# Patient Record
Sex: FEMALE | Race: WHITE | NOT HISPANIC OR LATINO | Employment: OTHER | URBAN - METROPOLITAN AREA
[De-identification: names, ages, dates, MRNs, and addresses within clinical notes are randomized per-mention and may not be internally consistent; named-entity substitution may affect disease eponyms.]

---

## 2017-01-06 RX ORDER — GLIMEPIRIDE 2 MG/1
TABLET ORAL
Qty: 90 TABLET | Refills: 0 | Status: SHIPPED | OUTPATIENT
Start: 2017-01-06 | End: 2017-02-02 | Stop reason: SDUPTHER

## 2017-01-18 ENCOUNTER — HOSPITAL ENCOUNTER (OUTPATIENT)
Dept: INFUSION THERAPY | Facility: HOSPITAL | Age: 67
Discharge: HOME | End: 2017-01-18
Admitting: INTERNAL MEDICINE

## 2017-01-18 VITALS
SYSTOLIC BLOOD PRESSURE: 176 MMHG | OXYGEN SATURATION: 99 % | DIASTOLIC BLOOD PRESSURE: 91 MMHG | TEMPERATURE: 98.6 F | RESPIRATION RATE: 20 BRPM | HEART RATE: 84 BPM

## 2017-01-18 DIAGNOSIS — N18.4 CHRONIC KIDNEY DISEASE, STAGE IV (SEVERE) (HCC): ICD-10-CM

## 2017-01-18 LAB
25(OH)D3 SERPL-MCNC: 44.5 NG/ML (ref 30–100)
ALBUMIN SERPL-MCNC: 3.8 G/DL (ref 3.5–5.2)
ALBUMIN/GLOB SERPL: 0.9 G/DL
ALP SERPL-CCNC: 72 U/L (ref 39–117)
ALT SERPL W P-5'-P-CCNC: 13 U/L (ref 1–33)
ANION GAP SERPL CALCULATED.3IONS-SCNC: 13.2 MMOL/L
AST SERPL-CCNC: 18 U/L (ref 1–32)
BILIRUB SERPL-MCNC: 0.2 MG/DL (ref 0.1–1.2)
BUN BLD-MCNC: 47 MG/DL (ref 8–23)
BUN/CREAT SERPL: 16.2 (ref 7–25)
CALCIUM SPEC-SCNC: 8.9 MG/DL (ref 8.6–10.5)
CALCIUM SPEC-SCNC: 9.2 MG/DL (ref 8.6–10.5)
CHLORIDE SERPL-SCNC: 103 MMOL/L (ref 98–107)
CO2 SERPL-SCNC: 23.8 MMOL/L (ref 22–29)
CREAT BLD-MCNC: 2.9 MG/DL (ref 0.57–1)
DEPRECATED RDW RBC AUTO: 64.1 FL (ref 37–54)
ERYTHROCYTE [DISTWIDTH] IN BLOOD BY AUTOMATED COUNT: 17.6 % (ref 11.7–13)
FERRITIN SERPL-MCNC: 909.2 NG/ML (ref 13–150)
FOLATE SERPL-MCNC: >20 NG/ML (ref 4.78–24.2)
GFR SERPL CREATININE-BSD FRML MDRD: 16 ML/MIN/1.73
GLOBULIN UR ELPH-MCNC: 4.4 GM/DL
GLUCOSE BLD-MCNC: 342 MG/DL (ref 65–99)
HCT VFR BLD AUTO: 35.8 % (ref 35.6–45.5)
HGB BLD-MCNC: 10.9 G/DL (ref 11.9–15.5)
IRON 24H UR-MRATE: 77 MCG/DL (ref 37–145)
IRON SATN MFR SERPL: 32 % (ref 20–50)
MCH RBC QN AUTO: 30.1 PG (ref 26.9–32)
MCHC RBC AUTO-ENTMCNC: 30.4 G/DL (ref 32.4–36.3)
MCV RBC AUTO: 98.9 FL (ref 80.5–98.2)
PHOSPHATE SERPL-MCNC: 3.5 MG/DL (ref 2.5–4.5)
PLATELET # BLD AUTO: 263 10*3/MM3 (ref 140–500)
PMV BLD AUTO: 10.4 FL (ref 6–12)
POTASSIUM BLD-SCNC: 4.3 MMOL/L (ref 3.5–5.2)
PROT SERPL-MCNC: 8.2 G/DL (ref 6–8.5)
PTH-INTACT SERPL-MCNC: 111.7 PG/ML (ref 15–65)
RBC # BLD AUTO: 3.62 10*6/MM3 (ref 3.9–5.2)
SODIUM BLD-SCNC: 140 MMOL/L (ref 136–145)
TIBC SERPL-MCNC: 244 MCG/DL
TRANSFERRIN SERPL-MCNC: 164 MG/DL (ref 200–360)
URATE SERPL-MCNC: 9.6 MG/DL (ref 2.4–5.7)
VIT B12 BLD-MCNC: 412 PG/ML (ref 211–946)
WBC NRBC COR # BLD: 9.55 10*3/MM3 (ref 4.5–10.7)

## 2017-01-18 PROCEDURE — 96372 THER/PROPH/DIAG INJ SC/IM: CPT

## 2017-01-18 PROCEDURE — 84100 ASSAY OF PHOSPHORUS: CPT | Performed by: INTERNAL MEDICINE

## 2017-01-18 PROCEDURE — 82306 VITAMIN D 25 HYDROXY: CPT | Performed by: INTERNAL MEDICINE

## 2017-01-18 PROCEDURE — 85027 COMPLETE CBC AUTOMATED: CPT | Performed by: INTERNAL MEDICINE

## 2017-01-18 PROCEDURE — 82746 ASSAY OF FOLIC ACID SERUM: CPT | Performed by: INTERNAL MEDICINE

## 2017-01-18 PROCEDURE — 83540 ASSAY OF IRON: CPT | Performed by: INTERNAL MEDICINE

## 2017-01-18 PROCEDURE — 25010000002 EPOETIN ALFA PER 1000 UNITS: Performed by: INTERNAL MEDICINE

## 2017-01-18 PROCEDURE — 84550 ASSAY OF BLOOD/URIC ACID: CPT | Performed by: INTERNAL MEDICINE

## 2017-01-18 PROCEDURE — 36415 COLL VENOUS BLD VENIPUNCTURE: CPT

## 2017-01-18 PROCEDURE — 84466 ASSAY OF TRANSFERRIN: CPT | Performed by: INTERNAL MEDICINE

## 2017-01-18 PROCEDURE — 80053 COMPREHEN METABOLIC PANEL: CPT | Performed by: INTERNAL MEDICINE

## 2017-01-18 PROCEDURE — 82607 VITAMIN B-12: CPT | Performed by: INTERNAL MEDICINE

## 2017-01-18 PROCEDURE — 83970 ASSAY OF PARATHORMONE: CPT | Performed by: INTERNAL MEDICINE

## 2017-01-18 PROCEDURE — 82728 ASSAY OF FERRITIN: CPT | Performed by: INTERNAL MEDICINE

## 2017-01-18 RX ADMIN — ERYTHROPOIETIN 20000 UNITS: 20000 INJECTION, SOLUTION INTRAVENOUS; SUBCUTANEOUS at 13:09

## 2017-01-18 NOTE — PROGRESS NOTES
Pt tolerated injection with no complaints.  Injection site x 1 with band aide applied.  Pt D/C per ambulation with spouse @ 1315.

## 2017-01-18 NOTE — IP AVS SNAPSHOT
Maribeth Rendon   1/18/2017 12:30 PM   Injection    Provider:  RO[facility]   Department:  [facility] AMB CARE   Dept Phone:  [phone]                Your Full Care Plan           To Do List     2/6/2017 12:30 PM     Appointment with ROOM 3 [facility] at [facility] CARE ([phone])   [address] [facility] [address]            Your Updated Medication List      ASK your doctor about these medications     amLODIPine 10 MG tablet   Commonly known as:  NORVASC       aspirin 81 MG tablet       bumetanide 2 MG tablet   Commonly known as:  BUMEX   TAKE 1 TABLET EVERY DAY       calcium acetate 667 MG capsule   Commonly known as:  PHOSLO   Take 1 capsule by mouth 3 (three) times a day.       epoetin hermes 80735 UNIT/ML injection   Commonly known as:  EPOGEN,PROCRIT       ferrous fumarate 50 MG CR tablet   Commonly known as:  YADIRA-SEQUELS       gabapentin 300 MG capsule   Commonly known as:  NEURONTIN       glimepiride 2 MG tablet   Commonly known as:  AMARYL   TAKE 1 TABLET EVERY MORNING BEFORE BREAKFAST       linagliptin 5 MG tablet tablet   Commonly known as:  TRADJENTA   Take 1 tablet by mouth Daily. Needs apt       magnesium oxide 400 MG tablet   Commonly known as:  MAG-OX       metoprolol succinate XL 50 MG 24 hr tablet   Commonly known as:  TOPROL-XL   TAKE 1 TABLET EVERY DAY       montelukast 10 MG tablet   Commonly known as:  SINGULAIR   Take 1 tablet by mouth every night.       MULTI VITAMIN DAILY PO       OTEZLA 30 MG tablet   Generic drug:  Apremilast       PROBIOTIC DAILY PO       sertraline 50 MG tablet   Commonly known as:  ZOLOFT   TAKE 1 TABLET EVERY DAY.       tiZANidine 4 MG tablet   Commonly known as:  ZANAFLEX       topiramate 100 MG tablet   Commonly known as:  TOPAMAX       Vitamin D-3 1000 UNITS capsule               [employer] Signup     Our records indicate that you have an active [facility] [employer] account.    You can view your  After Visit Summary by going to SPark! and logging in with your [employer] username and password.  If you don't have a [employer] username and password but a parent or guardian has access to your record, the parent or guardian should login with their own [employer] username and password and access your record to view the After Visit Summary.    If you have questions, you can email [email or web address] or call [phone] to talk to our [employer] staff.  Remember, [employer] is NOT to be used for urgent needs.  For medical emergencies, dial 911.               Other Info from Your Visit           Allergies     Prednisone Hallucinations      Reason for Visit     Injections procrit      Vital Signs     Blood Pressure Pulse Temperature Respirations Oxygen Saturation Smoking Status    176/91 (BP Location: Right arm, Patient Position: Sitting, Cuff Size: Adult) 84 98.6 °F (37 °C) (Oral) 20 99% Former Smoker      Discharge Instructions     None         SYMPTOMS OF A STROKE    Call 911 or have someone take you to the Emergency Department if you have any of the following:    · Sudden numbness or weakness of your face, arm or leg especially on one side of the body  · Sudden confusion, diffiiculty speaking or trouble understanding   · Changes in your vision or loss of sight in one eye  · Sudden severe headache with no known cause  · sudden dizziness, trouble walking, loss of balance or coordination    It is important to seek emergency care right away if you have further stroke symptoms. If you get emergency help quickly, the powerful clot-dissolving medicines can reduce the disabilities caused by a stroke.     For more information:    American Stroke Association  4-094-4-STROKE  www.strokeassociation.org           IF YOU SMOKE OR USE TOBACCO PLEASE READ THE FOLLOWING:    Why is smoking bad for me?  Smoking increases the risk of heart disease, lung disease, vascular disease, stroke, and cancer.     If you smoke,  STOP!    If you would like more information on quitting smoking, please visit the [employer] website: [email or web address]/[email or web address]   This link will provide additional resources including the QUIT line and the Beat the Pack support groups.     For more information:    American Cancer Society  ([phone]    American Heart Association  1-407.594.6545

## 2017-01-19 RX ORDER — GABAPENTIN 300 MG/1
CAPSULE ORAL
Qty: 180 CAPSULE | Refills: 0 | OUTPATIENT
Start: 2017-01-19

## 2017-02-02 ENCOUNTER — OFFICE VISIT (OUTPATIENT)
Dept: FAMILY MEDICINE CLINIC | Facility: CLINIC | Age: 67
End: 2017-02-02

## 2017-02-02 VITALS
BODY MASS INDEX: 33.97 KG/M2 | RESPIRATION RATE: 16 BRPM | SYSTOLIC BLOOD PRESSURE: 120 MMHG | WEIGHT: 199 LBS | HEART RATE: 76 BPM | DIASTOLIC BLOOD PRESSURE: 58 MMHG | HEIGHT: 64 IN | TEMPERATURE: 98.6 F

## 2017-02-02 DIAGNOSIS — E78.5 HYPERLIPIDEMIA, UNSPECIFIED HYPERLIPIDEMIA TYPE: ICD-10-CM

## 2017-02-02 DIAGNOSIS — R25.1 TREMOR OF RIGHT HAND: ICD-10-CM

## 2017-02-02 DIAGNOSIS — E11.42 DM TYPE 2 WITH DIABETIC PERIPHERAL NEUROPATHY (HCC): Primary | ICD-10-CM

## 2017-02-02 PROCEDURE — 99213 OFFICE O/P EST LOW 20 MIN: CPT | Performed by: INTERNAL MEDICINE

## 2017-02-02 RX ORDER — GLIMEPIRIDE 2 MG/1
2 TABLET ORAL
Qty: 90 TABLET | Refills: 1 | Status: SHIPPED | OUTPATIENT
Start: 2017-02-02 | End: 2017-10-17 | Stop reason: SDUPTHER

## 2017-02-02 RX ORDER — MONTELUKAST SODIUM 10 MG/1
10 TABLET ORAL NIGHTLY
Qty: 90 TABLET | Refills: 1 | Status: SHIPPED | OUTPATIENT
Start: 2017-02-02 | End: 2017-10-17 | Stop reason: SDUPTHER

## 2017-02-02 RX ORDER — AMLODIPINE BESYLATE 10 MG/1
10 TABLET ORAL EVERY MORNING
COMMUNITY
End: 2017-09-26 | Stop reason: HOSPADM

## 2017-02-02 RX ORDER — GABAPENTIN 300 MG/1
300 CAPSULE ORAL 2 TIMES DAILY
Qty: 180 CAPSULE | Refills: 1 | Status: SHIPPED | OUTPATIENT
Start: 2017-02-02 | End: 2017-12-20 | Stop reason: SDUPTHER

## 2017-02-02 RX ORDER — METOPROLOL SUCCINATE 50 MG/1
50 TABLET, EXTENDED RELEASE ORAL DAILY
Qty: 90 TABLET | Refills: 1 | Status: SHIPPED | OUTPATIENT
Start: 2017-02-02 | End: 2017-10-17 | Stop reason: SDUPTHER

## 2017-02-02 RX ORDER — BUMETANIDE 2 MG/1
2 TABLET ORAL DAILY
Qty: 90 TABLET | Refills: 1 | Status: SHIPPED | OUTPATIENT
Start: 2017-02-02 | End: 2017-10-17 | Stop reason: SDUPTHER

## 2017-02-02 NOTE — PROGRESS NOTES
Subjective  chief complaint is follow-up for medication refills  Maribeth Rendon is a 66 y.o. female.     History of Present Illness   Maribeth is here today for follow-up.  She does have non-insulin-dependent diabetes mellitus.  She has not had a hemoglobin A1c checked recently that I can see.  She had some lab work drawn at the hospital looking at her chemistry panel and phosphorus and calcium levels as well as blood count.  She did have a blood sugar on her chemistry panel 372.  She does have hypertension for which she takes amlodipine which is prescribed by her kidney doctor.  She also is on metoprolol as well.  She does take gabapentin for neuralgia pain.  She is developing a more rhythmic tremor.  Dr. Avila has her on Topamax for essential tremor.  She has not started dialysis yet.  Her kidney numbers of stabilized but she is going to have a new shunt placed.  The following portions of the patient's history were reviewed and updated as appropriate: allergies, current medications, past medical history and problem list.    Review of Systems   Respiratory: Negative for chest tightness and shortness of breath.    Cardiovascular: Negative for chest pain and leg swelling.   Neurological: Positive for tremors.       Objective   Physical Exam   Constitutional: She appears well-developed and well-nourished.   Cardiovascular: Normal rate, regular rhythm and normal heart sounds.    Pulmonary/Chest: Effort normal and breath sounds normal.   Abdominal: Soft.   Musculoskeletal: She exhibits no edema.   Neurological:   There is a rhythmic tremor of her right arm with some pill rolling quality with the thumb.   Nursing note and vitals reviewed.      Assessment/Plan   Maribeth was seen today for med refill.    Diagnoses and all orders for this visit:    DM type 2 with diabetic peripheral neuropathy  -     Hemoglobin A1c    Hyperlipidemia, unspecified hyperlipidemia type  -     Lipid Panel    Other orders  -     bumetanide (BUMEX) 2  MG tablet; Take 1 tablet by mouth Daily.  -     calcium acetate (PHOSLO) 667 MG capsule; Take 1 capsule by mouth 3 (Three) Times a Day.  -     gabapentin (NEURONTIN) 300 MG capsule; Take 1 capsule by mouth 2 (Two) Times a Day.  -     glimepiride (AMARYL) 2 MG tablet; Take 1 tablet by mouth Every Morning Before Breakfast.  -     linagliptin (TRADJENTA) 5 MG tablet tablet; Take 1 tablet by mouth Daily. Needs apt  -     metoprolol succinate XL (TOPROL-XL) 50 MG 24 hr tablet; Take 1 tablet by mouth Daily.  -     montelukast (SINGULAIR) 10 MG tablet; Take 1 tablet by mouth Every Night.  -     sertraline (ZOLOFT) 50 MG tablet; Take 1 tablet by mouth Daily.      Maribeth is here today for follow-up.  I am going to check her A1c and lipid panel.  She does appear to have her tremor which I think is more parkinsonian.  She is going to discuss this with her neurologist in March.

## 2017-02-03 ENCOUNTER — TELEPHONE (OUTPATIENT)
Dept: FAMILY MEDICINE CLINIC | Facility: CLINIC | Age: 67
End: 2017-02-03

## 2017-02-03 LAB
CHOLEST SERPL-MCNC: 191 MG/DL (ref 100–199)
HBA1C MFR BLD: 7.5 % (ref 4.8–5.6)
HDLC SERPL-MCNC: 34 MG/DL
INTERPRETATION: NORMAL
LDLC SERPL CALC-MCNC: 103 MG/DL (ref 0–99)
Lab: NORMAL
TRIGL SERPL-MCNC: 270 MG/DL (ref 0–149)
VLDLC SERPL CALC-MCNC: 54 MG/DL (ref 5–40)

## 2017-02-03 NOTE — TELEPHONE ENCOUNTER
Laboratory results were given to the patient.  Her A1c is down to 7.5.  Unfortunately due to some inactivity her cholesterol seems to have gone up.  Am going to let her try watching her diet and we will recheck in 3 months.

## 2017-02-06 ENCOUNTER — HOSPITAL ENCOUNTER (OUTPATIENT)
Dept: GENERAL RADIOLOGY | Facility: HOSPITAL | Age: 67
Discharge: HOME | End: 2017-02-06

## 2017-02-06 ENCOUNTER — HOSPITAL ENCOUNTER (OUTPATIENT)
Dept: INFUSION THERAPY | Facility: HOSPITAL | Age: 67
Discharge: HOME | End: 2017-02-06
Admitting: INTERNAL MEDICINE

## 2017-02-06 ENCOUNTER — APPOINTMENT (OUTPATIENT)
Dept: PREADMISSION TESTING | Facility: HOSPITAL | Age: 67
End: 2017-02-06

## 2017-02-06 VITALS
DIASTOLIC BLOOD PRESSURE: 77 MMHG | TEMPERATURE: 97.8 F | HEART RATE: 80 BPM | OXYGEN SATURATION: 100 % | WEIGHT: 196.19 LBS | BODY MASS INDEX: 32.69 KG/M2 | RESPIRATION RATE: 16 BRPM | HEIGHT: 65 IN | SYSTOLIC BLOOD PRESSURE: 168 MMHG

## 2017-02-06 VITALS
HEART RATE: 74 BPM | OXYGEN SATURATION: 95 % | DIASTOLIC BLOOD PRESSURE: 77 MMHG | RESPIRATION RATE: 16 BRPM | SYSTOLIC BLOOD PRESSURE: 168 MMHG | TEMPERATURE: 98.5 F

## 2017-02-06 DIAGNOSIS — N18.4 CHRONIC KIDNEY DISEASE, STAGE IV (SEVERE) (HCC): ICD-10-CM

## 2017-02-06 LAB
ALBUMIN SERPL-MCNC: 3.3 G/DL (ref 3.5–5.2)
ALBUMIN/GLOB SERPL: 0.7 G/DL
ALP SERPL-CCNC: 71 U/L (ref 39–117)
ALT SERPL W P-5'-P-CCNC: 13 U/L (ref 1–33)
ANION GAP SERPL CALCULATED.3IONS-SCNC: 15.3 MMOL/L
APTT PPP: 31.6 SECONDS (ref 22.7–35.4)
AST SERPL-CCNC: 15 U/L (ref 1–32)
BACTERIA UR QL AUTO: ABNORMAL /HPF
BILIRUB SERPL-MCNC: 0.2 MG/DL (ref 0.1–1.2)
BILIRUB UR QL STRIP: NEGATIVE
BUN BLD-MCNC: 45 MG/DL (ref 8–23)
BUN/CREAT SERPL: 14.6 (ref 7–25)
CALCIUM SPEC-SCNC: 8.8 MG/DL (ref 8.6–10.5)
CHLORIDE SERPL-SCNC: 101 MMOL/L (ref 98–107)
CLARITY UR: CLEAR
CO2 SERPL-SCNC: 23.7 MMOL/L (ref 22–29)
COLOR UR: YELLOW
CREAT BLD-MCNC: 3.09 MG/DL (ref 0.57–1)
DEPRECATED RDW RBC AUTO: 60.4 FL (ref 37–54)
ERYTHROCYTE [DISTWIDTH] IN BLOOD BY AUTOMATED COUNT: 17.4 % (ref 11.7–13)
GFR SERPL CREATININE-BSD FRML MDRD: 15 ML/MIN/1.73
GLOBULIN UR ELPH-MCNC: 4.8 GM/DL
GLUCOSE BLD-MCNC: 264 MG/DL (ref 65–99)
GLUCOSE UR STRIP-MCNC: NEGATIVE MG/DL
HCT VFR BLD AUTO: 32.5 % (ref 35.6–45.5)
HGB BLD-MCNC: 10.3 G/DL (ref 11.9–15.5)
HGB UR QL STRIP.AUTO: NEGATIVE
HYALINE CASTS UR QL AUTO: ABNORMAL /LPF
INR PPP: 1.1 (ref 0.9–1.1)
KETONES UR QL STRIP: NEGATIVE
LEUKOCYTE ESTERASE UR QL STRIP.AUTO: NEGATIVE
MCH RBC QN AUTO: 30.3 PG (ref 26.9–32)
MCHC RBC AUTO-ENTMCNC: 31.7 G/DL (ref 32.4–36.3)
MCV RBC AUTO: 95.6 FL (ref 80.5–98.2)
NITRITE UR QL STRIP: NEGATIVE
PH UR STRIP.AUTO: 6.5 [PH] (ref 5–8)
PLATELET # BLD AUTO: 322 10*3/MM3 (ref 140–500)
PMV BLD AUTO: 10 FL (ref 6–12)
POTASSIUM BLD-SCNC: 4.2 MMOL/L (ref 3.5–5.2)
PROT SERPL-MCNC: 8.1 G/DL (ref 6–8.5)
PROT UR QL STRIP: ABNORMAL
PROTHROMBIN TIME: 13.8 SECONDS (ref 11.7–14.2)
RBC # BLD AUTO: 3.4 10*6/MM3 (ref 3.9–5.2)
RBC # UR: ABNORMAL /HPF
REF LAB TEST METHOD: ABNORMAL
SODIUM BLD-SCNC: 140 MMOL/L (ref 136–145)
SP GR UR STRIP: 1.01 (ref 1–1.03)
SQUAMOUS #/AREA URNS HPF: ABNORMAL /HPF
UROBILINOGEN UR QL STRIP: ABNORMAL
WBC NRBC COR # BLD: 10.65 10*3/MM3 (ref 4.5–10.7)
WBC UR QL AUTO: ABNORMAL /HPF

## 2017-02-06 PROCEDURE — 85027 COMPLETE CBC AUTOMATED: CPT | Performed by: SURGERY

## 2017-02-06 PROCEDURE — 36415 COLL VENOUS BLD VENIPUNCTURE: CPT

## 2017-02-06 PROCEDURE — 25010000002 EPOETIN ALFA PER 1000 UNITS: Performed by: INTERNAL MEDICINE

## 2017-02-06 PROCEDURE — 81001 URINALYSIS AUTO W/SCOPE: CPT | Performed by: SURGERY

## 2017-02-06 PROCEDURE — 96372 THER/PROPH/DIAG INJ SC/IM: CPT

## 2017-02-06 PROCEDURE — 85730 THROMBOPLASTIN TIME PARTIAL: CPT | Performed by: SURGERY

## 2017-02-06 PROCEDURE — 71020 HC CHEST PA AND LATERAL: CPT

## 2017-02-06 PROCEDURE — 80053 COMPREHEN METABOLIC PANEL: CPT | Performed by: SURGERY

## 2017-02-06 PROCEDURE — 85610 PROTHROMBIN TIME: CPT | Performed by: SURGERY

## 2017-02-06 RX ADMIN — ERYTHROPOIETIN 20000 UNITS: 20000 INJECTION, SOLUTION INTRAVENOUS; SUBCUTANEOUS at 13:47

## 2017-02-06 NOTE — PROGRESS NOTES
Pt tolerated injection.  Injection site x 1 with band aide applied.  Pt D/C  Per ambulation with spouse @ 3711.

## 2017-02-06 NOTE — DISCHARGE INSTRUCTIONS
Take the following medications the morning of surgery with a small sip of water.    AMLODIPINE  TOPROL   TOPAMAX    General Instructions:  • Do not eat or drink after midnight: includes water, mints, or gum. You may brush your teeth.  • Do not smoke, chew tobacco, or drink alcohol.  • Bring medications in original bottles, any inhalers and if applicable your C-PAP/ BI-PAP machine.  • Bring any papers given to you in the doctor’s office.  • Wear clean comfortable clothes and socks.  • Do not wear contact lenses or make-up.  Bring a case for your glasses if applicable.   • Leave all other valuables and jewelry at home.        Preventing a Surgical Site Infection:  Shower on the morning of surgery using a fresh bar of anti-bacterial soap (such as Dial) and clean washcloth.  Dry with a clean towel and dress in clean clothing.  For 2 to 3 days before surgery, avoid shaving with a razor near where you will have surgery because the razor can irritate skin and make it easier to develop an infection  Ask your surgeon if you will be receiving antibiotics prior to surgery  Make sure you, your family, and all healthcare providers clean their hands with soap and water or an alcohol based hand  before caring for you or your wound  If at all possible, quit smoking as many days before surgery as you can.    Day of surgery:  Upon arrival, a Pre-op nurse and Anesthesiologist will review your health history, obtain vital signs, and answer questions you may have.  The only belongings needed at this time will be your home medications and if applicable your C-PAP/BI-PAP machine.  If you are staying overnight your family can leave the rest of your belongings in the car and bring them to your room later.  A Pre-op nurse will start an IV and you may receive medication in preparation for surgery, including something to help you relax.  Your family will be able to see you in the Pre-op area.  While you are in surgery your family  should notify the waiting room  if they leave the waiting room area and provide a contact phone number.    Please be aware that surgery does come with discomfort.  We want to make every effort to control your discomfort so please discuss any uncontrolled symptoms with your nurse.   Your doctor will most likely have prescribed pain medications.      If you are going home after surgery you will receive individualized written care instructions before being discharged.  A responsible adult must drive you to and from the hospital on the day of your surgery and stay with you for 24 hours.        If you have any questions please call Pre-Admission Testing at [phone].    Deductibles and co-payments are collected on the day of service. Please be prepared to pay the required co-pay, deductible or deposit on the day of service as defined by your plan.

## 2017-02-16 ENCOUNTER — ANESTHESIA EVENT (OUTPATIENT)
Dept: PERIOP | Facility: HOSPITAL | Age: 67
End: 2017-02-16

## 2017-02-16 ENCOUNTER — HOSPITAL ENCOUNTER (OUTPATIENT)
Facility: HOSPITAL | Age: 67
Setting detail: HOSPITAL OUTPATIENT SURGERY
Discharge: HOME | End: 2017-02-16
Attending: SURGERY | Admitting: SURGERY

## 2017-02-16 ENCOUNTER — ANESTHESIA (OUTPATIENT)
Dept: PERIOP | Facility: HOSPITAL | Age: 67
End: 2017-02-16

## 2017-02-16 VITALS
HEART RATE: 56 BPM | WEIGHT: 196.1 LBS | SYSTOLIC BLOOD PRESSURE: 107 MMHG | DIASTOLIC BLOOD PRESSURE: 60 MMHG | BODY MASS INDEX: 32.67 KG/M2 | TEMPERATURE: 97.7 F | OXYGEN SATURATION: 97 % | RESPIRATION RATE: 18 BRPM | HEIGHT: 65 IN

## 2017-02-16 LAB
GLUCOSE BLDC GLUCOMTR-MCNC: 186 MG/DL (ref 70–130)
GLUCOSE BLDC GLUCOMTR-MCNC: 193 MG/DL (ref 70–130)

## 2017-02-16 PROCEDURE — 25010000002 FENTANYL CITRATE (PF) 100 MCG/2ML SOLUTION: Performed by: NURSE ANESTHETIST, CERTIFIED REGISTERED

## 2017-02-16 PROCEDURE — 82962 GLUCOSE BLOOD TEST: CPT

## 2017-02-16 PROCEDURE — 25010000002 PROTAMINE SULFATE PER 10 MG: Performed by: NURSE ANESTHETIST, CERTIFIED REGISTERED

## 2017-02-16 PROCEDURE — 25010000002 PROPOFOL 10 MG/ML EMULSION: Performed by: NURSE ANESTHETIST, CERTIFIED REGISTERED

## 2017-02-16 PROCEDURE — 25010000003 CEFAZOLIN IN DEXTROSE 2-4 GM/100ML-% SOLUTION

## 2017-02-16 PROCEDURE — 25010000002 HEPARIN (PORCINE) PER 1000 UNITS: Performed by: NURSE ANESTHETIST, CERTIFIED REGISTERED

## 2017-02-16 PROCEDURE — 25010000002 MIDAZOLAM PER 1 MG: Performed by: ANESTHESIOLOGY

## 2017-02-16 PROCEDURE — 25010000002 HEPARIN (PORCINE) PER 1000 UNITS: Performed by: SURGERY

## 2017-02-16 PROCEDURE — 25010000002 MIDAZOLAM PER 1 MG: Performed by: NURSE ANESTHETIST, CERTIFIED REGISTERED

## 2017-02-16 PROCEDURE — 25010000003 CEFAZOLIN PER 500 MG: Performed by: SURGERY

## 2017-02-16 RX ORDER — PROMETHAZINE HYDROCHLORIDE 25 MG/1
25 TABLET ORAL ONCE AS NEEDED
Status: DISCONTINUED | OUTPATIENT
Start: 2017-02-16 | End: 2017-02-16 | Stop reason: HOSPADM

## 2017-02-16 RX ORDER — PROTAMINE SULFATE 10 MG/ML
INJECTION, SOLUTION INTRAVENOUS AS NEEDED
Status: DISCONTINUED | OUTPATIENT
Start: 2017-02-16 | End: 2017-02-16 | Stop reason: SURG

## 2017-02-16 RX ORDER — MIDAZOLAM HYDROCHLORIDE 1 MG/ML
2 INJECTION INTRAMUSCULAR; INTRAVENOUS
Status: DISCONTINUED | OUTPATIENT
Start: 2017-02-16 | End: 2017-02-16 | Stop reason: HOSPADM

## 2017-02-16 RX ORDER — CEFAZOLIN SODIUM 2 G/100ML
INJECTION, SOLUTION INTRAVENOUS
Status: COMPLETED
Start: 2017-02-16 | End: 2017-02-16

## 2017-02-16 RX ORDER — PROMETHAZINE HYDROCHLORIDE 25 MG/1
25 SUPPOSITORY RECTAL ONCE AS NEEDED
Status: DISCONTINUED | OUTPATIENT
Start: 2017-02-16 | End: 2017-02-16 | Stop reason: HOSPADM

## 2017-02-16 RX ORDER — MIDAZOLAM HYDROCHLORIDE 1 MG/ML
INJECTION INTRAMUSCULAR; INTRAVENOUS AS NEEDED
Status: DISCONTINUED | OUTPATIENT
Start: 2017-02-16 | End: 2017-02-16 | Stop reason: SURG

## 2017-02-16 RX ORDER — SODIUM CHLORIDE 9 MG/ML
9 INJECTION, SOLUTION INTRAVENOUS CONTINUOUS PRN
Status: DISCONTINUED | OUTPATIENT
Start: 2017-02-16 | End: 2017-02-16 | Stop reason: HOSPADM

## 2017-02-16 RX ORDER — SODIUM CHLORIDE 0.9 % (FLUSH) 0.9 %
1-10 SYRINGE (ML) INJECTION AS NEEDED
Status: DISCONTINUED | OUTPATIENT
Start: 2017-02-16 | End: 2017-02-16 | Stop reason: HOSPADM

## 2017-02-16 RX ORDER — PROMETHAZINE HYDROCHLORIDE 25 MG/ML
12.5 INJECTION, SOLUTION INTRAMUSCULAR; INTRAVENOUS ONCE AS NEEDED
Status: DISCONTINUED | OUTPATIENT
Start: 2017-02-16 | End: 2017-02-16 | Stop reason: HOSPADM

## 2017-02-16 RX ORDER — HYDROCODONE BITARTRATE AND ACETAMINOPHEN 5; 325 MG/1; MG/1
1-2 TABLET ORAL EVERY 4 HOURS PRN
Qty: 30 TABLET | Refills: 0 | Status: SHIPPED | OUTPATIENT
Start: 2017-02-16 | End: 2018-09-20

## 2017-02-16 RX ORDER — FENTANYL CITRATE 50 UG/ML
50 INJECTION, SOLUTION INTRAMUSCULAR; INTRAVENOUS
Status: DISCONTINUED | OUTPATIENT
Start: 2017-02-16 | End: 2017-02-16 | Stop reason: HOSPADM

## 2017-02-16 RX ORDER — MIDAZOLAM HYDROCHLORIDE 1 MG/ML
1 INJECTION INTRAMUSCULAR; INTRAVENOUS
Status: DISCONTINUED | OUTPATIENT
Start: 2017-02-16 | End: 2017-02-16 | Stop reason: HOSPADM

## 2017-02-16 RX ORDER — NALOXONE HCL 0.4 MG/ML
0.2 VIAL (ML) INJECTION AS NEEDED
Status: DISCONTINUED | OUTPATIENT
Start: 2017-02-16 | End: 2017-02-16 | Stop reason: HOSPADM

## 2017-02-16 RX ORDER — LIDOCAINE HYDROCHLORIDE 10 MG/ML
1 INJECTION, SOLUTION EPIDURAL; INFILTRATION; INTRACAUDAL; PERINEURAL ONCE
Status: DISCONTINUED | OUTPATIENT
Start: 2017-02-16 | End: 2017-02-16 | Stop reason: HOSPADM

## 2017-02-16 RX ORDER — HEPARIN SODIUM 1000 [USP'U]/ML
INJECTION, SOLUTION INTRAVENOUS; SUBCUTANEOUS AS NEEDED
Status: DISCONTINUED | OUTPATIENT
Start: 2017-02-16 | End: 2017-02-16 | Stop reason: SURG

## 2017-02-16 RX ORDER — FENTANYL CITRATE 50 UG/ML
INJECTION, SOLUTION INTRAMUSCULAR; INTRAVENOUS AS NEEDED
Status: DISCONTINUED | OUTPATIENT
Start: 2017-02-16 | End: 2017-02-16 | Stop reason: SURG

## 2017-02-16 RX ORDER — HYDRALAZINE HYDROCHLORIDE 20 MG/ML
5 INJECTION INTRAMUSCULAR; INTRAVENOUS
Status: DISCONTINUED | OUTPATIENT
Start: 2017-02-16 | End: 2017-02-16 | Stop reason: HOSPADM

## 2017-02-16 RX ORDER — CEFAZOLIN SODIUM 2 G/100ML
2 INJECTION, SOLUTION INTRAVENOUS ONCE
Status: DISCONTINUED | OUTPATIENT
Start: 2017-02-16 | End: 2017-02-16 | Stop reason: HOSPADM

## 2017-02-16 RX ORDER — PROPOFOL 10 MG/ML
VIAL (ML) INTRAVENOUS CONTINUOUS PRN
Status: DISCONTINUED | OUTPATIENT
Start: 2017-02-16 | End: 2017-02-16 | Stop reason: SURG

## 2017-02-16 RX ORDER — FLUMAZENIL 0.1 MG/ML
0.2 INJECTION INTRAVENOUS AS NEEDED
Status: DISCONTINUED | OUTPATIENT
Start: 2017-02-16 | End: 2017-02-16 | Stop reason: HOSPADM

## 2017-02-16 RX ORDER — DIPHENHYDRAMINE HYDROCHLORIDE 50 MG/ML
12.5 INJECTION INTRAMUSCULAR; INTRAVENOUS
Status: DISCONTINUED | OUTPATIENT
Start: 2017-02-16 | End: 2017-02-16 | Stop reason: HOSPADM

## 2017-02-16 RX ORDER — HYDROCODONE BITARTRATE AND ACETAMINOPHEN 5; 325 MG/1; MG/1
1 TABLET ORAL ONCE AS NEEDED
Status: DISCONTINUED | OUTPATIENT
Start: 2017-02-16 | End: 2017-02-16 | Stop reason: HOSPADM

## 2017-02-16 RX ORDER — ONDANSETRON 2 MG/ML
4 INJECTION INTRAMUSCULAR; INTRAVENOUS ONCE AS NEEDED
Status: DISCONTINUED | OUTPATIENT
Start: 2017-02-16 | End: 2017-02-16 | Stop reason: HOSPADM

## 2017-02-16 RX ORDER — FAMOTIDINE 10 MG/ML
20 INJECTION, SOLUTION INTRAVENOUS ONCE
Status: COMPLETED | OUTPATIENT
Start: 2017-02-16 | End: 2017-02-16

## 2017-02-16 RX ORDER — LABETALOL HYDROCHLORIDE 5 MG/ML
5 INJECTION, SOLUTION INTRAVENOUS
Status: DISCONTINUED | OUTPATIENT
Start: 2017-02-16 | End: 2017-02-16 | Stop reason: HOSPADM

## 2017-02-16 RX ADMIN — SODIUM CHLORIDE: 9 INJECTION, SOLUTION INTRAVENOUS at 08:48

## 2017-02-16 RX ADMIN — MIDAZOLAM HYDROCHLORIDE 2 MG: 1 INJECTION, SOLUTION INTRAMUSCULAR; INTRAVENOUS at 07:46

## 2017-02-16 RX ADMIN — FAMOTIDINE 20 MG: 10 INJECTION, SOLUTION INTRAVENOUS at 06:59

## 2017-02-16 RX ADMIN — HEPARIN SODIUM 3000 UNITS: 1000 INJECTION, SOLUTION INTRAVENOUS; SUBCUTANEOUS at 08:17

## 2017-02-16 RX ADMIN — PROTAMINE SULFATE 10 MG: 10 INJECTION, SOLUTION INTRAVENOUS at 08:46

## 2017-02-16 RX ADMIN — SODIUM CHLORIDE 9 ML/HR: 9 INJECTION, SOLUTION INTRAVENOUS at 06:59

## 2017-02-16 RX ADMIN — PROPOFOL 75 MCG/KG/MIN: 10 INJECTION, EMULSION INTRAVENOUS at 07:45

## 2017-02-16 RX ADMIN — CEFAZOLIN SODIUM 2 G: 2 INJECTION, SOLUTION INTRAVENOUS at 07:42

## 2017-02-16 RX ADMIN — FENTANYL CITRATE 25 MCG: 50 INJECTION INTRAMUSCULAR; INTRAVENOUS at 07:59

## 2017-02-16 RX ADMIN — MIDAZOLAM 1 MG: 1 INJECTION INTRAMUSCULAR; INTRAVENOUS at 06:59

## 2017-02-16 NOTE — PLAN OF CARE
Problem: Perioperative Period (Adult)  Goal: Signs and Symptoms of Listed Potential Problems Will be Absent or Manageable (Perioperative Period)  Outcome: Ongoing (interventions implemented as appropriate)    02/16/17 0615   Perioperative Period   Problems Assessed (Perioperative Period) pain   Problems Present (Perioperative Period) pain

## 2017-02-16 NOTE — DISCHARGE INSTRUCTIONS
"[facility]  [place] [place], Melisa Frey [place], [place], [place]  [address], [address]  [phone]    Post-Operative Instructions for AV Fistula / Graft   Diet: Regular Diet    Medications: Take your regularly scheduled medications on the day of your surgery, unless your doctor has directed you otherwise. You may be sent home with a prescription for pain medication, follow the directions as prescribed.    Activity Restrictions / Driving: Avoid lifting more than 8 pounds or other activities that stress or compress the access area. No driving for the remainder of the day after surgery. You may drive when you no longer are taking narcotic pain medications.    Incision Care: Some bruising is normal. If you have drainage from the incision please notify the office.  OK to shower in 48 hrs. Do not submerge incision until cleared by your surgeon (bath or swimming).    Bathing and Showering: You may shower in 48 hrs.q    Follow-up Appointments: You will need to return to the office for a follow-up visit within 1-3 weeks after your surgery. Please make sure you have your appointment scheduled, call [phone].    The patient (you) should:  1. Avoid wearing tight constrictive clothing over that arm.  2. Avoid wearing jewelry that is tight, such as a watch on the access arm.  3. Avoid carrying heavy objects.  4. Avoid purse straps over the fistula.  5. Avoid sleeping on the arm or keeping it bent for extended periods of time.  6. Each day, using your opposite hand, feel over the fistula for the \"thrill\" or vibration that is normally present.    Fistula Information / Care:  ·  It is normal to have swelling in the surgical area. To help control this swelling, you should elevate your arm on a pillow.  ·  Wiggle your fingers and clinch your fist 10 times every hour, while awake, for the first 5-7 days. Also, bend and straighten at the elbow to regain normal range of motion. These exercises are " designed to promote circulation in the fingers and aid in draining away the excess fluid accumulation in the immediate area.  · No blood pressures or needle sticks in the arm with your access.    Call the office for the followin. Fever greater than 101.0  2. Uncontrolled pain. This is on a scale of 1-10 (10 being the worst pain imaginable) your pain is a level 7 or above.  3. It is important that you notify our office if you are having numbness and significant pain in the extremity in which you have just had surgery!  4. Decreased or absent thrill.  5. Nausea, diarrhea, and/or vomiting that continue for 12-24 hours.  6. Signs of an infection: redness, increased swelling, drainage, fever and/or chills.  7. Chest pain or difficulty breathing.    The fistula or graft CAN NOT be used until the MD has given written approval. Generally, a graft will be ready to use in 2 weeks, and a fistula will be ready to use in 6-8 weeks.     If you have further questions after reading this handout, the office is open from 8:30am to 5:00pm Monday through Friday. Call [phone].

## 2017-02-16 NOTE — ANESTHESIA PREPROCEDURE EVALUATION
Anesthesia Evaluation     Patient summary reviewed   NPO Status: > 8 hours   Airway   Mallampati: II  TM distance: <3 FB  possible difficult intubation  Dental    (+) poor dentation    Pulmonary     breath sounds clear to auscultation  (+) pneumonia , shortness of breath,   Cardiovascular - normal exam    ECG reviewed  Rhythm: regular  Rate: normal    (+) hypertension,       Neuro/Psych  (+) seizures, psychiatric history Depression,    GI/Hepatic/Renal/Endo    (+)  GERD, chronic renal disease ESRD,     Musculoskeletal     Abdominal    Substance History      OB/GYN          Other   (+) arthritis         Phys Exam Other: Very loose teeth, incisors and molars                          Anesthesia Plan    ASA 3     MAC     intravenous induction   Anesthetic plan and risks discussed with patient and spouse/significant other.

## 2017-02-16 NOTE — PLAN OF CARE
Problem: Patient Care Overview (Adult)  Goal: Plan of Care Review  Outcome: Outcome(s) achieved Date Met:  02/16/17 02/16/17 1019   Coping/Psychosocial Response Interventions   Plan Of Care Reviewed With patient;spouse   Patient Care Overview   Progress improving   Outcome Evaluation   Outcome Summary/Follow up Plan ready for discharge       Goal: Adult Individualization and Mutuality  Outcome: Outcome(s) achieved Date Met:  02/16/17  Goal: Discharge Needs Assessment  Outcome: Outcome(s) achieved Date Met:  02/16/17    Problem: Perioperative Period (Adult)  Goal: Signs and Symptoms of Listed Potential Problems Will be Absent or Manageable (Perioperative Period)  Outcome: Outcome(s) achieved Date Met:  02/16/17

## 2017-02-16 NOTE — ANESTHESIA POSTPROCEDURE EVALUATION
Patient: Maribeth Rendon    Procedure Summary     Date Anesthesia Start Anesthesia Stop Room / Location    02/16/17 0739 0910  [facility] / [facility]       Procedure Diagnosis Surgeon Provider    LT BRACHIAL CEPHALIC WITH FISTULA LIGATION RADIAL CEPHALIC. (Left ) No diagnosis on file. MD Tru Sharma MD          Anesthesia Type: MAC  Last vitals  /60 (02/16/17 1010)    Temp      Pulse 56 (02/16/17 1010)   Resp 18 (02/16/17 1010)    SpO2 97 % (02/16/17 1010)      Post Anesthesia Care and Evaluation    Patient location during evaluation: PHASE II  Patient participation: complete - patient participated  Level of consciousness: awake and alert  Pain management: adequate  Airway patency: patent  Anesthetic complications: No anesthetic complications    Cardiovascular status: acceptable  Respiratory status: acceptable  Hydration status: acceptable

## 2017-02-16 NOTE — PLAN OF CARE
Problem: Patient Care Overview (Adult)  Goal: Plan of Care Review  Outcome: Ongoing (interventions implemented as appropriate)    02/16/17 0614   Coping/Psychosocial Response Interventions   Plan Of Care Reviewed With patient   Patient Care Overview   Progress no change       Goal: Adult Individualization and Mutuality  Outcome: Ongoing (interventions implemented as appropriate)    02/16/17 0614   Individualization   Patient Specific Preferences arthritis bothering her

## 2017-02-16 NOTE — H&P
Date of Admission:  2/16/2017   LOS: 0 days   Patient Care Team:  Robby Chaney MD as PCP - General  Robby Chaney MD as PCP - Family Medicine  Eddie Hodges MD as Consulting Physician (Cardiology)        Subjective     Consults  History of Present Illness  Review of Systems    Past Medical History   Diagnosis Date   • Anemia    • Anxiety    • Depression    • Diabetes mellitus, type 2    • ESRD (end stage renal disease)      HAS LEFT FOREARM FISTULA   • GERD (gastroesophageal reflux disease)    • History of MRSA infection 03/15/2016     ABDOMINAL WOUND,   INFECTION CONTROLL NOTIFIED 2-6-2017   • Hyperlipidemia    • Hypertension    • Hypokalemia    • Hypomagnesemia    • Hypoxic 01/2016     WHEN SHE HAD PNEUMONIA   • Intertrigo    • Leukocytosis    • Osteoarthritis    • Pneumonia 01/2016   • Psoriasis    • Psoriatic arthritis    • Seizures      one time   • Sepsis 01/2016   • SOB (shortness of breath)    • Stage 4 chronic renal impairment associated with type 2 diabetes mellitus    • Staph infection      HX RIGHT FOOT AT [facility] 01/09/2010   • Streptococcal bacteremia    • Swelling of hand 10/27/2016     LEFT HAND SWELLIMG SINCE LEFT FISTULA SURGERY   • Tremor, essential      Past Surgical History   Procedure Laterality Date   • Dilatation and curettage  1991   • Foot surgery Right 2010     REMOVED BONE IN RIGHT GREAT TOE   • Excision mass trunk N/A 3/22/2016     Procedure: I&D LOWER ABDOMINAL  WOUND;  Surgeon: Tru Yi MD;  Location: [facility];  Service:    • Abdominal wall abscess incision and drainage  2016   • Arteriovenous fistula/shunt surgery Left 10/27/2016     Procedure: LT FOREARM FISTULA;  Surgeon: Lorelei Haque Jr., MD;  Location: [facility];  Service:    • Cataract extraction w/ intraocular lens implant Bilateral 2011   • Hysterectomy  1992     Family History   Problem Relation Age of Onset   • Heart attack Mother    • Heart disease Mother    • Kidney  disease Mother    • Heart attack Father    • Heart disease Father    • Hypertension Father    • Stroke Father      ISCHEMIC   • Diabetes Father      TYPE 2   • Kidney disease Brother    • Diabetes Brother      TYPE 1   • Thyroid disease Daughter    • Anxiety disorder Maternal Aunt    • Bipolar disorder Maternal Aunt    • Depression Maternal Aunt    • Lupus Maternal Aunt      LUPUS ANTICOAGLULANT   • Rheum arthritis Maternal Aunt    • Alcohol abuse Maternal Uncle    • Other Maternal Uncle      PULMONARY DISEASE     Social History   Substance Use Topics   • Smoking status: Former Smoker     Packs/day: 2.00     Years: 20.00     Types: Cigarettes     Quit date: 10/21/1992   • Smokeless tobacco: Never Used      Comment: quit 1993   • Alcohol use No     Prescriptions Prior to Admission   Medication Sig Dispense Refill Last Dose   • amLODIPine (NORVASC) 10 MG tablet Take 10 mg by mouth Every Morning.   2/16/2017 at 0430   • Apremilast (OTEZLA) 30 MG tablet Take 30 mg by mouth Every Morning.   2/15/2017 at 1030   • aspirin 81 MG tablet Take 81 mg by mouth Every Morning. TO STOP THE DAY BEFORE SURGERY   2/15/2017 at 1030   • bumetanide (BUMEX) 2 MG tablet Take 1 tablet by mouth Daily. (Patient taking differently: Take 2 mg by mouth Every Morning.) 90 tablet 1 2/15/2017 at 1030   • calcium acetate (PHOSLO) 667 MG capsule Take 1 capsule by mouth 3 (Three) Times a Day. 270 capsule 1 2/15/2017 at 2200   • Cholecalciferol (VITAMIN D-3) 1000 UNITS capsule Take 1,000 Units by mouth 2 (Two) Times a Day.   2/15/2017 at 2200   • epoetin hermes (EPOGEN,PROCRIT) 96574 UNIT/ML injection Inject 20,000 Units under the skin Every 14 (Fourteen) Days. LAST TIME 2-6-2017 2/6/2017   • ferrous fumarate (YADIRA-SEQUELS) 50 MG CR tablet Take 65 mg by mouth 2 (Two) Times a Day.   2/15/2017 at 2200   • gabapentin (NEURONTIN) 300 MG capsule Take 1 capsule by mouth 2 (Two) Times a Day. 180 capsule 1 2/15/2017 at 2200   • glimepiride (AMARYL) 2 MG  tablet Take 1 tablet by mouth Every Morning Before Breakfast. 90 tablet 1 2/15/2017 at 2200   • linagliptin (TRADJENTA) 5 MG tablet tablet Take 1 tablet by mouth Daily. Needs apt (Patient taking differently: Take 5 mg by mouth Every Morning. Needs apt) 90 tablet 1 2/15/2017 at 1030   • magnesium oxide (MAG-OX) 400 MG tablet Take 400 mg by mouth Daily.   2/15/2017 at 1030   • metoprolol succinate XL (TOPROL-XL) 50 MG 24 hr tablet Take 1 tablet by mouth Daily. (Patient taking differently: Take 50 mg by mouth Every Morning.) 90 tablet 1 2/16/2017 at 0430   • montelukast (SINGULAIR) 10 MG tablet Take 1 tablet by mouth Every Night. 90 tablet 1 2/15/2017 at 2200   • Multiple Vitamin (MULTI VITAMIN DAILY PO) Take 1 tablet by mouth Every Morning.   2/15/2017 at 1030   • Probiotic Product (PROBIOTIC DAILY PO) Take 1 capsule by mouth Daily.   2/15/2017 at 1030   • sertraline (ZOLOFT) 50 MG tablet Take 1 tablet by mouth Daily. (Patient taking differently: Take 50 mg by mouth Every Morning.) 90 tablet 1 2/15/2017 at 1030   • topiramate (TOPAMAX) 100 MG tablet Take 100 mg by mouth Every Morning. For 1 week then stop   2/15/2017 at 1030       ceFAZolin in dextrose      ceFAZolin 2 g Intravenous Once   lidocaine PF 1 mL Infiltration Once       sodium chloride 9 mL/hr Last Rate: 9 mL/hr (02/16/17 0659)     midazolam **OR** midazolam  •  sodium chloride  •  sodium chloride  Prednisone    Objective     Physical Exam:  Physical Exam   Constitutional: She appears well-developed and well-nourished.   HENT:   Head: Normocephalic and atraumatic.   Eyes: Pupils are equal, round, and reactive to light. No scleral icterus.   Neck: No JVD present. No thyromegaly present.   Pulmonary/Chest: Effort normal. No respiratory distress.   Abdominal: Soft. Bowel sounds are normal.   Left mid RCF with thrill but very small.    Vascular Findings:  No acute complaints, some stable left hand swelling    Vital Signs and Labs:  Vital Signs Patient Vitals  "for the past 24 hrs:   BP Temp Temp src Resp SpO2 Height Weight   02/16/17 0606 157/66 98.3 °F (36.8 °C) Oral 20 99 % 65\" (165.1 cm) 196 lb 1.6 oz (89 kg)     I/O:       CBC        There are no hospital problems to display for this patient.    Assessment/Plan     Active Problems:    * No active hospital problems. *      66 y.o. female ESRD with a left arm RCF that is not matured, will need conversion to BCF.  R/B/A/ d/w pt.     I discussed the patients findings and my recommendations with patient, family and nursing staff.    Gurmeet Carver MD  02/16/17  7:26 AM    Please call my office with any question: [phone]            "

## 2017-02-16 NOTE — OP NOTE
Date of Admission:  2/16/2017 02/16/17  Gurmeet Carver MD      Preoperative Diagnosis: Chronic Kidney Disease Stage IV with left radiocephalic fistula that has failed to mature.    Postoperative Diagnosis: same as above    Procedure Performed:   left brachial-cephalic fistula creation (74274)  Ligation of left radiocephalic fistula.    Surgeon: Gurmeet Carver MD    Assistant:  Landy BURCH    Anesthesia: MAC with local    Estimated Blood Loss: minimal    Findings:     Vein quality:  Good   Artery quality:  Good   Post operative thrill quality:  Good   Post distal perfusion: Multiphasic signals at the wrist and palmar arch.   Left radiocephalic fistula ligated just after anastomosis.    Specimen: none    Complications: none    Dispo: to PACU    Indication for procedure: 66 y.o. female with running kidney disease stage IV with previously placed left radiocephalic fistula in October.  This remains patent but has failed to mature.  Recently she underwent a fistulogram that failed to demonstrate any stenoses as the cause of failure to maturation.  She did however have a small radial artery feeding the fistula which I believed to be the cause.  She was counseled on the risks benefits alternatives to undergoing fistula revision she agreed procedure informed consent was obtained.    Description of procedure:   The patient was taken to the operating room and placed in the supine position.  The arm was extended on an arm board and then prepped and draped in the usual sterile fashion.  Preoperative antibiotics were given.  A full surgical timeout was done.       Just proximal in the forearm to the previous incision a small oblique incision was made over the fistula just after the anastomosis.  Blunt dissection was carried out down to the level of the fistula here was encircled with a silk suture and tied.  Doppler is used to interrogate the fistula which showed cessation of flow.     A longitudinal incision was  made under local anesthesia and the cephalic vein was dissected out for several centimeters.  Next the deep fascia was opened and the brachial artery was dissected out. The patient was systemically heparinized. The vein was marked for orientation, divided and tied off with silk ties.  The vein was flushed with heparinized saline and a coronary dilator was passed proximally to ensure no venous webs.  After the heparin had been allowed to circulate for 5 minutes clamps were applied distally first and then proximally to the brachial artery.  It was opened longitudinally with an 11 blade and then Napoles scissors and then an anastomosis was created using 6-0 running Prolene between the artery and the vein.  Appropriate backbleeding and flushing maneuvers were done.      Flow was then restored.  The field was irrigated with sterile saline.  It was closed in the subcutaneous layers using 3-0 Vicryl and then in the skin subcuticular layer using 4-0 Vicryl.  The incision was clean and dry and covered in Dermabond.  The patient tolerated procedure well, counts are correct.      Gurmeet Carver MD  02/16/17    Active Hospital Problems (** Indicates Principal Problem)    Diagnosis Date Noted   • Chronic kidney disease, stage IV (severe) [N18.4] 01/27/2016      Resolved Hospital Problems    Diagnosis Date Noted Date Resolved   No resolved problems to display.

## 2017-02-17 ENCOUNTER — HOSPITAL ENCOUNTER (OUTPATIENT)
Dept: CARDIOLOGY | Facility: HOSPITAL | Age: 67
Discharge: HOME | End: 2017-02-17
Attending: SURGERY | Admitting: SURGERY

## 2017-02-17 ENCOUNTER — TRANSCRIBE ORDERS (OUTPATIENT)
Dept: ADMINISTRATIVE | Facility: HOSPITAL | Age: 67
End: 2017-02-17

## 2017-02-17 DIAGNOSIS — N18.5 CHRONIC KIDNEY DISEASE (CKD), STAGE 5: ICD-10-CM

## 2017-02-17 DIAGNOSIS — N18.6 END STAGE RENAL DISEASE (HCC): ICD-10-CM

## 2017-02-17 DIAGNOSIS — N18.6 END STAGE RENAL DISEASE (HCC): Primary | ICD-10-CM

## 2017-02-17 LAB
BH CV UPPER ARTERIAL LEFT 1ST DIGIT SYS MAX: 32 MMHG
BH CV UPPER ARTERIAL LEFT 2ND DIGIT SYS MAX: 48 MMHG
BH CV UPPER ARTERIAL LEFT 3RD DIGIT SYS MAX: 49 MMHG
BH CV UPPER ARTERIAL LEFT 4TH DIGIT SYS MAX: 59 MMHG
BH CV UPPER ARTERIAL LEFT 5TH DIGIT SYS MAX: 49 MMHG
BH CV UPPER ARTERIAL LEFT FBI 1ST DIGIT RATIO: 0.21
BH CV UPPER ARTERIAL LEFT FBI 2ND DIGIT RATIO: 0.31
BH CV UPPER ARTERIAL LEFT FBI 3RD DIGIT RATIO: 0.32
BH CV UPPER ARTERIAL LEFT FBI 4TH DIGIT RATIO: 0.39
BH CV UPPER ARTERIAL LEFT FBI 5TH DIGIT RATIO: 0.32
BH CV UPPER ARTERIAL LEFT WBI RATIO: 0.62
UPPER ARTERIAL LEFT ARM RADIAL SYS MAX: 95 MMHG
UPPER ARTERIAL LEFT ARM ULNAR SYS MAX: 93 MMHG
UPPER ARTERIAL RIGHT ARM BRACHIAL SYS MAX: 153 MMHG

## 2017-02-17 PROCEDURE — 93922 UPR/L XTREMITY ART 2 LEVELS: CPT

## 2017-02-24 ENCOUNTER — HOSPITAL ENCOUNTER (OUTPATIENT)
Dept: INFUSION THERAPY | Facility: HOSPITAL | Age: 67
Discharge: HOME | End: 2017-02-24
Admitting: INTERNAL MEDICINE

## 2017-02-24 VITALS
RESPIRATION RATE: 20 BRPM | TEMPERATURE: 97.9 F | SYSTOLIC BLOOD PRESSURE: 157 MMHG | HEART RATE: 78 BPM | OXYGEN SATURATION: 97 % | DIASTOLIC BLOOD PRESSURE: 71 MMHG

## 2017-02-24 DIAGNOSIS — N18.4 CHRONIC KIDNEY DISEASE, STAGE IV (SEVERE) (HCC): ICD-10-CM

## 2017-02-24 LAB
ANION GAP SERPL CALCULATED.3IONS-SCNC: 17.1 MMOL/L
BUN BLD-MCNC: 59 MG/DL (ref 8–23)
BUN/CREAT SERPL: 15.3 (ref 7–25)
CALCIUM SPEC-SCNC: 9 MG/DL (ref 8.6–10.5)
CHLORIDE SERPL-SCNC: 97 MMOL/L (ref 98–107)
CO2 SERPL-SCNC: 25.9 MMOL/L (ref 22–29)
CREAT BLD-MCNC: 3.86 MG/DL (ref 0.57–1)
GFR SERPL CREATININE-BSD FRML MDRD: 12 ML/MIN/1.73
GLUCOSE BLD-MCNC: 282 MG/DL (ref 65–99)
HCT VFR BLD AUTO: 32.3 % (ref 35.6–45.5)
HGB BLD-MCNC: 10.1 G/DL (ref 11.9–15.5)
MAGNESIUM SERPL-MCNC: 2.3 MG/DL (ref 1.6–2.4)
POTASSIUM BLD-SCNC: 4.2 MMOL/L (ref 3.5–5.2)
SODIUM BLD-SCNC: 140 MMOL/L (ref 136–145)

## 2017-02-24 PROCEDURE — 80048 BASIC METABOLIC PNL TOTAL CA: CPT | Performed by: INTERNAL MEDICINE

## 2017-02-24 PROCEDURE — 85018 HEMOGLOBIN: CPT | Performed by: INTERNAL MEDICINE

## 2017-02-24 PROCEDURE — 96372 THER/PROPH/DIAG INJ SC/IM: CPT

## 2017-02-24 PROCEDURE — 83735 ASSAY OF MAGNESIUM: CPT | Performed by: INTERNAL MEDICINE

## 2017-02-24 PROCEDURE — 85014 HEMATOCRIT: CPT | Performed by: INTERNAL MEDICINE

## 2017-02-24 PROCEDURE — 36415 COLL VENOUS BLD VENIPUNCTURE: CPT

## 2017-02-24 PROCEDURE — 63510000001 EPOETIN ALFA PER 1000 UNITS: Performed by: INTERNAL MEDICINE

## 2017-02-24 RX ADMIN — ERYTHROPOIETIN 20000 UNITS: 20000 INJECTION, SOLUTION INTRAVENOUS; SUBCUTANEOUS at 13:09

## 2017-03-10 ENCOUNTER — HOSPITAL ENCOUNTER (OUTPATIENT)
Dept: INFUSION THERAPY | Facility: HOSPITAL | Age: 67
Discharge: HOME | End: 2017-03-10
Admitting: INTERNAL MEDICINE

## 2017-03-10 VITALS
SYSTOLIC BLOOD PRESSURE: 151 MMHG | RESPIRATION RATE: 20 BRPM | TEMPERATURE: 97.8 F | DIASTOLIC BLOOD PRESSURE: 76 MMHG | HEART RATE: 66 BPM | OXYGEN SATURATION: 98 %

## 2017-03-10 DIAGNOSIS — N18.4 CHRONIC KIDNEY DISEASE, STAGE IV (SEVERE) (HCC): ICD-10-CM

## 2017-03-10 DIAGNOSIS — N18.4 ANEMIA OF CHRONIC KIDNEY FAILURE, STAGE 4 (SEVERE) (HCC): Primary | ICD-10-CM

## 2017-03-10 DIAGNOSIS — D63.1 ANEMIA OF CHRONIC KIDNEY FAILURE, STAGE 4 (SEVERE) (HCC): Primary | ICD-10-CM

## 2017-03-10 LAB
HCT VFR BLD AUTO: 32 % (ref 35.6–45.5)
HGB BLD-MCNC: 10 G/DL (ref 11.9–15.5)

## 2017-03-10 PROCEDURE — 96372 THER/PROPH/DIAG INJ SC/IM: CPT | Performed by: NURSE PRACTITIONER

## 2017-03-10 PROCEDURE — 85018 HEMOGLOBIN: CPT | Performed by: INTERNAL MEDICINE

## 2017-03-10 PROCEDURE — 85014 HEMATOCRIT: CPT | Performed by: INTERNAL MEDICINE

## 2017-03-10 PROCEDURE — 36415 COLL VENOUS BLD VENIPUNCTURE: CPT

## 2017-03-10 PROCEDURE — 63510000001 EPOETIN ALFA PER 1000 UNITS: Performed by: INTERNAL MEDICINE

## 2017-03-10 RX ADMIN — ERYTHROPOIETIN 20000 UNITS: 20000 INJECTION, SOLUTION INTRAVENOUS; SUBCUTANEOUS at 13:46

## 2017-03-24 ENCOUNTER — HOSPITAL ENCOUNTER (OUTPATIENT)
Dept: INFUSION THERAPY | Facility: HOSPITAL | Age: 67
Discharge: HOME | End: 2017-03-24
Admitting: INTERNAL MEDICINE

## 2017-03-24 VITALS
SYSTOLIC BLOOD PRESSURE: 142 MMHG | RESPIRATION RATE: 20 BRPM | OXYGEN SATURATION: 98 % | TEMPERATURE: 99.1 F | HEART RATE: 67 BPM | DIASTOLIC BLOOD PRESSURE: 60 MMHG

## 2017-03-24 DIAGNOSIS — N18.4 CHRONIC KIDNEY DISEASE, STAGE IV (SEVERE) (HCC): ICD-10-CM

## 2017-03-24 LAB
ANION GAP SERPL CALCULATED.3IONS-SCNC: 16.3 MMOL/L
BUN BLD-MCNC: 53 MG/DL (ref 8–23)
BUN/CREAT SERPL: 14.7 (ref 7–25)
CALCIUM SPEC-SCNC: 9.5 MG/DL (ref 8.6–10.5)
CHLORIDE SERPL-SCNC: 96 MMOL/L (ref 98–107)
CO2 SERPL-SCNC: 26.7 MMOL/L (ref 22–29)
CREAT BLD-MCNC: 3.6 MG/DL (ref 0.57–1)
GFR SERPL CREATININE-BSD FRML MDRD: 13 ML/MIN/1.73
GLUCOSE BLD-MCNC: 259 MG/DL (ref 65–99)
HCT VFR BLD AUTO: 32.7 % (ref 35.6–45.5)
HGB BLD-MCNC: 10.1 G/DL (ref 11.9–15.5)
MAGNESIUM SERPL-MCNC: 2.2 MG/DL (ref 1.6–2.4)
POTASSIUM BLD-SCNC: 4.3 MMOL/L (ref 3.5–5.2)
SODIUM BLD-SCNC: 139 MMOL/L (ref 136–145)

## 2017-03-24 PROCEDURE — 85018 HEMOGLOBIN: CPT | Performed by: INTERNAL MEDICINE

## 2017-03-24 PROCEDURE — 83735 ASSAY OF MAGNESIUM: CPT | Performed by: INTERNAL MEDICINE

## 2017-03-24 PROCEDURE — 63510000001 EPOETIN ALFA PER 1000 UNITS: Performed by: INTERNAL MEDICINE

## 2017-03-24 PROCEDURE — 85014 HEMATOCRIT: CPT | Performed by: INTERNAL MEDICINE

## 2017-03-24 PROCEDURE — 80048 BASIC METABOLIC PNL TOTAL CA: CPT | Performed by: INTERNAL MEDICINE

## 2017-03-24 PROCEDURE — 36415 COLL VENOUS BLD VENIPUNCTURE: CPT

## 2017-03-24 PROCEDURE — 96372 THER/PROPH/DIAG INJ SC/IM: CPT

## 2017-03-24 RX ADMIN — ERYTHROPOIETIN 20000 UNITS: 20000 INJECTION, SOLUTION INTRAVENOUS; SUBCUTANEOUS at 13:05

## 2017-04-07 ENCOUNTER — HOSPITAL ENCOUNTER (OUTPATIENT)
Dept: INFUSION THERAPY | Facility: HOSPITAL | Age: 67
Discharge: HOME | End: 2017-04-07
Admitting: INTERNAL MEDICINE

## 2017-04-07 VITALS
SYSTOLIC BLOOD PRESSURE: 118 MMHG | DIASTOLIC BLOOD PRESSURE: 56 MMHG | HEART RATE: 67 BPM | OXYGEN SATURATION: 91 % | TEMPERATURE: 98.3 F | RESPIRATION RATE: 20 BRPM

## 2017-04-07 DIAGNOSIS — N18.4 CHRONIC KIDNEY DISEASE, STAGE IV (SEVERE) (HCC): ICD-10-CM

## 2017-04-07 LAB
HCT VFR BLD AUTO: 34.4 % (ref 35.6–45.5)
HGB BLD-MCNC: 10.7 G/DL (ref 11.9–15.5)

## 2017-04-07 PROCEDURE — 63510000001 EPOETIN ALFA PER 1000 UNITS: Performed by: INTERNAL MEDICINE

## 2017-04-07 PROCEDURE — 96372 THER/PROPH/DIAG INJ SC/IM: CPT

## 2017-04-07 PROCEDURE — 85014 HEMATOCRIT: CPT | Performed by: INTERNAL MEDICINE

## 2017-04-07 PROCEDURE — 85018 HEMOGLOBIN: CPT | Performed by: INTERNAL MEDICINE

## 2017-04-07 PROCEDURE — 36415 COLL VENOUS BLD VENIPUNCTURE: CPT

## 2017-04-07 RX ADMIN — ERYTHROPOIETIN 20000 UNITS: 20000 INJECTION, SOLUTION INTRAVENOUS; SUBCUTANEOUS at 13:22

## 2017-04-21 ENCOUNTER — HOSPITAL ENCOUNTER (OUTPATIENT)
Dept: INFUSION THERAPY | Facility: HOSPITAL | Age: 67
Discharge: HOME | End: 2017-04-21
Admitting: INTERNAL MEDICINE

## 2017-04-21 VITALS
DIASTOLIC BLOOD PRESSURE: 73 MMHG | OXYGEN SATURATION: 97 % | HEART RATE: 77 BPM | RESPIRATION RATE: 20 BRPM | SYSTOLIC BLOOD PRESSURE: 139 MMHG | TEMPERATURE: 98.3 F

## 2017-04-21 DIAGNOSIS — N18.4 CHRONIC KIDNEY DISEASE, STAGE IV (SEVERE) (HCC): ICD-10-CM

## 2017-04-21 DIAGNOSIS — N18.4 CKD (CHRONIC KIDNEY DISEASE) STAGE 4, GFR 15-29 ML/MIN (HCC): ICD-10-CM

## 2017-04-21 DIAGNOSIS — D50.8 OTHER IRON DEFICIENCY ANEMIA: ICD-10-CM

## 2017-04-21 LAB
25(OH)D3 SERPL-MCNC: 35.6 NG/ML (ref 30–100)
ALBUMIN SERPL-MCNC: 3.2 G/DL (ref 3.5–5.2)
ALBUMIN/GLOB SERPL: 0.6 G/DL
ALP SERPL-CCNC: 77 U/L (ref 39–117)
ALT SERPL W P-5'-P-CCNC: 12 U/L (ref 1–33)
ANION GAP SERPL CALCULATED.3IONS-SCNC: 20.7 MMOL/L
AST SERPL-CCNC: 16 U/L (ref 1–32)
BILIRUB SERPL-MCNC: 0.2 MG/DL (ref 0.1–1.2)
BUN BLD-MCNC: 49 MG/DL (ref 8–23)
BUN/CREAT SERPL: 12.8 (ref 7–25)
CALCIUM SPEC-SCNC: 8.6 MG/DL (ref 8.6–10.5)
CALCIUM SPEC-SCNC: 8.9 MG/DL (ref 8.6–10.5)
CHLORIDE SERPL-SCNC: 101 MMOL/L (ref 98–107)
CO2 SERPL-SCNC: 20.3 MMOL/L (ref 22–29)
CREAT BLD-MCNC: 3.84 MG/DL (ref 0.57–1)
DEPRECATED RDW RBC AUTO: 61.4 FL (ref 37–54)
ERYTHROCYTE [DISTWIDTH] IN BLOOD BY AUTOMATED COUNT: 17 % (ref 11.7–13)
FERRITIN SERPL-MCNC: 801.2 NG/ML (ref 13–150)
FOLATE SERPL-MCNC: >20 NG/ML (ref 4.78–24.2)
GFR SERPL CREATININE-BSD FRML MDRD: 12 ML/MIN/1.73
GLOBULIN UR ELPH-MCNC: 5.1 GM/DL
GLUCOSE BLD-MCNC: 179 MG/DL (ref 65–99)
HCT VFR BLD AUTO: 32.4 % (ref 35.6–45.5)
HGB BLD-MCNC: 10.2 G/DL (ref 11.9–15.5)
IRON 24H UR-MRATE: 48 MCG/DL (ref 37–145)
IRON SATN MFR SERPL: 22 % (ref 20–50)
MAGNESIUM SERPL-MCNC: 2.5 MG/DL (ref 1.6–2.4)
MCH RBC QN AUTO: 31.4 PG (ref 26.9–32)
MCHC RBC AUTO-ENTMCNC: 31.5 G/DL (ref 32.4–36.3)
MCV RBC AUTO: 99.7 FL (ref 80.5–98.2)
PHOSPHATE SERPL-MCNC: 4.6 MG/DL (ref 2.5–4.5)
PLATELET # BLD AUTO: 267 10*3/MM3 (ref 140–500)
PMV BLD AUTO: 10.3 FL (ref 6–12)
POTASSIUM BLD-SCNC: 4 MMOL/L (ref 3.5–5.2)
PROT SERPL-MCNC: 8.3 G/DL (ref 6–8.5)
PTH-INTACT SERPL-MCNC: 128.1 PG/ML (ref 15–65)
RBC # BLD AUTO: 3.25 10*6/MM3 (ref 3.9–5.2)
SODIUM BLD-SCNC: 142 MMOL/L (ref 136–145)
TIBC SERPL-MCNC: 222 MCG/DL (ref 298–536)
TRANSFERRIN SERPL-MCNC: 149 MG/DL (ref 200–360)
URATE SERPL-MCNC: 10.6 MG/DL (ref 2.4–5.7)
VIT B12 BLD-MCNC: 577 PG/ML (ref 211–946)
WBC NRBC COR # BLD: 10.63 10*3/MM3 (ref 4.5–10.7)

## 2017-04-21 PROCEDURE — 82746 ASSAY OF FOLIC ACID SERUM: CPT | Performed by: INTERNAL MEDICINE

## 2017-04-21 PROCEDURE — 83735 ASSAY OF MAGNESIUM: CPT | Performed by: INTERNAL MEDICINE

## 2017-04-21 PROCEDURE — 84100 ASSAY OF PHOSPHORUS: CPT | Performed by: INTERNAL MEDICINE

## 2017-04-21 PROCEDURE — 82306 VITAMIN D 25 HYDROXY: CPT | Performed by: INTERNAL MEDICINE

## 2017-04-21 PROCEDURE — 96372 THER/PROPH/DIAG INJ SC/IM: CPT

## 2017-04-21 PROCEDURE — 84466 ASSAY OF TRANSFERRIN: CPT | Performed by: INTERNAL MEDICINE

## 2017-04-21 PROCEDURE — 84550 ASSAY OF BLOOD/URIC ACID: CPT | Performed by: INTERNAL MEDICINE

## 2017-04-21 PROCEDURE — 85027 COMPLETE CBC AUTOMATED: CPT | Performed by: INTERNAL MEDICINE

## 2017-04-21 PROCEDURE — 82607 VITAMIN B-12: CPT | Performed by: INTERNAL MEDICINE

## 2017-04-21 PROCEDURE — 63510000001 EPOETIN ALFA PER 1000 UNITS: Performed by: INTERNAL MEDICINE

## 2017-04-21 PROCEDURE — 83540 ASSAY OF IRON: CPT | Performed by: INTERNAL MEDICINE

## 2017-04-21 PROCEDURE — 82728 ASSAY OF FERRITIN: CPT | Performed by: INTERNAL MEDICINE

## 2017-04-21 PROCEDURE — 83970 ASSAY OF PARATHORMONE: CPT | Performed by: INTERNAL MEDICINE

## 2017-04-21 PROCEDURE — 36415 COLL VENOUS BLD VENIPUNCTURE: CPT

## 2017-04-21 PROCEDURE — 80053 COMPREHEN METABOLIC PANEL: CPT | Performed by: INTERNAL MEDICINE

## 2017-04-21 RX ADMIN — ERYTHROPOIETIN 20000 UNITS: 20000 INJECTION, SOLUTION INTRAVENOUS; SUBCUTANEOUS at 11:22

## 2017-05-05 ENCOUNTER — HOSPITAL ENCOUNTER (OUTPATIENT)
Dept: INFUSION THERAPY | Facility: HOSPITAL | Age: 67
Discharge: HOME | End: 2017-05-05
Admitting: INTERNAL MEDICINE

## 2017-05-05 VITALS
SYSTOLIC BLOOD PRESSURE: 143 MMHG | HEART RATE: 73 BPM | TEMPERATURE: 98.3 F | DIASTOLIC BLOOD PRESSURE: 60 MMHG | OXYGEN SATURATION: 96 % | RESPIRATION RATE: 20 BRPM

## 2017-05-05 DIAGNOSIS — N18.4 CHRONIC KIDNEY DISEASE, STAGE IV (SEVERE) (HCC): ICD-10-CM

## 2017-05-05 LAB
HCT VFR BLD AUTO: 32.2 % (ref 35.6–45.5)
HGB BLD-MCNC: 10.1 G/DL (ref 11.9–15.5)

## 2017-05-05 PROCEDURE — 63510000001 EPOETIN ALFA PER 1000 UNITS: Performed by: INTERNAL MEDICINE

## 2017-05-05 PROCEDURE — 36415 COLL VENOUS BLD VENIPUNCTURE: CPT

## 2017-05-05 PROCEDURE — 85018 HEMOGLOBIN: CPT | Performed by: INTERNAL MEDICINE

## 2017-05-05 PROCEDURE — 96372 THER/PROPH/DIAG INJ SC/IM: CPT

## 2017-05-05 PROCEDURE — 85014 HEMATOCRIT: CPT | Performed by: INTERNAL MEDICINE

## 2017-05-05 RX ADMIN — ERYTHROPOIETIN 20000 UNITS: 20000 INJECTION, SOLUTION INTRAVENOUS; SUBCUTANEOUS at 13:03

## 2017-05-19 ENCOUNTER — HOSPITAL ENCOUNTER (OUTPATIENT)
Dept: INFUSION THERAPY | Facility: HOSPITAL | Age: 67
Discharge: HOME | End: 2017-05-19
Attending: INTERNAL MEDICINE | Admitting: INTERNAL MEDICINE

## 2017-05-19 VITALS
HEART RATE: 65 BPM | DIASTOLIC BLOOD PRESSURE: 61 MMHG | OXYGEN SATURATION: 97 % | RESPIRATION RATE: 20 BRPM | SYSTOLIC BLOOD PRESSURE: 139 MMHG | TEMPERATURE: 98.1 F

## 2017-05-19 DIAGNOSIS — N18.4 CHRONIC KIDNEY DISEASE, STAGE IV (SEVERE) (HCC): ICD-10-CM

## 2017-05-19 LAB
HCT VFR BLD AUTO: 31 % (ref 35.6–45.5)
HGB BLD-MCNC: 9.7 G/DL (ref 11.9–15.5)

## 2017-05-19 PROCEDURE — 63510000001 EPOETIN ALFA PER 1000 UNITS: Performed by: INTERNAL MEDICINE

## 2017-05-19 PROCEDURE — 85014 HEMATOCRIT: CPT | Performed by: INTERNAL MEDICINE

## 2017-05-19 PROCEDURE — 36415 COLL VENOUS BLD VENIPUNCTURE: CPT

## 2017-05-19 PROCEDURE — 96372 THER/PROPH/DIAG INJ SC/IM: CPT

## 2017-05-19 PROCEDURE — 85018 HEMOGLOBIN: CPT | Performed by: INTERNAL MEDICINE

## 2017-05-19 RX ADMIN — ERYTHROPOIETIN 20000 UNITS: 20000 INJECTION, SOLUTION INTRAVENOUS; SUBCUTANEOUS at 13:36

## 2017-06-02 ENCOUNTER — HOSPITAL ENCOUNTER (OUTPATIENT)
Dept: INFUSION THERAPY | Facility: HOSPITAL | Age: 67
Discharge: HOME | End: 2017-06-02
Attending: INTERNAL MEDICINE | Admitting: INTERNAL MEDICINE

## 2017-06-02 VITALS
HEART RATE: 65 BPM | TEMPERATURE: 99 F | OXYGEN SATURATION: 95 % | SYSTOLIC BLOOD PRESSURE: 147 MMHG | RESPIRATION RATE: 20 BRPM | DIASTOLIC BLOOD PRESSURE: 61 MMHG

## 2017-06-02 DIAGNOSIS — N18.4 CHRONIC KIDNEY DISEASE, STAGE IV (SEVERE) (HCC): ICD-10-CM

## 2017-06-02 LAB
ANION GAP SERPL CALCULATED.3IONS-SCNC: 15 MMOL/L
BUN BLD-MCNC: 52 MG/DL (ref 8–23)
BUN/CREAT SERPL: 14.7 (ref 7–25)
CALCIUM SPEC-SCNC: 9.3 MG/DL (ref 8.6–10.5)
CHLORIDE SERPL-SCNC: 100 MMOL/L (ref 98–107)
CO2 SERPL-SCNC: 27 MMOL/L (ref 22–29)
CREAT BLD-MCNC: 3.53 MG/DL (ref 0.57–1)
GFR SERPL CREATININE-BSD FRML MDRD: 13 ML/MIN/1.73
GLUCOSE BLD-MCNC: 253 MG/DL (ref 65–99)
HCT VFR BLD AUTO: 30.3 % (ref 35.6–45.5)
HGB BLD-MCNC: 9.5 G/DL (ref 11.9–15.5)
MAGNESIUM SERPL-MCNC: 2.2 MG/DL (ref 1.6–2.4)
POTASSIUM BLD-SCNC: 4 MMOL/L (ref 3.5–5.2)
SODIUM BLD-SCNC: 142 MMOL/L (ref 136–145)

## 2017-06-02 PROCEDURE — 96372 THER/PROPH/DIAG INJ SC/IM: CPT

## 2017-06-02 PROCEDURE — 83735 ASSAY OF MAGNESIUM: CPT | Performed by: INTERNAL MEDICINE

## 2017-06-02 PROCEDURE — 63510000001 EPOETIN ALFA PER 1000 UNITS: Performed by: INTERNAL MEDICINE

## 2017-06-02 PROCEDURE — 80048 BASIC METABOLIC PNL TOTAL CA: CPT | Performed by: INTERNAL MEDICINE

## 2017-06-02 PROCEDURE — 36415 COLL VENOUS BLD VENIPUNCTURE: CPT

## 2017-06-02 PROCEDURE — 85014 HEMATOCRIT: CPT | Performed by: INTERNAL MEDICINE

## 2017-06-02 PROCEDURE — 85018 HEMOGLOBIN: CPT | Performed by: INTERNAL MEDICINE

## 2017-06-02 RX ADMIN — ERYTHROPOIETIN 20000 UNITS: 20000 INJECTION, SOLUTION INTRAVENOUS; SUBCUTANEOUS at 13:13

## 2017-06-16 ENCOUNTER — HOSPITAL ENCOUNTER (OUTPATIENT)
Dept: INFUSION THERAPY | Facility: HOSPITAL | Age: 67
Discharge: HOME | End: 2017-06-16
Attending: INTERNAL MEDICINE | Admitting: INTERNAL MEDICINE

## 2017-06-16 VITALS
RESPIRATION RATE: 18 BRPM | SYSTOLIC BLOOD PRESSURE: 126 MMHG | OXYGEN SATURATION: 93 % | HEART RATE: 66 BPM | DIASTOLIC BLOOD PRESSURE: 60 MMHG | TEMPERATURE: 98.8 F

## 2017-06-16 DIAGNOSIS — N18.4 CHRONIC KIDNEY DISEASE, STAGE IV (SEVERE) (HCC): ICD-10-CM

## 2017-06-16 LAB
HCT VFR BLD AUTO: 32.1 % (ref 35.6–45.5)
HGB BLD-MCNC: 10 G/DL (ref 11.9–15.5)

## 2017-06-16 PROCEDURE — 36415 COLL VENOUS BLD VENIPUNCTURE: CPT

## 2017-06-16 PROCEDURE — 63510000001 EPOETIN ALFA PER 1000 UNITS: Performed by: INTERNAL MEDICINE

## 2017-06-16 PROCEDURE — 96372 THER/PROPH/DIAG INJ SC/IM: CPT

## 2017-06-16 PROCEDURE — 85014 HEMATOCRIT: CPT | Performed by: INTERNAL MEDICINE

## 2017-06-16 PROCEDURE — 85018 HEMOGLOBIN: CPT | Performed by: INTERNAL MEDICINE

## 2017-06-16 RX ADMIN — ERYTHROPOIETIN 20000 UNITS: 20000 INJECTION, SOLUTION INTRAVENOUS; SUBCUTANEOUS at 13:26

## 2017-06-16 NOTE — PROGRESS NOTES
Pt tolerated injection.  Injection site x 1 with band aide applied.  Pt D/C  Per ambulation with spouse @ 1350.

## 2017-06-30 ENCOUNTER — HOSPITAL ENCOUNTER (OUTPATIENT)
Dept: INFUSION THERAPY | Facility: HOSPITAL | Age: 67
Discharge: HOME | End: 2017-06-30
Admitting: INTERNAL MEDICINE

## 2017-06-30 VITALS
SYSTOLIC BLOOD PRESSURE: 151 MMHG | DIASTOLIC BLOOD PRESSURE: 61 MMHG | HEART RATE: 70 BPM | OXYGEN SATURATION: 92 % | RESPIRATION RATE: 20 BRPM | TEMPERATURE: 98.4 F

## 2017-06-30 DIAGNOSIS — N18.4 CHRONIC KIDNEY DISEASE, STAGE IV (SEVERE) (HCC): ICD-10-CM

## 2017-06-30 LAB
HCT VFR BLD AUTO: 30.4 % (ref 35.6–45.5)
HGB BLD-MCNC: 9.3 G/DL (ref 11.9–15.5)

## 2017-06-30 PROCEDURE — 85014 HEMATOCRIT: CPT | Performed by: INTERNAL MEDICINE

## 2017-06-30 PROCEDURE — 96372 THER/PROPH/DIAG INJ SC/IM: CPT

## 2017-06-30 PROCEDURE — 85018 HEMOGLOBIN: CPT | Performed by: INTERNAL MEDICINE

## 2017-06-30 PROCEDURE — 36415 COLL VENOUS BLD VENIPUNCTURE: CPT

## 2017-06-30 PROCEDURE — 63510000001 EPOETIN ALFA PER 1000 UNITS: Performed by: INTERNAL MEDICINE

## 2017-06-30 RX ADMIN — ERYTHROPOIETIN 20000 UNITS: 20000 INJECTION, SOLUTION INTRAVENOUS; SUBCUTANEOUS at 14:04

## 2017-07-14 ENCOUNTER — HOSPITAL ENCOUNTER (OUTPATIENT)
Dept: INFUSION THERAPY | Facility: HOSPITAL | Age: 67
Discharge: HOME | End: 2017-07-14
Admitting: INTERNAL MEDICINE

## 2017-07-14 VITALS
TEMPERATURE: 98.5 F | RESPIRATION RATE: 20 BRPM | OXYGEN SATURATION: 91 % | DIASTOLIC BLOOD PRESSURE: 53 MMHG | SYSTOLIC BLOOD PRESSURE: 126 MMHG | HEART RATE: 85 BPM

## 2017-07-14 DIAGNOSIS — N18.4 CHRONIC KIDNEY DISEASE, STAGE IV (SEVERE) (HCC): ICD-10-CM

## 2017-07-14 LAB
ANION GAP SERPL CALCULATED.3IONS-SCNC: 16.6 MMOL/L
BUN BLD-MCNC: 50 MG/DL (ref 8–23)
BUN/CREAT SERPL: 13.7 (ref 7–25)
CALCIUM SPEC-SCNC: 9.3 MG/DL (ref 8.6–10.5)
CHLORIDE SERPL-SCNC: 97 MMOL/L (ref 98–107)
CO2 SERPL-SCNC: 24.4 MMOL/L (ref 22–29)
CREAT BLD-MCNC: 3.64 MG/DL (ref 0.57–1)
GFR SERPL CREATININE-BSD FRML MDRD: 12 ML/MIN/1.73
GLUCOSE BLD-MCNC: 165 MG/DL (ref 65–99)
HCT VFR BLD AUTO: 29.2 % (ref 35.6–45.5)
HGB BLD-MCNC: 9.1 G/DL (ref 11.9–15.5)
MAGNESIUM SERPL-MCNC: 2 MG/DL (ref 1.6–2.4)
POTASSIUM BLD-SCNC: 4.3 MMOL/L (ref 3.5–5.2)
SODIUM BLD-SCNC: 138 MMOL/L (ref 136–145)

## 2017-07-14 PROCEDURE — 85018 HEMOGLOBIN: CPT | Performed by: INTERNAL MEDICINE

## 2017-07-14 PROCEDURE — 96372 THER/PROPH/DIAG INJ SC/IM: CPT

## 2017-07-14 PROCEDURE — 85014 HEMATOCRIT: CPT | Performed by: INTERNAL MEDICINE

## 2017-07-14 PROCEDURE — 63510000001 EPOETIN ALFA PER 1000 UNITS: Performed by: INTERNAL MEDICINE

## 2017-07-14 PROCEDURE — 36415 COLL VENOUS BLD VENIPUNCTURE: CPT

## 2017-07-14 PROCEDURE — 83735 ASSAY OF MAGNESIUM: CPT | Performed by: INTERNAL MEDICINE

## 2017-07-14 PROCEDURE — 80048 BASIC METABOLIC PNL TOTAL CA: CPT | Performed by: INTERNAL MEDICINE

## 2017-07-14 RX ORDER — SODIUM PHOSPHATE,MONO-DIBASIC 19G-7G/118
500 ENEMA (ML) RECTAL 2 TIMES DAILY WITH MEALS
COMMUNITY
End: 2019-05-17

## 2017-07-14 RX ADMIN — ERYTHROPOIETIN 20000 UNITS: 20000 INJECTION, SOLUTION INTRAVENOUS; SUBCUTANEOUS at 13:14

## 2017-07-14 NOTE — PROGRESS NOTES
Procrit indicated for hemoglobin less than 11. Pt to return in 2 weeks. appt given. D/C at 1320 ambulatory with cane.

## 2017-07-28 ENCOUNTER — HOSPITAL ENCOUNTER (OUTPATIENT)
Dept: INFUSION THERAPY | Facility: HOSPITAL | Age: 67
Discharge: HOME | End: 2017-07-28
Admitting: INTERNAL MEDICINE

## 2017-07-28 VITALS
OXYGEN SATURATION: 93 % | HEART RATE: 96 BPM | SYSTOLIC BLOOD PRESSURE: 170 MMHG | DIASTOLIC BLOOD PRESSURE: 71 MMHG | TEMPERATURE: 97.5 F | RESPIRATION RATE: 16 BRPM

## 2017-07-28 DIAGNOSIS — N18.4 CHRONIC KIDNEY DISEASE, STAGE IV (SEVERE) (HCC): ICD-10-CM

## 2017-07-28 LAB
25(OH)D3 SERPL-MCNC: 43.8 NG/ML (ref 30–100)
ALBUMIN SERPL-MCNC: 3.3 G/DL (ref 3.5–5.2)
ALBUMIN/GLOB SERPL: 0.7 G/DL
ALP SERPL-CCNC: 72 U/L (ref 39–117)
ALT SERPL W P-5'-P-CCNC: 10 U/L (ref 1–33)
ANION GAP SERPL CALCULATED.3IONS-SCNC: 15.7 MMOL/L
AST SERPL-CCNC: 11 U/L (ref 1–32)
BILIRUB SERPL-MCNC: 0.2 MG/DL (ref 0.1–1.2)
BUN BLD-MCNC: 51 MG/DL (ref 8–23)
BUN/CREAT SERPL: 12.9 (ref 7–25)
CALCIUM SPEC-SCNC: 8 MG/DL (ref 8.6–10.5)
CALCIUM SPEC-SCNC: 8.5 MG/DL (ref 8.6–10.5)
CHLORIDE SERPL-SCNC: 100 MMOL/L (ref 98–107)
CO2 SERPL-SCNC: 24.3 MMOL/L (ref 22–29)
CREAT BLD-MCNC: 3.95 MG/DL (ref 0.57–1)
DEPRECATED RDW RBC AUTO: 62.8 FL (ref 37–54)
ERYTHROCYTE [DISTWIDTH] IN BLOOD BY AUTOMATED COUNT: 17.8 % (ref 11.7–13)
FERRITIN SERPL-MCNC: 840.9 NG/ML (ref 13–150)
FOLATE SERPL-MCNC: 16.48 NG/ML (ref 4.78–24.2)
GFR SERPL CREATININE-BSD FRML MDRD: 11 ML/MIN/1.73
GLOBULIN UR ELPH-MCNC: 4.8 GM/DL
GLUCOSE BLD-MCNC: 266 MG/DL (ref 65–99)
HCT VFR BLD AUTO: 31.7 % (ref 35.6–45.5)
HGB BLD-MCNC: 9.5 G/DL (ref 11.9–15.5)
IRON 24H UR-MRATE: 46 MCG/DL (ref 37–145)
IRON SATN MFR SERPL: 25 % (ref 20–50)
MAGNESIUM SERPL-MCNC: 1.9 MG/DL (ref 1.6–2.4)
MCH RBC QN AUTO: 29.1 PG (ref 26.9–32)
MCHC RBC AUTO-ENTMCNC: 30 G/DL (ref 32.4–36.3)
MCV RBC AUTO: 97.2 FL (ref 80.5–98.2)
PHOSPHATE SERPL-MCNC: 2.7 MG/DL (ref 2.5–4.5)
PLATELET # BLD AUTO: 262 10*3/MM3 (ref 140–500)
PMV BLD AUTO: 9.6 FL (ref 6–12)
POTASSIUM BLD-SCNC: 4.2 MMOL/L (ref 3.5–5.2)
PROT SERPL-MCNC: 8.1 G/DL (ref 6–8.5)
PTH-INTACT SERPL-MCNC: 135 PG/ML (ref 15–65)
RBC # BLD AUTO: 3.26 10*6/MM3 (ref 3.9–5.2)
SODIUM BLD-SCNC: 140 MMOL/L (ref 136–145)
TIBC SERPL-MCNC: 186 MCG/DL
TRANSFERRIN SERPL-MCNC: 125 MG/DL (ref 200–360)
URATE SERPL-MCNC: 12 MG/DL (ref 2.4–5.7)
VIT B12 BLD-MCNC: 576 PG/ML (ref 211–946)
WBC NRBC COR # BLD: 12.24 10*3/MM3 (ref 4.5–10.7)

## 2017-07-28 PROCEDURE — 84100 ASSAY OF PHOSPHORUS: CPT | Performed by: INTERNAL MEDICINE

## 2017-07-28 PROCEDURE — 82728 ASSAY OF FERRITIN: CPT | Performed by: INTERNAL MEDICINE

## 2017-07-28 PROCEDURE — 85027 COMPLETE CBC AUTOMATED: CPT | Performed by: INTERNAL MEDICINE

## 2017-07-28 PROCEDURE — 96372 THER/PROPH/DIAG INJ SC/IM: CPT

## 2017-07-28 PROCEDURE — 36415 COLL VENOUS BLD VENIPUNCTURE: CPT

## 2017-07-28 PROCEDURE — 82607 VITAMIN B-12: CPT | Performed by: INTERNAL MEDICINE

## 2017-07-28 PROCEDURE — 84466 ASSAY OF TRANSFERRIN: CPT | Performed by: INTERNAL MEDICINE

## 2017-07-28 PROCEDURE — 82306 VITAMIN D 25 HYDROXY: CPT | Performed by: INTERNAL MEDICINE

## 2017-07-28 PROCEDURE — 83970 ASSAY OF PARATHORMONE: CPT | Performed by: INTERNAL MEDICINE

## 2017-07-28 PROCEDURE — 63510000001 EPOETIN ALFA PER 1000 UNITS: Performed by: INTERNAL MEDICINE

## 2017-07-28 PROCEDURE — 82746 ASSAY OF FOLIC ACID SERUM: CPT | Performed by: INTERNAL MEDICINE

## 2017-07-28 PROCEDURE — 84550 ASSAY OF BLOOD/URIC ACID: CPT | Performed by: INTERNAL MEDICINE

## 2017-07-28 PROCEDURE — 83735 ASSAY OF MAGNESIUM: CPT | Performed by: INTERNAL MEDICINE

## 2017-07-28 PROCEDURE — 80053 COMPREHEN METABOLIC PANEL: CPT | Performed by: INTERNAL MEDICINE

## 2017-07-28 PROCEDURE — 83540 ASSAY OF IRON: CPT | Performed by: INTERNAL MEDICINE

## 2017-07-28 RX ADMIN — ERYTHROPOIETIN 20000 UNITS: 20000 INJECTION, SOLUTION INTRAVENOUS; SUBCUTANEOUS at 13:35

## 2017-08-11 ENCOUNTER — HOSPITAL ENCOUNTER (OUTPATIENT)
Dept: INFUSION THERAPY | Facility: HOSPITAL | Age: 67
Discharge: HOME | End: 2017-08-11
Admitting: INTERNAL MEDICINE

## 2017-08-11 VITALS
HEART RATE: 74 BPM | RESPIRATION RATE: 20 BRPM | TEMPERATURE: 98.2 F | OXYGEN SATURATION: 97 % | DIASTOLIC BLOOD PRESSURE: 89 MMHG | SYSTOLIC BLOOD PRESSURE: 142 MMHG

## 2017-08-11 DIAGNOSIS — N18.4 CHRONIC KIDNEY DISEASE, STAGE IV (SEVERE) (HCC): ICD-10-CM

## 2017-08-11 LAB
HCT VFR BLD AUTO: 30.1 % (ref 35.6–45.5)
HGB BLD-MCNC: 8.8 G/DL (ref 11.9–15.5)

## 2017-08-11 PROCEDURE — 96372 THER/PROPH/DIAG INJ SC/IM: CPT

## 2017-08-11 PROCEDURE — 85014 HEMATOCRIT: CPT | Performed by: INTERNAL MEDICINE

## 2017-08-11 PROCEDURE — 36415 COLL VENOUS BLD VENIPUNCTURE: CPT

## 2017-08-11 PROCEDURE — 85018 HEMOGLOBIN: CPT | Performed by: INTERNAL MEDICINE

## 2017-08-11 PROCEDURE — 63510000001 EPOETIN ALFA PER 1000 UNITS: Performed by: INTERNAL MEDICINE

## 2017-08-11 RX ADMIN — ERYTHROPOIETIN 20000 UNITS: 20000 INJECTION, SOLUTION INTRAVENOUS; SUBCUTANEOUS at 13:05

## 2017-08-11 NOTE — PROGRESS NOTES
Procrit indicated per lab results & MD order.  Injection site x 1 checked with band aide applied.  Pt D/C per ambulation @ 1116.

## 2017-08-25 ENCOUNTER — HOSPITAL ENCOUNTER (OUTPATIENT)
Dept: INFUSION THERAPY | Facility: HOSPITAL | Age: 67
Discharge: HOME | End: 2017-08-25
Admitting: INTERNAL MEDICINE

## 2017-08-25 ENCOUNTER — HOSPITAL ENCOUNTER (OUTPATIENT)
Dept: GENERAL RADIOLOGY | Facility: HOSPITAL | Age: 67
Discharge: HOME | End: 2017-08-25
Attending: INTERNAL MEDICINE

## 2017-08-25 VITALS
TEMPERATURE: 97.6 F | RESPIRATION RATE: 20 BRPM | DIASTOLIC BLOOD PRESSURE: 52 MMHG | OXYGEN SATURATION: 99 % | SYSTOLIC BLOOD PRESSURE: 142 MMHG | HEART RATE: 79 BPM

## 2017-08-25 DIAGNOSIS — S93.402S SPRAIN OF LEFT ANKLE, UNSPECIFIED LIGAMENT, SEQUELA: ICD-10-CM

## 2017-08-25 DIAGNOSIS — N18.4 CHRONIC KIDNEY DISEASE, STAGE IV (SEVERE) (HCC): ICD-10-CM

## 2017-08-25 DIAGNOSIS — S93.402S SPRAIN OF LEFT ANKLE, UNSPECIFIED LIGAMENT, SEQUELA: Primary | ICD-10-CM

## 2017-08-25 LAB
HCT VFR BLD AUTO: 29.6 % (ref 35.6–45.5)
HGB BLD-MCNC: 9 G/DL (ref 11.9–15.5)

## 2017-08-25 PROCEDURE — 85018 HEMOGLOBIN: CPT | Performed by: INTERNAL MEDICINE

## 2017-08-25 PROCEDURE — 63510000001 EPOETIN ALFA PER 1000 UNITS: Performed by: INTERNAL MEDICINE

## 2017-08-25 PROCEDURE — 36415 COLL VENOUS BLD VENIPUNCTURE: CPT

## 2017-08-25 PROCEDURE — 73610 X-RAY EXAM OF ANKLE: CPT

## 2017-08-25 PROCEDURE — 85014 HEMATOCRIT: CPT | Performed by: INTERNAL MEDICINE

## 2017-08-25 PROCEDURE — 96372 THER/PROPH/DIAG INJ SC/IM: CPT

## 2017-08-25 RX ADMIN — ERYTHROPOIETIN 20000 UNITS: 20000 INJECTION, SOLUTION INTRAVENOUS; SUBCUTANEOUS at 14:01

## 2017-08-25 NOTE — PROGRESS NOTES
"Patient reports hurt foot at home, primary MD aware and has just completed X-ray of foot. Dr. Chaney to share results.   RN called radiology, spoke with Dr. Lindsay who verbally reported to RN that left foot is \"swollen, maybe a bad sprain, no fracture.\" Patient notified and patient and  encouraged to notify Dr. Chaney concerning pain.  Patient advised to follow-up with Dr. Chaney on discharge for foot/ankle pain. Patient refused AVS.   "

## 2017-08-30 ENCOUNTER — OFFICE VISIT (OUTPATIENT)
Dept: FAMILY MEDICINE CLINIC | Facility: CLINIC | Age: 67
End: 2017-08-30

## 2017-08-30 VITALS
HEART RATE: 78 BPM | SYSTOLIC BLOOD PRESSURE: 130 MMHG | DIASTOLIC BLOOD PRESSURE: 76 MMHG | RESPIRATION RATE: 16 BRPM | HEIGHT: 64 IN | TEMPERATURE: 99 F

## 2017-08-30 DIAGNOSIS — N18.4 TYPE 2 DIABETES MELLITUS WITH STAGE 4 CHRONIC KIDNEY DISEASE, WITHOUT LONG-TERM CURRENT USE OF INSULIN (HCC): ICD-10-CM

## 2017-08-30 DIAGNOSIS — M25.461 EFFUSION OF RIGHT KNEE: ICD-10-CM

## 2017-08-30 DIAGNOSIS — E11.22 TYPE 2 DIABETES MELLITUS WITH STAGE 4 CHRONIC KIDNEY DISEASE, WITHOUT LONG-TERM CURRENT USE OF INSULIN (HCC): ICD-10-CM

## 2017-08-30 DIAGNOSIS — M25.561 ACUTE PAIN OF RIGHT KNEE: Primary | ICD-10-CM

## 2017-08-30 DIAGNOSIS — M70.50 ANSERINE BURSITIS: ICD-10-CM

## 2017-08-30 PROCEDURE — 99214 OFFICE O/P EST MOD 30 MIN: CPT | Performed by: INTERNAL MEDICINE

## 2017-08-30 NOTE — PROGRESS NOTES
Subjective  complaint is knee pain  Maribeth Rendon is a 66 y.o. female.     History of Present Illness   Maribeth is here today for evaluation of right knee pain.  This has been present for approximately 2 months.  There has been no associated injury.  It seems to be worse lately since she is now having to wear a boot on her left foot for an ankle sprain.  She did fall and sprained her ankle approximately one week ago.  X-ray showed no fracture.   She does have a history of poorly controlled non-insulin-dependent diabetes mellitus with chronic kidney disease and anemia of chronic kidney disease.  She gets lab work drawn every few weeks to follow her hemoglobin.  Her last hemoglobin was low.  She has not had a hemoglobin A1c done in some time.  She has not had any cholesterol testing done in some time.  The following portions of the patient's history were reviewed and updated as appropriate: allergies, current medications, past medical history and problem list.    Review of Systems   Respiratory: Negative for chest tightness and shortness of breath.    Cardiovascular: Negative for chest pain.   Musculoskeletal: Positive for joint swelling.   Neurological: Positive for weakness.       Objective   Physical Exam   Neck: Carotid bruit is not present.   Cardiovascular: Normal rate and regular rhythm.    Pulmonary/Chest: Effort normal and breath sounds normal.   Musculoskeletal:   There is some crepitation of the right knee with inability to fully extend his knee.  There is some mild joint effusion and tenderness over the anserine bursa.   Nursing note and vitals reviewed.      Assessment/Plan   Maribeth was seen today for knee pain.    Diagnoses and all orders for this visit:    Acute pain of right knee    Effusion of right knee    Anserine bursitis    Type 2 diabetes mellitus with stage 4 chronic kidney disease, without long-term current use of insulin  -     Hemoglobin A1c; Future  -     Lipid Panel; Future     Maribeth is here  today for pain in her right knee that is worsened since having to use her right leg after an ankle injury.  She does have a small effusion.  She has tenderness over the anserine bursa.  I advised normally this would be treated with anti-inflammatories and possibly steroids.  Neither of these she can do.  I did advise using ice alternating with heat.  She may use Tylenol.  We may consider an injection of the knee if this does not get better.

## 2017-09-08 ENCOUNTER — HOSPITAL ENCOUNTER (OUTPATIENT)
Dept: INFUSION THERAPY | Facility: HOSPITAL | Age: 67
Discharge: HOME | End: 2017-09-08
Admitting: INTERNAL MEDICINE

## 2017-09-08 VITALS
OXYGEN SATURATION: 97 % | HEART RATE: 84 BPM | RESPIRATION RATE: 20 BRPM | TEMPERATURE: 99.2 F | DIASTOLIC BLOOD PRESSURE: 62 MMHG | SYSTOLIC BLOOD PRESSURE: 144 MMHG

## 2017-09-08 DIAGNOSIS — N18.4 TYPE 2 DIABETES MELLITUS WITH STAGE 4 CHRONIC KIDNEY DISEASE, WITHOUT LONG-TERM CURRENT USE OF INSULIN (HCC): ICD-10-CM

## 2017-09-08 DIAGNOSIS — E11.22 TYPE 2 DIABETES MELLITUS WITH STAGE 4 CHRONIC KIDNEY DISEASE, WITHOUT LONG-TERM CURRENT USE OF INSULIN (HCC): ICD-10-CM

## 2017-09-08 DIAGNOSIS — N18.4 CHRONIC KIDNEY DISEASE, STAGE IV (SEVERE) (HCC): ICD-10-CM

## 2017-09-08 LAB
ANION GAP SERPL CALCULATED.3IONS-SCNC: 14.9 MMOL/L
BUN BLD-MCNC: 46 MG/DL (ref 8–23)
BUN/CREAT SERPL: 15.2 (ref 7–25)
CALCIUM SPEC-SCNC: 9.7 MG/DL (ref 8.6–10.5)
CHLORIDE SERPL-SCNC: 101 MMOL/L (ref 98–107)
CHOLEST SERPL-MCNC: 125 MG/DL (ref 0–200)
CO2 SERPL-SCNC: 24.1 MMOL/L (ref 22–29)
CREAT BLD-MCNC: 3.03 MG/DL (ref 0.57–1)
GFR SERPL CREATININE-BSD FRML MDRD: 15 ML/MIN/1.73
GLUCOSE BLD-MCNC: 333 MG/DL (ref 65–99)
HBA1C MFR BLD: 6.74 % (ref 4.8–5.6)
HCT VFR BLD AUTO: 28.5 % (ref 35.6–45.5)
HDLC SERPL-MCNC: 37 MG/DL (ref 40–60)
HGB BLD-MCNC: 8.4 G/DL (ref 11.9–15.5)
LDLC SERPL CALC-MCNC: 56 MG/DL (ref 0–100)
LDLC/HDLC SERPL: 1.52 {RATIO}
MAGNESIUM SERPL-MCNC: 2 MG/DL (ref 1.6–2.4)
POTASSIUM BLD-SCNC: 4.4 MMOL/L (ref 3.5–5.2)
SODIUM BLD-SCNC: 140 MMOL/L (ref 136–145)
TRIGL SERPL-MCNC: 159 MG/DL (ref 0–150)
VLDLC SERPL-MCNC: 31.8 MG/DL (ref 5–40)

## 2017-09-08 PROCEDURE — 85014 HEMATOCRIT: CPT | Performed by: INTERNAL MEDICINE

## 2017-09-08 PROCEDURE — 85018 HEMOGLOBIN: CPT | Performed by: INTERNAL MEDICINE

## 2017-09-08 PROCEDURE — 83036 HEMOGLOBIN GLYCOSYLATED A1C: CPT | Performed by: INTERNAL MEDICINE

## 2017-09-08 PROCEDURE — 80048 BASIC METABOLIC PNL TOTAL CA: CPT | Performed by: INTERNAL MEDICINE

## 2017-09-08 PROCEDURE — 63510000001 EPOETIN ALFA PER 1000 UNITS: Performed by: INTERNAL MEDICINE

## 2017-09-08 PROCEDURE — 96372 THER/PROPH/DIAG INJ SC/IM: CPT

## 2017-09-08 PROCEDURE — 80061 LIPID PANEL: CPT | Performed by: INTERNAL MEDICINE

## 2017-09-08 PROCEDURE — 83735 ASSAY OF MAGNESIUM: CPT | Performed by: INTERNAL MEDICINE

## 2017-09-08 PROCEDURE — 36415 COLL VENOUS BLD VENIPUNCTURE: CPT

## 2017-09-08 RX ADMIN — ERYTHROPOIETIN 20000 UNITS: 20000 INJECTION, SOLUTION INTRAVENOUS; SUBCUTANEOUS at 13:51

## 2017-09-08 NOTE — PROGRESS NOTES
HGB LESS THAN 11.0 PROCRIT INDICATED AND GIVEN. DC'D HOME PER WHEEL CHAIR WITH  AFTER APPOINTMENT COMPLETED.

## 2017-09-13 ENCOUNTER — TELEPHONE (OUTPATIENT)
Dept: FAMILY MEDICINE CLINIC | Facility: CLINIC | Age: 67
End: 2017-09-13

## 2017-09-13 NOTE — TELEPHONE ENCOUNTER
----- Message from Soraya Steel sent at 9/13/2017  3:00 PM EDT -----  Patient would like to speak with you about test results ...    Thanks,  Neena   Lab results were given to the patient.  I advised her to call Dr. Mcclain regarding her elevated creatinine.  Her cholesterol and A1c then I will fine.

## 2017-09-22 ENCOUNTER — HOSPITAL ENCOUNTER (INPATIENT)
Facility: HOSPITAL | Age: 67
LOS: 4 days | Discharge: HOME HEALTH | End: 2017-09-26
Attending: EMERGENCY MEDICINE | Admitting: INTERNAL MEDICINE

## 2017-09-22 ENCOUNTER — APPOINTMENT (OUTPATIENT)
Dept: GENERAL RADIOLOGY | Facility: HOSPITAL | Age: 67
End: 2017-09-22

## 2017-09-22 ENCOUNTER — HOSPITAL ENCOUNTER (OUTPATIENT)
Dept: INFUSION THERAPY | Facility: HOSPITAL | Age: 67
Discharge: HOME | End: 2017-09-22
Admitting: INTERNAL MEDICINE

## 2017-09-22 VITALS
RESPIRATION RATE: 18 BRPM | HEART RATE: 78 BPM | OXYGEN SATURATION: 98 % | SYSTOLIC BLOOD PRESSURE: 139 MMHG | TEMPERATURE: 98.5 F | DIASTOLIC BLOOD PRESSURE: 63 MMHG

## 2017-09-22 DIAGNOSIS — N18.4 CHRONIC KIDNEY DISEASE, STAGE IV (SEVERE) (HCC): ICD-10-CM

## 2017-09-22 DIAGNOSIS — L03.032 CELLULITIS OF TOE OF LEFT FOOT: Primary | ICD-10-CM

## 2017-09-22 LAB
ALBUMIN SERPL-MCNC: 3.1 G/DL (ref 3.5–5.2)
ALBUMIN/GLOB SERPL: 0.6 G/DL
ALP SERPL-CCNC: 64 U/L (ref 39–117)
ALT SERPL W P-5'-P-CCNC: 7 U/L (ref 1–33)
ANION GAP SERPL CALCULATED.3IONS-SCNC: 12.9 MMOL/L
AST SERPL-CCNC: 9 U/L (ref 1–32)
BASOPHILS # BLD AUTO: 0.04 10*3/MM3 (ref 0–0.2)
BASOPHILS NFR BLD AUTO: 0.3 % (ref 0–1.5)
BILIRUB SERPL-MCNC: 0.2 MG/DL (ref 0.1–1.2)
BUN BLD-MCNC: 50 MG/DL (ref 8–23)
BUN/CREAT SERPL: 14.9 (ref 7–25)
CALCIUM SPEC-SCNC: 9.6 MG/DL (ref 8.6–10.5)
CHLORIDE SERPL-SCNC: 94 MMOL/L (ref 98–107)
CO2 SERPL-SCNC: 27.1 MMOL/L (ref 22–29)
CREAT BLD-MCNC: 3.36 MG/DL (ref 0.57–1)
CRP SERPL-MCNC: 12.1 MG/DL (ref 0–0.5)
DEPRECATED RDW RBC AUTO: 59.4 FL (ref 37–54)
EOSINOPHIL # BLD AUTO: 0.48 10*3/MM3 (ref 0–0.7)
EOSINOPHIL NFR BLD AUTO: 3.5 % (ref 0.3–6.2)
ERYTHROCYTE [DISTWIDTH] IN BLOOD BY AUTOMATED COUNT: 17.4 % (ref 11.7–13)
ERYTHROCYTE [SEDIMENTATION RATE] IN BLOOD: >140 MM/HR (ref 0–30)
GFR SERPL CREATININE-BSD FRML MDRD: 14 ML/MIN/1.73
GLOBULIN UR ELPH-MCNC: 5.2 GM/DL
GLUCOSE BLD-MCNC: 239 MG/DL (ref 65–99)
HCT VFR BLD AUTO: 27.3 % (ref 35.6–45.5)
HCT VFR BLD AUTO: 27.4 % (ref 35.6–45.5)
HGB BLD-MCNC: 8.2 G/DL (ref 11.9–15.5)
HGB BLD-MCNC: 8.2 G/DL (ref 11.9–15.5)
IMM GRANULOCYTES # BLD: 0.08 10*3/MM3 (ref 0–0.03)
IMM GRANULOCYTES NFR BLD: 0.6 % (ref 0–0.5)
LYMPHOCYTES # BLD AUTO: 1.37 10*3/MM3 (ref 0.9–4.8)
LYMPHOCYTES NFR BLD AUTO: 10 % (ref 19.6–45.3)
MCH RBC QN AUTO: 27.9 PG (ref 26.9–32)
MCHC RBC AUTO-ENTMCNC: 29.9 G/DL (ref 32.4–36.3)
MCV RBC AUTO: 93.2 FL (ref 80.5–98.2)
MONOCYTES # BLD AUTO: 1.11 10*3/MM3 (ref 0.2–1.2)
MONOCYTES NFR BLD AUTO: 8.1 % (ref 5–12)
NEUTROPHILS # BLD AUTO: 10.59 10*3/MM3 (ref 1.9–8.1)
NEUTROPHILS NFR BLD AUTO: 77.5 % (ref 42.7–76)
PLATELET # BLD AUTO: 333 10*3/MM3 (ref 140–500)
PMV BLD AUTO: 9.7 FL (ref 6–12)
POTASSIUM BLD-SCNC: 3.9 MMOL/L (ref 3.5–5.2)
PROT SERPL-MCNC: 8.3 G/DL (ref 6–8.5)
RBC # BLD AUTO: 2.94 10*6/MM3 (ref 3.9–5.2)
SODIUM BLD-SCNC: 134 MMOL/L (ref 136–145)
WBC NRBC COR # BLD: 13.67 10*3/MM3 (ref 4.5–10.7)

## 2017-09-22 PROCEDURE — 36415 COLL VENOUS BLD VENIPUNCTURE: CPT | Performed by: EMERGENCY MEDICINE

## 2017-09-22 PROCEDURE — 25010000002 VANCOMYCIN: Performed by: PHYSICIAN ASSISTANT

## 2017-09-22 PROCEDURE — 73630 X-RAY EXAM OF FOOT: CPT

## 2017-09-22 PROCEDURE — 36415 COLL VENOUS BLD VENIPUNCTURE: CPT

## 2017-09-22 PROCEDURE — 86140 C-REACTIVE PROTEIN: CPT | Performed by: PHYSICIAN ASSISTANT

## 2017-09-22 PROCEDURE — 80053 COMPREHEN METABOLIC PANEL: CPT | Performed by: EMERGENCY MEDICINE

## 2017-09-22 PROCEDURE — 99284 EMERGENCY DEPT VISIT MOD MDM: CPT

## 2017-09-22 PROCEDURE — 85025 COMPLETE CBC W/AUTO DIFF WBC: CPT | Performed by: EMERGENCY MEDICINE

## 2017-09-22 PROCEDURE — 87040 BLOOD CULTURE FOR BACTERIA: CPT | Performed by: PHYSICIAN ASSISTANT

## 2017-09-22 PROCEDURE — 25010000002 PIPERACILLIN SOD-TAZOBACTAM PER 1 G: Performed by: PHYSICIAN ASSISTANT

## 2017-09-22 PROCEDURE — 85652 RBC SED RATE AUTOMATED: CPT | Performed by: PHYSICIAN ASSISTANT

## 2017-09-22 RX ORDER — SODIUM CHLORIDE 0.9 % (FLUSH) 0.9 %
1-10 SYRINGE (ML) INJECTION AS NEEDED
Status: DISCONTINUED | OUTPATIENT
Start: 2017-09-22 | End: 2017-09-26 | Stop reason: HOSPADM

## 2017-09-22 RX ORDER — ONDANSETRON 4 MG/1
4 TABLET, ORALLY DISINTEGRATING ORAL EVERY 6 HOURS PRN
Status: DISCONTINUED | OUTPATIENT
Start: 2017-09-22 | End: 2017-09-26 | Stop reason: HOSPADM

## 2017-09-22 RX ORDER — L.ACID,PARA/B.BIFIDUM/S.THERM 8B CELL
1 CAPSULE ORAL DAILY
Status: DISCONTINUED | OUTPATIENT
Start: 2017-09-23 | End: 2017-09-26 | Stop reason: HOSPADM

## 2017-09-22 RX ORDER — MONTELUKAST SODIUM 10 MG/1
10 TABLET ORAL NIGHTLY
Status: DISCONTINUED | OUTPATIENT
Start: 2017-09-22 | End: 2017-09-26 | Stop reason: HOSPADM

## 2017-09-22 RX ORDER — ONDANSETRON 2 MG/ML
4 INJECTION INTRAMUSCULAR; INTRAVENOUS EVERY 6 HOURS PRN
Status: DISCONTINUED | OUTPATIENT
Start: 2017-09-22 | End: 2017-09-26 | Stop reason: HOSPADM

## 2017-09-22 RX ORDER — HYDROCODONE BITARTRATE AND ACETAMINOPHEN 5; 325 MG/1; MG/1
1 TABLET ORAL EVERY 4 HOURS PRN
Status: DISCONTINUED | OUTPATIENT
Start: 2017-09-22 | End: 2017-09-26 | Stop reason: HOSPADM

## 2017-09-22 RX ORDER — METOPROLOL SUCCINATE 50 MG/1
50 TABLET, EXTENDED RELEASE ORAL EVERY MORNING
Status: DISCONTINUED | OUTPATIENT
Start: 2017-09-23 | End: 2017-09-26 | Stop reason: HOSPADM

## 2017-09-22 RX ORDER — TOPIRAMATE 50 MG/1
50 TABLET, FILM COATED ORAL EVERY 12 HOURS SCHEDULED
Status: DISCONTINUED | OUTPATIENT
Start: 2017-09-23 | End: 2017-09-23

## 2017-09-22 RX ORDER — ONDANSETRON 4 MG/1
4 TABLET, FILM COATED ORAL EVERY 6 HOURS PRN
Status: DISCONTINUED | OUTPATIENT
Start: 2017-09-22 | End: 2017-09-26 | Stop reason: HOSPADM

## 2017-09-22 RX ORDER — MELATONIN
1000 DAILY
Status: DISCONTINUED | OUTPATIENT
Start: 2017-09-23 | End: 2017-09-26 | Stop reason: HOSPADM

## 2017-09-22 RX ORDER — UREA 10 %
140 LOTION (ML) TOPICAL
Status: DISCONTINUED | OUTPATIENT
Start: 2017-09-23 | End: 2017-09-26 | Stop reason: HOSPADM

## 2017-09-22 RX ORDER — SENNA AND DOCUSATE SODIUM 50; 8.6 MG/1; MG/1
2 TABLET, FILM COATED ORAL NIGHTLY PRN
Status: DISCONTINUED | OUTPATIENT
Start: 2017-09-22 | End: 2017-09-26 | Stop reason: HOSPADM

## 2017-09-22 RX ORDER — DEXTROSE MONOHYDRATE 25 G/50ML
25 INJECTION, SOLUTION INTRAVENOUS
Status: DISCONTINUED | OUTPATIENT
Start: 2017-09-22 | End: 2017-09-26 | Stop reason: HOSPADM

## 2017-09-22 RX ORDER — ASPIRIN 81 MG/1
81 TABLET ORAL DAILY
Status: DISCONTINUED | OUTPATIENT
Start: 2017-09-23 | End: 2017-09-26 | Stop reason: HOSPADM

## 2017-09-22 RX ORDER — BUMETANIDE 2 MG/1
2 TABLET ORAL EVERY MORNING
Status: DISCONTINUED | OUTPATIENT
Start: 2017-09-23 | End: 2017-09-26 | Stop reason: HOSPADM

## 2017-09-22 RX ORDER — NICOTINE POLACRILEX 4 MG
15 LOZENGE BUCCAL
Status: DISCONTINUED | OUTPATIENT
Start: 2017-09-22 | End: 2017-09-26 | Stop reason: HOSPADM

## 2017-09-22 RX ORDER — HEPARIN SODIUM 5000 [USP'U]/ML
5000 INJECTION, SOLUTION INTRAVENOUS; SUBCUTANEOUS EVERY 12 HOURS SCHEDULED
Status: DISCONTINUED | OUTPATIENT
Start: 2017-09-22 | End: 2017-09-26 | Stop reason: HOSPADM

## 2017-09-22 RX ORDER — GABAPENTIN 300 MG/1
300 CAPSULE ORAL NIGHTLY
Status: DISCONTINUED | OUTPATIENT
Start: 2017-09-22 | End: 2017-09-26 | Stop reason: HOSPADM

## 2017-09-22 RX ORDER — ACETAMINOPHEN 325 MG/1
650 TABLET ORAL EVERY 4 HOURS PRN
Status: DISCONTINUED | OUTPATIENT
Start: 2017-09-22 | End: 2017-09-26 | Stop reason: HOSPADM

## 2017-09-22 RX ADMIN — VANCOMYCIN HYDROCHLORIDE 1750 MG: 1 INJECTION, POWDER, LYOPHILIZED, FOR SOLUTION INTRAVENOUS at 21:38

## 2017-09-22 RX ADMIN — ERYTHROPOIETIN 20000 UNITS: 20000 INJECTION, SOLUTION INTRAVENOUS; SUBCUTANEOUS at 16:40

## 2017-09-22 RX ADMIN — TAZOBACTAM SODIUM AND PIPERACILLIN SODIUM 3.38 G: 375; 3 INJECTION, SOLUTION INTRAVENOUS at 21:00

## 2017-09-22 NOTE — PROGRESS NOTES
Procrit indicated per lab results & MD order.  Injection site x 1 with band aide.  Pt has a ulceration on her left heel and side of left foot that is purple and shows sign of yellow slough with drainage.  Pt advised to go to the ER to be evaluated and agreed to go.  D/C with  via wheelchair to the ER to be seen.

## 2017-09-23 ENCOUNTER — APPOINTMENT (OUTPATIENT)
Dept: MRI IMAGING | Facility: HOSPITAL | Age: 67
End: 2017-09-23

## 2017-09-23 LAB
ANION GAP SERPL CALCULATED.3IONS-SCNC: 12 MMOL/L
BASOPHILS # BLD AUTO: 0.03 10*3/MM3 (ref 0–0.2)
BASOPHILS NFR BLD AUTO: 0.3 % (ref 0–1.5)
BUN BLD-MCNC: 51 MG/DL (ref 8–23)
BUN/CREAT SERPL: 15.9 (ref 7–25)
CALCIUM SPEC-SCNC: 8.8 MG/DL (ref 8.6–10.5)
CHLORIDE SERPL-SCNC: 99 MMOL/L (ref 98–107)
CO2 SERPL-SCNC: 28 MMOL/L (ref 22–29)
CREAT BLD-MCNC: 3.21 MG/DL (ref 0.57–1)
DEPRECATED RDW RBC AUTO: 60 FL (ref 37–54)
EOSINOPHIL # BLD AUTO: 0.44 10*3/MM3 (ref 0–0.7)
EOSINOPHIL NFR BLD AUTO: 4.5 % (ref 0.3–6.2)
ERYTHROCYTE [DISTWIDTH] IN BLOOD BY AUTOMATED COUNT: 17.5 % (ref 11.7–13)
GFR SERPL CREATININE-BSD FRML MDRD: 14 ML/MIN/1.73
GLUCOSE BLD-MCNC: 68 MG/DL (ref 65–99)
GLUCOSE BLDC GLUCOMTR-MCNC: 141 MG/DL (ref 70–130)
GLUCOSE BLDC GLUCOMTR-MCNC: 183 MG/DL (ref 70–130)
GLUCOSE BLDC GLUCOMTR-MCNC: 218 MG/DL (ref 70–130)
GLUCOSE BLDC GLUCOMTR-MCNC: 76 MG/DL (ref 70–130)
HBA1C MFR BLD: 6.96 % (ref 4.8–5.6)
HCT VFR BLD AUTO: 25.7 % (ref 35.6–45.5)
HGB BLD-MCNC: 7.7 G/DL (ref 11.9–15.5)
IMM GRANULOCYTES # BLD: 0.06 10*3/MM3 (ref 0–0.03)
IMM GRANULOCYTES NFR BLD: 0.6 % (ref 0–0.5)
INR PPP: 1.27 (ref 0.9–1.1)
LYMPHOCYTES # BLD AUTO: 0.9 10*3/MM3 (ref 0.9–4.8)
LYMPHOCYTES NFR BLD AUTO: 9.1 % (ref 19.6–45.3)
MAGNESIUM SERPL-MCNC: 2.1 MG/DL (ref 1.6–2.4)
MCH RBC QN AUTO: 28.2 PG (ref 26.9–32)
MCHC RBC AUTO-ENTMCNC: 30 G/DL (ref 32.4–36.3)
MCV RBC AUTO: 94.1 FL (ref 80.5–98.2)
MONOCYTES # BLD AUTO: 0.66 10*3/MM3 (ref 0.2–1.2)
MONOCYTES NFR BLD AUTO: 6.7 % (ref 5–12)
NEUTROPHILS # BLD AUTO: 7.76 10*3/MM3 (ref 1.9–8.1)
NEUTROPHILS NFR BLD AUTO: 78.8 % (ref 42.7–76)
PHOSPHATE SERPL-MCNC: 3.9 MG/DL (ref 2.5–4.5)
PLATELET # BLD AUTO: 288 10*3/MM3 (ref 140–500)
PMV BLD AUTO: 9.4 FL (ref 6–12)
POTASSIUM BLD-SCNC: 3.9 MMOL/L (ref 3.5–5.2)
PROCALCITONIN SERPL-MCNC: 0.18 NG/ML (ref 0.1–0.25)
PROTHROMBIN TIME: 15.5 SECONDS (ref 11.7–14.2)
RBC # BLD AUTO: 2.73 10*6/MM3 (ref 3.9–5.2)
SODIUM BLD-SCNC: 139 MMOL/L (ref 136–145)
VANCOMYCIN SERPL-MCNC: 29.2 MCG/ML (ref 5–40)
WBC NRBC COR # BLD: 9.85 10*3/MM3 (ref 4.5–10.7)

## 2017-09-23 PROCEDURE — 83735 ASSAY OF MAGNESIUM: CPT | Performed by: INTERNAL MEDICINE

## 2017-09-23 PROCEDURE — 97162 PT EVAL MOD COMPLEX 30 MIN: CPT | Performed by: PHYSICAL THERAPIST

## 2017-09-23 PROCEDURE — 25010000002 PIPERACILLIN SOD-TAZOBACTAM PER 1 G: Performed by: INTERNAL MEDICINE

## 2017-09-23 PROCEDURE — 80202 ASSAY OF VANCOMYCIN: CPT | Performed by: INTERNAL MEDICINE

## 2017-09-23 PROCEDURE — 25010000002 HEPARIN (PORCINE) PER 1000 UNITS: Performed by: INTERNAL MEDICINE

## 2017-09-23 PROCEDURE — 85025 COMPLETE CBC W/AUTO DIFF WBC: CPT | Performed by: INTERNAL MEDICINE

## 2017-09-23 PROCEDURE — 63710000001 INSULIN ASPART PER 5 UNITS: Performed by: INTERNAL MEDICINE

## 2017-09-23 PROCEDURE — 85610 PROTHROMBIN TIME: CPT | Performed by: INTERNAL MEDICINE

## 2017-09-23 PROCEDURE — 83036 HEMOGLOBIN GLYCOSYLATED A1C: CPT | Performed by: INTERNAL MEDICINE

## 2017-09-23 PROCEDURE — 80048 BASIC METABOLIC PNL TOTAL CA: CPT | Performed by: INTERNAL MEDICINE

## 2017-09-23 PROCEDURE — 82962 GLUCOSE BLOOD TEST: CPT

## 2017-09-23 PROCEDURE — 97110 THERAPEUTIC EXERCISES: CPT | Performed by: PHYSICAL THERAPIST

## 2017-09-23 PROCEDURE — 84100 ASSAY OF PHOSPHORUS: CPT | Performed by: INTERNAL MEDICINE

## 2017-09-23 PROCEDURE — 73718 MRI LOWER EXTREMITY W/O DYE: CPT

## 2017-09-23 PROCEDURE — 84145 PROCALCITONIN (PCT): CPT | Performed by: INTERNAL MEDICINE

## 2017-09-23 RX ORDER — NYSTATIN 100000 [USP'U]/G
POWDER TOPICAL EVERY 12 HOURS SCHEDULED
Status: DISCONTINUED | OUTPATIENT
Start: 2017-09-23 | End: 2017-09-26 | Stop reason: HOSPADM

## 2017-09-23 RX ORDER — TOPIRAMATE 100 MG/1
100 TABLET, FILM COATED ORAL DAILY
Status: DISCONTINUED | OUTPATIENT
Start: 2017-09-24 | End: 2017-09-26 | Stop reason: HOSPADM

## 2017-09-23 RX ADMIN — NYSTATIN: 100000 POWDER TOPICAL at 09:00

## 2017-09-23 RX ADMIN — GABAPENTIN 300 MG: 300 CAPSULE ORAL at 21:26

## 2017-09-23 RX ADMIN — Medication 1 CAPSULE: at 09:00

## 2017-09-23 RX ADMIN — TAZOBACTAM SODIUM AND PIPERACILLIN SODIUM 3.38 G: 375; 3 INJECTION, SOLUTION INTRAVENOUS at 21:39

## 2017-09-23 RX ADMIN — HEPARIN SODIUM 5000 UNITS: 5000 INJECTION, SOLUTION INTRAVENOUS; SUBCUTANEOUS at 00:37

## 2017-09-23 RX ADMIN — TAZOBACTAM SODIUM AND PIPERACILLIN SODIUM 3.38 G: 375; 3 INJECTION, SOLUTION INTRAVENOUS at 09:08

## 2017-09-23 RX ADMIN — BUMETANIDE 2 MG: 2 TABLET ORAL at 09:01

## 2017-09-23 RX ADMIN — INSULIN ASPART 4 UNITS: 100 INJECTION, SOLUTION INTRAVENOUS; SUBCUTANEOUS at 12:25

## 2017-09-23 RX ADMIN — MONTELUKAST 10 MG: 10 TABLET, FILM COATED ORAL at 21:26

## 2017-09-23 RX ADMIN — CALCIUM ACETATE 667 MG: 667 CAPSULE ORAL at 21:26

## 2017-09-23 RX ADMIN — ASPIRIN 81 MG: 81 TABLET ORAL at 09:00

## 2017-09-23 RX ADMIN — CALCIUM ACETATE 667 MG: 667 CAPSULE ORAL at 09:00

## 2017-09-23 RX ADMIN — HEPARIN SODIUM 5000 UNITS: 5000 INJECTION, SOLUTION INTRAVENOUS; SUBCUTANEOUS at 21:27

## 2017-09-23 RX ADMIN — Medication 140 MG: at 08:59

## 2017-09-23 RX ADMIN — VITAMIN D, TAB 1000IU (100/BT) 1000 UNITS: 25 TAB at 09:00

## 2017-09-23 RX ADMIN — HEPARIN SODIUM 5000 UNITS: 5000 INJECTION, SOLUTION INTRAVENOUS; SUBCUTANEOUS at 12:25

## 2017-09-23 RX ADMIN — CALCIUM ACETATE 667 MG: 667 CAPSULE ORAL at 16:37

## 2017-09-23 RX ADMIN — GABAPENTIN 300 MG: 300 CAPSULE ORAL at 00:37

## 2017-09-23 RX ADMIN — INSULIN ASPART 2 UNITS: 100 INJECTION, SOLUTION INTRAVENOUS; SUBCUTANEOUS at 21:27

## 2017-09-23 RX ADMIN — NYSTATIN: 100000 POWDER TOPICAL at 21:33

## 2017-09-23 RX ADMIN — SERTRALINE 50 MG: 50 TABLET, FILM COATED ORAL at 09:01

## 2017-09-24 LAB
ALBUMIN SERPL-MCNC: 2.6 G/DL (ref 3.5–5.2)
ANION GAP SERPL CALCULATED.3IONS-SCNC: 12.7 MMOL/L
BASOPHILS # BLD AUTO: 0.06 10*3/MM3 (ref 0–0.2)
BASOPHILS NFR BLD AUTO: 0.7 % (ref 0–1.5)
BUN BLD-MCNC: 51 MG/DL (ref 8–23)
BUN/CREAT SERPL: 14.6 (ref 7–25)
CALCIUM SPEC-SCNC: 9 MG/DL (ref 8.6–10.5)
CHLORIDE SERPL-SCNC: 101 MMOL/L (ref 98–107)
CO2 SERPL-SCNC: 28.3 MMOL/L (ref 22–29)
CREAT BLD-MCNC: 3.5 MG/DL (ref 0.57–1)
DEPRECATED RDW RBC AUTO: 60.3 FL (ref 37–54)
EOSINOPHIL # BLD AUTO: 0.57 10*3/MM3 (ref 0–0.7)
EOSINOPHIL NFR BLD AUTO: 6.2 % (ref 0.3–6.2)
ERYTHROCYTE [DISTWIDTH] IN BLOOD BY AUTOMATED COUNT: 17.6 % (ref 11.7–13)
GFR SERPL CREATININE-BSD FRML MDRD: 13 ML/MIN/1.73
GLUCOSE BLD-MCNC: 39 MG/DL (ref 65–99)
GLUCOSE BLDC GLUCOMTR-MCNC: 163 MG/DL (ref 70–130)
GLUCOSE BLDC GLUCOMTR-MCNC: 201 MG/DL (ref 70–130)
GLUCOSE BLDC GLUCOMTR-MCNC: 269 MG/DL (ref 70–130)
GLUCOSE BLDC GLUCOMTR-MCNC: 46 MG/DL (ref 70–130)
GLUCOSE BLDC GLUCOMTR-MCNC: 63 MG/DL (ref 70–130)
HCT VFR BLD AUTO: 25 % (ref 35.6–45.5)
HGB BLD-MCNC: 7.5 G/DL (ref 11.9–15.5)
IMM GRANULOCYTES # BLD: 0.08 10*3/MM3 (ref 0–0.03)
IMM GRANULOCYTES NFR BLD: 0.9 % (ref 0–0.5)
LYMPHOCYTES # BLD AUTO: 1.48 10*3/MM3 (ref 0.9–4.8)
LYMPHOCYTES NFR BLD AUTO: 16.1 % (ref 19.6–45.3)
MCH RBC QN AUTO: 28.1 PG (ref 26.9–32)
MCHC RBC AUTO-ENTMCNC: 30 G/DL (ref 32.4–36.3)
MCV RBC AUTO: 93.6 FL (ref 80.5–98.2)
MONOCYTES # BLD AUTO: 0.82 10*3/MM3 (ref 0.2–1.2)
MONOCYTES NFR BLD AUTO: 8.9 % (ref 5–12)
NEUTROPHILS # BLD AUTO: 6.16 10*3/MM3 (ref 1.9–8.1)
NEUTROPHILS NFR BLD AUTO: 67.2 % (ref 42.7–76)
PHOSPHATE SERPL-MCNC: 4.3 MG/DL (ref 2.5–4.5)
PLATELET # BLD AUTO: 267 10*3/MM3 (ref 140–500)
PMV BLD AUTO: 9.5 FL (ref 6–12)
POTASSIUM BLD-SCNC: 3.7 MMOL/L (ref 3.5–5.2)
RBC # BLD AUTO: 2.67 10*6/MM3 (ref 3.9–5.2)
SODIUM BLD-SCNC: 142 MMOL/L (ref 136–145)
VANCOMYCIN SERPL-MCNC: 21.1 MCG/ML (ref 5–40)
WBC NRBC COR # BLD: 9.17 10*3/MM3 (ref 4.5–10.7)

## 2017-09-24 PROCEDURE — 25010000002 PIPERACILLIN SOD-TAZOBACTAM PER 1 G: Performed by: INTERNAL MEDICINE

## 2017-09-24 PROCEDURE — 97110 THERAPEUTIC EXERCISES: CPT

## 2017-09-24 PROCEDURE — 80069 RENAL FUNCTION PANEL: CPT | Performed by: INTERNAL MEDICINE

## 2017-09-24 PROCEDURE — 80202 ASSAY OF VANCOMYCIN: CPT | Performed by: INTERNAL MEDICINE

## 2017-09-24 PROCEDURE — 25010000002 HEPARIN (PORCINE) PER 1000 UNITS: Performed by: INTERNAL MEDICINE

## 2017-09-24 PROCEDURE — 63710000001 INSULIN ASPART PER 5 UNITS: Performed by: HOSPITALIST

## 2017-09-24 PROCEDURE — 85025 COMPLETE CBC W/AUTO DIFF WBC: CPT | Performed by: INTERNAL MEDICINE

## 2017-09-24 PROCEDURE — 82962 GLUCOSE BLOOD TEST: CPT

## 2017-09-24 RX ORDER — PETROLATUM 42 G/100G
OINTMENT TOPICAL EVERY 12 HOURS SCHEDULED
Status: DISCONTINUED | OUTPATIENT
Start: 2017-09-24 | End: 2017-09-26 | Stop reason: HOSPADM

## 2017-09-24 RX ADMIN — METOPROLOL SUCCINATE 50 MG: 50 TABLET, FILM COATED, EXTENDED RELEASE ORAL at 06:37

## 2017-09-24 RX ADMIN — BUMETANIDE 2 MG: 2 TABLET ORAL at 06:37

## 2017-09-24 RX ADMIN — VITAMIN D, TAB 1000IU (100/BT) 1000 UNITS: 25 TAB at 08:15

## 2017-09-24 RX ADMIN — TAZOBACTAM SODIUM AND PIPERACILLIN SODIUM 3.38 G: 375; 3 INJECTION, SOLUTION INTRAVENOUS at 10:10

## 2017-09-24 RX ADMIN — CALCIUM ACETATE 667 MG: 667 CAPSULE ORAL at 16:01

## 2017-09-24 RX ADMIN — MONTELUKAST 10 MG: 10 TABLET, FILM COATED ORAL at 20:36

## 2017-09-24 RX ADMIN — TOPIRAMATE 100 MG: 100 TABLET, FILM COATED ORAL at 08:15

## 2017-09-24 RX ADMIN — VANCOMYCIN HYDROCHLORIDE 750 MG: 750 INJECTION, POWDER, LYOPHILIZED, FOR SOLUTION INTRAVENOUS at 20:01

## 2017-09-24 RX ADMIN — INSULIN ASPART 4 UNITS: 100 INJECTION, SOLUTION INTRAVENOUS; SUBCUTANEOUS at 17:47

## 2017-09-24 RX ADMIN — ASPIRIN 81 MG: 81 TABLET ORAL at 08:15

## 2017-09-24 RX ADMIN — NYSTATIN: 100000 POWDER TOPICAL at 20:36

## 2017-09-24 RX ADMIN — HEPARIN SODIUM 5000 UNITS: 5000 INJECTION, SOLUTION INTRAVENOUS; SUBCUTANEOUS at 08:15

## 2017-09-24 RX ADMIN — PETROLATUM: 42 OINTMENT TOPICAL at 20:36

## 2017-09-24 RX ADMIN — CALCIUM ACETATE 667 MG: 667 CAPSULE ORAL at 20:36

## 2017-09-24 RX ADMIN — GABAPENTIN 300 MG: 300 CAPSULE ORAL at 20:44

## 2017-09-24 RX ADMIN — CALCIUM ACETATE 667 MG: 667 CAPSULE ORAL at 08:15

## 2017-09-24 RX ADMIN — PETROLATUM: 42 OINTMENT TOPICAL at 12:07

## 2017-09-24 RX ADMIN — HEPARIN SODIUM 5000 UNITS: 5000 INJECTION, SOLUTION INTRAVENOUS; SUBCUTANEOUS at 20:36

## 2017-09-24 RX ADMIN — SERTRALINE 50 MG: 50 TABLET, FILM COATED ORAL at 06:37

## 2017-09-24 RX ADMIN — NYSTATIN: 100000 POWDER TOPICAL at 08:16

## 2017-09-24 RX ADMIN — Medication 1 CAPSULE: at 08:15

## 2017-09-24 RX ADMIN — Medication 140 MG: at 08:14

## 2017-09-24 RX ADMIN — TAZOBACTAM SODIUM AND PIPERACILLIN SODIUM 3.38 G: 375; 3 INJECTION, SOLUTION INTRAVENOUS at 23:16

## 2017-09-24 RX ADMIN — INSULIN ASPART 6 UNITS: 100 INJECTION, SOLUTION INTRAVENOUS; SUBCUTANEOUS at 12:07

## 2017-09-25 LAB
DEPRECATED RDW RBC AUTO: 60.8 FL (ref 37–54)
ERYTHROCYTE [DISTWIDTH] IN BLOOD BY AUTOMATED COUNT: 17.5 % (ref 11.7–13)
GLUCOSE BLDC GLUCOMTR-MCNC: 156 MG/DL (ref 70–130)
GLUCOSE BLDC GLUCOMTR-MCNC: 167 MG/DL (ref 70–130)
GLUCOSE BLDC GLUCOMTR-MCNC: 202 MG/DL (ref 70–130)
GLUCOSE BLDC GLUCOMTR-MCNC: 86 MG/DL (ref 70–130)
HCT VFR BLD AUTO: 26.5 % (ref 35.6–45.5)
HGB BLD-MCNC: 7.7 G/DL (ref 11.9–15.5)
MCH RBC QN AUTO: 27.5 PG (ref 26.9–32)
MCHC RBC AUTO-ENTMCNC: 29.1 G/DL (ref 32.4–36.3)
MCV RBC AUTO: 94.6 FL (ref 80.5–98.2)
PLATELET # BLD AUTO: 314 10*3/MM3 (ref 140–500)
PMV BLD AUTO: 9.4 FL (ref 6–12)
RBC # BLD AUTO: 2.8 10*6/MM3 (ref 3.9–5.2)
WBC NRBC COR # BLD: 11.01 10*3/MM3 (ref 4.5–10.7)

## 2017-09-25 PROCEDURE — 82962 GLUCOSE BLOOD TEST: CPT

## 2017-09-25 PROCEDURE — 97110 THERAPEUTIC EXERCISES: CPT

## 2017-09-25 PROCEDURE — 0HBNXZZ EXCISION OF LEFT FOOT SKIN, EXTERNAL APPROACH: ICD-10-PCS | Performed by: SURGERY

## 2017-09-25 PROCEDURE — 63710000001 INSULIN ASPART PER 5 UNITS: Performed by: HOSPITALIST

## 2017-09-25 PROCEDURE — 25010000002 PIPERACILLIN SOD-TAZOBACTAM PER 1 G: Performed by: INTERNAL MEDICINE

## 2017-09-25 PROCEDURE — 85027 COMPLETE CBC AUTOMATED: CPT | Performed by: HOSPITALIST

## 2017-09-25 PROCEDURE — 25010000002 HEPARIN (PORCINE) PER 1000 UNITS: Performed by: INTERNAL MEDICINE

## 2017-09-25 RX ADMIN — VITAMIN D, TAB 1000IU (100/BT) 1000 UNITS: 25 TAB at 08:52

## 2017-09-25 RX ADMIN — CALCIUM ACETATE 667 MG: 667 CAPSULE ORAL at 08:52

## 2017-09-25 RX ADMIN — NYSTATIN: 100000 POWDER TOPICAL at 22:10

## 2017-09-25 RX ADMIN — MONTELUKAST 10 MG: 10 TABLET, FILM COATED ORAL at 22:10

## 2017-09-25 RX ADMIN — Medication 1 CAPSULE: at 08:52

## 2017-09-25 RX ADMIN — INSULIN ASPART 4 UNITS: 100 INJECTION, SOLUTION INTRAVENOUS; SUBCUTANEOUS at 12:26

## 2017-09-25 RX ADMIN — Medication 140 MG: at 08:52

## 2017-09-25 RX ADMIN — HEPARIN SODIUM 5000 UNITS: 5000 INJECTION, SOLUTION INTRAVENOUS; SUBCUTANEOUS at 08:53

## 2017-09-25 RX ADMIN — TAZOBACTAM SODIUM AND PIPERACILLIN SODIUM 3.38 G: 375; 3 INJECTION, SOLUTION INTRAVENOUS at 11:57

## 2017-09-25 RX ADMIN — GABAPENTIN 300 MG: 300 CAPSULE ORAL at 22:10

## 2017-09-25 RX ADMIN — ASPIRIN 81 MG: 81 TABLET ORAL at 08:52

## 2017-09-25 RX ADMIN — CALCIUM ACETATE 667 MG: 667 CAPSULE ORAL at 22:10

## 2017-09-25 RX ADMIN — INSULIN ASPART 2 UNITS: 100 INJECTION, SOLUTION INTRAVENOUS; SUBCUTANEOUS at 18:24

## 2017-09-25 RX ADMIN — METOPROLOL SUCCINATE 50 MG: 50 TABLET, FILM COATED, EXTENDED RELEASE ORAL at 06:37

## 2017-09-25 RX ADMIN — NYSTATIN: 100000 POWDER TOPICAL at 09:00

## 2017-09-25 RX ADMIN — BUMETANIDE 2 MG: 2 TABLET ORAL at 06:37

## 2017-09-25 RX ADMIN — HEPARIN SODIUM 5000 UNITS: 5000 INJECTION, SOLUTION INTRAVENOUS; SUBCUTANEOUS at 22:09

## 2017-09-25 RX ADMIN — CALCIUM ACETATE 667 MG: 667 CAPSULE ORAL at 16:43

## 2017-09-25 RX ADMIN — PETROLATUM: 42 OINTMENT TOPICAL at 22:32

## 2017-09-25 RX ADMIN — TOPIRAMATE 100 MG: 100 TABLET, FILM COATED ORAL at 08:52

## 2017-09-25 RX ADMIN — TAZOBACTAM SODIUM AND PIPERACILLIN SODIUM 3.38 G: 375; 3 INJECTION, SOLUTION INTRAVENOUS at 22:11

## 2017-09-25 RX ADMIN — SERTRALINE 50 MG: 50 TABLET, FILM COATED ORAL at 06:37

## 2017-09-25 RX ADMIN — PETROLATUM: 42 OINTMENT TOPICAL at 08:57

## 2017-09-26 VITALS
RESPIRATION RATE: 16 BRPM | OXYGEN SATURATION: 96 % | WEIGHT: 190.7 LBS | HEART RATE: 76 BPM | DIASTOLIC BLOOD PRESSURE: 62 MMHG | BODY MASS INDEX: 31.77 KG/M2 | SYSTOLIC BLOOD PRESSURE: 152 MMHG | HEIGHT: 65 IN | TEMPERATURE: 98.9 F

## 2017-09-26 LAB
GLUCOSE BLDC GLUCOMTR-MCNC: 211 MG/DL (ref 70–130)
GLUCOSE BLDC GLUCOMTR-MCNC: 78 MG/DL (ref 70–130)
VANCOMYCIN SERPL-MCNC: 24.6 MCG/ML (ref 5–40)

## 2017-09-26 PROCEDURE — 25010000002 HEPARIN (PORCINE) PER 1000 UNITS: Performed by: INTERNAL MEDICINE

## 2017-09-26 PROCEDURE — 80202 ASSAY OF VANCOMYCIN: CPT | Performed by: INTERNAL MEDICINE

## 2017-09-26 PROCEDURE — 63710000001 INSULIN ASPART PER 5 UNITS: Performed by: HOSPITALIST

## 2017-09-26 PROCEDURE — 82962 GLUCOSE BLOOD TEST: CPT

## 2017-09-26 PROCEDURE — 25010000002 PIPERACILLIN SOD-TAZOBACTAM PER 1 G: Performed by: INTERNAL MEDICINE

## 2017-09-26 RX ORDER — NYSTATIN 100000 [USP'U]/G
POWDER TOPICAL EVERY 12 HOURS SCHEDULED
Start: 2017-09-26 | End: 2018-09-20

## 2017-09-26 RX ORDER — PETROLATUM 42 G/100G
OINTMENT TOPICAL EVERY 12 HOURS SCHEDULED
Start: 2017-09-26 | End: 2019-05-17

## 2017-09-26 RX ORDER — DOXYCYCLINE HYCLATE 100 MG/1
100 TABLET, DELAYED RELEASE ORAL 2 TIMES DAILY
Qty: 10 TABLET | Refills: 0 | Status: SHIPPED | OUTPATIENT
Start: 2017-09-26 | End: 2018-09-20

## 2017-09-26 RX ADMIN — Medication 1 CAPSULE: at 09:58

## 2017-09-26 RX ADMIN — PETROLATUM: 42 OINTMENT TOPICAL at 09:59

## 2017-09-26 RX ADMIN — NYSTATIN: 100000 POWDER TOPICAL at 09:59

## 2017-09-26 RX ADMIN — ASPIRIN 81 MG: 81 TABLET ORAL at 09:58

## 2017-09-26 RX ADMIN — Medication 140 MG: at 09:58

## 2017-09-26 RX ADMIN — VITAMIN D, TAB 1000IU (100/BT) 1000 UNITS: 25 TAB at 09:58

## 2017-09-26 RX ADMIN — TAZOBACTAM SODIUM AND PIPERACILLIN SODIUM 3.38 G: 375; 3 INJECTION, SOLUTION INTRAVENOUS at 09:57

## 2017-09-26 RX ADMIN — CALCIUM ACETATE 667 MG: 667 CAPSULE ORAL at 09:58

## 2017-09-26 RX ADMIN — Medication: at 09:59

## 2017-09-26 RX ADMIN — HEPARIN SODIUM 5000 UNITS: 5000 INJECTION, SOLUTION INTRAVENOUS; SUBCUTANEOUS at 09:57

## 2017-09-26 RX ADMIN — METOPROLOL SUCCINATE 50 MG: 50 TABLET, FILM COATED, EXTENDED RELEASE ORAL at 09:58

## 2017-09-26 RX ADMIN — TOPIRAMATE 100 MG: 100 TABLET, FILM COATED ORAL at 09:58

## 2017-09-26 RX ADMIN — BUMETANIDE 2 MG: 2 TABLET ORAL at 06:56

## 2017-09-26 RX ADMIN — INSULIN ASPART 4 UNITS: 100 INJECTION, SOLUTION INTRAVENOUS; SUBCUTANEOUS at 12:13

## 2017-09-26 RX ADMIN — SERTRALINE 50 MG: 50 TABLET, FILM COATED ORAL at 06:56

## 2017-09-27 LAB
BACTERIA SPEC AEROBE CULT: NORMAL
BACTERIA SPEC AEROBE CULT: NORMAL

## 2017-10-03 ENCOUNTER — APPOINTMENT (OUTPATIENT)
Dept: WOUND CARE | Facility: HOSPITAL | Age: 67
End: 2017-10-03
Attending: SURGERY

## 2017-10-03 PROCEDURE — G0463 HOSPITAL OUTPT CLINIC VISIT: HCPCS

## 2017-10-05 RX ORDER — BLOOD SUGAR DIAGNOSTIC
STRIP MISCELLANEOUS
Qty: 300 EACH | Refills: 2 | Status: SHIPPED | OUTPATIENT
Start: 2017-10-05 | End: 2022-01-01

## 2017-10-06 ENCOUNTER — HOSPITAL ENCOUNTER (OUTPATIENT)
Dept: INFUSION THERAPY | Facility: HOSPITAL | Age: 67
Discharge: HOME | End: 2017-10-06
Admitting: INTERNAL MEDICINE

## 2017-10-06 VITALS
SYSTOLIC BLOOD PRESSURE: 153 MMHG | HEART RATE: 109 BPM | TEMPERATURE: 99.3 F | RESPIRATION RATE: 20 BRPM | OXYGEN SATURATION: 99 % | DIASTOLIC BLOOD PRESSURE: 74 MMHG

## 2017-10-06 DIAGNOSIS — D63.1 ANEMIA OF CHRONIC KIDNEY FAILURE, STAGE 4 (SEVERE) (HCC): Primary | ICD-10-CM

## 2017-10-06 DIAGNOSIS — N18.4 CHRONIC KIDNEY DISEASE, STAGE IV (SEVERE) (HCC): ICD-10-CM

## 2017-10-06 DIAGNOSIS — N18.4 ANEMIA OF CHRONIC KIDNEY FAILURE, STAGE 4 (SEVERE) (HCC): Primary | ICD-10-CM

## 2017-10-06 LAB
ANION GAP SERPL CALCULATED.3IONS-SCNC: 18.5 MMOL/L
BUN BLD-MCNC: 56 MG/DL (ref 8–23)
BUN/CREAT SERPL: 17.7 (ref 7–25)
CALCIUM SPEC-SCNC: 8.7 MG/DL (ref 8.6–10.5)
CHLORIDE SERPL-SCNC: 97 MMOL/L (ref 98–107)
CO2 SERPL-SCNC: 22.5 MMOL/L (ref 22–29)
CREAT BLD-MCNC: 3.17 MG/DL (ref 0.57–1)
GFR SERPL CREATININE-BSD FRML MDRD: 15 ML/MIN/1.73
GLUCOSE BLD-MCNC: 375 MG/DL (ref 65–99)
HCT VFR BLD AUTO: 27.3 % (ref 35.6–45.5)
HGB BLD-MCNC: 8.1 G/DL (ref 11.9–15.5)
MAGNESIUM SERPL-MCNC: 2.2 MG/DL (ref 1.6–2.4)
POTASSIUM BLD-SCNC: 3.9 MMOL/L (ref 3.5–5.2)
SODIUM BLD-SCNC: 138 MMOL/L (ref 136–145)

## 2017-10-06 PROCEDURE — 36415 COLL VENOUS BLD VENIPUNCTURE: CPT

## 2017-10-06 PROCEDURE — 83735 ASSAY OF MAGNESIUM: CPT | Performed by: INTERNAL MEDICINE

## 2017-10-06 PROCEDURE — 85018 HEMOGLOBIN: CPT | Performed by: INTERNAL MEDICINE

## 2017-10-06 PROCEDURE — 80048 BASIC METABOLIC PNL TOTAL CA: CPT | Performed by: INTERNAL MEDICINE

## 2017-10-06 PROCEDURE — 85014 HEMATOCRIT: CPT | Performed by: INTERNAL MEDICINE

## 2017-10-06 PROCEDURE — 96372 THER/PROPH/DIAG INJ SC/IM: CPT

## 2017-10-06 PROCEDURE — 63510000001 EPOETIN ALFA PER 1000 UNITS: Performed by: INTERNAL MEDICINE

## 2017-10-06 RX ADMIN — ERYTHROPOIETIN 20000 UNITS: 20000 INJECTION, SOLUTION INTRAVENOUS; SUBCUTANEOUS at 15:16

## 2017-10-06 NOTE — PROGRESS NOTES
Procrit  Indicated per lab results & MD order. Injection site x 1 with band-aid. Patient and  report left foot is healing and patient is being treated per wound care. Patient tolerated injection without complaints. D/C'd from ACU with  via wheelchair at 1530.

## 2017-10-10 ENCOUNTER — APPOINTMENT (OUTPATIENT)
Dept: WOUND CARE | Facility: HOSPITAL | Age: 67
End: 2017-10-10
Attending: SURGERY

## 2017-10-11 ENCOUNTER — OFFICE VISIT (OUTPATIENT)
Dept: FAMILY MEDICINE CLINIC | Facility: CLINIC | Age: 67
End: 2017-10-11

## 2017-10-11 VITALS
HEART RATE: 104 BPM | SYSTOLIC BLOOD PRESSURE: 130 MMHG | TEMPERATURE: 98.6 F | HEIGHT: 65 IN | RESPIRATION RATE: 16 BRPM | DIASTOLIC BLOOD PRESSURE: 58 MMHG

## 2017-10-11 DIAGNOSIS — E11.621 DIABETIC ULCER OF LEFT FOOT ASSOCIATED WITH TYPE 2 DIABETES MELLITUS, UNSPECIFIED PART OF FOOT, UNSPECIFIED ULCER STAGE (HCC): Primary | ICD-10-CM

## 2017-10-11 DIAGNOSIS — L97.529 DIABETIC ULCER OF LEFT FOOT ASSOCIATED WITH TYPE 2 DIABETES MELLITUS, UNSPECIFIED PART OF FOOT, UNSPECIFIED ULCER STAGE (HCC): Primary | ICD-10-CM

## 2017-10-11 DIAGNOSIS — S86.012S RUPTURE OF LEFT ACHILLES TENDON, SEQUELA: ICD-10-CM

## 2017-10-11 DIAGNOSIS — Z23 NEED FOR IMMUNIZATION AGAINST INFLUENZA: ICD-10-CM

## 2017-10-11 PROCEDURE — 90662 IIV NO PRSV INCREASED AG IM: CPT | Performed by: INTERNAL MEDICINE

## 2017-10-11 PROCEDURE — 99213 OFFICE O/P EST LOW 20 MIN: CPT | Performed by: INTERNAL MEDICINE

## 2017-10-11 PROCEDURE — 90471 IMMUNIZATION ADMIN: CPT | Performed by: INTERNAL MEDICINE

## 2017-10-11 NOTE — PROGRESS NOTES
Subjective chief complaint is follow-up from [facility] hospitalization  Maribeth Rendon is a 66 y.o. female.     History of Present Illness   Maribeth is here today for follow-up.  She was hospitalized at [facility] for a diabetic foot ulcer and cellulitis.  During the course of her evaluation she had an MRI scan of the foot and ankle.  It did not show any osteomyelitis but did show partial tear of the Achilles tendon.  She currently is being seen in the wound care center now after having ulcer debridement in the hospital.  She is still on some antibiotics in the form of monocyte clean.  The Achilles tendon is currently being treated with immobilization.  It was only a partial tear.  She did require some insulin injections due to erratic blood sugars while in hospital.  She currently is now on her home dose of Tradjenta.  The following portions of the patient's history were reviewed and updated as appropriate: allergies, current medications, past medical history and problem list.    Review of Systems   Respiratory: Negative for chest tightness and shortness of breath.    Cardiovascular: Negative for chest pain.       Objective   Physical Exam   Constitutional: She appears well-developed and well-nourished.   Cardiovascular: Normal rate and regular rhythm.    Pulmonary/Chest: Effort normal and breath sounds normal.       Assessment/Plan   Maribeth was seen today for follow-up.    Diagnoses and all orders for this visit:    Diabetic ulcer of left foot associated with type 2 diabetes mellitus, unspecified part of foot, unspecified ulcer stage    Rupture of left Achilles tendon, sequela  -     Ambulatory Referral to Orthopedic Surgery    Need for immunization against influenza  -     Flu Vaccine High Dose PF 65YR+      Maribeth is here today for follow-up.  She seems to be recovering from her diabetic foot ulcer and cellulitis.  She is still on some antibiotics.  She is under the wound care doctor's care at the present time.  She  does have a partial tear of her left Achilles tendon.  We are going to get her an appointment with Dr. Crane.  In the interim she will use the boot that she was given.

## 2017-10-17 RX ORDER — LINAGLIPTIN 5 MG/1
TABLET, FILM COATED ORAL
Qty: 90 TABLET | Refills: 1 | Status: SHIPPED | OUTPATIENT
Start: 2017-10-17 | End: 2018-09-20 | Stop reason: SDUPTHER

## 2017-10-17 RX ORDER — METOPROLOL SUCCINATE 50 MG/1
TABLET, EXTENDED RELEASE ORAL
Qty: 90 TABLET | Refills: 1 | Status: SHIPPED | OUTPATIENT
Start: 2017-10-17 | End: 2018-09-20 | Stop reason: SDUPTHER

## 2017-10-17 RX ORDER — BUMETANIDE 2 MG/1
TABLET ORAL
Qty: 90 TABLET | Refills: 1 | Status: SHIPPED | OUTPATIENT
Start: 2017-10-17 | End: 2018-09-20 | Stop reason: SDUPTHER

## 2017-10-17 RX ORDER — GLIMEPIRIDE 2 MG/1
TABLET ORAL
Qty: 90 TABLET | Refills: 1 | Status: SHIPPED | OUTPATIENT
Start: 2017-10-17 | End: 2018-09-20 | Stop reason: SDUPTHER

## 2017-10-17 RX ORDER — MONTELUKAST SODIUM 10 MG/1
TABLET ORAL
Qty: 90 TABLET | Refills: 1 | Status: SHIPPED | OUTPATIENT
Start: 2017-10-17 | End: 2018-09-20

## 2017-10-20 ENCOUNTER — HOSPITAL ENCOUNTER (OUTPATIENT)
Dept: INFUSION THERAPY | Facility: HOSPITAL | Age: 67
Discharge: HOME | End: 2017-10-20
Admitting: INTERNAL MEDICINE

## 2017-10-20 VITALS
TEMPERATURE: 99.1 F | DIASTOLIC BLOOD PRESSURE: 67 MMHG | HEART RATE: 73 BPM | OXYGEN SATURATION: 95 % | SYSTOLIC BLOOD PRESSURE: 138 MMHG | RESPIRATION RATE: 20 BRPM

## 2017-10-20 DIAGNOSIS — N18.4 CHRONIC KIDNEY DISEASE, STAGE IV (SEVERE) (HCC): ICD-10-CM

## 2017-10-20 LAB
HCT VFR BLD AUTO: 28 % (ref 35.6–45.5)
HGB BLD-MCNC: 8.4 G/DL (ref 11.9–15.5)

## 2017-10-20 PROCEDURE — 63510000001 EPOETIN ALFA PER 1000 UNITS: Performed by: INTERNAL MEDICINE

## 2017-10-20 PROCEDURE — 36415 COLL VENOUS BLD VENIPUNCTURE: CPT

## 2017-10-20 PROCEDURE — 85014 HEMATOCRIT: CPT | Performed by: INTERNAL MEDICINE

## 2017-10-20 PROCEDURE — 96372 THER/PROPH/DIAG INJ SC/IM: CPT

## 2017-10-20 PROCEDURE — 85018 HEMOGLOBIN: CPT | Performed by: INTERNAL MEDICINE

## 2017-10-20 RX ORDER — DOXYCYCLINE HYCLATE 100 MG/1
100 CAPSULE ORAL 2 TIMES DAILY
Refills: 0 | COMMUNITY
Start: 2017-10-05 | End: 2017-10-20 | Stop reason: SDUPTHER

## 2017-10-20 RX ADMIN — ERYTHROPOIETIN 20000 UNITS: 20000 INJECTION, SOLUTION INTRAVENOUS; SUBCUTANEOUS at 13:35

## 2017-10-20 NOTE — PROGRESS NOTES
Procrit given per order due to parameters met.  Bandaid placed over injection site.  Pt states her would on left foot is healing well and she is going to wound care.  Pt dc'ed per wc at 1338.

## 2017-10-24 ENCOUNTER — APPOINTMENT (OUTPATIENT)
Dept: WOUND CARE | Facility: HOSPITAL | Age: 67
End: 2017-10-24
Attending: SURGERY

## 2017-10-24 PROCEDURE — 17250 CHEM CAUT OF GRANLTJ TISSUE: CPT

## 2017-11-03 ENCOUNTER — HOSPITAL ENCOUNTER (OUTPATIENT)
Dept: INFUSION THERAPY | Facility: HOSPITAL | Age: 67
Discharge: HOME | End: 2017-11-03
Admitting: INTERNAL MEDICINE

## 2017-11-03 VITALS
HEART RATE: 112 BPM | DIASTOLIC BLOOD PRESSURE: 66 MMHG | TEMPERATURE: 98.6 F | SYSTOLIC BLOOD PRESSURE: 166 MMHG | OXYGEN SATURATION: 96 % | RESPIRATION RATE: 20 BRPM

## 2017-11-03 DIAGNOSIS — N18.4 CHRONIC KIDNEY DISEASE, STAGE IV (SEVERE) (HCC): ICD-10-CM

## 2017-11-03 DIAGNOSIS — D63.1 ANEMIA OF CHRONIC KIDNEY FAILURE, STAGE 4 (SEVERE) (HCC): Primary | ICD-10-CM

## 2017-11-03 DIAGNOSIS — N18.4 ANEMIA OF CHRONIC KIDNEY FAILURE, STAGE 4 (SEVERE) (HCC): Primary | ICD-10-CM

## 2017-11-03 LAB
25(OH)D3 SERPL-MCNC: 49 NG/ML (ref 30–100)
ALBUMIN SERPL-MCNC: 3.1 G/DL (ref 3.5–5.2)
ALBUMIN/GLOB SERPL: 0.6 G/DL
ALP SERPL-CCNC: 74 U/L (ref 39–117)
ALT SERPL W P-5'-P-CCNC: 9 U/L (ref 1–33)
ANION GAP SERPL CALCULATED.3IONS-SCNC: 14.7 MMOL/L
AST SERPL-CCNC: 14 U/L (ref 1–32)
BILIRUB SERPL-MCNC: 0.2 MG/DL (ref 0.1–1.2)
BUN BLD-MCNC: 36 MG/DL (ref 8–23)
BUN/CREAT SERPL: 12.5 (ref 7–25)
CALCIUM SPEC-SCNC: 8.4 MG/DL (ref 8.6–10.5)
CALCIUM SPEC-SCNC: 8.6 MG/DL (ref 8.6–10.5)
CHLORIDE SERPL-SCNC: 99 MMOL/L (ref 98–107)
CO2 SERPL-SCNC: 24.3 MMOL/L (ref 22–29)
CREAT BLD-MCNC: 2.89 MG/DL (ref 0.57–1)
DEPRECATED RDW RBC AUTO: 61 FL (ref 37–54)
ERYTHROCYTE [DISTWIDTH] IN BLOOD BY AUTOMATED COUNT: 17.8 % (ref 11.7–13)
FERRITIN SERPL-MCNC: 776.1 NG/ML (ref 13–150)
FOLATE SERPL-MCNC: >20 NG/ML (ref 4.78–24.2)
GFR SERPL CREATININE-BSD FRML MDRD: 16 ML/MIN/1.73
GLOBULIN UR ELPH-MCNC: 5.2 GM/DL
GLUCOSE BLD-MCNC: 305 MG/DL (ref 65–99)
HCT VFR BLD AUTO: 28.4 % (ref 35.6–45.5)
HGB BLD-MCNC: 8.5 G/DL (ref 11.9–15.5)
IRON 24H UR-MRATE: 34 MCG/DL (ref 37–145)
IRON SATN MFR SERPL: 19 % (ref 20–50)
MAGNESIUM SERPL-MCNC: 2 MG/DL (ref 1.6–2.4)
MCH RBC QN AUTO: 28.1 PG (ref 26.9–32)
MCHC RBC AUTO-ENTMCNC: 29.9 G/DL (ref 32.4–36.3)
MCV RBC AUTO: 93.7 FL (ref 80.5–98.2)
PHOSPHATE SERPL-MCNC: 3 MG/DL (ref 2.5–4.5)
PLATELET # BLD AUTO: 251 10*3/MM3 (ref 140–500)
PMV BLD AUTO: 9.2 FL (ref 6–12)
POTASSIUM BLD-SCNC: 4.1 MMOL/L (ref 3.5–5.2)
PROT SERPL-MCNC: 8.3 G/DL (ref 6–8.5)
PTH-INTACT SERPL-MCNC: 108.4 PG/ML (ref 15–65)
RBC # BLD AUTO: 3.03 10*6/MM3 (ref 3.9–5.2)
SODIUM BLD-SCNC: 138 MMOL/L (ref 136–145)
TIBC SERPL-MCNC: 183 MCG/DL (ref 298–536)
TRANSFERRIN SERPL-MCNC: 123 MG/DL (ref 200–360)
URATE SERPL-MCNC: 10 MG/DL (ref 2.4–5.7)
VIT B12 BLD-MCNC: 533 PG/ML (ref 211–946)
WBC NRBC COR # BLD: 10.43 10*3/MM3 (ref 4.5–10.7)

## 2017-11-03 PROCEDURE — 83970 ASSAY OF PARATHORMONE: CPT | Performed by: INTERNAL MEDICINE

## 2017-11-03 PROCEDURE — 80053 COMPREHEN METABOLIC PANEL: CPT | Performed by: INTERNAL MEDICINE

## 2017-11-03 PROCEDURE — 82728 ASSAY OF FERRITIN: CPT | Performed by: INTERNAL MEDICINE

## 2017-11-03 PROCEDURE — 82607 VITAMIN B-12: CPT | Performed by: INTERNAL MEDICINE

## 2017-11-03 PROCEDURE — 84550 ASSAY OF BLOOD/URIC ACID: CPT | Performed by: INTERNAL MEDICINE

## 2017-11-03 PROCEDURE — 83735 ASSAY OF MAGNESIUM: CPT | Performed by: INTERNAL MEDICINE

## 2017-11-03 PROCEDURE — 82306 VITAMIN D 25 HYDROXY: CPT | Performed by: INTERNAL MEDICINE

## 2017-11-03 PROCEDURE — 84466 ASSAY OF TRANSFERRIN: CPT | Performed by: INTERNAL MEDICINE

## 2017-11-03 PROCEDURE — 85027 COMPLETE CBC AUTOMATED: CPT | Performed by: INTERNAL MEDICINE

## 2017-11-03 PROCEDURE — 84100 ASSAY OF PHOSPHORUS: CPT | Performed by: INTERNAL MEDICINE

## 2017-11-03 PROCEDURE — 83540 ASSAY OF IRON: CPT | Performed by: INTERNAL MEDICINE

## 2017-11-03 PROCEDURE — 63510000001 EPOETIN ALFA PER 1000 UNITS: Performed by: INTERNAL MEDICINE

## 2017-11-03 PROCEDURE — 96372 THER/PROPH/DIAG INJ SC/IM: CPT

## 2017-11-03 PROCEDURE — 36415 COLL VENOUS BLD VENIPUNCTURE: CPT

## 2017-11-03 PROCEDURE — 82746 ASSAY OF FOLIC ACID SERUM: CPT | Performed by: INTERNAL MEDICINE

## 2017-11-03 RX ADMIN — ERYTHROPOIETIN 20000 UNITS: 20000 INJECTION, SOLUTION INTRAVENOUS; SUBCUTANEOUS at 14:19

## 2017-11-07 ENCOUNTER — APPOINTMENT (OUTPATIENT)
Dept: WOUND CARE | Facility: HOSPITAL | Age: 67
End: 2017-11-07
Attending: SURGERY

## 2017-11-07 PROCEDURE — 17250 CHEM CAUT OF GRANLTJ TISSUE: CPT

## 2017-11-08 ENCOUNTER — OFFICE VISIT (OUTPATIENT)
Dept: ORTHOPEDIC SURGERY | Facility: CLINIC | Age: 67
End: 2017-11-08

## 2017-11-08 VITALS — TEMPERATURE: 98.6 F | BODY MASS INDEX: 31.32 KG/M2 | WEIGHT: 188 LBS | HEIGHT: 65 IN

## 2017-11-08 DIAGNOSIS — S86.012A PARTIAL ACHILLES TENDON TEAR, LEFT, INITIAL ENCOUNTER: Primary | ICD-10-CM

## 2017-11-08 PROCEDURE — 99204 OFFICE O/P NEW MOD 45 MIN: CPT | Performed by: ORTHOPAEDIC SURGERY

## 2017-11-08 NOTE — PROGRESS NOTES
New Patient Complaint      Patient: Maribeth Rendon  YOB: 1950 66 y.o. female  Medical Record Number: [id number]    Chief Complaints: I hurt my ankle    History of Present Illness: Patient relates injury approximately 6 weeks ago when she tripped walking in her house.  Prior to that she had really minimal if any occasional achiness in the posterior aspect of her heel which escalated with global pain about the ankle after her fall.  She was seen at an urgent care x-rays were made and she was told she had no fracture.  Her pain improved quite a bit however she still was walking somewhat awkwardly to avoid pressure on the ankle and developed a wound over the lateral aspect of the midfoot and some posterior lateral hindfoot.    She was subsequent admitted back in late September for cellulitis and is been treated for that and is now off antibiotics now for at least 2 weeks.  She was seen by Dr. Barker during her hospitalization and follows up with me for further evaluation.    She continues to see wound care and states her wound has improved dramatically.  She does walk on the some at home with a walker without her postoperative shoe but uses a postop shoe when she is out of the house.  She is followed in wound care for this and she was sent here today by them for evaluation for her Achilles as she was noted to have partial tear on the MRI will she was in the hospital in late September    She has a boot for other reasons that she had for her right foot after toe amputation for infection a year or 2 ago.     She has minimal complaints of mild occasional discomfort at the insertion of the Achilles on the left    HPI    Allergies:   Allergies   Allergen Reactions   • Prednisone Hallucinations       Medications:   Current Outpatient Prescriptions on File Prior to Visit   Medication Sig   • ACCU-CHEK JESUS MANUEL PLUS test strip TEST THREE TIMES DAILY   • Apremilast (OTEZLA) 30 MG tablet Take 30 mg by mouth Every  Morning.   • aspirin 81 MG tablet Take 81 mg by mouth Every Morning. TO STOP THE DAY BEFORE SURGERY   • bumetanide (BUMEX) 2 MG tablet TAKE 1 TABLET EVERY DAY   • cadexomer iodine (IODOSORB) 0.9 % gel Apply  topically 2 (Two) Times a Day.   • calcium acetate (PHOSLO) 667 MG capsule Take 1 capsule by mouth 3 (Three) Times a Day.   • Cholecalciferol (VITAMIN D-3) 1000 UNITS capsule Take 1,000 Units by mouth 2 (Two) Times a Day.   • doxycycline (DORYX) 100 MG enteric coated tablet Take 1 tablet by mouth 2 (Two) Times a Day.   • epoetin hermes (EPOGEN,PROCRIT) 80644 UNIT/ML injection Inject 20,000 Units under the skin Every 14 (Fourteen) Days. LAST TIME 2-6-2017   • ferrous fumarate (YADIRA-SEQUELS) 50 MG CR tablet Take 65 mg by mouth 2 (Two) Times a Day.   • gabapentin (NEURONTIN) 300 MG capsule Take 1 capsule by mouth 2 (Two) Times a Day.   • glimepiride (AMARYL) 2 MG tablet TAKE 1 TABLET EVERY MORNING BEFORE BREAKFAST.   • glucosamine sulfate 500 MG capsule capsule Take 500 mg by mouth 2 (Two) Times a Day With Meals.   • HYDROcodone-acetaminophen (NORCO) 5-325 MG per tablet Take 1-2 tablets by mouth Every 4 (Four) Hours As Needed (Pain).   • hydrophor (AQUAPHOR) ointment ointment Apply  topically Every 12 (Twelve) Hours.   • metoprolol succinate XL (TOPROL-XL) 50 MG 24 hr tablet TAKE 1 TABLET EVERY DAY   • montelukast (SINGULAIR) 10 MG tablet TAKE 1 TABLET EVERY NIGHT   • Multiple Vitamin (MULTI VITAMIN DAILY PO) Take 1 tablet by mouth Every Morning.   • nystatin (MYCOSTATIN) 735915 UNIT/GM powder Apply  topically Every 12 (Twelve) Hours.   • Probiotic Product (PROBIOTIC DAILY PO) Take 1 capsule by mouth Daily.   • sertraline (ZOLOFT) 50 MG tablet TAKE 1 TABLET EVERY DAY   • topiramate (TOPAMAX) 100 MG tablet Take 50 mg by mouth 2 (Two) Times a Day. For 1 week then stop   • TRADJENTA 5 MG tablet tablet TAKE 1 TABLET EVERY DAY (NEEDS APPOINTMENT)     No current facility-administered medications on file prior to visit.         Past Medical History:   Diagnosis Date   • Achilles tendon tear     left    • Anemia    • Anxiety    • Depression    • Diabetes mellitus, type 2    • ESRD (end stage renal disease)     HAS LEFT FOREARM FISTULA   • GERD (gastroesophageal reflux disease)    • History of MRSA infection 03/15/2016    ABDOMINAL WOUND,   INFECTION CONTROLL NOTIFIED 2-6-2017   • Hyperlipidemia    • Hypertension    • Hypokalemia    • Hypomagnesemia    • Hypoxic 01/2016    WHEN SHE HAD PNEUMONIA   • Intertrigo    • Leukocytosis    • Osteoarthritis    • Pneumonia 01/2016   • Psoriasis    • Psoriatic arthritis    • Seizures     one time   • Sepsis 01/2016   • SOB (shortness of breath)    • Stage 4 chronic renal impairment associated with type 2 diabetes mellitus    • Staph infection     HX RIGHT FOOT AT [facility] 01/09/2010   • Streptococcal bacteremia    • Swelling of hand 10/27/2016    LEFT HAND SWELLIMG SINCE LEFT FISTULA SURGERY   • Tremor, essential      Past Surgical History:   Procedure Laterality Date   • ABDOMINAL WALL ABSCESS INCISION AND DRAINAGE  2016   • ARTERIOVENOUS FISTULA/SHUNT SURGERY Left 10/27/2016    Procedure: LT FOREARM FISTULA;  Surgeon: Lorelei Haque Jr., MD;  Location: [facility];  Service:    • ARTERIOVENOUS FISTULA/SHUNT SURGERY Left 2/16/2017    Procedure: LT BRACHIAL CEPHALIC WITH FISTULA LIGATION RADIAL CEPHALIC.;  Surgeon: Gurmeet Carver MD;  Location: [facility] OR;  Service:    • CATARACT EXTRACTION W/ INTRAOCULAR LENS IMPLANT Bilateral 2011   • DILATATION AND CURETTAGE  1991   • EXCISION MASS TRUNK N/A 3/22/2016    Procedure: I&D LOWER ABDOMINAL  WOUND;  Surgeon: Tru Yi MD;  Location: [facility];  Service:    • FOOT SURGERY Right 2010    REMOVED BONE IN RIGHT GREAT TOE   • HYSTERECTOMY  1992     Social History     Occupational History   • Not on file.     Social History Main Topics   • Smoking status: Former Smoker     Packs/day: 2.00     Years: 20.00     Types: Cigarettes      "Quit date: 10/21/1992   • Smokeless tobacco: Never Used      Comment: quit 1993   • Alcohol use No   • Drug use: No   • Sexual activity: Defer      Social History     Social History Narrative     Family History   Problem Relation Age of Onset   • Heart attack Mother    • Heart disease Mother    • Kidney disease Mother    • Heart attack Father    • Heart disease Father    • Hypertension Father    • Stroke Father      ISCHEMIC   • Diabetes Father      TYPE 2   • Kidney disease Brother    • Diabetes Brother      TYPE 1   • Thyroid disease Daughter    • Anxiety disorder Maternal Aunt    • Bipolar disorder Maternal Aunt    • Depression Maternal Aunt    • Lupus Maternal Aunt      LUPUS ANTICOAGLULANT   • Rheum arthritis Maternal Aunt    • Alcohol abuse Maternal Uncle    • Other Maternal Uncle      PULMONARY DISEASE       Review of Systems: 14 point review of systems performed, positive pertinent findings identified in HPI. All remaining systems negative     Review of Systems      Physical Exam:   Vitals:    11/08/17 1045   Temp: 98.6 °F (37 °C)   Weight: 188 lb (85.3 kg)   Height: 65\" (165.1 cm)     Physical Exam   Constitutional: pleasant, well developed   Eyes: sclera non icteric  Hearing : adequate for exam  Cardiovascular: palpable pulses in left foot, left calf/ thigh NT without sign of DVT  Respiratoy: breathing unlabored   Neurological: grossly sensate to LT throughout left LE to pressure but lacked discrete sensation   Psychiatric: oriented with normal mood and affect.   Lymphatic: No palpable popliteal lymphadenopathy left LE  Skin: intact throughout left leg/foot  Musculoskeletal: Nonantalgic gait.  Left foot shows healing wound over the lateral aspect of the midfoot with granulation tissue with a smaller wound in the plantar lateral aspect of the heel neither showed any drainage warmth or erythema.  There was really no focal pain or swelling at the insertion of the Achilles with no palpable defects.  She " demonstrated at least 4+ out of 5 plantarflexion strength with negative Davidson test.  Physical Exam  Ortho Exam    Radiology: MRI films and report of the left ankle dated 9/23/17 were reviewed shows a soft tissue wound but no evidence of abscess or osteomyelitis.  Shows a partial Achilles tendon tear involving the central fibers which are somewhat retracted but peripheral fibers are intact.  Also shows midfoot arthritis of the second and third TMT joints area    Assessment/Plan: 1.  Left partial Achilles tear  2.  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  Plantar lateral foot wounds.    Her wounds seem to be improving according to her and compared with pictures that she showed me.  She will continue with wound care per wound care clinic for these.      regarding her Achilles this may have been some tendinitis going on before that was exacerbated by her fall.  However I reviewed with her that given her lack of pain as well as her good plantarflexion strength as well as her current wounds and overall medical condition I would not recommend anything from a surgical standpoint.  She was fitted with a small heel lift that she will use in a boot that she already has to help protect the Achilles.  Regarding her midfoot arthritis this is not seem to be symptomatic at this time and will be supported in her boot.    Regarding her diabetes, I did  her on careful blood sugar control and to make sure and stay within regimen for this and check with her PCP she if has difficulty monitoring this.    I will see her back in 6 weeks

## 2017-11-17 ENCOUNTER — HOSPITAL ENCOUNTER (OUTPATIENT)
Dept: INFUSION THERAPY | Facility: HOSPITAL | Age: 67
Discharge: HOME | End: 2017-11-17
Admitting: INTERNAL MEDICINE

## 2017-11-17 ENCOUNTER — HOSPITAL ENCOUNTER (OUTPATIENT)
Dept: GENERAL RADIOLOGY | Facility: HOSPITAL | Age: 67
Discharge: HOME | End: 2017-11-17
Attending: SURGERY

## 2017-11-17 VITALS
DIASTOLIC BLOOD PRESSURE: 64 MMHG | TEMPERATURE: 97.9 F | RESPIRATION RATE: 16 BRPM | HEART RATE: 80 BPM | OXYGEN SATURATION: 95 % | SYSTOLIC BLOOD PRESSURE: 138 MMHG

## 2017-11-17 DIAGNOSIS — L97.422 ULCER OF LEFT HEEL, WITH FAT LAYER EXPOSED (HCC): ICD-10-CM

## 2017-11-17 DIAGNOSIS — M86.9 DIABETIC FOOT ULCER WITH OSTEOMYELITIS (HCC): ICD-10-CM

## 2017-11-17 DIAGNOSIS — E11.69 DIABETIC FOOT ULCER WITH OSTEOMYELITIS (HCC): ICD-10-CM

## 2017-11-17 DIAGNOSIS — N18.4 CHRONIC KIDNEY DISEASE, STAGE IV (SEVERE) (HCC): ICD-10-CM

## 2017-11-17 DIAGNOSIS — L97.509 DIABETIC FOOT ULCER WITH OSTEOMYELITIS (HCC): ICD-10-CM

## 2017-11-17 DIAGNOSIS — E11.621 DIABETIC FOOT ULCER WITH OSTEOMYELITIS (HCC): ICD-10-CM

## 2017-11-17 LAB
HCT VFR BLD AUTO: 27.6 % (ref 35.6–45.5)
HGB BLD-MCNC: 8.2 G/DL (ref 11.9–15.5)

## 2017-11-17 PROCEDURE — 36415 COLL VENOUS BLD VENIPUNCTURE: CPT

## 2017-11-17 PROCEDURE — 85018 HEMOGLOBIN: CPT | Performed by: INTERNAL MEDICINE

## 2017-11-17 PROCEDURE — 73630 X-RAY EXAM OF FOOT: CPT

## 2017-11-17 PROCEDURE — 96372 THER/PROPH/DIAG INJ SC/IM: CPT

## 2017-11-17 PROCEDURE — 85014 HEMATOCRIT: CPT | Performed by: INTERNAL MEDICINE

## 2017-11-17 PROCEDURE — 63510000001 EPOETIN ALFA PER 1000 UNITS: Performed by: INTERNAL MEDICINE

## 2017-11-17 RX ADMIN — ERYTHROPOIETIN 20000 UNITS: 20000 INJECTION, SOLUTION INTRAVENOUS; SUBCUTANEOUS at 14:21

## 2017-11-21 ENCOUNTER — APPOINTMENT (OUTPATIENT)
Dept: WOUND CARE | Facility: HOSPITAL | Age: 67
End: 2017-11-21
Attending: SURGERY

## 2017-11-21 PROCEDURE — 17250 CHEM CAUT OF GRANLTJ TISSUE: CPT

## 2017-12-01 ENCOUNTER — HOSPITAL ENCOUNTER (OUTPATIENT)
Dept: INFUSION THERAPY | Facility: HOSPITAL | Age: 67
Discharge: HOME | End: 2017-12-01
Admitting: INTERNAL MEDICINE

## 2017-12-01 VITALS
RESPIRATION RATE: 16 BRPM | SYSTOLIC BLOOD PRESSURE: 143 MMHG | HEART RATE: 72 BPM | OXYGEN SATURATION: 99 % | DIASTOLIC BLOOD PRESSURE: 63 MMHG | TEMPERATURE: 96.2 F

## 2017-12-01 DIAGNOSIS — N18.4 CHRONIC KIDNEY DISEASE, STAGE IV (SEVERE) (HCC): ICD-10-CM

## 2017-12-01 LAB
HCT VFR BLD AUTO: 28.4 % (ref 35.6–45.5)
HGB BLD-MCNC: 8.4 G/DL (ref 11.9–15.5)

## 2017-12-01 PROCEDURE — 85018 HEMOGLOBIN: CPT | Performed by: INTERNAL MEDICINE

## 2017-12-01 PROCEDURE — 36415 COLL VENOUS BLD VENIPUNCTURE: CPT

## 2017-12-01 PROCEDURE — 96372 THER/PROPH/DIAG INJ SC/IM: CPT

## 2017-12-01 PROCEDURE — 63510000001 EPOETIN ALFA PER 1000 UNITS: Performed by: INTERNAL MEDICINE

## 2017-12-01 PROCEDURE — 85014 HEMATOCRIT: CPT | Performed by: INTERNAL MEDICINE

## 2017-12-01 RX ADMIN — ERYTHROPOIETIN 20000 UNITS: 20000 INJECTION, SOLUTION INTRAVENOUS; SUBCUTANEOUS at 15:22

## 2017-12-01 NOTE — PROGRESS NOTES
Procrit given per order due to parameters met.  Bandaid placed over injection site.  Pt states her left foot is healing well and she is going to wound care.  Pt dc'ed per wc at 1530.

## 2017-12-05 ENCOUNTER — APPOINTMENT (OUTPATIENT)
Dept: WOUND CARE | Facility: HOSPITAL | Age: 67
End: 2017-12-05
Attending: SURGERY

## 2017-12-07 ENCOUNTER — OFFICE VISIT (OUTPATIENT)
Dept: ORTHOPEDIC SURGERY | Facility: CLINIC | Age: 67
End: 2017-12-07

## 2017-12-07 VITALS — TEMPERATURE: 98.8 F | HEIGHT: 65 IN | BODY MASS INDEX: 31.49 KG/M2 | WEIGHT: 189 LBS

## 2017-12-07 DIAGNOSIS — S86.012A PARTIAL ACHILLES TENDON TEAR, LEFT, INITIAL ENCOUNTER: Primary | ICD-10-CM

## 2017-12-07 DIAGNOSIS — M19.079 ARTHRITIS OF FOOT: ICD-10-CM

## 2017-12-07 PROCEDURE — 99213 OFFICE O/P EST LOW 20 MIN: CPT | Performed by: ORTHOPAEDIC SURGERY

## 2017-12-07 RX ORDER — TOPIRAMATE 50 MG/1
100 TABLET, FILM COATED ORAL DAILY
Status: ON HOLD | COMMUNITY
Start: 2017-12-07 | End: 2020-05-30

## 2017-12-07 NOTE — PROGRESS NOTES
Ankle Follow Up      Patient: Maribeth Rendon    YOB: 1950 67 y.o. female    Chief Complaints: Ankle doing okay    History of Present Illness: Patient was seen initially on 11/8/17.  At that time she related to me that her injury been about 6 weeks ago however review of records indicates that was probably sometime in mid September that she had injury where she fell injuring her left ankle.  With escalation of systems global pain that she had had with only minor occasional achiness prior to that.    After walking abnormally she develop some ulcers and is in wound care for that.  She's not had any sign of infection.  She's not really been using her boot due to the wounds on her feet.  She does use a walker and really no significant complaints of pain about the Achilles nor does she report any obvious weakness today.  HPI    ROS: ankle pain no fevers chills  Past Medical History:   Diagnosis Date   • Achilles tendon tear     left    • Anemia    • Anxiety    • Depression    • Diabetes mellitus, type 2    • ESRD (end stage renal disease)     HAS LEFT FOREARM FISTULA   • GERD (gastroesophageal reflux disease)    • History of MRSA infection 03/15/2016    ABDOMINAL WOUND,   INFECTION CONTROLL NOTIFIED 2-6-2017   • Hyperlipidemia    • Hypertension    • Hypokalemia    • Hypomagnesemia    • Hypoxic 01/2016    WHEN SHE HAD PNEUMONIA   • Intertrigo    • Leukocytosis    • Osteoarthritis    • Pneumonia 01/2016   • Psoriasis    • Psoriatic arthritis    • Seizures     one time   • Sepsis 01/2016   • SOB (shortness of breath)    • Stage 4 chronic renal impairment associated with type 2 diabetes mellitus    • Staph infection     HX RIGHT FOOT AT [facility] 01/09/2010   • Streptococcal bacteremia    • Swelling of hand 10/27/2016    LEFT HAND SWELLIMG SINCE LEFT FISTULA SURGERY   • Tremor, essential    • Wears glasses        Physical Exam:   Vitals:    12/07/17 1542   Temp: 98.8 °F (37.1 °C)   TempSrc: Temporal Artery   "  Weight: 85.7 kg (189 lb)   Height: 165.1 cm (65\")     Well developed with normal mood.Left ankle shows some mild swelling about the Achilles no clearly palpable defect she had 4 out of 5 plantarflexion strength with a negative Davidson test.  She had 2 small wounds over the plantar lateral aspect of the midfoot and heel that were dressed with some type of packing there was no drainage warmth or obvious erythema.  Should minimal if any discomfort around the midfoot.      Radiology: None performed      Assessment/Plan:  1.  Left partial Achilles tear  2.  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  Plantar lateral foot wounds    I discussed treatment options with them she does seem slightly weaker than she was previously and is not been using the boot.  I discussed with him that even if she did have some worsening of the tear of her Achilles that given her overall medical condition as well as these slowly healing wounds at her foot I would not recommend surgery nor do they want any surgery done.    The midfoot arthritis is not terribly symptomatic and she using a postoperative shoe and really nothing I would do his far as any type of injections etc. with that at this time.    She'll continue with postoperative shoe and use of a walker and with wound care clinic for her feet.    Discussed with him that if she does have residual weakness she could be fitted with an AFO with a later point once her wounds are healed.    We had a pleasant visit unfortunately her  has been recently diagnosed with prostate cancer.  I will see him back in 6 weeks no x-rays  "

## 2017-12-12 ENCOUNTER — APPOINTMENT (OUTPATIENT)
Dept: WOUND CARE | Facility: HOSPITAL | Age: 67
End: 2017-12-12
Attending: SURGERY

## 2017-12-12 PROCEDURE — 17250 CHEM CAUT OF GRANLTJ TISSUE: CPT

## 2017-12-15 ENCOUNTER — HOSPITAL ENCOUNTER (OUTPATIENT)
Dept: INFUSION THERAPY | Facility: HOSPITAL | Age: 67
Discharge: HOME | End: 2017-12-15
Admitting: INTERNAL MEDICINE

## 2017-12-15 VITALS
OXYGEN SATURATION: 99 % | TEMPERATURE: 98.2 F | SYSTOLIC BLOOD PRESSURE: 143 MMHG | DIASTOLIC BLOOD PRESSURE: 64 MMHG | HEART RATE: 73 BPM | RESPIRATION RATE: 20 BRPM

## 2017-12-15 DIAGNOSIS — N18.4 CHRONIC KIDNEY DISEASE, STAGE IV (SEVERE) (HCC): ICD-10-CM

## 2017-12-15 LAB
HCT VFR BLD AUTO: 29.8 % (ref 35.6–45.5)
HGB BLD-MCNC: 8.8 G/DL (ref 11.9–15.5)

## 2017-12-15 PROCEDURE — 96372 THER/PROPH/DIAG INJ SC/IM: CPT

## 2017-12-15 PROCEDURE — 85018 HEMOGLOBIN: CPT | Performed by: INTERNAL MEDICINE

## 2017-12-15 PROCEDURE — 63510000001 EPOETIN ALFA PER 1000 UNITS: Performed by: INTERNAL MEDICINE

## 2017-12-15 PROCEDURE — 85014 HEMATOCRIT: CPT | Performed by: INTERNAL MEDICINE

## 2017-12-15 PROCEDURE — 36415 COLL VENOUS BLD VENIPUNCTURE: CPT

## 2017-12-15 RX ADMIN — ERYTHROPOIETIN 20000 UNITS: 20000 INJECTION, SOLUTION INTRAVENOUS; SUBCUTANEOUS at 14:37

## 2017-12-20 RX ORDER — GABAPENTIN 300 MG/1
CAPSULE ORAL
Qty: 180 CAPSULE | Refills: 1 | Status: SHIPPED | OUTPATIENT
Start: 2017-12-20 | End: 2018-09-19 | Stop reason: SDUPTHER

## 2017-12-20 NOTE — TELEPHONE ENCOUNTER
Dr. Chaney patient.    [facility]  Tel. #[phone]  Fax. #[phone].    GABAPENTIN 300 MG CAPSULE.    No future appointment.    Last O.V. 10/11/2017.  [place] 11/06/2017.  Filled 06/07/2017.    Thank you.

## 2017-12-26 ENCOUNTER — APPOINTMENT (OUTPATIENT)
Dept: WOUND CARE | Facility: HOSPITAL | Age: 67
End: 2017-12-26
Attending: SURGERY

## 2017-12-29 ENCOUNTER — HOSPITAL ENCOUNTER (OUTPATIENT)
Dept: INFUSION THERAPY | Facility: HOSPITAL | Age: 67
Discharge: HOME | End: 2017-12-29
Admitting: INTERNAL MEDICINE

## 2017-12-29 VITALS
DIASTOLIC BLOOD PRESSURE: 62 MMHG | RESPIRATION RATE: 16 BRPM | HEART RATE: 70 BPM | TEMPERATURE: 98.9 F | OXYGEN SATURATION: 94 % | SYSTOLIC BLOOD PRESSURE: 136 MMHG

## 2017-12-29 DIAGNOSIS — N18.4 CHRONIC KIDNEY DISEASE, STAGE IV (SEVERE) (HCC): ICD-10-CM

## 2017-12-29 LAB
HCT VFR BLD AUTO: 29.9 % (ref 35.6–45.5)
HGB BLD-MCNC: 8.9 G/DL (ref 11.9–15.5)

## 2017-12-29 PROCEDURE — 63510000001 EPOETIN ALFA PER 1000 UNITS: Performed by: INTERNAL MEDICINE

## 2017-12-29 PROCEDURE — 85014 HEMATOCRIT: CPT | Performed by: INTERNAL MEDICINE

## 2017-12-29 PROCEDURE — 36415 COLL VENOUS BLD VENIPUNCTURE: CPT

## 2017-12-29 PROCEDURE — 96372 THER/PROPH/DIAG INJ SC/IM: CPT

## 2017-12-29 PROCEDURE — 85018 HEMOGLOBIN: CPT | Performed by: INTERNAL MEDICINE

## 2017-12-29 RX ADMIN — ERYTHROPOIETIN 20000 UNITS: 20000 INJECTION, SOLUTION INTRAVENOUS; SUBCUTANEOUS at 13:52

## 2018-01-03 ENCOUNTER — APPOINTMENT (OUTPATIENT)
Dept: WOUND CARE | Facility: HOSPITAL | Age: 68
End: 2018-01-03
Attending: PHYSICAL MEDICINE & REHABILITATION

## 2018-01-12 ENCOUNTER — HOSPITAL ENCOUNTER (OUTPATIENT)
Dept: INFUSION THERAPY | Facility: HOSPITAL | Age: 68
Discharge: HOME | End: 2018-01-12
Admitting: INTERNAL MEDICINE

## 2018-01-12 VITALS
SYSTOLIC BLOOD PRESSURE: 136 MMHG | RESPIRATION RATE: 16 BRPM | HEART RATE: 76 BPM | OXYGEN SATURATION: 96 % | DIASTOLIC BLOOD PRESSURE: 65 MMHG | TEMPERATURE: 99.1 F

## 2018-01-12 DIAGNOSIS — N18.4 CHRONIC KIDNEY DISEASE, STAGE IV (SEVERE) (HCC): ICD-10-CM

## 2018-01-12 LAB
ANION GAP SERPL CALCULATED.3IONS-SCNC: 12.9 MMOL/L
BUN BLD-MCNC: 41 MG/DL (ref 8–23)
BUN/CREAT SERPL: 13.5 (ref 7–25)
CALCIUM SPEC-SCNC: 8.6 MG/DL (ref 8.6–10.5)
CHLORIDE SERPL-SCNC: 103 MMOL/L (ref 98–107)
CO2 SERPL-SCNC: 23.1 MMOL/L (ref 22–29)
CREAT BLD-MCNC: 3.03 MG/DL (ref 0.57–1)
GFR SERPL CREATININE-BSD FRML MDRD: 15 ML/MIN/1.73
GLUCOSE BLD-MCNC: 220 MG/DL (ref 65–99)
HCT VFR BLD AUTO: 31.6 % (ref 35.6–45.5)
HGB BLD-MCNC: 9.3 G/DL (ref 11.9–15.5)
MAGNESIUM SERPL-MCNC: 1.7 MG/DL (ref 1.6–2.4)
POTASSIUM BLD-SCNC: 3.9 MMOL/L (ref 3.5–5.2)
SODIUM BLD-SCNC: 139 MMOL/L (ref 136–145)

## 2018-01-12 PROCEDURE — 85014 HEMATOCRIT: CPT | Performed by: INTERNAL MEDICINE

## 2018-01-12 PROCEDURE — 83735 ASSAY OF MAGNESIUM: CPT | Performed by: INTERNAL MEDICINE

## 2018-01-12 PROCEDURE — 63510000001 EPOETIN ALFA PER 1000 UNITS: Performed by: INTERNAL MEDICINE

## 2018-01-12 PROCEDURE — 80048 BASIC METABOLIC PNL TOTAL CA: CPT | Performed by: INTERNAL MEDICINE

## 2018-01-12 PROCEDURE — 36415 COLL VENOUS BLD VENIPUNCTURE: CPT

## 2018-01-12 PROCEDURE — 85018 HEMOGLOBIN: CPT | Performed by: INTERNAL MEDICINE

## 2018-01-12 PROCEDURE — 96372 THER/PROPH/DIAG INJ SC/IM: CPT

## 2018-01-12 RX ADMIN — ERYTHROPOIETIN 20000 UNITS: 20000 INJECTION, SOLUTION INTRAVENOUS; SUBCUTANEOUS at 12:46

## 2018-01-12 NOTE — PROGRESS NOTES
Procrit indicated per lab results & MD order.  Injection site x 1 with band aide applied.  Pt D/C per ambulation with walker & spouse @ 6042.

## 2018-01-16 ENCOUNTER — APPOINTMENT (OUTPATIENT)
Dept: WOUND CARE | Facility: HOSPITAL | Age: 68
End: 2018-01-16
Attending: SURGERY

## 2018-01-16 PROCEDURE — G0463 HOSPITAL OUTPT CLINIC VISIT: HCPCS

## 2018-01-18 ENCOUNTER — OFFICE VISIT (OUTPATIENT)
Dept: ORTHOPEDIC SURGERY | Facility: CLINIC | Age: 68
End: 2018-01-18

## 2018-01-18 VITALS — HEIGHT: 65 IN | BODY MASS INDEX: 31.65 KG/M2 | WEIGHT: 190 LBS | TEMPERATURE: 98.5 F

## 2018-01-18 DIAGNOSIS — M19.079 ARTHRITIS OF FOOT: ICD-10-CM

## 2018-01-18 DIAGNOSIS — S86.012A PARTIAL ACHILLES TENDON TEAR, LEFT, INITIAL ENCOUNTER: Primary | ICD-10-CM

## 2018-01-18 PROCEDURE — 99213 OFFICE O/P EST LOW 20 MIN: CPT | Performed by: ORTHOPAEDIC SURGERY

## 2018-01-18 NOTE — PROGRESS NOTES
"Ankle Follow Up      Patient: Maribeth Rendon    YOB: 1950 67 y.o. 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  She's been followed by wound care for plantar lateral foot wounds which have continued to improve.    Since her last visit she's used a cane the majority the time occasionally uses a walker now had any pain along her Achilles or feelings of weakness with getting around.  HPI    ROS: No ankle pain  Past Medical History:   Diagnosis Date   • Achilles tendon tear     left    • Anemia    • Anxiety    • Depression    • Diabetes mellitus, type 2    • ESRD (end stage renal disease)     HAS LEFT FOREARM FISTULA   • GERD (gastroesophageal reflux disease)    • History of MRSA infection 03/15/2016    ABDOMINAL WOUND,   INFECTION CONTROLL NOTIFIED 2-6-2017   • Hyperlipidemia    • Hypertension    • Hypokalemia    • Hypomagnesemia    • Hypoxic 01/2016    WHEN SHE HAD PNEUMONIA   • Intertrigo    • Leukocytosis    • Osteoarthritis    • Pneumonia 01/2016   • Psoriasis    • Psoriatic arthritis    • Seizures     one time   • Sepsis 01/2016   • SOB (shortness of breath)    • Stage 4 chronic renal impairment associated with type 2 diabetes mellitus    • Staph infection     HX RIGHT FOOT AT [facility] 01/09/2010   • Streptococcal bacteremia    • Swelling of hand 10/27/2016    LEFT HAND SWELLIMG SINCE LEFT FISTULA SURGERY   • Tremor, essential    • Wears glasses        Physical Exam:   Vitals:    01/18/18 1344   Temp: 98.5 °F (36.9 °C)   TempSrc: Temporal Artery    Weight: 86.2 kg (190 lb)   Height: 165.1 cm (65\")     Well developed with normal mood.She is with a family member.  She has 1 small residual ulcer under the plantar lateral aspect of the left foot showed no drainage from this.  The lateral foot wound appears healed now.  There was no tenderness along the Achilles and she was able to " actively plantarflex with release 4 out of 5 motor strength.  Difficult to tell if there were any defects but she had good heel motion with calf squeeze.  Nontender over the dorsum of the midfoot      Radiology: None performed      Assessment/Plan:   1.  Left partial Achilles tear  2.  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  Plantar lateral foot wounds    She will continue with wound care for the ulcers.  They've also some ordered her some diabetic shoes which she will start weaning into may continue use of the cane or walker.  At this point I would hold off on any type of AFO brace until the wounds are healed and see how she does as far as weakness etc. but still may benefit from this.    We had a pleasant visit I will see her back in 6 weeks.  Unfortunately her  was diagnosed with prostate cancer.

## 2018-01-19 ENCOUNTER — OFFICE VISIT (OUTPATIENT)
Dept: FAMILY MEDICINE CLINIC | Facility: CLINIC | Age: 68
End: 2018-01-19

## 2018-01-19 VITALS
TEMPERATURE: 98.5 F | OXYGEN SATURATION: 95 % | HEART RATE: 77 BPM | SYSTOLIC BLOOD PRESSURE: 130 MMHG | WEIGHT: 195 LBS | HEIGHT: 65 IN | DIASTOLIC BLOOD PRESSURE: 60 MMHG | BODY MASS INDEX: 32.49 KG/M2

## 2018-01-19 DIAGNOSIS — L97.429 DIABETIC ULCER OF LEFT HEEL ASSOCIATED WITH TYPE 2 DIABETES MELLITUS, UNSPECIFIED ULCER STAGE (HCC): ICD-10-CM

## 2018-01-19 DIAGNOSIS — E11.42 DM TYPE 2 WITH DIABETIC PERIPHERAL NEUROPATHY (HCC): Primary | ICD-10-CM

## 2018-01-19 DIAGNOSIS — E11.621 DIABETIC ULCER OF LEFT HEEL ASSOCIATED WITH TYPE 2 DIABETES MELLITUS, UNSPECIFIED ULCER STAGE (HCC): ICD-10-CM

## 2018-01-19 DIAGNOSIS — J01.00 ACUTE MAXILLARY SINUSITIS, RECURRENCE NOT SPECIFIED: ICD-10-CM

## 2018-01-19 PROCEDURE — 99214 OFFICE O/P EST MOD 30 MIN: CPT | Performed by: INTERNAL MEDICINE

## 2018-01-19 RX ORDER — AZITHROMYCIN 250 MG/1
TABLET, FILM COATED ORAL
Qty: 6 TABLET | Refills: 0 | Status: SHIPPED | OUTPATIENT
Start: 2018-01-19 | End: 2018-02-09

## 2018-01-19 NOTE — PROGRESS NOTES
Subjective chief complaint is need for diabetic shoes  Maribeth Rendon is a 67 y.o. female.     History of Present Illness Maribeth is here today for evaluation of diabetic shoes.  She has known non-insulin-dependent diabetes mellitus with associated peripheral neuropathy..  She is not currently on insulin.  She did have a large plantar ulcer in October.  She has been undergoing debridements and treatment at the wound care center.  The wound has nearly healed other than a mild ulceration on her left heel.  She does have foot deformity of her right foot.  She has had prior surgery on the right great toe.  She does have a known Charcot deformity of the right foot.  Currently she is on Tradjenta and glimepiride.  She is due for a recheck on her hemoglobin A1c.   Additionally today she is complaining of some headache and left sinus pain.       The following portions of the patient's history were reviewed and updated as appropriate: allergies, current medications, past medical history and problem list.    Review of Systems   Constitutional: Negative for chills and fever.   Respiratory: Negative for cough, chest tightness and shortness of breath.    Neurological: Positive for headaches.       Objective   Physical Exam   Constitutional: She appears well-developed and well-nourished.   HENT:   She has some bilateral nasal congestion and dry excoriation.  There is tenderness over the left maxillary sinus.   Cardiovascular: Normal rate, regular rhythm, normal heart sounds and intact distal pulses.    Pulmonary/Chest: Effort normal and breath sounds normal.   Musculoskeletal:   She does have a Charcot deformity of the right foot.  The right great toe is foreshortened secondary to prior osteotomy.   Skin:   The left foot is currently wrapped.  Pictures were reviewed that showed near complete resolution of her large plantar ulcer.  There is a small area left on the heel.       Assessment/Plan   Maribeth was seen today for  follow-up.    Diagnoses and all orders for this visit:    DM type 2 with diabetic peripheral neuropathy    Acute maxillary sinusitis, recurrence not specified    Diabetic ulcer of left heel associated with type 2 diabetes mellitus, unspecified ulcer stage      Maribeth is here today for evaluation for diabetic shoes.  She has known diabetes.  She has known foot deformity due to Charcot arthropathy.  She has known diabetic peripheral neuropathy.  She has a nearly healed diabetic foot ulcer on the left foot.  She would benefit from diabetic shoes with custom inserts.  Her sinusitis I am going to give her Z-David.

## 2018-01-20 LAB
ALBUMIN SERPL-MCNC: 3.3 G/DL (ref 3.5–5.2)
ALBUMIN/GLOB SERPL: 0.8 G/DL
ALP SERPL-CCNC: 66 U/L (ref 39–117)
ALT SERPL-CCNC: 11 U/L (ref 1–33)
AST SERPL-CCNC: 16 U/L (ref 1–32)
BILIRUB SERPL-MCNC: 0.2 MG/DL (ref 0.1–1.2)
BUN SERPL-MCNC: 41 MG/DL (ref 8–23)
BUN/CREAT SERPL: 13.4 (ref 7–25)
CALCIUM SERPL-MCNC: 8.9 MG/DL (ref 8.6–10.5)
CHLORIDE SERPL-SCNC: 104 MMOL/L (ref 98–107)
CHOLEST SERPL-MCNC: 164 MG/DL (ref 0–200)
CO2 SERPL-SCNC: 23 MMOL/L (ref 22–29)
CREAT SERPL-MCNC: 3.07 MG/DL (ref 0.57–1)
GLOBULIN SER CALC-MCNC: 4.4 GM/DL
GLUCOSE SERPL-MCNC: 177 MG/DL (ref 65–99)
HBA1C MFR BLD: 6.8 % (ref 4.8–5.6)
HDLC SERPL-MCNC: 36 MG/DL (ref 40–60)
INTERPRETATION: NORMAL
LDLC SERPL CALC-MCNC: 89 MG/DL (ref 0–100)
POTASSIUM SERPL-SCNC: 4.3 MMOL/L (ref 3.5–5.2)
PROT SERPL-MCNC: 7.7 G/DL (ref 6–8.5)
SODIUM SERPL-SCNC: 143 MMOL/L (ref 136–145)
TRIGL SERPL-MCNC: 197 MG/DL (ref 0–150)
VLDLC SERPL CALC-MCNC: 39.4 MG/DL (ref 5–40)

## 2018-01-26 ENCOUNTER — APPOINTMENT (OUTPATIENT)
Dept: INFUSION THERAPY | Facility: HOSPITAL | Age: 68
End: 2018-01-26

## 2018-01-29 ENCOUNTER — HOSPITAL ENCOUNTER (OUTPATIENT)
Dept: INFUSION THERAPY | Facility: HOSPITAL | Age: 68
Discharge: HOME | End: 2018-01-29
Admitting: INTERNAL MEDICINE

## 2018-01-29 VITALS
RESPIRATION RATE: 20 BRPM | HEART RATE: 61 BPM | DIASTOLIC BLOOD PRESSURE: 59 MMHG | OXYGEN SATURATION: 98 % | TEMPERATURE: 98.2 F | SYSTOLIC BLOOD PRESSURE: 127 MMHG

## 2018-01-29 DIAGNOSIS — N18.4 CHRONIC KIDNEY DISEASE, STAGE IV (SEVERE) (HCC): ICD-10-CM

## 2018-01-29 LAB
HCT VFR BLD AUTO: 30.6 % (ref 35.6–45.5)
HGB BLD-MCNC: 9.3 G/DL (ref 11.9–15.5)

## 2018-01-29 PROCEDURE — 63510000001 EPOETIN ALFA PER 1000 UNITS: Performed by: INTERNAL MEDICINE

## 2018-01-29 PROCEDURE — 85018 HEMOGLOBIN: CPT | Performed by: INTERNAL MEDICINE

## 2018-01-29 PROCEDURE — 36415 COLL VENOUS BLD VENIPUNCTURE: CPT

## 2018-01-29 PROCEDURE — 96372 THER/PROPH/DIAG INJ SC/IM: CPT

## 2018-01-29 PROCEDURE — 85014 HEMATOCRIT: CPT | Performed by: INTERNAL MEDICINE

## 2018-01-29 RX ADMIN — ERYTHROPOIETIN 20000 UNITS: 20000 INJECTION, SOLUTION INTRAVENOUS; SUBCUTANEOUS at 15:06

## 2018-01-30 ENCOUNTER — APPOINTMENT (OUTPATIENT)
Dept: WOUND CARE | Facility: HOSPITAL | Age: 68
End: 2018-01-30
Attending: SURGERY

## 2018-02-09 ENCOUNTER — HOSPITAL ENCOUNTER (OUTPATIENT)
Dept: INFUSION THERAPY | Facility: HOSPITAL | Age: 68
Discharge: HOME | End: 2018-02-09
Admitting: INTERNAL MEDICINE

## 2018-02-09 VITALS
SYSTOLIC BLOOD PRESSURE: 157 MMHG | RESPIRATION RATE: 16 BRPM | OXYGEN SATURATION: 98 % | DIASTOLIC BLOOD PRESSURE: 64 MMHG | TEMPERATURE: 97.9 F | HEART RATE: 84 BPM

## 2018-02-09 DIAGNOSIS — N18.4 CHRONIC KIDNEY DISEASE, STAGE IV (SEVERE) (HCC): ICD-10-CM

## 2018-02-09 LAB
25(OH)D3 SERPL-MCNC: 36.8 NG/ML (ref 30–100)
CALCIUM SPEC-SCNC: 8.3 MG/DL (ref 8.6–10.5)
DEPRECATED RDW RBC AUTO: 60.3 FL (ref 37–54)
ERYTHROCYTE [DISTWIDTH] IN BLOOD BY AUTOMATED COUNT: 17.7 % (ref 11.7–13)
FERRITIN SERPL-MCNC: 504 NG/ML (ref 13–150)
FOLATE SERPL-MCNC: 19.11 NG/ML (ref 4.78–24.2)
HCT VFR BLD AUTO: 31.5 % (ref 35.6–45.5)
HGB BLD-MCNC: 9.5 G/DL (ref 11.9–15.5)
IRON 24H UR-MRATE: 35 MCG/DL (ref 37–145)
IRON SATN MFR SERPL: 17 % (ref 20–50)
MAGNESIUM SERPL-MCNC: 1.8 MG/DL (ref 1.6–2.4)
MCH RBC QN AUTO: 28.2 PG (ref 26.9–32)
MCHC RBC AUTO-ENTMCNC: 30.2 G/DL (ref 32.4–36.3)
MCV RBC AUTO: 93.5 FL (ref 80.5–98.2)
PHOSPHATE SERPL-MCNC: 4 MG/DL (ref 2.5–4.5)
PLATELET # BLD AUTO: 231 10*3/MM3 (ref 140–500)
PMV BLD AUTO: 9.6 FL (ref 6–12)
PTH-INTACT SERPL-MCNC: 160.1 PG/ML (ref 15–65)
RBC # BLD AUTO: 3.37 10*6/MM3 (ref 3.9–5.2)
TIBC SERPL-MCNC: 209 MCG/DL (ref 298–536)
TRANSFERRIN SERPL-MCNC: 140 MG/DL (ref 200–360)
URATE SERPL-MCNC: 8.8 MG/DL (ref 2.4–5.7)
VIT B12 BLD-MCNC: 423 PG/ML (ref 211–946)
WBC NRBC COR # BLD: 9.33 10*3/MM3 (ref 4.5–10.7)

## 2018-02-09 PROCEDURE — 82746 ASSAY OF FOLIC ACID SERUM: CPT | Performed by: INTERNAL MEDICINE

## 2018-02-09 PROCEDURE — 82728 ASSAY OF FERRITIN: CPT | Performed by: INTERNAL MEDICINE

## 2018-02-09 PROCEDURE — 85027 COMPLETE CBC AUTOMATED: CPT | Performed by: INTERNAL MEDICINE

## 2018-02-09 PROCEDURE — 84550 ASSAY OF BLOOD/URIC ACID: CPT | Performed by: INTERNAL MEDICINE

## 2018-02-09 PROCEDURE — 96372 THER/PROPH/DIAG INJ SC/IM: CPT

## 2018-02-09 PROCEDURE — 63510000001 EPOETIN ALFA PER 1000 UNITS: Performed by: INTERNAL MEDICINE

## 2018-02-09 PROCEDURE — 82607 VITAMIN B-12: CPT | Performed by: INTERNAL MEDICINE

## 2018-02-09 PROCEDURE — 83540 ASSAY OF IRON: CPT | Performed by: INTERNAL MEDICINE

## 2018-02-09 PROCEDURE — 83970 ASSAY OF PARATHORMONE: CPT | Performed by: INTERNAL MEDICINE

## 2018-02-09 PROCEDURE — 82310 ASSAY OF CALCIUM: CPT | Performed by: INTERNAL MEDICINE

## 2018-02-09 PROCEDURE — 84466 ASSAY OF TRANSFERRIN: CPT | Performed by: INTERNAL MEDICINE

## 2018-02-09 PROCEDURE — 83735 ASSAY OF MAGNESIUM: CPT | Performed by: INTERNAL MEDICINE

## 2018-02-09 PROCEDURE — 82306 VITAMIN D 25 HYDROXY: CPT | Performed by: INTERNAL MEDICINE

## 2018-02-09 PROCEDURE — 36415 COLL VENOUS BLD VENIPUNCTURE: CPT

## 2018-02-09 PROCEDURE — 84100 ASSAY OF PHOSPHORUS: CPT | Performed by: INTERNAL MEDICINE

## 2018-02-09 RX ADMIN — ERYTHROPOIETIN 20000 UNITS: 20000 INJECTION, SOLUTION INTRAVENOUS; SUBCUTANEOUS at 12:46

## 2018-02-09 NOTE — PROGRESS NOTES
Procrit indicated per lab results & MD order.  Injection site x 1 with band aide applied.  Pt D/C per ambulation with walker & spouse @ 8711.

## 2018-02-13 ENCOUNTER — APPOINTMENT (OUTPATIENT)
Dept: WOUND CARE | Facility: HOSPITAL | Age: 68
End: 2018-02-13
Attending: SURGERY

## 2018-02-13 PROCEDURE — G0463 HOSPITAL OUTPT CLINIC VISIT: HCPCS

## 2018-02-23 ENCOUNTER — HOSPITAL ENCOUNTER (OUTPATIENT)
Dept: INFUSION THERAPY | Facility: HOSPITAL | Age: 68
Discharge: HOME | End: 2018-02-23
Admitting: INTERNAL MEDICINE

## 2018-02-23 VITALS
TEMPERATURE: 97.7 F | HEART RATE: 85 BPM | RESPIRATION RATE: 16 BRPM | SYSTOLIC BLOOD PRESSURE: 160 MMHG | OXYGEN SATURATION: 97 % | DIASTOLIC BLOOD PRESSURE: 72 MMHG

## 2018-02-23 DIAGNOSIS — D63.1 ANEMIA IN STAGE 5 CHRONIC KIDNEY DISEASE, NOT ON CHRONIC DIALYSIS (HCC): ICD-10-CM

## 2018-02-23 DIAGNOSIS — N18.5 ANEMIA IN STAGE 5 CHRONIC KIDNEY DISEASE, NOT ON CHRONIC DIALYSIS (HCC): ICD-10-CM

## 2018-02-23 DIAGNOSIS — N18.5: Primary | ICD-10-CM

## 2018-02-23 LAB
HCT VFR BLD AUTO: 30.8 % (ref 35.6–45.5)
HGB BLD-MCNC: 9.4 G/DL (ref 11.9–15.5)

## 2018-02-23 PROCEDURE — 96372 THER/PROPH/DIAG INJ SC/IM: CPT

## 2018-02-23 PROCEDURE — 36415 COLL VENOUS BLD VENIPUNCTURE: CPT

## 2018-02-23 PROCEDURE — 63510000001 EPOETIN ALFA PER 1000 UNITS: Performed by: INTERNAL MEDICINE

## 2018-02-23 PROCEDURE — 85018 HEMOGLOBIN: CPT | Performed by: INTERNAL MEDICINE

## 2018-02-23 PROCEDURE — 85014 HEMATOCRIT: CPT | Performed by: INTERNAL MEDICINE

## 2018-02-23 RX ADMIN — ERYTHROPOIETIN 20000 UNITS: 20000 INJECTION, SOLUTION INTRAVENOUS; SUBCUTANEOUS at 13:19

## 2018-02-23 NOTE — PROGRESS NOTES
hgb less than11.0, Procrit indicated and injection given. Tolerated without difficulty.  Discharged amb with (cane and walking boot) with  after appointment completed.

## 2018-03-06 ENCOUNTER — APPOINTMENT (OUTPATIENT)
Dept: WOUND CARE | Facility: HOSPITAL | Age: 68
End: 2018-03-06
Attending: SURGERY

## 2018-03-06 PROCEDURE — G0463 HOSPITAL OUTPT CLINIC VISIT: HCPCS

## 2018-03-09 ENCOUNTER — HOSPITAL ENCOUNTER (OUTPATIENT)
Dept: INFUSION THERAPY | Facility: HOSPITAL | Age: 68
Discharge: HOME | End: 2018-03-09
Admitting: INTERNAL MEDICINE

## 2018-03-09 VITALS
TEMPERATURE: 98.7 F | OXYGEN SATURATION: 93 % | SYSTOLIC BLOOD PRESSURE: 182 MMHG | DIASTOLIC BLOOD PRESSURE: 76 MMHG | RESPIRATION RATE: 16 BRPM | HEART RATE: 80 BPM

## 2018-03-09 DIAGNOSIS — N18.4 CHRONIC KIDNEY DISEASE, STAGE IV (SEVERE) (HCC): ICD-10-CM

## 2018-03-09 DIAGNOSIS — D63.1 ANEMIA IN STAGE 5 CHRONIC KIDNEY DISEASE, NOT ON CHRONIC DIALYSIS (HCC): ICD-10-CM

## 2018-03-09 DIAGNOSIS — N18.5 ANEMIA OF CHRONIC KIDNEY FAILURE, STAGE 5 (HCC): Primary | ICD-10-CM

## 2018-03-09 DIAGNOSIS — N18.5 ANEMIA IN STAGE 5 CHRONIC KIDNEY DISEASE, NOT ON CHRONIC DIALYSIS (HCC): ICD-10-CM

## 2018-03-09 DIAGNOSIS — D63.1 ANEMIA OF CHRONIC KIDNEY FAILURE, STAGE 5 (HCC): Primary | ICD-10-CM

## 2018-03-09 LAB
HCT VFR BLD AUTO: 31.7 % (ref 35.6–45.5)
HGB BLD-MCNC: 9.6 G/DL (ref 11.9–15.5)

## 2018-03-09 PROCEDURE — 85014 HEMATOCRIT: CPT | Performed by: INTERNAL MEDICINE

## 2018-03-09 PROCEDURE — 36415 COLL VENOUS BLD VENIPUNCTURE: CPT

## 2018-03-09 PROCEDURE — 63510000001 EPOETIN ALFA PER 1000 UNITS: Performed by: INTERNAL MEDICINE

## 2018-03-09 PROCEDURE — 85018 HEMOGLOBIN: CPT | Performed by: INTERNAL MEDICINE

## 2018-03-09 PROCEDURE — 96372 THER/PROPH/DIAG INJ SC/IM: CPT

## 2018-03-09 RX ADMIN — ERYTHROPOIETIN 20000 UNITS: 20000 INJECTION, SOLUTION INTRAVENOUS; SUBCUTANEOUS at 13:01

## 2018-03-23 ENCOUNTER — HOSPITAL ENCOUNTER (OUTPATIENT)
Dept: INFUSION THERAPY | Facility: HOSPITAL | Age: 68
Discharge: HOME | End: 2018-03-23
Admitting: INTERNAL MEDICINE

## 2018-03-23 VITALS
HEART RATE: 67 BPM | OXYGEN SATURATION: 97 % | SYSTOLIC BLOOD PRESSURE: 151 MMHG | DIASTOLIC BLOOD PRESSURE: 62 MMHG | RESPIRATION RATE: 16 BRPM | TEMPERATURE: 97.7 F

## 2018-03-23 DIAGNOSIS — N18.4 CHRONIC KIDNEY DISEASE, STAGE IV (SEVERE) (HCC): ICD-10-CM

## 2018-03-23 DIAGNOSIS — N18.5 ANEMIA IN STAGE 5 CHRONIC KIDNEY DISEASE, NOT ON CHRONIC DIALYSIS (HCC): ICD-10-CM

## 2018-03-23 DIAGNOSIS — D63.1 ANEMIA IN STAGE 5 CHRONIC KIDNEY DISEASE, NOT ON CHRONIC DIALYSIS (HCC): ICD-10-CM

## 2018-03-23 LAB
ANION GAP SERPL CALCULATED.3IONS-SCNC: 14 MMOL/L
BUN BLD-MCNC: 41 MG/DL (ref 8–23)
BUN/CREAT SERPL: 12.2 (ref 7–25)
CALCIUM SPEC-SCNC: 8.7 MG/DL (ref 8.6–10.5)
CHLORIDE SERPL-SCNC: 103 MMOL/L (ref 98–107)
CO2 SERPL-SCNC: 23 MMOL/L (ref 22–29)
CREAT BLD-MCNC: 3.36 MG/DL (ref 0.57–1)
GFR SERPL CREATININE-BSD FRML MDRD: 14 ML/MIN/1.73
GLUCOSE BLD-MCNC: 159 MG/DL (ref 65–99)
HCT VFR BLD AUTO: 30.9 % (ref 35.6–45.5)
HGB BLD-MCNC: 9.2 G/DL (ref 11.9–15.5)
MAGNESIUM SERPL-MCNC: 1.7 MG/DL (ref 1.6–2.4)
POTASSIUM BLD-SCNC: 3.9 MMOL/L (ref 3.5–5.2)
SODIUM BLD-SCNC: 140 MMOL/L (ref 136–145)

## 2018-03-23 PROCEDURE — 63510000001 EPOETIN ALFA PER 1000 UNITS: Performed by: INTERNAL MEDICINE

## 2018-03-23 PROCEDURE — 36415 COLL VENOUS BLD VENIPUNCTURE: CPT

## 2018-03-23 PROCEDURE — 83735 ASSAY OF MAGNESIUM: CPT | Performed by: INTERNAL MEDICINE

## 2018-03-23 PROCEDURE — 85014 HEMATOCRIT: CPT | Performed by: INTERNAL MEDICINE

## 2018-03-23 PROCEDURE — 96372 THER/PROPH/DIAG INJ SC/IM: CPT

## 2018-03-23 PROCEDURE — 85018 HEMOGLOBIN: CPT | Performed by: INTERNAL MEDICINE

## 2018-03-23 PROCEDURE — 80048 BASIC METABOLIC PNL TOTAL CA: CPT | Performed by: INTERNAL MEDICINE

## 2018-03-23 RX ADMIN — ERYTHROPOIETIN 20000 UNITS: 20000 INJECTION, SOLUTION INTRAVENOUS; SUBCUTANEOUS at 13:38

## 2018-03-27 ENCOUNTER — APPOINTMENT (OUTPATIENT)
Dept: WOUND CARE | Facility: HOSPITAL | Age: 68
End: 2018-03-27
Attending: SURGERY

## 2018-03-27 PROCEDURE — G0463 HOSPITAL OUTPT CLINIC VISIT: HCPCS

## 2018-04-06 ENCOUNTER — HOSPITAL ENCOUNTER (OUTPATIENT)
Dept: INFUSION THERAPY | Facility: HOSPITAL | Age: 68
Discharge: HOME | End: 2018-04-06
Admitting: INTERNAL MEDICINE

## 2018-04-06 VITALS
TEMPERATURE: 97.9 F | RESPIRATION RATE: 16 BRPM | DIASTOLIC BLOOD PRESSURE: 65 MMHG | OXYGEN SATURATION: 98 % | SYSTOLIC BLOOD PRESSURE: 141 MMHG | HEART RATE: 68 BPM

## 2018-04-06 DIAGNOSIS — N18.4 CHRONIC KIDNEY DISEASE, STAGE IV (SEVERE) (HCC): ICD-10-CM

## 2018-04-06 DIAGNOSIS — N18.5 ANEMIA IN STAGE 5 CHRONIC KIDNEY DISEASE, NOT ON CHRONIC DIALYSIS (HCC): ICD-10-CM

## 2018-04-06 DIAGNOSIS — D63.1 ANEMIA IN STAGE 5 CHRONIC KIDNEY DISEASE, NOT ON CHRONIC DIALYSIS (HCC): ICD-10-CM

## 2018-04-06 LAB
HCT VFR BLD AUTO: 30.7 % (ref 35.6–45.5)
HGB BLD-MCNC: 9.2 G/DL (ref 11.9–15.5)

## 2018-04-06 PROCEDURE — 63510000001 EPOETIN ALFA PER 1000 UNITS: Performed by: INTERNAL MEDICINE

## 2018-04-06 PROCEDURE — 96372 THER/PROPH/DIAG INJ SC/IM: CPT

## 2018-04-06 PROCEDURE — 36415 COLL VENOUS BLD VENIPUNCTURE: CPT

## 2018-04-06 PROCEDURE — 85018 HEMOGLOBIN: CPT | Performed by: INTERNAL MEDICINE

## 2018-04-06 PROCEDURE — 85014 HEMATOCRIT: CPT | Performed by: INTERNAL MEDICINE

## 2018-04-06 RX ADMIN — ERYTHROPOIETIN 20000 UNITS: 20000 INJECTION, SOLUTION INTRAVENOUS; SUBCUTANEOUS at 13:02

## 2018-04-06 NOTE — PROGRESS NOTES
Procrit indicated per lab results & MD order.  Injection site x 1 with band aide applied.  Pt DC per ambulation with cane & spouse @ 0821.

## 2018-04-09 ENCOUNTER — TELEPHONE (OUTPATIENT)
Dept: FAMILY MEDICINE CLINIC | Facility: CLINIC | Age: 68
End: 2018-04-09

## 2018-04-09 RX ORDER — AMLODIPINE BESYLATE 10 MG/1
10 TABLET ORAL EVERY MORNING
Qty: 90 TABLET | Refills: 1 | Status: SHIPPED | OUTPATIENT
Start: 2018-04-09 | End: 2018-09-20 | Stop reason: SDUPTHER

## 2018-04-09 NOTE — TELEPHONE ENCOUNTER
[facility] pharmacy says pt is requesting amlodipine refill, med is not on her list. Please advise if this is something you would like for her to take or if I should call [facility] to let them know rx is not valid.

## 2018-04-19 ENCOUNTER — TELEPHONE (OUTPATIENT)
Dept: FAMILY MEDICINE CLINIC | Facility: CLINIC | Age: 68
End: 2018-04-19

## 2018-04-20 ENCOUNTER — HOSPITAL ENCOUNTER (OUTPATIENT)
Dept: INFUSION THERAPY | Facility: HOSPITAL | Age: 68
Discharge: HOME | End: 2018-04-20
Admitting: INTERNAL MEDICINE

## 2018-04-20 VITALS
OXYGEN SATURATION: 96 % | DIASTOLIC BLOOD PRESSURE: 94 MMHG | RESPIRATION RATE: 16 BRPM | TEMPERATURE: 97.3 F | SYSTOLIC BLOOD PRESSURE: 178 MMHG | HEART RATE: 76 BPM

## 2018-04-20 DIAGNOSIS — D63.1 ANEMIA IN STAGE 5 CHRONIC KIDNEY DISEASE, NOT ON CHRONIC DIALYSIS (HCC): ICD-10-CM

## 2018-04-20 DIAGNOSIS — N18.5 ANEMIA IN STAGE 5 CHRONIC KIDNEY DISEASE, NOT ON CHRONIC DIALYSIS (HCC): ICD-10-CM

## 2018-04-20 DIAGNOSIS — N18.4 CHRONIC KIDNEY DISEASE, STAGE IV (SEVERE) (HCC): ICD-10-CM

## 2018-04-20 LAB
ANION GAP SERPL CALCULATED.3IONS-SCNC: 14.8 MMOL/L
BUN BLD-MCNC: 40 MG/DL (ref 8–23)
BUN/CREAT SERPL: 12.4 (ref 7–25)
CALCIUM SPEC-SCNC: 8.5 MG/DL (ref 8.6–10.5)
CHLORIDE SERPL-SCNC: 102 MMOL/L (ref 98–107)
CO2 SERPL-SCNC: 23.2 MMOL/L (ref 22–29)
CREAT BLD-MCNC: 3.23 MG/DL (ref 0.57–1)
GFR SERPL CREATININE-BSD FRML MDRD: 14 ML/MIN/1.73
GLUCOSE BLD-MCNC: 265 MG/DL (ref 65–99)
HCT VFR BLD AUTO: 29.7 % (ref 35.6–45.5)
HGB BLD-MCNC: 9.1 G/DL (ref 11.9–15.5)
MAGNESIUM SERPL-MCNC: 1.9 MG/DL (ref 1.6–2.4)
POTASSIUM BLD-SCNC: 3.8 MMOL/L (ref 3.5–5.2)
SODIUM BLD-SCNC: 140 MMOL/L (ref 136–145)

## 2018-04-20 PROCEDURE — 63510000001 EPOETIN ALFA PER 1000 UNITS: Performed by: INTERNAL MEDICINE

## 2018-04-20 PROCEDURE — 83735 ASSAY OF MAGNESIUM: CPT | Performed by: INTERNAL MEDICINE

## 2018-04-20 PROCEDURE — 36415 COLL VENOUS BLD VENIPUNCTURE: CPT

## 2018-04-20 PROCEDURE — 85018 HEMOGLOBIN: CPT | Performed by: INTERNAL MEDICINE

## 2018-04-20 PROCEDURE — 80048 BASIC METABOLIC PNL TOTAL CA: CPT | Performed by: INTERNAL MEDICINE

## 2018-04-20 PROCEDURE — 96372 THER/PROPH/DIAG INJ SC/IM: CPT

## 2018-04-20 PROCEDURE — 85014 HEMATOCRIT: CPT | Performed by: INTERNAL MEDICINE

## 2018-04-20 RX ADMIN — ERYTHROPOIETIN 20000 UNITS: 20000 INJECTION, SOLUTION INTRAVENOUS; SUBCUTANEOUS at 13:31

## 2018-04-20 NOTE — PROGRESS NOTES
Procrit indicated per lab results & MD order.  Injection site x 1 with band aide applied.  Pt DC per ambulation with cane & spouse @ 9592.

## 2018-05-04 ENCOUNTER — HOSPITAL ENCOUNTER (OUTPATIENT)
Dept: INFUSION THERAPY | Facility: HOSPITAL | Age: 68
Discharge: HOME | End: 2018-05-04
Admitting: INTERNAL MEDICINE

## 2018-05-04 VITALS
HEART RATE: 91 BPM | TEMPERATURE: 96.3 F | OXYGEN SATURATION: 96 % | DIASTOLIC BLOOD PRESSURE: 65 MMHG | RESPIRATION RATE: 20 BRPM | SYSTOLIC BLOOD PRESSURE: 160 MMHG

## 2018-05-04 DIAGNOSIS — N18.4 CHRONIC KIDNEY DISEASE, STAGE IV (SEVERE) (HCC): ICD-10-CM

## 2018-05-04 DIAGNOSIS — N18.5 ANEMIA IN STAGE 5 CHRONIC KIDNEY DISEASE, NOT ON CHRONIC DIALYSIS (HCC): ICD-10-CM

## 2018-05-04 DIAGNOSIS — D63.1 ANEMIA IN STAGE 5 CHRONIC KIDNEY DISEASE, NOT ON CHRONIC DIALYSIS (HCC): ICD-10-CM

## 2018-05-04 LAB
HCT VFR BLD AUTO: 30.8 % (ref 35.6–45.5)
HGB BLD-MCNC: 9.5 G/DL (ref 11.9–15.5)

## 2018-05-04 PROCEDURE — 63510000001 EPOETIN ALFA PER 1000 UNITS: Performed by: INTERNAL MEDICINE

## 2018-05-04 PROCEDURE — 36415 COLL VENOUS BLD VENIPUNCTURE: CPT

## 2018-05-04 PROCEDURE — 85014 HEMATOCRIT: CPT | Performed by: INTERNAL MEDICINE

## 2018-05-04 PROCEDURE — 96372 THER/PROPH/DIAG INJ SC/IM: CPT

## 2018-05-04 PROCEDURE — 85018 HEMOGLOBIN: CPT | Performed by: INTERNAL MEDICINE

## 2018-05-04 RX ADMIN — ERYTHROPOIETIN 20000 UNITS: 20000 INJECTION, SOLUTION INTRAVENOUS; SUBCUTANEOUS at 14:21

## 2018-05-04 NOTE — PROGRESS NOTES
Procrit indicated per lab results & MD order.  Injection site x 1 with band aide applied.  Pt DC per ambulation with cane & daughter @ 1086.

## 2018-05-18 ENCOUNTER — HOSPITAL ENCOUNTER (OUTPATIENT)
Dept: INFUSION THERAPY | Facility: HOSPITAL | Age: 68
Discharge: HOME | End: 2018-05-18
Admitting: INTERNAL MEDICINE

## 2018-05-18 VITALS
HEIGHT: 65 IN | RESPIRATION RATE: 22 BRPM | TEMPERATURE: 98 F | SYSTOLIC BLOOD PRESSURE: 172 MMHG | OXYGEN SATURATION: 95 % | HEART RATE: 78 BPM | BODY MASS INDEX: 32.15 KG/M2 | DIASTOLIC BLOOD PRESSURE: 57 MMHG | WEIGHT: 193 LBS

## 2018-05-18 DIAGNOSIS — D63.1 ANEMIA ASSOCIATED WITH STAGE 5 CHRONIC RENAL FAILURE (HCC): Primary | ICD-10-CM

## 2018-05-18 DIAGNOSIS — N18.5 ANEMIA ASSOCIATED WITH STAGE 5 CHRONIC RENAL FAILURE (HCC): Primary | ICD-10-CM

## 2018-05-18 DIAGNOSIS — N18.4 CHRONIC KIDNEY DISEASE, STAGE IV (SEVERE) (HCC): ICD-10-CM

## 2018-05-18 DIAGNOSIS — N18.5 ANEMIA IN STAGE 5 CHRONIC KIDNEY DISEASE, NOT ON CHRONIC DIALYSIS (HCC): ICD-10-CM

## 2018-05-18 DIAGNOSIS — D63.1 ANEMIA IN STAGE 5 CHRONIC KIDNEY DISEASE, NOT ON CHRONIC DIALYSIS (HCC): ICD-10-CM

## 2018-05-18 LAB
25(OH)D3 SERPL-MCNC: 38.8 NG/ML (ref 30–100)
ALBUMIN SERPL-MCNC: 3.8 G/DL (ref 3.5–5.2)
ALBUMIN/GLOB SERPL: 0.9 G/DL
ALP SERPL-CCNC: 69 U/L (ref 39–117)
ALT SERPL W P-5'-P-CCNC: 10 U/L (ref 1–33)
ANION GAP SERPL CALCULATED.3IONS-SCNC: 13.7 MMOL/L
AST SERPL-CCNC: 16 U/L (ref 1–32)
BILIRUB SERPL-MCNC: 0.4 MG/DL (ref 0.1–1.2)
BUN BLD-MCNC: 32 MG/DL (ref 8–23)
BUN/CREAT SERPL: 10.4 (ref 7–25)
CALCIUM SPEC-SCNC: 8.8 MG/DL (ref 8.6–10.5)
CALCIUM SPEC-SCNC: 8.8 MG/DL (ref 8.6–10.5)
CHLORIDE SERPL-SCNC: 104 MMOL/L (ref 98–107)
CO2 SERPL-SCNC: 23.3 MMOL/L (ref 22–29)
CREAT BLD-MCNC: 3.07 MG/DL (ref 0.57–1)
DEPRECATED RDW RBC AUTO: 59.1 FL (ref 37–54)
ERYTHROCYTE [DISTWIDTH] IN BLOOD BY AUTOMATED COUNT: 17.1 % (ref 11.7–13)
FERRITIN SERPL-MCNC: 525.2 NG/ML (ref 13–150)
FOLATE SERPL-MCNC: 16.17 NG/ML (ref 4.78–24.2)
GFR SERPL CREATININE-BSD FRML MDRD: 15 ML/MIN/1.73
GLOBULIN UR ELPH-MCNC: 4.1 GM/DL
GLUCOSE BLD-MCNC: 182 MG/DL (ref 65–99)
HCT VFR BLD AUTO: 32.8 % (ref 35.6–45.5)
HGB BLD-MCNC: 9.9 G/DL (ref 11.9–15.5)
IRON 24H UR-MRATE: 48 MCG/DL (ref 37–145)
IRON SATN MFR SERPL: 22 % (ref 20–50)
MCH RBC QN AUTO: 28.5 PG (ref 26.9–32)
MCHC RBC AUTO-ENTMCNC: 30.2 G/DL (ref 32.4–36.3)
MCV RBC AUTO: 94.5 FL (ref 80.5–98.2)
PHOSPHATE SERPL-MCNC: 4.3 MG/DL (ref 2.5–4.5)
PLATELET # BLD AUTO: 238 10*3/MM3 (ref 140–500)
PMV BLD AUTO: 10 FL (ref 6–12)
POTASSIUM BLD-SCNC: 3.8 MMOL/L (ref 3.5–5.2)
PROT SERPL-MCNC: 7.9 G/DL (ref 6–8.5)
PTH-INTACT SERPL-MCNC: 134.1 PG/ML (ref 15–65)
RBC # BLD AUTO: 3.47 10*6/MM3 (ref 3.9–5.2)
SODIUM BLD-SCNC: 141 MMOL/L (ref 136–145)
TIBC SERPL-MCNC: 218 MCG/DL (ref 298–536)
TRANSFERRIN SERPL-MCNC: 146 MG/DL (ref 200–360)
URATE SERPL-MCNC: 10.5 MG/DL (ref 2.4–5.7)
VIT B12 BLD-MCNC: 432 PG/ML (ref 211–946)
WBC NRBC COR # BLD: 8.13 10*3/MM3 (ref 4.5–10.7)

## 2018-05-18 PROCEDURE — 84550 ASSAY OF BLOOD/URIC ACID: CPT | Performed by: INTERNAL MEDICINE

## 2018-05-18 PROCEDURE — 82728 ASSAY OF FERRITIN: CPT | Performed by: INTERNAL MEDICINE

## 2018-05-18 PROCEDURE — 82746 ASSAY OF FOLIC ACID SERUM: CPT | Performed by: INTERNAL MEDICINE

## 2018-05-18 PROCEDURE — 83540 ASSAY OF IRON: CPT | Performed by: INTERNAL MEDICINE

## 2018-05-18 PROCEDURE — 83970 ASSAY OF PARATHORMONE: CPT | Performed by: INTERNAL MEDICINE

## 2018-05-18 PROCEDURE — 82306 VITAMIN D 25 HYDROXY: CPT | Performed by: INTERNAL MEDICINE

## 2018-05-18 PROCEDURE — 36415 COLL VENOUS BLD VENIPUNCTURE: CPT

## 2018-05-18 PROCEDURE — 82607 VITAMIN B-12: CPT | Performed by: INTERNAL MEDICINE

## 2018-05-18 PROCEDURE — 84466 ASSAY OF TRANSFERRIN: CPT | Performed by: INTERNAL MEDICINE

## 2018-05-18 PROCEDURE — 80053 COMPREHEN METABOLIC PANEL: CPT | Performed by: INTERNAL MEDICINE

## 2018-05-18 PROCEDURE — 96372 THER/PROPH/DIAG INJ SC/IM: CPT

## 2018-05-18 PROCEDURE — 63510000001 EPOETIN ALFA PER 1000 UNITS: Performed by: INTERNAL MEDICINE

## 2018-05-18 PROCEDURE — 84100 ASSAY OF PHOSPHORUS: CPT | Performed by: INTERNAL MEDICINE

## 2018-05-18 PROCEDURE — 85027 COMPLETE CBC AUTOMATED: CPT | Performed by: INTERNAL MEDICINE

## 2018-05-18 RX ADMIN — ERYTHROPOIETIN 20000 UNITS: 20000 INJECTION, SOLUTION INTRAVENOUS; SUBCUTANEOUS at 15:42

## 2018-06-01 ENCOUNTER — HOSPITAL ENCOUNTER (OUTPATIENT)
Dept: INFUSION THERAPY | Facility: HOSPITAL | Age: 68
Discharge: HOME | End: 2018-06-01
Admitting: INTERNAL MEDICINE

## 2018-06-01 VITALS
OXYGEN SATURATION: 93 % | RESPIRATION RATE: 20 BRPM | DIASTOLIC BLOOD PRESSURE: 76 MMHG | HEART RATE: 70 BPM | SYSTOLIC BLOOD PRESSURE: 162 MMHG | TEMPERATURE: 98.8 F

## 2018-06-01 DIAGNOSIS — D63.1 ANEMIA IN STAGE 5 CHRONIC KIDNEY DISEASE, NOT ON CHRONIC DIALYSIS (HCC): ICD-10-CM

## 2018-06-01 DIAGNOSIS — N18.5 CKD (CHRONIC KIDNEY DISEASE), SYMPTOM MANAGEMENT ONLY, STAGE 5 (HCC): ICD-10-CM

## 2018-06-01 DIAGNOSIS — N18.5 ANEMIA IN STAGE 5 CHRONIC KIDNEY DISEASE, NOT ON CHRONIC DIALYSIS (HCC): ICD-10-CM

## 2018-06-01 LAB
HCT VFR BLD AUTO: 34.1 % (ref 35.6–45.5)
HGB BLD-MCNC: 10.4 G/DL (ref 11.9–15.5)

## 2018-06-01 PROCEDURE — 85018 HEMOGLOBIN: CPT | Performed by: INTERNAL MEDICINE

## 2018-06-01 PROCEDURE — 36415 COLL VENOUS BLD VENIPUNCTURE: CPT

## 2018-06-01 PROCEDURE — 63510000001 EPOETIN ALFA PER 1000 UNITS: Performed by: INTERNAL MEDICINE

## 2018-06-01 PROCEDURE — 85014 HEMATOCRIT: CPT | Performed by: INTERNAL MEDICINE

## 2018-06-01 PROCEDURE — 96372 THER/PROPH/DIAG INJ SC/IM: CPT

## 2018-06-01 RX ADMIN — ERYTHROPOIETIN 20000 UNITS: 20000 INJECTION, SOLUTION INTRAVENOUS; SUBCUTANEOUS at 13:45

## 2018-06-01 NOTE — PROGRESS NOTES
HGB LESS THAN 11.0 AND PROCRIT INDICATED, GIVEN BAND AID TO SITE.  DISCHARGED HOME AMB(CANE) WITH  AFTER APPOINTMENT COMPLETED.

## 2018-06-15 ENCOUNTER — HOSPITAL ENCOUNTER (OUTPATIENT)
Dept: INFUSION THERAPY | Facility: HOSPITAL | Age: 68
Discharge: HOME | End: 2018-06-15
Admitting: INTERNAL MEDICINE

## 2018-06-15 VITALS
DIASTOLIC BLOOD PRESSURE: 62 MMHG | SYSTOLIC BLOOD PRESSURE: 176 MMHG | RESPIRATION RATE: 20 BRPM | HEART RATE: 67 BPM | OXYGEN SATURATION: 96 % | TEMPERATURE: 99 F

## 2018-06-15 DIAGNOSIS — D63.1 ANEMIA IN STAGE 5 CHRONIC KIDNEY DISEASE, NOT ON CHRONIC DIALYSIS (HCC): ICD-10-CM

## 2018-06-15 DIAGNOSIS — N18.4 CHRONIC KIDNEY DISEASE, STAGE IV (SEVERE) (HCC): ICD-10-CM

## 2018-06-15 DIAGNOSIS — N18.5 ANEMIA IN STAGE 5 CHRONIC KIDNEY DISEASE, NOT ON CHRONIC DIALYSIS (HCC): ICD-10-CM

## 2018-06-15 LAB
ANION GAP SERPL CALCULATED.3IONS-SCNC: 14.4 MMOL/L
BUN BLD-MCNC: 36 MG/DL (ref 8–23)
BUN/CREAT SERPL: 12.6 (ref 7–25)
CALCIUM SPEC-SCNC: 9.1 MG/DL (ref 8.6–10.5)
CHLORIDE SERPL-SCNC: 99 MMOL/L (ref 98–107)
CO2 SERPL-SCNC: 23.6 MMOL/L (ref 22–29)
CREAT BLD-MCNC: 2.85 MG/DL (ref 0.57–1)
GFR SERPL CREATININE-BSD FRML MDRD: 17 ML/MIN/1.73
GLUCOSE BLD-MCNC: 327 MG/DL (ref 65–99)
HCT VFR BLD AUTO: 33.8 % (ref 35.6–45.5)
HGB BLD-MCNC: 10.3 G/DL (ref 11.9–15.5)
MAGNESIUM SERPL-MCNC: 1.9 MG/DL (ref 1.6–2.4)
POTASSIUM BLD-SCNC: 4.3 MMOL/L (ref 3.5–5.2)
SODIUM BLD-SCNC: 137 MMOL/L (ref 136–145)

## 2018-06-15 PROCEDURE — 83735 ASSAY OF MAGNESIUM: CPT | Performed by: INTERNAL MEDICINE

## 2018-06-15 PROCEDURE — 85018 HEMOGLOBIN: CPT | Performed by: INTERNAL MEDICINE

## 2018-06-15 PROCEDURE — 80048 BASIC METABOLIC PNL TOTAL CA: CPT | Performed by: INTERNAL MEDICINE

## 2018-06-15 PROCEDURE — 63510000001 EPOETIN ALFA PER 1000 UNITS: Performed by: INTERNAL MEDICINE

## 2018-06-15 PROCEDURE — 85014 HEMATOCRIT: CPT | Performed by: INTERNAL MEDICINE

## 2018-06-15 PROCEDURE — 96372 THER/PROPH/DIAG INJ SC/IM: CPT

## 2018-06-15 PROCEDURE — 36415 COLL VENOUS BLD VENIPUNCTURE: CPT

## 2018-06-15 RX ORDER — CLONIDINE HYDROCHLORIDE 0.1 MG/1
0.1 TABLET ORAL ONCE
Status: COMPLETED | OUTPATIENT
Start: 2018-06-15 | End: 2018-06-15

## 2018-06-15 RX ORDER — CLONIDINE HYDROCHLORIDE 0.1 MG/1
0.1 TABLET ORAL ONCE
Status: CANCELLED
Start: 2018-06-15 | End: 2018-06-15

## 2018-06-15 RX ADMIN — CLONIDINE HYDROCHLORIDE 0.1 MG: 0.1 TABLET ORAL at 14:06

## 2018-06-15 RX ADMIN — ERYTHROPOIETIN 20000 UNITS: 20000 INJECTION, SOLUTION INTRAVENOUS; SUBCUTANEOUS at 15:38

## 2018-06-29 ENCOUNTER — HOSPITAL ENCOUNTER (OUTPATIENT)
Dept: INFUSION THERAPY | Facility: HOSPITAL | Age: 68
Discharge: HOME | End: 2018-06-29
Admitting: INTERNAL MEDICINE

## 2018-06-29 VITALS
TEMPERATURE: 99 F | HEART RATE: 93 BPM | OXYGEN SATURATION: 90 % | RESPIRATION RATE: 20 BRPM | DIASTOLIC BLOOD PRESSURE: 69 MMHG | SYSTOLIC BLOOD PRESSURE: 150 MMHG

## 2018-06-29 DIAGNOSIS — N18.5 ANEMIA IN STAGE 5 CHRONIC KIDNEY DISEASE, NOT ON CHRONIC DIALYSIS (HCC): ICD-10-CM

## 2018-06-29 DIAGNOSIS — N18.4 CHRONIC KIDNEY DISEASE, STAGE IV (SEVERE) (HCC): ICD-10-CM

## 2018-06-29 DIAGNOSIS — D63.1 ANEMIA IN STAGE 5 CHRONIC KIDNEY DISEASE, NOT ON CHRONIC DIALYSIS (HCC): ICD-10-CM

## 2018-06-29 LAB
HCT VFR BLD AUTO: 34.3 % (ref 35.6–45.5)
HGB BLD-MCNC: 10.6 G/DL (ref 11.9–15.5)

## 2018-06-29 PROCEDURE — 63510000001 EPOETIN ALFA PER 1000 UNITS: Performed by: INTERNAL MEDICINE

## 2018-06-29 PROCEDURE — 36415 COLL VENOUS BLD VENIPUNCTURE: CPT

## 2018-06-29 PROCEDURE — 85018 HEMOGLOBIN: CPT | Performed by: INTERNAL MEDICINE

## 2018-06-29 PROCEDURE — 96372 THER/PROPH/DIAG INJ SC/IM: CPT

## 2018-06-29 PROCEDURE — 85014 HEMATOCRIT: CPT | Performed by: INTERNAL MEDICINE

## 2018-06-29 RX ADMIN — ERYTHROPOIETIN 20000 UNITS: 20000 INJECTION, SOLUTION INTRAVENOUS; SUBCUTANEOUS at 13:14

## 2018-07-13 ENCOUNTER — HOSPITAL ENCOUNTER (OUTPATIENT)
Dept: INFUSION THERAPY | Facility: HOSPITAL | Age: 68
Discharge: HOME | End: 2018-07-13
Admitting: INTERNAL MEDICINE

## 2018-07-13 VITALS
OXYGEN SATURATION: 95 % | RESPIRATION RATE: 20 BRPM | SYSTOLIC BLOOD PRESSURE: 144 MMHG | DIASTOLIC BLOOD PRESSURE: 62 MMHG | HEART RATE: 88 BPM | TEMPERATURE: 99.1 F

## 2018-07-13 DIAGNOSIS — N18.5 CKD (CHRONIC KIDNEY DISEASE) STAGE 5, GFR LESS THAN 15 ML/MIN (HCC): Primary | ICD-10-CM

## 2018-07-13 DIAGNOSIS — N18.5 ANEMIA IN STAGE 5 CHRONIC KIDNEY DISEASE, NOT ON CHRONIC DIALYSIS (HCC): ICD-10-CM

## 2018-07-13 DIAGNOSIS — N18.4 CHRONIC KIDNEY DISEASE, STAGE IV (SEVERE) (HCC): ICD-10-CM

## 2018-07-13 DIAGNOSIS — D63.1 ANEMIA IN STAGE 5 CHRONIC KIDNEY DISEASE, NOT ON CHRONIC DIALYSIS (HCC): ICD-10-CM

## 2018-07-13 LAB
HCT VFR BLD AUTO: 33.5 % (ref 35.6–45.5)
HGB BLD-MCNC: 10.2 G/DL (ref 11.9–15.5)

## 2018-07-13 PROCEDURE — 85014 HEMATOCRIT: CPT | Performed by: INTERNAL MEDICINE

## 2018-07-13 PROCEDURE — 85018 HEMOGLOBIN: CPT | Performed by: INTERNAL MEDICINE

## 2018-07-13 PROCEDURE — 63510000001 EPOETIN ALFA PER 1000 UNITS: Performed by: INTERNAL MEDICINE

## 2018-07-13 PROCEDURE — 36415 COLL VENOUS BLD VENIPUNCTURE: CPT

## 2018-07-13 PROCEDURE — 96372 THER/PROPH/DIAG INJ SC/IM: CPT

## 2018-07-13 RX ADMIN — ERYTHROPOIETIN 20000 UNITS: 20000 INJECTION, SOLUTION INTRAVENOUS; SUBCUTANEOUS at 14:04

## 2018-07-27 ENCOUNTER — HOSPITAL ENCOUNTER (OUTPATIENT)
Dept: INFUSION THERAPY | Facility: HOSPITAL | Age: 68
Discharge: HOME | End: 2018-07-27
Admitting: INTERNAL MEDICINE

## 2018-07-27 VITALS
DIASTOLIC BLOOD PRESSURE: 68 MMHG | RESPIRATION RATE: 20 BRPM | HEART RATE: 79 BPM | SYSTOLIC BLOOD PRESSURE: 151 MMHG | OXYGEN SATURATION: 95 % | TEMPERATURE: 98.7 F

## 2018-07-27 DIAGNOSIS — D63.1 ANEMIA IN STAGE 5 CHRONIC KIDNEY DISEASE, NOT ON CHRONIC DIALYSIS (HCC): ICD-10-CM

## 2018-07-27 DIAGNOSIS — N18.5 ANEMIA IN STAGE 5 CHRONIC KIDNEY DISEASE, NOT ON CHRONIC DIALYSIS (HCC): ICD-10-CM

## 2018-07-27 DIAGNOSIS — N18.4 CHRONIC KIDNEY DISEASE, STAGE IV (SEVERE) (HCC): ICD-10-CM

## 2018-07-27 LAB
ANION GAP SERPL CALCULATED.3IONS-SCNC: 16.8 MMOL/L
BUN BLD-MCNC: 44 MG/DL (ref 8–23)
BUN/CREAT SERPL: 12.8 (ref 7–25)
CALCIUM SPEC-SCNC: 8.3 MG/DL (ref 8.6–10.5)
CHLORIDE SERPL-SCNC: 102 MMOL/L (ref 98–107)
CO2 SERPL-SCNC: 23.2 MMOL/L (ref 22–29)
CREAT BLD-MCNC: 3.43 MG/DL (ref 0.57–1)
GFR SERPL CREATININE-BSD FRML MDRD: 13 ML/MIN/1.73
GLUCOSE BLD-MCNC: 190 MG/DL (ref 65–99)
HCT VFR BLD AUTO: 34.7 % (ref 35.6–45.5)
HGB BLD-MCNC: 10.5 G/DL (ref 11.9–15.5)
MAGNESIUM SERPL-MCNC: 1.9 MG/DL (ref 1.6–2.4)
POTASSIUM BLD-SCNC: 4 MMOL/L (ref 3.5–5.2)
SODIUM BLD-SCNC: 142 MMOL/L (ref 136–145)

## 2018-07-27 PROCEDURE — 80048 BASIC METABOLIC PNL TOTAL CA: CPT | Performed by: INTERNAL MEDICINE

## 2018-07-27 PROCEDURE — 85018 HEMOGLOBIN: CPT | Performed by: INTERNAL MEDICINE

## 2018-07-27 PROCEDURE — 63510000001 EPOETIN ALFA PER 1000 UNITS: Performed by: INTERNAL MEDICINE

## 2018-07-27 PROCEDURE — 83735 ASSAY OF MAGNESIUM: CPT | Performed by: INTERNAL MEDICINE

## 2018-07-27 PROCEDURE — 85014 HEMATOCRIT: CPT | Performed by: INTERNAL MEDICINE

## 2018-07-27 PROCEDURE — 36415 COLL VENOUS BLD VENIPUNCTURE: CPT

## 2018-07-27 PROCEDURE — 96372 THER/PROPH/DIAG INJ SC/IM: CPT

## 2018-07-27 RX ADMIN — ERYTHROPOIETIN 20000 UNITS: 20000 INJECTION, SOLUTION INTRAVENOUS; SUBCUTANEOUS at 13:40

## 2018-08-10 ENCOUNTER — HOSPITAL ENCOUNTER (OUTPATIENT)
Dept: INFUSION THERAPY | Facility: HOSPITAL | Age: 68
Discharge: HOME | End: 2018-08-10
Admitting: INTERNAL MEDICINE

## 2018-08-10 VITALS
TEMPERATURE: 98.6 F | RESPIRATION RATE: 20 BRPM | HEART RATE: 64 BPM | OXYGEN SATURATION: 91 % | SYSTOLIC BLOOD PRESSURE: 149 MMHG | DIASTOLIC BLOOD PRESSURE: 68 MMHG

## 2018-08-10 DIAGNOSIS — N18.5 ANEMIA IN STAGE 5 CHRONIC KIDNEY DISEASE, NOT ON CHRONIC DIALYSIS (HCC): ICD-10-CM

## 2018-08-10 DIAGNOSIS — N18.4 CHRONIC KIDNEY DISEASE, STAGE IV (SEVERE) (HCC): ICD-10-CM

## 2018-08-10 DIAGNOSIS — D63.1 ANEMIA IN STAGE 5 CHRONIC KIDNEY DISEASE, NOT ON CHRONIC DIALYSIS (HCC): ICD-10-CM

## 2018-08-10 LAB
HCT VFR BLD AUTO: 34.2 % (ref 35.6–45.5)
HGB BLD-MCNC: 10.8 G/DL (ref 11.9–15.5)

## 2018-08-10 PROCEDURE — 85018 HEMOGLOBIN: CPT | Performed by: INTERNAL MEDICINE

## 2018-08-10 PROCEDURE — 85014 HEMATOCRIT: CPT | Performed by: INTERNAL MEDICINE

## 2018-08-10 PROCEDURE — 36415 COLL VENOUS BLD VENIPUNCTURE: CPT

## 2018-08-10 PROCEDURE — 96372 THER/PROPH/DIAG INJ SC/IM: CPT

## 2018-08-10 PROCEDURE — 63510000001 EPOETIN ALFA PER 1000 UNITS: Performed by: INTERNAL MEDICINE

## 2018-08-10 RX ADMIN — ERYTHROPOIETIN 20000 UNITS: 20000 INJECTION, SOLUTION INTRAVENOUS; SUBCUTANEOUS at 13:16

## 2018-08-24 ENCOUNTER — HOSPITAL ENCOUNTER (OUTPATIENT)
Dept: INFUSION THERAPY | Facility: HOSPITAL | Age: 68
Discharge: HOME | End: 2018-08-24
Admitting: INTERNAL MEDICINE

## 2018-08-24 VITALS
OXYGEN SATURATION: 98 % | TEMPERATURE: 98.9 F | HEART RATE: 78 BPM | SYSTOLIC BLOOD PRESSURE: 197 MMHG | RESPIRATION RATE: 18 BRPM | DIASTOLIC BLOOD PRESSURE: 76 MMHG

## 2018-08-24 DIAGNOSIS — N18.4 CHRONIC KIDNEY DISEASE, STAGE IV (SEVERE) (HCC): ICD-10-CM

## 2018-08-24 DIAGNOSIS — N18.5 ANEMIA IN STAGE 5 CHRONIC KIDNEY DISEASE, NOT ON CHRONIC DIALYSIS (HCC): ICD-10-CM

## 2018-08-24 DIAGNOSIS — D63.1 ANEMIA IN STAGE 5 CHRONIC KIDNEY DISEASE, NOT ON CHRONIC DIALYSIS (HCC): ICD-10-CM

## 2018-08-24 LAB
25(OH)D3 SERPL-MCNC: 36.9 NG/ML (ref 30–100)
ALBUMIN SERPL-MCNC: 3.3 G/DL (ref 3.5–5.2)
ALBUMIN/GLOB SERPL: 0.7 G/DL
ALP SERPL-CCNC: 85 U/L (ref 39–117)
ALT SERPL W P-5'-P-CCNC: 13 U/L (ref 1–33)
ANION GAP SERPL CALCULATED.3IONS-SCNC: 13.7 MMOL/L
AST SERPL-CCNC: 14 U/L (ref 1–32)
BILIRUB SERPL-MCNC: 0.2 MG/DL (ref 0.1–1.2)
BUN BLD-MCNC: 32 MG/DL (ref 8–23)
BUN/CREAT SERPL: 11.3 (ref 7–25)
CALCIUM SPEC-SCNC: 8.7 MG/DL (ref 8.6–10.5)
CALCIUM SPEC-SCNC: 8.8 MG/DL (ref 8.6–10.5)
CHLORIDE SERPL-SCNC: 103 MMOL/L (ref 98–107)
CO2 SERPL-SCNC: 23.3 MMOL/L (ref 22–29)
CREAT BLD-MCNC: 2.82 MG/DL (ref 0.57–1)
DEPRECATED RDW RBC AUTO: 55.9 FL (ref 37–54)
ERYTHROCYTE [DISTWIDTH] IN BLOOD BY AUTOMATED COUNT: 16.8 % (ref 11.7–13)
FERRITIN SERPL-MCNC: 536.1 NG/ML (ref 13–150)
FOLATE SERPL-MCNC: 9.28 NG/ML (ref 4.78–24.2)
GFR SERPL CREATININE-BSD FRML MDRD: 17 ML/MIN/1.73
GLOBULIN UR ELPH-MCNC: 4.5 GM/DL
GLUCOSE BLD-MCNC: 317 MG/DL (ref 65–99)
HCT VFR BLD AUTO: 34.7 % (ref 35.6–45.5)
HGB BLD-MCNC: 10.8 G/DL (ref 11.9–15.5)
IRON 24H UR-MRATE: 48 MCG/DL (ref 37–145)
IRON SATN MFR SERPL: 23 % (ref 20–50)
MAGNESIUM SERPL-MCNC: 1.8 MG/DL (ref 1.6–2.4)
MCH RBC QN AUTO: 28.4 PG (ref 26.9–32)
MCHC RBC AUTO-ENTMCNC: 31.1 G/DL (ref 32.4–36.3)
MCV RBC AUTO: 91.3 FL (ref 80.5–98.2)
PHOSPHATE SERPL-MCNC: 4.3 MG/DL (ref 2.5–4.5)
PLATELET # BLD AUTO: 255 10*3/MM3 (ref 140–500)
PMV BLD AUTO: 10.3 FL (ref 6–12)
POTASSIUM BLD-SCNC: 4 MMOL/L (ref 3.5–5.2)
PROT SERPL-MCNC: 7.8 G/DL (ref 6–8.5)
PTH-INTACT SERPL-MCNC: 148.7 PG/ML (ref 15–65)
RBC # BLD AUTO: 3.8 10*6/MM3 (ref 3.9–5.2)
SODIUM BLD-SCNC: 140 MMOL/L (ref 136–145)
TIBC SERPL-MCNC: 212 MCG/DL (ref 298–536)
TRANSFERRIN SERPL-MCNC: 142 MG/DL (ref 200–360)
URATE SERPL-MCNC: 8.7 MG/DL (ref 2.4–5.7)
VIT B12 BLD-MCNC: 377 PG/ML (ref 211–946)
WBC NRBC COR # BLD: 9.39 10*3/MM3 (ref 4.5–10.7)

## 2018-08-24 PROCEDURE — 82607 VITAMIN B-12: CPT | Performed by: INTERNAL MEDICINE

## 2018-08-24 PROCEDURE — 63510000001 EPOETIN ALFA PER 1000 UNITS: Performed by: INTERNAL MEDICINE

## 2018-08-24 PROCEDURE — 84550 ASSAY OF BLOOD/URIC ACID: CPT | Performed by: INTERNAL MEDICINE

## 2018-08-24 PROCEDURE — 83970 ASSAY OF PARATHORMONE: CPT | Performed by: INTERNAL MEDICINE

## 2018-08-24 PROCEDURE — 80053 COMPREHEN METABOLIC PANEL: CPT | Performed by: INTERNAL MEDICINE

## 2018-08-24 PROCEDURE — 85027 COMPLETE CBC AUTOMATED: CPT | Performed by: INTERNAL MEDICINE

## 2018-08-24 PROCEDURE — 83735 ASSAY OF MAGNESIUM: CPT | Performed by: INTERNAL MEDICINE

## 2018-08-24 PROCEDURE — 82746 ASSAY OF FOLIC ACID SERUM: CPT | Performed by: INTERNAL MEDICINE

## 2018-08-24 PROCEDURE — 84466 ASSAY OF TRANSFERRIN: CPT | Performed by: INTERNAL MEDICINE

## 2018-08-24 PROCEDURE — 82306 VITAMIN D 25 HYDROXY: CPT | Performed by: INTERNAL MEDICINE

## 2018-08-24 PROCEDURE — 83540 ASSAY OF IRON: CPT | Performed by: INTERNAL MEDICINE

## 2018-08-24 PROCEDURE — 96372 THER/PROPH/DIAG INJ SC/IM: CPT

## 2018-08-24 PROCEDURE — 84100 ASSAY OF PHOSPHORUS: CPT | Performed by: INTERNAL MEDICINE

## 2018-08-24 PROCEDURE — 36415 COLL VENOUS BLD VENIPUNCTURE: CPT

## 2018-08-24 PROCEDURE — 82728 ASSAY OF FERRITIN: CPT | Performed by: INTERNAL MEDICINE

## 2018-08-24 RX ADMIN — ERYTHROPOIETIN 20000 UNITS: 20000 INJECTION, SOLUTION INTRAVENOUS; SUBCUTANEOUS at 13:44

## 2018-08-24 NOTE — PROGRESS NOTES
Procrit indicated per lab result & MD order.  Injection site x 1 with band aide applied.  Pt DC per ambulation with boot to left foot & cane, spouse accompanied @ 1350.

## 2018-09-07 ENCOUNTER — HOSPITAL ENCOUNTER (OUTPATIENT)
Dept: INFUSION THERAPY | Facility: HOSPITAL | Age: 68
Discharge: HOME | End: 2018-09-07
Admitting: INTERNAL MEDICINE

## 2018-09-07 VITALS
DIASTOLIC BLOOD PRESSURE: 76 MMHG | SYSTOLIC BLOOD PRESSURE: 167 MMHG | RESPIRATION RATE: 20 BRPM | OXYGEN SATURATION: 97 % | TEMPERATURE: 98.6 F | HEART RATE: 97 BPM

## 2018-09-07 DIAGNOSIS — N18.5 ANEMIA IN STAGE 5 CHRONIC KIDNEY DISEASE, NOT ON CHRONIC DIALYSIS (HCC): ICD-10-CM

## 2018-09-07 DIAGNOSIS — D63.1 ANEMIA IN STAGE 5 CHRONIC KIDNEY DISEASE, NOT ON CHRONIC DIALYSIS (HCC): ICD-10-CM

## 2018-09-07 DIAGNOSIS — N18.4 CHRONIC KIDNEY DISEASE, STAGE IV (SEVERE) (HCC): ICD-10-CM

## 2018-09-07 LAB
HCT VFR BLD AUTO: 35.2 % (ref 35.6–45.5)
HGB BLD-MCNC: 10.8 G/DL (ref 11.9–15.5)

## 2018-09-07 PROCEDURE — 96372 THER/PROPH/DIAG INJ SC/IM: CPT

## 2018-09-07 PROCEDURE — 85014 HEMATOCRIT: CPT | Performed by: INTERNAL MEDICINE

## 2018-09-07 PROCEDURE — 63510000001 EPOETIN ALFA PER 1000 UNITS: Performed by: INTERNAL MEDICINE

## 2018-09-07 PROCEDURE — 85018 HEMOGLOBIN: CPT | Performed by: INTERNAL MEDICINE

## 2018-09-07 PROCEDURE — 36415 COLL VENOUS BLD VENIPUNCTURE: CPT

## 2018-09-07 RX ADMIN — ERYTHROPOIETIN 20000 UNITS: 20000 INJECTION, SOLUTION INTRAVENOUS; SUBCUTANEOUS at 12:53

## 2018-09-07 NOTE — PROGRESS NOTES
Procrit indicated per lab results & MD order.  Injection site x 1 with band aide applied.  Pt DC per ambulation with spouse and cane @ 4937.

## 2018-09-11 ENCOUNTER — OFFICE VISIT (OUTPATIENT)
Dept: WOUND CARE | Facility: HOSPITAL | Age: 68
End: 2018-09-11
Attending: SURGERY

## 2018-09-11 DIAGNOSIS — S86.012A PARTIAL ACHILLES TENDON TEAR, LEFT, INITIAL ENCOUNTER: Primary | ICD-10-CM

## 2018-09-11 DIAGNOSIS — M19.079 ARTHRITIS OF FOOT: ICD-10-CM

## 2018-09-11 PROCEDURE — 87075 CULTR BACTERIA EXCEPT BLOOD: CPT | Performed by: SURGERY

## 2018-09-11 PROCEDURE — 87205 SMEAR GRAM STAIN: CPT | Performed by: SURGERY

## 2018-09-11 PROCEDURE — G0463 HOSPITAL OUTPT CLINIC VISIT: HCPCS

## 2018-09-11 PROCEDURE — 87186 SC STD MICRODIL/AGAR DIL: CPT | Performed by: SURGERY

## 2018-09-11 PROCEDURE — 87070 CULTURE OTHR SPECIMN AEROBIC: CPT | Performed by: SURGERY

## 2018-09-11 PROCEDURE — 87147 CULTURE TYPE IMMUNOLOGIC: CPT | Performed by: SURGERY

## 2018-09-14 LAB
BACTERIA SPEC AEROBE CULT: ABNORMAL
GRAM STN SPEC: ABNORMAL

## 2018-09-16 LAB — BACTERIA SPEC ANAEROBE CULT: NORMAL

## 2018-09-18 ENCOUNTER — OFFICE VISIT (OUTPATIENT)
Dept: WOUND CARE | Facility: HOSPITAL | Age: 68
End: 2018-09-18
Attending: SURGERY

## 2018-09-19 RX ORDER — METOPROLOL SUCCINATE 50 MG/1
TABLET, EXTENDED RELEASE ORAL
Qty: 90 TABLET | Refills: 1 | OUTPATIENT
Start: 2018-09-19

## 2018-09-19 RX ORDER — CALCIUM ACETATE 667 MG/1
CAPSULE ORAL
Qty: 270 CAPSULE | Refills: 0 | OUTPATIENT
Start: 2018-09-19

## 2018-09-19 RX ORDER — MONTELUKAST SODIUM 10 MG/1
TABLET ORAL
Qty: 90 TABLET | Refills: 1 | OUTPATIENT
Start: 2018-09-19

## 2018-09-19 RX ORDER — LINAGLIPTIN 5 MG/1
TABLET, FILM COATED ORAL
Qty: 90 TABLET | Refills: 1 | OUTPATIENT
Start: 2018-09-19

## 2018-09-19 RX ORDER — GLIMEPIRIDE 2 MG/1
TABLET ORAL
Qty: 90 TABLET | Refills: 1 | OUTPATIENT
Start: 2018-09-19

## 2018-09-19 RX ORDER — GABAPENTIN 300 MG/1
CAPSULE ORAL
Qty: 180 CAPSULE | Refills: 1 | OUTPATIENT
Start: 2018-09-19 | End: 2019-08-06 | Stop reason: HOSPADM

## 2018-09-19 RX ORDER — BUMETANIDE 2 MG/1
TABLET ORAL
Qty: 90 TABLET | Refills: 1 | OUTPATIENT
Start: 2018-09-19

## 2018-09-19 NOTE — TELEPHONE ENCOUNTER
GABAPENTIN  [facility] fax #[phone].    Has appt tomorrow 09/20/2018.    Last O.V. 01/19/2018.  Herminio 12/18/2017 (UPDATED).  Filled  04/04/2018.    Thank you  Okay to phone in but patient does need an office visits and

## 2018-09-20 ENCOUNTER — OFFICE VISIT (OUTPATIENT)
Dept: FAMILY MEDICINE CLINIC | Facility: CLINIC | Age: 68
End: 2018-09-20

## 2018-09-20 VITALS
HEART RATE: 88 BPM | RESPIRATION RATE: 16 BRPM | TEMPERATURE: 98.7 F | WEIGHT: 191 LBS | HEIGHT: 66 IN | BODY MASS INDEX: 30.7 KG/M2 | DIASTOLIC BLOOD PRESSURE: 87 MMHG | OXYGEN SATURATION: 98 % | SYSTOLIC BLOOD PRESSURE: 120 MMHG

## 2018-09-20 DIAGNOSIS — E11.43 TYPE 2 DIABETES MELLITUS WITH DIABETIC AUTONOMIC NEUROPATHY, WITHOUT LONG-TERM CURRENT USE OF INSULIN (HCC): Primary | ICD-10-CM

## 2018-09-20 PROCEDURE — 99213 OFFICE O/P EST LOW 20 MIN: CPT | Performed by: INTERNAL MEDICINE

## 2018-09-20 RX ORDER — HEPATITIS A VACCINE 1440 [IU]/ML
INJECTION, SUSPENSION INTRAMUSCULAR
COMMUNITY
Start: 2018-06-14 | End: 2019-08-06 | Stop reason: HOSPADM

## 2018-09-20 RX ORDER — BUMETANIDE 2 MG/1
2 TABLET ORAL DAILY
Qty: 90 TABLET | Refills: 1 | Status: SHIPPED | OUTPATIENT
Start: 2018-09-20 | End: 2019-06-19 | Stop reason: SDUPTHER

## 2018-09-20 RX ORDER — METOPROLOL SUCCINATE 50 MG/1
50 TABLET, EXTENDED RELEASE ORAL DAILY
Qty: 90 TABLET | Refills: 1 | Status: SHIPPED | OUTPATIENT
Start: 2018-09-20 | End: 2019-05-20

## 2018-09-20 RX ORDER — AMLODIPINE BESYLATE 10 MG/1
10 TABLET ORAL EVERY MORNING
Qty: 90 TABLET | Refills: 1 | Status: SHIPPED | OUTPATIENT
Start: 2018-09-20 | End: 2019-06-19 | Stop reason: SDUPTHER

## 2018-09-20 RX ORDER — GLIMEPIRIDE 2 MG/1
2 TABLET ORAL
Qty: 90 TABLET | Refills: 1 | Status: SHIPPED | OUTPATIENT
Start: 2018-09-20 | End: 2019-06-19 | Stop reason: SDUPTHER

## 2018-09-20 NOTE — PROGRESS NOTES
Cortez Frank is follow-up for diabetes  Maribeth Rendon is a 67 y.o. female.     History of Present Illness   Maribeth is here today for follow-up on her non-insulin-dependent diabetes mellitus.  She does have peripheral neuropathy associated with this.  She also has a heel wound that is being evaluated by the wound care clinic.  They did do a culture on this and it grew MRSA.  Her kidney labs however are such that oral antibiotics may be difficult to use.  She does take gabapentin for her peripheral neuropathy.  She is due for a refill on this and needs to re-sign an agreement.  The following portions of the patient's history were reviewed and updated as appropriate: allergies, current medications, past medical history and problem list.    Review of Systems   Constitutional: Negative for chills and fever.   Respiratory: Negative for chest tightness and shortness of breath.        Objective   Physical Exam   Constitutional: She appears well-developed and well-nourished.   Cardiovascular: Normal rate, regular rhythm and normal heart sounds.    Pulmonary/Chest: Effort normal and breath sounds normal. No respiratory distress. She has no wheezes. She has no rales.   Musculoskeletal: She exhibits edema.   Nursing note and vitals reviewed.        Assessment/Plan   Maribeth was seen today for diabetes.    Diagnoses and all orders for this visit:    Type 2 diabetes mellitus with diabetic autonomic neuropathy, without long-term current use of insulin (CMS/[facility])  -     Hemoglobin A1c; Future  -     Lipid Panel; Future    Other orders  -     retapamulin (ALTABAX) 1 % ointment; Apply  topically to the appropriate area as directed Every 12 (Twelve) Hours.  -     linagliptin (TRADJENTA) 5 MG tablet tablet; Take 1 tablet by mouth Daily.  -     sertraline (ZOLOFT) 50 MG tablet; Take 1 tablet by mouth Daily.  -     metoprolol succinate XL (TOPROL-XL) 50 MG 24 hr tablet; Take 1 tablet by mouth Daily.  -     glimepiride (AMARYL) 2 MG  tablet; Take 1 tablet by mouth Every Morning Before Breakfast.  -     bumetanide (BUMEX) 2 MG tablet; Take 1 tablet by mouth Daily.  -     amLODIPine (NORVASC) 10 MG tablet; Take 1 tablet by mouth Every Morning.      Maribeth is here today for follow-up.  We are going to see if we can get some labs drawn tomorrow at [facility] with her routine kidney labs.  I'm going to prescribe a topical medicine for her MRSA infection until the wound care clinic decides what else to do with this.

## 2018-09-21 ENCOUNTER — HOSPITAL ENCOUNTER (OUTPATIENT)
Dept: INFUSION THERAPY | Facility: HOSPITAL | Age: 68
Discharge: HOME | End: 2018-09-21
Admitting: INTERNAL MEDICINE

## 2018-09-21 VITALS
RESPIRATION RATE: 20 BRPM | TEMPERATURE: 98.6 F | HEART RATE: 62 BPM | OXYGEN SATURATION: 95 % | SYSTOLIC BLOOD PRESSURE: 123 MMHG | DIASTOLIC BLOOD PRESSURE: 57 MMHG

## 2018-09-21 DIAGNOSIS — D63.1 ANEMIA IN STAGE 5 CHRONIC KIDNEY DISEASE, NOT ON CHRONIC DIALYSIS (HCC): ICD-10-CM

## 2018-09-21 DIAGNOSIS — N18.5 ANEMIA IN STAGE 5 CHRONIC KIDNEY DISEASE, NOT ON CHRONIC DIALYSIS (HCC): ICD-10-CM

## 2018-09-21 DIAGNOSIS — E11.43 TYPE 2 DIABETES MELLITUS WITH DIABETIC AUTONOMIC NEUROPATHY, WITHOUT LONG-TERM CURRENT USE OF INSULIN (HCC): ICD-10-CM

## 2018-09-21 DIAGNOSIS — N18.4 CHRONIC KIDNEY DISEASE, STAGE IV (SEVERE) (HCC): ICD-10-CM

## 2018-09-21 LAB
CHOLEST SERPL-MCNC: 131 MG/DL (ref 0–200)
HBA1C MFR BLD: 7.72 % (ref 4.8–5.6)
HCT VFR BLD AUTO: 32.6 % (ref 35.6–45.5)
HDLC SERPL-MCNC: 34 MG/DL (ref 40–60)
HGB BLD-MCNC: 10.1 G/DL (ref 11.9–15.5)
LDLC SERPL CALC-MCNC: 67 MG/DL (ref 0–100)
LDLC/HDLC SERPL: 1.96 {RATIO}
TRIGL SERPL-MCNC: 152 MG/DL (ref 0–150)
VLDLC SERPL-MCNC: 30.4 MG/DL (ref 5–40)

## 2018-09-21 PROCEDURE — 83036 HEMOGLOBIN GLYCOSYLATED A1C: CPT | Performed by: INTERNAL MEDICINE

## 2018-09-21 PROCEDURE — 85018 HEMOGLOBIN: CPT | Performed by: INTERNAL MEDICINE

## 2018-09-21 PROCEDURE — 85014 HEMATOCRIT: CPT | Performed by: INTERNAL MEDICINE

## 2018-09-21 PROCEDURE — 36415 COLL VENOUS BLD VENIPUNCTURE: CPT

## 2018-09-21 PROCEDURE — 96372 THER/PROPH/DIAG INJ SC/IM: CPT

## 2018-09-21 PROCEDURE — 63510000001 EPOETIN ALFA PER 1000 UNITS: Performed by: INTERNAL MEDICINE

## 2018-09-21 PROCEDURE — 80061 LIPID PANEL: CPT | Performed by: INTERNAL MEDICINE

## 2018-09-21 RX ADMIN — ERYTHROPOIETIN 20000 UNITS: 20000 INJECTION, SOLUTION INTRAVENOUS; SUBCUTANEOUS at 14:03

## 2018-09-21 NOTE — PROGRESS NOTES
Procrit indicated per lab results & MD order.  Injection site x 1 with band aide applied.  Pt DC per ambulation with spouse and cane @ 5440.

## 2018-09-25 ENCOUNTER — APPOINTMENT (OUTPATIENT)
Dept: WOUND CARE | Facility: HOSPITAL | Age: 68
End: 2018-09-25
Attending: SURGERY

## 2018-10-05 ENCOUNTER — HOSPITAL ENCOUNTER (OUTPATIENT)
Dept: INFUSION THERAPY | Facility: HOSPITAL | Age: 68
Discharge: HOME | End: 2018-10-05
Admitting: INTERNAL MEDICINE

## 2018-10-05 VITALS
HEART RATE: 90 BPM | RESPIRATION RATE: 16 BRPM | TEMPERATURE: 99.4 F | DIASTOLIC BLOOD PRESSURE: 53 MMHG | OXYGEN SATURATION: 95 % | SYSTOLIC BLOOD PRESSURE: 142 MMHG

## 2018-10-05 DIAGNOSIS — N18.4 CHRONIC KIDNEY DISEASE, STAGE IV (SEVERE) (HCC): ICD-10-CM

## 2018-10-05 DIAGNOSIS — N18.5 ANEMIA IN STAGE 5 CHRONIC KIDNEY DISEASE, NOT ON CHRONIC DIALYSIS (HCC): ICD-10-CM

## 2018-10-05 DIAGNOSIS — D63.1 ANEMIA IN STAGE 5 CHRONIC KIDNEY DISEASE, NOT ON CHRONIC DIALYSIS (HCC): ICD-10-CM

## 2018-10-05 LAB
HCT VFR BLD AUTO: 33.5 % (ref 35.6–45.5)
HGB BLD-MCNC: 10.4 G/DL (ref 11.9–15.5)

## 2018-10-05 PROCEDURE — 85018 HEMOGLOBIN: CPT | Performed by: INTERNAL MEDICINE

## 2018-10-05 PROCEDURE — 96372 THER/PROPH/DIAG INJ SC/IM: CPT | Performed by: NURSE PRACTITIONER

## 2018-10-05 PROCEDURE — 85014 HEMATOCRIT: CPT | Performed by: INTERNAL MEDICINE

## 2018-10-05 PROCEDURE — 63510000001 EPOETIN ALFA PER 1000 UNITS: Performed by: INTERNAL MEDICINE

## 2018-10-05 PROCEDURE — 36415 COLL VENOUS BLD VENIPUNCTURE: CPT

## 2018-10-05 RX ADMIN — ERYTHROPOIETIN 20000 UNITS: 20000 INJECTION, SOLUTION INTRAVENOUS; SUBCUTANEOUS at 13:11

## 2018-10-09 ENCOUNTER — OFFICE VISIT (OUTPATIENT)
Dept: WOUND CARE | Facility: HOSPITAL | Age: 68
End: 2018-10-09
Attending: SURGERY

## 2018-10-09 ENCOUNTER — HOSPITAL ENCOUNTER (OUTPATIENT)
Dept: PREOP | Facility: HOSPITAL | Age: 68
Setting detail: HOSPITAL OUTPATIENT SURGERY
Discharge: HOME | End: 2018-10-09
Attending: OPHTHALMOLOGY | Admitting: OPHTHALMOLOGY

## 2018-10-09 LAB
GLUCOSE BLD-MCNC: 159 MG/DL (ref 70–105)
GLUCOSE BLD-MCNC: 163 MG/DL (ref 70–105)
POTASSIUM SERPL-SCNC: 3.8 MMOL/L (ref 3.6–5.1)

## 2018-10-19 ENCOUNTER — HOSPITAL ENCOUNTER (OUTPATIENT)
Dept: INFUSION THERAPY | Facility: HOSPITAL | Age: 68
Discharge: HOME | End: 2018-10-19
Admitting: INTERNAL MEDICINE

## 2018-10-19 VITALS
RESPIRATION RATE: 20 BRPM | OXYGEN SATURATION: 97 % | HEART RATE: 96 BPM | DIASTOLIC BLOOD PRESSURE: 89 MMHG | TEMPERATURE: 98.4 F | SYSTOLIC BLOOD PRESSURE: 191 MMHG

## 2018-10-19 DIAGNOSIS — D63.1 ANEMIA IN STAGE 5 CHRONIC KIDNEY DISEASE, NOT ON CHRONIC DIALYSIS (HCC): ICD-10-CM

## 2018-10-19 DIAGNOSIS — N18.5 ANEMIA IN STAGE 5 CHRONIC KIDNEY DISEASE, NOT ON CHRONIC DIALYSIS (HCC): ICD-10-CM

## 2018-10-19 DIAGNOSIS — N18.4 CHRONIC KIDNEY DISEASE, STAGE IV (SEVERE) (HCC): ICD-10-CM

## 2018-10-19 LAB
HCT VFR BLD AUTO: 35 % (ref 35.6–45.5)
HGB BLD-MCNC: 10.9 G/DL (ref 11.9–15.5)

## 2018-10-19 PROCEDURE — 85014 HEMATOCRIT: CPT | Performed by: INTERNAL MEDICINE

## 2018-10-19 PROCEDURE — 85018 HEMOGLOBIN: CPT | Performed by: INTERNAL MEDICINE

## 2018-10-19 PROCEDURE — 36415 COLL VENOUS BLD VENIPUNCTURE: CPT

## 2018-10-19 PROCEDURE — 96372 THER/PROPH/DIAG INJ SC/IM: CPT

## 2018-10-19 PROCEDURE — 63510000001 EPOETIN ALFA PER 1000 UNITS: Performed by: INTERNAL MEDICINE

## 2018-10-19 RX ADMIN — ERYTHROPOIETIN 20000 UNITS: 20000 INJECTION, SOLUTION INTRAVENOUS; SUBCUTANEOUS at 12:52

## 2018-10-25 ENCOUNTER — FLU SHOT (OUTPATIENT)
Dept: FAMILY MEDICINE CLINIC | Facility: CLINIC | Age: 68
End: 2018-10-25

## 2018-10-25 PROCEDURE — 90662 IIV NO PRSV INCREASED AG IM: CPT | Performed by: INTERNAL MEDICINE

## 2018-10-25 PROCEDURE — G0008 ADMIN INFLUENZA VIRUS VAC: HCPCS | Performed by: INTERNAL MEDICINE

## 2018-10-30 ENCOUNTER — OFFICE VISIT (OUTPATIENT)
Dept: WOUND CARE | Facility: HOSPITAL | Age: 68
End: 2018-10-30
Attending: SURGERY

## 2018-11-02 ENCOUNTER — HOSPITAL ENCOUNTER (OUTPATIENT)
Dept: INFUSION THERAPY | Facility: HOSPITAL | Age: 68
Discharge: HOME | End: 2018-11-02
Admitting: INTERNAL MEDICINE

## 2018-11-02 VITALS
OXYGEN SATURATION: 99 % | SYSTOLIC BLOOD PRESSURE: 179 MMHG | RESPIRATION RATE: 20 BRPM | BODY MASS INDEX: 30.34 KG/M2 | WEIGHT: 188 LBS | DIASTOLIC BLOOD PRESSURE: 79 MMHG | TEMPERATURE: 98.3 F | HEART RATE: 81 BPM

## 2018-11-02 DIAGNOSIS — N18.4 CHRONIC KIDNEY DISEASE, STAGE IV (SEVERE) (HCC): ICD-10-CM

## 2018-11-02 DIAGNOSIS — D63.1 ANEMIA IN STAGE 5 CHRONIC KIDNEY DISEASE, NOT ON CHRONIC DIALYSIS (HCC): ICD-10-CM

## 2018-11-02 DIAGNOSIS — N18.5 ANEMIA IN STAGE 5 CHRONIC KIDNEY DISEASE, NOT ON CHRONIC DIALYSIS (HCC): ICD-10-CM

## 2018-11-02 LAB
ANION GAP SERPL CALCULATED.3IONS-SCNC: 13 MMOL/L
BUN BLD-MCNC: 34 MG/DL (ref 8–23)
BUN/CREAT SERPL: 11.3 (ref 7–25)
CALCIUM SPEC-SCNC: 8.9 MG/DL (ref 8.6–10.5)
CHLORIDE SERPL-SCNC: 104 MMOL/L (ref 98–107)
CO2 SERPL-SCNC: 23 MMOL/L (ref 22–29)
CREAT BLD-MCNC: 3 MG/DL (ref 0.57–1)
GFR SERPL CREATININE-BSD FRML MDRD: 16 ML/MIN/1.73
GLUCOSE BLD-MCNC: 326 MG/DL (ref 65–99)
HCT VFR BLD AUTO: 35.8 % (ref 35.6–45.5)
HGB BLD-MCNC: 10.8 G/DL (ref 11.9–15.5)
MAGNESIUM SERPL-MCNC: 1.8 MG/DL (ref 1.6–2.4)
POTASSIUM BLD-SCNC: 4.4 MMOL/L (ref 3.5–5.2)
SODIUM BLD-SCNC: 140 MMOL/L (ref 136–145)

## 2018-11-02 PROCEDURE — 96372 THER/PROPH/DIAG INJ SC/IM: CPT

## 2018-11-02 PROCEDURE — 85014 HEMATOCRIT: CPT | Performed by: INTERNAL MEDICINE

## 2018-11-02 PROCEDURE — 63510000001 EPOETIN ALFA PER 1000 UNITS: Performed by: INTERNAL MEDICINE

## 2018-11-02 PROCEDURE — 83735 ASSAY OF MAGNESIUM: CPT | Performed by: INTERNAL MEDICINE

## 2018-11-02 PROCEDURE — 80048 BASIC METABOLIC PNL TOTAL CA: CPT | Performed by: INTERNAL MEDICINE

## 2018-11-02 PROCEDURE — 36415 COLL VENOUS BLD VENIPUNCTURE: CPT

## 2018-11-02 PROCEDURE — 85018 HEMOGLOBIN: CPT | Performed by: INTERNAL MEDICINE

## 2018-11-02 RX ADMIN — ERYTHROPOIETIN 20000 UNITS: 20000 INJECTION, SOLUTION INTRAVENOUS; SUBCUTANEOUS at 16:08

## 2018-11-16 ENCOUNTER — HOSPITAL ENCOUNTER (OUTPATIENT)
Dept: INFUSION THERAPY | Facility: HOSPITAL | Age: 68
Discharge: HOME | End: 2018-11-16
Admitting: INTERNAL MEDICINE

## 2018-11-16 VITALS
OXYGEN SATURATION: 99 % | DIASTOLIC BLOOD PRESSURE: 72 MMHG | TEMPERATURE: 98.3 F | RESPIRATION RATE: 18 BRPM | HEART RATE: 71 BPM | SYSTOLIC BLOOD PRESSURE: 141 MMHG

## 2018-11-16 DIAGNOSIS — N18.4 CHRONIC KIDNEY DISEASE, STAGE IV (SEVERE) (HCC): ICD-10-CM

## 2018-11-16 DIAGNOSIS — D63.1 ANEMIA IN STAGE 5 CHRONIC KIDNEY DISEASE, NOT ON CHRONIC DIALYSIS (HCC): Primary | ICD-10-CM

## 2018-11-16 DIAGNOSIS — N18.5 ANEMIA IN STAGE 5 CHRONIC KIDNEY DISEASE, NOT ON CHRONIC DIALYSIS (HCC): Primary | ICD-10-CM

## 2018-11-16 LAB
HCT VFR BLD AUTO: 34.2 % (ref 35.6–45.5)
HGB BLD-MCNC: 10.6 G/DL (ref 11.9–15.5)

## 2018-11-16 PROCEDURE — 85018 HEMOGLOBIN: CPT | Performed by: INTERNAL MEDICINE

## 2018-11-16 PROCEDURE — 96372 THER/PROPH/DIAG INJ SC/IM: CPT

## 2018-11-16 PROCEDURE — 85014 HEMATOCRIT: CPT | Performed by: INTERNAL MEDICINE

## 2018-11-16 PROCEDURE — 36415 COLL VENOUS BLD VENIPUNCTURE: CPT

## 2018-11-16 PROCEDURE — 63510000001 EPOETIN ALFA PER 1000 UNITS: Performed by: INTERNAL MEDICINE

## 2018-11-16 RX ADMIN — ERYTHROPOIETIN 20000 UNITS: 20000 INJECTION, SOLUTION INTRAVENOUS; SUBCUTANEOUS at 12:47

## 2018-11-20 ENCOUNTER — OFFICE VISIT (OUTPATIENT)
Dept: WOUND CARE | Facility: HOSPITAL | Age: 68
End: 2018-11-20
Attending: SURGERY

## 2018-11-20 PROCEDURE — G0463 HOSPITAL OUTPT CLINIC VISIT: HCPCS

## 2018-11-30 ENCOUNTER — HOSPITAL ENCOUNTER (OUTPATIENT)
Dept: INFUSION THERAPY | Facility: HOSPITAL | Age: 68
Discharge: HOME | End: 2018-11-30
Admitting: INTERNAL MEDICINE

## 2018-11-30 VITALS
HEART RATE: 71 BPM | SYSTOLIC BLOOD PRESSURE: 169 MMHG | RESPIRATION RATE: 16 BRPM | TEMPERATURE: 97.9 F | DIASTOLIC BLOOD PRESSURE: 88 MMHG | OXYGEN SATURATION: 94 %

## 2018-11-30 DIAGNOSIS — N18.5 ANEMIA IN STAGE 5 CHRONIC KIDNEY DISEASE, NOT ON CHRONIC DIALYSIS (HCC): Primary | ICD-10-CM

## 2018-11-30 DIAGNOSIS — D63.1 ANEMIA IN STAGE 5 CHRONIC KIDNEY DISEASE, NOT ON CHRONIC DIALYSIS (HCC): Primary | ICD-10-CM

## 2018-11-30 DIAGNOSIS — N18.4 CHRONIC KIDNEY DISEASE, STAGE IV (SEVERE) (HCC): ICD-10-CM

## 2018-11-30 LAB
25(OH)D3 SERPL-MCNC: 28.5 NG/ML (ref 30–100)
ALBUMIN SERPL-MCNC: 3.4 G/DL (ref 3.5–5.2)
ALBUMIN/GLOB SERPL: 0.8 G/DL
ALP SERPL-CCNC: 79 U/L (ref 39–117)
ALT SERPL W P-5'-P-CCNC: 11 U/L (ref 1–33)
ANION GAP SERPL CALCULATED.3IONS-SCNC: 13.6 MMOL/L
AST SERPL-CCNC: 13 U/L (ref 1–32)
BILIRUB SERPL-MCNC: 0.3 MG/DL (ref 0.1–1.2)
BUN BLD-MCNC: 42 MG/DL (ref 8–23)
BUN/CREAT SERPL: 12.9 (ref 7–25)
CALCIUM SPEC-SCNC: 8.8 MG/DL (ref 8.6–10.5)
CALCIUM SPEC-SCNC: 9 MG/DL (ref 8.6–10.5)
CHLORIDE SERPL-SCNC: 103 MMOL/L (ref 98–107)
CO2 SERPL-SCNC: 21.4 MMOL/L (ref 22–29)
CREAT BLD-MCNC: 3.25 MG/DL (ref 0.57–1)
DEPRECATED RDW RBC AUTO: 52.5 FL (ref 37–54)
ERYTHROCYTE [DISTWIDTH] IN BLOOD BY AUTOMATED COUNT: 16.1 % (ref 11.7–13)
FERRITIN SERPL-MCNC: 528.3 NG/ML (ref 13–150)
FOLATE SERPL-MCNC: 9.7 NG/ML (ref 4.78–24.2)
GFR SERPL CREATININE-BSD FRML MDRD: 14 ML/MIN/1.73
GFR SERPL CREATININE-BSD FRML MDRD: ABNORMAL ML/MIN/1.73
GLOBULIN UR ELPH-MCNC: 4.4 GM/DL
GLUCOSE BLD-MCNC: 288 MG/DL (ref 65–99)
HCT VFR BLD AUTO: 33.7 % (ref 35.6–45.5)
HGB BLD-MCNC: 10.6 G/DL (ref 11.9–15.5)
IRON 24H UR-MRATE: 47 MCG/DL (ref 37–145)
IRON SATN MFR SERPL: 23 % (ref 20–50)
MAGNESIUM SERPL-MCNC: 1.8 MG/DL (ref 1.6–2.4)
MCH RBC QN AUTO: 28.2 PG (ref 26.9–32)
MCHC RBC AUTO-ENTMCNC: 31.5 G/DL (ref 32.4–36.3)
MCV RBC AUTO: 89.6 FL (ref 80.5–98.2)
PHOSPHATE SERPL-MCNC: 4.6 MG/DL (ref 2.5–4.5)
PLATELET # BLD AUTO: 260 10*3/MM3 (ref 140–500)
PMV BLD AUTO: 9.9 FL (ref 6–12)
POTASSIUM BLD-SCNC: 3.9 MMOL/L (ref 3.5–5.2)
PROT SERPL-MCNC: 7.8 G/DL (ref 6–8.5)
PTH-INTACT SERPL-MCNC: 193 PG/ML (ref 15–65)
RBC # BLD AUTO: 3.76 10*6/MM3 (ref 3.9–5.2)
SODIUM BLD-SCNC: 138 MMOL/L (ref 136–145)
TIBC SERPL-MCNC: 209 MCG/DL
TRANSFERRIN SERPL-MCNC: 140 MG/DL (ref 200–360)
URATE SERPL-MCNC: 9.1 MG/DL (ref 2.4–5.7)
VIT B12 BLD-MCNC: 403 PG/ML (ref 211–946)
WBC NRBC COR # BLD: 9.25 10*3/MM3 (ref 4.5–10.7)

## 2018-11-30 PROCEDURE — 63510000001 EPOETIN ALFA PER 1000 UNITS: Performed by: INTERNAL MEDICINE

## 2018-11-30 PROCEDURE — 82728 ASSAY OF FERRITIN: CPT | Performed by: INTERNAL MEDICINE

## 2018-11-30 PROCEDURE — 85027 COMPLETE CBC AUTOMATED: CPT | Performed by: INTERNAL MEDICINE

## 2018-11-30 PROCEDURE — 80053 COMPREHEN METABOLIC PANEL: CPT | Performed by: INTERNAL MEDICINE

## 2018-11-30 PROCEDURE — 84466 ASSAY OF TRANSFERRIN: CPT | Performed by: INTERNAL MEDICINE

## 2018-11-30 PROCEDURE — 82306 VITAMIN D 25 HYDROXY: CPT | Performed by: INTERNAL MEDICINE

## 2018-11-30 PROCEDURE — 84100 ASSAY OF PHOSPHORUS: CPT | Performed by: INTERNAL MEDICINE

## 2018-11-30 PROCEDURE — 83735 ASSAY OF MAGNESIUM: CPT | Performed by: INTERNAL MEDICINE

## 2018-11-30 PROCEDURE — 83540 ASSAY OF IRON: CPT | Performed by: INTERNAL MEDICINE

## 2018-11-30 PROCEDURE — 82746 ASSAY OF FOLIC ACID SERUM: CPT | Performed by: INTERNAL MEDICINE

## 2018-11-30 PROCEDURE — 82607 VITAMIN B-12: CPT | Performed by: INTERNAL MEDICINE

## 2018-11-30 PROCEDURE — 96372 THER/PROPH/DIAG INJ SC/IM: CPT

## 2018-11-30 PROCEDURE — 84550 ASSAY OF BLOOD/URIC ACID: CPT | Performed by: INTERNAL MEDICINE

## 2018-11-30 PROCEDURE — 36415 COLL VENOUS BLD VENIPUNCTURE: CPT

## 2018-11-30 PROCEDURE — 83970 ASSAY OF PARATHORMONE: CPT | Performed by: INTERNAL MEDICINE

## 2018-11-30 RX ADMIN — ERYTHROPOIETIN 20000 UNITS: 20000 INJECTION, SOLUTION INTRAVENOUS; SUBCUTANEOUS at 13:19

## 2018-12-11 ENCOUNTER — OFFICE VISIT (OUTPATIENT)
Dept: WOUND CARE | Facility: HOSPITAL | Age: 68
End: 2018-12-11
Attending: SURGERY

## 2018-12-11 PROCEDURE — G0463 HOSPITAL OUTPT CLINIC VISIT: HCPCS

## 2018-12-14 ENCOUNTER — HOSPITAL ENCOUNTER (OUTPATIENT)
Dept: CARDIOLOGY | Facility: HOSPITAL | Age: 68
Discharge: HOME | End: 2018-12-14

## 2018-12-14 ENCOUNTER — HOSPITAL ENCOUNTER (OUTPATIENT)
Dept: INFUSION THERAPY | Facility: HOSPITAL | Age: 68
Discharge: HOME | End: 2018-12-14
Admitting: INTERNAL MEDICINE

## 2018-12-14 ENCOUNTER — TRANSCRIBE ORDERS (OUTPATIENT)
Dept: CARDIOLOGY | Facility: HOSPITAL | Age: 68
End: 2018-12-14

## 2018-12-14 VITALS
SYSTOLIC BLOOD PRESSURE: 146 MMHG | DIASTOLIC BLOOD PRESSURE: 72 MMHG | TEMPERATURE: 98.3 F | HEART RATE: 89 BPM | OXYGEN SATURATION: 96 % | RESPIRATION RATE: 20 BRPM

## 2018-12-14 DIAGNOSIS — Z98.890 H/O VITRECTOMY: ICD-10-CM

## 2018-12-14 DIAGNOSIS — Z01.818 PRE-OP EXAM: ICD-10-CM

## 2018-12-14 DIAGNOSIS — N18.4 CHRONIC KIDNEY DISEASE, STAGE IV (SEVERE) (HCC): ICD-10-CM

## 2018-12-14 DIAGNOSIS — D63.1 ANEMIA IN STAGE 5 CHRONIC KIDNEY DISEASE, NOT ON CHRONIC DIALYSIS (HCC): Primary | ICD-10-CM

## 2018-12-14 DIAGNOSIS — Z98.890 H/O VITRECTOMY: Primary | ICD-10-CM

## 2018-12-14 DIAGNOSIS — N18.5 ANEMIA IN STAGE 5 CHRONIC KIDNEY DISEASE, NOT ON CHRONIC DIALYSIS (HCC): Primary | ICD-10-CM

## 2018-12-14 LAB
HCT VFR BLD AUTO: 34.2 % (ref 35.6–45.5)
HGB BLD-MCNC: 10.5 G/DL (ref 11.9–15.5)
POTASSIUM BLD-SCNC: 4.2 MMOL/L (ref 3.5–5.2)

## 2018-12-14 PROCEDURE — 63510000001 EPOETIN ALFA PER 1000 UNITS: Performed by: INTERNAL MEDICINE

## 2018-12-14 PROCEDURE — 85018 HEMOGLOBIN: CPT | Performed by: INTERNAL MEDICINE

## 2018-12-14 PROCEDURE — 36415 COLL VENOUS BLD VENIPUNCTURE: CPT

## 2018-12-14 PROCEDURE — 93010 ELECTROCARDIOGRAM REPORT: CPT | Performed by: INTERNAL MEDICINE

## 2018-12-14 PROCEDURE — 84132 ASSAY OF SERUM POTASSIUM: CPT | Performed by: ANESTHESIOLOGY

## 2018-12-14 PROCEDURE — 96372 THER/PROPH/DIAG INJ SC/IM: CPT

## 2018-12-14 PROCEDURE — 85014 HEMATOCRIT: CPT | Performed by: INTERNAL MEDICINE

## 2018-12-14 PROCEDURE — 93005 ELECTROCARDIOGRAM TRACING: CPT | Performed by: ANESTHESIOLOGY

## 2018-12-14 RX ADMIN — ERYTHROPOIETIN 20000 UNITS: 20000 INJECTION, SOLUTION INTRAVENOUS; SUBCUTANEOUS at 14:02

## 2018-12-14 NOTE — PROGRESS NOTES
Procrit given per order.  Pt had additional labs drawn per order and will have an EKG following injection today.  Pt tolerated injection and walked to outpatient lab for EKG.  Pt using cane and accompanied by spouse.

## 2018-12-18 ENCOUNTER — HOSPITAL ENCOUNTER (OUTPATIENT)
Dept: PREOP | Facility: HOSPITAL | Age: 68
Setting detail: HOSPITAL OUTPATIENT SURGERY
Discharge: HOME | End: 2018-12-18
Attending: OPHTHALMOLOGY | Admitting: OPHTHALMOLOGY

## 2018-12-18 LAB
GLUCOSE BLD-MCNC: 86 MG/DL (ref 70–105)
GLUCOSE BLD-MCNC: 94 MG/DL (ref 70–105)

## 2018-12-28 ENCOUNTER — HOSPITAL ENCOUNTER (OUTPATIENT)
Dept: INFUSION THERAPY | Facility: HOSPITAL | Age: 68
Discharge: HOME | End: 2018-12-28
Admitting: INTERNAL MEDICINE

## 2018-12-28 VITALS
RESPIRATION RATE: 20 BRPM | SYSTOLIC BLOOD PRESSURE: 190 MMHG | TEMPERATURE: 98.1 F | DIASTOLIC BLOOD PRESSURE: 89 MMHG | HEART RATE: 80 BPM | OXYGEN SATURATION: 95 %

## 2018-12-28 DIAGNOSIS — N18.5 ANEMIA IN STAGE 5 CHRONIC KIDNEY DISEASE, NOT ON CHRONIC DIALYSIS (HCC): Primary | ICD-10-CM

## 2018-12-28 DIAGNOSIS — N18.4 CHRONIC KIDNEY DISEASE, STAGE IV (SEVERE) (HCC): ICD-10-CM

## 2018-12-28 DIAGNOSIS — D63.1 ANEMIA IN STAGE 5 CHRONIC KIDNEY DISEASE, NOT ON CHRONIC DIALYSIS (HCC): Primary | ICD-10-CM

## 2018-12-28 LAB
ANION GAP SERPL CALCULATED.3IONS-SCNC: 10 MMOL/L
BUN BLD-MCNC: 37 MG/DL (ref 8–23)
BUN/CREAT SERPL: 12.7 (ref 7–25)
CALCIUM SPEC-SCNC: 9.1 MG/DL (ref 8.6–10.5)
CHLORIDE SERPL-SCNC: 108 MMOL/L (ref 98–107)
CO2 SERPL-SCNC: 23 MMOL/L (ref 22–29)
CREAT BLD-MCNC: 2.91 MG/DL (ref 0.57–1)
GFR SERPL CREATININE-BSD FRML MDRD: 16 ML/MIN/1.73
GLUCOSE BLD-MCNC: 283 MG/DL (ref 65–99)
HCT VFR BLD AUTO: 35.4 % (ref 35.6–45.5)
HGB BLD-MCNC: 10.5 G/DL (ref 11.9–15.5)
MAGNESIUM SERPL-MCNC: 1.7 MG/DL (ref 1.6–2.4)
POTASSIUM BLD-SCNC: 3.7 MMOL/L (ref 3.5–5.2)
SODIUM BLD-SCNC: 141 MMOL/L (ref 136–145)

## 2018-12-28 PROCEDURE — 63510000001 EPOETIN ALFA PER 1000 UNITS: Performed by: INTERNAL MEDICINE

## 2018-12-28 PROCEDURE — 85014 HEMATOCRIT: CPT | Performed by: INTERNAL MEDICINE

## 2018-12-28 PROCEDURE — 96372 THER/PROPH/DIAG INJ SC/IM: CPT

## 2018-12-28 PROCEDURE — 85018 HEMOGLOBIN: CPT | Performed by: INTERNAL MEDICINE

## 2018-12-28 PROCEDURE — 80048 BASIC METABOLIC PNL TOTAL CA: CPT | Performed by: INTERNAL MEDICINE

## 2018-12-28 PROCEDURE — 83735 ASSAY OF MAGNESIUM: CPT | Performed by: INTERNAL MEDICINE

## 2018-12-28 PROCEDURE — 36415 COLL VENOUS BLD VENIPUNCTURE: CPT

## 2018-12-28 RX ADMIN — ERYTHROPOIETIN 20000 UNITS: 20000 INJECTION, SOLUTION INTRAVENOUS; SUBCUTANEOUS at 13:57

## 2019-01-11 ENCOUNTER — HOSPITAL ENCOUNTER (OUTPATIENT)
Dept: INFUSION THERAPY | Facility: HOSPITAL | Age: 69
Discharge: HOME | End: 2019-01-11
Admitting: INTERNAL MEDICINE

## 2019-01-11 VITALS
RESPIRATION RATE: 20 BRPM | HEART RATE: 70 BPM | DIASTOLIC BLOOD PRESSURE: 77 MMHG | TEMPERATURE: 98.1 F | OXYGEN SATURATION: 97 % | SYSTOLIC BLOOD PRESSURE: 178 MMHG

## 2019-01-11 DIAGNOSIS — D63.1 ANEMIA IN STAGE 5 CHRONIC KIDNEY DISEASE, NOT ON CHRONIC DIALYSIS (HCC): Primary | ICD-10-CM

## 2019-01-11 DIAGNOSIS — N18.4 CHRONIC KIDNEY DISEASE, STAGE IV (SEVERE) (HCC): ICD-10-CM

## 2019-01-11 DIAGNOSIS — N18.5 ANEMIA IN STAGE 5 CHRONIC KIDNEY DISEASE, NOT ON CHRONIC DIALYSIS (HCC): Primary | ICD-10-CM

## 2019-01-11 LAB
HCT VFR BLD AUTO: 35.9 % (ref 35.6–45.5)
HGB BLD-MCNC: 10.5 G/DL (ref 11.9–15.5)

## 2019-01-11 PROCEDURE — 85014 HEMATOCRIT: CPT | Performed by: INTERNAL MEDICINE

## 2019-01-11 PROCEDURE — 85018 HEMOGLOBIN: CPT | Performed by: INTERNAL MEDICINE

## 2019-01-11 PROCEDURE — 63510000001 EPOETIN ALFA PER 1000 UNITS: Performed by: INTERNAL MEDICINE

## 2019-01-11 PROCEDURE — 36415 COLL VENOUS BLD VENIPUNCTURE: CPT

## 2019-01-11 PROCEDURE — 96372 THER/PROPH/DIAG INJ SC/IM: CPT

## 2019-01-11 PROCEDURE — 36415 COLL VENOUS BLD VENIPUNCTURE: CPT | Performed by: INTERNAL MEDICINE

## 2019-01-11 RX ADMIN — ERYTHROPOIETIN 20000 UNITS: 20000 INJECTION, SOLUTION INTRAVENOUS; SUBCUTANEOUS at 14:01

## 2019-01-11 NOTE — PROGRESS NOTES
Procrit indicated per lab results & MD order.  Injection site x 1 with band aide applied.  Pt DC per ambulation with cane & spouse @ 0072.

## 2019-01-25 ENCOUNTER — HOSPITAL ENCOUNTER (OUTPATIENT)
Dept: INFUSION THERAPY | Facility: HOSPITAL | Age: 69
Discharge: HOME | End: 2019-01-25
Admitting: INTERNAL MEDICINE

## 2019-01-25 VITALS
TEMPERATURE: 96.6 F | DIASTOLIC BLOOD PRESSURE: 79 MMHG | OXYGEN SATURATION: 100 % | HEART RATE: 68 BPM | SYSTOLIC BLOOD PRESSURE: 180 MMHG | RESPIRATION RATE: 20 BRPM

## 2019-01-25 DIAGNOSIS — D63.1 ANEMIA IN STAGE 5 CHRONIC KIDNEY DISEASE, NOT ON CHRONIC DIALYSIS (HCC): Primary | ICD-10-CM

## 2019-01-25 DIAGNOSIS — N18.5 ANEMIA IN STAGE 5 CHRONIC KIDNEY DISEASE, NOT ON CHRONIC DIALYSIS (HCC): Primary | ICD-10-CM

## 2019-01-25 DIAGNOSIS — N18.4 CHRONIC KIDNEY DISEASE, STAGE IV (SEVERE) (HCC): ICD-10-CM

## 2019-01-25 LAB
HCT VFR BLD AUTO: 35.8 % (ref 35.6–45.5)
HGB BLD-MCNC: 11.1 G/DL (ref 11.9–15.5)

## 2019-01-25 PROCEDURE — 85018 HEMOGLOBIN: CPT | Performed by: INTERNAL MEDICINE

## 2019-01-25 PROCEDURE — G0463 HOSPITAL OUTPT CLINIC VISIT: HCPCS

## 2019-01-25 PROCEDURE — 85014 HEMATOCRIT: CPT | Performed by: INTERNAL MEDICINE

## 2019-01-25 PROCEDURE — 36415 COLL VENOUS BLD VENIPUNCTURE: CPT

## 2019-02-08 ENCOUNTER — HOSPITAL ENCOUNTER (OUTPATIENT)
Dept: INFUSION THERAPY | Facility: HOSPITAL | Age: 69
Discharge: HOME | End: 2019-02-08
Admitting: INTERNAL MEDICINE

## 2019-02-08 VITALS
RESPIRATION RATE: 20 BRPM | DIASTOLIC BLOOD PRESSURE: 85 MMHG | HEART RATE: 85 BPM | TEMPERATURE: 98.9 F | OXYGEN SATURATION: 94 % | SYSTOLIC BLOOD PRESSURE: 155 MMHG

## 2019-02-08 DIAGNOSIS — N18.5 ANEMIA IN STAGE 5 CHRONIC KIDNEY DISEASE, NOT ON CHRONIC DIALYSIS (HCC): Primary | ICD-10-CM

## 2019-02-08 DIAGNOSIS — D63.1 ANEMIA IN STAGE 5 CHRONIC KIDNEY DISEASE, NOT ON CHRONIC DIALYSIS (HCC): Primary | ICD-10-CM

## 2019-02-08 DIAGNOSIS — N18.4 CHRONIC KIDNEY DISEASE, STAGE IV (SEVERE) (HCC): ICD-10-CM

## 2019-02-08 LAB
HCT VFR BLD AUTO: 33.1 % (ref 35.6–45.5)
HGB BLD-MCNC: 10.1 G/DL (ref 11.9–15.5)

## 2019-02-08 PROCEDURE — 85014 HEMATOCRIT: CPT | Performed by: INTERNAL MEDICINE

## 2019-02-08 PROCEDURE — 85018 HEMOGLOBIN: CPT | Performed by: INTERNAL MEDICINE

## 2019-02-08 PROCEDURE — 63510000001 EPOETIN ALFA PER 1000 UNITS: Performed by: INTERNAL MEDICINE

## 2019-02-08 PROCEDURE — 36415 COLL VENOUS BLD VENIPUNCTURE: CPT

## 2019-02-08 PROCEDURE — 96372 THER/PROPH/DIAG INJ SC/IM: CPT

## 2019-02-08 RX ADMIN — ERYTHROPOIETIN 20000 UNITS: 20000 INJECTION, SOLUTION INTRAVENOUS; SUBCUTANEOUS at 13:37

## 2019-02-08 NOTE — PROGRESS NOTES
Procrit indicated per lab results & MD order.  Injection site x 1 with band aide applied.  Pt DC per ambulation with cane & spouse @ 0078.

## 2019-02-22 ENCOUNTER — HOSPITAL ENCOUNTER (OUTPATIENT)
Dept: INFUSION THERAPY | Facility: HOSPITAL | Age: 69
Discharge: HOME | End: 2019-02-22
Admitting: INTERNAL MEDICINE

## 2019-02-22 VITALS
HEART RATE: 93 BPM | OXYGEN SATURATION: 96 % | TEMPERATURE: 98.5 F | RESPIRATION RATE: 20 BRPM | DIASTOLIC BLOOD PRESSURE: 84 MMHG | SYSTOLIC BLOOD PRESSURE: 173 MMHG

## 2019-02-22 DIAGNOSIS — D63.1 ANEMIA IN STAGE 5 CHRONIC KIDNEY DISEASE, NOT ON CHRONIC DIALYSIS (HCC): Primary | ICD-10-CM

## 2019-02-22 DIAGNOSIS — N18.4 CHRONIC KIDNEY DISEASE, STAGE IV (SEVERE) (HCC): ICD-10-CM

## 2019-02-22 DIAGNOSIS — N18.5 ANEMIA IN STAGE 5 CHRONIC KIDNEY DISEASE, NOT ON CHRONIC DIALYSIS (HCC): Primary | ICD-10-CM

## 2019-02-22 LAB
ANION GAP SERPL CALCULATED.3IONS-SCNC: 15.7 MMOL/L
BUN BLD-MCNC: 42 MG/DL (ref 8–23)
BUN/CREAT SERPL: 13 (ref 7–25)
CALCIUM SPEC-SCNC: 9 MG/DL (ref 8.6–10.5)
CHLORIDE SERPL-SCNC: 107 MMOL/L (ref 98–107)
CO2 SERPL-SCNC: 19.3 MMOL/L (ref 22–29)
CREAT BLD-MCNC: 3.24 MG/DL (ref 0.57–1)
GFR SERPL CREATININE-BSD FRML MDRD: 14 ML/MIN/1.73
GFR SERPL CREATININE-BSD FRML MDRD: ABNORMAL ML/MIN/1.73
GLUCOSE BLD-MCNC: 176 MG/DL (ref 65–99)
HCT VFR BLD AUTO: 33.6 % (ref 34–46.6)
HGB BLD-MCNC: 10 G/DL (ref 12–15.9)
MAGNESIUM SERPL-MCNC: 1.9 MG/DL (ref 1.6–2.4)
POTASSIUM BLD-SCNC: 4 MMOL/L (ref 3.5–5.2)
SODIUM BLD-SCNC: 142 MMOL/L (ref 136–145)

## 2019-02-22 PROCEDURE — 63510000001 EPOETIN ALFA PER 1000 UNITS: Performed by: INTERNAL MEDICINE

## 2019-02-22 PROCEDURE — 36415 COLL VENOUS BLD VENIPUNCTURE: CPT

## 2019-02-22 PROCEDURE — 80048 BASIC METABOLIC PNL TOTAL CA: CPT | Performed by: INTERNAL MEDICINE

## 2019-02-22 PROCEDURE — 85014 HEMATOCRIT: CPT | Performed by: INTERNAL MEDICINE

## 2019-02-22 PROCEDURE — 83735 ASSAY OF MAGNESIUM: CPT | Performed by: INTERNAL MEDICINE

## 2019-02-22 PROCEDURE — 85018 HEMOGLOBIN: CPT | Performed by: INTERNAL MEDICINE

## 2019-02-22 PROCEDURE — 96372 THER/PROPH/DIAG INJ SC/IM: CPT

## 2019-02-22 RX ADMIN — ERYTHROPOIETIN 20000 UNITS: 20000 INJECTION, SOLUTION INTRAVENOUS; SUBCUTANEOUS at 09:10

## 2019-03-08 ENCOUNTER — HOSPITAL ENCOUNTER (OUTPATIENT)
Dept: INFUSION THERAPY | Facility: HOSPITAL | Age: 69
Discharge: HOME | End: 2019-03-08
Admitting: INTERNAL MEDICINE

## 2019-03-08 VITALS
WEIGHT: 190 LBS | SYSTOLIC BLOOD PRESSURE: 174 MMHG | TEMPERATURE: 98.9 F | HEART RATE: 98 BPM | DIASTOLIC BLOOD PRESSURE: 66 MMHG | RESPIRATION RATE: 20 BRPM | OXYGEN SATURATION: 93 % | BODY MASS INDEX: 30.67 KG/M2

## 2019-03-08 DIAGNOSIS — N18.4 CHRONIC KIDNEY DISEASE, STAGE IV (SEVERE) (HCC): ICD-10-CM

## 2019-03-08 DIAGNOSIS — N18.4 ANEMIA DUE TO STAGE 4 CHRONIC KIDNEY DISEASE (HCC): ICD-10-CM

## 2019-03-08 DIAGNOSIS — N18.4 CKD (CHRONIC KIDNEY DISEASE) STAGE 4, GFR 15-29 ML/MIN (HCC): Primary | ICD-10-CM

## 2019-03-08 DIAGNOSIS — N18.5 ANEMIA IN STAGE 5 CHRONIC KIDNEY DISEASE, NOT ON CHRONIC DIALYSIS (HCC): ICD-10-CM

## 2019-03-08 DIAGNOSIS — D63.1 ANEMIA DUE TO STAGE 4 CHRONIC KIDNEY DISEASE (HCC): ICD-10-CM

## 2019-03-08 DIAGNOSIS — D63.1 ANEMIA IN STAGE 5 CHRONIC KIDNEY DISEASE, NOT ON CHRONIC DIALYSIS (HCC): ICD-10-CM

## 2019-03-08 LAB
25(OH)D3 SERPL-MCNC: 29.9 NG/ML (ref 30–100)
ALBUMIN SERPL-MCNC: 3.4 G/DL (ref 3.5–5.2)
ALBUMIN/GLOB SERPL: 0.8 G/DL
ALP SERPL-CCNC: 69 U/L (ref 39–117)
ALT SERPL W P-5'-P-CCNC: 13 U/L (ref 1–33)
ANION GAP SERPL CALCULATED.3IONS-SCNC: 12.7 MMOL/L
AST SERPL-CCNC: 16 U/L (ref 1–32)
BILIRUB SERPL-MCNC: 0.3 MG/DL (ref 0.1–1.2)
BUN BLD-MCNC: 43 MG/DL (ref 8–23)
BUN/CREAT SERPL: 12.1 (ref 7–25)
CALCIUM SPEC-SCNC: 8.6 MG/DL (ref 8.6–10.5)
CALCIUM SPEC-SCNC: 8.6 MG/DL (ref 8.6–10.5)
CHLORIDE SERPL-SCNC: 108 MMOL/L (ref 98–107)
CO2 SERPL-SCNC: 22.3 MMOL/L (ref 22–29)
CREAT BLD-MCNC: 3.55 MG/DL (ref 0.57–1)
DEPRECATED RDW RBC AUTO: 57.9 FL (ref 37–54)
ERYTHROCYTE [DISTWIDTH] IN BLOOD BY AUTOMATED COUNT: 16.6 % (ref 12.3–15.4)
FERRITIN SERPL-MCNC: 532 NG/ML (ref 13–150)
FOLATE SERPL-MCNC: 9.28 NG/ML (ref 4.78–24.2)
GFR SERPL CREATININE-BSD FRML MDRD: 13 ML/MIN/1.73
GFR SERPL CREATININE-BSD FRML MDRD: ABNORMAL ML/MIN/1.73
GLOBULIN UR ELPH-MCNC: 4.1 GM/DL
GLUCOSE BLD-MCNC: 212 MG/DL (ref 65–99)
HCT VFR BLD AUTO: 33.7 % (ref 34–46.6)
HGB BLD-MCNC: 10.3 G/DL (ref 12–15.9)
IRON 24H UR-MRATE: 41 MCG/DL (ref 37–145)
IRON SATN MFR SERPL: 18 % (ref 20–50)
MCH RBC QN AUTO: 28.9 PG (ref 26.6–33)
MCHC RBC AUTO-ENTMCNC: 30.6 G/DL (ref 31.5–35.7)
MCV RBC AUTO: 94.7 FL (ref 79–97)
PHOSPHATE SERPL-MCNC: 4.2 MG/DL (ref 2.5–4.5)
PLATELET # BLD AUTO: 223 10*3/MM3 (ref 140–450)
PMV BLD AUTO: 10.7 FL (ref 6–12)
POTASSIUM BLD-SCNC: 4 MMOL/L (ref 3.5–5.2)
PROT SERPL-MCNC: 7.5 G/DL (ref 6–8.5)
PTH-INTACT SERPL-MCNC: 222 PG/ML (ref 15–65)
RBC # BLD AUTO: 3.56 10*6/MM3 (ref 3.77–5.28)
SODIUM BLD-SCNC: 143 MMOL/L (ref 136–145)
TIBC SERPL-MCNC: 232 MCG/DL
TRANSFERRIN SERPL-MCNC: 156 MG/DL (ref 200–360)
URATE SERPL-MCNC: 9.2 MG/DL (ref 2.4–5.7)
VIT B12 BLD-MCNC: 292 PG/ML (ref 211–946)
WBC NRBC COR # BLD: 8.9 10*3/MM3 (ref 3.4–10.8)

## 2019-03-08 PROCEDURE — 82607 VITAMIN B-12: CPT | Performed by: INTERNAL MEDICINE

## 2019-03-08 PROCEDURE — 85027 COMPLETE CBC AUTOMATED: CPT | Performed by: INTERNAL MEDICINE

## 2019-03-08 PROCEDURE — 84100 ASSAY OF PHOSPHORUS: CPT | Performed by: INTERNAL MEDICINE

## 2019-03-08 PROCEDURE — 82746 ASSAY OF FOLIC ACID SERUM: CPT | Performed by: INTERNAL MEDICINE

## 2019-03-08 PROCEDURE — 82306 VITAMIN D 25 HYDROXY: CPT | Performed by: INTERNAL MEDICINE

## 2019-03-08 PROCEDURE — 96372 THER/PROPH/DIAG INJ SC/IM: CPT

## 2019-03-08 PROCEDURE — 63510000001 EPOETIN ALFA PER 1000 UNITS: Performed by: INTERNAL MEDICINE

## 2019-03-08 PROCEDURE — 82728 ASSAY OF FERRITIN: CPT | Performed by: INTERNAL MEDICINE

## 2019-03-08 PROCEDURE — 36415 COLL VENOUS BLD VENIPUNCTURE: CPT

## 2019-03-08 PROCEDURE — 83540 ASSAY OF IRON: CPT | Performed by: INTERNAL MEDICINE

## 2019-03-08 PROCEDURE — 84550 ASSAY OF BLOOD/URIC ACID: CPT | Performed by: INTERNAL MEDICINE

## 2019-03-08 PROCEDURE — 84466 ASSAY OF TRANSFERRIN: CPT | Performed by: INTERNAL MEDICINE

## 2019-03-08 PROCEDURE — 83970 ASSAY OF PARATHORMONE: CPT | Performed by: INTERNAL MEDICINE

## 2019-03-08 PROCEDURE — 80053 COMPREHEN METABOLIC PANEL: CPT | Performed by: INTERNAL MEDICINE

## 2019-03-08 RX ADMIN — ERYTHROPOIETIN 20000 UNITS: 20000 INJECTION, SOLUTION INTRAVENOUS; SUBCUTANEOUS at 13:41

## 2019-03-20 ENCOUNTER — OFFICE VISIT (OUTPATIENT)
Dept: FAMILY MEDICINE CLINIC | Facility: CLINIC | Age: 69
End: 2019-03-20

## 2019-03-20 VITALS
HEART RATE: 108 BPM | SYSTOLIC BLOOD PRESSURE: 130 MMHG | WEIGHT: 188.6 LBS | OXYGEN SATURATION: 97 % | DIASTOLIC BLOOD PRESSURE: 90 MMHG | HEIGHT: 66 IN | BODY MASS INDEX: 30.31 KG/M2

## 2019-03-20 DIAGNOSIS — I10 ESSENTIAL HYPERTENSION: ICD-10-CM

## 2019-03-20 DIAGNOSIS — E11.42 DM TYPE 2 WITH DIABETIC PERIPHERAL NEUROPATHY (HCC): Primary | ICD-10-CM

## 2019-03-20 DIAGNOSIS — R06.02 SHORTNESS OF BREATH: ICD-10-CM

## 2019-03-20 DIAGNOSIS — E78.5 HYPERLIPIDEMIA, UNSPECIFIED HYPERLIPIDEMIA TYPE: ICD-10-CM

## 2019-03-20 PROCEDURE — 99214 OFFICE O/P EST MOD 30 MIN: CPT | Performed by: INTERNAL MEDICINE

## 2019-03-20 NOTE — PROGRESS NOTES
Subjective Chief complaint is follow-up on diabetes but also ear fullness  Maribeth Rendon is a 68 y.o. female.     History of Present Illness   Maribeth is here today for follow-up on her non-insulin-dependent diabetes.  Her last hemoglobin A1c was approximately 6 months ago.  She does have hypertension.  She is on several different medications for that.  She has had some hyperlipidemia in the past.  Her last cholesterol testing however looked okay.  She is complaining of some ear fullness.  This seemed to occur after getting some water in her ear after shower.  She is also complaining of some shortness of breath.  Both she and her  report that she is short of breath just walking back the hallway to the bathroom.  She does have chronic kidney disease and known anemia.  I did review a stress test she had in 2016 that was normal.  She had a chest x-ray in 2017 look normal.  The following portions of the patient's history were reviewed and updated as appropriate: allergies, current medications, past family history, past medical history, past social history, past surgical history and problem list.    Review of Systems   HENT: Positive for ear pain. Negative for ear discharge.    Respiratory: Positive for shortness of breath. Negative for chest tightness.    Cardiovascular: Negative for chest pain.       Objective   Physical Exam   Constitutional: She appears well-developed and well-nourished.   HENT:   Tympanic membranes are normal.   Neck: Carotid bruit is not present. No thyromegaly present.   Cardiovascular: Normal rate, regular rhythm, normal heart sounds and intact distal pulses. Exam reveals no friction rub.   No murmur heard.  Pulmonary/Chest: Effort normal and breath sounds normal. No respiratory distress. She has no wheezes.   Abdominal: Soft. Bowel sounds are normal. She exhibits no distension and no mass. There is no tenderness. There is no guarding.   Musculoskeletal: She exhibits edema.   Nursing note  and vitals reviewed.        Assessment/Plan   Maribeth was seen today for diabetes and ear fullness.    Diagnoses and all orders for this visit:    DM type 2 with diabetic peripheral neuropathy (CMS/[facility])  -     Comprehensive Metabolic Panel; Future  -     Hemoglobin A1c; Future    Essential hypertension  -     CBC & Differential; Future    Hyperlipidemia, unspecified hyperlipidemia type  -     Lipid Panel; Future    Shortness of breath      Maribeth is here today for follow-up.  We are going to check on the status of her diabetes and cholesterol.  I suspect her shortness of breath is based on deconditioning.  Her  does report that she really does little of any type of exercise.  I advised her to start walking and increasing amount of distance each day.

## 2019-03-22 ENCOUNTER — HOSPITAL ENCOUNTER (OUTPATIENT)
Dept: INFUSION THERAPY | Facility: HOSPITAL | Age: 69
Discharge: HOME | End: 2019-03-22
Admitting: INTERNAL MEDICINE

## 2019-03-22 VITALS
RESPIRATION RATE: 16 BRPM | OXYGEN SATURATION: 96 % | DIASTOLIC BLOOD PRESSURE: 86 MMHG | HEART RATE: 99 BPM | SYSTOLIC BLOOD PRESSURE: 182 MMHG

## 2019-03-22 DIAGNOSIS — E11.42 DM TYPE 2 WITH DIABETIC PERIPHERAL NEUROPATHY (HCC): ICD-10-CM

## 2019-03-22 DIAGNOSIS — N18.5 ANEMIA IN STAGE 5 CHRONIC KIDNEY DISEASE, NOT ON CHRONIC DIALYSIS (HCC): Primary | ICD-10-CM

## 2019-03-22 DIAGNOSIS — D63.1 ANEMIA IN STAGE 5 CHRONIC KIDNEY DISEASE, NOT ON CHRONIC DIALYSIS (HCC): Primary | ICD-10-CM

## 2019-03-22 DIAGNOSIS — E78.5 HYPERLIPIDEMIA, UNSPECIFIED HYPERLIPIDEMIA TYPE: ICD-10-CM

## 2019-03-22 DIAGNOSIS — I10 ESSENTIAL HYPERTENSION: ICD-10-CM

## 2019-03-22 DIAGNOSIS — N18.4 CHRONIC KIDNEY DISEASE, STAGE IV (SEVERE) (HCC): ICD-10-CM

## 2019-03-22 LAB
ALBUMIN SERPL-MCNC: 3.5 G/DL (ref 3.5–5.2)
ALBUMIN/GLOB SERPL: 0.8 G/DL
ALP SERPL-CCNC: 77 U/L (ref 39–117)
ALT SERPL W P-5'-P-CCNC: 11 U/L (ref 1–33)
ANION GAP SERPL CALCULATED.3IONS-SCNC: 13.6 MMOL/L
AST SERPL-CCNC: 16 U/L (ref 1–32)
BASOPHILS # BLD AUTO: 0.07 10*3/MM3 (ref 0–0.2)
BASOPHILS NFR BLD AUTO: 0.8 % (ref 0–1.5)
BILIRUB SERPL-MCNC: 0.2 MG/DL (ref 0.2–1.2)
BUN BLD-MCNC: 43 MG/DL (ref 8–23)
BUN/CREAT SERPL: 13.2 (ref 7–25)
CALCIUM SPEC-SCNC: 8.8 MG/DL (ref 8.6–10.5)
CHLORIDE SERPL-SCNC: 106 MMOL/L (ref 98–107)
CHOLEST SERPL-MCNC: 149 MG/DL (ref 0–200)
CO2 SERPL-SCNC: 20.4 MMOL/L (ref 22–29)
CREAT BLD-MCNC: 3.25 MG/DL (ref 0.57–1)
DEPRECATED RDW RBC AUTO: 54.6 FL (ref 37–54)
EOSINOPHIL # BLD AUTO: 0.34 10*3/MM3 (ref 0–0.4)
EOSINOPHIL NFR BLD AUTO: 4.1 % (ref 0.3–6.2)
ERYTHROCYTE [DISTWIDTH] IN BLOOD BY AUTOMATED COUNT: 15.9 % (ref 12.3–15.4)
GFR SERPL CREATININE-BSD FRML MDRD: 14 ML/MIN/1.73
GFR SERPL CREATININE-BSD FRML MDRD: ABNORMAL ML/MIN/1.73
GLOBULIN UR ELPH-MCNC: 4.3 GM/DL
GLUCOSE BLD-MCNC: 290 MG/DL (ref 65–99)
HBA1C MFR BLD: 7.3 % (ref 4.8–5.6)
HCT VFR BLD AUTO: 33.5 % (ref 34–46.6)
HDLC SERPL-MCNC: 35 MG/DL (ref 40–60)
HGB BLD-MCNC: 10.4 G/DL (ref 12–15.9)
IMM GRANULOCYTES # BLD AUTO: 0.04 10*3/MM3 (ref 0–0.05)
IMM GRANULOCYTES NFR BLD AUTO: 0.5 % (ref 0–0.5)
LDLC SERPL CALC-MCNC: 80 MG/DL (ref 0–100)
LDLC/HDLC SERPL: 2.3 {RATIO}
LYMPHOCYTES # BLD AUTO: 1.4 10*3/MM3 (ref 0.7–3.1)
LYMPHOCYTES NFR BLD AUTO: 17 % (ref 19.6–45.3)
MCH RBC QN AUTO: 29.1 PG (ref 26.6–33)
MCHC RBC AUTO-ENTMCNC: 31 G/DL (ref 31.5–35.7)
MCV RBC AUTO: 93.6 FL (ref 79–97)
MONOCYTES # BLD AUTO: 0.61 10*3/MM3 (ref 0.1–0.9)
MONOCYTES NFR BLD AUTO: 7.4 % (ref 5–12)
NEUTROPHILS # BLD AUTO: 5.79 10*3/MM3 (ref 1.4–7)
NEUTROPHILS NFR BLD AUTO: 70.2 % (ref 42.7–76)
NRBC BLD AUTO-RTO: 0 /100 WBC (ref 0–0)
PLATELET # BLD AUTO: 220 10*3/MM3 (ref 140–450)
PMV BLD AUTO: 10 FL (ref 6–12)
POTASSIUM BLD-SCNC: 4.3 MMOL/L (ref 3.5–5.2)
PROT SERPL-MCNC: 7.8 G/DL (ref 6–8.5)
RBC # BLD AUTO: 3.58 10*6/MM3 (ref 3.77–5.28)
SODIUM BLD-SCNC: 140 MMOL/L (ref 136–145)
TRIGL SERPL-MCNC: 168 MG/DL (ref 0–150)
VLDLC SERPL-MCNC: 33.6 MG/DL (ref 5–40)
WBC NRBC COR # BLD: 8.25 10*3/MM3 (ref 3.4–10.8)

## 2019-03-22 PROCEDURE — 85025 COMPLETE CBC W/AUTO DIFF WBC: CPT | Performed by: INTERNAL MEDICINE

## 2019-03-22 PROCEDURE — 63510000001 EPOETIN ALFA PER 1000 UNITS: Performed by: INTERNAL MEDICINE

## 2019-03-22 PROCEDURE — 36415 COLL VENOUS BLD VENIPUNCTURE: CPT

## 2019-03-22 PROCEDURE — 80053 COMPREHEN METABOLIC PANEL: CPT | Performed by: INTERNAL MEDICINE

## 2019-03-22 PROCEDURE — 83036 HEMOGLOBIN GLYCOSYLATED A1C: CPT | Performed by: INTERNAL MEDICINE

## 2019-03-22 PROCEDURE — 96372 THER/PROPH/DIAG INJ SC/IM: CPT

## 2019-03-22 PROCEDURE — 80061 LIPID PANEL: CPT | Performed by: INTERNAL MEDICINE

## 2019-03-22 RX ADMIN — ERYTHROPOIETIN 20000 UNITS: 20000 INJECTION, SOLUTION INTRAVENOUS; SUBCUTANEOUS at 13:34

## 2019-04-05 ENCOUNTER — HOSPITAL ENCOUNTER (OUTPATIENT)
Dept: INFUSION THERAPY | Facility: HOSPITAL | Age: 69
Discharge: HOME | End: 2019-04-05
Admitting: INTERNAL MEDICINE

## 2019-04-05 VITALS
SYSTOLIC BLOOD PRESSURE: 167 MMHG | RESPIRATION RATE: 20 BRPM | OXYGEN SATURATION: 97 % | HEART RATE: 94 BPM | DIASTOLIC BLOOD PRESSURE: 78 MMHG | TEMPERATURE: 98.1 F

## 2019-04-05 DIAGNOSIS — N18.4 CHRONIC KIDNEY DISEASE, STAGE IV (SEVERE) (HCC): ICD-10-CM

## 2019-04-05 DIAGNOSIS — N18.5 ANEMIA IN STAGE 5 CHRONIC KIDNEY DISEASE, NOT ON CHRONIC DIALYSIS (HCC): Primary | ICD-10-CM

## 2019-04-05 DIAGNOSIS — D63.1 ANEMIA IN STAGE 5 CHRONIC KIDNEY DISEASE, NOT ON CHRONIC DIALYSIS (HCC): Primary | ICD-10-CM

## 2019-04-05 LAB
HCT VFR BLD AUTO: 36.5 % (ref 34–46.6)
HGB BLD-MCNC: 11.2 G/DL (ref 12–15.9)

## 2019-04-05 PROCEDURE — G0463 HOSPITAL OUTPT CLINIC VISIT: HCPCS

## 2019-04-05 PROCEDURE — 85014 HEMATOCRIT: CPT | Performed by: INTERNAL MEDICINE

## 2019-04-05 PROCEDURE — 85018 HEMOGLOBIN: CPT | Performed by: INTERNAL MEDICINE

## 2019-04-05 PROCEDURE — 36415 COLL VENOUS BLD VENIPUNCTURE: CPT

## 2019-04-08 ENCOUNTER — OFFICE VISIT (OUTPATIENT)
Dept: FAMILY MEDICINE CLINIC | Facility: CLINIC | Age: 69
End: 2019-04-08

## 2019-04-08 VITALS
DIASTOLIC BLOOD PRESSURE: 80 MMHG | WEIGHT: 185 LBS | HEART RATE: 107 BPM | TEMPERATURE: 98.3 F | OXYGEN SATURATION: 97 % | SYSTOLIC BLOOD PRESSURE: 150 MMHG | BODY MASS INDEX: 29.73 KG/M2 | HEIGHT: 66 IN

## 2019-04-08 DIAGNOSIS — J06.9 URI WITH COUGH AND CONGESTION: Primary | ICD-10-CM

## 2019-04-08 PROCEDURE — 99214 OFFICE O/P EST MOD 30 MIN: CPT | Performed by: INTERNAL MEDICINE

## 2019-04-08 RX ORDER — DOXYCYCLINE HYCLATE 100 MG/1
100 CAPSULE ORAL 2 TIMES DAILY
Qty: 14 CAPSULE | Refills: 0 | Status: SHIPPED | OUTPATIENT
Start: 2019-04-08 | End: 2019-05-03

## 2019-04-08 RX ORDER — BENZONATATE 100 MG/1
100 CAPSULE ORAL 3 TIMES DAILY
Qty: 30 CAPSULE | Refills: 1 | Status: SHIPPED | OUTPATIENT
Start: 2019-04-08 | End: 2019-05-17

## 2019-04-08 NOTE — PROGRESS NOTES
Subjective Chief complaint is cough and congestion  Maribeth Rendon is a 68 y.o. female.     History of Present Illness   Maribeth is here today for moderate to severe cough beginning approximately a week ago.  It is somewhat paroxysmal in nature.  It is keeping her awake at night because it is worse with recumbency.  She has not had any fever or chills with it.  She is not necessarily any more short of breath than usual.  She has not taken any medication because she did not know what she could take with her chronic kidney disease.  The following portions of the patient's history were reviewed and updated as appropriate: allergies, current medications, past family history, past medical history, past social history, past surgical history and problem list.    Review of Systems   Constitutional: Negative for chills and fever.   HENT: Positive for congestion and postnasal drip.    Respiratory: Positive for cough. Negative for shortness of breath.        Objective   Physical Exam   Constitutional: She appears well-developed and well-nourished.   HENT:   Tympanic membranes are normal.  There is bilateral nasal congestion but no significant erythema.  Oropharynx is clear.   Cardiovascular: Normal rate, regular rhythm and normal heart sounds.   Pulmonary/Chest: Effort normal.   She has some scattered upper airway rhonchi which clear with coughing   Nursing note and vitals reviewed.        Assessment/Plan   Maribeth was seen today for cough, nasal congestion and uri.    Diagnoses and all orders for this visit:    URI with cough and congestion    Other orders  -     benzonatate (TESSALON) 100 MG capsule; Take 1 capsule by mouth 3 (Three) Times a Day.  -     doxycycline (VIBRAMYCIN) 100 MG capsule; Take 1 capsule by mouth 2 (Two) Times a Day.      Maribeth is here today for cough.  I am going to treat her with some Tessalon Perles to help with the cough.  Hopefully she will be able to sleep through the night with this.  We will also  give her some doxycycline.  If her symptoms do not improve we will do a chest x-ray.

## 2019-04-19 ENCOUNTER — HOSPITAL ENCOUNTER (OUTPATIENT)
Dept: INFUSION THERAPY | Facility: HOSPITAL | Age: 69
Discharge: HOME | End: 2019-04-19
Admitting: INTERNAL MEDICINE

## 2019-04-19 VITALS
HEART RATE: 95 BPM | SYSTOLIC BLOOD PRESSURE: 180 MMHG | DIASTOLIC BLOOD PRESSURE: 81 MMHG | RESPIRATION RATE: 20 BRPM | OXYGEN SATURATION: 97 % | TEMPERATURE: 97.6 F

## 2019-04-19 DIAGNOSIS — N18.4 CHRONIC KIDNEY DISEASE, STAGE IV (SEVERE) (HCC): ICD-10-CM

## 2019-04-19 DIAGNOSIS — N18.5 ANEMIA IN STAGE 5 CHRONIC KIDNEY DISEASE, NOT ON CHRONIC DIALYSIS (HCC): Primary | ICD-10-CM

## 2019-04-19 DIAGNOSIS — D63.1 ANEMIA IN STAGE 5 CHRONIC KIDNEY DISEASE, NOT ON CHRONIC DIALYSIS (HCC): Primary | ICD-10-CM

## 2019-04-19 LAB
ANION GAP SERPL CALCULATED.3IONS-SCNC: 16.9 MMOL/L
BUN BLD-MCNC: 58 MG/DL (ref 8–23)
BUN/CREAT SERPL: 16.5 (ref 7–25)
CALCIUM SPEC-SCNC: 9.4 MG/DL (ref 8.6–10.5)
CHLORIDE SERPL-SCNC: 101 MMOL/L (ref 98–107)
CO2 SERPL-SCNC: 20.1 MMOL/L (ref 22–29)
CREAT BLD-MCNC: 3.52 MG/DL (ref 0.57–1)
GFR SERPL CREATININE-BSD FRML MDRD: 13 ML/MIN/1.73
GFR SERPL CREATININE-BSD FRML MDRD: ABNORMAL ML/MIN/1.73
GLUCOSE BLD-MCNC: 319 MG/DL (ref 65–99)
HCT VFR BLD AUTO: 35.5 % (ref 34–46.6)
HGB BLD-MCNC: 10.8 G/DL (ref 12–15.9)
MAGNESIUM SERPL-MCNC: 1.8 MG/DL (ref 1.6–2.4)
POTASSIUM BLD-SCNC: 4.1 MMOL/L (ref 3.5–5.2)
SODIUM BLD-SCNC: 138 MMOL/L (ref 136–145)

## 2019-04-19 PROCEDURE — 85014 HEMATOCRIT: CPT | Performed by: INTERNAL MEDICINE

## 2019-04-19 PROCEDURE — 36415 COLL VENOUS BLD VENIPUNCTURE: CPT

## 2019-04-19 PROCEDURE — 96372 THER/PROPH/DIAG INJ SC/IM: CPT

## 2019-04-19 PROCEDURE — 63510000001 EPOETIN ALFA PER 1000 UNITS: Performed by: INTERNAL MEDICINE

## 2019-04-19 PROCEDURE — 83735 ASSAY OF MAGNESIUM: CPT | Performed by: INTERNAL MEDICINE

## 2019-04-19 PROCEDURE — 85018 HEMOGLOBIN: CPT | Performed by: INTERNAL MEDICINE

## 2019-04-19 PROCEDURE — 80048 BASIC METABOLIC PNL TOTAL CA: CPT | Performed by: INTERNAL MEDICINE

## 2019-04-19 RX ADMIN — ERYTHROPOIETIN 20000 UNITS: 20000 INJECTION, SOLUTION INTRAVENOUS; SUBCUTANEOUS at 14:18

## 2019-04-19 NOTE — PROGRESS NOTES
Procrit given per order due to parameters met.  Additional labs drawn and results faxed to Dr Mcclain.  Pt given AVS and dc'ed ambulatory with spouse.

## 2019-05-03 ENCOUNTER — HOSPITAL ENCOUNTER (OUTPATIENT)
Dept: INFUSION THERAPY | Facility: HOSPITAL | Age: 69
Discharge: HOME | End: 2019-05-03
Admitting: INTERNAL MEDICINE

## 2019-05-03 ENCOUNTER — HOSPITAL ENCOUNTER (EMERGENCY)
Facility: HOSPITAL | Age: 69
Discharge: HOME | End: 2019-05-03
Attending: EMERGENCY MEDICINE | Admitting: EMERGENCY MEDICINE

## 2019-05-03 VITALS
WEIGHT: 189.9 LBS | SYSTOLIC BLOOD PRESSURE: 121 MMHG | HEIGHT: 64 IN | BODY MASS INDEX: 32.42 KG/M2 | RESPIRATION RATE: 17 BRPM | HEART RATE: 108 BPM | TEMPERATURE: 98.8 F | OXYGEN SATURATION: 97 % | DIASTOLIC BLOOD PRESSURE: 92 MMHG

## 2019-05-03 VITALS
SYSTOLIC BLOOD PRESSURE: 144 MMHG | HEART RATE: 140 BPM | RESPIRATION RATE: 20 BRPM | TEMPERATURE: 98.4 F | DIASTOLIC BLOOD PRESSURE: 75 MMHG | OXYGEN SATURATION: 97 %

## 2019-05-03 DIAGNOSIS — N18.4 CHRONIC KIDNEY DISEASE, STAGE IV (SEVERE) (HCC): ICD-10-CM

## 2019-05-03 DIAGNOSIS — N18.5 ANEMIA IN STAGE 5 CHRONIC KIDNEY DISEASE, NOT ON CHRONIC DIALYSIS (HCC): Primary | ICD-10-CM

## 2019-05-03 DIAGNOSIS — I48.3 TYPICAL ATRIAL FLUTTER (HCC): Primary | ICD-10-CM

## 2019-05-03 DIAGNOSIS — D63.1 ANEMIA IN STAGE 5 CHRONIC KIDNEY DISEASE, NOT ON CHRONIC DIALYSIS (HCC): Primary | ICD-10-CM

## 2019-05-03 DIAGNOSIS — N18.9 CHRONIC KIDNEY DISEASE, UNSPECIFIED CKD STAGE: ICD-10-CM

## 2019-05-03 LAB
ANION GAP SERPL CALCULATED.3IONS-SCNC: 11.7 MMOL/L
BASOPHILS # BLD AUTO: 0.05 10*3/MM3 (ref 0–0.2)
BASOPHILS NFR BLD AUTO: 0.5 % (ref 0–1.5)
BUN BLD-MCNC: 46 MG/DL (ref 8–23)
BUN/CREAT SERPL: 13.9 (ref 7–25)
CALCIUM SPEC-SCNC: 8.2 MG/DL (ref 8.6–10.5)
CHLORIDE SERPL-SCNC: 102 MMOL/L (ref 98–107)
CO2 SERPL-SCNC: 23.3 MMOL/L (ref 22–29)
CREAT BLD-MCNC: 3.3 MG/DL (ref 0.57–1)
DEPRECATED RDW RBC AUTO: 53.2 FL (ref 37–54)
EOSINOPHIL # BLD AUTO: 0.42 10*3/MM3 (ref 0–0.4)
EOSINOPHIL NFR BLD AUTO: 4 % (ref 0.3–6.2)
ERYTHROCYTE [DISTWIDTH] IN BLOOD BY AUTOMATED COUNT: 15.7 % (ref 12.3–15.4)
GFR SERPL CREATININE-BSD FRML MDRD: 14 ML/MIN/1.73
GFR SERPL CREATININE-BSD FRML MDRD: ABNORMAL ML/MIN/1.73
GLUCOSE BLD-MCNC: 292 MG/DL (ref 65–99)
HCT VFR BLD AUTO: 34.6 % (ref 34–46.6)
HCT VFR BLD AUTO: 37.1 % (ref 34–46.6)
HGB BLD-MCNC: 10.4 G/DL (ref 12–15.9)
HGB BLD-MCNC: 11.2 G/DL (ref 12–15.9)
HOLD SPECIMEN: NORMAL
HOLD SPECIMEN: NORMAL
IMM GRANULOCYTES # BLD AUTO: 0.07 10*3/MM3 (ref 0–0.05)
IMM GRANULOCYTES NFR BLD AUTO: 0.7 % (ref 0–0.5)
LYMPHOCYTES # BLD AUTO: 1.3 10*3/MM3 (ref 0.7–3.1)
LYMPHOCYTES NFR BLD AUTO: 12.3 % (ref 19.6–45.3)
MAGNESIUM SERPL-MCNC: 1.6 MG/DL (ref 1.6–2.4)
MCH RBC QN AUTO: 28 PG (ref 26.6–33)
MCHC RBC AUTO-ENTMCNC: 30.2 G/DL (ref 31.5–35.7)
MCV RBC AUTO: 92.8 FL (ref 79–97)
MONOCYTES # BLD AUTO: 0.83 10*3/MM3 (ref 0.1–0.9)
MONOCYTES NFR BLD AUTO: 7.8 % (ref 5–12)
NEUTROPHILS # BLD AUTO: 7.93 10*3/MM3 (ref 1.7–7)
NEUTROPHILS NFR BLD AUTO: 74.7 % (ref 42.7–76)
NRBC BLD AUTO-RTO: 0 /100 WBC (ref 0–0.2)
PLATELET # BLD AUTO: 241 10*3/MM3 (ref 140–450)
PMV BLD AUTO: 11.2 FL (ref 6–12)
POTASSIUM BLD-SCNC: 4.6 MMOL/L (ref 3.5–5.2)
RBC # BLD AUTO: 4 10*6/MM3 (ref 3.77–5.28)
SODIUM BLD-SCNC: 137 MMOL/L (ref 136–145)
T4 FREE SERPL-MCNC: 1.11 NG/DL (ref 0.93–1.7)
TSH SERPL DL<=0.05 MIU/L-ACNC: 3.23 MIU/ML (ref 0.27–4.2)
WBC NRBC COR # BLD: 10.6 10*3/MM3 (ref 3.4–10.8)
WHOLE BLOOD HOLD SPECIMEN: NORMAL
WHOLE BLOOD HOLD SPECIMEN: NORMAL

## 2019-05-03 PROCEDURE — 84439 ASSAY OF FREE THYROXINE: CPT | Performed by: EMERGENCY MEDICINE

## 2019-05-03 PROCEDURE — 96372 THER/PROPH/DIAG INJ SC/IM: CPT

## 2019-05-03 PROCEDURE — 25010000002 EPOETIN ALFA PER 1000 UNITS: Performed by: INTERNAL MEDICINE

## 2019-05-03 PROCEDURE — 84443 ASSAY THYROID STIM HORMONE: CPT | Performed by: EMERGENCY MEDICINE

## 2019-05-03 PROCEDURE — 85018 HEMOGLOBIN: CPT | Performed by: INTERNAL MEDICINE

## 2019-05-03 PROCEDURE — 85014 HEMATOCRIT: CPT | Performed by: INTERNAL MEDICINE

## 2019-05-03 PROCEDURE — 99285 EMERGENCY DEPT VISIT HI MDM: CPT

## 2019-05-03 PROCEDURE — 96375 TX/PRO/DX INJ NEW DRUG ADDON: CPT

## 2019-05-03 PROCEDURE — 96374 THER/PROPH/DIAG INJ IV PUSH: CPT

## 2019-05-03 PROCEDURE — 25010000002 ADENOSINE PER 6 MG: Performed by: EMERGENCY MEDICINE

## 2019-05-03 PROCEDURE — 63510000001 EPOETIN ALFA PER 1000 UNITS: Performed by: INTERNAL MEDICINE

## 2019-05-03 PROCEDURE — 85025 COMPLETE CBC W/AUTO DIFF WBC: CPT | Performed by: EMERGENCY MEDICINE

## 2019-05-03 PROCEDURE — 93005 ELECTROCARDIOGRAM TRACING: CPT | Performed by: EMERGENCY MEDICINE

## 2019-05-03 PROCEDURE — 36415 COLL VENOUS BLD VENIPUNCTURE: CPT

## 2019-05-03 PROCEDURE — 93010 ELECTROCARDIOGRAM REPORT: CPT | Performed by: INTERNAL MEDICINE

## 2019-05-03 PROCEDURE — 80048 BASIC METABOLIC PNL TOTAL CA: CPT | Performed by: EMERGENCY MEDICINE

## 2019-05-03 PROCEDURE — 83735 ASSAY OF MAGNESIUM: CPT | Performed by: EMERGENCY MEDICINE

## 2019-05-03 PROCEDURE — 93005 ELECTROCARDIOGRAM TRACING: CPT

## 2019-05-03 RX ORDER — ADENOSINE 3 MG/ML
12 INJECTION, SOLUTION INTRAVENOUS ONCE
Status: COMPLETED | OUTPATIENT
Start: 2019-05-03 | End: 2019-05-03

## 2019-05-03 RX ORDER — SODIUM CHLORIDE 0.9 % (FLUSH) 0.9 %
10 SYRINGE (ML) INJECTION AS NEEDED
Status: DISCONTINUED | OUTPATIENT
Start: 2019-05-03 | End: 2019-05-03 | Stop reason: HOSPADM

## 2019-05-03 RX ADMIN — ERYTHROPOIETIN 20000 UNITS: 20000 INJECTION, SOLUTION INTRAVENOUS; SUBCUTANEOUS at 13:44

## 2019-05-03 RX ADMIN — METOPROLOL TARTRATE 25 MG: 25 TABLET ORAL at 14:49

## 2019-05-03 RX ADMIN — METOPROLOL TARTRATE 5 MG: 1 INJECTION, SOLUTION INTRAVENOUS at 14:50

## 2019-05-03 RX ADMIN — ADENOSINE 12 MG: 3 INJECTION, SOLUTION INTRAVENOUS at 14:26

## 2019-05-03 NOTE — PROGRESS NOTES
[id number]-paged Dr. Mcclain, call received from Dr. Watson. Per Dr. Watson send patient to the Emergency room to be evaluated for increased heart rate. Patient denies any other symptoms. Patient taken to the Emergency room per wheelchair at 1350 with . Report given to triage nurse.

## 2019-05-03 NOTE — ED NOTES
[facility] Pharmacist at bedside for pt education on blood thinner.      Natividad Hamilton, RN  05/03/19 0304

## 2019-05-03 NOTE — ED PROVIDER NOTES
" EMERGENCY DEPARTMENT ENCOUNTER    Room Number:  21/21  Date seen:  5/3/2019  Time seen: 2:12 PM  PCP: Robby Chaney MD  Historian: Patient       HPI:  Chief Complaint: Palpitations   Context: Maribeth Rendon is a 68 y.o. female who presents to the ED c/o \"heart racing\" palpitations that were noted this morning when presenting to office for follow up about CKD. Pt denies any other symptoms. Pt states she has 1 prior episode of rapid heart rate associated with sepsis.     Quality: palpitations   Intensity/Severity: mild   Duration: several hours   Onset quality: gradual   Timing: constant   Progression: unchanged   Aggravating Factors: none   Alleviating Factors: none   Previous Episodes: Pt states she has 1 prior episode   Treatment before arrival: none   Associated Symptoms: none    PAST MEDICAL HISTORY  Active Ambulatory Problems     Diagnosis Date Noted   • Menstrual disorder 01/27/2016   • Atopic rhinitis 01/27/2016   • Anemia in CKD (chronic kidney disease) 01/27/2016   • Spasm of cervical paraspinous muscle 01/27/2016   • Cervical radiculopathy 01/27/2016   • Chronic kidney disease, stage IV (severe) (CMS/[facility]) 01/27/2016   • Depression 01/27/2016   • DM type 2 with diabetic peripheral neuropathy (CMS/[facility]) 01/27/2016   • Essential hypertension 01/27/2016   • Duplay's periarthritis syndrome 01/27/2016   • Gastroesophageal reflux disease 01/27/2016   • Hyperlipidemia 01/27/2016   • Hypertension 01/27/2016   • Arthritis 01/27/2016   • Seizure disorder (CMS/[facility]) 01/27/2016   • Phlebitis 01/27/2016   • Type 2 diabetes mellitus (CMS/[facility]) 01/27/2016   • Vitamin D deficiency 01/27/2016   • Chest pain 01/27/2016   • Abscess 03/17/2016   • Generalized muscle weakness 03/17/2016   • Abdominal wall cellulitis 03/18/2016   • HTN (hypertension) 03/18/2016   • CKD (chronic kidney disease) stage 4, GFR 15-29 ml/min (CMS/[facility]) 03/18/2016   • Diabetes mellitus with peripheral autonomic neuropathy (CMS/[facility]) 03/18/2016   • " Hyponatremia 03/18/2016   • Hypercalcemia 03/18/2016   • Dehydration 03/18/2016   • DACIA (acute kidney injury) (CMS/HCC) 03/19/2016   • Abdominal wall abscess 03/23/2016   • Cellulitis of toe of left foot 09/22/2017   • Partial Achilles tendon tear, left, initial encounter 11/08/2017   • Arthritis of foot 01/18/2018     Resolved Ambulatory Problems     Diagnosis Date Noted   • Community acquired pneumonia 01/27/2016   • Diabetes mellitus (CMS/HCC) 03/18/2016     Past Medical History:   Diagnosis Date   • Achilles tendon tear    • Anemia    • Anxiety    • Depression    • Diabetes mellitus, type 2 (CMS/HCC)    • ESRD (end stage renal disease) (CMS/HCC)    • GERD (gastroesophageal reflux disease)    • History of MRSA infection 03/15/2016   • Hyperlipidemia    • Hypertension    • Hypokalemia    • Hypomagnesemia    • Hypoxic 01/2016   • Intertrigo    • Leukocytosis    • Osteoarthritis    • Pneumonia 01/2016   • Psoriasis    • Psoriatic arthritis (CMS/HCC)    • Seizures (CMS/HCC)    • Sepsis (CMS/HCC) 01/2016   • SOB (shortness of breath)    • Stage 4 chronic renal impairment associated with type 2 diabetes mellitus (CMS/HCC)    • Staph infection    • Streptococcal bacteremia    • Swelling of hand 10/27/2016   • Tremor, essential    • Wears glasses          PAST SURGICAL HISTORY  Past Surgical History:   Procedure Laterality Date   • ABDOMINAL WALL ABSCESS INCISION AND DRAINAGE  2016   • ARTERIOVENOUS FISTULA/SHUNT SURGERY Left 10/27/2016    Procedure: LT FOREARM FISTULA;  Surgeon: Lorelei Haque Jr., MD;  Location: [facility] OR;  Service:    • ARTERIOVENOUS FISTULA/SHUNT SURGERY Left 2/16/2017    Procedure: LT BRACHIAL CEPHALIC WITH FISTULA LIGATION RADIAL CEPHALIC.;  Surgeon: Gurmeet Carver MD;  Location: [facility] OR;  Service:    • CATARACT EXTRACTION W/ INTRAOCULAR LENS IMPLANT Bilateral 2011   • DILATATION AND CURETTAGE  1991   • EXCISION MASS TRUNK N/A 3/22/2016    Procedure: I&D LOWER ABDOMINAL  WOUND;   Surgeon: Tru Yi MD;  Location: [facility];  Service:    • FOOT SURGERY Right     REMOVED BONE IN RIGHT GREAT TOE   • HYSTERECTOMY           FAMILY HISTORY  Family History   Problem Relation Age of Onset   • Heart attack Mother    • Heart disease Mother    • Kidney disease Mother    • Heart attack Father    • Heart disease Father    • Hypertension Father    • Stroke Father         ISCHEMIC   • Diabetes Father         TYPE 2   • Kidney disease Brother    • Diabetes Brother         TYPE 1   • Thyroid disease Daughter    • Anxiety disorder Maternal Aunt    • Bipolar disorder Maternal Aunt    • Depression Maternal Aunt    • Lupus Maternal Aunt         LUPUS ANTICOAGLULANT   • Rheum arthritis Maternal Aunt    • Alcohol abuse Maternal Uncle    • Other Maternal Uncle         PULMONARY DISEASE         SOCIAL HISTORY  Social History     Socioeconomic History   • Marital status:      Spouse name: Not on file   • Number of children: Not on file   • Years of education: Not on file   • Highest education level: Not on file   Tobacco Use   • Smoking status: Former Smoker     Packs/day: 2.00     Years: 20.00     Pack years: 40.00     Types: Cigarettes     Last attempt to quit: 10/21/1992     Years since quittin.5   • Smokeless tobacco: Never Used   • Tobacco comment: quit    Substance and Sexual Activity   • Alcohol use: No   • Drug use: No   • Sexual activity: Defer         ALLERGIES  Prednisone        REVIEW OF SYSTEMS  Review of Systems   Constitutional: Negative for fever.   HENT: Negative for sore throat.    Respiratory: Negative for shortness of breath.    Cardiovascular: Positive for palpitations. Negative for chest pain.   Gastrointestinal: Negative for abdominal pain.   Endocrine: Negative for polyuria.   Genitourinary: Negative for dysuria.   Musculoskeletal: Negative for neck pain.   Skin: Negative for rash.   Neurological: Negative for headaches.   All other systems reviewed and are  negative.           PHYSICAL EXAM  ED Triage Vitals [05/03/19 1400]   Temp Heart Rate Resp BP SpO2   98.8 °F (37.1 °C) (!) 142 18 149/91 95 %      Temp src Heart Rate Source Patient Position BP Location FiO2 (%)   Tympanic -- Sitting Right leg --         GENERAL: not distressed  HENT: nares patent  EYES: no scleral icterus  CV: irregular, tachycardic   RESPIRATORY: normal effort  ABDOMEN: soft, nontender  MUSCULOSKELETAL: no deformity  NEURO: alert, PASCUAL, FC  SKIN: warm, dry    Vital signs and nursing notes reviewed.          LAB RESULTS  Recent Results (from the past 24 hour(s))   Hemoglobin & Hematocrit, Blood    Collection Time: 05/03/19  1:01 PM   Result Value Ref Range    Hemoglobin 10.4 (L) 12.0 - 15.9 g/dL    Hematocrit 34.6 34.0 - 46.6 %   Basic Metabolic Panel    Collection Time: 05/03/19  2:18 PM   Result Value Ref Range    Glucose 292 (H) 65 - 99 mg/dL    BUN 46 (H) 8 - 23 mg/dL    Creatinine 3.30 (H) 0.57 - 1.00 mg/dL    Sodium 137 136 - 145 mmol/L    Potassium 4.6 3.5 - 5.2 mmol/L    Chloride 102 98 - 107 mmol/L    CO2 23.3 22.0 - 29.0 mmol/L    Calcium 8.2 (L) 8.6 - 10.5 mg/dL    eGFR  African Amer  >60 mL/min/1.73    eGFR Non African Amer 14 (L) >60 mL/min/1.73    BUN/Creatinine Ratio 13.9 7.0 - 25.0    Anion Gap 11.7 mmol/L   Magnesium    Collection Time: 05/03/19  2:18 PM   Result Value Ref Range    Magnesium 1.6 1.6 - 2.4 mg/dL   TSH    Collection Time: 05/03/19  2:18 PM   Result Value Ref Range    TSH 3.230 0.270 - 4.200 mIU/mL   T4, Free    Collection Time: 05/03/19  2:18 PM   Result Value Ref Range    Free T4 1.11 0.93 - 1.70 ng/dL   CBC Auto Differential    Collection Time: 05/03/19  2:18 PM   Result Value Ref Range    WBC 10.60 3.40 - 10.80 10*3/mm3    RBC 4.00 3.77 - 5.28 10*6/mm3    Hemoglobin 11.2 (L) 12.0 - 15.9 g/dL    Hematocrit 37.1 34.0 - 46.6 %    MCV 92.8 79.0 - 97.0 fL    MCH 28.0 26.6 - 33.0 pg    MCHC 30.2 (L) 31.5 - 35.7 g/dL    RDW 15.7 (H) 12.3 - 15.4 %    RDW-SD 53.2 37.0 -  54.0 fl    MPV 11.2 6.0 - 12.0 fL    Platelets 241 140 - 450 10*3/mm3    Neutrophil % 74.7 42.7 - 76.0 %    Lymphocyte % 12.3 (L) 19.6 - 45.3 %    Monocyte % 7.8 5.0 - 12.0 %    Eosinophil % 4.0 0.3 - 6.2 %    Basophil % 0.5 0.0 - 1.5 %    Immature Grans % 0.7 (H) 0.0 - 0.5 %    Neutrophils, Absolute 7.93 (H) 1.70 - 7.00 10*3/mm3    Lymphocytes, Absolute 1.30 0.70 - 3.10 10*3/mm3    Monocytes, Absolute 0.83 0.10 - 0.90 10*3/mm3    Eosinophils, Absolute 0.42 (H) 0.00 - 0.40 10*3/mm3    Basophils, Absolute 0.05 0.00 - 0.20 10*3/mm3    Immature Grans, Absolute 0.07 (H) 0.00 - 0.05 10*3/mm3    nRBC 0.0 0.0 - 0.2 /100 WBC       Ordered the above labs and reviewed the results.          PROCEDURES  Chemical Cardioversion  Date/Time: 5/3/2019 6:26 PM  Performed by: Sabas Soto II, MD  Authorized by: Sabas Soto II, MD     Consent:     Consent obtained:  Verbal    Consent given by:  Patient    Risks discussed:  Death, induced arrhythmia and pain    Alternatives discussed:  Electrical cardioversion, no treatment and alternative treatment  Pre-procedure details:     Cardioversion basis:  Emergent    Pre-procedure rhythm: SVT vs atrial flutter.  Attempt one:    Cardioversion medication:  Adenosine      Cardioversion outcome attempt one: flutter waves became visible.  Post-procedure details:     Patient tolerance of procedure:  Tolerated well, no immediate complications            EKG:           EKG time: 1404  Rhythm/Rate:   P waves and MN: absent   QRS, axis: LAD   ST and T waves: nml     Interpreted Contemporaneously by me, independently viewed  Changed compared to prior 12/14/18 - sinus at the time              MEDICATIONS GIVEN IN ER  Medications   sodium chloride 0.9 % flush 10 mL (not administered)   metoprolol tartrate (LOPRESSOR) injection 5 mg (5 mg Intravenous Given 5/3/19 4270)   adenosine (ADENOCARD) injection 12 mg (12 mg Intravenous Given 5/3/19 4114)   metoprolol tartrate (LOPRESSOR)  tablet 25 mg (25 mg Oral Given 5/3/19 1449)                   PROGRESS AND CONSULTS     418: Upon pt exam, discussed with pt the evidence of SVT with plan for resolve in ED. Discussed plan to attempt vasovagal maneuver and if unsuccessful the plan to manage with medication.     1415: Adenosine ordered for cardioversion.     1420: Attempted vasovagal maneuver unsuccessfully, HR still in 140s. Discussed plan for adenosine. Informed pt of plan with medication and risks versus benefits of chemical cardioversion. Pt attached to monitor again.     1426: 12mg Adenosine given, HR converted to 78 briefly and then went to 140 again. Rhythm strip reviewed and pt has underlying atrial flutter. Discussed with pt the plan for further medication in ED to decrease HR, with plan to call Cardiology for further plan for care as well. A-flutter discussed at length with pt and possibility of starting pt on anticoagulation as well.     1433: Lopressor ordered for HR management.     1507: Pt's HR is in the 90s. Call placed to Cardiology.     1510: DIscussed pt's case with RN on call with Dr. Hodges who agreed with plan for 25mg Metoprolol BID and Eliquis upon discharge. Directed pt for outpatient follow up.     1521: Pt rechecked and resting comfortably, HR at 100. Discussed with pt the plan for discharge with Metoprolol and Eliquis for outpatient cardiology follow up. Pt given falls risks due to anticoagulants, instructed to come to ED pt hits head upon fall. Pt understands and agrees with plan, all questions addressed.      MEDICAL DECISION MAKING      MDM  Number of Diagnoses or Management Options  Chronic kidney disease, unspecified CKD stage:   Typical atrial flutter (CMS/HCC):   Diagnosis management comments: Patient presents with tachycardia.  I saw no discernible P waves on her EKG and therefore was suspicious for SVT despite having a slower rate than would be normally expected.  Also the differential includes atrial  flutter, sinus tach.  Adenosine was given which brought out classic flutter waves.  Patient was then given IV metoprolol as well as p.o. metoprolol.  Her heart rate has improved to the 90s.  She feels well.  I discussed the case with cardiology. Discharge home.        Amount and/or Complexity of Data Reviewed  Clinical lab tests: ordered and reviewed (BUN - 46  Creatinine - 3.30)  Tests in the medicine section of CPT®: ordered and reviewed (See note)  Discuss the patient with other providers: yes (RN - on call with Dr. Hodges (cardiology))               DIAGNOSIS  Final diagnoses:   Typical atrial flutter (CMS/[facility])   Chronic kidney disease, unspecified CKD stage         DISPOSITION  DISCHARGE    Patient discharged in stable condition.    Reviewed implications of results, diagnosis, meds, responsibility to follow up, warning signs and symptoms of possible worsening, potential complications and reasons to return to ER.    Patient/Family voiced understanding of above instructions.    Discussed plan for discharge, as there is no emergent indication for admission. Patient referred to primary care provider for BP management due to today's BP. Pt/family is agreeable and understands need for follow up and repeat testing.  Pt is aware that discharge does not mean that nothing is wrong but it indicates no emergency is present that requires admission and they must continue care with follow-up as given below or physician of their choice.     FOLLOW-UP  Robby Chaney MD  [address] [id number] [phone]    Schedule an appointment as soon as possible for a visit   As needed    Eddie Hodges MD  [address] [id number] [phone]    Schedule an appointment as soon as possible for a visit in 1 month  for follow up of atrial flutter         Medication List      New Prescriptions    apixaban 5 MG tablet tablet  Commonly known as:  ELIQUIS  Take 1 tablet by mouth 2 (Two)  Times a Day for 30 days.     metoprolol tartrate 25 MG tablet  Commonly known as:  LOPRESSOR  Take 1 tablet by mouth 2 (Two) Times a Day for 30 days.                      Latest Documented Vital Signs:  As of 3:23 PM  BP- 121/92 HR- 110 Temp- 98.8 °F (37.1 °C) (Tympanic) O2 sat- 96%        --  Documentation assistance provided by leona Jameson for Dr. Charles MD.  Information recorded by the scribe was done at my direction and has been verified and validated by me.             Quin Jameson  05/03/19 1529       Sabas Soto II, MD  05/03/19 [id number]

## 2019-05-03 NOTE — ED NOTES
Pts underlying heart rhythm atrial flutter per dr brannon. pts HR returned to 140bpm     Natividad Hamilton, RN  05/03/19 [id number]

## 2019-05-03 NOTE — PROGRESS NOTES
Clinical Pharmacy Services: Anticoagulation Discharge Counseling    Maribeth Rendon is a 68 y.o. female who was diagnosed in the ER with atrial flutter.     He/she is being discharged on eliquis as a new medication. I counseled the patient about this medication, including the following information:  · Indication  · Dose and frequency  · Administration instructions  · Drug interactions  · Side effects  · Monitoring for signs and symptoms of bleeding    Patient/family verbalized good understanding of information and were able to repeat hatch points back to me. Patient was also provided with a co-pay card to help with cost of medication. Patient provided opportunity to ask questions.    Please call if questions.    Noni Sneed, [facility]

## 2019-05-17 ENCOUNTER — HOSPITAL ENCOUNTER (OUTPATIENT)
Dept: INFUSION THERAPY | Facility: HOSPITAL | Age: 69
Discharge: HOME | End: 2019-05-17
Admitting: INTERNAL MEDICINE

## 2019-05-17 VITALS
HEART RATE: 93 BPM | SYSTOLIC BLOOD PRESSURE: 142 MMHG | OXYGEN SATURATION: 91 % | DIASTOLIC BLOOD PRESSURE: 69 MMHG | TEMPERATURE: 98.1 F | RESPIRATION RATE: 16 BRPM

## 2019-05-17 DIAGNOSIS — N18.5 ANEMIA IN STAGE 5 CHRONIC KIDNEY DISEASE, NOT ON CHRONIC DIALYSIS (HCC): Primary | ICD-10-CM

## 2019-05-17 DIAGNOSIS — N18.4 CHRONIC KIDNEY DISEASE, STAGE IV (SEVERE) (HCC): ICD-10-CM

## 2019-05-17 DIAGNOSIS — D63.1 ANEMIA IN STAGE 5 CHRONIC KIDNEY DISEASE, NOT ON CHRONIC DIALYSIS (HCC): Primary | ICD-10-CM

## 2019-05-17 LAB
ANION GAP SERPL CALCULATED.3IONS-SCNC: 13.9 MMOL/L
BUN BLD-MCNC: 51 MG/DL (ref 8–23)
BUN/CREAT SERPL: 13.4 (ref 7–25)
CALCIUM SPEC-SCNC: 8.6 MG/DL (ref 8.6–10.5)
CHLORIDE SERPL-SCNC: 102 MMOL/L (ref 98–107)
CO2 SERPL-SCNC: 22.1 MMOL/L (ref 22–29)
CREAT BLD-MCNC: 3.8 MG/DL (ref 0.57–1)
GFR SERPL CREATININE-BSD FRML MDRD: 12 ML/MIN/1.73
GFR SERPL CREATININE-BSD FRML MDRD: ABNORMAL ML/MIN/1.73
GLUCOSE BLD-MCNC: 305 MG/DL (ref 65–99)
HCT VFR BLD AUTO: 34.5 % (ref 34–46.6)
HGB BLD-MCNC: 10.4 G/DL (ref 12–15.9)
MAGNESIUM SERPL-MCNC: 1.8 MG/DL (ref 1.6–2.4)
POTASSIUM BLD-SCNC: 3.8 MMOL/L (ref 3.5–5.2)
SODIUM BLD-SCNC: 138 MMOL/L (ref 136–145)

## 2019-05-17 PROCEDURE — 80048 BASIC METABOLIC PNL TOTAL CA: CPT | Performed by: INTERNAL MEDICINE

## 2019-05-17 PROCEDURE — 25010000002 EPOETIN ALFA PER 1000 UNITS: Performed by: INTERNAL MEDICINE

## 2019-05-17 PROCEDURE — 85018 HEMOGLOBIN: CPT | Performed by: INTERNAL MEDICINE

## 2019-05-17 PROCEDURE — 96372 THER/PROPH/DIAG INJ SC/IM: CPT

## 2019-05-17 PROCEDURE — 85014 HEMATOCRIT: CPT | Performed by: INTERNAL MEDICINE

## 2019-05-17 PROCEDURE — 83735 ASSAY OF MAGNESIUM: CPT | Performed by: INTERNAL MEDICINE

## 2019-05-17 PROCEDURE — 36415 COLL VENOUS BLD VENIPUNCTURE: CPT

## 2019-05-17 RX ADMIN — ERYTHROPOIETIN 20000 UNITS: 20000 INJECTION, SOLUTION INTRAVENOUS; SUBCUTANEOUS at 14:14

## 2019-05-17 NOTE — PROGRESS NOTES
Procrit indicated per lab results. Tolerated well. Reviewed pt medication list, noted metoprolol succinate and metoprolol tartrate both on list. Spoke with Jazlyn in pharmacy and states pt should talk to doctor regarding the 2 meds together. Informed pt of advice from pharmacist, verbalized understanding. D/C'd per ambulation.

## 2019-05-20 ENCOUNTER — OFFICE VISIT (OUTPATIENT)
Dept: FAMILY MEDICINE CLINIC | Facility: CLINIC | Age: 69
End: 2019-05-20

## 2019-05-20 VITALS
TEMPERATURE: 97.9 F | WEIGHT: 191 LBS | SYSTOLIC BLOOD PRESSURE: 144 MMHG | BODY MASS INDEX: 32.61 KG/M2 | HEART RATE: 91 BPM | OXYGEN SATURATION: 93 % | HEIGHT: 64 IN | DIASTOLIC BLOOD PRESSURE: 74 MMHG

## 2019-05-20 DIAGNOSIS — Z23 NEED FOR PNEUMOCOCCAL VACCINE: Primary | ICD-10-CM

## 2019-05-20 DIAGNOSIS — I48.92 ATRIAL FLUTTER, UNSPECIFIED TYPE (HCC): ICD-10-CM

## 2019-05-20 PROBLEM — G20.C PRIMARY PARKINSONISM: Status: ACTIVE | Noted: 2017-03-10

## 2019-05-20 PROBLEM — G20 PRIMARY PARKINSONISM (HCC): Status: ACTIVE | Noted: 2017-03-10

## 2019-05-20 PROCEDURE — 90670 PCV13 VACCINE IM: CPT | Performed by: INTERNAL MEDICINE

## 2019-05-20 PROCEDURE — 99214 OFFICE O/P EST MOD 30 MIN: CPT | Performed by: INTERNAL MEDICINE

## 2019-05-20 PROCEDURE — 90471 IMMUNIZATION ADMIN: CPT | Performed by: INTERNAL MEDICINE

## 2019-05-20 RX ORDER — METOPROLOL TARTRATE 50 MG/1
50 TABLET, FILM COATED ORAL 2 TIMES DAILY
Qty: 180 TABLET | Refills: 1 | Status: SHIPPED | OUTPATIENT
Start: 2019-05-20 | End: 2019-06-06 | Stop reason: ALTCHOICE

## 2019-05-20 NOTE — PROGRESS NOTES
Subjective Chief complaint is follow-up from emergency room   Maribeth Rendon is a 68 y.o. female.     History of Present Illness   Is here today for follow-up.  She was seen in the emergency room earlier in May for rapid heart rate.  She was found to be in atrial flutter.  She did not respond to adenosine injection.  She had 25 mg of metoprolol twice daily added onto her 50 mg of long-acting metoprolol.  She was also given a prescription for Eliquis.  Her heart rate has been doing okay since then.  She is not having any bleeding abnormalities on Eliquis.  The following portions of the patient's history were reviewed and updated as appropriate: allergies, current medications, past family history, past medical history, past social history, past surgical history and problem list.    Review of Systems   Respiratory: Negative for chest tightness and shortness of breath.    Cardiovascular: Negative for chest pain and leg swelling.       Objective   Physical Exam   Constitutional: She is oriented to person, place, and time. She appears well-developed and well-nourished.   Cardiovascular: Normal rate, regular rhythm and normal heart sounds.   Pulmonary/Chest: Effort normal and breath sounds normal.   Musculoskeletal: She exhibits no edema.   Neurological: She is alert and oriented to person, place, and time. No cranial nerve deficit.   Psychiatric: She has a normal mood and affect.         Assessment/Plan   Maribeth was seen today for follow-up and med refill.    Diagnoses and all orders for this visit:    Need for pneumococcal vaccine  -     Pneumococcal Conjugate Vaccine 13-Valent All (PCV13)    Atrial flutter, unspecified type (CMS/HCC)    Other orders  -     metoprolol tartrate (LOPRESSOR) 50 MG tablet; Take 1 tablet by mouth 2 (Two) Times a Day for 180 days.    Maribeth is here today for follow-up.  Today she seems to be in a regular rhythm with a controlled rate.  Rather than have her take 2 different types of metoprolol I  am going to have her stop the long-acting metoprolol.  I am going to have her use 50 mg of metoprolol tartrate twice daily until she sees her cardiologist.

## 2019-05-31 ENCOUNTER — HOSPITAL ENCOUNTER (OUTPATIENT)
Dept: INFUSION THERAPY | Facility: HOSPITAL | Age: 69
Discharge: HOME | End: 2019-05-31
Admitting: INTERNAL MEDICINE

## 2019-05-31 VITALS
OXYGEN SATURATION: 96 % | TEMPERATURE: 98 F | HEART RATE: 107 BPM | SYSTOLIC BLOOD PRESSURE: 161 MMHG | DIASTOLIC BLOOD PRESSURE: 73 MMHG | RESPIRATION RATE: 20 BRPM

## 2019-05-31 DIAGNOSIS — N18.4 CHRONIC KIDNEY DISEASE, STAGE IV (SEVERE) (HCC): ICD-10-CM

## 2019-05-31 DIAGNOSIS — D63.1 ANEMIA IN STAGE 5 CHRONIC KIDNEY DISEASE, NOT ON CHRONIC DIALYSIS (HCC): Primary | ICD-10-CM

## 2019-05-31 DIAGNOSIS — N18.5 ANEMIA IN STAGE 5 CHRONIC KIDNEY DISEASE, NOT ON CHRONIC DIALYSIS (HCC): Primary | ICD-10-CM

## 2019-05-31 LAB
HCT VFR BLD AUTO: 34.7 % (ref 34–46.6)
HGB BLD-MCNC: 10.1 G/DL (ref 12–15.9)

## 2019-05-31 PROCEDURE — 96372 THER/PROPH/DIAG INJ SC/IM: CPT

## 2019-05-31 PROCEDURE — 85018 HEMOGLOBIN: CPT | Performed by: INTERNAL MEDICINE

## 2019-05-31 PROCEDURE — 36415 COLL VENOUS BLD VENIPUNCTURE: CPT

## 2019-05-31 PROCEDURE — 25010000002 EPOETIN ALFA PER 1000 UNITS: Performed by: INTERNAL MEDICINE

## 2019-05-31 PROCEDURE — 85014 HEMATOCRIT: CPT | Performed by: INTERNAL MEDICINE

## 2019-05-31 RX ADMIN — ERYTHROPOIETIN 20000 UNITS: 20000 INJECTION, SOLUTION INTRAVENOUS; SUBCUTANEOUS at 13:47

## 2019-06-06 ENCOUNTER — OFFICE VISIT (OUTPATIENT)
Dept: CARDIOLOGY | Facility: CLINIC | Age: 69
End: 2019-06-06

## 2019-06-06 VITALS
DIASTOLIC BLOOD PRESSURE: 70 MMHG | HEIGHT: 64 IN | SYSTOLIC BLOOD PRESSURE: 174 MMHG | BODY MASS INDEX: 32.79 KG/M2 | HEART RATE: 66 BPM

## 2019-06-06 DIAGNOSIS — R06.02 SOB (SHORTNESS OF BREATH): ICD-10-CM

## 2019-06-06 DIAGNOSIS — I47.1 PAROXYSMAL SVT (SUPRAVENTRICULAR TACHYCARDIA) (HCC): Primary | ICD-10-CM

## 2019-06-06 PROCEDURE — 99204 OFFICE O/P NEW MOD 45 MIN: CPT | Performed by: INTERNAL MEDICINE

## 2019-06-06 PROCEDURE — 93000 ELECTROCARDIOGRAM COMPLETE: CPT | Performed by: INTERNAL MEDICINE

## 2019-06-06 RX ORDER — BISOPROLOL FUMARATE AND HYDROCHLOROTHIAZIDE 10; 6.25 MG/1; MG/1
1 TABLET ORAL DAILY
Qty: 30 TABLET | Refills: 6 | Status: SHIPPED | OUTPATIENT
Start: 2019-06-06 | End: 2019-08-06 | Stop reason: HOSPADM

## 2019-06-06 NOTE — PROGRESS NOTES
Subjective:        [facility]  Cardiology Consult Note    Patient Identification:  Name: Maribeth Rendon  Age: 68 y.o.  Sex: female  :  1950  MRN: [id number]             CC  See in 3 yrs    During procrit shot fast heart beat ,     Episode of aflutter/ svt in er      History of Present Illness:   68-year-old white female has been seen after 3 years, recently during Procrit shot had heart rate started beating fast lasted first several minutes    Patient went to the emergency room with the patient was found to be in SVT/atrial flutter, symptoms associated with palpitations shortness of breath and anxiety but no chest pain    Comorbid cardiac risk factors:     Past Medical History:  Past Medical History:   Diagnosis Date   • Achilles tendon tear     left    • Anemia    • Anxiety    • Depression    • Diabetes mellitus, type 2 (CMS/HCC)    • ESRD (end stage renal disease) (CMS/[facility])     HAS LEFT FOREARM FISTULA   • GERD (gastroesophageal reflux disease)    • History of MRSA infection 03/15/2016    ABDOMINAL WOUND,   INFECTION CONTROLL NOTIFIED 2017   • Hyperlipidemia    • Hypertension    • Hypokalemia    • Hypomagnesemia    • Hypoxic 2016    WHEN SHE HAD PNEUMONIA   • Intertrigo    • Leukocytosis    • Osteoarthritis    • Pneumonia 2016   • Psoriasis    • Psoriatic arthritis (CMS/[facility])    • Seizures (CMS/[facility])     one time   • Sepsis (CMS/HCC) 2016   • SOB (shortness of breath)    • Stage 4 chronic renal impairment associated with type 2 diabetes mellitus (CMS/[facility])    • Staph infection     HX RIGHT FOOT AT [facility] 2010   • Streptococcal bacteremia    • Swelling of hand 10/27/2016    LEFT HAND SWELLIMG SINCE LEFT FISTULA SURGERY   • Tremor, essential    • Wears glasses      Past Surgical History:  Past Surgical History:   Procedure Laterality Date   • ABDOMINAL WALL ABSCESS INCISION AND DRAINAGE     • ARTERIOVENOUS FISTULA/SHUNT SURGERY Left 10/27/2016    Procedure: LT FOREARM  FISTULA;  Surgeon: Lorelei Haque Jr., MD;  Location: [facility] OR;  Service:    • ARTERIOVENOUS FISTULA/SHUNT SURGERY Left 2017    Procedure: LT BRACHIAL CEPHALIC WITH FISTULA LIGATION RADIAL CEPHALIC.;  Surgeon: Gurmeet Carver MD;  Location: [facility] OR;  Service:    • CATARACT EXTRACTION W/ INTRAOCULAR LENS IMPLANT Bilateral    • DILATATION AND CURETTAGE     • EXCISION MASS TRUNK N/A 3/22/2016    Procedure: I&D LOWER ABDOMINAL  WOUND;  Surgeon: Tru Yi MD;  Location: [facility] OR;  Service:    • FOOT SURGERY Right     REMOVED BONE IN RIGHT GREAT TOE   • HYSTERECTOMY        Allergies:  Allergies   Allergen Reactions   • Prednisone Hallucinations     Home Meds:    (Not in a hospital admission)  Current Meds:   [unfilled]  Social History:   Social History     Tobacco Use   • Smoking status: Former Smoker     Packs/day: 2.00     Years: 20.00     Pack years: 40.00     Types: Cigarettes     Last attempt to quit: 10/21/1992     Years since quittin.6   • Smokeless tobacco: Never Used   • Tobacco comment: quit    Substance Use Topics   • Alcohol use: No      Family History:  Family History   Problem Relation Age of Onset   • Heart attack Mother    • Heart disease Mother    • Kidney disease Mother    • Heart attack Father    • Heart disease Father    • Hypertension Father    • Stroke Father         ISCHEMIC   • Diabetes Father         TYPE 2   • Kidney disease Brother    • Diabetes Brother         TYPE 1   • Thyroid disease Daughter    • Anxiety disorder Maternal Aunt    • Bipolar disorder Maternal Aunt    • Depression Maternal Aunt    • Lupus Maternal Aunt         LUPUS ANTICOAGLULANT   • Rheum arthritis Maternal Aunt    • Alcohol abuse Maternal Uncle    • Other Maternal Uncle         PULMONARY DISEASE        Review of Systems    Constitutional: No weakness,fatigue, fever, rigors, chills   Eyes: No vision changes, eye pain   ENT/oropharynx: No difficulty swallowing, sore  throat, epistaxis, changes in hearing   Cardiovascular:  Palpitation   Respiratory: No shortness of breath, dyspnea on exertion, cough, wheezing hemoptysis   Gastrointestinal: No abdominal pain, nausea, vomiting, diarrhea, bloody stools   Genitourinary: No hematuria, dysuria   Neurological: No headache, tremors, numbness,  one-sided weakness    Musculoskeletal: No cramps, myalgias,  joint pain, joint swelling   Integument: No rash, edema           Constitutional:  Heart Rate:  [66] 66  BP: (174)/(70) 174/70    Physical Exam   General:  Appears in no acute distress  Eyes: PERTL,  HEENT:  No JVD. Thyroid not visibly enlarged. No mucosal pallor or cyanosis  Respiratory: Respirations regular and unlabored at rest. BBS with good air entry in all fields. No crackles, rubs or wheezes auscultated  Cardiovascular: S1S2 Regular rate and rhythm. No murmur, rub or gallop auscultated. No carotid bruits. DP/PT pulses    . No pretibial pitting edema  Gastrointestinal: Abdomen soft, flat, non tender. Bowel sounds present. No hepatosplenomegaly. No ascites  Musculoskeletal: PASCUAL x4. No abnormal movements  Extremities: No digital clubbing or cyanosis  Skin: Color pink. Skin warm and dry to touch. No rashes  No xanthoma  Neuro: AAO x3 CN II-XII grossly intact            ECG 12 Lead  Date/Time: 6/6/2019 11:21 AM  Performed by: Eddie Hodges MD  Authorized by: Eddie Hodges MD   Comparison: compared with previous ECG   Similar to previous ECG  Rhythm: sinus rhythm  ST Flattening: all    Clinical impression: non-specific ECG                Cardiographics  ECG:     Telemetry:    Echocardiogram:     Imaging  Chest X-ray:     Lab Review               @[email or web address]@  Results from last 7 days   Lab Units 05/31/19  1257   HEMOGLOBIN g/dL 10.1*   HEMATOCRIT % 34.7             Assessment:/ Recommendations / Plan:   Patient Active Problem List   Diagnosis   • Menstrual disorder   • Atopic rhinitis   • Anemia in CKD (chronic  kidney disease)   • Spasm of cervical paraspinous muscle   • Cervical radiculopathy   • Chronic kidney disease, stage IV (severe) (CMS/[facility])   • Depression   • DM type 2 with diabetic peripheral neuropathy (CMS/[facility])   • Essential hypertension   • Duplay's periarthritis syndrome   • Gastroesophageal reflux disease   • Hyperlipidemia   • Hypertension   • Arthritis   • Seizure disorder (CMS/[facility])   • Phlebitis   • Type 2 diabetes mellitus (CMS/[facility])   • Vitamin D deficiency   • Chest pain   • Abscess   • Generalized muscle weakness   • Abdominal wall cellulitis   • HTN (hypertension)   • CKD (chronic kidney disease) stage 4, GFR 15-29 ml/min (CMS/[facility])   • Diabetes mellitus with peripheral autonomic neuropathy (CMS/[facility])   • Hyponatremia   • Hypercalcemia   • Dehydration   • DACIA (acute kidney injury) (CMS/[facility])   • Abdominal wall abscess   • Cellulitis of toe of left foot   • Partial Achilles tendon tear, left, initial encounter   • Arthritis of foot   • Essential tremor   • Primary Parkinsonism (CMS/[facility])                    ICD-10-CM ICD-9-CM   1. Paroxysmal SVT (supraventricular tachycardia) (CMS/[facility]) I47.1 427.0   2. SOB (shortness of breath) R06.02 786.05     1. Paroxysmal SVT (supraventricular tachycardia) (CMS/[facility])  Resolved, will start the beta-blockers  - Stress Test With Myocardial Perfusion One Day  - Adult Transthoracic Echo Complete W/ Cont if Necessary Per Protocol    2. SOB (shortness of breath)  Considering the patient's symptoms as well as clinical situation and  EKG findings, along with cardiac risk factors, ischemic workup is necessary to rule out ischemic cardiomyopathy, stress induced arrhythmias, and functional capacity for diagnosis as well as prognostic consideration    Considering patient's medical condition as well as the risk factors, patient will require echocardiogram for further evaluation for the LV function, four-chamber evaluation, including the pressures, valvular function and  pericardial  disease and pericardial effusion    - Stress Test With Myocardial Perfusion One Day  - Adult Transthoracic Echo Complete W/ Cont if Necessary Per Protocol         Dc metoprolol    Start ziac 10/6.25     Pros and cons of this new medication / change medication has been explained to  the patient    Possible side effects has been explained    Associated need of the blood  Work has been explained    Need for the compliance of the medication has been explained      Sandhya, echo    1 month    Labs/tests ordered for am      Eddie Hodges MD  6/6/2019, 11:21 AM      EMR Dragon/Transcription:   Dictated utilizing Dragon dictation

## 2019-06-11 ENCOUNTER — HOSPITAL ENCOUNTER (OUTPATIENT)
Dept: CARDIOLOGY | Facility: HOSPITAL | Age: 69
Discharge: HOME | End: 2019-06-11
Admitting: INTERNAL MEDICINE

## 2019-06-11 VITALS
SYSTOLIC BLOOD PRESSURE: 165 MMHG | BODY MASS INDEX: 32.61 KG/M2 | WEIGHT: 191 LBS | HEIGHT: 64 IN | HEART RATE: 66 BPM | DIASTOLIC BLOOD PRESSURE: 65 MMHG

## 2019-06-11 PROCEDURE — 93306 TTE W/DOPPLER COMPLETE: CPT

## 2019-06-11 PROCEDURE — 93306 TTE W/DOPPLER COMPLETE: CPT | Performed by: INTERNAL MEDICINE

## 2019-06-12 LAB
BH CV ECHO MEAS - ACS: 2 CM
BH CV ECHO MEAS - AO MAX PG (FULL): 9.4 MMHG
BH CV ECHO MEAS - AO MAX PG: 13.5 MMHG
BH CV ECHO MEAS - AO MEAN PG (FULL): 5 MMHG
BH CV ECHO MEAS - AO MEAN PG: 8 MMHG
BH CV ECHO MEAS - AO ROOT AREA (BSA CORRECTED): 1.5
BH CV ECHO MEAS - AO ROOT AREA: 6.2 CM^2
BH CV ECHO MEAS - AO ROOT DIAM: 2.8 CM
BH CV ECHO MEAS - AO V2 MAX: 184 CM/SEC
BH CV ECHO MEAS - AO V2 MEAN: 133 CM/SEC
BH CV ECHO MEAS - AO V2 VTI: 44.8 CM
BH CV ECHO MEAS - ASC AORTA: 3 CM
BH CV ECHO MEAS - AVA(I,A): 1.9 CM^2
BH CV ECHO MEAS - AVA(I,D): 1.9 CM^2
BH CV ECHO MEAS - AVA(V,A): 1.7 CM^2
BH CV ECHO MEAS - AVA(V,D): 1.7 CM^2
BH CV ECHO MEAS - BSA(HAYCOCK): 2 M^2
BH CV ECHO MEAS - BSA: 1.9 M^2
BH CV ECHO MEAS - BZI_BMI: 32.8 KILOGRAMS/M^2
BH CV ECHO MEAS - BZI_METRIC_HEIGHT: 162.6 CM
BH CV ECHO MEAS - BZI_METRIC_WEIGHT: 86.6 KG
BH CV ECHO MEAS - EDV(CUBED): 74.1 ML
BH CV ECHO MEAS - EDV(MOD-SP2): 91 ML
BH CV ECHO MEAS - EDV(MOD-SP4): 127 ML
BH CV ECHO MEAS - EDV(TEICH): 78.6 ML
BH CV ECHO MEAS - EF(CUBED): 59.8 %
BH CV ECHO MEAS - EF(MOD-BP): 57 %
BH CV ECHO MEAS - EF(MOD-SP2): 57.1 %
BH CV ECHO MEAS - EF(MOD-SP4): 61.4 %
BH CV ECHO MEAS - EF(TEICH): 51.7 %
BH CV ECHO MEAS - ESV(CUBED): 29.8 ML
BH CV ECHO MEAS - ESV(MOD-SP2): 39 ML
BH CV ECHO MEAS - ESV(MOD-SP4): 49 ML
BH CV ECHO MEAS - ESV(TEICH): 37.9 ML
BH CV ECHO MEAS - FS: 26.2 %
BH CV ECHO MEAS - IVS/LVPW: 0.93
BH CV ECHO MEAS - IVSD: 1.3 CM
BH CV ECHO MEAS - LAT PEAK E' VEL: 8.8 CM/SEC
BH CV ECHO MEAS - LV DIASTOLIC VOL/BSA (35-75): 66.2 ML/M^2
BH CV ECHO MEAS - LV MASS(C)D: 212.3 GRAMS
BH CV ECHO MEAS - LV MASS(C)DI: 110.7 GRAMS/M^2
BH CV ECHO MEAS - LV MAX PG: 4.2 MMHG
BH CV ECHO MEAS - LV MEAN PG: 3 MMHG
BH CV ECHO MEAS - LV SYSTOLIC VOL/BSA (12-30): 25.5 ML/M^2
BH CV ECHO MEAS - LV V1 MAX: 102 CM/SEC
BH CV ECHO MEAS - LV V1 MEAN: 75.9 CM/SEC
BH CV ECHO MEAS - LV V1 VTI: 26.6 CM
BH CV ECHO MEAS - LVIDD: 4.2 CM
BH CV ECHO MEAS - LVIDS: 3.1 CM
BH CV ECHO MEAS - LVLD AP2: 7.6 CM
BH CV ECHO MEAS - LVLD AP4: 7.7 CM
BH CV ECHO MEAS - LVLS AP2: 6.9 CM
BH CV ECHO MEAS - LVLS AP4: 5.9 CM
BH CV ECHO MEAS - LVOT AREA (M): 3.1 CM^2
BH CV ECHO MEAS - LVOT AREA: 3.1 CM^2
BH CV ECHO MEAS - LVOT DIAM: 2 CM
BH CV ECHO MEAS - LVPWD: 1.4 CM
BH CV ECHO MEAS - MED PEAK E' VEL: 6.2 CM/SEC
BH CV ECHO MEAS - MV A DUR: 0.22 SEC
BH CV ECHO MEAS - MV A MAX VEL: 108 CM/SEC
BH CV ECHO MEAS - MV DEC SLOPE: 348 CM/SEC^2
BH CV ECHO MEAS - MV DEC TIME: 0.17 SEC
BH CV ECHO MEAS - MV E MAX VEL: 135 CM/SEC
BH CV ECHO MEAS - MV E/A: 1.3
BH CV ECHO MEAS - MV MAX PG: 8.1 MMHG
BH CV ECHO MEAS - MV MEAN PG: 2 MMHG
BH CV ECHO MEAS - MV P1/2T MAX VEL: 133 CM/SEC
BH CV ECHO MEAS - MV P1/2T: 111.9 MSEC
BH CV ECHO MEAS - MV V2 MAX: 142 CM/SEC
BH CV ECHO MEAS - MV V2 MEAN: 69.2 CM/SEC
BH CV ECHO MEAS - MV V2 VTI: 44.7 CM
BH CV ECHO MEAS - MVA P1/2T LCG: 1.7 CM^2
BH CV ECHO MEAS - MVA(P1/2T): 2 CM^2
BH CV ECHO MEAS - MVA(VTI): 1.9 CM^2
BH CV ECHO MEAS - PA ACC TIME: 0.1 SEC
BH CV ECHO MEAS - PA MAX PG (FULL): 4.5 MMHG
BH CV ECHO MEAS - PA MAX PG: 7 MMHG
BH CV ECHO MEAS - PA PR(ACCEL): 34 MMHG
BH CV ECHO MEAS - PA V2 MAX: 132 CM/SEC
BH CV ECHO MEAS - PULM A REVS DUR: 0.14 SEC
BH CV ECHO MEAS - PULM A REVS VEL: 26.2 CM/SEC
BH CV ECHO MEAS - PULM DIAS VEL: 63.1 CM/SEC
BH CV ECHO MEAS - PULM S/D: 0.78
BH CV ECHO MEAS - PULM SYS VEL: 49 CM/SEC
BH CV ECHO MEAS - PVA(V,A): 2.7 CM^2
BH CV ECHO MEAS - PVA(V,D): 2.7 CM^2
BH CV ECHO MEAS - QP/QS: 1
BH CV ECHO MEAS - RAP SYSTOLE: 3 MMHG
BH CV ECHO MEAS - RV MAX PG: 2.5 MMHG
BH CV ECHO MEAS - RV MEAN PG: 1 MMHG
BH CV ECHO MEAS - RV V1 MAX: 79.1 CM/SEC
BH CV ECHO MEAS - RV V1 MEAN: 54 CM/SEC
BH CV ECHO MEAS - RV V1 VTI: 18.4 CM
BH CV ECHO MEAS - RVOT AREA: 4.5 CM^2
BH CV ECHO MEAS - RVOT DIAM: 2.4 CM
BH CV ECHO MEAS - RVSP: 34.8 MMHG
BH CV ECHO MEAS - SI(AO): 143.8 ML/M^2
BH CV ECHO MEAS - SI(CUBED): 23.1 ML/M^2
BH CV ECHO MEAS - SI(LVOT): 43.6 ML/M^2
BH CV ECHO MEAS - SI(MOD-SP2): 27.1 ML/M^2
BH CV ECHO MEAS - SI(MOD-SP4): 40.7 ML/M^2
BH CV ECHO MEAS - SI(TEICH): 21.2 ML/M^2
BH CV ECHO MEAS - SV(AO): 275.9 ML
BH CV ECHO MEAS - SV(CUBED): 44.3 ML
BH CV ECHO MEAS - SV(LVOT): 83.6 ML
BH CV ECHO MEAS - SV(MOD-SP2): 52 ML
BH CV ECHO MEAS - SV(MOD-SP4): 78 ML
BH CV ECHO MEAS - SV(RVOT): 83.2 ML
BH CV ECHO MEAS - SV(TEICH): 40.7 ML
BH CV ECHO MEAS - TAPSE (>1.6): 1.9 CM2
BH CV ECHO MEAS - TR MAX VEL: 282 CM/SEC
BH CV ECHO MEASUREMENTS AVERAGE E/E' RATIO: 18
BH CV XLRA - RV BASE: 3.1 CM
BH CV XLRA - RV LENGTH: 6.8 CM
BH CV XLRA - RV MID: 2.2 CM
BH CV XLRA - TDI S': 13.1 CM/SEC
LEFT ATRIUM VOLUME INDEX: 40 ML/M2
MAXIMAL PREDICTED HEART RATE: 152 BPM
STRESS TARGET HR: 129 BPM

## 2019-06-14 ENCOUNTER — HOSPITAL ENCOUNTER (OUTPATIENT)
Dept: INFUSION THERAPY | Facility: HOSPITAL | Age: 69
Discharge: HOME | End: 2019-06-14
Admitting: INTERNAL MEDICINE

## 2019-06-14 VITALS
SYSTOLIC BLOOD PRESSURE: 152 MMHG | RESPIRATION RATE: 16 BRPM | DIASTOLIC BLOOD PRESSURE: 61 MMHG | HEART RATE: 80 BPM | TEMPERATURE: 98.2 F | OXYGEN SATURATION: 96 %

## 2019-06-14 DIAGNOSIS — N18.5 ANEMIA IN STAGE 5 CHRONIC KIDNEY DISEASE, NOT ON CHRONIC DIALYSIS (HCC): Primary | ICD-10-CM

## 2019-06-14 DIAGNOSIS — N18.4 CHRONIC KIDNEY DISEASE, STAGE IV (SEVERE) (HCC): ICD-10-CM

## 2019-06-14 DIAGNOSIS — D63.1 ANEMIA IN STAGE 5 CHRONIC KIDNEY DISEASE, NOT ON CHRONIC DIALYSIS (HCC): Primary | ICD-10-CM

## 2019-06-14 LAB
25(OH)D3 SERPL-MCNC: 26.8 NG/ML (ref 30–100)
ALBUMIN SERPL-MCNC: 3.5 G/DL (ref 3.5–5.2)
ALBUMIN/GLOB SERPL: 0.9 G/DL
ALP SERPL-CCNC: 73 U/L (ref 39–117)
ALT SERPL W P-5'-P-CCNC: 10 U/L (ref 1–33)
ANION GAP SERPL CALCULATED.3IONS-SCNC: 14.6 MMOL/L
ANION GAP SERPL CALCULATED.3IONS-SCNC: 15.8 MMOL/L
AST SERPL-CCNC: 16 U/L (ref 1–32)
BILIRUB SERPL-MCNC: 0.3 MG/DL (ref 0.2–1.2)
BUN BLD-MCNC: 49 MG/DL (ref 8–23)
BUN BLD-MCNC: 53 MG/DL (ref 8–23)
BUN/CREAT SERPL: 11.2 (ref 7–25)
BUN/CREAT SERPL: 11.4 (ref 7–25)
CALCIUM SPEC-SCNC: 8.4 MG/DL (ref 8.6–10.5)
CALCIUM SPEC-SCNC: 8.5 MG/DL (ref 8.6–10.5)
CALCIUM SPEC-SCNC: 8.7 MG/DL (ref 8.6–10.5)
CHLORIDE SERPL-SCNC: 100 MMOL/L (ref 98–107)
CHLORIDE SERPL-SCNC: 102 MMOL/L (ref 98–107)
CO2 SERPL-SCNC: 22.2 MMOL/L (ref 22–29)
CO2 SERPL-SCNC: 22.4 MMOL/L (ref 22–29)
CREAT BLD-MCNC: 4.37 MG/DL (ref 0.57–1)
CREAT BLD-MCNC: 4.63 MG/DL (ref 0.57–1)
DEPRECATED RDW RBC AUTO: 56.7 FL (ref 37–54)
ERYTHROCYTE [DISTWIDTH] IN BLOOD BY AUTOMATED COUNT: 16.2 % (ref 12.3–15.4)
FERRITIN SERPL-MCNC: 476 NG/ML (ref 13–150)
FOLATE SERPL-MCNC: 6.48 NG/ML (ref 4.78–24.2)
GFR SERPL CREATININE-BSD FRML MDRD: 10 ML/MIN/1.73
GFR SERPL CREATININE-BSD FRML MDRD: 9 ML/MIN/1.73
GFR SERPL CREATININE-BSD FRML MDRD: ABNORMAL ML/MIN/1.73
GFR SERPL CREATININE-BSD FRML MDRD: ABNORMAL ML/MIN/1.73
GLOBULIN UR ELPH-MCNC: 3.9 GM/DL
GLUCOSE BLD-MCNC: 234 MG/DL (ref 65–99)
GLUCOSE BLD-MCNC: 234 MG/DL (ref 65–99)
HCT VFR BLD AUTO: 34.3 % (ref 34–46.6)
HGB BLD-MCNC: 10.3 G/DL (ref 12–15.9)
IRON 24H UR-MRATE: 66 MCG/DL (ref 37–145)
IRON SATN MFR SERPL: 28 % (ref 20–50)
MAGNESIUM SERPL-MCNC: 2 MG/DL (ref 1.6–2.4)
MCH RBC QN AUTO: 28.5 PG (ref 26.6–33)
MCHC RBC AUTO-ENTMCNC: 30 G/DL (ref 31.5–35.7)
MCV RBC AUTO: 94.8 FL (ref 79–97)
PHOSPHATE SERPL-MCNC: 6 MG/DL (ref 2.5–4.5)
PLATELET # BLD AUTO: 211 10*3/MM3 (ref 140–450)
PMV BLD AUTO: 10.5 FL (ref 6–12)
POTASSIUM BLD-SCNC: 4 MMOL/L (ref 3.5–5.2)
POTASSIUM BLD-SCNC: 4.1 MMOL/L (ref 3.5–5.2)
PROT SERPL-MCNC: 7.4 G/DL (ref 6–8.5)
PTH-INTACT SERPL-MCNC: 311 PG/ML (ref 15–65)
RBC # BLD AUTO: 3.62 10*6/MM3 (ref 3.77–5.28)
SODIUM BLD-SCNC: 137 MMOL/L (ref 136–145)
SODIUM BLD-SCNC: 140 MMOL/L (ref 136–145)
TIBC SERPL-MCNC: 240 MCG/DL (ref 298–536)
TRANSFERRIN SERPL-MCNC: 161 MG/DL (ref 200–360)
URATE SERPL-MCNC: 10.1 MG/DL (ref 2.4–5.7)
VIT B12 BLD-MCNC: 532 PG/ML (ref 211–946)
WBC NRBC COR # BLD: 7.57 10*3/MM3 (ref 3.4–10.8)

## 2019-06-14 PROCEDURE — 83540 ASSAY OF IRON: CPT | Performed by: INTERNAL MEDICINE

## 2019-06-14 PROCEDURE — 82306 VITAMIN D 25 HYDROXY: CPT | Performed by: INTERNAL MEDICINE

## 2019-06-14 PROCEDURE — 82607 VITAMIN B-12: CPT | Performed by: INTERNAL MEDICINE

## 2019-06-14 PROCEDURE — 84466 ASSAY OF TRANSFERRIN: CPT | Performed by: INTERNAL MEDICINE

## 2019-06-14 PROCEDURE — 84100 ASSAY OF PHOSPHORUS: CPT | Performed by: INTERNAL MEDICINE

## 2019-06-14 PROCEDURE — 25010000002 EPOETIN ALFA PER 1000 UNITS: Performed by: INTERNAL MEDICINE

## 2019-06-14 PROCEDURE — 82746 ASSAY OF FOLIC ACID SERUM: CPT | Performed by: INTERNAL MEDICINE

## 2019-06-14 PROCEDURE — 80053 COMPREHEN METABOLIC PANEL: CPT | Performed by: INTERNAL MEDICINE

## 2019-06-14 PROCEDURE — 83735 ASSAY OF MAGNESIUM: CPT | Performed by: INTERNAL MEDICINE

## 2019-06-14 PROCEDURE — 84550 ASSAY OF BLOOD/URIC ACID: CPT | Performed by: INTERNAL MEDICINE

## 2019-06-14 PROCEDURE — 36415 COLL VENOUS BLD VENIPUNCTURE: CPT

## 2019-06-14 PROCEDURE — 85027 COMPLETE CBC AUTOMATED: CPT | Performed by: INTERNAL MEDICINE

## 2019-06-14 PROCEDURE — 82728 ASSAY OF FERRITIN: CPT | Performed by: INTERNAL MEDICINE

## 2019-06-14 PROCEDURE — 83970 ASSAY OF PARATHORMONE: CPT | Performed by: INTERNAL MEDICINE

## 2019-06-14 PROCEDURE — 96372 THER/PROPH/DIAG INJ SC/IM: CPT

## 2019-06-14 RX ORDER — METOPROLOL TARTRATE 50 MG/1
50 TABLET, FILM COATED ORAL 2 TIMES DAILY
COMMUNITY
End: 2019-06-28

## 2019-06-14 RX ADMIN — ERYTHROPOIETIN 20000 UNITS: 20000 INJECTION, SOLUTION INTRAVENOUS; SUBCUTANEOUS at 13:50

## 2019-06-14 NOTE — PROGRESS NOTES
Procrit indicated per lab results & MD order.  Injection site x 1 with band aide applied.  Pt DC per ambulation with spouse @ [phone].

## 2019-06-19 RX ORDER — AMLODIPINE BESYLATE 10 MG/1
10 TABLET ORAL EVERY MORNING
Qty: 90 TABLET | Refills: 1 | Status: SHIPPED | OUTPATIENT
Start: 2019-06-19 | End: 2019-08-06 | Stop reason: HOSPADM

## 2019-06-19 RX ORDER — MONTELUKAST SODIUM 10 MG/1
10 TABLET ORAL NIGHTLY
Qty: 90 TABLET | Refills: 1 | Status: SHIPPED | OUTPATIENT
Start: 2019-06-19 | End: 2020-07-10 | Stop reason: SDUPTHER

## 2019-06-19 RX ORDER — GLIMEPIRIDE 2 MG/1
2 TABLET ORAL
Qty: 90 TABLET | Refills: 1 | Status: SHIPPED | OUTPATIENT
Start: 2019-06-19 | End: 2019-08-06 | Stop reason: HOSPADM

## 2019-06-19 RX ORDER — BUMETANIDE 2 MG/1
2 TABLET ORAL DAILY
Qty: 90 TABLET | Refills: 1 | Status: SHIPPED | OUTPATIENT
Start: 2019-06-19 | End: 2019-08-06 | Stop reason: HOSPADM

## 2019-06-28 ENCOUNTER — HOSPITAL ENCOUNTER (OUTPATIENT)
Dept: INFUSION THERAPY | Facility: HOSPITAL | Age: 69
Discharge: HOME | End: 2019-06-28
Admitting: INTERNAL MEDICINE

## 2019-06-28 VITALS
OXYGEN SATURATION: 96 % | TEMPERATURE: 97.8 F | RESPIRATION RATE: 20 BRPM | SYSTOLIC BLOOD PRESSURE: 143 MMHG | DIASTOLIC BLOOD PRESSURE: 61 MMHG | HEART RATE: 104 BPM

## 2019-06-28 DIAGNOSIS — D63.1 ANEMIA IN STAGE 5 CHRONIC KIDNEY DISEASE, NOT ON CHRONIC DIALYSIS (HCC): ICD-10-CM

## 2019-06-28 DIAGNOSIS — N18.4 ANEMIA OF CHRONIC KIDNEY FAILURE, STAGE 4 (SEVERE) (HCC): Primary | ICD-10-CM

## 2019-06-28 DIAGNOSIS — N18.5 ANEMIA IN STAGE 5 CHRONIC KIDNEY DISEASE, NOT ON CHRONIC DIALYSIS (HCC): ICD-10-CM

## 2019-06-28 DIAGNOSIS — N18.4 CHRONIC KIDNEY DISEASE, STAGE IV (SEVERE) (HCC): ICD-10-CM

## 2019-06-28 DIAGNOSIS — D63.1 ANEMIA OF CHRONIC KIDNEY FAILURE, STAGE 4 (SEVERE) (HCC): Primary | ICD-10-CM

## 2019-06-28 LAB
HCT VFR BLD AUTO: 34.1 % (ref 34–46.6)
HGB BLD-MCNC: 10.2 G/DL (ref 12–15.9)

## 2019-06-28 PROCEDURE — 85018 HEMOGLOBIN: CPT | Performed by: INTERNAL MEDICINE

## 2019-06-28 PROCEDURE — 96372 THER/PROPH/DIAG INJ SC/IM: CPT

## 2019-06-28 PROCEDURE — 36415 COLL VENOUS BLD VENIPUNCTURE: CPT

## 2019-06-28 PROCEDURE — 85014 HEMATOCRIT: CPT | Performed by: INTERNAL MEDICINE

## 2019-06-28 PROCEDURE — 25010000002 EPOETIN ALFA PER 1000 UNITS: Performed by: INTERNAL MEDICINE

## 2019-06-28 RX ADMIN — ERYTHROPOIETIN 20000 UNITS: 20000 INJECTION, SOLUTION INTRAVENOUS; SUBCUTANEOUS at 13:21

## 2019-07-03 ENCOUNTER — HOSPITAL ENCOUNTER (OUTPATIENT)
Dept: CARDIOLOGY | Facility: HOSPITAL | Age: 69
Discharge: HOME | End: 2019-07-03

## 2019-07-03 VITALS
HEART RATE: 53 BPM | DIASTOLIC BLOOD PRESSURE: 64 MMHG | OXYGEN SATURATION: 100 % | BODY MASS INDEX: 32.61 KG/M2 | WEIGHT: 191 LBS | SYSTOLIC BLOOD PRESSURE: 128 MMHG | RESPIRATION RATE: 16 BRPM | HEIGHT: 64 IN

## 2019-07-03 PROCEDURE — 25010000002 REGADENOSON 0.4 MG/5ML SOLUTION: Performed by: INTERNAL MEDICINE

## 2019-07-03 PROCEDURE — A9500 TC99M SESTAMIBI: HCPCS | Performed by: INTERNAL MEDICINE

## 2019-07-03 PROCEDURE — 25010000002 AMINOPHYLLINE PER 250 MG: Performed by: NURSE PRACTITIONER

## 2019-07-03 PROCEDURE — 93017 CV STRESS TEST TRACING ONLY: CPT

## 2019-07-03 PROCEDURE — 78452 HT MUSCLE IMAGE SPECT MULT: CPT | Performed by: INTERNAL MEDICINE

## 2019-07-03 PROCEDURE — 0 TECHNETIUM SESTAMIBI: Performed by: INTERNAL MEDICINE

## 2019-07-03 PROCEDURE — 93016 CV STRESS TEST SUPVJ ONLY: CPT | Performed by: NURSE PRACTITIONER

## 2019-07-03 PROCEDURE — 93018 CV STRESS TEST I&R ONLY: CPT | Performed by: INTERNAL MEDICINE

## 2019-07-03 PROCEDURE — 78452 HT MUSCLE IMAGE SPECT MULT: CPT

## 2019-07-03 RX ORDER — AMINOPHYLLINE DIHYDRATE 25 MG/ML
125 INJECTION, SOLUTION INTRAVENOUS ONCE
Status: COMPLETED | OUTPATIENT
Start: 2019-07-03 | End: 2019-07-03

## 2019-07-03 RX ADMIN — TECHNETIUM TC 99M SESTAMIBI 1 DOSE: 1 INJECTION INTRAVENOUS at 11:59

## 2019-07-03 RX ADMIN — REGADENOSON 0.4 MG: 0.08 INJECTION, SOLUTION INTRAVENOUS at 12:00

## 2019-07-03 RX ADMIN — AMINOPHYLLINE 125 MG: 25 INJECTION, SOLUTION INTRAVENOUS at 11:57

## 2019-07-03 RX ADMIN — TECHNETIUM TC 99M SESTAMIBI 1 DOSE: 1 INJECTION INTRAVENOUS at 08:08

## 2019-07-05 LAB
BH CV STRESS BP STAGE 1: NORMAL
BH CV STRESS COMMENTS STAGE 1: NORMAL
BH CV STRESS DOSE REGADENOSON STAGE 1: 0.4
BH CV STRESS DURATION MIN STAGE 1: 1
BH CV STRESS DURATION SEC STAGE 1: 0
BH CV STRESS HR STAGE 1: 68
BH CV STRESS O2 STAGE 1: 100
BH CV STRESS PROTOCOL 1: NORMAL
BH CV STRESS RECOVERY BP: NORMAL MMHG
BH CV STRESS RECOVERY HR: 56 BPM
BH CV STRESS RECOVERY O2: 100 %
BH CV STRESS STAGE 1: 1
LV EF NUC BP: 60 %
MAXIMAL PREDICTED HEART RATE: 152 BPM
PERCENT MAX PREDICTED HR: 44.74 %
STRESS BASELINE BP: NORMAL MMHG
STRESS BASELINE HR: 53 BPM
STRESS O2 SAT REST: 100 %
STRESS PERCENT HR: 53 %
STRESS POST ESTIMATED WORKLOAD: 1 METS
STRESS POST EXERCISE DUR MIN: 1 MIN
STRESS POST EXERCISE DUR SEC: 0 SEC
STRESS POST O2 SAT PEAK: 100 %
STRESS POST PEAK BP: NORMAL MMHG
STRESS POST PEAK HR: 68 BPM
STRESS TARGET HR: 129 BPM

## 2019-07-12 ENCOUNTER — APPOINTMENT (OUTPATIENT)
Dept: INFUSION THERAPY | Facility: HOSPITAL | Age: 69
End: 2019-07-12

## 2019-07-15 ENCOUNTER — OFFICE VISIT (OUTPATIENT)
Dept: CARDIOLOGY | Facility: CLINIC | Age: 69
End: 2019-07-15

## 2019-07-15 ENCOUNTER — HOSPITAL ENCOUNTER (OUTPATIENT)
Dept: INFUSION THERAPY | Facility: HOSPITAL | Age: 69
Discharge: HOME | End: 2019-07-15
Admitting: INTERNAL MEDICINE

## 2019-07-15 ENCOUNTER — HOSPITAL ENCOUNTER (OUTPATIENT)
Facility: HOSPITAL | Age: 69
Setting detail: HOSPITAL OUTPATIENT SURGERY
End: 2019-07-15
Attending: INTERNAL MEDICINE | Admitting: INTERNAL MEDICINE

## 2019-07-15 VITALS
OXYGEN SATURATION: 94 % | RESPIRATION RATE: 16 BRPM | HEART RATE: 57 BPM | SYSTOLIC BLOOD PRESSURE: 181 MMHG | DIASTOLIC BLOOD PRESSURE: 73 MMHG | TEMPERATURE: 97.9 F

## 2019-07-15 VITALS
HEART RATE: 85 BPM | SYSTOLIC BLOOD PRESSURE: 191 MMHG | WEIGHT: 191 LBS | HEIGHT: 64 IN | BODY MASS INDEX: 32.61 KG/M2 | DIASTOLIC BLOOD PRESSURE: 94 MMHG

## 2019-07-15 DIAGNOSIS — D63.1 ANEMIA IN STAGE 5 CHRONIC KIDNEY DISEASE, NOT ON CHRONIC DIALYSIS (HCC): Primary | ICD-10-CM

## 2019-07-15 DIAGNOSIS — R07.2 PRECORDIAL PAIN: ICD-10-CM

## 2019-07-15 DIAGNOSIS — I10 ESSENTIAL HYPERTENSION: ICD-10-CM

## 2019-07-15 DIAGNOSIS — R94.30 ABNORMAL CARDIAC FUNCTION TEST: ICD-10-CM

## 2019-07-15 DIAGNOSIS — E78.5 HYPERLIPIDEMIA, UNSPECIFIED HYPERLIPIDEMIA TYPE: ICD-10-CM

## 2019-07-15 DIAGNOSIS — N18.5 ANEMIA IN STAGE 5 CHRONIC KIDNEY DISEASE, NOT ON CHRONIC DIALYSIS (HCC): Primary | ICD-10-CM

## 2019-07-15 DIAGNOSIS — N18.4 CHRONIC KIDNEY DISEASE, STAGE IV (SEVERE) (HCC): ICD-10-CM

## 2019-07-15 DIAGNOSIS — R07.89 OTHER CHEST PAIN: ICD-10-CM

## 2019-07-15 DIAGNOSIS — R94.30 ABNORMAL CARDIAC FUNCTION TEST: Primary | ICD-10-CM

## 2019-07-15 DIAGNOSIS — I25.118 CORONARY ARTERY DISEASE OF NATIVE ARTERY OF NATIVE HEART WITH STABLE ANGINA PECTORIS (HCC): ICD-10-CM

## 2019-07-15 LAB
ANION GAP SERPL CALCULATED.3IONS-SCNC: 12 MMOL/L (ref 5–15)
BASOPHILS # BLD AUTO: 0.05 10*3/MM3 (ref 0–0.2)
BASOPHILS NFR BLD AUTO: 0.6 % (ref 0–1.5)
BUN BLD-MCNC: 48 MG/DL (ref 8–23)
BUN/CREAT SERPL: 13.6 (ref 7–25)
CALCIUM SPEC-SCNC: 8.7 MG/DL (ref 8.6–10.5)
CHLORIDE SERPL-SCNC: 102 MMOL/L (ref 98–107)
CO2 SERPL-SCNC: 25 MMOL/L (ref 22–29)
CREAT BLD-MCNC: 3.53 MG/DL (ref 0.57–1)
DEPRECATED RDW RBC AUTO: 57.5 FL (ref 37–54)
EOSINOPHIL # BLD AUTO: 0.33 10*3/MM3 (ref 0–0.4)
EOSINOPHIL NFR BLD AUTO: 4 % (ref 0.3–6.2)
ERYTHROCYTE [DISTWIDTH] IN BLOOD BY AUTOMATED COUNT: 16.4 % (ref 12.3–15.4)
GFR SERPL CREATININE-BSD FRML MDRD: 13 ML/MIN/1.73
GFR SERPL CREATININE-BSD FRML MDRD: ABNORMAL ML/MIN/1.73
GLUCOSE BLD-MCNC: 250 MG/DL (ref 65–99)
HCT VFR BLD AUTO: 36.4 % (ref 34–46.6)
HCT VFR BLD AUTO: 36.5 % (ref 34–46.6)
HGB BLD-MCNC: 10.8 G/DL (ref 12–15.9)
HGB BLD-MCNC: 10.9 G/DL (ref 12–15.9)
IMM GRANULOCYTES # BLD AUTO: 0.03 10*3/MM3 (ref 0–0.05)
IMM GRANULOCYTES NFR BLD AUTO: 0.4 % (ref 0–0.5)
LYMPHOCYTES # BLD AUTO: 1.16 10*3/MM3 (ref 0.7–3.1)
LYMPHOCYTES NFR BLD AUTO: 14.1 % (ref 19.6–45.3)
MAGNESIUM SERPL-MCNC: 1.8 MG/DL (ref 1.6–2.4)
MCH RBC QN AUTO: 28.3 PG (ref 26.6–33)
MCHC RBC AUTO-ENTMCNC: 29.6 G/DL (ref 31.5–35.7)
MCV RBC AUTO: 95.5 FL (ref 79–97)
MONOCYTES # BLD AUTO: 0.69 10*3/MM3 (ref 0.1–0.9)
MONOCYTES NFR BLD AUTO: 8.4 % (ref 5–12)
NEUTROPHILS # BLD AUTO: 5.94 10*3/MM3 (ref 1.7–7)
NEUTROPHILS NFR BLD AUTO: 72.5 % (ref 42.7–76)
NRBC BLD AUTO-RTO: 0.1 /100 WBC (ref 0–0.2)
PLATELET # BLD AUTO: 218 10*3/MM3 (ref 140–450)
PMV BLD AUTO: 10.9 FL (ref 6–12)
POTASSIUM BLD-SCNC: 4.1 MMOL/L (ref 3.5–5.2)
RBC # BLD AUTO: 3.82 10*6/MM3 (ref 3.77–5.28)
SODIUM BLD-SCNC: 139 MMOL/L (ref 136–145)
WBC NRBC COR # BLD: 8.2 10*3/MM3 (ref 3.4–10.8)

## 2019-07-15 PROCEDURE — 80048 BASIC METABOLIC PNL TOTAL CA: CPT | Performed by: INTERNAL MEDICINE

## 2019-07-15 PROCEDURE — 99214 OFFICE O/P EST MOD 30 MIN: CPT | Performed by: INTERNAL MEDICINE

## 2019-07-15 PROCEDURE — 83735 ASSAY OF MAGNESIUM: CPT | Performed by: INTERNAL MEDICINE

## 2019-07-15 PROCEDURE — 36415 COLL VENOUS BLD VENIPUNCTURE: CPT

## 2019-07-15 PROCEDURE — 85014 HEMATOCRIT: CPT | Performed by: INTERNAL MEDICINE

## 2019-07-15 PROCEDURE — 85025 COMPLETE CBC W/AUTO DIFF WBC: CPT | Performed by: INTERNAL MEDICINE

## 2019-07-15 PROCEDURE — 85018 HEMOGLOBIN: CPT | Performed by: INTERNAL MEDICINE

## 2019-07-15 NOTE — PROGRESS NOTES
Subjective:        Maribeth Rendon is a 68 y.o. female who here for follow up    CC  Palpitation, follow up ziac    sob  HPI  68-year-old white female with a known history of coronary artery disease, along with a history of the benign essential arterial hypertension and hyperlipidemia was recently started on Ziac for the palpitation has markedly improved patient continues to complains of shortness of breath on exertion     Problem List Items Addressed This Visit        Cardiovascular and Mediastinum    Abnormal cardiac function test - Primary    Relevant Orders    Case Request Cath Lab: Left Heart Cath (Completed)    CBC & Differential (Completed)    Basic Metabolic Panel    aPTT    Protime-INR    Hyperlipidemia    Hypertension    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 pectoris (CMS/HCC)       Nervous and Auditory    Chest pain    Relevant Orders    CBC & Differential (Completed)    Basic Metabolic Panel    aPTT    Protime-INR    aPTT      Interpretation Summary        · Findings consistent with a normal ECG stress test.  · Findings consistent with a normal ECG stress test.  · Left ventricular ejection fraction is normal (Calculated EF = 60%).  · Myocardial perfusion imaging indicates a small-to-medium-sized, mild-to-moderately severe area of ischemia located in the anterior wall.  · Impressions are consistent with an intermediate risk study.       .Interpretation Summary     · Right ventricular cavity is borderline dilated.  · Left atrial cavity size is mildly dilated.  · Mild tricuspid valve regurgitation is present.  · Calculated EF = 57%.  · There is no evidence of pericardial effusion.         The following portions of the patient's history were reviewed and updated as appropriate: allergies, current medications, past family history, past medical history, past social history, past surgical history and problem list.    Past Medical History:   Diagnosis Date   • Achilles tendon tear     left     • Anemia    • Anxiety    • Depression    • Diabetes mellitus, type 2 (CMS/[facility])    • ESRD (end stage renal disease) (CMS/[facility])     HAS LEFT FOREARM FISTULA   • GERD (gastroesophageal reflux disease)    • History of MRSA infection 03/15/2016    ABDOMINAL WOUND,   INFECTION CONTROLL NOTIFIED 2-6-2017   • Hyperlipidemia    • Hypertension    • Hypokalemia    • Hypomagnesemia    • Hypoxic 01/2016    WHEN SHE HAD PNEUMONIA   • Intertrigo    • Leukocytosis    • Osteoarthritis    • Pneumonia 01/2016   • Psoriasis    • Psoriatic arthritis (CMS/[facility])    • Seizures (CMS/[facility])     one time   • Sepsis (CMS/[facility]) 01/2016   • SOB (shortness of breath)    • Stage 4 chronic renal impairment associated with type 2 diabetes mellitus (CMS/[facility])    • Staph infection     HX RIGHT FOOT AT [facility] 01/09/2010   • Streptococcal bacteremia    • Swelling of hand 10/27/2016    LEFT HAND SWELLIMG SINCE LEFT FISTULA SURGERY   • Tremor, essential    • Wears glasses      reports that she quit smoking about 26 years ago. Her smoking use included cigarettes. She has a 40.00 pack-year smoking history. She has never used smokeless tobacco. She reports that she does not drink alcohol or use drugs.   Family History   Problem Relation Age of Onset   • Heart attack Mother    • Heart disease Mother    • Kidney disease Mother    • Heart attack Father    • Heart disease Father    • Hypertension Father    • Stroke Father         ISCHEMIC   • Diabetes Father         TYPE 2   • Kidney disease Brother    • Diabetes Brother         TYPE 1   • Thyroid disease Daughter    • Anxiety disorder Maternal Aunt    • Bipolar disorder Maternal Aunt    • Depression Maternal Aunt    • Lupus Maternal Aunt         LUPUS ANTICOAGLULANT   • Rheum arthritis Maternal Aunt    • Alcohol abuse Maternal Uncle    • Other Maternal Uncle         PULMONARY DISEASE       Review of Systems  Constitutional: No wt loss, fever, fatigue  Gastrointestinal: No nausea, abdominal pain  Behavioral/Psych:  "No insomnia or anxiety   Cardiovascular shortness of breath  Objective:       Physical Exam  BP (!) 191/94 (BP Location: Right arm, Patient Position: Sitting)   Pulse 85   Ht 162.6 cm (64\")   Wt 86.6 kg (191 lb)   BMI 32.79 kg/m²   General appearance: No acute changes   Neck: Trachea midline; NECK, supple, no thyromegaly or lymphadenopathy   Lungs: Normal size and shape, normal breath sounds, equal distribution of air, no rales and rhonchi   CV: S1-S2 regular, no murmurs, no rub, no gallop   Abdomen: Soft, non-tender; no masses , no abnormal abdominal sounds   Extremities: No deformity , normal color , no peripheral edema   Skin: Normal temperature, turgor and texture; no rash, ulcers          Procedures      Echocardiogram:        Current Outpatient Medications:   •  ACCU-CHEK JESUS MANUEL PLUS test strip, TEST THREE TIMES DAILY, Disp: 300 each, Rfl: 2  •  amLODIPine (NORVASC) 10 MG tablet, Take 1 tablet by mouth Every Morning., Disp: 90 tablet, Rfl: 1  •  apixaban (ELIQUIS) 5 MG tablet tablet, Take 1 tablet by mouth 2 (Two) Times a Day., Disp: 180 tablet, Rfl: 1  •  Apremilast (OTEZLA) 30 MG tablet, Take 30 mg by mouth Every Morning., Disp: , Rfl:   •  aspirin 81 MG tablet, Take 81 mg by mouth Every Morning. TO STOP THE DAY BEFORE SURGERY, Disp: , Rfl:   •  bisoprolol-hydrochlorothiazide (ZIAC) 10-6.25 MG per tablet, Take 1 tablet by mouth Daily., Disp: 30 tablet, Rfl: 6  •  bumetanide (BUMEX) 2 MG tablet, Take 1 tablet by mouth Daily., Disp: 90 tablet, Rfl: 1  •  cadexomer iodine (IODOSORB) 0.9 % gel, Apply topically to left heel every other day, Disp: 10 g, Rfl: 4  •  calcium acetate (PHOSLO) 667 MG capsule, Take 1 capsule by mouth 3 (Three) Times a Day., Disp: 270 capsule, Rfl: 1  •  Cholecalciferol (VITAMIN D-3) 1000 UNITS capsule, Take 1,000 Units by mouth 2 (Two) Times a Day., Disp: , Rfl:   •  epoetin hermes (EPOGEN,PROCRIT) 29841 UNIT/ML injection, Inject 20,000 Units under the skin Every 14 (Fourteen) Days. " LAST TIME 2-6-2017, Disp: , Rfl:   •  ferrous fumarate (YADIRA-SEQUELS) 50 MG CR tablet, Take 65 mg by mouth 2 (Two) Times a Day., Disp: , Rfl:   •  gabapentin (NEURONTIN) 300 MG capsule, TAKE 1 CAPSULE TWICE DAILY, Disp: 180 capsule, Rfl: 1  •  glimepiride (AMARYL) 2 MG tablet, Take 1 tablet by mouth Every Morning Before Breakfast., Disp: 90 tablet, Rfl: 1  •  HAVRIX 1440 EL U/ML vaccine, , Disp: , Rfl:   •  linagliptin (TRADJENTA) 5 MG tablet tablet, Take 1 tablet by mouth Daily., Disp: 90 tablet, Rfl: 1  •  montelukast (SINGULAIR) 10 MG tablet, Take 1 tablet by mouth Every Night., Disp: 90 tablet, Rfl: 1  •  Multiple Vitamin (MULTI VITAMIN DAILY PO), Take 1 tablet by mouth Every Morning., Disp: , Rfl:   •  Probiotic Product (PROBIOTIC DAILY PO), Take 1 capsule by mouth Daily., Disp: , Rfl:   •  pyridoxine (VITAMIN B-6) 500 MG tablet, Take 500 mg by mouth Daily., Disp: , Rfl:   •  sertraline (ZOLOFT) 50 MG tablet, Take 1 tablet by mouth Daily., Disp: 90 tablet, Rfl: 1  •  topiramate (TOPAMAX) 50 MG tablet, Take 100 mg by mouth Daily., Disp: , Rfl:    Assessment:        Patient Active Problem List   Diagnosis   • Menstrual disorder   • Atopic rhinitis   • Anemia in CKD (chronic kidney disease)   • Spasm of cervical paraspinous muscle   • Cervical radiculopathy   • Chronic kidney disease, stage IV (severe) (CMS/HCC)   • Depression   • DM type 2 with diabetic peripheral neuropathy (CMS/HCC)   • Essential hypertension   • Duplay's periarthritis syndrome   • Gastroesophageal reflux disease   • Hyperlipidemia   • Hypertension   • Arthritis   • Seizure disorder (CMS/HCC)   • Phlebitis   • Type 2 diabetes mellitus (CMS/HCC)   • Vitamin D deficiency   • Chest pain   • Abscess   • Generalized muscle weakness   • Abdominal wall cellulitis   • HTN (hypertension)   • CKD (chronic kidney disease) stage 4, GFR 15-29 ml/min (CMS/HCC)   • Diabetes mellitus with peripheral autonomic neuropathy (CMS/HCC)   • Hyponatremia   •  Hypercalcemia   • Dehydration   • DACIA (acute kidney injury) (CMS/[facility])   • Abdominal wall abscess   • Cellulitis of toe of left foot   • Partial Achilles tendon tear, left, initial encounter   • Arthritis of foot   • Essential tremor   • Primary Parkinsonism (CMS/[facility])               Plan:            ICD-10-CM ICD-9-CM   1. Abnormal cardiac function test R94.30 794.30   2. Precordial pain R07.2 786.51   3. Other chest pain  R07.89 786.59   4. Essential hypertension I10 401.9   5. Hyperlipidemia, unspecified hyperlipidemia type E78.5 272.4   6. Coronary artery disease of native artery of native heart with stable angina pectoris (CMS/[facility]) I25.118 414.01     413.9     1. Abnormal cardiac function test  Procedure, risks and options of cardiac cath explained to pt INCLUDING BUT NOT LIMITED TO MI, STROKE, DEATH, INFECTION HAEMORRHAGE, . Pt understands well and agrees with no further questions.  - Case Request Cath Lab: Left Heart Cath  - CBC & Differential; Future  - Basic Metabolic Panel; Future  - aPTT; Future  - Protime-INR; Future    2. Precordial pain  Atypical  - CBC & Differential; Future  - Basic Metabolic Panel; Future  - aPTT; Future  - Protime-INR; Future    3. Other chest pain   Atypical  - aPTT; Future    4. Essential hypertension  Blood pressure under control    5. Hyperlipidemia, unspecified hyperlipidemia type  Continue current medications    6. Coronary artery disease of native artery of native heart with stable angina pectoris (CMS/[facility])  No chest pain       Abnormal cardiac fun test    Procedure, risks and options of cardiac cath explained to pt INCLUDING BUT NOT LIMITED TO MI, STROKE, DEATH, INFECTION HAEMORRHAGE, . Pt understands well and agrees with no further questions.  COUNSELING:    Maribeth Eagle was given to patient for the following topics: diagnostic results, risk factor reductions, impressions, risks and benefits of treatment options and importance of treatment compliance .       SMOKING  COUNSELING:    Counseling given: Yes  Comment: quit 1993      Dictated using Dragon dictation

## 2019-07-17 ENCOUNTER — TELEPHONE (OUTPATIENT)
Dept: FAMILY MEDICINE CLINIC | Facility: CLINIC | Age: 69
End: 2019-07-17

## 2019-07-17 RX ORDER — FUROSEMIDE 80 MG
80 TABLET ORAL DAILY
Qty: 90 TABLET | Refills: 1 | Status: SHIPPED | OUTPATIENT
Start: 2019-07-17 | End: 2019-08-06 | Stop reason: HOSPADM

## 2019-07-17 NOTE — TELEPHONE ENCOUNTER
----- Message from Tasneem Marti MA sent at 7/16/2019 11:53 AM EDT -----  Patient asked if she can be put back on HCTZ or something cheaper than bumetanide.  It costs $87.50...she didn't know if you changed it to this because of her kidneys.  If you can change it, please send to [facility].  She is having a heart cath in 1 week.  Message was left that this was originally prescribed by the nephrologist.  The closest thing to it would likely be furosemide.  She would have to be on 80 mg daily.

## 2019-07-25 ENCOUNTER — APPOINTMENT (OUTPATIENT)
Dept: GENERAL RADIOLOGY | Facility: HOSPITAL | Age: 69
End: 2019-07-25

## 2019-07-25 ENCOUNTER — HOSPITAL ENCOUNTER (INPATIENT)
Facility: HOSPITAL | Age: 69
LOS: 11 days | Discharge: HOME HEALTH | End: 2019-08-06
Attending: INTERNAL MEDICINE | Admitting: THORACIC SURGERY (CARDIOTHORACIC VASCULAR SURGERY)

## 2019-07-25 DIAGNOSIS — M75.00 DUPLAY'S PERIARTHRITIS SYNDROME, UNSPECIFIED LATERALITY: ICD-10-CM

## 2019-07-25 DIAGNOSIS — I25.118 CORONARY ARTERY DISEASE OF NATIVE ARTERY OF NATIVE HEART WITH STABLE ANGINA PECTORIS (HCC): ICD-10-CM

## 2019-07-25 DIAGNOSIS — I25.118 CORONARY ARTERY DISEASE OF NATIVE HEART WITH STABLE ANGINA PECTORIS, UNSPECIFIED VESSEL OR LESION TYPE (HCC): Primary | ICD-10-CM

## 2019-07-25 DIAGNOSIS — R94.30 ABNORMAL CARDIAC FUNCTION TEST: ICD-10-CM

## 2019-07-25 DIAGNOSIS — Z74.09 IMPAIRED FUNCTIONAL MOBILITY AND ACTIVITY TOLERANCE: ICD-10-CM

## 2019-07-25 LAB
ALBUMIN SERPL-MCNC: 3.1 G/DL (ref 3.5–5.2)
ALBUMIN/GLOB SERPL: 0.9 G/DL
ALP SERPL-CCNC: 74 U/L (ref 39–117)
ALT SERPL W P-5'-P-CCNC: 11 U/L (ref 1–33)
ANION GAP SERPL CALCULATED.3IONS-SCNC: 10.2 MMOL/L (ref 5–15)
AST SERPL-CCNC: 13 U/L (ref 1–32)
BASOPHILS # BLD AUTO: 0.04 10*3/MM3 (ref 0–0.2)
BASOPHILS NFR BLD AUTO: 0.3 % (ref 0–1.5)
BH CV XLRA MEAS - DIST GSV CALF DIST LEFT: 0.25 CM
BH CV XLRA MEAS - DIST GSV CALF DIST RIGHT: 0.21 CM
BH CV XLRA MEAS - DIST GSV THIGH DIST LEFT: 0.41 CM
BH CV XLRA MEAS - DIST GSV THIGH DIST RIGHT: 0.51 CM
BH CV XLRA MEAS - GSV ANKLE DIST LEFT: 0.32 CM
BH CV XLRA MEAS - GSV ANKLE DIST RIGHT: 0.22 CM
BH CV XLRA MEAS - GSV KNEE DIST LEFT: 0.36 CM
BH CV XLRA MEAS - GSV KNEE DIST RIGHT: 0.4 CM
BH CV XLRA MEAS - GSV ORIGIN DIST LEFT: 0.75 CM
BH CV XLRA MEAS - GSV ORIGIN DIST RIGHT: 0.89 CM
BH CV XLRA MEAS - MID GSV CALF LEFT: 0.27 CM
BH CV XLRA MEAS - MID GSV CALF RIGHT: 0.25 CM
BH CV XLRA MEAS - MID GSV THIGH  LEFT: 0.4 CM
BH CV XLRA MEAS - MID GSV THIGH  RIGHT: 0.38 CM
BH CV XLRA MEAS - PROX GSV CALF DIST LEFT: 0.28 CM
BH CV XLRA MEAS - PROX GSV CALF DIST RIGHT: 0.29 CM
BH CV XLRA MEAS - PROX GSV THIGH  LEFT: 0.35 CM
BH CV XLRA MEAS - PROX GSV THIGH  RIGHT: 0.35 CM
BILIRUB SERPL-MCNC: 0.3 MG/DL (ref 0.2–1.2)
BUN BLD-MCNC: 57 MG/DL (ref 8–23)
BUN/CREAT SERPL: 13.6 (ref 7–25)
CALCIUM SPEC-SCNC: 8.5 MG/DL (ref 8.6–10.5)
CHLORIDE SERPL-SCNC: 101 MMOL/L (ref 98–107)
CO2 SERPL-SCNC: 24.8 MMOL/L (ref 22–29)
CREAT BLD-MCNC: 4.19 MG/DL (ref 0.57–1)
DEPRECATED RDW RBC AUTO: 54 FL (ref 37–54)
EOSINOPHIL # BLD AUTO: 0.24 10*3/MM3 (ref 0–0.4)
EOSINOPHIL NFR BLD AUTO: 2 % (ref 0.3–6.2)
ERYTHROCYTE [DISTWIDTH] IN BLOOD BY AUTOMATED COUNT: 15.7 % (ref 12.3–15.4)
GFR SERPL CREATININE-BSD FRML MDRD: 11 ML/MIN/1.73
GFR SERPL CREATININE-BSD FRML MDRD: ABNORMAL ML/MIN/1.73
GLOBULIN UR ELPH-MCNC: 3.5 GM/DL
GLUCOSE BLD-MCNC: 386 MG/DL (ref 65–99)
GLUCOSE BLDC GLUCOMTR-MCNC: 171 MG/DL (ref 70–130)
GLUCOSE BLDC GLUCOMTR-MCNC: 370 MG/DL (ref 70–130)
HCT VFR BLD AUTO: 32.6 % (ref 34–46.6)
HGB BLD-MCNC: 10.1 G/DL (ref 12–15.9)
IMM GRANULOCYTES # BLD AUTO: 0.07 10*3/MM3 (ref 0–0.05)
IMM GRANULOCYTES NFR BLD AUTO: 0.6 % (ref 0–0.5)
LYMPHOCYTES # BLD AUTO: 1.5 10*3/MM3 (ref 0.7–3.1)
LYMPHOCYTES NFR BLD AUTO: 12.7 % (ref 19.6–45.3)
MCH RBC QN AUTO: 28.9 PG (ref 26.6–33)
MCHC RBC AUTO-ENTMCNC: 31 G/DL (ref 31.5–35.7)
MCV RBC AUTO: 93.1 FL (ref 79–97)
MONOCYTES # BLD AUTO: 1.04 10*3/MM3 (ref 0.1–0.9)
MONOCYTES NFR BLD AUTO: 8.8 % (ref 5–12)
NEUTROPHILS # BLD AUTO: 8.88 10*3/MM3 (ref 1.7–7)
NEUTROPHILS NFR BLD AUTO: 75.6 % (ref 42.7–76)
NRBC BLD AUTO-RTO: 0 /100 WBC (ref 0–0.2)
PLATELET # BLD AUTO: 188 10*3/MM3 (ref 140–450)
PMV BLD AUTO: 11 FL (ref 6–12)
POTASSIUM BLD-SCNC: 4.4 MMOL/L (ref 3.5–5.2)
PROT SERPL-MCNC: 6.6 G/DL (ref 6–8.5)
RBC # BLD AUTO: 3.5 10*6/MM3 (ref 3.77–5.28)
SODIUM BLD-SCNC: 136 MMOL/L (ref 136–145)
TROPONIN T SERPL-MCNC: 0.02 NG/ML (ref 0–0.03)
WBC NRBC COR # BLD: 11.77 10*3/MM3 (ref 3.4–10.8)

## 2019-07-25 PROCEDURE — 63710000001 INSULIN LISPRO (HUMAN) PER 5 UNITS: Performed by: HOSPITALIST

## 2019-07-25 PROCEDURE — 82962 GLUCOSE BLOOD TEST: CPT

## 2019-07-25 PROCEDURE — 99219 PR INITIAL OBSERVATION CARE/DAY 50 MINUTES: CPT | Performed by: INTERNAL MEDICINE

## 2019-07-25 PROCEDURE — 71045 X-RAY EXAM CHEST 1 VIEW: CPT

## 2019-07-25 PROCEDURE — G0378 HOSPITAL OBSERVATION PER HR: HCPCS

## 2019-07-25 PROCEDURE — 85025 COMPLETE CBC W/AUTO DIFF WBC: CPT | Performed by: NURSE PRACTITIONER

## 2019-07-25 PROCEDURE — 84484 ASSAY OF TROPONIN QUANT: CPT | Performed by: NURSE PRACTITIONER

## 2019-07-25 PROCEDURE — 80053 COMPREHEN METABOLIC PANEL: CPT | Performed by: NURSE PRACTITIONER

## 2019-07-25 RX ORDER — ATENOLOL AND CHLORTHALIDONE 50; 25 MG/1; MG/1
TABLET ORAL
Status: DISCONTINUED | OUTPATIENT
Start: 2019-07-25 | End: 2019-07-29

## 2019-07-25 RX ORDER — GABAPENTIN 300 MG/1
300 CAPSULE ORAL NIGHTLY
Status: DISCONTINUED | OUTPATIENT
Start: 2019-07-25 | End: 2019-07-29

## 2019-07-25 RX ORDER — AMLODIPINE BESYLATE 10 MG/1
10 TABLET ORAL EVERY MORNING
Status: DISCONTINUED | OUTPATIENT
Start: 2019-07-26 | End: 2019-07-29

## 2019-07-25 RX ORDER — ASPIRIN 81 MG/1
81 TABLET ORAL DAILY
Status: DISCONTINUED | OUTPATIENT
Start: 2019-07-25 | End: 2019-07-29

## 2019-07-25 RX ORDER — DIPHENOXYLATE HYDROCHLORIDE AND ATROPINE SULFATE 2.5; .025 MG/1; MG/1
1 TABLET ORAL EVERY MORNING
Status: DISCONTINUED | OUTPATIENT
Start: 2019-07-26 | End: 2019-07-29

## 2019-07-25 RX ORDER — MONTELUKAST SODIUM 10 MG/1
10 TABLET ORAL NIGHTLY
Status: DISCONTINUED | OUTPATIENT
Start: 2019-07-25 | End: 2019-07-29

## 2019-07-25 RX ORDER — TOPIRAMATE 100 MG/1
100 TABLET, FILM COATED ORAL DAILY
Status: DISCONTINUED | OUTPATIENT
Start: 2019-07-25 | End: 2019-07-29

## 2019-07-25 RX ORDER — GABAPENTIN 300 MG/1
300 CAPSULE ORAL EVERY 12 HOURS SCHEDULED
Status: DISCONTINUED | OUTPATIENT
Start: 2019-07-25 | End: 2019-07-25

## 2019-07-25 RX ORDER — LANOLIN ALCOHOL/MO/W.PET/CERES
500 CREAM (GRAM) TOPICAL DAILY
Status: DISCONTINUED | OUTPATIENT
Start: 2019-07-25 | End: 2019-07-29

## 2019-07-25 RX ORDER — SODIUM CHLORIDE 0.9 % (FLUSH) 0.9 %
3-10 SYRINGE (ML) INJECTION AS NEEDED
Status: DISCONTINUED | OUTPATIENT
Start: 2019-07-25 | End: 2019-07-29

## 2019-07-25 RX ORDER — SODIUM CHLORIDE 9 MG/ML
50 INJECTION, SOLUTION INTRAVENOUS CONTINUOUS
Status: DISCONTINUED | OUTPATIENT
Start: 2019-07-25 | End: 2019-07-26

## 2019-07-25 RX ORDER — NITROGLYCERIN 0.4 MG/1
0.4 TABLET SUBLINGUAL
Status: DISCONTINUED | OUTPATIENT
Start: 2019-07-25 | End: 2019-07-29

## 2019-07-25 RX ORDER — L.ACID,PARA/B.BIFIDUM/S.THERM 8B CELL
1 CAPSULE ORAL DAILY
Status: DISCONTINUED | OUTPATIENT
Start: 2019-07-25 | End: 2019-07-29

## 2019-07-25 RX ORDER — PANTOPRAZOLE SODIUM 40 MG/1
40 TABLET, DELAYED RELEASE ORAL
Status: DISCONTINUED | OUTPATIENT
Start: 2019-07-26 | End: 2019-07-29

## 2019-07-25 RX ORDER — SODIUM CHLORIDE 0.9 % (FLUSH) 0.9 %
3 SYRINGE (ML) INJECTION EVERY 12 HOURS SCHEDULED
Status: DISCONTINUED | OUTPATIENT
Start: 2019-07-25 | End: 2019-07-29

## 2019-07-25 RX ADMIN — INSULIN LISPRO 2 UNITS: 100 INJECTION, SOLUTION INTRAVENOUS; SUBCUTANEOUS at 22:08

## 2019-07-25 RX ADMIN — Medication 1 CAPSULE: at 15:58

## 2019-07-25 RX ADMIN — CALCIUM ACETATE 667 MG: 667 CAPSULE ORAL at 15:57

## 2019-07-25 RX ADMIN — LINAGLIPTIN 5 MG: 5 TABLET, FILM COATED ORAL at 15:57

## 2019-07-25 RX ADMIN — TOPIRAMATE 100 MG: 100 TABLET, FILM COATED ORAL at 15:59

## 2019-07-25 RX ADMIN — SODIUM CHLORIDE, PRESERVATIVE FREE 3 ML: 5 INJECTION INTRAVENOUS at 22:09

## 2019-07-25 RX ADMIN — SODIUM CHLORIDE 75 ML/HR: 9 INJECTION, SOLUTION INTRAVENOUS at 16:09

## 2019-07-25 RX ADMIN — CHLORTHALIDONE: 25 TABLET ORAL at 17:04

## 2019-07-25 RX ADMIN — ASPIRIN 81 MG: 81 TABLET, COATED ORAL at 16:08

## 2019-07-25 RX ADMIN — MONTELUKAST SODIUM 10 MG: 10 TABLET, FILM COATED ORAL at 22:08

## 2019-07-25 RX ADMIN — CALCIUM ACETATE 667 MG: 667 CAPSULE ORAL at 22:08

## 2019-07-25 RX ADMIN — GABAPENTIN 300 MG: 300 CAPSULE ORAL at 22:08

## 2019-07-25 RX ADMIN — INSULIN LISPRO 6 UNITS: 100 INJECTION, SOLUTION INTRAVENOUS; SUBCUTANEOUS at 16:08

## 2019-07-26 ENCOUNTER — APPOINTMENT (OUTPATIENT)
Dept: RESPIRATORY THERAPY | Facility: HOSPITAL | Age: 69
End: 2019-07-26

## 2019-07-26 ENCOUNTER — APPOINTMENT (OUTPATIENT)
Dept: CARDIOLOGY | Facility: HOSPITAL | Age: 69
End: 2019-07-26

## 2019-07-26 ENCOUNTER — APPOINTMENT (OUTPATIENT)
Dept: GENERAL RADIOLOGY | Facility: HOSPITAL | Age: 69
End: 2019-07-26

## 2019-07-26 ENCOUNTER — APPOINTMENT (OUTPATIENT)
Dept: INFUSION THERAPY | Facility: HOSPITAL | Age: 69
End: 2019-07-26

## 2019-07-26 PROBLEM — I25.118 CORONARY ARTERY DISEASE OF NATIVE HEART WITH STABLE ANGINA PECTORIS (HCC): Status: ACTIVE | Noted: 2019-07-15

## 2019-07-26 LAB
ALBUMIN SERPL-MCNC: 3 G/DL (ref 3.5–5.2)
ALBUMIN/GLOB SERPL: 0.8 G/DL
ALP SERPL-CCNC: 68 U/L (ref 39–117)
ALT SERPL W P-5'-P-CCNC: 9 U/L (ref 1–33)
ANION GAP SERPL CALCULATED.3IONS-SCNC: 11.2 MMOL/L (ref 5–15)
AST SERPL-CCNC: 10 U/L (ref 1–32)
BASOPHILS # BLD AUTO: 0.04 10*3/MM3 (ref 0–0.2)
BASOPHILS NFR BLD AUTO: 0.3 % (ref 0–1.5)
BH CV XLRA MEAS LEFT CCA RATIO VEL: -98.2 CM/SEC
BH CV XLRA MEAS LEFT DIST CCA EDV: -20.6 CM/SEC
BH CV XLRA MEAS LEFT DIST CCA PSV: -98.2 CM/SEC
BH CV XLRA MEAS LEFT DIST ICA EDV: -23.4 CM/SEC
BH CV XLRA MEAS LEFT DIST ICA PSV: -121 CM/SEC
BH CV XLRA MEAS LEFT ICA RATIO VEL: -121 CM/SEC
BH CV XLRA MEAS LEFT ICA/CCA RATIO: 1.2
BH CV XLRA MEAS LEFT MID ICA EDV: -23.5 CM/SEC
BH CV XLRA MEAS LEFT MID ICA PSV: -65.7 CM/SEC
BH CV XLRA MEAS LEFT PROX CCA EDV: 14.7 CM/SEC
BH CV XLRA MEAS LEFT PROX CCA PSV: 110 CM/SEC
BH CV XLRA MEAS LEFT PROX ECA EDV: -8.8 CM/SEC
BH CV XLRA MEAS LEFT PROX ECA PSV: -105 CM/SEC
BH CV XLRA MEAS LEFT PROX ICA EDV: 19.6 CM/SEC
BH CV XLRA MEAS LEFT PROX ICA PSV: 80.1 CM/SEC
BH CV XLRA MEAS LEFT PROX SCLA EDV: 77.3 CM/SEC
BH CV XLRA MEAS LEFT PROX SCLA PSV: 246 CM/SEC
BH CV XLRA MEAS LEFT VERTEBRAL A EDV: -17 CM/SEC
BH CV XLRA MEAS LEFT VERTEBRAL A PSV: -75.6 CM/SEC
BH CV XLRA MEAS RIGHT CCA RATIO VEL: -54.2 CM/SEC
BH CV XLRA MEAS RIGHT DIST CCA EDV: -9 CM/SEC
BH CV XLRA MEAS RIGHT DIST CCA PSV: -54.2 CM/SEC
BH CV XLRA MEAS RIGHT DIST ICA EDV: -18.1 CM/SEC
BH CV XLRA MEAS RIGHT DIST ICA PSV: -59.7 CM/SEC
BH CV XLRA MEAS RIGHT ICA RATIO VEL: 64.4 CM/SEC
BH CV XLRA MEAS RIGHT ICA/CCA RATIO: -1.2
BH CV XLRA MEAS RIGHT MID ICA EDV: 17.3 CM/SEC
BH CV XLRA MEAS RIGHT MID ICA PSV: 64.4 CM/SEC
BH CV XLRA MEAS RIGHT PROX CCA EDV: -10.2 CM/SEC
BH CV XLRA MEAS RIGHT PROX CCA PSV: -91.9 CM/SEC
BH CV XLRA MEAS RIGHT PROX ECA EDV: 10.2 CM/SEC
BH CV XLRA MEAS RIGHT PROX ECA PSV: 98.2 CM/SEC
BH CV XLRA MEAS RIGHT PROX ICA EDV: -9.4 CM/SEC
BH CV XLRA MEAS RIGHT PROX ICA PSV: -47.9 CM/SEC
BH CV XLRA MEAS RIGHT PROX SCLA PSV: 138 CM/SEC
BH CV XLRA MEAS RIGHT VERTEBRAL A EDV: -6.7 CM/SEC
BH CV XLRA MEAS RIGHT VERTEBRAL A PSV: -27.6 CM/SEC
BILIRUB SERPL-MCNC: 0.3 MG/DL (ref 0.2–1.2)
BUN BLD-MCNC: 58 MG/DL (ref 8–23)
BUN/CREAT SERPL: 14.3 (ref 7–25)
CALCIUM SPEC-SCNC: 8.7 MG/DL (ref 8.6–10.5)
CHLORIDE SERPL-SCNC: 97 MMOL/L (ref 98–107)
CHOLEST SERPL-MCNC: 134 MG/DL (ref 0–200)
CO2 SERPL-SCNC: 21.8 MMOL/L (ref 22–29)
CREAT BLD-MCNC: 4.06 MG/DL (ref 0.57–1)
DEPRECATED RDW RBC AUTO: 54.8 FL (ref 37–54)
EOSINOPHIL # BLD AUTO: 0.34 10*3/MM3 (ref 0–0.4)
EOSINOPHIL NFR BLD AUTO: 2.8 % (ref 0.3–6.2)
ERYTHROCYTE [DISTWIDTH] IN BLOOD BY AUTOMATED COUNT: 15.8 % (ref 12.3–15.4)
GFR SERPL CREATININE-BSD FRML MDRD: 11 ML/MIN/1.73
GFR SERPL CREATININE-BSD FRML MDRD: ABNORMAL ML/MIN/1.73
GLOBULIN UR ELPH-MCNC: 3.7 GM/DL
GLUCOSE BLD-MCNC: 91 MG/DL (ref 65–99)
GLUCOSE BLDC GLUCOMTR-MCNC: 106 MG/DL (ref 70–130)
GLUCOSE BLDC GLUCOMTR-MCNC: 243 MG/DL (ref 70–130)
GLUCOSE BLDC GLUCOMTR-MCNC: 87 MG/DL (ref 70–130)
HBA1C MFR BLD: 7.56 % (ref 4.8–5.6)
HCT VFR BLD AUTO: 32.9 % (ref 34–46.6)
HDLC SERPL-MCNC: 38 MG/DL (ref 40–60)
HGB BLD-MCNC: 9.9 G/DL (ref 12–15.9)
IMM GRANULOCYTES # BLD AUTO: 0.07 10*3/MM3 (ref 0–0.05)
IMM GRANULOCYTES NFR BLD AUTO: 0.6 % (ref 0–0.5)
LDLC SERPL CALC-MCNC: 74 MG/DL (ref 0–100)
LDLC/HDLC SERPL: 1.94 {RATIO}
LEFT ARM BP: NORMAL MMHG
LYMPHOCYTES # BLD AUTO: 1.82 10*3/MM3 (ref 0.7–3.1)
LYMPHOCYTES NFR BLD AUTO: 15 % (ref 19.6–45.3)
MCH RBC QN AUTO: 28.6 PG (ref 26.6–33)
MCHC RBC AUTO-ENTMCNC: 30.1 G/DL (ref 31.5–35.7)
MCV RBC AUTO: 95.1 FL (ref 79–97)
MONOCYTES # BLD AUTO: 1.08 10*3/MM3 (ref 0.1–0.9)
MONOCYTES NFR BLD AUTO: 8.9 % (ref 5–12)
NEUTROPHILS # BLD AUTO: 8.8 10*3/MM3 (ref 1.7–7)
NEUTROPHILS NFR BLD AUTO: 72.4 % (ref 42.7–76)
NRBC BLD AUTO-RTO: 0 /100 WBC (ref 0–0.2)
NT-PROBNP SERPL-MCNC: 2477 PG/ML (ref 5–900)
PLATELET # BLD AUTO: 189 10*3/MM3 (ref 140–450)
PMV BLD AUTO: 11.1 FL (ref 6–12)
POTASSIUM BLD-SCNC: 4 MMOL/L (ref 3.5–5.2)
PROT SERPL-MCNC: 6.7 G/DL (ref 6–8.5)
RBC # BLD AUTO: 3.46 10*6/MM3 (ref 3.77–5.28)
RIGHT ARM BP: NORMAL MMHG
SODIUM BLD-SCNC: 130 MMOL/L (ref 136–145)
TRIGL SERPL-MCNC: 112 MG/DL (ref 0–150)
TROPONIN T SERPL-MCNC: 0.02 NG/ML (ref 0–0.03)
TSH SERPL DL<=0.05 MIU/L-ACNC: 1.64 MIU/ML (ref 0.27–4.2)
VLDLC SERPL-MCNC: 22.4 MG/DL (ref 5–40)
WBC NRBC COR # BLD: 12.15 10*3/MM3 (ref 3.4–10.8)

## 2019-07-26 PROCEDURE — 93458 L HRT ARTERY/VENTRICLE ANGIO: CPT | Performed by: INTERNAL MEDICINE

## 2019-07-26 PROCEDURE — 84443 ASSAY THYROID STIM HORMONE: CPT | Performed by: HOSPITALIST

## 2019-07-26 PROCEDURE — 99222 1ST HOSP IP/OBS MODERATE 55: CPT | Performed by: THORACIC SURGERY (CARDIOTHORACIC VASCULAR SURGERY)

## 2019-07-26 PROCEDURE — 80053 COMPREHEN METABOLIC PANEL: CPT | Performed by: HOSPITALIST

## 2019-07-26 PROCEDURE — 85025 COMPLETE CBC W/AUTO DIFF WBC: CPT | Performed by: NURSE PRACTITIONER

## 2019-07-26 PROCEDURE — C1894 INTRO/SHEATH, NON-LASER: HCPCS | Performed by: INTERNAL MEDICINE

## 2019-07-26 PROCEDURE — 83880 ASSAY OF NATRIURETIC PEPTIDE: CPT | Performed by: NURSE PRACTITIONER

## 2019-07-26 PROCEDURE — 93970 EXTREMITY STUDY: CPT

## 2019-07-26 PROCEDURE — 25010000002 MIDAZOLAM PER 1 MG: Performed by: INTERNAL MEDICINE

## 2019-07-26 PROCEDURE — 94060 EVALUATION OF WHEEZING: CPT

## 2019-07-26 PROCEDURE — 82962 GLUCOSE BLOOD TEST: CPT

## 2019-07-26 PROCEDURE — 83036 HEMOGLOBIN GLYCOSYLATED A1C: CPT | Performed by: NURSE PRACTITIONER

## 2019-07-26 PROCEDURE — 71046 X-RAY EXAM CHEST 2 VIEWS: CPT

## 2019-07-26 PROCEDURE — 25010000002 HEPARIN (PORCINE) PER 1000 UNITS: Performed by: INTERNAL MEDICINE

## 2019-07-26 PROCEDURE — 84484 ASSAY OF TROPONIN QUANT: CPT | Performed by: NURSE PRACTITIONER

## 2019-07-26 PROCEDURE — 80061 LIPID PANEL: CPT | Performed by: NURSE PRACTITIONER

## 2019-07-26 PROCEDURE — C1769 GUIDE WIRE: HCPCS | Performed by: INTERNAL MEDICINE

## 2019-07-26 PROCEDURE — 0 IOPAMIDOL PER 1 ML: Performed by: INTERNAL MEDICINE

## 2019-07-26 PROCEDURE — 93880 EXTRACRANIAL BILAT STUDY: CPT

## 2019-07-26 PROCEDURE — 25010000002 FENTANYL CITRATE (PF) 100 MCG/2ML SOLUTION: Performed by: INTERNAL MEDICINE

## 2019-07-26 PROCEDURE — 63710000001 INSULIN LISPRO (HUMAN) PER 5 UNITS: Performed by: HOSPITALIST

## 2019-07-26 RX ORDER — MIDAZOLAM HYDROCHLORIDE 1 MG/ML
INJECTION INTRAMUSCULAR; INTRAVENOUS AS NEEDED
Status: DISCONTINUED | OUTPATIENT
Start: 2019-07-26 | End: 2019-07-26 | Stop reason: HOSPADM

## 2019-07-26 RX ORDER — CEFAZOLIN SODIUM 2 G/100ML
2 INJECTION, SOLUTION INTRAVENOUS
Status: COMPLETED | OUTPATIENT
Start: 2019-07-29 | End: 2019-07-29

## 2019-07-26 RX ORDER — FENTANYL CITRATE 50 UG/ML
INJECTION, SOLUTION INTRAMUSCULAR; INTRAVENOUS AS NEEDED
Status: DISCONTINUED | OUTPATIENT
Start: 2019-07-26 | End: 2019-07-26 | Stop reason: HOSPADM

## 2019-07-26 RX ORDER — CHLORHEXIDINE GLUCONATE 0.12 MG/ML
15 RINSE ORAL EVERY 12 HOURS SCHEDULED
Status: DISCONTINUED | OUTPATIENT
Start: 2019-07-28 | End: 2019-07-27

## 2019-07-26 RX ORDER — TEMAZEPAM 7.5 MG/1
7.5 CAPSULE ORAL NIGHTLY PRN
Status: DISCONTINUED | OUTPATIENT
Start: 2019-07-26 | End: 2019-07-29

## 2019-07-26 RX ORDER — CHLORHEXIDINE GLUCONATE 500 MG/1
1 CLOTH TOPICAL EVERY 12 HOURS
Status: DISCONTINUED | OUTPATIENT
Start: 2019-07-28 | End: 2019-07-27

## 2019-07-26 RX ORDER — SODIUM CHLORIDE 9 MG/ML
75 INJECTION, SOLUTION INTRAVENOUS CONTINUOUS
Status: DISCONTINUED | OUTPATIENT
Start: 2019-07-26 | End: 2019-07-27

## 2019-07-26 RX ORDER — ALBUTEROL SULFATE 2.5 MG/3ML
2.5 SOLUTION RESPIRATORY (INHALATION) ONCE AS NEEDED
Status: COMPLETED | OUTPATIENT
Start: 2019-07-26 | End: 2019-07-26

## 2019-07-26 RX ORDER — LIDOCAINE HYDROCHLORIDE 20 MG/ML
INJECTION, SOLUTION INFILTRATION; PERINEURAL AS NEEDED
Status: DISCONTINUED | OUTPATIENT
Start: 2019-07-26 | End: 2019-07-26 | Stop reason: HOSPADM

## 2019-07-26 RX ADMIN — CALCIUM ACETATE 667 MG: 667 CAPSULE ORAL at 09:10

## 2019-07-26 RX ADMIN — LINAGLIPTIN 5 MG: 5 TABLET, FILM COATED ORAL at 09:10

## 2019-07-26 RX ADMIN — AMLODIPINE BESYLATE 10 MG: 10 TABLET ORAL at 07:01

## 2019-07-26 RX ADMIN — CALCIUM ACETATE 667 MG: 667 CAPSULE ORAL at 17:54

## 2019-07-26 RX ADMIN — SODIUM CHLORIDE, PRESERVATIVE FREE 3 ML: 5 INJECTION INTRAVENOUS at 09:17

## 2019-07-26 RX ADMIN — ASPIRIN 81 MG: 81 TABLET, COATED ORAL at 09:10

## 2019-07-26 RX ADMIN — TOPIRAMATE 100 MG: 100 TABLET, FILM COATED ORAL at 09:10

## 2019-07-26 RX ADMIN — ALBUTEROL SULFATE 2.5 MG: 2.5 SOLUTION RESPIRATORY (INHALATION) at 17:00

## 2019-07-26 RX ADMIN — Medication 1 TABLET: at 07:01

## 2019-07-26 RX ADMIN — CALCIUM ACETATE 667 MG: 667 CAPSULE ORAL at 21:26

## 2019-07-26 RX ADMIN — SODIUM CHLORIDE 75 ML/HR: 9 INJECTION, SOLUTION INTRAVENOUS at 12:24

## 2019-07-26 RX ADMIN — Medication 1 CAPSULE: at 09:10

## 2019-07-26 RX ADMIN — GABAPENTIN 300 MG: 300 CAPSULE ORAL at 21:26

## 2019-07-26 RX ADMIN — MONTELUKAST SODIUM 10 MG: 10 TABLET, FILM COATED ORAL at 21:26

## 2019-07-26 RX ADMIN — PANTOPRAZOLE SODIUM 40 MG: 40 TABLET, DELAYED RELEASE ORAL at 07:01

## 2019-07-26 RX ADMIN — CHLORTHALIDONE: 25 TABLET ORAL at 09:10

## 2019-07-26 RX ADMIN — INSULIN LISPRO 3 UNITS: 100 INJECTION, SOLUTION INTRAVENOUS; SUBCUTANEOUS at 21:26

## 2019-07-26 RX ADMIN — SODIUM CHLORIDE, PRESERVATIVE FREE 3 ML: 5 INJECTION INTRAVENOUS at 21:27

## 2019-07-27 LAB
ABO GROUP BLD: NORMAL
ALBUMIN SERPL-MCNC: 3 G/DL (ref 3.5–5.2)
ALBUMIN/GLOB SERPL: 0.8 G/DL
ALP SERPL-CCNC: 60 U/L (ref 39–117)
ALT SERPL W P-5'-P-CCNC: 8 U/L (ref 1–33)
ANION GAP SERPL CALCULATED.3IONS-SCNC: 13.6 MMOL/L (ref 5–15)
ANTI-K: NORMAL
APTT PPP: 35.3 SECONDS (ref 22.7–35.4)
APTT PPP: 70.6 SECONDS (ref 22.7–35.4)
APTT PPP: 78.7 SECONDS (ref 22.7–35.4)
ARTERIAL PATENCY WRIST A: ABNORMAL
AST SERPL-CCNC: 10 U/L (ref 1–32)
ATMOSPHERIC PRESS: 757.2 MMHG
BASE EXCESS BLDA CALC-SCNC: -4.7 MMOL/L (ref 0–2)
BASOPHILS # BLD AUTO: 0.03 10*3/MM3 (ref 0–0.2)
BASOPHILS NFR BLD AUTO: 0.3 % (ref 0–1.5)
BDY SITE: ABNORMAL
BILIRUB SERPL-MCNC: 0.2 MG/DL (ref 0.2–1.2)
BLD GP AB SCN SERPL QL: POSITIVE
BUN BLD-MCNC: 60 MG/DL (ref 8–23)
BUN/CREAT SERPL: 14.4 (ref 7–25)
CALCIUM SPEC-SCNC: 8.4 MG/DL (ref 8.6–10.5)
CHLORIDE SERPL-SCNC: 108 MMOL/L (ref 98–107)
CLOSE TME COLL+ADP + EPINEP PNL BLD: 90 % (ref 86–100)
CO2 SERPL-SCNC: 21.4 MMOL/L (ref 22–29)
CREAT BLD-MCNC: 4.17 MG/DL (ref 0.57–1)
DAT POLY-SP REAG RBC QL: NEGATIVE
DEPRECATED RDW RBC AUTO: 55.9 FL (ref 37–54)
EOSINOPHIL # BLD AUTO: 0.23 10*3/MM3 (ref 0–0.4)
EOSINOPHIL NFR BLD AUTO: 2.5 % (ref 0.3–6.2)
ERYTHROCYTE [DISTWIDTH] IN BLOOD BY AUTOMATED COUNT: 15.9 % (ref 12.3–15.4)
GFR SERPL CREATININE-BSD FRML MDRD: 11 ML/MIN/1.73
GFR SERPL CREATININE-BSD FRML MDRD: ABNORMAL ML/MIN/1.73
GLOBULIN UR ELPH-MCNC: 3.8 GM/DL
GLUCOSE BLD-MCNC: 139 MG/DL (ref 65–99)
GLUCOSE BLDC GLUCOMTR-MCNC: 120 MG/DL (ref 70–130)
GLUCOSE BLDC GLUCOMTR-MCNC: 172 MG/DL (ref 70–130)
GLUCOSE BLDC GLUCOMTR-MCNC: 181 MG/DL (ref 70–130)
GLUCOSE BLDC GLUCOMTR-MCNC: 270 MG/DL (ref 70–130)
HCO3 BLDA-SCNC: 20 MMOL/L (ref 22–28)
HCT VFR BLD AUTO: 29.3 % (ref 34–46.6)
HGB BLD-MCNC: 8.8 G/DL (ref 12–15.9)
IMM GRANULOCYTES # BLD AUTO: 0.08 10*3/MM3 (ref 0–0.05)
IMM GRANULOCYTES NFR BLD AUTO: 0.9 % (ref 0–0.5)
INR PPP: 1.17 (ref 0.9–1.1)
KELL: NEGATIVE
LYMPHOCYTES # BLD AUTO: 1.4 10*3/MM3 (ref 0.7–3.1)
LYMPHOCYTES NFR BLD AUTO: 15.2 % (ref 19.6–45.3)
MAGNESIUM SERPL-MCNC: 1.8 MG/DL (ref 1.6–2.4)
MCH RBC QN AUTO: 28.9 PG (ref 26.6–33)
MCHC RBC AUTO-ENTMCNC: 30 G/DL (ref 31.5–35.7)
MCV RBC AUTO: 96.1 FL (ref 79–97)
MODALITY: ABNORMAL
MONOCYTES # BLD AUTO: 0.92 10*3/MM3 (ref 0.1–0.9)
MONOCYTES NFR BLD AUTO: 10 % (ref 5–12)
NEUTROPHILS # BLD AUTO: 6.54 10*3/MM3 (ref 1.7–7)
NEUTROPHILS NFR BLD AUTO: 71.1 % (ref 42.7–76)
NRBC BLD AUTO-RTO: 0 /100 WBC (ref 0–0.2)
NT-PROBNP SERPL-MCNC: 2437 PG/ML (ref 5–900)
PCO2 BLDA: 35.4 MM HG (ref 35–45)
PH BLDA: 7.36 PH UNITS (ref 7.35–7.45)
PLATELET # BLD AUTO: 171 10*3/MM3 (ref 140–450)
PMV BLD AUTO: 10.9 FL (ref 6–12)
PO2 BLDA: 91.3 MM HG (ref 80–100)
POTASSIUM BLD-SCNC: 4.6 MMOL/L (ref 3.5–5.2)
PROT SERPL-MCNC: 6.8 G/DL (ref 6–8.5)
PROTHROMBIN TIME: 14.6 SECONDS (ref 11.7–14.2)
RBC # BLD AUTO: 3.05 10*6/MM3 (ref 3.77–5.28)
RH BLD: POSITIVE
SAO2 % BLDCOA: 96.8 % (ref 92–99)
SET MECH RESP RATE: 16
SODIUM BLD-SCNC: 143 MMOL/L (ref 136–145)
T&S EXPIRATION DATE: NORMAL
WBC NRBC COR # BLD: 9.2 10*3/MM3 (ref 3.4–10.8)

## 2019-07-27 PROCEDURE — 63710000001 INSULIN LISPRO (HUMAN) PER 5 UNITS: Performed by: HOSPITALIST

## 2019-07-27 PROCEDURE — 82962 GLUCOSE BLOOD TEST: CPT

## 2019-07-27 PROCEDURE — 86880 COOMBS TEST DIRECT: CPT | Performed by: THORACIC SURGERY (CARDIOTHORACIC VASCULAR SURGERY)

## 2019-07-27 PROCEDURE — 85610 PROTHROMBIN TIME: CPT | Performed by: THORACIC SURGERY (CARDIOTHORACIC VASCULAR SURGERY)

## 2019-07-27 PROCEDURE — 83880 ASSAY OF NATRIURETIC PEPTIDE: CPT | Performed by: THORACIC SURGERY (CARDIOTHORACIC VASCULAR SURGERY)

## 2019-07-27 PROCEDURE — 86905 BLOOD TYPING RBC ANTIGENS: CPT | Performed by: THORACIC SURGERY (CARDIOTHORACIC VASCULAR SURGERY)

## 2019-07-27 PROCEDURE — 25010000002 HEPARIN (PORCINE) PER 1000 UNITS: Performed by: THORACIC SURGERY (CARDIOTHORACIC VASCULAR SURGERY)

## 2019-07-27 PROCEDURE — 85576 BLOOD PLATELET AGGREGATION: CPT | Performed by: THORACIC SURGERY (CARDIOTHORACIC VASCULAR SURGERY)

## 2019-07-27 PROCEDURE — 94799 UNLISTED PULMONARY SVC/PX: CPT

## 2019-07-27 PROCEDURE — 86920 COMPATIBILITY TEST SPIN: CPT

## 2019-07-27 PROCEDURE — 86901 BLOOD TYPING SEROLOGIC RH(D): CPT | Performed by: THORACIC SURGERY (CARDIOTHORACIC VASCULAR SURGERY)

## 2019-07-27 PROCEDURE — 99232 SBSQ HOSP IP/OBS MODERATE 35: CPT | Performed by: NURSE PRACTITIONER

## 2019-07-27 PROCEDURE — 86901 BLOOD TYPING SEROLOGIC RH(D): CPT

## 2019-07-27 PROCEDURE — 86900 BLOOD TYPING SEROLOGIC ABO: CPT

## 2019-07-27 PROCEDURE — 86902 BLOOD TYPE ANTIGEN DONOR EA: CPT

## 2019-07-27 PROCEDURE — 86850 RBC ANTIBODY SCREEN: CPT

## 2019-07-27 PROCEDURE — 25010000002 EPOETIN ALFA PER 1000 UNITS: Performed by: INTERNAL MEDICINE

## 2019-07-27 PROCEDURE — 86900 BLOOD TYPING SEROLOGIC ABO: CPT | Performed by: THORACIC SURGERY (CARDIOTHORACIC VASCULAR SURGERY)

## 2019-07-27 PROCEDURE — 86850 RBC ANTIBODY SCREEN: CPT | Performed by: THORACIC SURGERY (CARDIOTHORACIC VASCULAR SURGERY)

## 2019-07-27 PROCEDURE — 80053 COMPREHEN METABOLIC PANEL: CPT | Performed by: THORACIC SURGERY (CARDIOTHORACIC VASCULAR SURGERY)

## 2019-07-27 PROCEDURE — 86922 COMPATIBILITY TEST ANTIGLOB: CPT

## 2019-07-27 PROCEDURE — 85025 COMPLETE CBC W/AUTO DIFF WBC: CPT | Performed by: THORACIC SURGERY (CARDIOTHORACIC VASCULAR SURGERY)

## 2019-07-27 PROCEDURE — 82803 BLOOD GASES ANY COMBINATION: CPT

## 2019-07-27 PROCEDURE — 36600 WITHDRAWAL OF ARTERIAL BLOOD: CPT

## 2019-07-27 PROCEDURE — 83735 ASSAY OF MAGNESIUM: CPT | Performed by: THORACIC SURGERY (CARDIOTHORACIC VASCULAR SURGERY)

## 2019-07-27 PROCEDURE — 86870 RBC ANTIBODY IDENTIFICATION: CPT | Performed by: THORACIC SURGERY (CARDIOTHORACIC VASCULAR SURGERY)

## 2019-07-27 PROCEDURE — 85730 THROMBOPLASTIN TIME PARTIAL: CPT | Performed by: THORACIC SURGERY (CARDIOTHORACIC VASCULAR SURGERY)

## 2019-07-27 RX ORDER — HEPARIN SODIUM 10000 [USP'U]/100ML
12 INJECTION, SOLUTION INTRAVENOUS
Status: DISCONTINUED | OUTPATIENT
Start: 2019-07-27 | End: 2019-07-29

## 2019-07-27 RX ADMIN — Medication 1 CAPSULE: at 08:48

## 2019-07-27 RX ADMIN — HEPARIN SODIUM 12 UNITS/KG/HR: 10000 INJECTION, SOLUTION INTRAVENOUS at 09:01

## 2019-07-27 RX ADMIN — INSULIN LISPRO 2 UNITS: 100 INJECTION, SOLUTION INTRAVENOUS; SUBCUTANEOUS at 20:49

## 2019-07-27 RX ADMIN — TOPIRAMATE 100 MG: 100 TABLET, FILM COATED ORAL at 08:49

## 2019-07-27 RX ADMIN — PANTOPRAZOLE SODIUM 40 MG: 40 TABLET, DELAYED RELEASE ORAL at 06:55

## 2019-07-27 RX ADMIN — INSULIN LISPRO 4 UNITS: 100 INJECTION, SOLUTION INTRAVENOUS; SUBCUTANEOUS at 11:43

## 2019-07-27 RX ADMIN — LINAGLIPTIN 5 MG: 5 TABLET, FILM COATED ORAL at 08:48

## 2019-07-27 RX ADMIN — CALCIUM ACETATE 667 MG: 667 CAPSULE ORAL at 16:59

## 2019-07-27 RX ADMIN — ASPIRIN 81 MG: 81 TABLET, COATED ORAL at 08:49

## 2019-07-27 RX ADMIN — GABAPENTIN 300 MG: 300 CAPSULE ORAL at 20:49

## 2019-07-27 RX ADMIN — CHLORTHALIDONE: 25 TABLET ORAL at 08:48

## 2019-07-27 RX ADMIN — SODIUM CHLORIDE, PRESERVATIVE FREE 3 ML: 5 INJECTION INTRAVENOUS at 20:50

## 2019-07-27 RX ADMIN — Medication 500 MG: at 08:49

## 2019-07-27 RX ADMIN — MONTELUKAST SODIUM 10 MG: 10 TABLET, FILM COATED ORAL at 20:48

## 2019-07-27 RX ADMIN — INSULIN LISPRO 2 UNITS: 100 INJECTION, SOLUTION INTRAVENOUS; SUBCUTANEOUS at 16:59

## 2019-07-27 RX ADMIN — AMLODIPINE BESYLATE 10 MG: 10 TABLET ORAL at 06:55

## 2019-07-27 RX ADMIN — Medication 1 TABLET: at 06:55

## 2019-07-27 RX ADMIN — SERTRALINE 50 MG: 50 TABLET, FILM COATED ORAL at 08:48

## 2019-07-27 RX ADMIN — CALCIUM ACETATE 667 MG: 667 CAPSULE ORAL at 08:48

## 2019-07-27 RX ADMIN — SODIUM CHLORIDE, PRESERVATIVE FREE 3 ML: 5 INJECTION INTRAVENOUS at 08:55

## 2019-07-27 RX ADMIN — ERYTHROPOIETIN 10000 UNITS: 10000 INJECTION, SOLUTION INTRAVENOUS; SUBCUTANEOUS at 16:58

## 2019-07-27 RX ADMIN — CALCIUM ACETATE 667 MG: 667 CAPSULE ORAL at 11:44

## 2019-07-28 ENCOUNTER — ANESTHESIA EVENT (OUTPATIENT)
Dept: PERIOP | Facility: HOSPITAL | Age: 69
End: 2019-07-28

## 2019-07-28 LAB
ALBUMIN SERPL-MCNC: 3.1 G/DL (ref 3.5–5.2)
ANION GAP SERPL CALCULATED.3IONS-SCNC: 16.4 MMOL/L (ref 5–15)
APTT PPP: 127.2 SECONDS (ref 22.7–35.4)
APTT PPP: 45.1 SECONDS (ref 22.7–35.4)
APTT PPP: 96.5 SECONDS (ref 22.7–35.4)
BASOPHILS # BLD AUTO: 0.03 10*3/MM3 (ref 0–0.2)
BASOPHILS NFR BLD AUTO: 0.3 % (ref 0–1.5)
BUN BLD-MCNC: 67 MG/DL (ref 8–23)
BUN/CREAT SERPL: 15.2 (ref 7–25)
CALCIUM SPEC-SCNC: 8.3 MG/DL (ref 8.6–10.5)
CHLORIDE SERPL-SCNC: 106 MMOL/L (ref 98–107)
CO2 SERPL-SCNC: 17.6 MMOL/L (ref 22–29)
CREAT BLD-MCNC: 4.42 MG/DL (ref 0.57–1)
DEPRECATED RDW RBC AUTO: 58.8 FL (ref 37–54)
EOSINOPHIL # BLD AUTO: 0.25 10*3/MM3 (ref 0–0.4)
EOSINOPHIL NFR BLD AUTO: 2.7 % (ref 0.3–6.2)
ERYTHROCYTE [DISTWIDTH] IN BLOOD BY AUTOMATED COUNT: 15.8 % (ref 12.3–15.4)
GFR SERPL CREATININE-BSD FRML MDRD: 10 ML/MIN/1.73
GFR SERPL CREATININE-BSD FRML MDRD: ABNORMAL ML/MIN/1.73
GLUCOSE BLD-MCNC: 200 MG/DL (ref 65–99)
GLUCOSE BLDC GLUCOMTR-MCNC: 172 MG/DL (ref 70–130)
GLUCOSE BLDC GLUCOMTR-MCNC: 212 MG/DL (ref 70–130)
GLUCOSE BLDC GLUCOMTR-MCNC: 225 MG/DL (ref 70–130)
GLUCOSE BLDC GLUCOMTR-MCNC: 256 MG/DL (ref 70–130)
HCT VFR BLD AUTO: 30.4 % (ref 34–46.6)
HGB BLD-MCNC: 8.8 G/DL (ref 12–15.9)
IMM GRANULOCYTES # BLD AUTO: 0.06 10*3/MM3 (ref 0–0.05)
IMM GRANULOCYTES NFR BLD AUTO: 0.6 % (ref 0–0.5)
LYMPHOCYTES # BLD AUTO: 1.93 10*3/MM3 (ref 0.7–3.1)
LYMPHOCYTES NFR BLD AUTO: 20.7 % (ref 19.6–45.3)
MCH RBC QN AUTO: 29 PG (ref 26.6–33)
MCHC RBC AUTO-ENTMCNC: 28.9 G/DL (ref 31.5–35.7)
MCV RBC AUTO: 100.3 FL (ref 79–97)
MONOCYTES # BLD AUTO: 0.92 10*3/MM3 (ref 0.1–0.9)
MONOCYTES NFR BLD AUTO: 9.9 % (ref 5–12)
NEUTROPHILS # BLD AUTO: 6.12 10*3/MM3 (ref 1.7–7)
NEUTROPHILS NFR BLD AUTO: 65.8 % (ref 42.7–76)
NRBC BLD AUTO-RTO: 0.1 /100 WBC (ref 0–0.2)
PHOSPHATE SERPL-MCNC: 4.2 MG/DL (ref 2.5–4.5)
PLATELET # BLD AUTO: 183 10*3/MM3 (ref 140–450)
PMV BLD AUTO: 11.2 FL (ref 6–12)
POTASSIUM BLD-SCNC: 4.5 MMOL/L (ref 3.5–5.2)
RBC # BLD AUTO: 3.03 10*6/MM3 (ref 3.77–5.28)
SODIUM BLD-SCNC: 140 MMOL/L (ref 136–145)
WBC NRBC COR # BLD: 9.31 10*3/MM3 (ref 3.4–10.8)

## 2019-07-28 PROCEDURE — 93005 ELECTROCARDIOGRAM TRACING: CPT | Performed by: NURSE PRACTITIONER

## 2019-07-28 PROCEDURE — 99231 SBSQ HOSP IP/OBS SF/LOW 25: CPT | Performed by: NURSE PRACTITIONER

## 2019-07-28 PROCEDURE — 63710000001 INSULIN LISPRO (HUMAN) PER 5 UNITS: Performed by: HOSPITALIST

## 2019-07-28 PROCEDURE — 85730 THROMBOPLASTIN TIME PARTIAL: CPT | Performed by: THORACIC SURGERY (CARDIOTHORACIC VASCULAR SURGERY)

## 2019-07-28 PROCEDURE — 81001 URINALYSIS AUTO W/SCOPE: CPT | Performed by: THORACIC SURGERY (CARDIOTHORACIC VASCULAR SURGERY)

## 2019-07-28 PROCEDURE — 63710000001 INSULIN GLARGINE PER 5 UNITS: Performed by: HOSPITALIST

## 2019-07-28 PROCEDURE — 80069 RENAL FUNCTION PANEL: CPT | Performed by: INTERNAL MEDICINE

## 2019-07-28 PROCEDURE — 25010000002 HEPARIN (PORCINE) PER 1000 UNITS: Performed by: THORACIC SURGERY (CARDIOTHORACIC VASCULAR SURGERY)

## 2019-07-28 PROCEDURE — 93010 ELECTROCARDIOGRAM REPORT: CPT | Performed by: INTERNAL MEDICINE

## 2019-07-28 PROCEDURE — 82962 GLUCOSE BLOOD TEST: CPT

## 2019-07-28 PROCEDURE — 85025 COMPLETE CBC W/AUTO DIFF WBC: CPT | Performed by: INTERNAL MEDICINE

## 2019-07-28 RX ORDER — CHLORHEXIDINE GLUCONATE 0.12 MG/ML
15 RINSE ORAL EVERY 12 HOURS SCHEDULED
Status: DISCONTINUED | OUTPATIENT
Start: 2019-07-28 | End: 2019-07-29

## 2019-07-28 RX ORDER — INSULIN GLARGINE 100 [IU]/ML
10 INJECTION, SOLUTION SUBCUTANEOUS NIGHTLY
Status: DISCONTINUED | OUTPATIENT
Start: 2019-07-28 | End: 2019-07-29

## 2019-07-28 RX ORDER — FAMOTIDINE 10 MG/ML
20 INJECTION, SOLUTION INTRAVENOUS ONCE
Status: CANCELLED | OUTPATIENT
Start: 2019-07-29 | End: 2019-07-28

## 2019-07-28 RX ADMIN — HEPARIN SODIUM 10 UNITS/KG/HR: 10000 INJECTION, SOLUTION INTRAVENOUS at 08:36

## 2019-07-28 RX ADMIN — INSULIN LISPRO 4 UNITS: 100 INJECTION, SOLUTION INTRAVENOUS; SUBCUTANEOUS at 12:10

## 2019-07-28 RX ADMIN — PANTOPRAZOLE SODIUM 40 MG: 40 TABLET, DELAYED RELEASE ORAL at 06:44

## 2019-07-28 RX ADMIN — MUPIROCIN 1 APPLICATION: 20 OINTMENT TOPICAL at 21:51

## 2019-07-28 RX ADMIN — SODIUM CHLORIDE, PRESERVATIVE FREE 3 ML: 5 INJECTION INTRAVENOUS at 08:41

## 2019-07-28 RX ADMIN — HEPARIN SODIUM 7 UNITS/KG/HR: 10000 INJECTION, SOLUTION INTRAVENOUS at 14:23

## 2019-07-28 RX ADMIN — Medication 1 TABLET: at 06:44

## 2019-07-28 RX ADMIN — TOPIRAMATE 100 MG: 100 TABLET, FILM COATED ORAL at 08:35

## 2019-07-28 RX ADMIN — GABAPENTIN 300 MG: 300 CAPSULE ORAL at 21:51

## 2019-07-28 RX ADMIN — INSULIN LISPRO 2 UNITS: 100 INJECTION, SOLUTION INTRAVENOUS; SUBCUTANEOUS at 06:45

## 2019-07-28 RX ADMIN — AMLODIPINE BESYLATE 10 MG: 10 TABLET ORAL at 06:44

## 2019-07-28 RX ADMIN — INSULIN LISPRO 3 UNITS: 100 INJECTION, SOLUTION INTRAVENOUS; SUBCUTANEOUS at 17:19

## 2019-07-28 RX ADMIN — SODIUM CHLORIDE, PRESERVATIVE FREE 3 ML: 5 INJECTION INTRAVENOUS at 21:52

## 2019-07-28 RX ADMIN — CALCIUM ACETATE 667 MG: 667 CAPSULE ORAL at 17:19

## 2019-07-28 RX ADMIN — CALCIUM ACETATE 667 MG: 667 CAPSULE ORAL at 08:35

## 2019-07-28 RX ADMIN — MONTELUKAST SODIUM 10 MG: 10 TABLET, FILM COATED ORAL at 21:50

## 2019-07-28 RX ADMIN — Medication 1 CAPSULE: at 08:35

## 2019-07-28 RX ADMIN — Medication 500 MG: at 08:35

## 2019-07-28 RX ADMIN — INSULIN LISPRO 3 UNITS: 100 INJECTION, SOLUTION INTRAVENOUS; SUBCUTANEOUS at 21:51

## 2019-07-28 RX ADMIN — INSULIN GLARGINE 10 UNITS: 100 INJECTION, SOLUTION SUBCUTANEOUS at 21:51

## 2019-07-28 RX ADMIN — CHLORHEXIDINE GLUCONATE 15 ML: 1.2 RINSE ORAL at 21:50

## 2019-07-28 RX ADMIN — CHLORTHALIDONE: 25 TABLET ORAL at 08:35

## 2019-07-28 RX ADMIN — LINAGLIPTIN 5 MG: 5 TABLET, FILM COATED ORAL at 08:35

## 2019-07-28 RX ADMIN — ASPIRIN 81 MG: 81 TABLET, COATED ORAL at 08:35

## 2019-07-28 RX ADMIN — CALCIUM ACETATE 667 MG: 667 CAPSULE ORAL at 12:12

## 2019-07-28 NOTE — ANESTHESIA PREPROCEDURE EVALUATION
Anesthesia Evaluation     Patient summary reviewed and Nursing notes reviewed   no history of anesthetic complications:               Airway   Mallampati: III  TM distance: >3 FB  Neck ROM: full  Possible difficult intubation  Dental    (+) poor dentition        Pulmonary - normal exam   (+) shortness of breath,   Cardiovascular - normal exam  Exercise tolerance: poor (<4 METS)    ECG reviewed  PT is on anticoagulation therapy    (+) hypertension, CAD, SILVA, hyperlipidemia,   (-) angina, CHF      Neuro/Psych  (+) seizures, numbness (cervical radic), psychiatric history Anxiety and Depression,     GI/Hepatic/Renal/Endo    (+) obesity,  GERD,  renal disease (stage IV, has shunt but no dialysis yet, patient aware of high liklihood of dialysis following procedure) CRI, diabetes mellitus (peripheral and autonomic neuropathy),     Musculoskeletal     Abdominal   (+) obese,    Substance History      OB/GYN          Other   (+) arthritis     ROS/Med Hx Other: P/w severe SOB on exertion, postive stress test for ischemia. On heparin gtt. Hgb 8.8, stage IV CKD. Disease of Left main, LCx, and RCA    Echo 6/19: · Right ventricular cavity is borderline dilated.  · Left atrial cavity size is mildly dilated.  · Mild tricuspid valve regurgitation is present.  · Calculated EF = 57%.  · There is no evidence of pericardial effusion.                Anesthesia Plan    ASA 4     general   (A line, CVC, swan. D/w patient likely dialysis required following CABG)  intravenous induction   Anesthetic plan, all risks, benefits, and alternatives have been provided, discussed and informed consent has been obtained with: patient.

## 2019-07-29 ENCOUNTER — ANCILLARY PROCEDURE (OUTPATIENT)
Dept: PERIOP | Facility: HOSPITAL | Age: 69
End: 2019-07-29

## 2019-07-29 ENCOUNTER — APPOINTMENT (OUTPATIENT)
Dept: INFUSION THERAPY | Facility: HOSPITAL | Age: 69
End: 2019-07-29

## 2019-07-29 ENCOUNTER — APPOINTMENT (OUTPATIENT)
Dept: GENERAL RADIOLOGY | Facility: HOSPITAL | Age: 69
End: 2019-07-29

## 2019-07-29 ENCOUNTER — ANESTHESIA (OUTPATIENT)
Dept: PERIOP | Facility: HOSPITAL | Age: 69
End: 2019-07-29

## 2019-07-29 LAB
ACT BLD: 114 SECONDS (ref 82–152)
ACT BLD: 136 SECONDS (ref 82–152)
ACT BLD: 351 SECONDS (ref 82–152)
ACT BLD: 384 SECONDS (ref 82–152)
ACT BLD: 406 SECONDS (ref 82–152)
ACT BLD: 417 SECONDS (ref 82–152)
ACT BLD: 433 SECONDS (ref 82–152)
ALBUMIN SERPL-MCNC: 3.1 G/DL (ref 3.5–5.2)
ALBUMIN SERPL-MCNC: 3.1 G/DL (ref 3.5–5.2)
ALBUMIN SERPL-MCNC: 3.2 G/DL (ref 3.5–5.2)
ALBUMIN/GLOB SERPL: 0.8 G/DL
ALP SERPL-CCNC: 82 U/L (ref 39–117)
ALT SERPL W P-5'-P-CCNC: 10 U/L (ref 1–33)
ANION GAP SERPL CALCULATED.3IONS-SCNC: 13.4 MMOL/L (ref 5–15)
ANION GAP SERPL CALCULATED.3IONS-SCNC: 15.4 MMOL/L (ref 5–15)
ANION GAP SERPL CALCULATED.3IONS-SCNC: 16.1 MMOL/L (ref 5–15)
APTT PPP: 36.5 SECONDS (ref 22.7–35.4)
APTT PPP: 48.1 SECONDS (ref 22.7–35.4)
ARTERIAL PATENCY WRIST A: ABNORMAL
ARTERIAL PATENCY WRIST A: ABNORMAL
AST SERPL-CCNC: 12 U/L (ref 1–32)
ATMOSPHERIC PRESS: 750.5 MMHG
ATMOSPHERIC PRESS: 751.6 MMHG
BACTERIA UR QL AUTO: NORMAL /HPF
BASE EXCESS BLDA CALC-SCNC: -4.9 MMOL/L (ref 0–2)
BASE EXCESS BLDA CALC-SCNC: -5.4 MMOL/L (ref 0–2)
BASOPHILS # BLD AUTO: 0.03 10*3/MM3 (ref 0–0.2)
BASOPHILS # BLD AUTO: 0.04 10*3/MM3 (ref 0–0.2)
BASOPHILS NFR BLD AUTO: 0.3 % (ref 0–1.5)
BASOPHILS NFR BLD AUTO: 0.4 % (ref 0–1.5)
BDY SITE: ABNORMAL
BDY SITE: ABNORMAL
BILIRUB SERPL-MCNC: 0.2 MG/DL (ref 0.2–1.2)
BILIRUB UR QL STRIP: NEGATIVE
BUN BLD-MCNC: 72 MG/DL (ref 8–23)
BUN BLD-MCNC: 72 MG/DL (ref 8–23)
BUN BLD-MCNC: 74 MG/DL (ref 8–23)
BUN/CREAT SERPL: 15.7 (ref 7–25)
BUN/CREAT SERPL: 17.6 (ref 7–25)
BUN/CREAT SERPL: 18.1 (ref 7–25)
CA-I BLD-MCNC: 4.5 MG/DL (ref 4.6–5.4)
CA-I SERPL ISE-MCNC: 1.12 MMOL/L (ref 1.15–1.35)
CALCIUM SPEC-SCNC: 7.6 MG/DL (ref 8.6–10.5)
CALCIUM SPEC-SCNC: 7.6 MG/DL (ref 8.6–10.5)
CALCIUM SPEC-SCNC: 8.4 MG/DL (ref 8.6–10.5)
CHLORIDE SERPL-SCNC: 102 MMOL/L (ref 98–107)
CHLORIDE SERPL-SCNC: 106 MMOL/L (ref 98–107)
CHLORIDE SERPL-SCNC: 106 MMOL/L (ref 98–107)
CLARITY UR: CLEAR
CLOSE TME COLL+ADP + EPINEP PNL BLD: 80 %
CO2 SERPL-SCNC: 18.9 MMOL/L (ref 22–29)
CO2 SERPL-SCNC: 19.6 MMOL/L (ref 22–29)
CO2 SERPL-SCNC: 21.6 MMOL/L (ref 22–29)
COLOR UR: YELLOW
CREAT BLD-MCNC: 3.98 MG/DL (ref 0.57–1)
CREAT BLD-MCNC: 4.08 MG/DL (ref 0.57–1)
CREAT BLD-MCNC: 4.71 MG/DL (ref 0.57–1)
DEPRECATED RDW RBC AUTO: 54.4 FL (ref 37–54)
DEPRECATED RDW RBC AUTO: 54.8 FL (ref 37–54)
DEPRECATED RDW RBC AUTO: 55.1 FL (ref 37–54)
EOSINOPHIL # BLD AUTO: 0.26 10*3/MM3 (ref 0–0.4)
EOSINOPHIL # BLD AUTO: 0.32 10*3/MM3 (ref 0–0.4)
EOSINOPHIL NFR BLD AUTO: 2.4 % (ref 0.3–6.2)
EOSINOPHIL NFR BLD AUTO: 3 % (ref 0.3–6.2)
ERYTHROCYTE [DISTWIDTH] IN BLOOD BY AUTOMATED COUNT: 15.9 % (ref 12.3–15.4)
FIBRINOGEN PPP-MCNC: 431 MG/DL (ref 219–464)
GFR SERPL CREATININE-BSD FRML MDRD: 11 ML/MIN/1.73
GFR SERPL CREATININE-BSD FRML MDRD: 11 ML/MIN/1.73
GFR SERPL CREATININE-BSD FRML MDRD: 9 ML/MIN/1.73
GFR SERPL CREATININE-BSD FRML MDRD: ABNORMAL ML/MIN/1.73
GLOBULIN UR ELPH-MCNC: 3.9 GM/DL
GLUCOSE BLD-MCNC: 121 MG/DL (ref 65–99)
GLUCOSE BLD-MCNC: 151 MG/DL (ref 65–99)
GLUCOSE BLD-MCNC: 197 MG/DL (ref 65–99)
GLUCOSE BLDC GLUCOMTR-MCNC: 123 MG/DL (ref 70–130)
GLUCOSE BLDC GLUCOMTR-MCNC: 123 MG/DL (ref 70–130)
GLUCOSE BLDC GLUCOMTR-MCNC: 129 MG/DL (ref 70–130)
GLUCOSE BLDC GLUCOMTR-MCNC: 133 MG/DL (ref 70–130)
GLUCOSE BLDC GLUCOMTR-MCNC: 141 MG/DL (ref 70–130)
GLUCOSE BLDC GLUCOMTR-MCNC: 141 MG/DL (ref 70–130)
GLUCOSE BLDC GLUCOMTR-MCNC: 142 MG/DL (ref 70–130)
GLUCOSE BLDC GLUCOMTR-MCNC: 157 MG/DL (ref 70–130)
GLUCOSE BLDC GLUCOMTR-MCNC: 197 MG/DL (ref 70–130)
GLUCOSE UR STRIP-MCNC: NEGATIVE MG/DL
HCO3 BLDA-SCNC: 19.6 MMOL/L (ref 22–28)
HCO3 BLDA-SCNC: 20.8 MMOL/L (ref 22–28)
HCT VFR BLD AUTO: 23.2 % (ref 34–46.6)
HCT VFR BLD AUTO: 24.3 % (ref 34–46.6)
HCT VFR BLD AUTO: 28.4 % (ref 34–46.6)
HGB BLD-MCNC: 7.3 G/DL (ref 12–15.9)
HGB BLD-MCNC: 7.5 G/DL (ref 12–15.9)
HGB BLD-MCNC: 8.8 G/DL (ref 12–15.9)
HGB UR QL STRIP.AUTO: NEGATIVE
HOROWITZ INDEX BLD+IHG-RTO: 100 %
HOROWITZ INDEX BLD+IHG-RTO: 40 %
HYALINE CASTS UR QL AUTO: NORMAL /LPF
IMM GRANULOCYTES # BLD AUTO: 0.2 10*3/MM3 (ref 0–0.05)
IMM GRANULOCYTES # BLD AUTO: 0.3 10*3/MM3 (ref 0–0.05)
IMM GRANULOCYTES NFR BLD AUTO: 1.9 % (ref 0–0.5)
IMM GRANULOCYTES NFR BLD AUTO: 2.7 % (ref 0–0.5)
INR PPP: 1.67 (ref 0.9–1.1)
KETONES UR QL STRIP: NEGATIVE
LEUKOCYTE ESTERASE UR QL STRIP.AUTO: ABNORMAL
LYMPHOCYTES # BLD AUTO: 1.54 10*3/MM3 (ref 0.7–3.1)
LYMPHOCYTES # BLD AUTO: 1.89 10*3/MM3 (ref 0.7–3.1)
LYMPHOCYTES NFR BLD AUTO: 14.6 % (ref 19.6–45.3)
LYMPHOCYTES NFR BLD AUTO: 17.3 % (ref 19.6–45.3)
MAGNESIUM SERPL-MCNC: 1.9 MG/DL (ref 1.6–2.4)
MAGNESIUM SERPL-MCNC: 1.9 MG/DL (ref 1.6–2.4)
MAGNESIUM SERPL-MCNC: 2.1 MG/DL (ref 1.6–2.4)
MCH RBC QN AUTO: 29.1 PG (ref 26.6–33)
MCH RBC QN AUTO: 29.1 PG (ref 26.6–33)
MCH RBC QN AUTO: 29.3 PG (ref 26.6–33)
MCHC RBC AUTO-ENTMCNC: 30.9 G/DL (ref 31.5–35.7)
MCHC RBC AUTO-ENTMCNC: 31 G/DL (ref 31.5–35.7)
MCHC RBC AUTO-ENTMCNC: 31.5 G/DL (ref 31.5–35.7)
MCV RBC AUTO: 93.2 FL (ref 79–97)
MCV RBC AUTO: 94 FL (ref 79–97)
MCV RBC AUTO: 94.2 FL (ref 79–97)
MODALITY: ABNORMAL
MODALITY: ABNORMAL
MONOCYTES # BLD AUTO: 0.53 10*3/MM3 (ref 0.1–0.9)
MONOCYTES # BLD AUTO: 0.97 10*3/MM3 (ref 0.1–0.9)
MONOCYTES NFR BLD AUTO: 4.8 % (ref 5–12)
MONOCYTES NFR BLD AUTO: 9.2 % (ref 5–12)
NEUTROPHILS # BLD AUTO: 7.51 10*3/MM3 (ref 1.7–7)
NEUTROPHILS # BLD AUTO: 7.94 10*3/MM3 (ref 1.7–7)
NEUTROPHILS NFR BLD AUTO: 70.9 % (ref 42.7–76)
NEUTROPHILS NFR BLD AUTO: 72.5 % (ref 42.7–76)
NITRITE UR QL STRIP: NEGATIVE
NRBC BLD AUTO-RTO: 0 /100 WBC (ref 0–0.2)
NRBC BLD AUTO-RTO: 0 /100 WBC (ref 0–0.2)
O2 A-A PPRESDIFF RESPIRATORY: 0.4 MMHG
O2 A-A PPRESDIFF RESPIRATORY: 0.8 MMHG
PCO2 BLDA: 35 MM HG (ref 35–45)
PCO2 BLDA: 40.5 MM HG (ref 35–45)
PEEP RESPIRATORY: 7.5 CM[H2O]
PEEP RESPIRATORY: 7.5 CM[H2O]
PH BLDA: 7.32 PH UNITS (ref 7.35–7.45)
PH BLDA: 7.36 PH UNITS (ref 7.35–7.45)
PH UR STRIP.AUTO: <=5 [PH] (ref 5–8)
PHOSPHATE SERPL-MCNC: 2.3 MG/DL (ref 2.5–4.5)
PHOSPHATE SERPL-MCNC: 2.6 MG/DL (ref 2.5–4.5)
PHOSPHATE SERPL-MCNC: 3.7 MG/DL (ref 2.5–4.5)
PLATELET # BLD AUTO: 116 10*3/MM3 (ref 140–450)
PLATELET # BLD AUTO: 127 10*3/MM3 (ref 140–450)
PLATELET # BLD AUTO: 197 10*3/MM3 (ref 140–450)
PMV BLD AUTO: 10.7 FL (ref 6–12)
PMV BLD AUTO: 11 FL (ref 6–12)
PMV BLD AUTO: 11.1 FL (ref 6–12)
PO2 BLDA: 110.1 MM HG (ref 80–100)
PO2 BLDA: 529.6 MM HG (ref 80–100)
POTASSIUM BLD-SCNC: 4.2 MMOL/L (ref 3.5–5.2)
POTASSIUM BLD-SCNC: 4.5 MMOL/L (ref 3.5–5.2)
POTASSIUM BLD-SCNC: 4.5 MMOL/L (ref 3.5–5.2)
POTASSIUM BLD-SCNC: 4.6 MMOL/L (ref 3.5–5.2)
PROT SERPL-MCNC: 7 G/DL (ref 6–8.5)
PROT UR QL STRIP: ABNORMAL
PROTHROMBIN TIME: 19.4 SECONDS (ref 11.7–14.2)
RBC # BLD AUTO: 2.49 10*6/MM3 (ref 3.77–5.28)
RBC # BLD AUTO: 2.58 10*6/MM3 (ref 3.77–5.28)
RBC # BLD AUTO: 3.02 10*6/MM3 (ref 3.77–5.28)
RBC # UR: NORMAL /HPF
REF LAB TEST METHOD: NORMAL
SAO2 % BLDCOA: 100 % (ref 92–99)
SAO2 % BLDCOA: 98.1 % (ref 92–99)
SET MECH RESP RATE: 10
SET MECH RESP RATE: 12
SODIUM BLD-SCNC: 137 MMOL/L (ref 136–145)
SODIUM BLD-SCNC: 141 MMOL/L (ref 136–145)
SODIUM BLD-SCNC: 141 MMOL/L (ref 136–145)
SP GR UR STRIP: 1.01 (ref 1–1.03)
SQUAMOUS #/AREA URNS HPF: NORMAL /HPF
TOTAL RATE: 12 BREATHS/MINUTE
TOTAL RATE: 12 BREATHS/MINUTE
URATE SERPL-MCNC: 8.4 MG/DL (ref 2.4–5.7)
UROBILINOGEN UR QL STRIP: ABNORMAL
VENTILATOR MODE: ABNORMAL
VENTILATOR MODE: ABNORMAL
VT ON VENT VENT: 550 ML
VT ON VENT VENT: 600 ML
WBC NRBC COR # BLD: 10.58 10*3/MM3 (ref 3.4–10.8)
WBC NRBC COR # BLD: 10.61 10*3/MM3 (ref 3.4–10.8)
WBC NRBC COR # BLD: 10.95 10*3/MM3 (ref 3.4–10.8)
WBC UR QL AUTO: NORMAL /HPF

## 2019-07-29 PROCEDURE — 25010000003 CEFAZOLIN IN DEXTROSE 2-4 GM/100ML-% SOLUTION: Performed by: THORACIC SURGERY (CARDIOTHORACIC VASCULAR SURGERY)

## 2019-07-29 PROCEDURE — 93010 ELECTROCARDIOGRAM REPORT: CPT | Performed by: INTERNAL MEDICINE

## 2019-07-29 PROCEDURE — 94799 UNLISTED PULMONARY SVC/PX: CPT

## 2019-07-29 PROCEDURE — 85576 BLOOD PLATELET AGGREGATION: CPT | Performed by: THORACIC SURGERY (CARDIOTHORACIC VASCULAR SURGERY)

## 2019-07-29 PROCEDURE — 25010000002 VANCOMYCIN 10 G RECONSTITUTED SOLUTION: Performed by: THORACIC SURGERY (CARDIOTHORACIC VASCULAR SURGERY)

## 2019-07-29 PROCEDURE — 84100 ASSAY OF PHOSPHORUS: CPT | Performed by: INTERNAL MEDICINE

## 2019-07-29 PROCEDURE — 63710000001 INSULIN REGULAR HUMAN PER 5 UNITS: Performed by: ANESTHESIOLOGY

## 2019-07-29 PROCEDURE — 85610 PROTHROMBIN TIME: CPT | Performed by: THORACIC SURGERY (CARDIOTHORACIC VASCULAR SURGERY)

## 2019-07-29 PROCEDURE — 25010000002 ONDANSETRON PER 1 MG: Performed by: ANESTHESIOLOGY

## 2019-07-29 PROCEDURE — 33533 CABG ARTERIAL SINGLE: CPT | Performed by: THORACIC SURGERY (CARDIOTHORACIC VASCULAR SURGERY)

## 2019-07-29 PROCEDURE — 33519 CABG ARTERY-VEIN THREE: CPT | Performed by: THORACIC SURGERY (CARDIOTHORACIC VASCULAR SURGERY)

## 2019-07-29 PROCEDURE — 83735 ASSAY OF MAGNESIUM: CPT | Performed by: INTERNAL MEDICINE

## 2019-07-29 PROCEDURE — 85384 FIBRINOGEN ACTIVITY: CPT | Performed by: THORACIC SURGERY (CARDIOTHORACIC VASCULAR SURGERY)

## 2019-07-29 PROCEDURE — 94002 VENT MGMT INPAT INIT DAY: CPT

## 2019-07-29 PROCEDURE — 33533 CABG ARTERIAL SINGLE: CPT | Performed by: PHYSICIAN ASSISTANT

## 2019-07-29 PROCEDURE — 82947 ASSAY GLUCOSE BLOOD QUANT: CPT

## 2019-07-29 PROCEDURE — 25010000002 PROTAMINE SULFATE PER 10 MG: Performed by: ANESTHESIOLOGY

## 2019-07-29 PROCEDURE — 25010000002 HEPARIN (PORCINE) PER 1000 UNITS

## 2019-07-29 PROCEDURE — 25010000002 FUROSEMIDE PER 20 MG

## 2019-07-29 PROCEDURE — 85347 COAGULATION TIME ACTIVATED: CPT

## 2019-07-29 PROCEDURE — 25010000002 MIDAZOLAM PER 1 MG: Performed by: ANESTHESIOLOGY

## 2019-07-29 PROCEDURE — 33508 ENDOSCOPIC VEIN HARVEST: CPT | Performed by: PHYSICIAN ASSISTANT

## 2019-07-29 PROCEDURE — C1729 CATH, DRAINAGE: HCPCS | Performed by: THORACIC SURGERY (CARDIOTHORACIC VASCULAR SURGERY)

## 2019-07-29 PROCEDURE — 82330 ASSAY OF CALCIUM: CPT | Performed by: THORACIC SURGERY (CARDIOTHORACIC VASCULAR SURGERY)

## 2019-07-29 PROCEDURE — 85730 THROMBOPLASTIN TIME PARTIAL: CPT | Performed by: THORACIC SURGERY (CARDIOTHORACIC VASCULAR SURGERY)

## 2019-07-29 PROCEDURE — 71045 X-RAY EXAM CHEST 1 VIEW: CPT

## 2019-07-29 PROCEDURE — C1751 CATH, INF, PER/CENT/MIDLINE: HCPCS | Performed by: ANESTHESIOLOGY

## 2019-07-29 PROCEDURE — 80069 RENAL FUNCTION PANEL: CPT | Performed by: THORACIC SURGERY (CARDIOTHORACIC VASCULAR SURGERY)

## 2019-07-29 PROCEDURE — 25010000002 PAPAVERINE PER 60 MG: Performed by: THORACIC SURGERY (CARDIOTHORACIC VASCULAR SURGERY)

## 2019-07-29 PROCEDURE — P9047 ALBUMIN (HUMAN), 25%, 50ML: HCPCS

## 2019-07-29 PROCEDURE — 021209W BYPASS CORONARY ARTERY, THREE ARTERIES FROM AORTA WITH AUTOLOGOUS VENOUS TISSUE, OPEN APPROACH: ICD-10-PCS | Performed by: THORACIC SURGERY (CARDIOTHORACIC VASCULAR SURGERY)

## 2019-07-29 PROCEDURE — 82803 BLOOD GASES ANY COMBINATION: CPT

## 2019-07-29 PROCEDURE — 85014 HEMATOCRIT: CPT

## 2019-07-29 PROCEDURE — 25010000002 PHENYLEPHRINE PER 1 ML: Performed by: ANESTHESIOLOGY

## 2019-07-29 PROCEDURE — 25010000002 HEPARIN (PORCINE) PER 1000 UNITS: Performed by: THORACIC SURGERY (CARDIOTHORACIC VASCULAR SURGERY)

## 2019-07-29 PROCEDURE — 02100Z9 BYPASS CORONARY ARTERY, ONE ARTERY FROM LEFT INTERNAL MAMMARY, OPEN APPROACH: ICD-10-PCS | Performed by: THORACIC SURGERY (CARDIOTHORACIC VASCULAR SURGERY)

## 2019-07-29 PROCEDURE — 93318 ECHO TRANSESOPHAGEAL INTRAOP: CPT

## 2019-07-29 PROCEDURE — 85025 COMPLETE CBC W/AUTO DIFF WBC: CPT | Performed by: INTERNAL MEDICINE

## 2019-07-29 PROCEDURE — 25010000002 HEPARIN (PORCINE) PER 1000 UNITS: Performed by: ANESTHESIOLOGY

## 2019-07-29 PROCEDURE — 5A1221Z PERFORMANCE OF CARDIAC OUTPUT, CONTINUOUS: ICD-10-PCS | Performed by: THORACIC SURGERY (CARDIOTHORACIC VASCULAR SURGERY)

## 2019-07-29 PROCEDURE — 33508 ENDOSCOPIC VEIN HARVEST: CPT | Performed by: THORACIC SURGERY (CARDIOTHORACIC VASCULAR SURGERY)

## 2019-07-29 PROCEDURE — 25010000002 ALBUMIN HUMAN 25% PER 50 ML

## 2019-07-29 PROCEDURE — 99232 SBSQ HOSP IP/OBS MODERATE 35: CPT | Performed by: INTERNAL MEDICINE

## 2019-07-29 PROCEDURE — 83735 ASSAY OF MAGNESIUM: CPT | Performed by: THORACIC SURGERY (CARDIOTHORACIC VASCULAR SURGERY)

## 2019-07-29 PROCEDURE — 80053 COMPREHEN METABOLIC PANEL: CPT | Performed by: INTERNAL MEDICINE

## 2019-07-29 PROCEDURE — 06BP4ZZ EXCISION OF RIGHT SAPHENOUS VEIN, PERCUTANEOUS ENDOSCOPIC APPROACH: ICD-10-PCS | Performed by: THORACIC SURGERY (CARDIOTHORACIC VASCULAR SURGERY)

## 2019-07-29 PROCEDURE — 25010000002 PROPOFOL 10 MG/ML EMULSION: Performed by: ANESTHESIOLOGY

## 2019-07-29 PROCEDURE — C1713 ANCHOR/SCREW BN/BN,TIS/BN: HCPCS | Performed by: THORACIC SURGERY (CARDIOTHORACIC VASCULAR SURGERY)

## 2019-07-29 PROCEDURE — 85018 HEMOGLOBIN: CPT

## 2019-07-29 PROCEDURE — A4648 IMPLANTABLE TISSUE MARKER: HCPCS | Performed by: THORACIC SURGERY (CARDIOTHORACIC VASCULAR SURGERY)

## 2019-07-29 PROCEDURE — 85025 COMPLETE CBC W/AUTO DIFF WBC: CPT | Performed by: THORACIC SURGERY (CARDIOTHORACIC VASCULAR SURGERY)

## 2019-07-29 PROCEDURE — 82962 GLUCOSE BLOOD TEST: CPT

## 2019-07-29 PROCEDURE — 84550 ASSAY OF BLOOD/URIC ACID: CPT | Performed by: INTERNAL MEDICINE

## 2019-07-29 PROCEDURE — 84132 ASSAY OF SERUM POTASSIUM: CPT | Performed by: THORACIC SURGERY (CARDIOTHORACIC VASCULAR SURGERY)

## 2019-07-29 PROCEDURE — 25010000002 FENTANYL CITRATE (PF) 100 MCG/2ML SOLUTION: Performed by: ANESTHESIOLOGY

## 2019-07-29 PROCEDURE — 93005 ELECTROCARDIOGRAM TRACING: CPT | Performed by: THORACIC SURGERY (CARDIOTHORACIC VASCULAR SURGERY)

## 2019-07-29 PROCEDURE — 33519 CABG ARTERY-VEIN THREE: CPT | Performed by: PHYSICIAN ASSISTANT

## 2019-07-29 PROCEDURE — 85027 COMPLETE CBC AUTOMATED: CPT | Performed by: THORACIC SURGERY (CARDIOTHORACIC VASCULAR SURGERY)

## 2019-07-29 DEVICE — SS SUTURE, 4 PER SLEEVE
Type: IMPLANTABLE DEVICE | Site: STERNUM | Status: FUNCTIONAL
Brand: MYO/WIRE II

## 2019-07-29 RX ORDER — MORPHINE SULFATE 2 MG/ML
1 INJECTION, SOLUTION INTRAMUSCULAR; INTRAVENOUS EVERY 4 HOURS PRN
Status: DISCONTINUED | OUTPATIENT
Start: 2019-07-29 | End: 2019-07-31

## 2019-07-29 RX ORDER — POTASSIUM CHLORIDE 1.5 G/1.77G
40 POWDER, FOR SOLUTION ORAL AS NEEDED
Status: DISCONTINUED | OUTPATIENT
Start: 2019-07-29 | End: 2019-07-30

## 2019-07-29 RX ORDER — ALPRAZOLAM 0.25 MG/1
0.25 TABLET ORAL EVERY 8 HOURS PRN
Status: DISCONTINUED | OUTPATIENT
Start: 2019-07-29 | End: 2019-07-30

## 2019-07-29 RX ORDER — HEPARIN SODIUM 1000 [USP'U]/ML
INJECTION, SOLUTION INTRAVENOUS; SUBCUTANEOUS AS NEEDED
Status: DISCONTINUED | OUTPATIENT
Start: 2019-07-29 | End: 2019-07-29 | Stop reason: SURG

## 2019-07-29 RX ORDER — ACETAMINOPHEN 650 MG/1
650 SUPPOSITORY RECTAL EVERY 4 HOURS PRN
Status: DISCONTINUED | OUTPATIENT
Start: 2019-07-30 | End: 2019-08-06 | Stop reason: HOSPADM

## 2019-07-29 RX ORDER — ONDANSETRON 2 MG/ML
INJECTION INTRAMUSCULAR; INTRAVENOUS AS NEEDED
Status: DISCONTINUED | OUTPATIENT
Start: 2019-07-29 | End: 2019-07-29 | Stop reason: SURG

## 2019-07-29 RX ORDER — ACETAMINOPHEN 325 MG/1
650 TABLET ORAL EVERY 4 HOURS
Status: DISCONTINUED | OUTPATIENT
Start: 2019-07-29 | End: 2019-07-30

## 2019-07-29 RX ORDER — FENTANYL CITRATE 50 UG/ML
INJECTION, SOLUTION INTRAMUSCULAR; INTRAVENOUS AS NEEDED
Status: DISCONTINUED | OUTPATIENT
Start: 2019-07-29 | End: 2019-07-29 | Stop reason: SURG

## 2019-07-29 RX ORDER — METOCLOPRAMIDE HYDROCHLORIDE 5 MG/ML
10 INJECTION INTRAMUSCULAR; INTRAVENOUS EVERY 6 HOURS
Status: DISCONTINUED | OUTPATIENT
Start: 2019-07-29 | End: 2019-07-29

## 2019-07-29 RX ORDER — ACETAMINOPHEN 160 MG/5ML
650 SOLUTION ORAL EVERY 4 HOURS
Status: DISCONTINUED | OUTPATIENT
Start: 2019-07-29 | End: 2019-07-30

## 2019-07-29 RX ORDER — PROPOFOL 10 MG/ML
VIAL (ML) INTRAVENOUS AS NEEDED
Status: DISCONTINUED | OUTPATIENT
Start: 2019-07-29 | End: 2019-07-29 | Stop reason: SURG

## 2019-07-29 RX ORDER — ACETAMINOPHEN 160 MG/5ML
650 SOLUTION ORAL EVERY 4 HOURS PRN
Status: DISCONTINUED | OUTPATIENT
Start: 2019-07-30 | End: 2019-08-06 | Stop reason: HOSPADM

## 2019-07-29 RX ORDER — ALBUMIN, HUMAN INJ 5% 5 %
1500 SOLUTION INTRAVENOUS AS NEEDED
Status: DISCONTINUED | OUTPATIENT
Start: 2019-07-29 | End: 2019-07-30

## 2019-07-29 RX ORDER — POTASSIUM CHLORIDE 7.45 MG/ML
10 INJECTION INTRAVENOUS
Status: DISCONTINUED | OUTPATIENT
Start: 2019-07-29 | End: 2019-07-30

## 2019-07-29 RX ORDER — PROTAMINE SULFATE 10 MG/ML
INJECTION, SOLUTION INTRAVENOUS AS NEEDED
Status: DISCONTINUED | OUTPATIENT
Start: 2019-07-29 | End: 2019-07-29 | Stop reason: SURG

## 2019-07-29 RX ORDER — AMINOCAPROIC ACID 250 MG/ML
INJECTION, SOLUTION INTRAVENOUS AS NEEDED
Status: DISCONTINUED | OUTPATIENT
Start: 2019-07-29 | End: 2019-07-29 | Stop reason: SURG

## 2019-07-29 RX ORDER — ONDANSETRON 2 MG/ML
4 INJECTION INTRAMUSCULAR; INTRAVENOUS EVERY 6 HOURS PRN
Status: DISCONTINUED | OUTPATIENT
Start: 2019-07-29 | End: 2019-08-06 | Stop reason: HOSPADM

## 2019-07-29 RX ORDER — BISACODYL 5 MG/1
10 TABLET, DELAYED RELEASE ORAL DAILY PRN
Status: DISCONTINUED | OUTPATIENT
Start: 2019-07-29 | End: 2019-08-06 | Stop reason: HOSPADM

## 2019-07-29 RX ORDER — SODIUM CHLORIDE 9 MG/ML
30 INJECTION, SOLUTION INTRAVENOUS CONTINUOUS
Status: DISCONTINUED | OUTPATIENT
Start: 2019-07-29 | End: 2019-07-30

## 2019-07-29 RX ORDER — BISACODYL 10 MG
10 SUPPOSITORY, RECTAL RECTAL DAILY PRN
Status: DISCONTINUED | OUTPATIENT
Start: 2019-07-30 | End: 2019-08-06 | Stop reason: HOSPADM

## 2019-07-29 RX ORDER — POTASSIUM CHLORIDE 29.8 MG/ML
20 INJECTION INTRAVENOUS
Status: DISCONTINUED | OUTPATIENT
Start: 2019-07-29 | End: 2019-07-30

## 2019-07-29 RX ORDER — PROPOFOL 10 MG/ML
VIAL (ML) INTRAVENOUS CONTINUOUS PRN
Status: DISCONTINUED | OUTPATIENT
Start: 2019-07-29 | End: 2019-07-29 | Stop reason: SURG

## 2019-07-29 RX ORDER — PROMETHAZINE HYDROCHLORIDE 25 MG/ML
12.5 INJECTION, SOLUTION INTRAMUSCULAR; INTRAVENOUS EVERY 6 HOURS PRN
Status: DISCONTINUED | OUTPATIENT
Start: 2019-07-29 | End: 2019-08-01

## 2019-07-29 RX ORDER — SENNA AND DOCUSATE SODIUM 50; 8.6 MG/1; MG/1
2 TABLET, FILM COATED ORAL NIGHTLY
Status: DISCONTINUED | OUTPATIENT
Start: 2019-07-30 | End: 2019-07-31

## 2019-07-29 RX ORDER — MILRINONE LACTATE 0.2 MG/ML
.25-.75 INJECTION, SOLUTION INTRAVENOUS CONTINUOUS PRN
Status: DISCONTINUED | OUTPATIENT
Start: 2019-07-29 | End: 2019-07-30

## 2019-07-29 RX ORDER — FUROSEMIDE 10 MG/ML
40 INJECTION INTRAMUSCULAR; INTRAVENOUS EVERY 6 HOURS PRN
Status: DISCONTINUED | OUTPATIENT
Start: 2019-07-29 | End: 2019-07-30

## 2019-07-29 RX ORDER — SODIUM CHLORIDE 9 MG/ML
INJECTION, SOLUTION INTRAVENOUS CONTINUOUS PRN
Status: DISCONTINUED | OUTPATIENT
Start: 2019-07-29 | End: 2019-07-29 | Stop reason: SURG

## 2019-07-29 RX ORDER — MEPERIDINE HYDROCHLORIDE 25 MG/ML
25 INJECTION INTRAMUSCULAR; INTRAVENOUS; SUBCUTANEOUS EVERY 4 HOURS PRN
Status: DISCONTINUED | OUTPATIENT
Start: 2019-07-29 | End: 2019-07-30

## 2019-07-29 RX ORDER — MORPHINE SULFATE 2 MG/ML
4 INJECTION, SOLUTION INTRAMUSCULAR; INTRAVENOUS
Status: DISCONTINUED | OUTPATIENT
Start: 2019-07-29 | End: 2019-07-30

## 2019-07-29 RX ORDER — ASPIRIN 81 MG/1
81 TABLET ORAL DAILY
Status: DISCONTINUED | OUTPATIENT
Start: 2019-07-30 | End: 2019-08-06 | Stop reason: HOSPADM

## 2019-07-29 RX ORDER — MIDAZOLAM HYDROCHLORIDE 1 MG/ML
2 INJECTION INTRAMUSCULAR; INTRAVENOUS
Status: DISCONTINUED | OUTPATIENT
Start: 2019-07-29 | End: 2019-07-30

## 2019-07-29 RX ORDER — ACETAMINOPHEN 650 MG/1
650 SUPPOSITORY RECTAL EVERY 4 HOURS
Status: DISCONTINUED | OUTPATIENT
Start: 2019-07-29 | End: 2019-07-30

## 2019-07-29 RX ORDER — NOREPINEPHRINE BIT/0.9 % NACL 8 MG/250ML
.02-.3 INFUSION BOTTLE (ML) INTRAVENOUS CONTINUOUS PRN
Status: DISCONTINUED | OUTPATIENT
Start: 2019-07-29 | End: 2019-07-30

## 2019-07-29 RX ORDER — SUFENTANIL CITRATE 50 UG/ML
INJECTION EPIDURAL; INTRAVENOUS AS NEEDED
Status: DISCONTINUED | OUTPATIENT
Start: 2019-07-29 | End: 2019-07-29 | Stop reason: SURG

## 2019-07-29 RX ORDER — PAPAVERINE HYDROCHLORIDE 30 MG/ML
INJECTION INTRAMUSCULAR; INTRAVENOUS AS NEEDED
Status: DISCONTINUED | OUTPATIENT
Start: 2019-07-29 | End: 2019-07-29 | Stop reason: HOSPADM

## 2019-07-29 RX ORDER — NITROGLYCERIN 20 MG/100ML
10-50 INJECTION INTRAVENOUS
Status: DISCONTINUED | OUTPATIENT
Start: 2019-07-29 | End: 2019-07-30

## 2019-07-29 RX ORDER — HEPARIN SODIUM 5000 [USP'U]/ML
INJECTION, SOLUTION INTRAVENOUS; SUBCUTANEOUS AS NEEDED
Status: DISCONTINUED | OUTPATIENT
Start: 2019-07-29 | End: 2019-07-29 | Stop reason: HOSPADM

## 2019-07-29 RX ORDER — CYCLOBENZAPRINE HCL 10 MG
10 TABLET ORAL EVERY 8 HOURS PRN
Status: DISCONTINUED | OUTPATIENT
Start: 2019-07-30 | End: 2019-07-31

## 2019-07-29 RX ORDER — ROCURONIUM BROMIDE 10 MG/ML
INJECTION, SOLUTION INTRAVENOUS AS NEEDED
Status: DISCONTINUED | OUTPATIENT
Start: 2019-07-29 | End: 2019-07-29 | Stop reason: SURG

## 2019-07-29 RX ORDER — DOPAMINE HYDROCHLORIDE 160 MG/100ML
2-20 INJECTION, SOLUTION INTRAVENOUS CONTINUOUS PRN
Status: DISCONTINUED | OUTPATIENT
Start: 2019-07-29 | End: 2019-07-30

## 2019-07-29 RX ORDER — PROMETHAZINE HYDROCHLORIDE 25 MG/1
12.5 TABLET ORAL EVERY 6 HOURS PRN
Status: DISCONTINUED | OUTPATIENT
Start: 2019-07-29 | End: 2019-08-01

## 2019-07-29 RX ORDER — NALOXONE HCL 0.4 MG/ML
0.4 VIAL (ML) INJECTION
Status: DISCONTINUED | OUTPATIENT
Start: 2019-07-29 | End: 2019-08-06 | Stop reason: HOSPADM

## 2019-07-29 RX ORDER — POTASSIUM CHLORIDE 750 MG/1
40 CAPSULE, EXTENDED RELEASE ORAL AS NEEDED
Status: DISCONTINUED | OUTPATIENT
Start: 2019-07-29 | End: 2019-07-30

## 2019-07-29 RX ORDER — SODIUM CHLORIDE 9 MG/ML
30 INJECTION, SOLUTION INTRAVENOUS CONTINUOUS PRN
Status: DISCONTINUED | OUTPATIENT
Start: 2019-07-29 | End: 2019-07-30

## 2019-07-29 RX ORDER — MIDAZOLAM HYDROCHLORIDE 1 MG/ML
INJECTION INTRAMUSCULAR; INTRAVENOUS AS NEEDED
Status: DISCONTINUED | OUTPATIENT
Start: 2019-07-29 | End: 2019-07-29 | Stop reason: SURG

## 2019-07-29 RX ORDER — NOREPINEPHRINE BITARTRATE 1 MG/ML
INJECTION, SOLUTION INTRAVENOUS CONTINUOUS PRN
Status: DISCONTINUED | OUTPATIENT
Start: 2019-07-29 | End: 2019-07-29 | Stop reason: SURG

## 2019-07-29 RX ORDER — CHLORHEXIDINE GLUCONATE 0.12 MG/ML
15 RINSE ORAL EVERY 12 HOURS
Status: DISCONTINUED | OUTPATIENT
Start: 2019-07-29 | End: 2019-08-01

## 2019-07-29 RX ORDER — OXYCODONE HYDROCHLORIDE 5 MG/1
10 TABLET ORAL EVERY 4 HOURS PRN
Status: DISCONTINUED | OUTPATIENT
Start: 2019-07-29 | End: 2019-07-30

## 2019-07-29 RX ORDER — ATORVASTATIN CALCIUM 20 MG/1
40 TABLET, FILM COATED ORAL NIGHTLY
Status: DISCONTINUED | OUTPATIENT
Start: 2019-07-29 | End: 2019-08-06 | Stop reason: HOSPADM

## 2019-07-29 RX ORDER — ACETAMINOPHEN 325 MG/1
650 TABLET ORAL EVERY 4 HOURS PRN
Status: DISCONTINUED | OUTPATIENT
Start: 2019-07-30 | End: 2019-08-06 | Stop reason: HOSPADM

## 2019-07-29 RX ORDER — HYDROCODONE BITARTRATE AND ACETAMINOPHEN 5; 325 MG/1; MG/1
2 TABLET ORAL EVERY 4 HOURS PRN
Status: DISCONTINUED | OUTPATIENT
Start: 2019-07-29 | End: 2019-07-31

## 2019-07-29 RX ORDER — SODIUM CHLORIDE 0.9 % (FLUSH) 0.9 %
30 SYRINGE (ML) INJECTION ONCE AS NEEDED
Status: DISCONTINUED | OUTPATIENT
Start: 2019-07-29 | End: 2019-07-30

## 2019-07-29 RX ADMIN — CEFAZOLIN SODIUM 2 G: 2 INJECTION, SOLUTION INTRAVENOUS at 07:34

## 2019-07-29 RX ADMIN — SODIUM CHLORIDE 1 MCG/KG/HR: 900 INJECTION, SOLUTION INTRAVENOUS at 07:21

## 2019-07-29 RX ADMIN — Medication 2 MG: at 11:15

## 2019-07-29 RX ADMIN — HEPARIN SODIUM 30000 UNITS: 1000 INJECTION, SOLUTION INTRAVENOUS; SUBCUTANEOUS at 08:58

## 2019-07-29 RX ADMIN — SODIUM CHLORIDE: 9 INJECTION, SOLUTION INTRAVENOUS at 12:15

## 2019-07-29 RX ADMIN — SUFENTANIL CITRATE 25 MCG: 50 INJECTION, SOLUTION EPIDURAL; INTRAVENOUS at 11:31

## 2019-07-29 RX ADMIN — FENTANYL CITRATE 50 MCG: 50 INJECTION INTRAMUSCULAR; INTRAVENOUS at 06:58

## 2019-07-29 RX ADMIN — Medication 1 MG: at 06:51

## 2019-07-29 RX ADMIN — CEFAZOLIN SODIUM 2 G: 2 INJECTION, SOLUTION INTRAVENOUS at 11:31

## 2019-07-29 RX ADMIN — SODIUM CHLORIDE: 9 INJECTION, SOLUTION INTRAVENOUS at 06:45

## 2019-07-29 RX ADMIN — MUPIROCIN 1 APPLICATION: 20 OINTMENT TOPICAL at 21:47

## 2019-07-29 RX ADMIN — AMINOCAPROIC ACID 10 G: 250 INJECTION, SOLUTION INTRAVENOUS at 11:56

## 2019-07-29 RX ADMIN — SUFENTANIL CITRATE 25 MCG: 50 INJECTION, SOLUTION EPIDURAL; INTRAVENOUS at 07:54

## 2019-07-29 RX ADMIN — SUFENTANIL CITRATE 25 MCG: 50 INJECTION, SOLUTION EPIDURAL; INTRAVENOUS at 07:42

## 2019-07-29 RX ADMIN — SODIUM CHLORIDE 2.4 UNITS/HR: 900 INJECTION, SOLUTION INTRAVENOUS at 07:56

## 2019-07-29 RX ADMIN — SUFENTANIL CITRATE 25 MCG: 50 INJECTION, SOLUTION EPIDURAL; INTRAVENOUS at 11:38

## 2019-07-29 RX ADMIN — ROCURONIUM BROMIDE 20 MG: 10 INJECTION INTRAVENOUS at 11:15

## 2019-07-29 RX ADMIN — PROTAMINE SULFATE 500 MG: 10 INJECTION, SOLUTION INTRAVENOUS at 11:32

## 2019-07-29 RX ADMIN — SODIUM CHLORIDE 30 ML/HR: 9 INJECTION, SOLUTION INTRAVENOUS at 13:30

## 2019-07-29 RX ADMIN — AMINOCAPROIC ACID 10 G: 250 INJECTION, SOLUTION INTRAVENOUS at 07:42

## 2019-07-29 RX ADMIN — Medication 1 MG: at 06:56

## 2019-07-29 RX ADMIN — DEXMEDETOMIDINE HYDROCHLORIDE 0.5 MCG/KG/HR: 100 INJECTION, SOLUTION, CONCENTRATE INTRAVENOUS at 16:03

## 2019-07-29 RX ADMIN — FENTANYL CITRATE 50 MCG: 50 INJECTION INTRAMUSCULAR; INTRAVENOUS at 06:51

## 2019-07-29 RX ADMIN — SODIUM CHLORIDE 1 MG/HR: 9 INJECTION, SOLUTION INTRAVENOUS at 12:10

## 2019-07-29 RX ADMIN — HEPARIN SODIUM 10000 UNITS: 1000 INJECTION, SOLUTION INTRAVENOUS; SUBCUTANEOUS at 09:30

## 2019-07-29 RX ADMIN — SUFENTANIL CITRATE 25 MCG: 50 INJECTION, SOLUTION EPIDURAL; INTRAVENOUS at 10:41

## 2019-07-29 RX ADMIN — SUFENTANIL CITRATE 25 MCG: 50 INJECTION, SOLUTION EPIDURAL; INTRAVENOUS at 08:19

## 2019-07-29 RX ADMIN — SODIUM CHLORIDE: 9 INJECTION, SOLUTION INTRAVENOUS at 07:21

## 2019-07-29 RX ADMIN — PHENYLEPHRINE HYDROCHLORIDE 0.5 MCG/KG/MIN: 10 INJECTION, SOLUTION INTRAMUSCULAR; INTRAVENOUS; SUBCUTANEOUS at 06:58

## 2019-07-29 RX ADMIN — PROPOFOL 50 MCG/KG/MIN: 10 INJECTION, EMULSION INTRAVENOUS at 08:59

## 2019-07-29 RX ADMIN — ONDANSETRON 4 MG: 2 INJECTION INTRAMUSCULAR; INTRAVENOUS at 11:58

## 2019-07-29 RX ADMIN — SUFENTANIL CITRATE 25 MCG: 50 INJECTION, SOLUTION EPIDURAL; INTRAVENOUS at 09:08

## 2019-07-29 RX ADMIN — METOPROLOL TARTRATE 12.5 MG: 25 TABLET ORAL at 06:17

## 2019-07-29 RX ADMIN — MICONAZOLE NITRATE: 2 POWDER TOPICAL at 22:31

## 2019-07-29 RX ADMIN — PHENYLEPHRINE HYDROCHLORIDE 0.2 MCG/KG/MIN: 10 INJECTION, SOLUTION INTRAMUSCULAR; INTRAVENOUS; SUBCUTANEOUS at 07:41

## 2019-07-29 RX ADMIN — ROCURONIUM BROMIDE 50 MG: 10 INJECTION INTRAVENOUS at 07:00

## 2019-07-29 RX ADMIN — PROPOFOL 150 MG: 10 INJECTION, EMULSION INTRAVENOUS at 07:00

## 2019-07-29 RX ADMIN — NOREPINEPHRINE BITARTRATE 0.02 MCG/KG/MIN: 1 INJECTION, SOLUTION, CONCENTRATE INTRAVENOUS at 11:22

## 2019-07-29 RX ADMIN — Medication 1 MG: at 07:49

## 2019-07-29 RX ADMIN — VANCOMYCIN HYDROCHLORIDE 1250 MG: 10 INJECTION, POWDER, LYOPHILIZED, FOR SOLUTION INTRAVENOUS at 19:31

## 2019-07-29 RX ADMIN — SUFENTANIL CITRATE 25 MCG: 50 INJECTION, SOLUTION EPIDURAL; INTRAVENOUS at 11:49

## 2019-07-29 NOTE — ANESTHESIA PROCEDURE NOTES
Central Line      Patient reassessed immediately prior to procedure    Patient location during procedure: OR  Start time: 7/29/2019 7:11 AM  Stop Time:7/29/2019 7:21 AM  Indications: vascular access, MD/Surgeon request and central pressure monitoring  Staff  Anesthesiologist: Nehemiah Chawla MD  Preanesthetic Checklist  Completed: patient identified, surgical consent, pre-op evaluation, timeout performed, IV checked, risks and benefits discussed and monitors and equipment checked  Central Line Prep  Sterile Tech:cap, gloves, gown, mask and sterile barriers  Prep: chloraprep  Patient monitoring: blood pressure monitoring, continuous pulse oximetry and EKG  Central Line Procedure  Laterality:right  Location:internal jugular  Catheter Type:Cordis  Catheter Size:9 Fr  Guidance:landmark technique  Assessment  Post procedure:biopatch applied, line sutured and occlusive dressing applied  Assessement:blood return through all ports, free fluid flow and Kishore Test  Complications:no  Patient Tolerance:patient tolerated the procedure well with no apparent complications

## 2019-07-29 NOTE — ANESTHESIA PROCEDURE NOTES
Central Line      Patient reassessed immediately prior to procedure    Patient location during procedure: OR  Stop Time:7/29/2019 7:21 AM  Indications: vascular access, MD/Surgeon request and central pressure monitoring  Staff  Anesthesiologist: Nehemiah Chawla MD  Preanesthetic Checklist  Completed: patient identified, surgical consent, pre-op evaluation, timeout performed, IV checked, risks and benefits discussed and monitors and equipment checked  Central Line Prep  Sterile Tech:cap, gloves, gown, mask and sterile barriers  Prep: chloraprep  Patient monitoring: blood pressure monitoring, continuous pulse oximetry and EKG  Central Line Procedure  Laterality:right  Location:internal jugular  Catheter Type:Brockton-Leonard  Guidance:landmark technique  Assessment  Post procedure:biopatch applied, line sutured and occlusive dressing applied  Assessement:blood return through all ports, free fluid flow and Kishore Test  Complications:no  Patient Tolerance:patient tolerated the procedure well with no apparent complications

## 2019-07-29 NOTE — ANESTHESIA PROCEDURE NOTES
Airway  Urgency: elective      General Information and Staff    Patient location during procedure: OR  Anesthesiologist: Nehemiah Chawla MD    Indications and Patient Condition    Preoxygenated: yes      Final Airway Details  Final airway type: endotracheal airway      Successful airway: ETT  Cuffed: yes   Successful intubation technique: direct laryngoscopy  Endotracheal tube insertion site: oral  Blade: Marleny  Blade size: 3  ETT size (mm): 7.5  Cormack-Lehane Classification: grade IIa - partial view of glottis  Placement verified by: chest auscultation   Number of attempts at approach: 1

## 2019-07-29 NOTE — ANESTHESIA PROCEDURE NOTES
Procedure Performed: Emergent/Open-Heart Anesthesia ERNESTO     Start Time:        End Time:      Preanesthesia Checklist:  Patient identified, IV assessed, risks and benefits discussed, monitors and equipment assessed, procedure being performed at surgeon's request and anesthesia consent obtained.    General Procedure Information  ERNESTO Placed for monitoring purposes only -- This is not a diagnostic ERNESTO          Anesthesia Information      Echocardiogram Comments:       ERNESTO placed easily x 1 attempt  DMP

## 2019-07-29 NOTE — ANESTHESIA POSTPROCEDURE EVALUATION
Patient: Maribeth Rendon    Procedure Summary     Date:  07/29/19 Room / Location:  [facility] OR [address] [place] [address] MAIN OR    Anesthesia Start:  0645 Anesthesia Stop:  1248    Procedure:  INTRAOP ERNESTO; CORONARY ARTERY BYPASS GRAFTING X 4 WITH LEFT INTERNAL MAMMARY ARTERY GRAFT AND UTILIZING ENDOSCOPICALLY HARVESTED GREATER SAPHENOUS VEIN; PRP (N/A Chest) Diagnosis:       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 pectoris, unspecified vessel or lesion type (CMS/HCC)      (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 pectoris, unspecified vessel or lesion type (CMS/HCC) [I25.118])    Surgeon:  Gordo Ferreira MD Provider:  Nehemiah Chawla MD    Anesthesia Type:  general ASA Status:  4          Anesthesia Type: general  Last vitals  BP   103/53 (07/29/19 1352)   Temp   37 °C (98.6 °F) (07/29/19 0344)   Pulse   69 (07/29/19 1400)   Resp   18 (07/29/19 0344)     SpO2   100 % (07/29/19 1400)     Post Anesthesia Care and Evaluation    Patient location during evaluation: ICU  Patient participation: waiting for patient participation  Pain management: adequate  Airway patency: patent  Anesthetic complications: No anesthetic complications  PONV Status: none  Cardiovascular status: acceptable  Respiratory status: acceptable, ETT, intubated and ventilator  Hydration status: acceptable

## 2019-07-29 NOTE — ANESTHESIA PROCEDURE NOTES
Arterial Line      Patient reassessed immediately prior to procedure    Patient location during procedure: OR  Start time: 7/29/2019 6:51 AM  Stop Time:7/29/2019 6:54 AM       Line placed for hemodynamic monitoring.  Performed By   Anesthesiologist: Nehemiah Chawla MD  Preanesthetic Checklist  Completed: patient identified, surgical consent, pre-op evaluation, timeout performed, IV checked, risks and benefits discussed and monitors and equipment checked  Arterial Line Prep   Sterile Tech: gloves and cap  Prep: ChloraPrep  Patient monitoring: blood pressure monitoring, continuous pulse oximetry and EKG  Arterial Line Procedure   Laterality:right  Location:  radial artery  Catheter size: 20 G   Guidance: landmark technique  Number of attempts: 1  Successful placement: yes  Post Assessment   Dressing Type: occlusive dressing applied and wrist guard applied.   Complications no  Circ/Move/Sens Assessment: normal and unchanged.   Patient Tolerance: patient tolerated the procedure well with no apparent complications

## 2019-07-30 ENCOUNTER — APPOINTMENT (OUTPATIENT)
Dept: GENERAL RADIOLOGY | Facility: HOSPITAL | Age: 69
End: 2019-07-30

## 2019-07-30 LAB
ALBUMIN SERPL-MCNC: 3.3 G/DL (ref 3.5–5.2)
ANION GAP SERPL CALCULATED.3IONS-SCNC: 18.1 MMOL/L (ref 5–15)
ANISOCYTOSIS BLD QL: ABNORMAL
ARTERIAL PATENCY WRIST A: ABNORMAL
ARTERIAL PATENCY WRIST A: ABNORMAL
ATMOSPHERIC PRESS: 751.9 MMHG
ATMOSPHERIC PRESS: 753.4 MMHG
BASE EXCESS BLDA CALC-SCNC: -1 MMOL/L (ref -5–5)
BASE EXCESS BLDA CALC-SCNC: -6 MMOL/L (ref -5–5)
BASE EXCESS BLDA CALC-SCNC: -6.7 MMOL/L (ref 0–2)
BASE EXCESS BLDA CALC-SCNC: -7 MMOL/L (ref -5–5)
BASE EXCESS BLDA CALC-SCNC: -7.8 MMOL/L (ref 0–2)
BASE EXCESS BLDA CALC-SCNC: -8 MMOL/L (ref -5–5)
BASE EXCESS BLDA CALC-SCNC: 1 MMOL/L (ref -5–5)
BASE EXCESS BLDA CALC-SCNC: 1 MMOL/L (ref -5–5)
BDY SITE: ABNORMAL
BDY SITE: ABNORMAL
BUN BLD-MCNC: 73 MG/DL (ref 8–23)
BUN/CREAT SERPL: 17.1 (ref 7–25)
CALCIUM SPEC-SCNC: 7.9 MG/DL (ref 8.6–10.5)
CHLORIDE SERPL-SCNC: 106 MMOL/L (ref 98–107)
CO2 BLDA-SCNC: 20 MMOL/L (ref 24–29)
CO2 BLDA-SCNC: 21 MMOL/L (ref 24–29)
CO2 BLDA-SCNC: 21 MMOL/L (ref 24–29)
CO2 BLDA-SCNC: 25 MMOL/L (ref 24–29)
CO2 BLDA-SCNC: 27 MMOL/L (ref 24–29)
CO2 BLDA-SCNC: 27 MMOL/L (ref 24–29)
CO2 SERPL-SCNC: 17.9 MMOL/L (ref 22–29)
CREAT BLD-MCNC: 4.27 MG/DL (ref 0.57–1)
DEPRECATED RDW RBC AUTO: 57.7 FL (ref 37–54)
ERYTHROCYTE [DISTWIDTH] IN BLOOD BY AUTOMATED COUNT: 16.1 % (ref 12.3–15.4)
GAS FLOW AIRWAY: 5 LPM
GFR SERPL CREATININE-BSD FRML MDRD: 10 ML/MIN/1.73
GFR SERPL CREATININE-BSD FRML MDRD: ABNORMAL ML/MIN/1.73
GLUCOSE BLD-MCNC: 109 MG/DL (ref 65–99)
GLUCOSE BLDC GLUCOMTR-MCNC: 112 MG/DL (ref 70–130)
GLUCOSE BLDC GLUCOMTR-MCNC: 114 MG/DL (ref 70–130)
GLUCOSE BLDC GLUCOMTR-MCNC: 118 MG/DL (ref 70–130)
GLUCOSE BLDC GLUCOMTR-MCNC: 120 MG/DL (ref 70–130)
GLUCOSE BLDC GLUCOMTR-MCNC: 148 MG/DL (ref 70–130)
GLUCOSE BLDC GLUCOMTR-MCNC: 150 MG/DL (ref 70–130)
GLUCOSE BLDC GLUCOMTR-MCNC: 152 MG/DL (ref 70–130)
GLUCOSE BLDC GLUCOMTR-MCNC: 160 MG/DL (ref 70–130)
GLUCOSE BLDC GLUCOMTR-MCNC: 163 MG/DL (ref 70–130)
GLUCOSE BLDC GLUCOMTR-MCNC: 180 MG/DL (ref 70–130)
GLUCOSE BLDC GLUCOMTR-MCNC: 197 MG/DL (ref 70–130)
GLUCOSE BLDC GLUCOMTR-MCNC: 201 MG/DL (ref 70–130)
GLUCOSE BLDC GLUCOMTR-MCNC: 240 MG/DL (ref 70–130)
HCO3 BLDA-SCNC: 18.8 MMOL/L (ref 22–26)
HCO3 BLDA-SCNC: 18.9 MMOL/L (ref 22–28)
HCO3 BLDA-SCNC: 20 MMOL/L (ref 22–26)
HCO3 BLDA-SCNC: 20.1 MMOL/L (ref 22–26)
HCO3 BLDA-SCNC: 20.1 MMOL/L (ref 22–28)
HCO3 BLDA-SCNC: 24 MMOL/L (ref 22–26)
HCO3 BLDA-SCNC: 25.6 MMOL/L (ref 22–26)
HCO3 BLDA-SCNC: 25.9 MMOL/L (ref 22–26)
HCT VFR BLD AUTO: 28 % (ref 34–46.6)
HCT VFR BLDA CALC: 21 % (ref 38–51)
HCT VFR BLDA CALC: 21 % (ref 38–51)
HCT VFR BLDA CALC: 22 % (ref 38–51)
HCT VFR BLDA CALC: 23 % (ref 38–51)
HCT VFR BLDA CALC: 24 % (ref 38–51)
HCT VFR BLDA CALC: 26 % (ref 38–51)
HGB BLD-MCNC: 8.4 G/DL (ref 12–15.9)
HGB BLDA-MCNC: 7.1 G/DL (ref 12–17)
HGB BLDA-MCNC: 7.1 G/DL (ref 12–17)
HGB BLDA-MCNC: 7.5 G/DL (ref 12–17)
HGB BLDA-MCNC: 7.8 G/DL (ref 12–17)
HGB BLDA-MCNC: 8.2 G/DL (ref 12–17)
HGB BLDA-MCNC: 8.8 G/DL (ref 12–17)
HOROWITZ INDEX BLD+IHG-RTO: 40 %
INR PPP: 1.31 (ref 0.9–1.1)
LYMPHOCYTES # BLD MANUAL: 0 10*3/MM3 (ref 0.7–3.1)
LYMPHOCYTES NFR BLD MANUAL: 0 % (ref 19.6–45.3)
LYMPHOCYTES NFR BLD MANUAL: 1 % (ref 5–12)
MAGNESIUM SERPL-MCNC: 1.9 MG/DL (ref 1.6–2.4)
MCH RBC QN AUTO: 29.1 PG (ref 26.6–33)
MCHC RBC AUTO-ENTMCNC: 30 G/DL (ref 31.5–35.7)
MCV RBC AUTO: 96.9 FL (ref 79–97)
METAMYELOCYTES NFR BLD MANUAL: 2 % (ref 0–0)
MODALITY: ABNORMAL
MODALITY: ABNORMAL
MONOCYTES # BLD AUTO: 0.18 10*3/MM3 (ref 0.1–0.9)
MYELOCYTES NFR BLD MANUAL: 1 % (ref 0–0)
NEUTROPHILS # BLD AUTO: 16.82 10*3/MM3 (ref 1.7–7)
NEUTROPHILS NFR BLD MANUAL: 96 % (ref 42.7–76)
O2 A-A PPRESDIFF RESPIRATORY: 0.4 MMHG
PCO2 BLDA: 38.9 MM HG (ref 35–45)
PCO2 BLDA: 41.3 MM HG (ref 35–45)
PCO2 BLDA: 41.9 MM HG (ref 35–45)
PCO2 BLDA: 42.7 MM HG (ref 35–45)
PCO2 BLDA: 42.8 MM HG (ref 35–45)
PCO2 BLDA: 43.3 MM HG (ref 35–45)
PCO2 BLDA: 45.1 MM HG (ref 35–45)
PCO2 BLDA: 45.4 MM HG (ref 35–45)
PEEP RESPIRATORY: 7.5 CM[H2O]
PH BLDA: 7.25 PH UNITS (ref 7.35–7.45)
PH BLDA: 7.25 PH UNITS (ref 7.35–7.45)
PH BLDA: 7.27 PH UNITS (ref 7.35–7.6)
PH BLDA: 7.28 PH UNITS (ref 7.35–7.6)
PH BLDA: 7.32 PH UNITS (ref 7.35–7.6)
PH BLDA: 7.37 PH UNITS (ref 7.35–7.6)
PH BLDA: 7.37 PH UNITS (ref 7.35–7.6)
PH BLDA: 7.39 PH UNITS (ref 7.35–7.6)
PHOSPHATE SERPL-MCNC: 4.5 MG/DL (ref 2.5–4.5)
PLAT MORPH BLD: NORMAL
PLATELET # BLD AUTO: 142 10*3/MM3 (ref 140–450)
PMV BLD AUTO: 11.4 FL (ref 6–12)
PO2 BLDA: 102.4 MM HG (ref 80–100)
PO2 BLDA: 144.1 MM HG (ref 80–100)
PO2 BLDA: 41 MMHG (ref 80–105)
PO2 BLDA: 437 MMHG (ref 80–105)
PO2 BLDA: 476 MMHG (ref 80–105)
PO2 BLDA: 497 MMHG (ref 80–105)
PO2 BLDA: 529 MMHG (ref 80–105)
PO2 BLDA: 58 MMHG (ref 80–105)
POTASSIUM BLD-SCNC: 5.1 MMOL/L (ref 3.5–5.2)
POTASSIUM BLDA-SCNC: 4.2 MMOL/L (ref 3.5–4.9)
POTASSIUM BLDA-SCNC: 4.9 MMOL/L (ref 3.5–4.9)
POTASSIUM BLDA-SCNC: 5.2 MMOL/L (ref 3.5–4.9)
POTASSIUM BLDA-SCNC: 5.2 MMOL/L (ref 3.5–4.9)
POTASSIUM BLDA-SCNC: 5.3 MMOL/L (ref 3.5–4.9)
POTASSIUM BLDA-SCNC: 5.3 MMOL/L (ref 3.5–4.9)
PROTHROMBIN TIME: 16 SECONDS (ref 11.7–14.2)
PSV: 8 CMH2O
RBC # BLD AUTO: 2.89 10*6/MM3 (ref 3.77–5.28)
SAO2 % BLDA: 100 % (ref 95–98)
SAO2 % BLDA: 68 % (ref 95–98)
SAO2 % BLDA: 86 % (ref 95–98)
SAO2 % BLDCOA: 96.8 % (ref 92–99)
SAO2 % BLDCOA: 98.8 % (ref 92–99)
SET MECH RESP RATE: 16
SET MECH RESP RATE: 18
SODIUM BLD-SCNC: 142 MMOL/L (ref 136–145)
TOTAL RATE: 16 BREATHS/MINUTE
TOTAL RATE: 18 BREATHS/MINUTE
VENTILATOR MODE: ABNORMAL
VT ON VENT VENT: 490 ML
WBC MORPH BLD: NORMAL
WBC NRBC COR # BLD: 17.52 10*3/MM3 (ref 3.4–10.8)

## 2019-07-30 PROCEDURE — 97110 THERAPEUTIC EXERCISES: CPT

## 2019-07-30 PROCEDURE — 83735 ASSAY OF MAGNESIUM: CPT | Performed by: THORACIC SURGERY (CARDIOTHORACIC VASCULAR SURGERY)

## 2019-07-30 PROCEDURE — 71045 X-RAY EXAM CHEST 1 VIEW: CPT

## 2019-07-30 PROCEDURE — 93010 ELECTROCARDIOGRAM REPORT: CPT | Performed by: INTERNAL MEDICINE

## 2019-07-30 PROCEDURE — 97162 PT EVAL MOD COMPLEX 30 MIN: CPT

## 2019-07-30 PROCEDURE — 93005 ELECTROCARDIOGRAM TRACING: CPT | Performed by: NURSE PRACTITIONER

## 2019-07-30 PROCEDURE — 99232 SBSQ HOSP IP/OBS MODERATE 35: CPT | Performed by: INTERNAL MEDICINE

## 2019-07-30 PROCEDURE — 82803 BLOOD GASES ANY COMBINATION: CPT

## 2019-07-30 PROCEDURE — 85610 PROTHROMBIN TIME: CPT | Performed by: THORACIC SURGERY (CARDIOTHORACIC VASCULAR SURGERY)

## 2019-07-30 PROCEDURE — 94799 UNLISTED PULMONARY SVC/PX: CPT

## 2019-07-30 PROCEDURE — 85007 BL SMEAR W/DIFF WBC COUNT: CPT | Performed by: THORACIC SURGERY (CARDIOTHORACIC VASCULAR SURGERY)

## 2019-07-30 PROCEDURE — 80069 RENAL FUNCTION PANEL: CPT | Performed by: THORACIC SURGERY (CARDIOTHORACIC VASCULAR SURGERY)

## 2019-07-30 PROCEDURE — 85025 COMPLETE CBC W/AUTO DIFF WBC: CPT | Performed by: THORACIC SURGERY (CARDIOTHORACIC VASCULAR SURGERY)

## 2019-07-30 PROCEDURE — 25010000002 ENOXAPARIN PER 10 MG: Performed by: THORACIC SURGERY (CARDIOTHORACIC VASCULAR SURGERY)

## 2019-07-30 PROCEDURE — 82962 GLUCOSE BLOOD TEST: CPT

## 2019-07-30 RX ORDER — NICOTINE POLACRILEX 4 MG
15 LOZENGE BUCCAL
Status: DISCONTINUED | OUTPATIENT
Start: 2019-07-30 | End: 2019-08-01

## 2019-07-30 RX ORDER — TOPIRAMATE 100 MG/1
100 TABLET, FILM COATED ORAL DAILY
Status: DISCONTINUED | OUTPATIENT
Start: 2019-07-30 | End: 2019-07-31

## 2019-07-30 RX ORDER — BUMETANIDE 0.25 MG/ML
4 INJECTION INTRAMUSCULAR; INTRAVENOUS EVERY 8 HOURS
Status: COMPLETED | OUTPATIENT
Start: 2019-07-30 | End: 2019-07-30

## 2019-07-30 RX ORDER — DEXTROSE MONOHYDRATE 25 G/50ML
25 INJECTION, SOLUTION INTRAVENOUS
Status: DISCONTINUED | OUTPATIENT
Start: 2019-07-30 | End: 2019-08-01

## 2019-07-30 RX ADMIN — SODIUM BICARBONATE 50 MEQ: 84 INJECTION, SOLUTION INTRAVENOUS at 01:13

## 2019-07-30 RX ADMIN — CHLORHEXIDINE GLUCONATE 15 ML: 1.2 RINSE ORAL at 05:18

## 2019-07-30 RX ADMIN — ACETAMINOPHEN 650 MG: 325 TABLET, FILM COATED ORAL at 02:29

## 2019-07-30 RX ADMIN — SERTRALINE 50 MG: 50 TABLET, FILM COATED ORAL at 17:04

## 2019-07-30 RX ADMIN — SODIUM BICARBONATE 100 MEQ: 84 INJECTION, SOLUTION INTRAVENOUS at 05:52

## 2019-07-30 RX ADMIN — ATORVASTATIN CALCIUM 40 MG: 20 TABLET, FILM COATED ORAL at 21:15

## 2019-07-30 RX ADMIN — BUMETANIDE 4 MG: 0.25 INJECTION INTRAMUSCULAR; INTRAVENOUS at 11:45

## 2019-07-30 RX ADMIN — MUPIROCIN 1 APPLICATION: 20 OINTMENT TOPICAL at 09:15

## 2019-07-30 RX ADMIN — CYCLOBENZAPRINE 10 MG: 10 TABLET, FILM COATED ORAL at 13:50

## 2019-07-30 RX ADMIN — MUPIROCIN 1 APPLICATION: 20 OINTMENT TOPICAL at 21:15

## 2019-07-30 RX ADMIN — HYDROCODONE BITARTRATE AND ACETAMINOPHEN 1 TABLET: 5; 325 TABLET ORAL at 11:14

## 2019-07-30 RX ADMIN — METOPROLOL TARTRATE 12.5 MG: 25 TABLET ORAL at 21:15

## 2019-07-30 RX ADMIN — ENOXAPARIN SODIUM 30 MG: 30 INJECTION SUBCUTANEOUS at 17:04

## 2019-07-30 RX ADMIN — SENNOSIDES AND DOCUSATE SODIUM 2 TABLET: 8.6; 5 TABLET ORAL at 21:15

## 2019-07-30 RX ADMIN — SODIUM CHLORIDE 250 ML: 0.9 INJECTION, SOLUTION INTRAVENOUS at 16:45

## 2019-07-30 RX ADMIN — MICONAZOLE NITRATE: 2 POWDER TOPICAL at 11:47

## 2019-07-30 RX ADMIN — Medication 50 MEQ: at 01:13

## 2019-07-30 RX ADMIN — CHLORHEXIDINE GLUCONATE 15 ML: 1.2 RINSE ORAL at 17:04

## 2019-07-30 RX ADMIN — TOPIRAMATE 100 MG: 100 TABLET, FILM COATED ORAL at 17:04

## 2019-07-30 RX ADMIN — ASPIRIN 81 MG: 81 TABLET, COATED ORAL at 09:15

## 2019-07-30 RX ADMIN — Medication 100 MEQ: at 05:52

## 2019-07-30 RX ADMIN — BUMETANIDE 4 MG: 0.25 INJECTION INTRAMUSCULAR; INTRAVENOUS at 18:26

## 2019-07-30 RX ADMIN — MUPIROCIN 1 APPLICATION: 20 OINTMENT TOPICAL at 11:47

## 2019-07-30 RX ADMIN — HYDROCODONE BITARTRATE AND ACETAMINOPHEN 2 TABLET: 5; 325 TABLET ORAL at 22:36

## 2019-07-31 ENCOUNTER — APPOINTMENT (OUTPATIENT)
Dept: GENERAL RADIOLOGY | Facility: HOSPITAL | Age: 69
End: 2019-07-31

## 2019-07-31 LAB
ABO + RH BLD: NORMAL
ABO + RH BLD: NORMAL
ABO GROUP BLD: NORMAL
ALBUMIN SERPL-MCNC: 2.9 G/DL (ref 3.5–5.2)
ALBUMIN/GLOB SERPL: 0.9 G/DL
ALP SERPL-CCNC: 61 U/L (ref 39–117)
ALT SERPL W P-5'-P-CCNC: <5 U/L (ref 1–33)
ANION GAP SERPL CALCULATED.3IONS-SCNC: 19.6 MMOL/L (ref 5–15)
ANTI-K: NORMAL
AST SERPL-CCNC: 24 U/L (ref 1–32)
BH BB BLOOD EXPIRATION DATE: NORMAL
BH BB BLOOD EXPIRATION DATE: NORMAL
BH BB BLOOD TYPE BARCODE: 5100
BH BB BLOOD TYPE BARCODE: 5100
BH BB DISPENSE STATUS: NORMAL
BH BB DISPENSE STATUS: NORMAL
BH BB PRODUCT CODE: NORMAL
BH BB PRODUCT CODE: NORMAL
BH BB UNIT NUMBER: NORMAL
BH BB UNIT NUMBER: NORMAL
BILIRUB SERPL-MCNC: 0.2 MG/DL (ref 0.2–1.2)
BLD GP AB SCN SERPL QL: POSITIVE
BUN BLD-MCNC: 81 MG/DL (ref 8–23)
BUN/CREAT SERPL: 15.5 (ref 7–25)
CALCIUM SPEC-SCNC: 7.4 MG/DL (ref 8.6–10.5)
CHLORIDE SERPL-SCNC: 98 MMOL/L (ref 98–107)
CO2 SERPL-SCNC: 18.4 MMOL/L (ref 22–29)
CREAT BLD-MCNC: 5.22 MG/DL (ref 0.57–1)
CROSSMATCH INTERPRETATION: NORMAL
CROSSMATCH INTERPRETATION: NORMAL
DAT POLY-SP REAG RBC QL: NEGATIVE
DEPRECATED RDW RBC AUTO: 60.5 FL (ref 37–54)
ERYTHROCYTE [DISTWIDTH] IN BLOOD BY AUTOMATED COUNT: 16.4 % (ref 12.3–15.4)
GFR SERPL CREATININE-BSD FRML MDRD: 8 ML/MIN/1.73
GFR SERPL CREATININE-BSD FRML MDRD: ABNORMAL ML/MIN/1.73
GLOBULIN UR ELPH-MCNC: 3.2 GM/DL
GLUCOSE BLD-MCNC: 321 MG/DL (ref 65–99)
GLUCOSE BLDC GLUCOMTR-MCNC: 157 MG/DL (ref 70–130)
GLUCOSE BLDC GLUCOMTR-MCNC: 162 MG/DL (ref 70–130)
GLUCOSE BLDC GLUCOMTR-MCNC: 195 MG/DL (ref 70–130)
GLUCOSE BLDC GLUCOMTR-MCNC: 306 MG/DL (ref 70–130)
HBV SURFACE AG SERPL QL IA: NORMAL
HCT VFR BLD AUTO: 24.9 % (ref 34–46.6)
HGB BLD-MCNC: 7.3 G/DL (ref 12–15.9)
MAGNESIUM SERPL-MCNC: 2 MG/DL (ref 1.6–2.4)
MCH RBC QN AUTO: 29.2 PG (ref 26.6–33)
MCHC RBC AUTO-ENTMCNC: 29.3 G/DL (ref 31.5–35.7)
MCV RBC AUTO: 99.6 FL (ref 79–97)
NONSPECIFIC GEL REACTION: NORMAL
PHOSPHATE SERPL-MCNC: 6.8 MG/DL (ref 2.5–4.5)
PLATELET # BLD AUTO: 125 10*3/MM3 (ref 140–450)
PMV BLD AUTO: 12.5 FL (ref 6–12)
POTASSIUM BLD-SCNC: 4.8 MMOL/L (ref 3.5–5.2)
PROT SERPL-MCNC: 6.1 G/DL (ref 6–8.5)
RBC # BLD AUTO: 2.5 10*6/MM3 (ref 3.77–5.28)
RH BLD: POSITIVE
SODIUM BLD-SCNC: 136 MMOL/L (ref 136–145)
T&S EXPIRATION DATE: NORMAL
UNIT  ABO: NORMAL
UNIT  ABO: NORMAL
UNIT  RH: NORMAL
UNIT  RH: NORMAL
WBC NRBC COR # BLD: 13.79 10*3/MM3 (ref 3.4–10.8)

## 2019-07-31 PROCEDURE — 86920 COMPATIBILITY TEST SPIN: CPT

## 2019-07-31 PROCEDURE — 94640 AIRWAY INHALATION TREATMENT: CPT

## 2019-07-31 PROCEDURE — 25010000002 ENOXAPARIN PER 10 MG: Performed by: THORACIC SURGERY (CARDIOTHORACIC VASCULAR SURGERY)

## 2019-07-31 PROCEDURE — 86850 RBC ANTIBODY SCREEN: CPT | Performed by: HOSPITALIST

## 2019-07-31 PROCEDURE — 86922 COMPATIBILITY TEST ANTIGLOB: CPT

## 2019-07-31 PROCEDURE — 87340 HEPATITIS B SURFACE AG IA: CPT | Performed by: INTERNAL MEDICINE

## 2019-07-31 PROCEDURE — 93010 ELECTROCARDIOGRAM REPORT: CPT | Performed by: INTERNAL MEDICINE

## 2019-07-31 PROCEDURE — 94799 UNLISTED PULMONARY SVC/PX: CPT

## 2019-07-31 PROCEDURE — 86901 BLOOD TYPING SEROLOGIC RH(D): CPT | Performed by: HOSPITALIST

## 2019-07-31 PROCEDURE — 80053 COMPREHEN METABOLIC PANEL: CPT | Performed by: NURSE PRACTITIONER

## 2019-07-31 PROCEDURE — 86900 BLOOD TYPING SEROLOGIC ABO: CPT

## 2019-07-31 PROCEDURE — 83735 ASSAY OF MAGNESIUM: CPT | Performed by: NURSE PRACTITIONER

## 2019-07-31 PROCEDURE — 86850 RBC ANTIBODY SCREEN: CPT

## 2019-07-31 PROCEDURE — 63710000001 INSULIN LISPRO (HUMAN) PER 5 UNITS: Performed by: HOSPITALIST

## 2019-07-31 PROCEDURE — 84100 ASSAY OF PHOSPHORUS: CPT | Performed by: INTERNAL MEDICINE

## 2019-07-31 PROCEDURE — 86870 RBC ANTIBODY IDENTIFICATION: CPT | Performed by: HOSPITALIST

## 2019-07-31 PROCEDURE — 71045 X-RAY EXAM CHEST 1 VIEW: CPT

## 2019-07-31 PROCEDURE — 85027 COMPLETE CBC AUTOMATED: CPT | Performed by: THORACIC SURGERY (CARDIOTHORACIC VASCULAR SURGERY)

## 2019-07-31 PROCEDURE — 97110 THERAPEUTIC EXERCISES: CPT

## 2019-07-31 PROCEDURE — 82962 GLUCOSE BLOOD TEST: CPT

## 2019-07-31 PROCEDURE — 86880 COOMBS TEST DIRECT: CPT | Performed by: HOSPITALIST

## 2019-07-31 PROCEDURE — 99231 SBSQ HOSP IP/OBS SF/LOW 25: CPT | Performed by: NURSE PRACTITIONER

## 2019-07-31 PROCEDURE — 94762 N-INVAS EAR/PLS OXIMTRY CONT: CPT

## 2019-07-31 PROCEDURE — 86901 BLOOD TYPING SEROLOGIC RH(D): CPT

## 2019-07-31 PROCEDURE — P9016 RBC LEUKOCYTES REDUCED: HCPCS

## 2019-07-31 PROCEDURE — 93005 ELECTROCARDIOGRAM TRACING: CPT | Performed by: NURSE PRACTITIONER

## 2019-07-31 PROCEDURE — 86900 BLOOD TYPING SEROLOGIC ABO: CPT | Performed by: HOSPITALIST

## 2019-07-31 RX ORDER — DEXTROSE MONOHYDRATE 25 G/50ML
25 INJECTION, SOLUTION INTRAVENOUS
Status: DISCONTINUED | OUTPATIENT
Start: 2019-07-31 | End: 2019-08-01

## 2019-07-31 RX ORDER — INSULIN GLARGINE 100 [IU]/ML
15 INJECTION, SOLUTION SUBCUTANEOUS EVERY MORNING
Status: DISCONTINUED | OUTPATIENT
Start: 2019-08-01 | End: 2019-08-01

## 2019-07-31 RX ORDER — HYDROCODONE BITARTRATE AND ACETAMINOPHEN 5; 325 MG/1; MG/1
1 TABLET ORAL EVERY 4 HOURS PRN
Status: DISCONTINUED | OUTPATIENT
Start: 2019-07-31 | End: 2019-08-06 | Stop reason: HOSPADM

## 2019-07-31 RX ORDER — BACITRACIN ZINC AND POLYMYXIN B SULFATE 500; 10000 [USP'U]/G; [USP'U]/G
OINTMENT TOPICAL
Status: DISCONTINUED | OUTPATIENT
Start: 2019-07-31 | End: 2019-08-06 | Stop reason: HOSPADM

## 2019-07-31 RX ORDER — NICOTINE POLACRILEX 4 MG
15 LOZENGE BUCCAL
Status: DISCONTINUED | OUTPATIENT
Start: 2019-07-31 | End: 2019-08-01

## 2019-07-31 RX ORDER — INSULIN GLARGINE 100 [IU]/ML
10 INJECTION, SOLUTION SUBCUTANEOUS EVERY MORNING
Status: DISCONTINUED | OUTPATIENT
Start: 2019-07-31 | End: 2019-07-31

## 2019-07-31 RX ORDER — IPRATROPIUM BROMIDE AND ALBUTEROL SULFATE 2.5; .5 MG/3ML; MG/3ML
3 SOLUTION RESPIRATORY (INHALATION)
Status: DISCONTINUED | OUTPATIENT
Start: 2019-07-31 | End: 2019-08-06 | Stop reason: HOSPADM

## 2019-07-31 RX ORDER — NYSTATIN 100000 [USP'U]/G
1 POWDER TOPICAL EVERY 12 HOURS SCHEDULED
Status: DISCONTINUED | OUTPATIENT
Start: 2019-07-31 | End: 2019-08-06 | Stop reason: HOSPADM

## 2019-07-31 RX ADMIN — ASPIRIN 81 MG: 81 TABLET, COATED ORAL at 16:54

## 2019-07-31 RX ADMIN — IPRATROPIUM BROMIDE AND ALBUTEROL SULFATE 3 ML: 2.5; .5 SOLUTION RESPIRATORY (INHALATION) at 19:27

## 2019-07-31 RX ADMIN — ATORVASTATIN CALCIUM 40 MG: 20 TABLET, FILM COATED ORAL at 21:20

## 2019-07-31 RX ADMIN — INSULIN LISPRO 2 UNITS: 100 INJECTION, SOLUTION INTRAVENOUS; SUBCUTANEOUS at 21:20

## 2019-07-31 RX ADMIN — CHLORHEXIDINE GLUCONATE 15 ML: 1.2 RINSE ORAL at 16:54

## 2019-07-31 RX ADMIN — BACITRACIN ZINC AND POLYMYXIN B SULFATE: at 21:22

## 2019-07-31 RX ADMIN — INSULIN LISPRO 5 UNITS: 100 INJECTION, SOLUTION INTRAVENOUS; SUBCUTANEOUS at 08:31

## 2019-07-31 RX ADMIN — MUPIROCIN 1 APPLICATION: 20 OINTMENT TOPICAL at 21:21

## 2019-07-31 RX ADMIN — MUPIROCIN 1 APPLICATION: 20 OINTMENT TOPICAL at 08:32

## 2019-07-31 RX ADMIN — MICONAZOLE NITRATE: 2 POWDER TOPICAL at 13:27

## 2019-07-31 RX ADMIN — NYSTATIN 1 APPLICATION: 100000 POWDER TOPICAL at 21:20

## 2019-07-31 RX ADMIN — ENOXAPARIN SODIUM 30 MG: 30 INJECTION SUBCUTANEOUS at 16:55

## 2019-07-31 RX ADMIN — CHLORHEXIDINE GLUCONATE 15 ML: 1.2 RINSE ORAL at 06:31

## 2019-07-31 RX ADMIN — HYDROCODONE BITARTRATE AND ACETAMINOPHEN 1 TABLET: 5; 325 TABLET ORAL at 21:32

## 2019-07-31 RX ADMIN — MICONAZOLE NITRATE: 2 POWDER TOPICAL at 21:25

## 2019-08-01 ENCOUNTER — APPOINTMENT (OUTPATIENT)
Dept: GENERAL RADIOLOGY | Facility: HOSPITAL | Age: 69
End: 2019-08-01

## 2019-08-01 LAB
ALBUMIN SERPL-MCNC: 3.2 G/DL (ref 3.5–5.2)
ANION GAP SERPL CALCULATED.3IONS-SCNC: 19.3 MMOL/L (ref 5–15)
BASOPHILS # BLD AUTO: 0.02 10*3/MM3 (ref 0–0.2)
BASOPHILS NFR BLD AUTO: 0.2 % (ref 0–1.5)
BUN BLD-MCNC: 50 MG/DL (ref 8–23)
BUN/CREAT SERPL: 12.3 (ref 7–25)
CALCIUM SPEC-SCNC: 7.9 MG/DL (ref 8.6–10.5)
CHLORIDE SERPL-SCNC: 94 MMOL/L (ref 98–107)
CO2 SERPL-SCNC: 21.7 MMOL/L (ref 22–29)
CREAT BLD-MCNC: 4.05 MG/DL (ref 0.57–1)
DEPRECATED RDW RBC AUTO: 60.1 FL (ref 37–54)
EOSINOPHIL # BLD AUTO: 0.11 10*3/MM3 (ref 0–0.4)
EOSINOPHIL NFR BLD AUTO: 0.9 % (ref 0.3–6.2)
ERYTHROCYTE [DISTWIDTH] IN BLOOD BY AUTOMATED COUNT: 17.2 % (ref 12.3–15.4)
GFR SERPL CREATININE-BSD FRML MDRD: 11 ML/MIN/1.73
GFR SERPL CREATININE-BSD FRML MDRD: ABNORMAL ML/MIN/1.73
GLUCOSE BLD-MCNC: 199 MG/DL (ref 65–99)
GLUCOSE BLDC GLUCOMTR-MCNC: 203 MG/DL (ref 70–130)
GLUCOSE BLDC GLUCOMTR-MCNC: 271 MG/DL (ref 70–130)
GLUCOSE BLDC GLUCOMTR-MCNC: 304 MG/DL (ref 70–130)
GLUCOSE BLDC GLUCOMTR-MCNC: 325 MG/DL (ref 70–130)
HCT VFR BLD AUTO: 26.2 % (ref 34–46.6)
HGB BLD-MCNC: 7.8 G/DL (ref 12–15.9)
IMM GRANULOCYTES # BLD AUTO: 0.3 10*3/MM3 (ref 0–0.05)
IMM GRANULOCYTES NFR BLD AUTO: 2.4 % (ref 0–0.5)
LYMPHOCYTES # BLD AUTO: 0.8 10*3/MM3 (ref 0.7–3.1)
LYMPHOCYTES NFR BLD AUTO: 6.3 % (ref 19.6–45.3)
MAGNESIUM SERPL-MCNC: 2.1 MG/DL (ref 1.6–2.4)
MCH RBC QN AUTO: 28.4 PG (ref 26.6–33)
MCHC RBC AUTO-ENTMCNC: 29.8 G/DL (ref 31.5–35.7)
MCV RBC AUTO: 95.3 FL (ref 79–97)
MONOCYTES # BLD AUTO: 0.94 10*3/MM3 (ref 0.1–0.9)
MONOCYTES NFR BLD AUTO: 7.4 % (ref 5–12)
NEUTROPHILS # BLD AUTO: 10.45 10*3/MM3 (ref 1.7–7)
NEUTROPHILS NFR BLD AUTO: 82.8 % (ref 42.7–76)
NRBC BLD AUTO-RTO: 0.6 /100 WBC (ref 0–0.2)
PHOSPHATE SERPL-MCNC: 5.3 MG/DL (ref 2.5–4.5)
PLATELET # BLD AUTO: 136 10*3/MM3 (ref 140–450)
PMV BLD AUTO: 11.7 FL (ref 6–12)
POTASSIUM BLD-SCNC: 4.1 MMOL/L (ref 3.5–5.2)
RBC # BLD AUTO: 2.75 10*6/MM3 (ref 3.77–5.28)
SODIUM BLD-SCNC: 135 MMOL/L (ref 136–145)
WBC NRBC COR # BLD: 12.62 10*3/MM3 (ref 3.4–10.8)

## 2019-08-01 PROCEDURE — 93010 ELECTROCARDIOGRAM REPORT: CPT | Performed by: INTERNAL MEDICINE

## 2019-08-01 PROCEDURE — 85025 COMPLETE CBC W/AUTO DIFF WBC: CPT | Performed by: NURSE PRACTITIONER

## 2019-08-01 PROCEDURE — 97165 OT EVAL LOW COMPLEX 30 MIN: CPT

## 2019-08-01 PROCEDURE — 83735 ASSAY OF MAGNESIUM: CPT | Performed by: NURSE PRACTITIONER

## 2019-08-01 PROCEDURE — 93005 ELECTROCARDIOGRAM TRACING: CPT | Performed by: PHYSICIAN ASSISTANT

## 2019-08-01 PROCEDURE — 94799 UNLISTED PULMONARY SVC/PX: CPT

## 2019-08-01 PROCEDURE — 80069 RENAL FUNCTION PANEL: CPT | Performed by: INTERNAL MEDICINE

## 2019-08-01 PROCEDURE — 25010000002 ENOXAPARIN PER 10 MG: Performed by: THORACIC SURGERY (CARDIOTHORACIC VASCULAR SURGERY)

## 2019-08-01 PROCEDURE — 99232 SBSQ HOSP IP/OBS MODERATE 35: CPT | Performed by: NURSE PRACTITIONER

## 2019-08-01 PROCEDURE — 63710000001 INSULIN LISPRO (HUMAN) PER 5 UNITS: Performed by: HOSPITALIST

## 2019-08-01 PROCEDURE — 93005 ELECTROCARDIOGRAM TRACING: CPT | Performed by: THORACIC SURGERY (CARDIOTHORACIC VASCULAR SURGERY)

## 2019-08-01 PROCEDURE — 71046 X-RAY EXAM CHEST 2 VIEWS: CPT

## 2019-08-01 PROCEDURE — 97112 NEUROMUSCULAR REEDUCATION: CPT

## 2019-08-01 PROCEDURE — 97110 THERAPEUTIC EXERCISES: CPT

## 2019-08-01 PROCEDURE — 63710000001 INSULIN GLARGINE PER 5 UNITS: Performed by: HOSPITALIST

## 2019-08-01 PROCEDURE — 82962 GLUCOSE BLOOD TEST: CPT

## 2019-08-01 RX ORDER — LIDOCAINE AND PRILOCAINE 25; 25 MG/G; MG/G
CREAM TOPICAL 3 TIMES WEEKLY
Status: DISCONTINUED | OUTPATIENT
Start: 2019-08-02 | End: 2019-08-05

## 2019-08-01 RX ORDER — INSULIN GLARGINE 100 [IU]/ML
20 INJECTION, SOLUTION SUBCUTANEOUS EVERY MORNING
Status: DISCONTINUED | OUTPATIENT
Start: 2019-08-02 | End: 2019-08-02

## 2019-08-01 RX ORDER — BUMETANIDE 0.25 MG/ML
4 INJECTION INTRAMUSCULAR; INTRAVENOUS EVERY 12 HOURS
Status: COMPLETED | OUTPATIENT
Start: 2019-08-01 | End: 2019-08-02

## 2019-08-01 RX ADMIN — IPRATROPIUM BROMIDE AND ALBUTEROL SULFATE 3 ML: 2.5; .5 SOLUTION RESPIRATORY (INHALATION) at 14:57

## 2019-08-01 RX ADMIN — INSULIN LISPRO 5 UNITS: 100 INJECTION, SOLUTION INTRAVENOUS; SUBCUTANEOUS at 16:01

## 2019-08-01 RX ADMIN — INSULIN LISPRO 3 UNITS: 100 INJECTION, SOLUTION INTRAVENOUS; SUBCUTANEOUS at 08:14

## 2019-08-01 RX ADMIN — CHLORHEXIDINE GLUCONATE 15 ML: 1.2 RINSE ORAL at 16:00

## 2019-08-01 RX ADMIN — HYDROCODONE BITARTRATE AND ACETAMINOPHEN 1 TABLET: 5; 325 TABLET ORAL at 01:39

## 2019-08-01 RX ADMIN — NYSTATIN 1 APPLICATION: 100000 POWDER TOPICAL at 08:14

## 2019-08-01 RX ADMIN — INSULIN LISPRO 3 UNITS: 100 INJECTION, SOLUTION INTRAVENOUS; SUBCUTANEOUS at 20:15

## 2019-08-01 RX ADMIN — IPRATROPIUM BROMIDE AND ALBUTEROL SULFATE 3 ML: 2.5; .5 SOLUTION RESPIRATORY (INHALATION) at 18:56

## 2019-08-01 RX ADMIN — INSULIN LISPRO 6 UNITS: 100 INJECTION, SOLUTION INTRAVENOUS; SUBCUTANEOUS at 11:40

## 2019-08-01 RX ADMIN — ASPIRIN 81 MG: 81 TABLET, COATED ORAL at 08:14

## 2019-08-01 RX ADMIN — NYSTATIN 1 APPLICATION: 100000 POWDER TOPICAL at 20:15

## 2019-08-01 RX ADMIN — HYDROCODONE BITARTRATE AND ACETAMINOPHEN 1 TABLET: 5; 325 TABLET ORAL at 19:15

## 2019-08-01 RX ADMIN — MUPIROCIN 1 APPLICATION: 20 OINTMENT TOPICAL at 08:14

## 2019-08-01 RX ADMIN — CHLORHEXIDINE GLUCONATE 15 ML: 1.2 RINSE ORAL at 06:02

## 2019-08-01 RX ADMIN — IPRATROPIUM BROMIDE AND ALBUTEROL SULFATE 3 ML: 2.5; .5 SOLUTION RESPIRATORY (INHALATION) at 08:03

## 2019-08-01 RX ADMIN — BACITRACIN ZINC AND POLYMYXIN B SULFATE: at 08:14

## 2019-08-01 RX ADMIN — MICONAZOLE NITRATE: 2 POWDER TOPICAL at 08:14

## 2019-08-01 RX ADMIN — INSULIN GLARGINE 15 UNITS: 100 INJECTION, SOLUTION SUBCUTANEOUS at 06:02

## 2019-08-01 RX ADMIN — HYDROCODONE BITARTRATE AND ACETAMINOPHEN 1 TABLET: 5; 325 TABLET ORAL at 06:45

## 2019-08-01 RX ADMIN — MICONAZOLE NITRATE: 2 POWDER TOPICAL at 20:15

## 2019-08-01 RX ADMIN — ATORVASTATIN CALCIUM 40 MG: 20 TABLET, FILM COATED ORAL at 20:15

## 2019-08-01 RX ADMIN — BUMETANIDE 4 MG: 0.25 INJECTION INTRAMUSCULAR; INTRAVENOUS at 14:49

## 2019-08-01 RX ADMIN — IPRATROPIUM BROMIDE AND ALBUTEROL SULFATE 3 ML: 2.5; .5 SOLUTION RESPIRATORY (INHALATION) at 11:20

## 2019-08-01 RX ADMIN — ENOXAPARIN SODIUM 30 MG: 30 INJECTION SUBCUTANEOUS at 16:01

## 2019-08-01 NOTE — PROGRESS NOTES
Daily progress note    Chief complaint  Doing same  No specific complaints  Family at bedside  Working with PT OT    History of present Illness  68-year-old white female with history of hypertension hyperlipidemia diabetes mellitus chronic kidney disease stage IV directly admitted to cardiology service with abnormal stress Cardiolite for cardiac catheterization and I am asked to follow the patient for medical problem.  Patient denies any chest pain but has shortness of breath with exertion.  Patient denies any fever chills abdominal pain nausea vomiting diarrhea.  Patient stated that she is very close for hemodialysis she already had AV fistula but her kidney function closely followed by nephrology.  Patient fully alert oriented and give me a detailed history.  I am asked to follow the patient for medical problems.     REVIEW OF SYSTEMS  Remarkable for generalized weakness    PHYSICAL EXAM  Blood pressure 110/50, pulse 88, temperature 97.8 °F (36.6 °C), temperature source Oral, resp. rate 18, weight 90.4 kg (199 lb 6.4 oz), SpO2 100 %, not currently breastfeeding.    General awake and alert  Cardiovascular: Normal rate and regular rhythm.    Pulmonary/Chest:  Moving air bilaterally  Abdominal: Soft.  Soft nontender bowel is most  Extremities no edema    LAB RESULTS  Lab Results (last 24 hours)     Procedure Component Value Units Date/Time    POC Glucose Once [[id number]]  (Abnormal) Collected:  08/01/19 1036    Specimen:  Blood Updated:  08/01/19 1042     Glucose 304 mg/dL     POC Glucose Once [[id number]]  (Abnormal) Collected:  08/01/19 0602    Specimen:  Blood Updated:  08/01/19 0603     Glucose 203 mg/dL     CBC & Differential [[id number]] Collected:  08/01/19 0416    Specimen:  Blood Updated:  08/01/19 0546    Narrative:       The following orders were created for panel order CBC & Differential.  Procedure                               Abnormality         Status                     ---------                                -----------         ------                     CBC Auto Differential[[id number]]        Abnormal            Final result                 Please view results for these tests on the individual orders.    CBC Auto Differential [[id number]]  (Abnormal) Collected:  08/01/19 0416    Specimen:  Blood Updated:  08/01/19 0546     WBC 12.62 10*3/mm3      RBC 2.75 10*6/mm3      Hemoglobin 7.8 g/dL      Hematocrit 26.2 %      MCV 95.3 fL      MCH 28.4 pg      MCHC 29.8 g/dL      RDW 17.2 %      RDW-SD 60.1 fl      MPV 11.7 fL      Platelets 136 10*3/mm3      Neutrophil % 82.8 %      Lymphocyte % 6.3 %      Monocyte % 7.4 %      Eosinophil % 0.9 %      Basophil % 0.2 %      Immature Grans % 2.4 %      Neutrophils, Absolute 10.45 10*3/mm3      Lymphocytes, Absolute 0.80 10*3/mm3      Monocytes, Absolute 0.94 10*3/mm3      Eosinophils, Absolute 0.11 10*3/mm3      Basophils, Absolute 0.02 10*3/mm3      Immature Grans, Absolute 0.30 10*3/mm3      nRBC 0.6 /100 WBC     Renal Function Panel [[id number]]  (Abnormal) Collected:  08/01/19 0416    Specimen:  Blood Updated:  08/01/19 0538     Glucose 199 mg/dL      BUN 50 mg/dL      Creatinine 4.05 mg/dL      Sodium 135 mmol/L      Potassium 4.1 mmol/L      Chloride 94 mmol/L      CO2 21.7 mmol/L      Calcium 7.9 mg/dL      Albumin 3.20 g/dL      Phosphorus 5.3 mg/dL      Anion Gap 19.3 mmol/L      BUN/Creatinine Ratio 12.3     eGFR Non African Amer 11 mL/min/1.73      Comment: <15 Indicative of kidney failure.        eGFR   Amer -- mL/min/1.73      Comment: <15 Indicative of kidney failure.       Narrative:       GFR Normal >60  Chronic Kidney Disease <60  Kidney Failure <15    Magnesium [[id number]]  (Normal) Collected:  08/01/19 0416    Specimen:  Blood Updated:  08/01/19 0538     Magnesium 2.1 mg/dL     POC Glucose Once [[id number]]  (Abnormal) Collected:  07/31/19 2023    Specimen:  Blood Updated:  07/31/19 2026     Glucose 195 mg/dL     POC Glucose Once [[id number]]   (Abnormal) Collected:  07/31/19 1551    Specimen:  Blood Updated:  07/31/19 1554     Glucose 157 mg/dL     POC Glucose Once [[id number]]  (Abnormal) Collected:  07/31/19 1331    Specimen:  Blood Updated:  07/31/19 1333     Glucose 162 mg/dL         Imaging Results (last 24 hours)     Procedure Component Value Units Date/Time    XR Chest PA & Lateral [[id number]] Collected:  08/01/19 1108     Updated:  08/01/19 1108    Narrative:       TWO-VIEW CHEST     HISTORY: Recent cardiac surgery. Atelectasis.     The lungs are moderately expanded with some residual atelectasis and  pleural fluid at the left base unchanged from yesterday's exam. The  heart remains mildly enlarged with slight vascular congestion that is  also unchanged. A right jugular sheath ends in the SVC.       XR Chest 1 View [[id number]] Collected:  07/31/19 0646     Updated:  08/01/19 0549    Narrative:       PORTABLE CHEST X-RAY     CLINICAL HISTORY: Post-Op Heart Surgery; I25.118-Atherosclerotic heart  disease of native coronary artery with other forms of angina pectoris;  R94.30-Abnormal result of cardiovascular function study, unspecified;  Z74.09-Other reduced mobility     COMPARISON: 07/30/2019.     FINDINGS: Portable AP view of the chest was obtained with overlying  monitor leads in place. [facility] catheter has been removed. Other lines  are unchanged. No pneumothorax. Persistent vascular congestion. There is  some developing left lung base atelectasis with small effusion. Stable  cardiomegaly.             Impression:       Persistent vascular congestion with developing left lung  base opacity as above.        This report was finalized on 8/1/2019 5:46 AM by Shiva Aguirre M.D.       XR Chest 1 View [[id number]] Collected:  07/31/19 1534     Updated:  07/31/19 1538    Narrative:       XR CHEST 1 VW-     HISTORY: Female who is 68 years-old,  chest tube removal     TECHNIQUE: Frontal view of the chest     COMPARISON: 07/31/2019     FINDINGS: Interval  chest tube removal. Heart is enlarged. Sternotomy  wires, right IJ sheath noted. Mild pulmonary vascular congestion. Mild  left basilar atelectasis/infiltrate/effusion, continued follow-up  suggested. No pneumothorax. No acute osseous process.       Impression:       Chest tube removal. No pneumothorax.     This report was finalized on 7/31/2019 3:35 PM by Dr. Garland Garica M.D.           ECG 12 Lead         ECG 12 Lead  Date/Time: 6/6/2019 11:21 AM  Performed by: Eddie Hodges MD  Authorized by: Eddie Hodges MD   Comparison: compared with previous ECG   Similar to previous ECG  Rhythm: sinus rhythm  ST Flattening: all  Clinical impression: non-specific ECG             Current Facility-Administered Medications:   •  acetaminophen (TYLENOL) tablet 650 mg, 650 mg, Oral, Q4H PRN **OR** acetaminophen (TYLENOL) 160 MG/5ML solution 650 mg, 650 mg, Oral, Q4H PRN **OR** acetaminophen (TYLENOL) suppository 650 mg, 650 mg, Rectal, Q4H PRN, Gorod Ferreira MD  •  aspirin EC tablet 81 mg, 81 mg, Oral, Daily, Gordo Ferreira MD, 81 mg at 08/01/19 0814  •  atorvastatin (LIPITOR) tablet 40 mg, 40 mg, Oral, Nightly, Gordo Ferreira MD, 40 mg at 07/31/19 2120  •  bacitracin-polymyxin b (POLYSPORIN) ointment, , Topical, Q24H, José Antonio Michael MD  •  bisacodyl (DULCOLAX) EC tablet 10 mg, 10 mg, Oral, Daily PRN, Gordo Ferreira MD  •  bisacodyl (DULCOLAX) suppository 10 mg, 10 mg, Rectal, Daily PRN, Gordo Ferreira MD  •  chlorhexidine (PERIDEX) 0.12 % solution 15 mL, 15 mL, Mouth/Throat, Q12H, Gordo Ferreira MD, 15 mL at 08/01/19 0602  •  dextrose (D50W) 25 g/ 50mL Intravenous Solution 25 g, 25 g, Intravenous, Q15 Min PRN, Justin Esparza PA-C  •  dextrose (D50W) 25 g/ 50mL Intravenous Solution 25 g, 25 g, Intravenous, Q15 Min PRN, Dariel Vyas MD  •  dextrose (GLUTOSE) oral gel 15 g, 15 g, Oral, Q15 Min PRN, Justin Esparza PA-C  •  dextrose  (GLUTOSE) oral gel 15 g, 15 g, Oral, Q15 Min PRN, Dariel Vyas MD  •  enoxaparin (LOVENOX) syringe 30 mg, 30 mg, Subcutaneous, Daily, Gordo Ferreira MD, 30 mg at 07/31/19 1655  •  epoetin hermes (EPOGEN,PROCRIT) injection 10,000 Units, 10,000 Units, Intravenous, Once per day on Mon Wed Fri, Flory Watt MD  •  glucagon (human recombinant) (GLUCAGEN DIAGNOSTIC) injection 1 mg, 1 mg, Subcutaneous, PRN, Justin Esparza PA-C  •  glucagon (human recombinant) (GLUCAGEN DIAGNOSTIC) injection 1 mg, 1 mg, Subcutaneous, PRN, Dariel Vyas MD  •  HYDROcodone-acetaminophen (NORCO) 5-325 MG per tablet 1 tablet, 1 tablet, Oral, Q4H PRN, Justin Esparza PA-C, 1 tablet at 08/01/19 0645  •  insulin glargine (LANTUS) injection 15 Units, 15 Units, Subcutaneous, QAM, Dariel Vyas MD, 15 Units at 08/01/19 0602  •  insulin lispro (humaLOG) injection 0-7 Units, 0-7 Units, Subcutaneous, 4x Daily With Meals & Nightly, Dariel Vyas MD, 6 Units at 08/01/19 1140  •  ipratropium-albuterol (DUO-NEB) nebulizer solution 3 mL, 3 mL, Nebulization, 4x Daily - RT, Justin Esparza PA-C, 3 mL at 08/01/19 1120  •  miconazole (MICOTIN) 2 % powder, , Topical, Q12H, Gordo Ferreira MD  •  mupirocin (BACTROBAN) 2 % nasal ointment 1 application, 1 application, Each Nare, Daily, Gordo Ferreira MD, 1 application at 08/01/19 0814  •  [DISCONTINUED] morphine injection 1 mg, 1 mg, Intravenous, Q4H PRN **AND** naloxone (NARCAN) injection 0.4 mg, 0.4 mg, Intravenous, Q5 Min PRN, Gordo Ferreira MD  •  nystatin (MYCOSTATIN) powder 1 application, 1 application, Topical, Q12H, José Antonio Michael MD, 1 application at 08/01/19 0814  •  ondansetron (ZOFRAN) injection 4 mg, 4 mg, Intravenous, Q6H PRN, Gordo Ferreira MD  •  promethazine (PHENERGAN) tablet 12.5 mg, 12.5 mg, Oral, Q6H PRN **OR** promethazine (PHENERGAN) injection 12.5 mg, 12.5 mg, Intravenous, Q6H PRN, Gordo Ferreira MD      ASSESSMENT  Multivessel coronary artery disease post CABG pod #3  Hypertension  Hyperlipidemia  Diabetes mellitus  Chronic anemia  Chronic kidney disease stage IV  Gastroesophageal reflux disease    PLAN  CPM  Postop care  Supportive care  Home medications  Adjust insulin dose  Accu-Chek with sliding scale insulin  Stress ulcer DVT prophylaxis  Discussed with family  PT/OT  Will follow     MICHA LAGUERRE MD

## 2019-08-01 NOTE — THERAPY EVALUATION
Acute Care - Occupational Therapy Initial Evaluation  [facility]     Patient Name: Maribeth Rendon  : 1950  MRN: [id number]  Today's Date: 2019  Onset of Illness/Injury or Date of Surgery: 19     Referring Physician: Jhon    Admit Date: 2019       ICD-10-CM ICD-9-CM   1. 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 pectoris, unspecified vessel or lesion type (CMS/[facility]) I25.118 414.01     413.9   2. Abnormal cardiac function test R94.30 794.30   3. Impaired functional mobility and activity tolerance Z74.09 V49.89     Patient Active Problem List   Diagnosis   • Menstrual disorder   • Atopic rhinitis   • Anemia in CKD (chronic kidney disease)   • Spasm of cervical paraspinous muscle   • Cervical radiculopathy   • Chronic kidney disease, stage IV (severe) (CMS/[facility])   • Depression   • DM type 2 with diabetic peripheral neuropathy (CMS/[facility])   • Essential hypertension   • Duplay's periarthritis syndrome   • Gastroesophageal reflux disease   • Hyperlipidemia   • Hypertension   • Arthritis   • Seizure disorder (CMS/[facility])   • Phlebitis   • Type 2 diabetes mellitus (CMS/[facility])   • Vitamin D deficiency   • Chest pain   • Abscess   • Generalized muscle weakness   • Abdominal wall cellulitis   • HTN (hypertension)   • CKD (chronic kidney disease) stage 4, GFR 15-29 ml/min (CMS/[facility])   • Diabetes mellitus with peripheral autonomic neuropathy (CMS/[facility])   • Hyponatremia   • Hypercalcemia   • Dehydration   • DACIA (acute kidney injury) (CMS/[facility])   • Abdominal wall abscess   • Cellulitis of toe of left foot   • Partial Achilles tendon tear, left, initial encounter   • Arthritis of foot   • Essential tremor   • Primary Parkinsonism (CMS/[facility])   • Abnormal cardiac function test   • 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 pectoris (CMS/[facility])     Past Medical History:   Diagnosis Date   • Achilles tendon tear     left    • Anemia    • Anxiety    • Depression    • Diabetes mellitus,  type 2 (CMS/[facility])    • ESRD (end stage renal disease) (CMS/[facility])     HAS LEFT FOREARM FISTULA   • GERD (gastroesophageal reflux disease)    • History of MRSA infection 03/15/2016    ABDOMINAL WOUND,   INFECTION CONTROLL NOTIFIED 2-6-2017   • History of transfusion    • Hyperlipidemia    • Hypertension    • Hypokalemia    • Hypomagnesemia    • Hypoxic 01/2016    WHEN SHE HAD PNEUMONIA   • Intertrigo    • Leukocytosis    • Osteoarthritis    • Pneumonia 01/2016   • Psoriasis    • Psoriatic arthritis (CMS/[facility])    • Seizures (CMS/[facility])     one time   • Sepsis (CMS/[facility]) 01/2016   • SOB (shortness of breath)    • Stage 4 chronic renal impairment associated with type 2 diabetes mellitus (CMS/[facility])    • Staph infection     HX RIGHT FOOT AT [facility] 01/09/2010   • Streptococcal bacteremia    • Swelling of hand 10/27/2016    LEFT HAND SWELLIMG SINCE LEFT FISTULA SURGERY   • Tremor, essential    • Wears glasses      Past Surgical History:   Procedure Laterality Date   • ABDOMINAL WALL ABSCESS INCISION AND DRAINAGE  2016   • ARTERIOVENOUS FISTULA/SHUNT SURGERY Left 10/27/2016    Procedure: LT FOREARM FISTULA;  Surgeon: Lorelei Haque Jr., MD;  Location: [facility];  Service:    • ARTERIOVENOUS FISTULA/SHUNT SURGERY Left 2/16/2017    Procedure: LT BRACHIAL CEPHALIC WITH FISTULA LIGATION RADIAL CEPHALIC.;  Surgeon: Gurmeet Carver MD;  Location: [facility] OR;  Service:    • CATARACT EXTRACTION W/ INTRAOCULAR LENS IMPLANT Bilateral 2011   • CORONARY ARTERY BYPASS GRAFT N/A 7/29/2019    Procedure: INTRAOP ERNESTO; CORONARY ARTERY BYPASS GRAFTING X 4 WITH LEFT INTERNAL MAMMARY ARTERY GRAFT AND UTILIZING ENDOSCOPICALLY HARVESTED GREATER SAPHENOUS VEIN; PRP;  Surgeon: Gordo Ferreira MD;  Location: [facility] OR;  Service: Cardiothoracic   • DILATATION AND CURETTAGE  1991   • EXCISION MASS TRUNK N/A 3/22/2016    Procedure: I&D LOWER ABDOMINAL  WOUND;  Surgeon: Tru Yi MD;  Location: [facility] OR;  Service:     • FOOT SURGERY Right 2010    REMOVED BONE IN RIGHT GREAT TOE   • HYSTERECTOMY  1992          OT ASSESSMENT FLOWSHEET (last 12 hours)      Occupational Therapy Evaluation     Row Name 08/01/19 0028                   OT Evaluation Time/Intention    Subjective Information  no complaints;weakness;pain  -VS        Document Type  evaluation  -VS        Mode of Treatment  occupational therapy  -VS        Patient Effort  good  -VS           General Information    Patient Profile Reviewed?  yes  -VS        General Observations of Patient  Pt. was lying on her (L) side in bed withbher  at her side  -VS        Prior Level of Function  independent: except for donning socks, pt's  does cooking/laundry  -VS        Equipment Currently Used at Home  bath bench;cane, straight;commode, 3-in-1;grab bar;walker, rolling;wheelchair;other (see comments) scooter  -VS        Existing Precautions/Restrictions  cardiac;fall  -VS           Cognitive Assessment/Intervention- PT/OT    Orientation Status (Cognition)  oriented x 3  -VS        Follows Commands (Cognition)  WFL  -VS           Bed Mobility Assessment/Treatment    Bed Mobility Assessment/Treatment  rolling left;supine-sit;sit-supine  -VS        Rolling Left [place] (Bed Mobility)  moderate assist (50% patient effort)  -VS        Supine-Sit [place] (Bed Mobility)  maximum assist (25% patient effort);1 person assist  -VS        Sit-Supine [place] (Bed Mobility)  maximum assist (25% patient effort);1 person assist  -VS        Comment (Bed Mobility)  (A) of 1   -VS           Transfer Assessment/Treatment    Transfer Assessment/Treatment  sit-stand transfer;stand-sit transfer  -VS           Sit-Stand Transfer    Sit-Stand [place] (Transfers)  moderate assist (50% patient effort);1 person assist  -VS           Stand-Sit Transfer    Stand-Sit [place] (Transfers)  moderate assist (50% patient effort);1 person assist  -VS           ADL  "Assessment/Intervention    BADL Assessment/Intervention  lower body dressing  -VS           Lower Body Dressing Assessment/Training    Lower Body Dressing [place] Level  doff;don;socks  -VS        Lower Body Dressing Position  edge of bed sitting  -VS        Comment (Lower Body Dressing)  per pt. for the past several months she has needed help with this task, grand daughter purchased a sock aid for her but she has not used to date  -VS           General ROM    GENERAL ROM COMMENTS  AROM (B)UE WFL excpet shoulder not tested above 90 degrees   -VS           MMT (Manual Muscle Testing)    General MMT Comments  NA   -VS           Motor Assessment/Interventions    Additional Documentation  Balance (Group);Functional Endurance Training (Group)  -VS           Balance    Balance  static standing balance  -VS           Static Standing Balance    Level of [place] (Supported Standing, Static Balance)  contact guard assist;minimal assist, 75% patient effort  -VS        Time Able to Maintain Position (Supported Standing, Static Balance)  1 to 2 minutes  -VS        Assistive Device Utilized (Supported Standing, Static Balance)  -- hand held assistsance   -VS           Functional Endurance Training    Comment, Functional Endurance  fair -   -VS           Sensory Assessment/Intervention    Sensory General Assessment  -- per pt. (B) Hand and feet are \"numb\"/diabetic issue   -VS           Positioning and Restraints    Pre-Treatment Position  in bed  -VS        Post Treatment Position  bed  -VS        In Bed  notified nsg;supine;call light within reach;encouraged to call for assist;with family/caregiver;with other staff;side rails up x3 with Doctor   -VS           Pain Assessment    Additional Documentation  Pain Scale: FACES Pre/Post-Treatment (Group)  -VS           Pain Scale: FACES Pre/Post-Treatment    Pain: FACES Scale, Pretreatment  4-->hurts little more  -VS        Pain: FACES Scale, Post-Treatment  4-->hurts little " more  -VS           Wound 07/29/19 0800 Bilateral chest incision    Wound - Properties Group Date first assessed: 07/29/19  -TL Time first assessed: 0800  -TL Side: Bilateral  -TL Location: chest  -TL Type: incision  -TL       Wound 07/29/19 0800 Right leg incision    Wound - Properties Group Date first assessed: 07/29/19  -TL Time first assessed: 0800  -TL Side: Right  -TL Location: leg  -TL Type: incision  -TL       Wound 07/29/19 1245 upper sternal incision    Wound - Properties Group Date first assessed: 07/29/19  -SM Time first assessed: 1245  -SM Present On Admission : no  -SM Orientation: upper  -SM Location: sternal  -SM Type: incision  -SM       Wound 07/30/19 1300 Bilateral gluteal unspecified    Wound - Properties Group Date first assessed: 07/30/19  -AA Time first assessed: 1300  -AA Side: Bilateral  -AA Location: gluteal  -AA Type: unspecified  -AA Stage, Pressure Injury: deep tissue injury  -AA       Plan of Care Review    Plan of Care Reviewed With  patient;spouse  -VS           Clinical Impression (OT)    Functional Level at Time of Evaluation (OT Eval)  Decreased endurance, BADLs and Mobility skills   -VS        Patient/Family Goals Statement (OT Eval)  I want to go back home and be able to help myself   -VS        Criteria for Skilled Therapeutic Interventions Met (OT Eval)  treatment indicated  -VS        Rehab Potential (OT Eval)  good, to achieve stated therapy goals  -VS        Therapy Frequency (OT Eval)  5 times/wk  -VS        Care Plan Review (OT)  evaluation/treatment results reviewed;care plan/treatment goals reviewed;patient/other agree to care plan  -VS        Care Plan Review, Other Participant (OT Eval)  spouse  -VS           Planned OT Interventions    Planned Therapy Interventions (OT Eval)  activity tolerance training;BADL retraining;functional balance retraining;transfer/mobility retraining  -VS           OT Goals    Transfer Goal Selection (OT)  transfer, OT goal 1  -VS         Bathing Goal Selection (OT)  bathing, OT goal 1  -VS        Balance Goal Selection (OT)  balance, OT goal 1  -VS        Endurance Goal Selection (OT)  endurance, OT goal 1  -VS        Additional Documentation  Balance Goal Selection (OT) (Row);Endurance Goal Selection (OT) (Row)  -VS           Transfer Goal 1 (OT)    Activity/Assistive Device (Transfer Goal 1, OT)  sit-to-stand/stand-to-sit;bed-to-chair/chair-to-bed;toilet  -VS        [place] Level/Cues Needed (Transfer Goal 1, OT)  minimum assist (75% or more patient effort);moderate assist (50-74% patient effort)  -VS        Time Frame (Transfer Goal 1, OT)  short term goal (STG);1 week  -VS        Progress/Outcome (Transfer Goal 1, OT)  continuing progress toward goal  -VS           Bathing Goal 1 (OT)    Activity/Assistive Device (Bathing Goal 1, OT)  bathing skills, all  -VS        [place] Level/Cues Needed (Bathing Goal 1, OT)  minimum assist (75% or more patient effort);moderate assist (50-74% patient effort)  -VS        Time Frame (Bathing Goal 1, OT)  short term goal (STG);1 week  -VS        Progress/Outcomes (Bathing Goal 1, OT)  continuing progress toward goal  -VS           Balance Goal 1 (OT)    Activity/Assistive Device (Balance Goal 1, OT)  standing, static pt. to stand 5 - 8 minutes to (A) with BADLs   -VS        [place] Level/Cues Needed (Balance Goal 1, OT)  minimum assist (75% or more patient effort)  -VS        Time Frame (Balance Goal 1, OT)  short term goal (STG);1 week  -VS        Progress/Outcomes (Balance Goal 1, OT)  continuing progress toward goal  -VS            Endurance Goal 1 (OT)    Activity Level (Endurance Goal 1, OT)  to increase;endurance 2 fair +  -VS        Time Frame (Endurance Goal 1, OT)  short term goal (STG);1 week  -VS        Progress/Outcome (Endurance Goal 1, OT)  continuing progress toward goal  -VS          User Key  (r) = Recorded By, (t) = Taken By, (c) = Cosigned By    Initials Name Effective Dates      Rhianna Garcia OTR 06/08/18 -     Neena Dela Cruz RN 06/16/16 -     Kylah Clark RN 06/16/16 -     Kiesha Johnson RN 06/16/16 -          Occupational Therapy Education     Title: PT OT SLP Therapies (In Progress)     Topic: Occupational Therapy (Done)     Point: ADL training (Done)     Description: Instruct learner(s) on proper safety adaptation and remediation techniques during self care or transfers.   Instruct in proper use of assistive devices.    Learning Progress Summary           Patient Eager, E,D, VU,NR by VS at 8/1/2019  3:15 PM    Comment:  POC and goals   Family Eager, E,D, VU,NR by VS at 8/1/2019  3:15 PM    Comment:  POC and goals                   Point: Home exercise program (Done)     Description: Instruct learner(s) on appropriate technique for monitoring, assisting and/or progressing therapeutic exercises/activities.    Learning Progress Summary           Patient Eager, E,D, VU,NR by VS at 8/1/2019  3:15 PM    Comment:  POC and goals   Family Eager, E,D, VU,NR by VS at 8/1/2019  3:15 PM    Comment:  POC and goals                               User Key     Initials Effective Dates Name Provider Type Discipline     06/08/18 -  Rhianna Garcia OTR Occupational Therapist OT                  OT Recommendation and Plan  Outcome Summary/Treatment Plan (OT)  Anticipated Discharge Disposition (OT): inpatient rehabilitation facility  Planned Therapy Interventions (OT Eval): activity tolerance training, BADL retraining, functional balance retraining, transfer/mobility retraining  Therapy Frequency (OT Eval): 5 times/wk  Plan of Care Review  Plan of Care Reviewed With: patient, spouse  Plan of Care Reviewed With: patient, spouse  Outcome Summary: OT:  Pt. presents with decrease endurance, tansfers and BADLS skills.  Pt. required max (A) x 1 for bed mobility and Mod (A) x 1 for sit to stand.  Pt. would benefit from skilled OT to address the areas so she can return home as  (I) and safe as possible.      Outcome Measures     Row Name 08/01/19 1358 08/01/19 0900 07/31/19 1100       How much help from another person do you currently need...    Turning from your back to your side while in flat bed without using bedrails?  --  2  -EM  2  -EM    Moving from lying on back to sitting on the side of a flat bed without bedrails?  --  2  -EM  2  -EM    Moving to and from a bed to a chair (including a wheelchair)?  --  2  -EM  1  -EM    Standing up from a chair using your arms (e.g., wheelchair, bedside chair)?  --  2  -EM  1  -EM    Climbing 3-5 steps with a railing?  --  1  -EM  1  -EM    To walk in hospital room?  --  2  -EM  1  -EM    AM-PAC 6 Clicks Score (PT)  --  11  -EM  8  -EM       How much help from another is currently needed...    Putting on and taking off regular lower body clothing?  1  -VS  --  --    Bathing (including washing, rinsing, and drying)  1  -VS  --  --    Toileting (which includes using toilet bed pan or urinal)  1  -VS  --  --    Putting on and taking off regular upper body clothing  2  -VS  --  --    Taking care of personal grooming (such as brushing teeth)  2  -VS  --  --    Eating meals  3  -VS  --  --    AM-PAC 6 Clicks Score (OT)  10  -VS  --  --       Functional Assessment    Outcome Measure Options  AM-PAC 6 Clicks Daily Activity (OT)  -VS  --  --    Row Name 07/30/19 0900             How much help from another person do you currently need...    Turning from your back to your side while in flat bed without using bedrails?  2  -PC      Moving from lying on back to sitting on the side of a flat bed without bedrails?  2  -PC      Moving to and from a bed to a chair (including a wheelchair)?  2  -PC      Standing up from a chair using your arms (e.g., wheelchair, bedside chair)?  2  -PC      Climbing 3-5 steps with a railing?  1  -PC      To walk in hospital room?  2  -PC      AM-PAC 6 Clicks Score (PT)  11  -PC         Functional Assessment    Outcome Measure  Options  AM-PAC 6 Clicks Basic Mobility (PT)  -PC        User Key  (r) = Recorded By, (t) = Taken By, (c) = Cosigned By    Initials Name Provider Type    VS Rhianna Garcia OTR Occupational Therapist    PC Kayla Trent, PT Physical Therapist    EM Shanique Monreal, PT Physical Therapist          Time Calculation:   Time Calculation- OT     Row Name 08/01/19 1520             Time Calculation- OT    OT Start Time  1330  -VS      OT Stop Time  1358  -VS      OT Time Calculation (min)  28 min  -VS      OT Received On  08/01/19  -VS      OT Goal Re-Cert Due Date  08/08/19  -VS        User Key  (r) = Recorded By, (t) = Taken By, (c) = Cosigned By    Initials Name Provider Type    VS Rhianna Garcia OTR Occupational Therapist        Therapy Charges for Today     Code Description Service Date Service Provider Modifiers Qty    95371351437 HC OT EVAL LOW COMPLEXITY 2 8/1/2019 Rhianna Garcia OTR GO 1    [id number]  OT NEUROMUSC RE EDUCATION EA 15 MIN 8/1/2019 Rhianna Garcia OTR GO 1               HALEY Zaragoza  8/1/2019

## 2019-08-01 NOTE — PLAN OF CARE
Problem: Patient Care Overview  Goal: Plan of Care Review   08/01/19 1516   OTHER   Outcome Summary OT: Pt. presents with decrease endurance, tansfers and BADLS skills. Pt. required max (A) x 1 for bed mobility and Mod (A) x 1 for sit to stand. Pt. would benefit from skilled OT to address the areas so she can return home as (I) and safe as possible.

## 2019-08-01 NOTE — PROGRESS NOTES
NEPHROLOGY PROGRESS NOTE    PATIENT IDENTIFICATION:   Name:  Maribeth Rendon      MRN:  [id number]     68 y.o.  female             Reason for visit: DACIA    SUBJECTIVE:   Better today.  Dialysis yesterday am.  1 unit PRBC.  Wt down 2 pounds . Feels better. UOP  270 for last 24 hours, but making more today.  Mejía in place . Bowels moved.  A little winded after standing with therapy.  Eating fair .   OBJECTIVE:  Vitals:    08/01/19 0751 08/01/19 0803 08/01/19 1121 08/01/19 1145   BP: 101/57   110/50   BP Location: Right arm   Right arm   Patient Position: Sitting   Lying   Pulse: 89 89 81 88   Resp: 18 18 18 18   Temp: 98 °F (36.7 °C)   97.8 °F (36.6 °C)   TempSrc: Oral   Oral   SpO2: 96% 100% 98% 100%   Weight:         FiO2 (%): 39 %     Body mass index is 34.23 kg/m².    Intake/Output Summary (Last 24 hours) at 8/1/2019 1351  Last data filed at 8/1/2019 0600  Gross per 24 hour   Intake --   Output 270 ml   Net -270 ml         Exam:  General Appearance: Chronically ill-appearing; No myoclonic movements.  More awake and interactive,. Looks older than stated age .  Skin: warm and dry  HEENT: Anicteric, periorbital edema.   Neck: RIJ cordis .  Lungs: Coarse bs, rales at left base. No wheezes .  Heart: RRR. normal S1 and S2, no S3, +rub intermittent  Abdomen: soft, non-tender, +bs, body wall edema.   : Mejía catheter  Extremities: 1+ hip edema; patent  AV fistula in the left upper arm  Vasc: right foot wrapped  Neuro: Awake, alert, more conversant . moves all extremities        Scheduled meds:      aspirin 81 mg Oral Daily   atorvastatin 40 mg Oral Nightly   bacitracin-polymyxin b  Topical Q24H   chlorhexidine 15 mL Mouth/Throat Q12H   enoxaparin 30 mg Subcutaneous Daily   epoetin hermes 10,000 Units Intravenous Once per day on Mon Wed Fri   [START ON 8/2/2019] insulin glargine 20 Units Subcutaneous QAM   insulin lispro 0-7 Units Subcutaneous 4x Daily With Meals & Nightly   insulin lispro 5 Units Subcutaneous TID With  Meals   ipratropium-albuterol 3 mL Nebulization 4x Daily - RT   miconazole  Topical Q12H   mupirocin 1 application Each Nare Daily   nystatin 1 application Topical Q12H     IV meds:                             Data Review:    Results from last 7 days   Lab Units 08/01/19 0416 07/31/19  0305 07/30/19  0248  07/29/19  0515  07/27/19  0502   SODIUM mmol/L 135* 136 142   < > 137   < > 143   POTASSIUM mmol/L 4.1 4.8 5.1   < > 4.5   < > 4.6   CHLORIDE mmol/L 94* 98 106   < > 102   < > 108*   CO2 mmol/L 21.7* 18.4* 17.9*   < > 19.6*   < > 21.4*   BUN mg/dL 50* 81* 73*   < > 74*   < > 60*   CREATININE mg/dL 4.05* 5.22* 4.27*   < > 4.71*   < > 4.17*   CALCIUM mg/dL 7.9* 7.4* 7.9*   < > 8.4*   < > 8.4*   BILIRUBIN mg/dL  --  0.2  --   --  0.2  --  0.2   ALK PHOS U/L  --  61  --   --  82  --  60   ALT (SGPT) U/L  --  <5  --   --  10  --  8   AST (SGOT) U/L  --  24  --   --  12  --  10   GLUCOSE mg/dL 199* 321* 109*   < > 197*   < > 139*    < > = values in this interval not displayed.       Estimated Creatinine Clearance: 14.5 mL/min (A) (by C-G formula based on SCr of 4.05 mg/dL (H)).    Results from last 7 days   Lab Units 08/01/19 0416 07/31/19 0305 07/30/19  0248   MAGNESIUM mg/dL 2.1 2.0 1.9   PHOSPHORUS mg/dL 5.3* 6.8* 4.5       Results from last 7 days   Lab Units 08/01/19 0416 07/31/19  0305 07/30/19  0248 07/29/19  1506 07/29/19  1249   WBC 10*3/mm3 12.62* 13.79* 17.52* 10.61 10.95*   HEMOGLOBIN g/dL 7.8* 7.3* 8.4* 7.5* 7.3*   PLATELETS 10*3/mm3 136* 125* 142 127* 116*       Results from last 7 days   Lab Units 07/30/19  0248 07/29/19  1249 07/27/19  0502   INR  1.31* 1.67* 1.17*             ASSESSMENT:   1.  Stage V chronic kidney disease, with rising waste products post op.  Dialysis yesterday with low blood flow for initiation.   Crt better.   Anticipate next HD tomorrow.   AVF LUE. May be starting to make more urine today .  2.  CABG 7/29.    3.  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 Not  controlled. On scheduled lantus. Meal time insulin added today .  4.  Hypertension, too controlled.    Off metoprolol for now.     5.   Anemia of chronic kidney disease; Not much response to one unit PRBC yesterday.   Procrit .  2 units tomorrow .  6.  PVD: wound right foot  7.  Leukocytosis . WBC down a little today .       PLAN:  1. Dialysis tomorrow barring any surprises.  Diuretic today .  2. 2 units PRBC on dialysis tomorrow.    3. Procrit with HD  4. DW   5. Ok to dc xavier  6. Pur wik if incontinent .      Flory Watt MD  8/1/2019    1:51 PM

## 2019-08-01 NOTE — THERAPY TREATMENT NOTE
Acute Care - Physical Therapy Treatment Note  [facility]     Patient Name: Maribeth Rendon  : 1950  MRN: [id number]  Today's Date: 2019  Onset of Illness/Injury or Date of Surgery: 19  Date of Referral to PT: 19  Referring Physician: Jhon    Admit Date: 2019    Visit Dx:    ICD-10-CM ICD-9-CM   1. 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 pectoris, unspecified vessel or lesion type (CMS/HCC) I25.118 414.01     413.9   2. Abnormal cardiac function test R94.30 794.30   3. Impaired functional mobility and activity tolerance Z74.09 V49.89     Patient Active Problem List   Diagnosis   • Menstrual disorder   • Atopic rhinitis   • Anemia in CKD (chronic kidney disease)   • Spasm of cervical paraspinous muscle   • Cervical radiculopathy   • Chronic kidney disease, stage IV (severe) (CMS/[facility])   • Depression   • DM type 2 with diabetic peripheral neuropathy (CMS/[facility])   • Essential hypertension   • Duplay's periarthritis syndrome   • Gastroesophageal reflux disease   • Hyperlipidemia   • Hypertension   • Arthritis   • Seizure disorder (CMS/HCC)   • Phlebitis   • Type 2 diabetes mellitus (CMS/HCC)   • Vitamin D deficiency   • Chest pain   • Abscess   • Generalized muscle weakness   • Abdominal wall cellulitis   • HTN (hypertension)   • CKD (chronic kidney disease) stage 4, GFR 15-29 ml/min (CMS/HCC)   • Diabetes mellitus with peripheral autonomic neuropathy (CMS/HCC)   • Hyponatremia   • Hypercalcemia   • Dehydration   • DACIA (acute kidney injury) (CMS/[facility])   • Abdominal wall abscess   • Cellulitis of toe of left foot   • Partial Achilles tendon tear, left, initial encounter   • Arthritis of foot   • Essential tremor   • Primary Parkinsonism (CMS/HCC)   • Abnormal cardiac function test   • 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 pectoris (CMS/HCC)       Therapy Treatment    Rehabilitation Treatment Summary     Row Name 19 0932             Treatment  Time/Intention    Discipline  physical therapist  -EM      Document Type  therapy note (daily note)  -EM      Subjective Information  complains of;weakness;pain  -EM      Mode of Treatment  physical therapy  -EM      Patient/Family Observations  patient sitting up in chair, awake and alert  -EM      Patient Effort  good  -EM      Existing Precautions/Restrictions  cardiac;fall;sternal  -EM      Recorded by [EM] Shanique Monreal, PT 08/01/19 0937      Row Name 08/01/19 0932             Vital Signs    Pre Systolic BP Rehab  101  -EM      Pre Treatment Diastolic BP  57  -EM      Pretreatment Heart Rate (beats/min)  80  -EM      Posttreatment Heart Rate (beats/min)  77  -EM      Pre SpO2 (%)  99  -EM      O2 Delivery Pre Treatment  supplemental O2  -EM      Recorded by [EM] Shanique Monreal, PT 08/01/19 0937      Row Name 08/01/19 0932             Bed Mobility Assessment/Treatment    Comment (Bed Mobility)  not tested, up in chair   -EM      Recorded by [EM] Shanique Monreal, PT 08/01/19 0937      Row Name 08/01/19 0932             Sit-Stand Transfer    Sit-Stand [place] (Transfers)  moderate assist (50% patient effort);2 person assist;verbal cues  -EM      Recorded by [EM] Shanique Monreal, PT 08/01/19 0937      Row Name 08/01/19 0932             Stand-Sit Transfer    Stand-Sit [place] (Transfers)  moderate assist (50% patient effort);2 person assist  -EM      Recorded by [EM] Shanique Monreal, PT 08/01/19 0937      Row Name 08/01/19 0932             Gait/Stairs Assessment/Training    [place] Level (Gait)  moderate assist (50% patient effort);2 person assist  -EM      Assistive Device (Gait)  -- clarissa HHA  -EM      Distance in Feet (Gait)  6  -EM      Deviations/Abnormal Patterns (Gait)  stride length decreased;gait speed decreased  -EM      Comment (Gait/Stairs)  posterior lean, unsteady, few steps forward and back   -EM      Recorded by [EM] Shanique Monreal, PT 08/01/19 0937       Row Name 08/01/19 0932             Therapeutic Exercise    Comment (Therapeutic Exercise)  5 reps cardiac protocol, level 2   -EM      Recorded by [EM] Shanique Monreal, PT 08/01/19 0937      Row Name 08/01/19 0932             Positioning and Restraints    Pre-Treatment Position  sitting in chair/recliner  -EM      Post Treatment Position  chair  -EM      In Chair  sitting;call light within reach;with family/caregiver  -EM      Recorded by [EM] Shanique Monreal, PT 08/01/19 0937      Row Name 08/01/19 0932             Pain Scale: Numbers Pre/Post-Treatment    Pain Scale: Numbers, Pretreatment  5/10  -EM      Pain Location  chest  -EM      Pain Intervention(s)  Medication (See MAR)  -EM      Recorded by [EM] Shanique Monreal, PT 08/01/19 0937      Row Name                Wound 07/29/19 0800 Bilateral chest incision    Wound - Properties Group Date first assessed: 07/29/19 [TL] Time first assessed: 0800 [TL] Side: Bilateral [TL] Location: chest [TL] Type: incision [TL] Recorded by:  [TL] Kylah Ruiz RN 07/29/19 0800    Row Name                Wound 07/29/19 0800 Right leg incision    Wound - Properties Group Date first assessed: 07/29/19 [TL] Time first assessed: 0800 [TL] Side: Right [TL] Location: leg [TL] Type: incision [TL] Recorded by:  [TL] Kylah Ruiz RN 07/29/19 0800    Row Name                Wound 07/29/19 1245 upper sternal incision    Wound - Properties Group Date first assessed: 07/29/19 [SM] Time first assessed: 1245 [SM] Present On Admission : no [SM] Orientation: upper [SM] Location: sternal [SM] Type: incision [SM] Recorded by:  [SM] Kiesha Escobedo RN 07/29/19 1441    Row Name                Wound 07/30/19 1300 Bilateral gluteal unspecified    Wound - Properties Group Date first assessed: 07/30/19 [AA] Time first assessed: 1300 [AA] Side: Bilateral [AA] Location: gluteal [AA] Type: unspecified [AA] Stage, Pressure Injury: deep tissue injury [AA] Recorded  by:  [AA] Neena Altman, RN 07/31/19 0903    Row Name 08/01/19 0932             Outcome Summary/Treatment Plan (PT)    Anticipated Discharge Disposition (PT)  [facility] nursing [facility]  -      Recorded by [EM] Shanique Monreal, PT 08/01/19 0937        User Key  (r) = Recorded By, (t) = Taken By, (c) = Cosigned By    Initials Name Effective Dates Discipline    Neena Dela Cruz RN 06/16/16 -  Nurse    Kylah Clark RN 06/16/16 -  Nurse    Shanique Morin, PT 04/03/18 -  PT    Kiesha Johnson RN 06/16/16 -  Nurse          Rash 07/30/19 1300 other (see comments) breast other (see comments) (Active)   Distribution localized 8/1/2019  8:15 AM   Borders irregular 8/1/2019  8:15 AM   Characteristics moist;flat;pain/discomfort 8/1/2019  8:15 AM   Color red 8/1/2019  8:15 AM   Care, Rash skin cleanser;cleansed with;antimicrobial agent applied 7/31/2019  4:12 PM       Wound 07/29/19 0800 Bilateral chest incision (Active)   Dressing Appearance intact;dry 8/1/2019  8:15 AM   Closure KERRI 8/1/2019  8:15 AM   Periwound dry;intact 8/1/2019  8:15 AM   Periwound Temperature warm 8/1/2019  8:15 AM   Drainage Amount none 8/1/2019  8:15 AM       Wound 07/29/19 0800 Right leg incision (Active)   Dressing Appearance dry;intact 8/1/2019  8:15 AM   Drainage Amount none 8/1/2019  8:15 AM   Dressing Care, Wound open to air 8/1/2019  8:15 AM       Wound 07/29/19 1245 upper sternal incision (Active)   Dressing Appearance dry;intact 8/1/2019  8:15 AM   Closure KERRI 8/1/2019  8:15 AM   Drainage Amount none 8/1/2019  8:15 AM   Dressing Care, Wound foam;low-adherent 8/1/2019  8:15 AM       Wound 07/30/19 1300 Bilateral gluteal unspecified (Active)   Closure Open to air 8/1/2019  8:15 AM   Base moist;pink 8/1/2019  8:15 AM   Periwound intact;pink 8/1/2019  8:15 AM   Periwound Temperature warm 8/1/2019  8:15 AM   Periwound Skin Turgor soft 8/1/2019  8:15 AM   Edges irregular 8/1/2019  8:15 AM   Dressing Care,  Wound open to air 8/1/2019  8:15 AM   Periwound Care, Wound barrier ointment applied 8/1/2019  8:15 AM           Physical Therapy Education     Title: PT OT SLP Therapies (In Progress)     Topic: Physical Therapy (In Progress)     Point: Mobility training (In Progress)     Learning Progress Summary           Patient Acceptance, E, NR by EM at 8/1/2019  9:37 AM    Acceptance, E, NR by EM at 7/31/2019 11:05 AM    Acceptance, E,D, NR by PC at 7/30/2019  9:13 AM                   Point: Home exercise program (In Progress)     Learning Progress Summary           Patient Acceptance, E, NR by EM at 8/1/2019  9:37 AM    Acceptance, E, NR by EM at 7/31/2019 11:05 AM    Acceptance, E,D, NR by PC at 7/30/2019  9:13 AM                   Point: Body mechanics (In Progress)     Learning Progress Summary           Patient Acceptance, E,D, NR by PC at 7/30/2019  9:13 AM                   Point: Precautions (In Progress)     Learning Progress Summary           Patient Acceptance, E,D, NR by PC at 7/30/2019  9:13 AM                               User Key     Initials Effective Dates Name Provider Type Discipline    PC 04/03/18 -  Kayla Trent, PT Physical Therapist PT    EM 04/03/18 -  Shanique Monreal, PT Physical Therapist PT                PT Recommendation and Plan  Anticipated Discharge Disposition (PT): skilled nursing facility  Outcome Summary/Treatment Plan (PT)  Anticipated Discharge Disposition (PT): skilled nursing facility  Plan of Care Reviewed With: patient  Progress: improving  Outcome Summary: patient more awake today, up in chair, able to complete 5 reps cardiac protocol with frequent rest breaks. Patient stood with modAx2, able to take a few steps but very unsteady, fatigues quickly   Outcome Measures     Row Name 08/01/19 0900 07/31/19 1100 07/30/19 0900       How much help from another person do you currently need...    Turning from your back to your side while in flat bed without using bedrails?  2  -EM  2   -EM  2  -PC    Moving from lying on back to sitting on the side of a flat bed without bedrails?  2  -EM  2  -EM  2  -PC    Moving to and from a bed to a chair (including a wheelchair)?  2  -EM  1  -EM  2  -PC    Standing up from a chair using your arms (e.g., wheelchair, bedside chair)?  2  -EM  1  -EM  2  -PC    Climbing 3-5 steps with a railing?  1  -EM  1  -EM  1  -PC    To walk in hospital room?  2  -EM  1  -EM  2  -PC    AM-PAC 6 Clicks Score (PT)  11  -EM  8  -EM  11  -PC       Functional Assessment    Outcome Measure Options  --  --  AM-PAC 6 Clicks Basic Mobility (PT)  -PC      User Key  (r) = Recorded By, (t) = Taken By, (c) = Cosigned By    Initials Name Provider Type    PC Kayla Trent, PT Physical Therapist    EM Shanique Monreal, PT Physical Therapist         Time Calculation:   PT Charges     Row Name 08/01/19 0946             Time Calculation    Start Time  0911  -EM      Stop Time  0927  -EM      Time Calculation (min)  16 min  -EM      PT Received On  08/01/19  -EM      PT - Next Appointment  08/02/19  -EM         Time Calculation- PT    Total Timed Code Minutes- PT  16 minute(s)  -EM        User Key  (r) = Recorded By, (t) = Taken By, (c) = Cosigned By    Initials Name Provider Type    EM Shanique Monreal, PT Physical Therapist        Therapy Charges for Today     Code Description Service Date Service Provider Modifiers Qty    97234756802 HC PT THER PROC EA 15 MIN 7/31/2019 Shanique Monreal, PT GP 1    66810086741 HC PT THER SUPP EA 15 MIN 7/31/2019 Shanique Monreal, PT GP 1    72823535617 HC PT THER PROC EA 15 MIN 8/1/2019 Shanique Monreal, PT GP 1    47060046846 HC PT THER SUPP EA 15 MIN 8/1/2019 Shanique Monreal, PT GP 1          PT G-Codes  Outcome Measure Options: AM-PAC 6 Clicks Basic Mobility (PT)  AM-PAC 6 Clicks Score (PT): 11    Shanique Monreal, PT  8/1/2019

## 2019-08-01 NOTE — PROGRESS NOTES
LOS: 6 days   Patient Care Team:  Robby Chaney MD as PCP - General  Robby Chaney MD as PCP - Family Medicine  Eddie Hodges MD as Consulting Physician (Cardiology)    Chief Complaint:   Post-op follow-up, s/p CABG    Subjective  Sitting up in chair. Much more awake. Feels she has more strength today.  Poor appetite, requesting novasource supplement.    Vital Signs  Temp:  [97.3 °F (36.3 °C)-98.2 °F (36.8 °C)] 98 °F (36.7 °C)  Heart Rate:  [86-92] 89  Resp:  [16-18] 18  BP: ()/(51-77) 101/57      07/29/19  0545 07/31/19  0500 08/01/19  0615   Weight: 89.1 kg (196 lb 6.4 oz) 91.5 kg (201 lb 12.8 oz) 90.4 kg (199 lb 6.4 oz)     Body mass index is 34.23 kg/m².    Intake/Output Summary (Last 24 hours) at 8/1/2019 0823  Last data filed at 8/1/2019 0600  Gross per 24 hour   Intake --   Output 270 ml   Net -270 ml     No intake/output data recorded.      Objective    Physical Exam:   General Appearance: awake and alert, no acute distress   Lungs: respirations regular, respirations unlabored and diminished bases   Heart: regular rhythm & normal rate, normal S1, S2 and paced   Abdomen: soft or nontender, + bowel sounds    Skin: sternal incision clean, dry, intact; H site c/d/i   Neuro: alert and oriented, no focal deficits.     Results Review:        WBC WBC   Date Value Ref Range Status   08/01/2019 12.62 (H) 3.40 - 10.80 10*3/mm3 Final   07/31/2019 13.79 (H) 3.40 - 10.80 10*3/mm3 Final   07/30/2019 17.52 (H) 3.40 - 10.80 10*3/mm3 Final   07/29/2019 10.61 3.40 - 10.80 10*3/mm3 Final   07/29/2019 10.95 (H) 3.40 - 10.80 10*3/mm3 Final      HGB Hemoglobin   Date Value Ref Range Status   08/01/2019 7.8 (L) 12.0 - 15.9 g/dL Final   07/31/2019 7.3 (L) 12.0 - 15.9 g/dL Final   07/30/2019 8.4 (L) 12.0 - 15.9 g/dL Final   07/29/2019 7.5 (L) 12.0 - 15.9 g/dL Final   07/29/2019 7.3 (L) 12.0 - 15.9 g/dL Final   07/29/2019 7.1 (L) 12.0 - 17.0 g/dL Final   07/29/2019 8.2 (L) 12.0 - 17.0 g/dL Final    07/29/2019 7.8 (L) 12.0 - 17.0 g/dL Final   07/29/2019 7.5 (L) 12.0 - 17.0 g/dL Final   07/29/2019 7.1 (L) 12.0 - 17.0 g/dL Final      HCT Hematocrit   Date Value Ref Range Status   08/01/2019 26.2 (L) 34.0 - 46.6 % Final   07/31/2019 24.9 (L) 34.0 - 46.6 % Final   07/30/2019 28.0 (L) 34.0 - 46.6 % Final   07/29/2019 24.3 (L) 34.0 - 46.6 % Final   07/29/2019 23.2 (L) 34.0 - 46.6 % Final   07/29/2019 21 (L) 38 - 51 % Final   07/29/2019 24 (L) 38 - 51 % Final   07/29/2019 23 (L) 38 - 51 % Final   07/29/2019 22 (L) 38 - 51 % Final   07/29/2019 21 (L) 38 - 51 % Final      Platelets Platelets   Date Value Ref Range Status   08/01/2019 136 (L) 140 - 450 10*3/mm3 Final   07/31/2019 125 (L) 140 - 450 10*3/mm3 Final   07/30/2019 142 140 - 450 10*3/mm3 Final   07/29/2019 127 (L) 140 - 450 10*3/mm3 Final   07/29/2019 116 (L) 140 - 450 10*3/mm3 Final        PT/INR:    Protime   Date Value Ref Range Status   07/30/2019 16.0 (H) 11.7 - 14.2 Seconds Final   07/29/2019 19.4 (H) 11.7 - 14.2 Seconds Final   /  INR   Date Value Ref Range Status   07/30/2019 1.31 (H) 0.90 - 1.10 Final   07/29/2019 1.67 (H) 0.90 - 1.10 Final       Sodium Sodium   Date Value Ref Range Status   08/01/2019 135 (L) 136 - 145 mmol/L Final   07/31/2019 136 136 - 145 mmol/L Final   07/30/2019 142 136 - 145 mmol/L Final   07/29/2019 141 136 - 145 mmol/L Final   07/29/2019 141 136 - 145 mmol/L Final      Potassium Potassium   Date Value Ref Range Status   08/01/2019 4.1 3.5 - 5.2 mmol/L Final   07/31/2019 4.8 3.5 - 5.2 mmol/L Final   07/30/2019 5.1 3.5 - 5.2 mmol/L Final   07/29/2019 4.5 3.5 - 5.2 mmol/L Final   07/29/2019 4.2 3.5 - 5.2 mmol/L Final   07/29/2019 4.6 3.5 - 5.2 mmol/L Final      Chloride Chloride   Date Value Ref Range Status   08/01/2019 94 (L) 98 - 107 mmol/L Final   07/31/2019 98 98 - 107 mmol/L Final   07/30/2019 106 98 - 107 mmol/L Final   07/29/2019 106 98 - 107 mmol/L Final   07/29/2019 106 98 - 107 mmol/L Final      Bicarbonate CO2    Date Value Ref Range Status   08/01/2019 21.7 (L) 22.0 - 29.0 mmol/L Final   07/31/2019 18.4 (L) 22.0 - 29.0 mmol/L Final   07/30/2019 17.9 (L) 22.0 - 29.0 mmol/L Final   07/29/2019 18.9 (L) 22.0 - 29.0 mmol/L Final   07/29/2019 21.6 (L) 22.0 - 29.0 mmol/L Final      BUN BUN   Date Value Ref Range Status   08/01/2019 50 (H) 8 - 23 mg/dL Final   07/31/2019 81 (H) 8 - 23 mg/dL Final   07/30/2019 73 (H) 8 - 23 mg/dL Final   07/29/2019 72 (H) 8 - 23 mg/dL Final   07/29/2019 72 (H) 8 - 23 mg/dL Final      Creatinine Creatinine   Date Value Ref Range Status   08/01/2019 4.05 (H) 0.57 - 1.00 mg/dL Final   07/31/2019 5.22 (H) 0.57 - 1.00 mg/dL Final   07/30/2019 4.27 (H) 0.57 - 1.00 mg/dL Final   07/29/2019 3.98 (H) 0.57 - 1.00 mg/dL Final   07/29/2019 4.08 (H) 0.57 - 1.00 mg/dL Final      Calcium Calcium   Date Value Ref Range Status   08/01/2019 7.9 (L) 8.6 - 10.5 mg/dL Final   07/31/2019 7.4 (L) 8.6 - 10.5 mg/dL Final   07/30/2019 7.9 (L) 8.6 - 10.5 mg/dL Final   07/29/2019 7.6 (L) 8.6 - 10.5 mg/dL Final   07/29/2019 7.6 (L) 8.6 - 10.5 mg/dL Final      Magnesium Magnesium   Date Value Ref Range Status   08/01/2019 2.1 1.6 - 2.4 mg/dL Final   07/31/2019 2.0 1.6 - 2.4 mg/dL Final   07/30/2019 1.9 1.6 - 2.4 mg/dL Final   07/29/2019 1.9 1.6 - 2.4 mg/dL Final   07/29/2019 2.1 1.6 - 2.4 mg/dL Final            aspirin 81 mg Oral Daily   atorvastatin 40 mg Oral Nightly   bacitracin-polymyxin b  Topical Q24H   chlorhexidine 15 mL Mouth/Throat Q12H   enoxaparin 30 mg Subcutaneous Daily   epoetin hermes 10,000 Units Intravenous Once per day on Mon Wed Fri   insulin glargine 15 Units Subcutaneous QAM   insulin lispro 0-7 Units Subcutaneous 4x Daily With Meals & Nightly   ipratropium-albuterol 3 mL Nebulization 4x Daily - RT   miconazole  Topical Q12H   mupirocin 1 application Each Nare Daily   nystatin 1 application Topical Q12H   sertraline 50 mg Oral Daily              Patient Active Problem List   Diagnosis Code   • Menstrual  disorder N92.6   • Atopic rhinitis J30.9   • Anemia in CKD (chronic kidney disease) N18.9, D63.1   • Spasm of cervical paraspinous muscle M62.838   • Cervical radiculopathy M54.12   • Chronic kidney disease, stage IV (severe) (CMS/[facility]) N18.4   • Depression F32.9   • DM type 2 with diabetic peripheral neuropathy (CMS/[facility]) E11.42   • Essential hypertension I10   • 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   • Gastroesophageal reflux disease K21.9   • Hyperlipidemia E78.5   • Hypertension I10   • Arthritis M19.90   • Seizure disorder (CMS/[facility]) G40.909   • Phlebitis I80.9   • Type 2 diabetes mellitus (CMS/[facility]) E11.9   • Vitamin D deficiency E55.9   • Chest pain R07.9   • Abscess L02.91   • Generalized muscle weakness M62.81   • Abdominal wall cellulitis L03.311   • HTN (hypertension) I10   • CKD (chronic kidney disease) stage 4, GFR 15-29 ml/min (CMS/[facility]) N18.4   • Diabetes mellitus with peripheral autonomic neuropathy (CMS/[facility]) E11.43   • Hyponatremia E87.1   • Hypercalcemia E83.52   • Dehydration E86.0   • DACIA (acute kidney injury) (CMS/[facility]) N17.9   • Abdominal wall abscess L02.211   • Cellulitis of toe of left foot L03.032   • Partial Achilles tendon tear, left, initial encounter S86.012A   • Arthritis of foot M19.079   • Essential tremor G25.0   • Primary Parkinsonism (CMS/[facility]) G20   • Abnormal cardiac function test R94.30   • 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 pectoris (CMS/[facility]) I25.118       Assessment & Plan    -multivessel CAD--- s/p CABGx4 POD#3 (Dr. Ferreira)   -HTN-- borderline hypotension   -HLD-- statin  -DM with diabetic neuropathy, A1c 7.56, hospitalist following  -CKD, stage V--- nephrology following, LUE AV fistula, HD on 7/31   -chronic anemia due to CKD, acute on chronic due to expected betito-op blood loss   -psoriatic arthritis--- apremilast at home   -seizure disorder-- topamax at home   -history of SVT/aflutter--- on eliquis pre-op, currently bradycardic   -leukocytosis---  trending down, likely reactive, WBC 12, afebrile, monitor  -chronic foot wound, multiple areas with fungal infection---- seen by wound care and plastic surgery, nystatin powder  -likely undiagnosed DOMI, reported by family      Routine care.  Order Novasource shake supplements.  HD yesterday. Wt down 1kg.  1unit PRBC with HD, Hgb 7.8.  D/c park if ok with nephrology.   Borderline bradycardic under pacer. Paced for BP. No BB. Will check EKG.   Encourage increased activity and aggressive pulm toilet.   May need rehab at discharge.     Justin Esparza PA-C  08/01/19  8:23 AM

## 2019-08-01 NOTE — PROGRESS NOTES
[facility]  Cardiology Progress Note    Patient Identification:  Name: Maribeth Rendon  Age: 68 y.o.  Sex: female  :  1950  MRN: [id number]                 Follow Up / Chief Complaint: abnormal stress test/cardiac catheterization       Interval History: Doing well postoperatively of CABG x4, continues to be on pacer.          Subjective: Patient continues to be somewhat fatigued however works with physical therapy this a.m.  Denies chest pain, shortness of breath, and palpitations.    Objective:   Past Medical History:  Past Medical History:   Diagnosis Date   • Achilles tendon tear     left    • Anemia    • Anxiety    • Depression    • Diabetes mellitus, type 2 (CMS/[facility])    • ESRD (end stage renal disease) (CMS/[facility])     HAS LEFT FOREARM FISTULA   • GERD (gastroesophageal reflux disease)    • History of MRSA infection 03/15/2016    ABDOMINAL WOUND,   INFECTION CONTROLL NOTIFIED 2017   • History of transfusion    • Hyperlipidemia    • Hypertension    • Hypokalemia    • Hypomagnesemia    • Hypoxic 2016    WHEN SHE HAD PNEUMONIA   • Intertrigo    • Leukocytosis    • Osteoarthritis    • Pneumonia 2016   • Psoriasis    • Psoriatic arthritis (CMS/[facility])    • Seizures (CMS/[facility])     one time   • Sepsis (CMS/[facility]) 2016   • SOB (shortness of breath)    • Stage 4 chronic renal impairment associated with type 2 diabetes mellitus (CMS/[facility])    • Staph infection     HX RIGHT FOOT AT [facility] 2010   • Streptococcal bacteremia    • Swelling of hand 10/27/2016    LEFT HAND SWELLIMG SINCE LEFT FISTULA SURGERY   • Tremor, essential    • Wears glasses      Past Surgical History:  Past Surgical History:   Procedure Laterality Date   • ABDOMINAL WALL ABSCESS INCISION AND DRAINAGE     • ARTERIOVENOUS FISTULA/SHUNT SURGERY Left 10/27/2016    Procedure: LT FOREARM FISTULA;  Surgeon: Lorelei Haque Jr., MD;  Location: [facility];  Service:    • ARTERIOVENOUS FISTULA/SHUNT SURGERY Left  2017    Procedure: LT BRACHIAL CEPHALIC WITH FISTULA LIGATION RADIAL CEPHALIC.;  Surgeon: Gurmeet Carver MD;  Location: [facility] OR;  Service:    • CATARACT EXTRACTION W/ INTRAOCULAR LENS IMPLANT Bilateral    • CORONARY ARTERY BYPASS GRAFT N/A 2019    Procedure: INTRAOP ERNESTO; CORONARY ARTERY BYPASS GRAFTING X 4 WITH LEFT INTERNAL MAMMARY ARTERY GRAFT AND UTILIZING ENDOSCOPICALLY HARVESTED GREATER SAPHENOUS VEIN; PRP;  Surgeon: Gordo Ferreira MD;  Location: [facility] OR;  Service: Cardiothoracic   • DILATATION AND CURETTAGE     • EXCISION MASS TRUNK N/A 3/22/2016    Procedure: I&D LOWER ABDOMINAL  WOUND;  Surgeon: Tru Yi MD;  Location: [facility] OR;  Service:    • FOOT SURGERY Right     REMOVED BONE IN RIGHT GREAT TOE   • HYSTERECTOMY          Social History:   Social History     Tobacco Use   • Smoking status: Former Smoker     Packs/day: 2.00     Years: 20.00     Pack years: 40.00     Types: Cigarettes     Last attempt to quit: 10/21/1992     Years since quittin.7   • Smokeless tobacco: Never Used   • Tobacco comment: quit    Substance Use Topics   • Alcohol use: No      Family History:  Family History   Problem Relation Age of Onset   • Heart attack Mother    • Heart disease Mother    • Kidney disease Mother    • Heart attack Father    • Heart disease Father    • Hypertension Father    • Stroke Father         ISCHEMIC   • Diabetes Father         TYPE 2   • Kidney disease Brother    • Diabetes Brother         TYPE 1   • Thyroid disease Daughter    • Anxiety disorder Maternal Aunt    • Bipolar disorder Maternal Aunt    • Depression Maternal Aunt    • Lupus Maternal Aunt         LUPUS ANTICOAGLULANT   • Rheum arthritis Maternal Aunt    • Alcohol abuse Maternal Uncle    • Other Maternal Uncle         PULMONARY DISEASE          Allergies:  Allergies   Allergen Reactions   • Prednisone Hallucinations     Scheduled Meds:    aspirin 81 mg Daily   atorvastatin  40 mg Nightly   bacitracin-polymyxin b  Q24H   chlorhexidine 15 mL Q12H   enoxaparin 30 mg Daily   epoetin hermes 10,000 Units Once per day on    insulin glargine 15 Units QAM   insulin lispro 0-7 Units 4x Daily With Meals & Nightly   ipratropium-albuterol 3 mL 4x Daily - RT   miconazole  Q12H   mupirocin 1 application Daily   nystatin 1 application Q12H       I have reviewed the patient's recent medical history and current medications, as well as personally reviewed/interpreted the ECG/telemetry data        INTAKE AND OUTPUT:    Intake/Output Summary (Last 24 hours) at 2019 1213  Last data filed at 2019 0600  Gross per 24 hour   Intake --   Output 270 ml   Net -270 ml       Review of Systems:    GI: Denies abdominal pain and n/v/d  Cardiac: Denies chest pain and palpitations  Pulmonary: Denies shortness of breath and cough       Physical Exam:   General: Appears in no acute distress; resting in bed with spouse at bedside.  HEENT:  No JVD.  Thyroid not visibly enlarged.  No mucosal pallor or cyanosis  Respiratory: Aspirations regular and unlabored at rest.  BBS with good air entry anteriorly and laterally.  No  crackles, rubs or wheezes auscultated  Cardiovascular: S1-S2 regular rate and rhythm.  No murmur, rub or gallop. No carotid bruits. DP/PT pulses 2+. No pretibial pitting edema  Gastrointestinal: Abdomen soft, round, and nontender.  Bowel sounds present.  No ascites  Musculoskeletal: PASCUAL x4. No abnormal movements  Extremities: No digital clubbing or cyanosis   Neuro: AAO x3 CN II-XII grossly intact, still somewhat fatigue    Psych: Mood and affect cooperative and pleasant        Cardiographics  Telemetry:    SR       AV paced rate 70s      EC/31 AV paved rate 80s        19      Echocardiogram:     Interpretation Summary 19    · Right ventricular cavity is borderline dilated.  · Left atrial cavity size is mildly dilated.  · Mild tricuspid valve  regurgitation is present.  · Calculated EF = 57%.  · There is no evidence of pericardial effusion.              Interpretation Summary Myocardial perfusion stress testing 7/3/19       · Findings consistent with a normal ECG stress test.  · Findings consistent with a normal ECG stress test.  · Left ventricular ejection fraction is normal (Calculated EF = 60%).  · Myocardial perfusion imaging indicates a small-to-medium-sized, mild-to-moderately severe area of ischemia located in the anterior wall.  · Impressions are consistent with an intermediate risk study.     Asymptomatic for chest pain. ECG is negative for ischemia.   Ectopy: PAC's at baseline, episode of atrial tachycardia verses junctional tachycardia post Infusion.   B/P is appropriate Beta-blocker therapy  Pharmacologic study due to inability to tolerate increasing speed and grade of treadmill due to poor balance,  mobility  Issues and Beta-blocker therapy.  Unable to participate in low level exercise due to poor mobility and poor balance.             Lab Review   Results from last 7 days   Lab Units 07/26/19  0330 07/25/19  1433   TROPONIN T ng/mL 0.020 0.017     Results from last 7 days   Lab Units 08/01/19  0416   MAGNESIUM mg/dL 2.1     Results from last 7 days   Lab Units 08/01/19  0416   SODIUM mmol/L 135*   POTASSIUM mmol/L 4.1   BUN mg/dL 50*   CREATININE mg/dL 4.05*   CALCIUM mg/dL 7.9*       Results from last 7 days   Lab Units 08/01/19  0416 07/31/19  0305 07/30/19  0248   WBC 10*3/mm3 12.62* 13.79* 17.52*   HEMOGLOBIN g/dL 7.8* 7.3* 8.4*   HEMATOCRIT % 26.2* 24.9* 28.0*   PLATELETS 10*3/mm3 136* 125* 142     Results from last 7 days   Lab Units 07/30/19  0248 07/29/19  1249 07/29/19  0515 07/28/19  2052  07/27/19  0502   INR  1.31* 1.67*  --   --   --  1.17*   APTT seconds  --  36.5* 48.1* 45.1*   < > 35.3    < > = values in this interval not displayed.                 Estimated Creatinine Clearance: 14.5 mL/min (A) (by C-G formula based on SCr  "of 4.05 mg/dL (H)).    I have reviewed the medical decision making with Dr. Hodges in detail.     Assessment / Plan:      1.  CAD     2.  CKD IV/V  3.  Hypertension  4.  Hyperlipidemia  5.  DM2  6.  Atrial  fibrillation  7.  Anemia   8.  Leukocytosis  9.  Foot wound, chronic   10.  Cirrhotic arthritis    Postop day 3 for CABG x4.  Patient had hemodialysis yesterday with decrease of weight by 1 kg.  Patient has sinus rhythm rhythm on telemetry.  An EKG today.  Currently on aspirin, statin, beta-blockade had been discontinued due to hypotension.  Blood pressure stable today.    Electrolytes WNL, globin improved from 7.3-7.8 defer to CTS. Anticoagulated home on apixaban and currently held due to anemia postop recovery would resume when okay with all consulting providers.        Labs: BMP and mag    )8/1/2019  IRENA Kohler/Transcription:   \"Dictated utilizing Dragon dictation\".   "

## 2019-08-01 NOTE — CONSULTS
"Met with patient's family, discussed benefits of cardiac rehab. Provided phase II information packet, which includes; general information about cardiac rehab, [facility] [place] [facility] handout and [facility] letter article entitled “Cardiac Rehab is often the Best Medicine for Recovery\", stresses the importance of cardiac rehab after a heart event.   I provided the contact information for cardiac rehab here at [facility] and encouraged him to call when discharged.   Explained if receiving home health would not be able to attend cardiac rehab until finished with home health. Instructed to bring a copy of After Visit Summary to initial assessment.      "

## 2019-08-01 NOTE — PLAN OF CARE
Problem: Patient Care Overview  Goal: Plan of Care Review  Outcome: Ongoing (interventions implemented as appropriate)   08/01/19 0965   Coping/Psychosocial   Plan of Care Reviewed With patient   Plan of Care Review   Progress improving   OTHER   Outcome Summary patient more awake today, up in chair, able to complete 5 reps cardiac protocol with frequent rest breaks. Patient stood with modAx2, able to take a few steps but very unsteady, fatigues quickly

## 2019-08-02 LAB
ALBUMIN SERPL-MCNC: 2.8 G/DL (ref 3.5–5.2)
ALBUMIN/GLOB SERPL: 0.8 G/DL
ALP SERPL-CCNC: 80 U/L (ref 39–117)
ALT SERPL W P-5'-P-CCNC: <5 U/L (ref 1–33)
ANION GAP SERPL CALCULATED.3IONS-SCNC: 14.3 MMOL/L (ref 5–15)
AST SERPL-CCNC: 15 U/L (ref 1–32)
BILIRUB SERPL-MCNC: 0.2 MG/DL (ref 0.2–1.2)
BUN BLD-MCNC: 50 MG/DL (ref 8–23)
BUN/CREAT SERPL: 10.4 (ref 7–25)
CALCIUM SPEC-SCNC: 7.6 MG/DL (ref 8.6–10.5)
CHLORIDE SERPL-SCNC: 96 MMOL/L (ref 98–107)
CO2 SERPL-SCNC: 24.7 MMOL/L (ref 22–29)
CREAT BLD-MCNC: 4.82 MG/DL (ref 0.57–1)
DEPRECATED RDW RBC AUTO: 60.1 FL (ref 37–54)
ERYTHROCYTE [DISTWIDTH] IN BLOOD BY AUTOMATED COUNT: 17.2 % (ref 12.3–15.4)
GFR SERPL CREATININE-BSD FRML MDRD: 9 ML/MIN/1.73
GFR SERPL CREATININE-BSD FRML MDRD: ABNORMAL ML/MIN/1.73
GLOBULIN UR ELPH-MCNC: 3.7 GM/DL
GLUCOSE BLD-MCNC: 174 MG/DL (ref 65–99)
GLUCOSE BLDC GLUCOMTR-MCNC: 143 MG/DL (ref 70–130)
GLUCOSE BLDC GLUCOMTR-MCNC: 154 MG/DL (ref 70–130)
GLUCOSE BLDC GLUCOMTR-MCNC: 155 MG/DL (ref 70–130)
GLUCOSE BLDC GLUCOMTR-MCNC: 182 MG/DL (ref 70–130)
HCT VFR BLD AUTO: 25.9 % (ref 34–46.6)
HGB BLD-MCNC: 7.8 G/DL (ref 12–15.9)
MAGNESIUM SERPL-MCNC: 1.9 MG/DL (ref 1.6–2.4)
MCH RBC QN AUTO: 28.9 PG (ref 26.6–33)
MCHC RBC AUTO-ENTMCNC: 30.1 G/DL (ref 31.5–35.7)
MCV RBC AUTO: 95.9 FL (ref 79–97)
PLATELET # BLD AUTO: 135 10*3/MM3 (ref 140–450)
PMV BLD AUTO: 10.9 FL (ref 6–12)
POTASSIUM BLD-SCNC: 3.4 MMOL/L (ref 3.5–5.2)
PROT SERPL-MCNC: 6.5 G/DL (ref 6–8.5)
RBC # BLD AUTO: 2.7 10*6/MM3 (ref 3.77–5.28)
SODIUM BLD-SCNC: 135 MMOL/L (ref 136–145)
WBC NRBC COR # BLD: 10.49 10*3/MM3 (ref 3.4–10.8)

## 2019-08-02 PROCEDURE — 80053 COMPREHEN METABOLIC PANEL: CPT | Performed by: HOSPITALIST

## 2019-08-02 PROCEDURE — P9016 RBC LEUKOCYTES REDUCED: HCPCS

## 2019-08-02 PROCEDURE — 94799 UNLISTED PULMONARY SVC/PX: CPT

## 2019-08-02 PROCEDURE — 86900 BLOOD TYPING SEROLOGIC ABO: CPT

## 2019-08-02 PROCEDURE — 25010000002 EPOETIN ALFA PER 1000 UNITS: Performed by: INTERNAL MEDICINE

## 2019-08-02 PROCEDURE — 99232 SBSQ HOSP IP/OBS MODERATE 35: CPT | Performed by: INTERNAL MEDICINE

## 2019-08-02 PROCEDURE — 63710000001 INSULIN LISPRO (HUMAN) PER 5 UNITS: Performed by: HOSPITALIST

## 2019-08-02 PROCEDURE — 83735 ASSAY OF MAGNESIUM: CPT | Performed by: NURSE PRACTITIONER

## 2019-08-02 PROCEDURE — 63710000001 INSULIN GLARGINE PER 5 UNITS: Performed by: HOSPITALIST

## 2019-08-02 PROCEDURE — 25010000002 ENOXAPARIN PER 10 MG: Performed by: THORACIC SURGERY (CARDIOTHORACIC VASCULAR SURGERY)

## 2019-08-02 PROCEDURE — 85027 COMPLETE CBC AUTOMATED: CPT | Performed by: THORACIC SURGERY (CARDIOTHORACIC VASCULAR SURGERY)

## 2019-08-02 PROCEDURE — 97110 THERAPEUTIC EXERCISES: CPT

## 2019-08-02 PROCEDURE — 82962 GLUCOSE BLOOD TEST: CPT

## 2019-08-02 RX ORDER — INSULIN GLARGINE 100 [IU]/ML
25 INJECTION, SOLUTION SUBCUTANEOUS EVERY MORNING
Status: DISCONTINUED | OUTPATIENT
Start: 2019-08-03 | End: 2019-08-03

## 2019-08-02 RX ADMIN — ASPIRIN 81 MG: 81 TABLET, COATED ORAL at 18:34

## 2019-08-02 RX ADMIN — BACITRACIN ZINC AND POLYMYXIN B SULFATE: at 08:24

## 2019-08-02 RX ADMIN — INSULIN LISPRO 5 UNITS: 100 INJECTION, SOLUTION INTRAVENOUS; SUBCUTANEOUS at 10:55

## 2019-08-02 RX ADMIN — INSULIN LISPRO 2 UNITS: 100 INJECTION, SOLUTION INTRAVENOUS; SUBCUTANEOUS at 08:25

## 2019-08-02 RX ADMIN — IPRATROPIUM BROMIDE AND ALBUTEROL SULFATE 3 ML: 2.5; .5 SOLUTION RESPIRATORY (INHALATION) at 19:45

## 2019-08-02 RX ADMIN — HYDROCODONE BITARTRATE AND ACETAMINOPHEN 1 TABLET: 5; 325 TABLET ORAL at 08:24

## 2019-08-02 RX ADMIN — NYSTATIN 1 APPLICATION: 100000 POWDER TOPICAL at 08:24

## 2019-08-02 RX ADMIN — INSULIN LISPRO 5 UNITS: 100 INJECTION, SOLUTION INTRAVENOUS; SUBCUTANEOUS at 08:24

## 2019-08-02 RX ADMIN — MUPIROCIN 1 APPLICATION: 20 OINTMENT TOPICAL at 08:24

## 2019-08-02 RX ADMIN — IPRATROPIUM BROMIDE AND ALBUTEROL SULFATE 3 ML: 2.5; .5 SOLUTION RESPIRATORY (INHALATION) at 06:57

## 2019-08-02 RX ADMIN — MICONAZOLE NITRATE: 2 POWDER TOPICAL at 22:30

## 2019-08-02 RX ADMIN — LIDOCAINE AND PRILOCAINE: 25; 25 CREAM TOPICAL at 13:02

## 2019-08-02 RX ADMIN — NYSTATIN 1 APPLICATION: 100000 POWDER TOPICAL at 22:30

## 2019-08-02 RX ADMIN — BUMETANIDE 4 MG: 0.25 INJECTION INTRAMUSCULAR; INTRAVENOUS at 03:57

## 2019-08-02 RX ADMIN — ENOXAPARIN SODIUM 30 MG: 30 INJECTION SUBCUTANEOUS at 18:33

## 2019-08-02 RX ADMIN — HYDROCODONE BITARTRATE AND ACETAMINOPHEN 1 TABLET: 5; 325 TABLET ORAL at 19:38

## 2019-08-02 RX ADMIN — ERYTHROPOIETIN 10000 UNITS: 10000 INJECTION, SOLUTION INTRAVENOUS; SUBCUTANEOUS at 17:12

## 2019-08-02 RX ADMIN — HYDROCODONE BITARTRATE AND ACETAMINOPHEN 1 TABLET: 5; 325 TABLET ORAL at 03:57

## 2019-08-02 RX ADMIN — INSULIN LISPRO 2 UNITS: 100 INJECTION, SOLUTION INTRAVENOUS; SUBCUTANEOUS at 10:55

## 2019-08-02 RX ADMIN — ATORVASTATIN CALCIUM 40 MG: 20 TABLET, FILM COATED ORAL at 19:38

## 2019-08-02 RX ADMIN — MICONAZOLE NITRATE: 2 POWDER TOPICAL at 08:25

## 2019-08-02 RX ADMIN — INSULIN GLARGINE 20 UNITS: 100 INJECTION, SOLUTION SUBCUTANEOUS at 06:00

## 2019-08-02 RX ADMIN — IPRATROPIUM BROMIDE AND ALBUTEROL SULFATE 3 ML: 2.5; .5 SOLUTION RESPIRATORY (INHALATION) at 10:50

## 2019-08-02 NOTE — PLAN OF CARE
Problem: Patient Care Overview  Goal: Plan of Care Review  Outcome: Ongoing (interventions implemented as appropriate)   08/02/19 1029   Coping/Psychosocial   Plan of Care Reviewed With patient   OTHER   Outcome Summary patient drowsy but arousable today, able to complete exercises per cardiac protocol with rest breaks, ambulated 10 feet with rwx and Melina, seated rest and then ambulated another 15 feet.

## 2019-08-02 NOTE — PROGRESS NOTES
LOS: 7 days   Patient Care Team:  Robby Cahney MD as PCP - General  Robby Chaney MD as PCP - Family Medicine  Eddie Hodges MD as Consulting Physician (Cardiology)    Chief Complaint:   Post-op follow-up, s/p CABG    Subjective  Sitting up in chair. Worn out from working with therapy.     Vital Signs  Temp:  [97.8 °F (36.6 °C)-98.4 °F (36.9 °C)] 97.8 °F (36.6 °C)  Heart Rate:  [67-88] 70  Resp:  [18-20] 18  BP: (110-139)/(50-73) 131/56      07/31/19  0500 08/01/19  0615 08/02/19  0600   Weight: 91.5 kg (201 lb 12.8 oz) 90.4 kg (199 lb 6.4 oz) 91.4 kg (201 lb 9.6 oz)     Body mass index is 34.6 kg/m².    Intake/Output Summary (Last 24 hours) at 8/2/2019 1105  Last data filed at 8/2/2019 0710  Gross per 24 hour   Intake 200 ml   Output 785 ml   Net -585 ml     I/O this shift:  In: 200 [P.O.:200]  Out: -         Objective    Physical Exam:   General Appearance: awake and alert, no acute distress   Lungs: respirations regular and respirations unlabored   Heart: regular rhythm & normal rate and normal S1, S2   Abdomen: soft or nontender, + bowel sounds    Skin: sternal incision clean, dry, intact; H site c/d/i   Neuro: alert and oriented, no focal deficits.     Results Review:        WBC WBC   Date Value Ref Range Status   08/02/2019 10.49 3.40 - 10.80 10*3/mm3 Final   08/01/2019 12.62 (H) 3.40 - 10.80 10*3/mm3 Final   07/31/2019 13.79 (H) 3.40 - 10.80 10*3/mm3 Final      HGB Hemoglobin   Date Value Ref Range Status   08/02/2019 7.8 (L) 12.0 - 15.9 g/dL Final   08/01/2019 7.8 (L) 12.0 - 15.9 g/dL Final   07/31/2019 7.3 (L) 12.0 - 15.9 g/dL Final      HCT Hematocrit   Date Value Ref Range Status   08/02/2019 25.9 (L) 34.0 - 46.6 % Final   08/01/2019 26.2 (L) 34.0 - 46.6 % Final   07/31/2019 24.9 (L) 34.0 - 46.6 % Final      Platelets Platelets   Date Value Ref Range Status   08/02/2019 135 (L) 140 - 450 10*3/mm3 Final   08/01/2019 136 (L) 140 - 450 10*3/mm3 Final   07/31/2019 125 (L)  140 - 450 10*3/mm3 Final        PT/INR:  No results found for: PROTIME/No results found for: INR    Sodium Sodium   Date Value Ref Range Status   08/02/2019 135 (L) 136 - 145 mmol/L Final   08/01/2019 135 (L) 136 - 145 mmol/L Final   07/31/2019 136 136 - 145 mmol/L Final      Potassium Potassium   Date Value Ref Range Status   08/02/2019 3.4 (L) 3.5 - 5.2 mmol/L Final   08/01/2019 4.1 3.5 - 5.2 mmol/L Final   07/31/2019 4.8 3.5 - 5.2 mmol/L Final      Chloride Chloride   Date Value Ref Range Status   08/02/2019 96 (L) 98 - 107 mmol/L Final   08/01/2019 94 (L) 98 - 107 mmol/L Final   07/31/2019 98 98 - 107 mmol/L Final      Bicarbonate CO2   Date Value Ref Range Status   08/02/2019 24.7 22.0 - 29.0 mmol/L Final   08/01/2019 21.7 (L) 22.0 - 29.0 mmol/L Final   07/31/2019 18.4 (L) 22.0 - 29.0 mmol/L Final      BUN BUN   Date Value Ref Range Status   08/02/2019 50 (H) 8 - 23 mg/dL Final   08/01/2019 50 (H) 8 - 23 mg/dL Final   07/31/2019 81 (H) 8 - 23 mg/dL Final      Creatinine Creatinine   Date Value Ref Range Status   08/02/2019 4.82 (H) 0.57 - 1.00 mg/dL Final   08/01/2019 4.05 (H) 0.57 - 1.00 mg/dL Final   07/31/2019 5.22 (H) 0.57 - 1.00 mg/dL Final      Calcium Calcium   Date Value Ref Range Status   08/02/2019 7.6 (L) 8.6 - 10.5 mg/dL Final   08/01/2019 7.9 (L) 8.6 - 10.5 mg/dL Final   07/31/2019 7.4 (L) 8.6 - 10.5 mg/dL Final      Magnesium Magnesium   Date Value Ref Range Status   08/02/2019 1.9 1.6 - 2.4 mg/dL Final   08/01/2019 2.1 1.6 - 2.4 mg/dL Final   07/31/2019 2.0 1.6 - 2.4 mg/dL Final            aspirin 81 mg Oral Daily   atorvastatin 40 mg Oral Nightly   bacitracin-polymyxin b  Topical Q24H   enoxaparin 30 mg Subcutaneous Daily   epoetin hermes 10,000 Units Intravenous Once per day on Mon Wed Fri   insulin glargine 20 Units Subcutaneous QAM   insulin lispro 0-7 Units Subcutaneous 4x Daily With Meals & Nightly   insulin lispro 5 Units Subcutaneous TID With Meals   ipratropium-albuterol 3 mL Nebulization  4x Daily - RT   lidocaine-prilocaine  Topical Once per day on Mon Wed Fri   miconazole  Topical Q12H   mupirocin 1 application Each Nare Daily   nystatin 1 application Topical Q12H              Patient Active Problem List   Diagnosis Code   • Menstrual disorder N92.6   • Atopic rhinitis J30.9   • Anemia in CKD (chronic kidney disease) N18.9, D63.1   • Spasm of cervical paraspinous muscle M62.838   • Cervical radiculopathy M54.12   • Chronic kidney disease, stage IV (severe) (CMS/[facility]) N18.4   • Depression F32.9   • DM type 2 with diabetic peripheral neuropathy (CMS/[facility]) E11.42   • Essential hypertension I10   • 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   • Gastroesophageal reflux disease K21.9   • Hyperlipidemia E78.5   • Hypertension I10   • Arthritis M19.90   • Seizure disorder (CMS/[facility]) G40.909   • Phlebitis I80.9   • Type 2 diabetes mellitus (CMS/[facility]) E11.9   • Vitamin D deficiency E55.9   • Chest pain R07.9   • Abscess L02.91   • Generalized muscle weakness M62.81   • Abdominal wall cellulitis L03.311   • HTN (hypertension) I10   • CKD (chronic kidney disease) stage 4, GFR 15-29 ml/min (CMS/[facility]) N18.4   • Diabetes mellitus with peripheral autonomic neuropathy (CMS/[facility]) E11.43   • Hyponatremia E87.1   • Hypercalcemia E83.52   • Dehydration E86.0   • DACIA (acute kidney injury) (CMS/[facility]) N17.9   • Abdominal wall abscess L02.211   • Cellulitis of toe of left foot L03.032   • Partial Achilles tendon tear, left, initial encounter S86.012A   • Arthritis of foot M19.079   • Essential tremor G25.0   • Primary Parkinsonism (CMS/[facility]) G20   • Abnormal cardiac function test R94.30   • 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 pectoris (CMS/[facility]) I25.118       Assessment & Plan    -multivessel CAD--- s/p CABGx4 POD#4 (Dr. Ferreira)   -HTN-- borderline hypotension   -HLD-- statin  -DM with diabetic neuropathy, A1c 7.56, hospitalist following  -CKD, stage V--- nephrology following, LUE AV fistula, HD per  nephrology  -chronic anemia due to CKD, acute on chronic due to expected betito-op blood loss, transfusing with HD  -psoriatic arthritis--- apremilast at home   -seizure disorder-- topamax at home   -history of SVT/aflutter--- on eliquis pre-op, currently bradycardic   -leukocytosis--- trending down   -chronic foot wound, multiple areas with fungal infection---- seen by wound care and plastic surgery, nystatin powder  -likely undiagnosed DOMI, reported by family      Routine care.  HD today with PRBCs per nephrology.   BB as BP/HR tolerates.  D/c epicardial wires.   D/c central line if ok with nephrology.   Encourage increased activity and aggressive pulm toilet.   Likely will need rehab at discharge.      Justin Esparza PA-C  08/02/19  11:05 AM

## 2019-08-02 NOTE — PROGRESS NOTES
Daily progress note    Chief complaint  Doing same  No specific complaints  Family at bedside  Getting ready for second hemodialysis    History of present Illness  68-year-old white female with history of hypertension hyperlipidemia diabetes mellitus chronic kidney disease stage IV directly admitted to cardiology service with abnormal stress Cardiolite for cardiac catheterization and I am asked to follow the patient for medical problem.  Patient denies any chest pain but has shortness of breath with exertion.  Patient denies any fever chills abdominal pain nausea vomiting diarrhea.  Patient stated that she is very close for hemodialysis she already had AV fistula but her kidney function closely followed by nephrology.  Patient fully alert oriented and give me a detailed history.  I am asked to follow the patient for medical problems.     REVIEW OF SYSTEMS  Remarkable for generalized weakness    PHYSICAL EXAM  Blood pressure 135/59, pulse 71, temperature 98.2 °F (36.8 °C), temperature source Oral, resp. rate 18, weight 91.4 kg (201 lb 9.6 oz), SpO2 96 %, not currently breastfeeding.    General awake and alert  Cardiovascular: Normal rate and regular rhythm.    Pulmonary/Chest:  Moving air bilaterally  Abdominal: Soft.  Soft nontender bowel is most  Extremities no edema    LAB RESULTS  Lab Results (last 24 hours)     Procedure Component Value Units Date/Time    POC Glucose Once [[id number]]  (Abnormal) Collected:  08/02/19 1032    Specimen:  Blood Updated:  08/02/19 1034     Glucose 155 mg/dL     Comprehensive Metabolic Panel [[id number]]  (Abnormal) Collected:  08/02/19 0445    Specimen:  Blood Updated:  08/02/19 0557     Glucose 174 mg/dL      BUN 50 mg/dL      Creatinine 4.82 mg/dL      Sodium 135 mmol/L      Potassium 3.4 mmol/L      Chloride 96 mmol/L      CO2 24.7 mmol/L      Calcium 7.6 mg/dL      Total Protein 6.5 g/dL      Albumin 2.80 g/dL      ALT (SGPT) <5 U/L      AST (SGOT) 15 U/L      Alkaline Phosphatase  80 U/L      Total Bilirubin 0.2 mg/dL      eGFR Non African Amer 9 mL/min/1.73      Comment: <15 Indicative of kidney failure.        eGFR   Amer -- mL/min/1.73      Comment: <15 Indicative of kidney failure.        Globulin 3.7 gm/dL      A/G Ratio 0.8 g/dL      BUN/Creatinine Ratio 10.4     Anion Gap 14.3 mmol/L     Narrative:       GFR Normal >60  Chronic Kidney Disease <60  Kidney Failure <15    Magnesium [[id number]]  (Normal) Collected:  08/02/19 0445    Specimen:  Blood Updated:  08/02/19 0557     Magnesium 1.9 mg/dL     CBC (No Diff) [[id number]]  (Abnormal) Collected:  08/02/19 0445    Specimen:  Blood Updated:  08/02/19 0534     WBC 10.49 10*3/mm3      RBC 2.70 10*6/mm3      Hemoglobin 7.8 g/dL      Hematocrit 25.9 %      MCV 95.9 fL      MCH 28.9 pg      MCHC 30.1 g/dL      RDW 17.2 %      RDW-SD 60.1 fl      MPV 10.9 fL      Platelets 135 10*3/mm3     POC Glucose Once [[id number]]  (Abnormal) Collected:  08/02/19 0520    Specimen:  Blood Updated:  08/02/19 0524     Glucose 182 mg/dL     POC Glucose Once [[id number]]  (Abnormal) Collected:  08/01/19 1951    Specimen:  Blood Updated:  08/01/19 1952     Glucose 271 mg/dL     POC Glucose Once [[id number]]  (Abnormal) Collected:  08/01/19 1543    Specimen:  Blood Updated:  08/01/19 1545     Glucose 325 mg/dL         Imaging Results (last 24 hours)     Procedure Component Value Units Date/Time    XR Chest PA & Lateral [[id number]] Collected:  08/01/19 1108     Updated:  08/01/19 1429    Narrative:       TWO-VIEW CHEST     HISTORY: Recent cardiac surgery. Atelectasis.     The lungs are moderately expanded with some residual atelectasis and  pleural fluid at the left base unchanged from yesterday's exam. The  heart remains mildly enlarged with slight vascular congestion that is  also unchanged. A right jugular sheath ends in the SVC.     This report was finalized on 8/1/2019 2:26 PM by Dr. Kirill Lindsay M.D.           ECG 12 Lead         ECG 12  Lead  Date/Time: 6/6/2019 11:21 AM  Performed by: Eddie Hodges MD  Authorized by: Eddie Hodges MD   Comparison: compared with previous ECG   Similar to previous ECG  Rhythm: sinus rhythm  ST Flattening: all  Clinical impression: non-specific ECG             Current Facility-Administered Medications:   •  acetaminophen (TYLENOL) tablet 650 mg, 650 mg, Oral, Q4H PRN **OR** acetaminophen (TYLENOL) 160 MG/5ML solution 650 mg, 650 mg, Oral, Q4H PRN **OR** acetaminophen (TYLENOL) suppository 650 mg, 650 mg, Rectal, Q4H PRN, Gordo Ferreira MD  •  aspirin EC tablet 81 mg, 81 mg, Oral, Daily, Gordo Ferreira MD, 81 mg at 08/01/19 0814  •  atorvastatin (LIPITOR) tablet 40 mg, 40 mg, Oral, Nightly, Gordo Ferreira MD, 40 mg at 08/01/19 2015  •  bacitracin-polymyxin b (POLYSPORIN) ointment, , Topical, Q24H, José Antonio Michael MD  •  bisacodyl (DULCOLAX) EC tablet 10 mg, 10 mg, Oral, Daily PRN, Gordo Ferreira MD  •  bisacodyl (DULCOLAX) suppository 10 mg, 10 mg, Rectal, Daily PRN, Gordo Ferreira MD  •  enoxaparin (LOVENOX) syringe 30 mg, 30 mg, Subcutaneous, Daily, Gordo Ferreira MD, 30 mg at 08/01/19 1601  •  epoetin hermes (EPOGEN,PROCRIT) injection 10,000 Units, 10,000 Units, Intravenous, Once per day on Mon Wed Fri, Flory Watt MD  •  HYDROcodone-acetaminophen (NORCO) 5-325 MG per tablet 1 tablet, 1 tablet, Oral, Q4H PRN, Justin Esparza PA-C, 1 tablet at 08/02/19 0824  •  insulin glargine (LANTUS) injection 20 Units, 20 Units, Subcutaneous, Asael BAUTISTA Aftab, MD, 20 Units at 08/02/19 0600  •  insulin lispro (humaLOG) injection 0-7 Units, 0-7 Units, Subcutaneous, 4x Daily With Meals & Nightly, Dariel Vyas MD, 2 Units at 08/02/19 1055  •  insulin lispro (humaLOG) injection 5 Units, 5 Units, Subcutaneous, TID With Meals, Dariel Vyas MD, 5 Units at 08/02/19 1055  •  ipratropium-albuterol (DUO-NEB) nebulizer solution 3 mL, 3 mL,  Nebulization, 4x Daily - RT, Justin Esparza PA-C, 3 mL at 08/02/19 1050  •  lidocaine-prilocaine (EMLA) 2.5-2.5 % cream, , Topical, Once per day on Mon Wed Fri, Flory Watt MD  •  miconazole (MICOTIN) 2 % powder, , Topical, Q12H, Gordo Ferreira MD  •  mupirocin (BACTROBAN) 2 % nasal ointment 1 application, 1 application, Each Nare, Daily, Gordo Ferreira MD, 1 application at 08/02/19 0824  •  [DISCONTINUED] morphine injection 1 mg, 1 mg, Intravenous, Q4H PRN **AND** naloxone (NARCAN) injection 0.4 mg, 0.4 mg, Intravenous, Q5 Min PRN, Gordo Ferreira MD  •  nystatin (MYCOSTATIN) powder 1 application, 1 application, Topical, Q12H, José Antonio Michael MD, 1 application at 08/02/19 0824  •  ondansetron (ZOFRAN) injection 4 mg, 4 mg, Intravenous, Q6H PRN, Gordo Ferreira MD     ASSESSMENT  Multivessel coronary artery disease post CABG pod #4  Hypertension  Hyperlipidemia  Diabetes mellitus  Chronic anemia  Chronic kidney disease stage IV on hemodialysis  Gastroesophageal reflux disease    PLAN  CPM  Postop care  Hemodialysis today  Supportive care  Home medications  Adjust insulin dose  Accu-Chek with sliding scale insulin  Stress ulcer DVT prophylaxis  Discussed with family  PT/OT  Will follow     MICHA LAGUERRE MD

## 2019-08-02 NOTE — PROGRESS NOTES
NEPHROLOGY PROGRESS NOTE    PATIENT IDENTIFICATION:   Name:  Maribeth Rendon      MRN:  [id number]     68 y.o.  female             Reason for visit: DACIA    SUBJECTIVE:   She denies shortness of breath on just 1 L/min; appetite is good; first dialysis was the day before yesterday; continues to make some urine    OBJECTIVE:  Vitals:    08/02/19 0315 08/02/19 0600 08/02/19 0658 08/02/19 0710   BP: 134/63   131/56   BP Location: Right arm   Right arm   Patient Position: Lying   Sitting   Pulse: 76  67 68   Resp: 20  20 18   Temp: 97.8 °F (36.6 °C)   97.8 °F (36.6 °C)   TempSrc: Oral   Oral   SpO2: 98%  97% 96%   Weight:  91.4 kg (201 lb 9.6 oz)       FiO2 (%): 39 %     Body mass index is 34.6 kg/m².    Intake/Output Summary (Last 24 hours) at 8/2/2019 0830  Last data filed at 8/2/2019 0200  Gross per 24 hour   Intake --   Output 785 ml   Net -785 ml         Exam:  General Appearance: Chronically ill-appearing; NAD;awake and interactive  Looks older than stated age .  Skin: warm and dry  HEENT: Anicteric, periorbital edema.   Neck: No JVD  Lungs: Coarse bs, rales at left base. No wheezes; not labored  Heart: RRR. normal S1 and S2, no S3, +rub intermittent  Abdomen: soft, non-tender, +bs, body wall edema.   : Mejía catheter  Extremities: 1+ hip edema; patent  AV fistula in the left upper arm  Vasc: right foot wrapped  Neuro: Awake, alert, conversant; moves all extremities        Scheduled meds:      aspirin 81 mg Oral Daily   atorvastatin 40 mg Oral Nightly   bacitracin-polymyxin b  Topical Q24H   enoxaparin 30 mg Subcutaneous Daily   epoetin hermes 10,000 Units Intravenous Once per day on Mon Wed Fri   insulin glargine 20 Units Subcutaneous QAM   insulin lispro 0-7 Units Subcutaneous 4x Daily With Meals & Nightly   insulin lispro 5 Units Subcutaneous TID With Meals   ipratropium-albuterol 3 mL Nebulization 4x Daily - RT   lidocaine-prilocaine  Topical Once per day on Mon Wed Fri   miconazole  Topical Q12H   mupirocin 1  application Each Nare Daily   nystatin 1 application Topical Q12H     IV meds:                             Data Review:    Results from last 7 days   Lab Units 08/02/19 0445 08/01/19 0416 07/31/19  0305  07/29/19  0515   SODIUM mmol/L 135* 135* 136   < > 137   POTASSIUM mmol/L 3.4* 4.1 4.8   < > 4.5   CHLORIDE mmol/L 96* 94* 98   < > 102   CO2 mmol/L 24.7 21.7* 18.4*   < > 19.6*   BUN mg/dL 50* 50* 81*   < > 74*   CREATININE mg/dL 4.82* 4.05* 5.22*   < > 4.71*   CALCIUM mg/dL 7.6* 7.9* 7.4*   < > 8.4*   BILIRUBIN mg/dL 0.2  --  0.2  --  0.2   ALK PHOS U/L 80  --  61  --  82   ALT (SGPT) U/L <5  --  <5  --  10   AST (SGOT) U/L 15  --  24  --  12   GLUCOSE mg/dL 174* 199* 321*   < > 197*    < > = values in this interval not displayed.       Estimated Creatinine Clearance: 12.2 mL/min (A) (by C-G formula based on SCr of 4.82 mg/dL (H)).    Results from last 7 days   Lab Units 08/02/19 0445 08/01/19 0416 07/31/19  0305 07/30/19  0248   MAGNESIUM mg/dL 1.9 2.1 2.0 1.9   PHOSPHORUS mg/dL  --  5.3* 6.8* 4.5       Results from last 7 days   Lab Units 08/02/19 0445 08/01/19 0416 07/31/19  0305 07/30/19  0248 07/29/19  1506   WBC 10*3/mm3 10.49 12.62* 13.79* 17.52* 10.61   HEMOGLOBIN g/dL 7.8* 7.8* 7.3* 8.4* 7.5*   PLATELETS 10*3/mm3 135* 136* 125* 142 127*       Results from last 7 days   Lab Units 07/30/19  0248 07/29/19  1249 07/27/19  0502   INR  1.31* 1.67* 1.17*             ASSESSMENT:   1.  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  Non-oliguric.  Azotemia has worsened today in the absence of any HD yesterday.  AVF LUE.  Hypokalemia  2.  CABG 7/29.    3.  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 Not controlled. On scheduled lantus.   4.  Hypertension, controlled  5.  Anemia of chronic kidney disease, on JANEEN and as needed PRBCs   6.  PVD: wound right foot  7.  Leukocytosis . WBC down a little today .       PLAN:  1.  Dialysis today with 2 units PRBCs  2.  Procrit with HD  3.  DW member at bedside  4.   Surveillance labs      Vinny Zaragoza MD  8/2/2019    8:30 AM

## 2019-08-02 NOTE — THERAPY TREATMENT NOTE
Acute Care - Physical Therapy Treatment Note  [facility]     Patient Name: Maribeth Rendon  : 1950  MRN: [id number]  Today's Date: 2019  Onset of Illness/Injury or Date of Surgery: 19  Date of Referral to PT: 19  Referring Physician: Jhon    Admit Date: 2019    Visit Dx:    ICD-10-CM ICD-9-CM   1. 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 pectoris, unspecified vessel or lesion type (CMS/HCC) I25.118 414.01     413.9   2. Abnormal cardiac function test R94.30 794.30   3. Impaired functional mobility and activity tolerance Z74.09 V49.89     Patient Active Problem List   Diagnosis   • Menstrual disorder   • Atopic rhinitis   • Anemia in CKD (chronic kidney disease)   • Spasm of cervical paraspinous muscle   • Cervical radiculopathy   • Chronic kidney disease, stage IV (severe) (CMS/[facility], located [facility])   • Depression   • DM type 2 with diabetic peripheral neuropathy (CMS/[facility])   • Essential hypertension   • Duplay's periarthritis syndrome   • Gastroesophageal reflux disease   • Hyperlipidemia   • Hypertension   • Arthritis   • Seizure disorder (CMS/HCC)   • Phlebitis   • Type 2 diabetes mellitus (CMS/HCC)   • Vitamin D deficiency   • Chest pain   • Abscess   • Generalized muscle weakness   • Abdominal wall cellulitis   • HTN (hypertension)   • CKD (chronic kidney disease) stage 4, GFR 15-29 ml/min (CMS/HCC)   • Diabetes mellitus with peripheral autonomic neuropathy (CMS/HCC)   • Hyponatremia   • Hypercalcemia   • Dehydration   • DACIA (acute kidney injury) (CMS/[facility])   • Abdominal wall abscess   • Cellulitis of toe of left foot   • Partial Achilles tendon tear, left, initial encounter   • Arthritis of foot   • Essential tremor   • Primary Parkinsonism (CMS/HCC)   • Abnormal cardiac function test   • 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 pectoris (CMS/HCC)       Therapy Treatment    Rehabilitation Treatment Summary     Row Name 19 1024             Treatment  Time/Intention    Discipline  physical therapist  -EM      Document Type  therapy note (daily note)  -EM      Subjective Information  complains of;fatigue;pain  -EM      Mode of Treatment  physical therapy  -EM      Patient/Family Observations  patient up in recliner, sleeping but arousable, dtr at bedside   -EM      Patient Effort  good  -EM      Existing Precautions/Restrictions  cardiac;fall;sternal  -EM      Recorded by [EM] Shanique Monreal, PT 08/02/19 1028      Row Name 08/02/19 1024             Vital Signs    Pre Systolic BP Rehab  131  -EM      Pre Treatment Diastolic BP  56  -EM      Pretreatment Heart Rate (beats/min)  70  -EM      Posttreatment Heart Rate (beats/min)  93  -EM      Pre SpO2 (%)  100  -EM      O2 Delivery Pre Treatment  supplemental O2  -EM      Recorded by [EM] Shanique Monreal, PT 08/02/19 1028      Row Name 08/02/19 1024             Sit-Stand Transfer    Sit-Stand Arthur (Transfers)  moderate assist (50% patient effort);2 person assist  -EM      Assistive Device (Sit-Stand Transfers)  walker, front-wheeled  -EM      Recorded by [EM] Shanique oMnreal, PT 08/02/19 1028      Row Name 08/02/19 1024             Stand-Sit Transfer    Stand-Sit Arthur (Transfers)  minimum assist (75% patient effort);2 person assist  -EM      Assistive Device (Stand-Sit Transfers)  walker, front-wheeled  -EM      Recorded by [EM] Shanique Monreal, PT 08/02/19 1028      Row Name 08/02/19 1024             Gait/Stairs Assessment/Training    Arthur Level (Gait)  minimum assist (75% patient effort);2 person assist pulled chair behind patient   -EM      Assistive Device (Gait)  walker, front-wheeled  -EM      Distance in Feet (Gait)  10 seated rest and then ambulated 15 more feet   -EM      Deviations/Abnormal Patterns (Gait)  stride length decreased;gait speed decreased  -EM      Recorded by [EM] Shanique Monreal, PT 08/02/19 1028      Row Name 08/02/19 1024              Therapeutic Exercise    Comment (Therapeutic Exercise)  5 reps cardiac protocol, level 2   -EM      Recorded by [EM] Shanique Monreal, PT 08/02/19 1028      Row Name 08/02/19 1024             Positioning and Restraints    Pre-Treatment Position  sitting in chair/recliner  -EM      Post Treatment Position  chair  -EM      In Chair  reclined;call light within reach;with family/caregiver  -EM      Recorded by [EM] Shanique Monreal, PT 08/02/19 1028      Row Name 08/02/19 1024             Pain Scale: Numbers Pre/Post-Treatment    Pain Scale: Numbers, Pretreatment  5/10  -EM      Pain Location  chest  -EM      Pain Intervention(s)  Medication (See MAR)  -EM      Recorded by [EM] Shanique Monreal, PT 08/02/19 1028      Row Name                Wound 07/29/19 0800 Bilateral chest incision    Wound - Properties Group Date first assessed: 07/29/19 [TL] Time first assessed: 0800 [TL] Side: Bilateral [TL] Location: chest [TL] Type: incision [TL] Recorded by:  [TL] Kylah Ruiz RN 07/29/19 0800    Row Name                Wound 07/29/19 0800 Right leg incision    Wound - Properties Group Date first assessed: 07/29/19 [TL] Time first assessed: 0800 [TL] Side: Right [TL] Location: leg [TL] Type: incision [TL] Recorded by:  [TL] Kylah Ruiz RN 07/29/19 0800    Row Name                Wound 07/29/19 1245 upper sternal incision    Wound - Properties Group Date first assessed: 07/29/19 [SM] Time first assessed: 1245 [SM] Present On Admission : no [SM] Orientation: upper [SM] Location: sternal [SM] Type: incision [SM] Recorded by:  [SM] Kiesha Escobedo RN 07/29/19 1441    Row Name                Wound 07/30/19 1300 Bilateral gluteal unspecified    Wound - Properties Group Date first assessed: 07/30/19 [AA] Time first assessed: 1300 [AA] Side: Bilateral [AA] Location: gluteal [AA] Type: unspecified [AA] Stage, Pressure Injury: deep tissue injury [AA] Recorded by:  [AA] Neena Altman, RN  07/31/19 0903    Row Name 08/02/19 1024             Plan of Care Review    Plan of Care Reviewed With  patient;daughter  -EM      Recorded by [EM] Shanique Monreal, PT 08/02/19 1028      Row Name 08/02/19 1024             Outcome Summary/Treatment Plan (PT)    Anticipated Discharge Disposition (PT)  skilled nursing facility  -EM      Recorded by [EM] Shanique Monreal, PT 08/02/19 1028        User Key  (r) = Recorded By, (t) = Taken By, (c) = Cosigned By    Initials Name Effective Dates Discipline    AA Neena Altman, RN 06/16/16 -  Nurse    Kylah Clark RN 06/16/16 -  Nurse    Shanique Morin, PT 04/03/18 -  PT    Kiesha Johnson RN 06/16/16 -  Nurse          Rash 07/30/19 1300 other (see comments) breast other (see comments) (Active)   Distribution localized 8/2/2019  8:01 AM   Borders irregular 8/2/2019  4:28 AM   Characteristics moist;flat;pain/discomfort 8/2/2019  8:01 AM   Color red 8/2/2019  8:01 AM   Care, Rash other (see comments);open to air 8/2/2019  8:01 AM       Wound 07/29/19 0800 Bilateral chest incision (Active)   Dressing Appearance intact;dry 8/2/2019  8:01 AM   Closure Open to air;Liquid skin adhesive 8/2/2019  8:01 AM   Base dry;pink 8/2/2019  8:01 AM   Periwound dry;intact 8/2/2019  8:01 AM   Periwound Temperature warm 8/2/2019  8:01 AM   Drainage Amount none 8/2/2019  8:01 AM   Dressing Care, Wound open to air 8/2/2019  8:01 AM       Wound 07/29/19 0800 Right leg incision (Active)   Dressing Appearance dry;intact 8/2/2019  8:01 AM   Drainage Amount none 8/2/2019  8:01 AM   Dressing Care, Wound open to air 8/2/2019  8:01 AM       Wound 07/29/19 1245 upper sternal incision (Active)   Dressing Appearance dry;intact 8/2/2019  8:01 AM   Closure KERRI 8/2/2019  4:28 AM   Drainage Amount none 8/2/2019  8:01 AM   Dressing Care, Wound open to air 8/2/2019  8:01 AM       Wound 07/30/19 1300 Bilateral gluteal unspecified (Active)   Closure Open to air 8/2/2019  8:01  AM   Base moist;pink 8/2/2019  4:28 AM   Periwound intact;pink 8/2/2019  4:28 AM   Periwound Temperature warm 8/2/2019  4:28 AM   Periwound Skin Turgor soft 8/2/2019  4:28 AM   Edges irregular 8/2/2019  4:28 AM   Care, Wound cleansed with;soap and water 8/2/2019  8:01 AM   Dressing Care, Wound open to air 8/2/2019  8:01 AM   Periwound Care, Wound barrier ointment applied 8/2/2019  8:01 AM           Physical Therapy Education     Title: PT OT SLP Therapies (In Progress)     Topic: Physical Therapy (In Progress)     Point: Mobility training (In Progress)     Learning Progress Summary           Patient Acceptance, E, NR by EM at 8/2/2019 10:28 AM    Acceptance, E, NR by EM at 8/1/2019  9:37 AM    Acceptance, E, NR by EM at 7/31/2019 11:05 AM    Acceptance, E,D, NR by PC at 7/30/2019  9:13 AM                   Point: Home exercise program (In Progress)     Learning Progress Summary           Patient Acceptance, E, NR by EM at 8/2/2019 10:28 AM    Acceptance, E, NR by EM at 8/1/2019  9:37 AM    Acceptance, E, NR by EM at 7/31/2019 11:05 AM    Acceptance, E,D, NR by PC at 7/30/2019  9:13 AM                   Point: Body mechanics (In Progress)     Learning Progress Summary           Patient Acceptance, E,D, NR by PC at 7/30/2019  9:13 AM                   Point: Precautions (In Progress)     Learning Progress Summary           Patient Acceptance, E,D, NR by PC at 7/30/2019  9:13 AM                               User Key     Initials Effective Dates Name Provider Type Discipline    PC 04/03/18 -  Kayla Trent, PT Physical Therapist PT    EM 04/03/18 -  Shanique Monreal PT Physical Therapist PT                PT Recommendation and Plan  Anticipated Discharge Disposition (PT): skilled nursing facility  Outcome Summary/Treatment Plan (PT)  Anticipated Discharge Disposition (PT): skilled nursing facility  Plan of Care Reviewed With: patient  Progress: improving  Outcome Summary: patient drowsy but arousable today, able  to complete exercises per cardiac protocol with rest breaks, ambulated 10 feet with rwx and Melina, seated rest and then ambulated another 15 feet.   Outcome Measures     Row Name 08/02/19 1000 08/01/19 1358 08/01/19 0900       How much help from another person do you currently need...    Turning from your back to your side while in flat bed without using bedrails?  2  -EM  --  2  -EM    Moving from lying on back to sitting on the side of a flat bed without bedrails?  2  -EM  --  2  -EM    Moving to and from a bed to a chair (including a wheelchair)?  2  -EM  --  2  -EM    Standing up from a chair using your arms (e.g., wheelchair, bedside chair)?  2  -EM  --  2  -EM    Climbing 3-5 steps with a railing?  2  -EM  --  1  -EM    To walk in hospital room?  3  -EM  --  2  -EM    AM-PAC 6 Clicks Score (PT)  13  -EM  --  11  -EM       How much help from another is currently needed...    Putting on and taking off regular lower body clothing?  --  1  -VS  --    Bathing (including washing, rinsing, and drying)  --  1  -VS  --    Toileting (which includes using toilet bed pan or urinal)  --  1  -VS  --    Putting on and taking off regular upper body clothing  --  2  -VS  --    Taking care of personal grooming (such as brushing teeth)  --  2  -VS  --    Eating meals  --  3  -VS  --    AM-PAC 6 Clicks Score (OT)  --  10  -VS  --       Functional Assessment    Outcome Measure Options  --  AM-PAC 6 Clicks Daily Activity (OT)  -VS  --    Row Name 07/31/19 1100             How much help from another person do you currently need...    Turning from your back to your side while in flat bed without using bedrails?  2  -EM      Moving from lying on back to sitting on the side of a flat bed without bedrails?  2  -EM      Moving to and from a bed to a chair (including a wheelchair)?  1  -EM      Standing up from a chair using your arms (e.g., wheelchair, bedside chair)?  1  -EM      Climbing 3-5 steps with a railing?  1  -EM      To walk in  hospital room?  1  -EM      AM-PAC 6 Clicks Score (PT)  8  -EM        User Key  (r) = Recorded By, (t) = Taken By, (c) = Cosigned By    Initials Name Provider Type    VS Rhianna Garcia OTR Occupational Therapist    EM Shanique Monreal PT Physical Therapist         Time Calculation:   PT Charges     Row Name 08/02/19 1030             Time Calculation    Start Time  0940  -EM      Stop Time  1004  -EM      Time Calculation (min)  24 min  -EM      PT Received On  08/02/19  -EM      PT - Next Appointment  08/03/19  -EM         Time Calculation- PT    Total Timed Code Minutes- PT  24 minute(s)  -EM        User Key  (r) = Recorded By, (t) = Taken By, (c) = Cosigned By    Initials Name Provider Type    EM Shanique Monreal PT Physical Therapist        Therapy Charges for Today     Code Description Service Date Service Provider Modifiers Qty    50039225567 HC PT THER PROC EA 15 MIN 8/1/2019 Shanique Monreal, PT GP 1    98817347949 HC PT THER SUPP EA 15 MIN 8/1/2019 Shanique Monreal, PT GP 1    00059227058 HC PT THER PROC EA 15 MIN 8/2/2019 Shanique Monreal, PT GP 2    58894562161 HC PT THER SUPP EA 15 MIN 8/2/2019 Shanique Monreal, PT GP 1          PT G-Codes  Outcome Measure Options: AM-PAC 6 Clicks Daily Activity (OT)  AM-PAC 6 Clicks Score (PT): 13  AM-PAC 6 Clicks Score (OT): 10    Shanique Monreal, PT  8/2/2019

## 2019-08-02 NOTE — PROGRESS NOTES
Continued Stay Note  [facility]     Patient Name: Maribeth Rendon  MRN: [id number]  Today's Date: 8/2/2019    Admit Date: 7/25/2019    Discharge Plan     Row Name 08/02/19 1417       Plan    Plan  Home w/ [facility]    Plan Comments  CCP s/w Pt and spouse at bedside yesterday and with spouse again today in regards to skilled nursing at d/c.  CCP advised that physical therapy had recommended skilled rehab.  Pt is refusing rehab and wants to discharge to home with [facility].  CCP advised that we will keep monitoring Pt progress with P.T. (which was 10 feet today).  CCP gave Pt a SNF list and a Road to Recovery booklet.  Pt has had to have hemodialysis two times since surgery.  CCP following.  AUDREY WOOTEN/CCP        Discharge Codes    No documentation.             Pattie Ocampo RN

## 2019-08-02 NOTE — PROGRESS NOTES
[facility]  Cardiology Progress Note    Patient Identification:  Name: Maribeth Rendon  Age: 68 y.o.  Sex: female  :  1950  MRN: [id number]                 Follow Up / Chief Complaint: abnormal stress test/cardiac catheterization       Interval History: Currently on HD. Epicardial wires discontinued per CTS.        Subjective: States she feels a tiny bit stronger today.  Denies chest pain and shortness of breath.     Objective:   Past Medical History:  Past Medical History:   Diagnosis Date   • Achilles tendon tear     left    • Anemia    • Anxiety    • Depression    • Diabetes mellitus, type 2 (CMS/HCC)    • ESRD (end stage renal disease) (CMS/[facility])     HAS LEFT FOREARM FISTULA   • GERD (gastroesophageal reflux disease)    • History of MRSA infection 03/15/2016    ABDOMINAL WOUND,   INFECTION CONTROLL NOTIFIED 2017   • History of transfusion    • Hyperlipidemia    • Hypertension    • Hypokalemia    • Hypomagnesemia    • Hypoxic 2016    WHEN SHE HAD PNEUMONIA   • Intertrigo    • Leukocytosis    • Osteoarthritis    • Pneumonia 2016   • Psoriasis    • Psoriatic arthritis (CMS/[facility])    • Seizures (CMS/[facility])     one time   • Sepsis (CMS/HCC) 2016   • SOB (shortness of breath)    • Stage 4 chronic renal impairment associated with type 2 diabetes mellitus (CMS/[facility])    • Staph infection     HX RIGHT FOOT AT [facility] 2010   • Streptococcal bacteremia    • Swelling of hand 10/27/2016    LEFT HAND SWELLIMG SINCE LEFT FISTULA SURGERY   • Tremor, essential    • Wears glasses      Past Surgical History:  Past Surgical History:   Procedure Laterality Date   • ABDOMINAL WALL ABSCESS INCISION AND DRAINAGE     • ARTERIOVENOUS FISTULA/SHUNT SURGERY Left 10/27/2016    Procedure: LT FOREARM FISTULA;  Surgeon: Lorelei Haque Jr., MD;  Location: [facility];  Service:    • ARTERIOVENOUS FISTULA/SHUNT SURGERY Left 2017    Procedure: LT BRACHIAL CEPHALIC WITH FISTULA LIGATION RADIAL  CEPHALIC.;  Surgeon: Gurmeet Carver MD;  Location: [facility] OR;  Service:    • CATARACT EXTRACTION W/ INTRAOCULAR LENS IMPLANT Bilateral    • CORONARY ARTERY BYPASS GRAFT N/A 2019    Procedure: INTRAOP ERNESTO; CORONARY ARTERY BYPASS GRAFTING X 4 WITH LEFT INTERNAL MAMMARY ARTERY GRAFT AND UTILIZING ENDOSCOPICALLY HARVESTED GREATER SAPHENOUS VEIN; PRP;  Surgeon: Gordo Ferreira MD;  Location: [facility] OR;  Service: Cardiothoracic   • DILATATION AND CURETTAGE     • EXCISION MASS TRUNK N/A 3/22/2016    Procedure: I&D LOWER ABDOMINAL  WOUND;  Surgeon: Tru Yi MD;  Location: [facility] OR;  Service:    • FOOT SURGERY Right     REMOVED BONE IN RIGHT GREAT TOE   • HYSTERECTOMY          Social History:   Social History     Tobacco Use   • Smoking status: Former Smoker     Packs/day: 2.00     Years: 20.00     Pack years: 40.00     Types: Cigarettes     Last attempt to quit: 10/21/1992     Years since quittin.7   • Smokeless tobacco: Never Used   • Tobacco comment: quit    Substance Use Topics   • Alcohol use: No      Family History:  Family History   Problem Relation Age of Onset   • Heart attack Mother    • Heart disease Mother    • Kidney disease Mother    • Heart attack Father    • Heart disease Father    • Hypertension Father    • Stroke Father         ISCHEMIC   • Diabetes Father         TYPE 2   • Kidney disease Brother    • Diabetes Brother         TYPE 1   • Thyroid disease Daughter    • Anxiety disorder Maternal Aunt    • Bipolar disorder Maternal Aunt    • Depression Maternal Aunt    • Lupus Maternal Aunt         LUPUS ANTICOAGLULANT   • Rheum arthritis Maternal Aunt    • Alcohol abuse Maternal Uncle    • Other Maternal Uncle         PULMONARY DISEASE          Allergies:  Allergies   Allergen Reactions   • Prednisone Hallucinations     Scheduled Meds:    aspirin 81 mg Daily   atorvastatin 40 mg Nightly   bacitracin-polymyxin b  Q24H   enoxaparin 30 mg Daily    epoetin hermes 10,000 Units Once per day on    [START ON 8/3/2019] insulin glargine 25 Units QAM   insulin lispro 0-7 Units 4x Daily With Meals & Nightly   insulin lispro 7 Units TID With Meals   ipratropium-albuterol 3 mL 4x Daily - RT   lidocaine-prilocaine  Once per day on    miconazole  Q12H   mupirocin 1 application Daily   nystatin 1 application Q12H     I have reviewed the patient's recent medical history and current medications, as well as personally reviewed/interpreted the ECG/telemetry data      INTAKE AND OUTPUT:    Intake/Output Summary (Last 24 hours) at 2019 1508  Last data filed at 2019 1150  Gross per 24 hour   Intake 360 ml   Output 585 ml   Net -225 ml       Review of Systems:  Unchanged from   GI: Denies abdominal pain and n/v/d  Cardiac: Denies chest pain and palpitations  Pulmonary: Denies shortness of breath and cough       /64   Pulse 69   Temp 98.2 °F (36.8 °C) (Oral)   Resp 16   Wt 91.4 kg (201 lb 9.6 oz)   SpO2 96%   BMI 34.60 kg/m²   General appearance: No acute changes   Neck: Trachea midline; NECK, supple, no thyromegaly or lymphadenopathy   Lungs: Normal size and shape, normal breath sounds, equal distribution of air, no rales and rhonchi   CV: S1-S2 regular, no murmurs, no rub, no gallop   Abdomen: Soft, non-tender; no masses , no abnormal abdominal sounds   Extremities: No deformity , normal color , no peripheral edema   Skin: Normal temperature, turgor and texture; no rash, ulcers            Cardiographics  Telemetry:         EC/1 SR rate 70         AV paced rate 80s        19 SR rate 50s bradycardic        Echocardiogram:     Interpretation Summary 19    · Right ventricular cavity is borderline dilated.  · Left atrial cavity size is mildly dilated.  · Mild tricuspid valve regurgitation is present.  · Calculated EF = 57%.  · There is no evidence of pericardial effusion.              Interpretation Summary Myocardial  perfusion stress testing 7/3/19       · Findings consistent with a normal ECG stress test.  · Findings consistent with a normal ECG stress test.  · Left ventricular ejection fraction is normal (Calculated EF = 60%).  · Myocardial perfusion imaging indicates a small-to-medium-sized, mild-to-moderately severe area of ischemia located in the anterior wall.  · Impressions are consistent with an intermediate risk study.     Asymptomatic for chest pain. ECG is negative for ischemia.   Ectopy: PAC's at baseline, episode of atrial tachycardia verses junctional tachycardia post Infusion.   B/P is appropriate Beta-blocker therapy  Pharmacologic study due to inability to tolerate increasing speed and grade of treadmill due to poor balance,  mobility  Issues and Beta-blocker therapy.  Unable to participate in low level exercise due to poor mobility and poor balance.             Lab Review       Results from last 7 days   Lab Units 08/02/19  0445   MAGNESIUM mg/dL 1.9     Results from last 7 days   Lab Units 08/02/19  0445   SODIUM mmol/L 135*   POTASSIUM mmol/L 3.4*   BUN mg/dL 50*   CREATININE mg/dL 4.82*   CALCIUM mg/dL 7.6*       Results from last 7 days   Lab Units 08/02/19  0445 08/01/19  0416 07/31/19  0305   WBC 10*3/mm3 10.49 12.62* 13.79*   HEMOGLOBIN g/dL 7.8* 7.8* 7.3*   HEMATOCRIT % 25.9* 26.2* 24.9*   PLATELETS 10*3/mm3 135* 136* 125*     Results from last 7 days   Lab Units 07/30/19  0248 07/29/19  1249 07/29/19  0515 07/28/19  2052  07/27/19  0502   INR  1.31* 1.67*  --   --   --  1.17*   APTT seconds  --  36.5* 48.1* 45.1*   < > 35.3    < > = values in this interval not displayed.                 Estimated Creatinine Clearance: 12.2 mL/min (A) (by C-G formula based on SCr of 4.82 mg/dL (H)).    I have reviewed the medical decision making with Dr. Hodges in detail.     Assessment / Plan:      1.  CAD   2.  CKD IV/V  3.  Hypertension  4.  Hyperlipidemia  5.  DM2  6.  Atrial  fibrillation  7.  Anemia   8.   "Leukocytosis  9.  Foot wound, chronic   10.  Cirrhotic arthritis    HD today with 2 units PRBCs.  VS stable, controlled, WNL.  Now SR, epicardial wires discontinued.  Potassium low, defer to CTS and nephrology.  Hgb low today at 7.8, transfused as above.  WBC count normalized today. Appreciate all consulting providers.  Glucose better controlled today, defer to IM.  Mag low at 1.9 today, defer to nephrology.      )8/2/2019  IRENA Alvarez    Patient personally interviewed and above subjective findings personally confirmed during a face to face contact with patient today  All findings of physical examination confirmed  All pertinent and performed labs, cardiac procedures ,  radiographs of the last 24 hours personally reviewed  Impression and plans discussed/elaborated and implemented jointly as described above     Eddie Hodges MD          EMR Dragon/Transcription:   \"Dictated utilizing Dragon dictation\".   "

## 2019-08-03 LAB
ABO + RH BLD: NORMAL
ABO + RH BLD: NORMAL
ALBUMIN SERPL-MCNC: 3.3 G/DL (ref 3.5–5.2)
ANION GAP SERPL CALCULATED.3IONS-SCNC: 17 MMOL/L (ref 5–15)
BH BB BLOOD EXPIRATION DATE: NORMAL
BH BB BLOOD EXPIRATION DATE: NORMAL
BH BB BLOOD TYPE BARCODE: 5100
BH BB BLOOD TYPE BARCODE: 5100
BH BB DISPENSE STATUS: NORMAL
BH BB DISPENSE STATUS: NORMAL
BH BB PRODUCT CODE: NORMAL
BH BB PRODUCT CODE: NORMAL
BH BB UNIT NUMBER: NORMAL
BH BB UNIT NUMBER: NORMAL
BUN BLD-MCNC: 44 MG/DL (ref 8–23)
BUN/CREAT SERPL: 14.7 (ref 7–25)
CALCIUM SPEC-SCNC: 8.7 MG/DL (ref 8.6–10.5)
CHLORIDE SERPL-SCNC: 95 MMOL/L (ref 98–107)
CO2 SERPL-SCNC: 22 MMOL/L (ref 22–29)
CREAT BLD-MCNC: 3 MG/DL (ref 0.57–1)
CROSSMATCH INTERPRETATION: NORMAL
CROSSMATCH INTERPRETATION: NORMAL
DEPRECATED RDW RBC AUTO: 57.1 FL (ref 37–54)
EOSINOPHIL # BLD MANUAL: 0.11 10*3/MM3 (ref 0–0.4)
EOSINOPHIL NFR BLD MANUAL: 1 % (ref 0.3–6.2)
ERYTHROCYTE [DISTWIDTH] IN BLOOD BY AUTOMATED COUNT: 16.7 % (ref 12.3–15.4)
GFR SERPL CREATININE-BSD FRML MDRD: 16 ML/MIN/1.73
GLUCOSE BLD-MCNC: 251 MG/DL (ref 65–99)
GLUCOSE BLDC GLUCOMTR-MCNC: 131 MG/DL (ref 70–130)
GLUCOSE BLDC GLUCOMTR-MCNC: 184 MG/DL (ref 70–130)
GLUCOSE BLDC GLUCOMTR-MCNC: 195 MG/DL (ref 70–130)
GLUCOSE BLDC GLUCOMTR-MCNC: 315 MG/DL (ref 70–130)
HCT VFR BLD AUTO: 36.4 % (ref 34–46.6)
HGB BLD-MCNC: 11.4 G/DL (ref 12–15.9)
LYMPHOCYTES # BLD MANUAL: 1.02 10*3/MM3 (ref 0.7–3.1)
LYMPHOCYTES NFR BLD MANUAL: 11 % (ref 5–12)
LYMPHOCYTES NFR BLD MANUAL: 9 % (ref 19.6–45.3)
MAGNESIUM SERPL-MCNC: 2 MG/DL (ref 1.6–2.4)
MCH RBC QN AUTO: 29.3 PG (ref 26.6–33)
MCHC RBC AUTO-ENTMCNC: 31.3 G/DL (ref 31.5–35.7)
MCV RBC AUTO: 93.6 FL (ref 79–97)
METAMYELOCYTES NFR BLD MANUAL: 4 % (ref 0–0)
MONOCYTES # BLD AUTO: 1.25 10*3/MM3 (ref 0.1–0.9)
NEUTROPHILS # BLD AUTO: 8.5 10*3/MM3 (ref 1.7–7)
NEUTROPHILS NFR BLD MANUAL: 75 % (ref 42.7–76)
NRBC BLD AUTO-RTO: 1.1 /100 WBC (ref 0–0.2)
NRBC SPEC MANUAL: 2 /100 WBC (ref 0–0.2)
PHOSPHATE SERPL-MCNC: 3.7 MG/DL (ref 2.5–4.5)
PLAT MORPH BLD: NORMAL
PLATELET # BLD AUTO: 146 10*3/MM3 (ref 140–450)
PMV BLD AUTO: 11.1 FL (ref 6–12)
POTASSIUM BLD-SCNC: 3.7 MMOL/L (ref 3.5–5.2)
RBC # BLD AUTO: 3.89 10*6/MM3 (ref 3.77–5.28)
RBC MORPH BLD: NORMAL
SODIUM BLD-SCNC: 134 MMOL/L (ref 136–145)
UNIT  ABO: NORMAL
UNIT  ABO: NORMAL
UNIT  RH: NORMAL
UNIT  RH: NORMAL
WBC MORPH BLD: NORMAL
WBC NRBC COR # BLD: 11.33 10*3/MM3 (ref 3.4–10.8)

## 2019-08-03 PROCEDURE — 85007 BL SMEAR W/DIFF WBC COUNT: CPT | Performed by: HOSPITALIST

## 2019-08-03 PROCEDURE — 99231 SBSQ HOSP IP/OBS SF/LOW 25: CPT | Performed by: NURSE PRACTITIONER

## 2019-08-03 PROCEDURE — 94799 UNLISTED PULMONARY SVC/PX: CPT

## 2019-08-03 PROCEDURE — 85025 COMPLETE CBC W/AUTO DIFF WBC: CPT | Performed by: HOSPITALIST

## 2019-08-03 PROCEDURE — 83735 ASSAY OF MAGNESIUM: CPT | Performed by: INTERNAL MEDICINE

## 2019-08-03 PROCEDURE — 82962 GLUCOSE BLOOD TEST: CPT

## 2019-08-03 PROCEDURE — 80069 RENAL FUNCTION PANEL: CPT | Performed by: INTERNAL MEDICINE

## 2019-08-03 PROCEDURE — 97116 GAIT TRAINING THERAPY: CPT

## 2019-08-03 PROCEDURE — 25010000002 ENOXAPARIN PER 10 MG: Performed by: THORACIC SURGERY (CARDIOTHORACIC VASCULAR SURGERY)

## 2019-08-03 PROCEDURE — 63710000001 INSULIN LISPRO (HUMAN) PER 5 UNITS: Performed by: HOSPITALIST

## 2019-08-03 RX ORDER — INSULIN GLARGINE 100 [IU]/ML
30 INJECTION, SOLUTION SUBCUTANEOUS EVERY MORNING
Status: DISCONTINUED | OUTPATIENT
Start: 2019-08-04 | End: 2019-08-06

## 2019-08-03 RX ORDER — CARVEDILOL 3.12 MG/1
3.12 TABLET ORAL EVERY 12 HOURS
Status: DISCONTINUED | OUTPATIENT
Start: 2019-08-03 | End: 2019-08-05

## 2019-08-03 RX ORDER — BUMETANIDE 0.25 MG/ML
3 INJECTION INTRAMUSCULAR; INTRAVENOUS ONCE
Status: COMPLETED | OUTPATIENT
Start: 2019-08-03 | End: 2019-08-03

## 2019-08-03 RX ADMIN — ENOXAPARIN SODIUM 30 MG: 30 INJECTION SUBCUTANEOUS at 16:51

## 2019-08-03 RX ADMIN — IPRATROPIUM BROMIDE AND ALBUTEROL SULFATE 3 ML: 2.5; .5 SOLUTION RESPIRATORY (INHALATION) at 20:24

## 2019-08-03 RX ADMIN — BACITRACIN ZINC AND POLYMYXIN B SULFATE: at 10:19

## 2019-08-03 RX ADMIN — CARVEDILOL 3.12 MG: 3.12 TABLET, FILM COATED ORAL at 10:24

## 2019-08-03 RX ADMIN — IPRATROPIUM BROMIDE AND ALBUTEROL SULFATE 3 ML: 2.5; .5 SOLUTION RESPIRATORY (INHALATION) at 07:08

## 2019-08-03 RX ADMIN — IPRATROPIUM BROMIDE AND ALBUTEROL SULFATE 3 ML: 2.5; .5 SOLUTION RESPIRATORY (INHALATION) at 15:38

## 2019-08-03 RX ADMIN — MICONAZOLE NITRATE: 2 POWDER TOPICAL at 10:20

## 2019-08-03 RX ADMIN — HYDROCODONE BITARTRATE AND ACETAMINOPHEN 1 TABLET: 5; 325 TABLET ORAL at 17:56

## 2019-08-03 RX ADMIN — MICONAZOLE NITRATE: 2 POWDER TOPICAL at 22:22

## 2019-08-03 RX ADMIN — INSULIN LISPRO 2 UNITS: 100 INJECTION, SOLUTION INTRAVENOUS; SUBCUTANEOUS at 22:22

## 2019-08-03 RX ADMIN — INSULIN LISPRO 7 UNITS: 100 INJECTION, SOLUTION INTRAVENOUS; SUBCUTANEOUS at 12:15

## 2019-08-03 RX ADMIN — ATORVASTATIN CALCIUM 40 MG: 20 TABLET, FILM COATED ORAL at 22:21

## 2019-08-03 RX ADMIN — NYSTATIN 1 APPLICATION: 100000 POWDER TOPICAL at 10:20

## 2019-08-03 RX ADMIN — INSULIN LISPRO 2 UNITS: 100 INJECTION, SOLUTION INTRAVENOUS; SUBCUTANEOUS at 16:51

## 2019-08-03 RX ADMIN — MUPIROCIN 1 APPLICATION: 20 OINTMENT TOPICAL at 10:19

## 2019-08-03 RX ADMIN — NYSTATIN 1 APPLICATION: 100000 POWDER TOPICAL at 22:22

## 2019-08-03 RX ADMIN — HYDROCODONE BITARTRATE AND ACETAMINOPHEN 1 TABLET: 5; 325 TABLET ORAL at 03:16

## 2019-08-03 RX ADMIN — HYDROCODONE BITARTRATE AND ACETAMINOPHEN 1 TABLET: 5; 325 TABLET ORAL at 22:22

## 2019-08-03 RX ADMIN — BUMETANIDE 3 MG: 0.25 INJECTION INTRAMUSCULAR; INTRAVENOUS at 16:50

## 2019-08-03 RX ADMIN — CARVEDILOL 3.12 MG: 3.12 TABLET, FILM COATED ORAL at 22:22

## 2019-08-03 RX ADMIN — ASPIRIN 81 MG: 81 TABLET, COATED ORAL at 10:21

## 2019-08-03 RX ADMIN — INSULIN LISPRO 5 UNITS: 100 INJECTION, SOLUTION INTRAVENOUS; SUBCUTANEOUS at 12:15

## 2019-08-03 RX ADMIN — INSULIN LISPRO 9 UNITS: 100 INJECTION, SOLUTION INTRAVENOUS; SUBCUTANEOUS at 16:51

## 2019-08-03 RX ADMIN — INSULIN LISPRO 7 UNITS: 100 INJECTION, SOLUTION INTRAVENOUS; SUBCUTANEOUS at 10:19

## 2019-08-03 NOTE — PROGRESS NOTES
NEPHROLOGY PROGRESS NOTE    PATIENT IDENTIFICATION:   Name:  Maribeth Rendon      MRN:  [id number]     68 y.o.  female             Reason for visit: DACIA    SUBJECTIVE:   Had HD yesterday complicated by infiltration of AV fistula (she bent her arm); breathing is comfortable; appetite is fine;  ml yesterday    OBJECTIVE:  Vitals:    08/03/19 0645 08/03/19 0708 08/03/19 0714 08/03/19 0717   BP:    147/61   BP Location:    Right arm   Patient Position:    Sitting   Pulse:  72 72 73   Resp:  18  16   Temp:    98.7 °F (37.1 °C)   TempSrc:    Oral   SpO2:  95% 100% 100%   Weight: 88.9 kg (196 lb)        FiO2 (%): 39 %     Body mass index is 33.64 kg/m².    Intake/Output Summary (Last 24 hours) at 8/3/2019 0823  Last data filed at 8/3/2019 0100  Gross per 24 hour   Intake 760 ml   Output 1800 ml   Net -1040 ml         Exam:  General Appearance: Chronically ill-appearing; NAD; awake and appropriate  Looks older than stated age   Skin: warm and dry  HEENT: Anicteric, periorbital edema.   Neck: No JVD  Lungs: Coarse bs, rales in bases. No wheezes; not labored  Heart: RRR; normal S1 and S2, no S3, no rub  Abdomen: soft, non-tender, +bs, body wall edema.   : Mejía catheter  Extremities: 1+ hip edema; patent AV fistula in the left upper arm  Vasc: right foot wrapped  Neuro: Awake, alert, conversant; moves all extremities        Scheduled meds:      aspirin 81 mg Oral Daily   atorvastatin 40 mg Oral Nightly   bacitracin-polymyxin b  Topical Q24H   enoxaparin 30 mg Subcutaneous Daily   epoetin hermes 10,000 Units Intravenous Once per day on Mon Wed Fri   insulin glargine 25 Units Subcutaneous QAM   insulin lispro 0-7 Units Subcutaneous 4x Daily With Meals & Nightly   insulin lispro 7 Units Subcutaneous TID With Meals   ipratropium-albuterol 3 mL Nebulization 4x Daily - RT   lidocaine-prilocaine  Topical Once per day on Mon Wed Fri   miconazole  Topical Q12H   mupirocin 1 application Each Nare Daily   nystatin 1 application  Topical Q12H     IV meds:                             Data Review:    Results from last 7 days   Lab Units 08/02/19 0445 08/01/19 0416 07/31/19  0305  07/29/19  0515   SODIUM mmol/L 135* 135* 136   < > 137   POTASSIUM mmol/L 3.4* 4.1 4.8   < > 4.5   CHLORIDE mmol/L 96* 94* 98   < > 102   CO2 mmol/L 24.7 21.7* 18.4*   < > 19.6*   BUN mg/dL 50* 50* 81*   < > 74*   CREATININE mg/dL 4.82* 4.05* 5.22*   < > 4.71*   CALCIUM mg/dL 7.6* 7.9* 7.4*   < > 8.4*   BILIRUBIN mg/dL 0.2  --  0.2  --  0.2   ALK PHOS U/L 80  --  61  --  82   ALT (SGPT) U/L <5  --  <5  --  10   AST (SGOT) U/L 15  --  24  --  12   GLUCOSE mg/dL 174* 199* 321*   < > 197*    < > = values in this interval not displayed.       Estimated Creatinine Clearance: 12.1 mL/min (A) (by C-G formula based on SCr of 4.82 mg/dL (H)).    Results from last 7 days   Lab Units 08/02/19 0445 08/01/19 0416 07/31/19  0305 07/30/19  0248   MAGNESIUM mg/dL 1.9 2.1 2.0 1.9   PHOSPHORUS mg/dL  --  5.3* 6.8* 4.5       Results from last 7 days   Lab Units 08/02/19 0445 08/01/19 0416 07/31/19  0305 07/30/19  0248 07/29/19  1506   WBC 10*3/mm3 10.49 12.62* 13.79* 17.52* 10.61   HEMOGLOBIN g/dL 7.8* 7.8* 7.3* 8.4* 7.5*   PLATELETS 10*3/mm3 135* 136* 125* 142 127*       Results from last 7 days   Lab Units 07/30/19  0248 07/29/19  1249   INR  1.31* 1.67*             ASSESSMENT:   1.  CKD5 with DACIA.  Non-oliguric.  Labs are still pending this morning.  AVF LUE.  Improving peripheral volume excess  2.  CABG 7/29.    3.  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 Not controlled. On scheduled lantus.   4.  Hypertension, controlled  5.  Anemia of chronic kidney disease, on JANEEN and as needed PRBCs   6.  PVD: wound right foot  7.  Leukocytosis        PLAN:  1.  One dose of diuretic today  2.  No HD today  3.  Will trend UOP and SCR to decide on timing of next treatment, if needed  4.  Surveillance labs  5.  Discussed with daughter at bedside      Vinny Zaragoza,  MD  8/3/2019    8:23 AM

## 2019-08-03 NOTE — THERAPY TREATMENT NOTE
Acute Care - Physical Therapy Treatment Note  [facility]     Patient Name: Maribeth Rendon  : 1950  MRN: [id number]  Today's Date: 8/3/2019  Onset of Illness/Injury or Date of Surgery: 19  Date of Referral to PT: 19  Referring Physician: Jhon    Admit Date: 2019    Visit Dx:    ICD-10-CM ICD-9-CM   1. 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 pectoris, unspecified vessel or lesion type (CMS/HCC) I25.118 414.01     413.9   2. Abnormal cardiac function test R94.30 794.30   3. Impaired functional mobility and activity tolerance Z74.09 V49.89     Patient Active Problem List   Diagnosis   • Menstrual disorder   • Atopic rhinitis   • Anemia in CKD (chronic kidney disease)   • Spasm of cervical paraspinous muscle   • Cervical radiculopathy   • Chronic kidney disease, stage IV (severe) (CMS/[facility])   • Depression   • DM type 2 with diabetic peripheral neuropathy (CMS/[facility])   • Essential hypertension   • Duplay's periarthritis syndrome   • Gastroesophageal reflux disease   • Hyperlipidemia   • Hypertension   • Arthritis   • Seizure disorder (CMS/HCC)   • Phlebitis   • Type 2 diabetes mellitus (CMS/HCC)   • Vitamin D deficiency   • Chest pain   • Abscess   • Generalized muscle weakness   • Abdominal wall cellulitis   • HTN (hypertension)   • CKD (chronic kidney disease) stage 4, GFR 15-29 ml/min (CMS/HCC)   • Diabetes mellitus with peripheral autonomic neuropathy (CMS/HCC)   • Hyponatremia   • Hypercalcemia   • Dehydration   • DACIA (acute kidney injury) (CMS/[facility])   • Abdominal wall abscess   • Cellulitis of toe of left foot   • Partial Achilles tendon tear, left, initial encounter   • Arthritis of foot   • Essential tremor   • Primary Parkinsonism (CMS/HCC)   • Abnormal cardiac function test   • 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 pectoris (CMS/HCC)       Therapy Treatment    Rehabilitation Treatment Summary     Row Name 19 1144             Treatment  "Time/Intention    Discipline  physical therapist  -JR      Document Type  therapy note (daily note)  -JR      Subjective Information  complains of;weakness;fatigue;pain \"I AM SORE ALL OVER.\"  -JR      Mode of Treatment  physical therapy  -JR      Patient/Family Observations  SITTING UP IN CHAIR.   IS PRESENT  -JR      Care Plan Review  care plan/treatment goals reviewed;evaluation/treatment results reviewed;risks/benefits reviewed;patient/other agree to care plan;current/potential barriers reviewed  -JR      Care Plan Review, Other Participant(s)  spouse  -JR      Total Minutes, Physical Therapy Treatment  22  -JR      Patient Effort  good  -JR      Comment  AMBULATED FURTHER TODAY.  STILL PREFERS TO US THE WALKER.    -JR      Recorded by [JR] Giovanny Rucker, PT 08/03/19 1145      Row Name 08/03/19 1144             Vital Signs    Pre Systolic BP Rehab  132  -JR      Pre Treatment Diastolic BP  67  -JR      Post Systolic BP Rehab  147  -JR      Post Treatment Diastolic BP  61  -JR      Pretreatment Heart Rate (beats/min)  79  -JR      Posttreatment Heart Rate (beats/min)  82  -JR      Pre SpO2 (%)  97  -JR      O2 Delivery Pre Treatment  supplemental O2 2L  -JR      Post SpO2 (%)  97  -JR      O2 Delivery Post Treatment  supplemental O2  -JR      Recorded by [JR] Giovanny Rucker, PT 08/03/19 1150      Row Name 08/03/19 1144             Cognitive Assessment/Intervention- PT/OT    Orientation Status (Cognition)  oriented x 4  -JR      Follows Commands (Cognition)  WNL  -JR      Recorded by [JR] Giovanny Rucker, PT 08/03/19 1150      Row Name 08/03/19 1144             Bed Mobility Assessment/Treatment    Comment (Bed Mobility)  NOT TESTED.  SITTING IN BEDSIDE CHAIR.   -JR      Recorded by [JR] Giovanny Rucker, PT 08/03/19 1150      Row Name 08/03/19 1144             Functional Mobility    Functional Mobility- Ind. Level  contact guard assist;1 person  -JR      Functional Mobility- Device  rolling walker  -JR "      Functional Mobility-Distance (Feet)  4 4 STEPS ALONG EDGE OF BED.   -JR      Recorded by [JR] Giovanny Rucker, PT 08/03/19 1150      Row Name 08/03/19 1144             Transfer Assessment/Treatment    Transfer Assessment/Treatment  stand-sit transfer;sit-stand transfer  -JR      Recorded by [JR] Giovanny Rucker, PT 08/03/19 1150      Row Name 08/03/19 1144             Sit-Stand Transfer    Sit-Stand [place] (Transfers)  nonverbal cues (demo/gesture);minimum assist (75% patient effort);1 person assist  -JR      Assistive Device (Sit-Stand Transfers)  walker, front-wheeled  -JR      Recorded by [JR] Giovanny Rucker, PT 08/03/19 1150      Row Name 08/03/19 1144             Stand-Sit Transfer    Stand-Sit [place] (Transfers)  minimum assist (75% patient effort);1 person assist  -JR      Assistive Device (Stand-Sit Transfers)  walker, front-wheeled  -JR      Recorded by [JR] Giovanny Rucker, PT 08/03/19 1150      Row Name 08/03/19 1144             Gait/Stairs Assessment/Training    [place] Level (Gait)  minimum assist (75% patient effort);1 person assist  -JR      Assistive Device (Gait)  walker, front-wheeled  -      Distance in Feet (Gait)  30 AMBULATED TO DOOR AND THEN BACK TO CHAIR.    -JR      Recorded by [JR] Giovanny Rucker, PT 08/03/19 1150      Row Name 08/03/19 1144             Therapeutic Exercise    Comment (Therapeutic Exercise)  5 REPS CARDIAC PROTOCOL.    -JR      Recorded by [JR] Giovanny Rucker, PT 08/03/19 1150      Row Name 08/03/19 1144             Static Standing Balance    Level of [place] (Supported Standing, Static Balance)  minimal assist, 75% patient effort;1 person assist  -JR      Time Able to Maintain Position (Supported Standing, Static Balance)  more than 5 minutes  -JR      Assistive Device Utilized (Supported Standing, Static Balance)  walker, rolling  -JR      Recorded by [JR] Giovanny Rucker, PT 08/03/19 1150      Row Name 08/03/19 1144              Positioning and Restraints    Pre-Treatment Position  sitting in chair/recliner  -JR      Post Treatment Position  chair  -JR      In Chair  notified nsg;reclined;call light within reach;encouraged to call for assist  -JR      Recorded by [JR] Giovanny Rucker, PT 08/03/19 1150      Row Name 08/03/19 1144             Pain Assessment    Additional Documentation  Pain Scale: Numbers Pre/Post-Treatment (Group)  -JR      Recorded by [] Giovanny Rucker, PT 08/03/19 1150      Row Name 08/03/19 1144             Pain Scale: Numbers Pre/Post-Treatment    Pain Scale: Numbers, Pretreatment  5/10  -JR      Pain Scale: Numbers, Post-Treatment  5/10  -JR      Pain Location  chest  -JR      Pain Intervention(s)  Repositioned;Ambulation/increased activity  -JR      Recorded by [JR] Giovanny Rucker, PT 08/03/19 1150      Row Name                Wound 07/29/19 0800 Bilateral chest incision    Wound - Properties Group Date first assessed: 07/29/19 [TL] Time first assessed: 0800 [TL] Side: Bilateral [TL] Location: chest [TL] Type: incision [TL] Recorded by:  [TL] Kylah Ruiz RN 07/29/19 0800    Row Name                Wound 07/29/19 0800 Right leg incision    Wound - Properties Group Date first assessed: 07/29/19 [TL] Time first assessed: 0800 [TL] Side: Right [TL] Location: leg [TL] Type: incision [TL] Recorded by:  [TL] Kylah Ruiz RN 07/29/19 0800    Row Name                Wound 07/29/19 1245 upper sternal incision    Wound - Properties Group Date first assessed: 07/29/19 [SM] Time first assessed: 1245 [SM] Present On Admission : no [SM] Orientation: upper [SM] Location: sternal [SM] Type: incision [SM] Recorded by:  [SM] Kiesha Escobedo RN 07/29/19 1441    Row Name                Wound 07/30/19 1300 Bilateral gluteal unspecified    Wound - Properties Group Date first assessed: 07/30/19 [AA] Time first assessed: 1300 [AA] Side: Bilateral [AA] Location: gluteal [AA] Type: unspecified [AA] Stage,  Pressure Injury: deep tissue injury [AA] Recorded by:  [AA] Neena Altman, RN 07/31/19 0903    Row Name 08/03/19 1144             Plan of Care Review    Plan of Care Reviewed With  patient;spouse  -JR      Recorded by [] Giovanny Rucker, PT 08/03/19 1150        User Key  (r) = Recorded By, (t) = Taken By, (c) = Cosigned By    Initials Name Effective Dates Discipline    Neena Dela Cruz RN 06/16/16 -  Nurse    Kylah Clark RN 06/16/16 -  Nurse    Kiesha Johnson RN 06/16/16 -  Nurse    Giovanny Barbour, PT 04/03/18 -  PT          Rash 07/30/19 1300 other (see comments) breast other (see comments) (Active)   Distribution localized 8/2/2019  7:38 PM   Characteristics moist;flat;pain/discomfort 8/2/2019  7:38 PM   Color red 8/2/2019  7:38 PM       Wound 07/29/19 0800 Bilateral chest incision (Active)   Dressing Appearance intact;dry 8/3/2019  3:16 AM   Closure Open to air;Approximated 8/3/2019  8:00 AM   Base dry;pink 8/3/2019  8:00 AM   Periwound dry;intact 8/3/2019  8:00 AM   Periwound Temperature warm 8/3/2019  8:00 AM   Drainage Amount none 8/3/2019  8:00 AM   Care, Wound cleansed with;antimicrobial agent applied 8/3/2019  8:00 AM       Wound 07/29/19 0800 Right leg incision (Active)   Dressing Appearance dry;intact 8/2/2019 12:20 PM   Closure Liquid skin adhesive;Open to air 8/3/2019  8:00 AM   Base dry;scab 8/3/2019  8:00 AM   Periwound redness;dry 8/3/2019  8:00 AM   Periwound Temperature warm 8/3/2019  8:00 AM   Drainage Amount none 8/3/2019  8:00 AM   Dressing Care, Wound open to air 8/3/2019  8:00 AM       Wound 07/29/19 1245 upper sternal incision (Active)   Dressing Appearance dry;intact 8/2/2019 12:20 PM   Closure Approximated;Open to air 8/3/2019  8:00 AM   Base scab;dry 8/3/2019  8:00 AM   Periwound intact;dry;pink 8/3/2019  8:00 AM   Periwound Temperature warm 8/3/2019  8:00 AM   Drainage Amount none 8/3/2019  8:00 AM   Care, Wound cleansed with;antimicrobial agent  applied 8/3/2019  8:00 AM       Wound 07/30/19 1300 Bilateral gluteal unspecified (Active)   Closure Open to air 8/3/2019  8:00 AM   Base pink;moist;red 8/3/2019  8:00 AM   Drainage Amount none 8/3/2019  8:00 AM           Physical Therapy Education     Title: PT OT SLP Therapies (Done)     Topic: Physical Therapy (Done)     Point: Mobility training (Done)     Learning Progress Summary           Patient Acceptance, E,D, VU,DU by JR at 8/3/2019 11:50 AM    Acceptance, E, NR by EM at 8/2/2019 10:28 AM    Acceptance, E, NR by EM at 8/1/2019  9:37 AM    Acceptance, E, NR by EM at 7/31/2019 11:05 AM    Acceptance, E,D, NR by PC at 7/30/2019  9:13 AM   Family Acceptance, E,D, VU,DU by JR at 8/3/2019 11:50 AM                   Point: Home exercise program (Done)     Learning Progress Summary           Patient Acceptance, E,D, VU,DU by JR at 8/3/2019 11:50 AM    Acceptance, E, NR by EM at 8/2/2019 10:28 AM    Acceptance, E, NR by EM at 8/1/2019  9:37 AM    Acceptance, E, NR by EM at 7/31/2019 11:05 AM    Acceptance, E,D, NR by PC at 7/30/2019  9:13 AM   Family Acceptance, E,D, VU,DU by JR at 8/3/2019 11:50 AM                   Point: Body mechanics (Done)     Learning Progress Summary           Patient Acceptance, E,D, VU,DU by JR at 8/3/2019 11:50 AM    Acceptance, E,D, NR by PC at 7/30/2019  9:13 AM   Family Acceptance, E,D, VU,DU by JR at 8/3/2019 11:50 AM                   Point: Precautions (Done)     Learning Progress Summary           Patient Acceptance, E,D, VU,DU by JR at 8/3/2019 11:50 AM    Acceptance, E,D, NR by PC at 7/30/2019  9:13 AM   Family Acceptance, E,D, VU,DU by JR at 8/3/2019 11:50 AM                               User Key     Initials Effective Dates Name Provider Type Discipline    PC 04/03/18 -  Kayla Trent, PT Physical Therapist PT    EM 04/03/18 -  Shanique Monreal, PT Physical Therapist PT    JR 04/03/18 -  Giovanny Rucker, PT Physical Therapist PT                PT Recommendation and  Plan     Plan of Care Reviewed With: patient  Progress: improving  Outcome Measures     Row Name 08/03/19 1151 08/02/19 1000 08/01/19 1358       How much help from another person do you currently need...    Turning from your back to your side while in flat bed without using bedrails?  3  -JR  2  -EM  --    Moving from lying on back to sitting on the side of a flat bed without bedrails?  3  -JR  2  -EM  --    Moving to and from a bed to a chair (including a wheelchair)?  3  -JR  2  -EM  --    Standing up from a chair using your arms (e.g., wheelchair, bedside chair)?  3  -JR  2  -EM  --    Climbing 3-5 steps with a railing?  1  -JR  2  -EM  --    To walk in hospital room?  3  -JR  3  -EM  --    AM-PAC 6 Clicks Score (PT)  16  -JR  13  -EM  --       How much help from another is currently needed...    Putting on and taking off regular lower body clothing?  --  --  1  -VS    Bathing (including washing, rinsing, and drying)  --  --  1  -VS    Toileting (which includes using toilet bed pan or urinal)  --  --  1  -VS    Putting on and taking off regular upper body clothing  --  --  2  -VS    Taking care of personal grooming (such as brushing teeth)  --  --  2  -VS    Eating meals  --  --  3  -VS    AM-PAC 6 Clicks Score (OT)  --  --  10  -VS       Functional Assessment    Outcome Measure Options  --  --  AM-PAC 6 Clicks Daily Activity (OT)  -VS    Row Name 08/01/19 0900             How much help from another person do you currently need...    Turning from your back to your side while in flat bed without using bedrails?  2  -EM      Moving from lying on back to sitting on the side of a flat bed without bedrails?  2  -EM      Moving to and from a bed to a chair (including a wheelchair)?  2  -EM      Standing up from a chair using your arms (e.g., wheelchair, bedside chair)?  2  -EM      Climbing 3-5 steps with a railing?  1  -EM      To walk in hospital room?  2  -EM      AM-PAC 6 Clicks Score (PT)  11  -EM        User Key   (r) = Recorded By, (t) = Taken By, (c) = Cosigned By    Initials Name Provider Type    VS Rhianna Garcia OTR Occupational Therapist    Shanique Morin, PT Physical Therapist    Giovanny Barbour PT Physical Therapist         Time Calculation:   PT Charges     Row Name 08/03/19 1151             Time Calculation    Start Time  1128  -JR      Stop Time  1151  -      Time Calculation (min)  23 min  -      PT Received On  08/03/19  -      PT - Next Appointment  08/04/19  -        User Key  (r) = Recorded By, (t) = Taken By, (c) = Cosigned By    Initials Name Provider Type    Giovanny Barbour, AMADO Physical Therapist        Therapy Charges for Today     Code Description Service Date Service Provider Modifiers Qty    [id number] HC GAIT TRAINING EA 15 MIN 8/3/2019 Giovanny Rucker, PT GP 1          PT G-Codes  Outcome Measure Options: AM-PAC 6 Clicks Daily Activity (OT)  AM-PAC 6 Clicks Score (PT): 16  AM-PAC 6 Clicks Score (OT): 10    Giovanny Rucker PT  8/3/2019

## 2019-08-03 NOTE — PLAN OF CARE
Problem: Patient Care Overview  Goal: Plan of Care Review   08/03/19 1150   Coping/Psychosocial   Plan of Care Reviewed With patient   Plan of Care Review   Progress improving   AMBULATING A LITTLE FURTHER, BUT STILL NEEDS WALKER FOR SUPPORT.  WILL CONTINUE TO MONITOR AND PROGRESS.

## 2019-08-03 NOTE — PROGRESS NOTES
Daily progress note    Chief complaint  Doing same  No specific complaints  Family at bedside    History of present Illness  68-year-old white female with history of hypertension hyperlipidemia diabetes mellitus chronic kidney disease stage IV directly admitted to cardiology service with abnormal stress Cardiolite for cardiac catheterization and I am asked to follow the patient for medical problem.  Patient denies any chest pain but has shortness of breath with exertion.  Patient denies any fever chills abdominal pain nausea vomiting diarrhea.  Patient stated that she is very close for hemodialysis she already had AV fistula but her kidney function closely followed by nephrology.  Patient fully alert oriented and give me a detailed history.  I am asked to follow the patient for medical problems.     REVIEW OF SYSTEMS  Remarkable for generalized weakness    PHYSICAL EXAM  Blood pressure 132/57, pulse 79, temperature 99.6 °F (37.6 °C), temperature source Oral, resp. rate 16, weight 88.9 kg (196 lb), SpO2 97 %, not currently breastfeeding.    General awake and alert  Cardiovascular: Normal rate and regular rhythm.    Pulmonary/Chest:  Moving air bilaterally  Abdominal: Soft.  Soft nontender bowel is most  Extremities no edema    LAB RESULTS  Lab Results (last 24 hours)     Procedure Component Value Units Date/Time    POC Glucose Once [[id number]]  (Abnormal) Collected:  08/03/19 1039    Specimen:  Blood Updated:  08/03/19 1110     Glucose 315 mg/dL     Manual Differential [[id number]]  (Abnormal) Collected:  08/03/19 0911    Specimen:  Blood Updated:  08/03/19 1005     Neutrophil % 75.0 %      Lymphocyte % 9.0 %      Monocyte % 11.0 %      Eosinophil % 1.0 %      Metamyelocyte % 4.0 %      Neutrophils Absolute 8.50 10*3/mm3      Lymphocytes Absolute 1.02 10*3/mm3      Monocytes Absolute 1.25 10*3/mm3      Eosinophils Absolute 0.11 10*3/mm3      nRBC 2.0 /100 WBC      RBC Morphology Normal     WBC Morphology Normal      Platelet Morphology Normal    CBC & Differential [[id number]] Collected:  08/03/19 0911    Specimen:  Blood Updated:  08/03/19 1005    Narrative:       The following orders were created for panel order CBC & Differential.  Procedure                               Abnormality         Status                     ---------                               -----------         ------                     CBC Auto Differential[[id number]]        Abnormal            Final result                 Please view results for these tests on the individual orders.    CBC Auto Differential [[id number]]  (Abnormal) Collected:  08/03/19 0911    Specimen:  Blood Updated:  08/03/19 1005     WBC 11.33 10*3/mm3      RBC 3.89 10*6/mm3      Hemoglobin 11.4 g/dL      Hematocrit 36.4 %      MCV 93.6 fL      MCH 29.3 pg      MCHC 31.3 g/dL      RDW 16.7 %      RDW-SD 57.1 fl      MPV 11.1 fL      Platelets 146 10*3/mm3      nRBC 1.1 /100 WBC     Renal Function Panel [[id number]]  (Abnormal) Collected:  08/03/19 0911    Specimen:  Blood Updated:  08/03/19 0954     Glucose 251 mg/dL      BUN 44 mg/dL      Creatinine 3.00 mg/dL      Sodium 134 mmol/L      Potassium 3.7 mmol/L      Chloride 95 mmol/L      CO2 22.0 mmol/L      Calcium 8.7 mg/dL      Albumin 3.30 g/dL      Phosphorus 3.7 mg/dL      Anion Gap 17.0 mmol/L      BUN/Creatinine Ratio 14.7     eGFR Non African Amer 16 mL/min/1.73     Narrative:       GFR Normal >60  Chronic Kidney Disease <60  Kidney Failure <15    Magnesium [[id number]]  (Normal) Collected:  08/03/19 0911    Specimen:  Blood Updated:  08/03/19 0949     Magnesium 2.0 mg/dL     POC Glucose Once [[id number]]  (Abnormal) Collected:  08/03/19 0641    Specimen:  Blood Updated:  08/03/19 0648     Glucose 131 mg/dL     POC Glucose Once [[id number]]  (Abnormal) Collected:  08/02/19 2013    Specimen:  Blood Updated:  08/02/19 2015     Glucose 154 mg/dL     POC Glucose Once [[id number]]  (Abnormal) Collected:  08/02/19 1827    Specimen:  Blood  Updated:  08/02/19 1847     Glucose 143 mg/dL         Imaging Results (last 24 hours)     ** No results found for the last 24 hours. **        ECG 12 Lead         ECG 12 Lead  Date/Time: 6/6/2019 11:21 AM  Performed by: Eddie Hodges MD  Authorized by: Eddie Hodges MD   Comparison: compared with previous ECG   Similar to previous ECG  Rhythm: sinus rhythm  ST Flattening: all  Clinical impression: non-specific ECG             Current Facility-Administered Medications:   •  acetaminophen (TYLENOL) tablet 650 mg, 650 mg, Oral, Q4H PRN **OR** acetaminophen (TYLENOL) 160 MG/5ML solution 650 mg, 650 mg, Oral, Q4H PRN **OR** acetaminophen (TYLENOL) suppository 650 mg, 650 mg, Rectal, Q4H PRN, Gordo Ferreira MD  •  aspirin EC tablet 81 mg, 81 mg, Oral, Daily, Gordo Ferreira MD, 81 mg at 08/03/19 1021  •  atorvastatin (LIPITOR) tablet 40 mg, 40 mg, Oral, Nightly, Gordo Ferreira MD, 40 mg at 08/02/19 1938  •  bacitracin-polymyxin b (POLYSPORIN) ointment, , Topical, Q24H, José Antonio Michael MD  •  bisacodyl (DULCOLAX) EC tablet 10 mg, 10 mg, Oral, Daily PRN, Gordo Ferreira MD  •  bisacodyl (DULCOLAX) suppository 10 mg, 10 mg, Rectal, Daily PRN, Gordo Ferreira MD  •  bumetanide (BUMEX) injection 3 mg, 3 mg, Intravenous, Once, Vinny Zaragoza MD  •  carvedilol (COREG) tablet 3.125 mg, 3.125 mg, Oral, Q12H, Nancy Renee APRN, 3.125 mg at 08/03/19 1024  •  enoxaparin (LOVENOX) syringe 30 mg, 30 mg, Subcutaneous, Daily, Gordo Ferreira MD, 30 mg at 08/02/19 1833  •  epoetin hermes (EPOGEN,PROCRIT) injection 10,000 Units, 10,000 Units, Intravenous, Once per day on Mon Wed Fri, Flory Watt MD, 10,000 Units at 08/02/19 1712  •  HYDROcodone-acetaminophen (NORCO) 5-325 MG per tablet 1 tablet, 1 tablet, Oral, Q4H PRN, Justin Esparza PA-C, 1 tablet at 08/03/19 0316  •  insulin glargine (LANTUS) injection 25 Units, 25 Units, Subcutaneous,  QAM, Micha Vyas MD  •  insulin lispro (humaLOG) injection 0-7 Units, 0-7 Units, Subcutaneous, 4x Daily With Meals & Nightly, Micha Vyas MD, 5 Units at 08/03/19 1215  •  insulin lispro (humaLOG) injection 7 Units, 7 Units, Subcutaneous, TID With Meals, Micha Vyas MD, 7 Units at 08/03/19 1215  •  ipratropium-albuterol (DUO-NEB) nebulizer solution 3 mL, 3 mL, Nebulization, 4x Daily - RT, Justin Esparza PA-C, 3 mL at 08/03/19 0708  •  lidocaine-prilocaine (EMLA) 2.5-2.5 % cream, , Topical, Once per day on Mon Wed Fri, Flory Watt MD  •  miconazole (MICOTIN) 2 % powder, , Topical, Q12H, Gordo Ferreira MD  •  mupirocin (BACTROBAN) 2 % nasal ointment 1 application, 1 application, Each Nare, Daily, Gordo Ferreira MD, 1 application at 08/03/19 1019  •  [DISCONTINUED] morphine injection 1 mg, 1 mg, Intravenous, Q4H PRN **AND** naloxone (NARCAN) injection 0.4 mg, 0.4 mg, Intravenous, Q5 Min PRN, Gordo Ferreira MD  •  nystatin (MYCOSTATIN) powder 1 application, 1 application, Topical, Q12H, José Antonio Michael MD, 1 application at 08/03/19 1020  •  ondansetron (ZOFRAN) injection 4 mg, 4 mg, Intravenous, Q6H PRN, Gordo Ferreira MD     ASSESSMENT  Multivessel coronary artery disease post CABG pod #5  Hypertension  Hyperlipidemia  Diabetes mellitus  Chronic anemia  Chronic kidney disease stage IV on hemodialysis  Gastroesophageal reflux disease    PLAN  CPM  Postop care  Hemodialysis PRN  Adjust insulin dose  Accu-Chek with sliding scale insulin  Diuresis PRN  Stress ulcer DVT prophylaxis  Discussed with family  PT/OT  Will follow     MICHA VYAS MD

## 2019-08-03 NOTE — PROGRESS NOTES
[facility]  Cardiology Progress Note    Patient Identification:  Name: Maribeth Rendon  Age: 68 y.o.  Sex: female  :  1950  MRN: [id number]                 Follow Up / Chief Complaint: abnormal stress test/cardiac catheterization       Interval History:  CAD s/p CABGx4  (Dr. Ferreira).  Had HD yesterday with PRBC's and had infiltration of AV fistula per nephrology note.         Subjective:  Tired after being up in chair from breakfast until 2 pm.  Walked with PT out in hallway did well.  Denies syncope, near syncope, or shob.  Poor inspiratory effort pulling 500 on IS.  Productive cough. + flatus no bm yet.       Past Medical History:  Past Medical History:   Diagnosis Date   • Achilles tendon tear     left    • Anemia    • Anxiety    • Depression    • Diabetes mellitus, type 2 (CMS/[facility], located [facility])    • ESRD (end stage renal disease) (CMS/[facility], located within [facility])     HAS LEFT FOREARM FISTULA   • GERD (gastroesophageal reflux disease)    • History of MRSA infection 03/15/2016    ABDOMINAL WOUND,   INFECTION CONTROLL NOTIFIED 2017   • History of transfusion    • Hyperlipidemia    • Hypertension    • Hypokalemia    • Hypomagnesemia    • Hypoxic 2016    WHEN SHE HAD PNEUMONIA   • Intertrigo    • Leukocytosis    • Osteoarthritis    • Pneumonia 2016   • Psoriasis    • Psoriatic arthritis (CMS/[facility], located [facility])    • Seizures (CMS/[facility], located [facility])     one time   • Sepsis (CMS/[facility] located [facility]) 2016   • SOB (shortness of breath)    • Stage 4 chronic renal impairment associated with type 2 diabetes mellitus (CMS/[facility], located [facility])    • Staph infection     HX RIGHT FOOT AT [facility] 2010   • Streptococcal bacteremia    • Swelling of hand 10/27/2016    LEFT HAND SWELLIMG SINCE LEFT FISTULA SURGERY   • Tremor, essential    • Wears glasses      Past Surgical History:  Past Surgical History:   Procedure Laterality Date   • ABDOMINAL WALL ABSCESS INCISION AND DRAINAGE     • ARTERIOVENOUS FISTULA/SHUNT SURGERY Left 10/27/2016    Procedure: LT FOREARM FISTULA;   Surgeon: Lorelei Haque Jr., MD;  Location: [facility] OR;  Service:    • ARTERIOVENOUS FISTULA/SHUNT SURGERY Left 2017    Procedure: LT BRACHIAL CEPHALIC WITH FISTULA LIGATION RADIAL CEPHALIC.;  Surgeon: Gurmeet Carver MD;  Location: [facility] OR;  Service:    • CATARACT EXTRACTION W/ INTRAOCULAR LENS IMPLANT Bilateral    • CORONARY ARTERY BYPASS GRAFT N/A 2019    Procedure: INTRAOP ERNESTO; CORONARY ARTERY BYPASS GRAFTING X 4 WITH LEFT INTERNAL MAMMARY ARTERY GRAFT AND UTILIZING ENDOSCOPICALLY HARVESTED GREATER SAPHENOUS VEIN; PRP;  Surgeon: Gordo Ferreira MD;  Location: [facility] OR;  Service: Cardiothoracic   • DILATATION AND CURETTAGE     • EXCISION MASS TRUNK N/A 3/22/2016    Procedure: I&D LOWER ABDOMINAL  WOUND;  Surgeon: Tru Yi MD;  Location: [facility] OR;  Service:    • FOOT SURGERY Right     REMOVED BONE IN RIGHT GREAT TOE   • HYSTERECTOMY          Social History:   Social History     Tobacco Use   • Smoking status: Former Smoker     Packs/day: 2.00     Years: 20.00     Pack years: 40.00     Types: Cigarettes     Last attempt to quit: 10/21/1992     Years since quittin.8   • Smokeless tobacco: Never Used   • Tobacco comment: quit    Substance Use Topics   • Alcohol use: No      Family History:  Family History   Problem Relation Age of Onset   • Heart attack Mother    • Heart disease Mother    • Kidney disease Mother    • Heart attack Father    • Heart disease Father    • Hypertension Father    • Stroke Father         ISCHEMIC   • Diabetes Father         TYPE 2   • Kidney disease Brother    • Diabetes Brother         TYPE 1   • Thyroid disease Daughter    • Anxiety disorder Maternal Aunt    • Bipolar disorder Maternal Aunt    • Depression Maternal Aunt    • Lupus Maternal Aunt         LUPUS ANTICOAGLULANT   • Rheum arthritis Maternal Aunt    • Alcohol abuse Maternal Uncle    • Other Maternal Uncle         PULMONARY DISEASE           Allergies:  Allergies   Allergen Reactions   • Prednisone Hallucinations     Scheduled Meds:    aspirin 81 mg Daily   atorvastatin 40 mg Nightly   bacitracin-polymyxin b  Q24H   bumetanide 3 mg Once   carvedilol 3.125 mg Q12H   enoxaparin 30 mg Daily   epoetin hermes 10,000 Units Once per day on Mon Wed Fri   [START ON 8/4/2019] insulin glargine 30 Units QAM   insulin lispro 0-7 Units 4x Daily With Meals & Nightly   insulin lispro 9 Units TID With Meals   ipratropium-albuterol 3 mL 4x Daily - RT   lidocaine-prilocaine  Once per day on Mon Wed Fri   miconazole  Q12H   mupirocin 1 application Daily   nystatin 1 application Q12H     I have reviewed the patient's recent medical history and current medications, as well as personally reviewed/interpreted the ECG/telemetry data      Objective:     INTAKE AND OUTPUT:    Intake/Output Summary (Last 24 hours) at 8/3/2019 1426  Last data filed at 8/3/2019 1324  Gross per 24 hour   Intake 1050 ml   Output 2250 ml   Net -1200 ml       08/03/19 0645  88.9 kg (196 lb)   08/02/19 0600  91.4 kg (201 lb 9.6 oz)   08/01/19 0615  90.4 kg (199 lb 6.4 oz)   07/31/19 0500  91.5 kg (201 lb 12.8 oz)   07/29/19 0545  89.1 kg (196 lb 6.4 oz)   07/28/19 0556  87.7 kg (193 lb 6 oz)   07/27/19 0556  87.1 kg (192 lb)         Review of Systems:   GI: Denies abdominal pain and n/v/d, + flatus  Cardiac: Denies chest pain and palpitations  Pulmonary: Denies shortness of breath and cough       /57 (BP Location: Right arm, Patient Position: Sitting)   Pulse 79   Temp 99.6 °F (37.6 °C) (Oral)   Resp 16   Wt 88.9 kg (196 lb)   SpO2 97%   BMI 33.64 kg/m²   General appearance: No acute changes   Neck: Trachea midline; NECK, supple, no thyromegaly or lymphadenopathy   Lungs: Resp even and unlabored.  normal breath sounds, equal distribution of air, no rales and rhonchi   CV: S1-S2 regular, no murmurs, no rub, no gallop   Abdomen: Soft, non-tender; no masses , no abnormal abdominal sounds    Extremities: No deformity , normal color , no peripheral edema; left upper ext AV fistula + bruit /thrill  Skin: Normal temperature, turgor and texture; no rash, ulcers        Cardiographics  Telemetry:   8/3          EC/1 SR rate 70         AV paced rate 80s        19 SR rate 50s bradycardic        Echocardiogram:     Interpretation Summary 19    · Right ventricular cavity is borderline dilated.  · Left atrial cavity size is mildly dilated.  · Mild tricuspid valve regurgitation is present.  · Calculated EF = 57%.  · There is no evidence of pericardial effusion.              Interpretation Summary Myocardial perfusion stress testing 7/3/19       · Findings consistent with a normal ECG stress test.  · Findings consistent with a normal ECG stress test.  · Left ventricular ejection fraction is normal (Calculated EF = 60%).  · Myocardial perfusion imaging indicates a small-to-medium-sized, mild-to-moderately severe area of ischemia located in the anterior wall.  · Impressions are consistent with an intermediate risk study.     Asymptomatic for chest pain. ECG is negative for ischemia.   Ectopy: PAC's at baseline, episode of atrial tachycardia verses junctional tachycardia post Infusion.   B/P is appropriate Beta-blocker therapy  Pharmacologic study due to inability to tolerate increasing speed and grade of treadmill due to poor balance,  mobility  Issues and Beta-blocker therapy.  Unable to participate in low level exercise due to poor mobility and poor balance.             Lab Review       Results from last 7 days   Lab Units 19  0911   MAGNESIUM mg/dL 2.0     Results from last 7 days   Lab Units 19  0911   SODIUM mmol/L 134*   POTASSIUM mmol/L 3.7   BUN mg/dL 44*   CREATININE mg/dL 3.00*   CALCIUM mg/dL 8.7       Results from last 7 days   Lab Units 19  0911 19  0445 19  0416   WBC 10*3/mm3 11.33* 10.49 12.62*   HEMOGLOBIN g/dL 11.4* 7.8* 7.8*   HEMATOCRIT % 36.4  "25.9* 26.2*   PLATELETS 10*3/mm3 146 135* 136*     Results from last 7 days   Lab Units 07/30/19  0248 07/29/19  1249 07/29/19  0515 07/28/19 2052   INR  1.31* 1.67*  --   --    APTT seconds  --  36.5* 48.1* 45.1*                 Estimated Creatinine Clearance: 19.4 mL/min (A) (by C-G formula based on SCr of 3 mg/dL (H)).         Assessment / Plan:      1.  CAD s/p CABGx4 7/29 (Dr. Ferreira)  2.  CKD IV/V  3.  Hypertension  4.  Hyperlipidemia: is on statin.   5.  DM2  6.  Hx of Atrial  fibrillation  7.  Anemia : is on procrit as outpt.    8.  Leukocytosis: monitor.  Up a bit today.    9.  Foot wound, chronic   10.  Cirrhotic arthritis  11. Hypokalemia: resolved WNL on 8/3.       VS stable, controlled, WNL.  SR.  Improved  Hgb 11.4 (7.8) after PRBC's on 8/2 in HD.     defer to CTS and nephrology.   Continue ASA, Statin.  HR and BP up.  Carvedilol started by CTS today.  Monitor.    Blood sugars > 250 today. IM is following. Discussed with nurse, who states she is unsure if she received her lantus this am as med record incomplete for the lantus. She has call out to night shift RN to check.       Continue supportive care.  Pushed IS and pulmonary toilet.  She will need rehab at discharge.     )8/3/2019  Bella Raines, IRENA Chen/Transcription:   \"Dictated utilizing Dragon dictation\".   "

## 2019-08-03 NOTE — PROGRESS NOTES
-multivessel CAD--- s/p CABGx4 POD#5 (Dr. Ferreira)   -HTN-- borderline hypotension   -HLD-- statin  -DM with diabetic neuropathy, A1c 7.56, hospitalist following  -CKD, stage V--- nephrology following, LUE AV fistula, HD per nephrology  -chronic anemia due to CKD, acute on chronic due to expected betito-op blood loss, transfusing with HD  -psoriatic arthritis--- apremilast at home   -seizure disorder-- topamax at home   -history of SVT/aflutter--- on eliquis pre-op, currently bradycardic   -leukocytosis--- trending down   -chronic foot wound, multiple areas with fungal infection---- seen by wound care and plastic surgery, nystatin powder  -likely undiagnosed DOMI, reported by family      Routine care.  Add beta blocker with HR and BP  Encourage increased activity and aggressive pulm toilet.   rehab at discharge.      IRENA Rascon  10:19AM  8/3/19

## 2019-08-04 LAB
ABO + RH BLD: NORMAL
ABO + RH BLD: NORMAL
ALBUMIN SERPL-MCNC: 2.8 G/DL (ref 3.5–5.2)
ANION GAP SERPL CALCULATED.3IONS-SCNC: 12 MMOL/L (ref 5–15)
BH BB BLOOD EXPIRATION DATE: NORMAL
BH BB BLOOD EXPIRATION DATE: NORMAL
BH BB BLOOD TYPE BARCODE: 5100
BH BB BLOOD TYPE BARCODE: 5100
BH BB DISPENSE STATUS: NORMAL
BH BB DISPENSE STATUS: NORMAL
BH BB PRODUCT CODE: NORMAL
BH BB PRODUCT CODE: NORMAL
BH BB UNIT NUMBER: NORMAL
BH BB UNIT NUMBER: NORMAL
BUN BLD-MCNC: 59 MG/DL (ref 8–23)
BUN/CREAT SERPL: 16.2 (ref 7–25)
CALCIUM SPEC-SCNC: 8.4 MG/DL (ref 8.6–10.5)
CHLORIDE SERPL-SCNC: 100 MMOL/L (ref 98–107)
CO2 SERPL-SCNC: 27 MMOL/L (ref 22–29)
CREAT BLD-MCNC: 3.65 MG/DL (ref 0.57–1)
CROSSMATCH INTERPRETATION: NORMAL
CROSSMATCH INTERPRETATION: NORMAL
GFR SERPL CREATININE-BSD FRML MDRD: 12 ML/MIN/1.73
GFR SERPL CREATININE-BSD FRML MDRD: ABNORMAL ML/MIN/1.73
GLUCOSE BLD-MCNC: 85 MG/DL (ref 65–99)
GLUCOSE BLDC GLUCOMTR-MCNC: 101 MG/DL (ref 70–130)
GLUCOSE BLDC GLUCOMTR-MCNC: 126 MG/DL (ref 70–130)
GLUCOSE BLDC GLUCOMTR-MCNC: 166 MG/DL (ref 70–130)
GLUCOSE BLDC GLUCOMTR-MCNC: 185 MG/DL (ref 70–130)
MAGNESIUM SERPL-MCNC: 2.1 MG/DL (ref 1.6–2.4)
PHOSPHATE SERPL-MCNC: 2.9 MG/DL (ref 2.5–4.5)
POTASSIUM BLD-SCNC: 3.4 MMOL/L (ref 3.5–5.2)
SODIUM BLD-SCNC: 139 MMOL/L (ref 136–145)
UNIT  ABO: NORMAL
UNIT  ABO: NORMAL
UNIT  RH: NORMAL
UNIT  RH: NORMAL

## 2019-08-04 PROCEDURE — 94799 UNLISTED PULMONARY SVC/PX: CPT

## 2019-08-04 PROCEDURE — 63710000001 INSULIN GLARGINE PER 5 UNITS: Performed by: HOSPITALIST

## 2019-08-04 PROCEDURE — 83735 ASSAY OF MAGNESIUM: CPT | Performed by: INTERNAL MEDICINE

## 2019-08-04 PROCEDURE — 82962 GLUCOSE BLOOD TEST: CPT

## 2019-08-04 PROCEDURE — 80069 RENAL FUNCTION PANEL: CPT | Performed by: INTERNAL MEDICINE

## 2019-08-04 PROCEDURE — 97116 GAIT TRAINING THERAPY: CPT

## 2019-08-04 PROCEDURE — 63710000001 INSULIN LISPRO (HUMAN) PER 5 UNITS: Performed by: HOSPITALIST

## 2019-08-04 PROCEDURE — 97530 THERAPEUTIC ACTIVITIES: CPT

## 2019-08-04 PROCEDURE — 25010000002 ENOXAPARIN PER 10 MG: Performed by: THORACIC SURGERY (CARDIOTHORACIC VASCULAR SURGERY)

## 2019-08-04 RX ORDER — BUMETANIDE 0.25 MG/ML
3 INJECTION INTRAMUSCULAR; INTRAVENOUS ONCE
Status: COMPLETED | OUTPATIENT
Start: 2019-08-04 | End: 2019-08-04

## 2019-08-04 RX ORDER — POTASSIUM CHLORIDE 750 MG/1
40 CAPSULE, EXTENDED RELEASE ORAL ONCE
Status: COMPLETED | OUTPATIENT
Start: 2019-08-04 | End: 2019-08-04

## 2019-08-04 RX ORDER — ALBUMIN (HUMAN) 12.5 G/50ML
12.5 SOLUTION INTRAVENOUS AS NEEDED
Status: CANCELLED | OUTPATIENT
Start: 2019-08-05 | End: 2019-08-05

## 2019-08-04 RX ADMIN — BACITRACIN ZINC AND POLYMYXIN B SULFATE: at 10:09

## 2019-08-04 RX ADMIN — IPRATROPIUM BROMIDE AND ALBUTEROL SULFATE 3 ML: 2.5; .5 SOLUTION RESPIRATORY (INHALATION) at 20:17

## 2019-08-04 RX ADMIN — INSULIN GLARGINE 30 UNITS: 100 INJECTION, SOLUTION SUBCUTANEOUS at 07:25

## 2019-08-04 RX ADMIN — NYSTATIN 1 APPLICATION: 100000 POWDER TOPICAL at 22:15

## 2019-08-04 RX ADMIN — MICONAZOLE NITRATE: 2 POWDER TOPICAL at 22:15

## 2019-08-04 RX ADMIN — CARVEDILOL 3.12 MG: 3.12 TABLET, FILM COATED ORAL at 12:55

## 2019-08-04 RX ADMIN — INSULIN LISPRO 2 UNITS: 100 INJECTION, SOLUTION INTRAVENOUS; SUBCUTANEOUS at 22:15

## 2019-08-04 RX ADMIN — HYDROCODONE BITARTRATE AND ACETAMINOPHEN 1 TABLET: 5; 325 TABLET ORAL at 22:38

## 2019-08-04 RX ADMIN — IPRATROPIUM BROMIDE AND ALBUTEROL SULFATE 3 ML: 2.5; .5 SOLUTION RESPIRATORY (INHALATION) at 06:37

## 2019-08-04 RX ADMIN — INSULIN LISPRO 9 UNITS: 100 INJECTION, SOLUTION INTRAVENOUS; SUBCUTANEOUS at 07:25

## 2019-08-04 RX ADMIN — MUPIROCIN 1 APPLICATION: 20 OINTMENT TOPICAL at 10:08

## 2019-08-04 RX ADMIN — HYDROCODONE BITARTRATE AND ACETAMINOPHEN 1 TABLET: 5; 325 TABLET ORAL at 18:01

## 2019-08-04 RX ADMIN — BUMETANIDE 3 MG: 0.25 INJECTION INTRAMUSCULAR; INTRAVENOUS at 12:55

## 2019-08-04 RX ADMIN — INSULIN LISPRO 2 UNITS: 100 INJECTION, SOLUTION INTRAVENOUS; SUBCUTANEOUS at 18:02

## 2019-08-04 RX ADMIN — ENOXAPARIN SODIUM 30 MG: 30 INJECTION SUBCUTANEOUS at 18:02

## 2019-08-04 RX ADMIN — ASPIRIN 81 MG: 81 TABLET, COATED ORAL at 10:08

## 2019-08-04 RX ADMIN — IPRATROPIUM BROMIDE AND ALBUTEROL SULFATE 3 ML: 2.5; .5 SOLUTION RESPIRATORY (INHALATION) at 10:46

## 2019-08-04 RX ADMIN — IPRATROPIUM BROMIDE AND ALBUTEROL SULFATE 3 ML: 2.5; .5 SOLUTION RESPIRATORY (INHALATION) at 14:36

## 2019-08-04 RX ADMIN — INSULIN LISPRO 9 UNITS: 100 INJECTION, SOLUTION INTRAVENOUS; SUBCUTANEOUS at 12:56

## 2019-08-04 RX ADMIN — INSULIN LISPRO 9 UNITS: 100 INJECTION, SOLUTION INTRAVENOUS; SUBCUTANEOUS at 18:01

## 2019-08-04 RX ADMIN — NYSTATIN 1 APPLICATION: 100000 POWDER TOPICAL at 10:08

## 2019-08-04 RX ADMIN — ATORVASTATIN CALCIUM 40 MG: 20 TABLET, FILM COATED ORAL at 22:15

## 2019-08-04 RX ADMIN — CARVEDILOL 3.12 MG: 3.12 TABLET, FILM COATED ORAL at 22:15

## 2019-08-04 RX ADMIN — POTASSIUM CHLORIDE 40 MEQ: 750 CAPSULE, EXTENDED RELEASE ORAL at 10:08

## 2019-08-04 RX ADMIN — HYDROCODONE BITARTRATE AND ACETAMINOPHEN 1 TABLET: 5; 325 TABLET ORAL at 12:00

## 2019-08-04 RX ADMIN — MICONAZOLE NITRATE: 2 POWDER TOPICAL at 10:09

## 2019-08-04 RX ADMIN — HYDROCODONE BITARTRATE AND ACETAMINOPHEN 1 TABLET: 5; 325 TABLET ORAL at 03:49

## 2019-08-04 NOTE — CONSULTS
Adult Nutrition  Assessment/PES    Patient Name:  Maribeth Rendon  YOB: 1950  MRN: [id number]  Admit Date:  7/25/2019    Assessment Date:  8/4/2019    Nutrition assessment triggered by RN consult re: diet education per patient request. At time of visit, patient very lethargic.  Provided nutrition education material regarding cardiac, renal and consistent carbohydrate.  RD to follow for further nutrition questions/concerns.  Reason for Assessment     Row Name 08/04/19 1050          Reason for Assessment    Reason For Assessment  nurse/nurse practitioner consult     Diagnosis   Primary Problem:  Abnormal cardiac function test; CKD stage 5, HTN, DM, HLD     Identified At Risk by Screening Criteria  need for education         Nutrition/Diet History     Row Name 08/04/19 1051          Nutrition/Diet History    Typical Food/Fluid Intake  fair po intake-50% of meals, very lethargic         Anthropometrics     Row Name 08/04/19 1051          Body Mass Index (BMI)    BMI Assessment  BMI 30-34.9: obesity grade I         Labs/Tests/Procedures/Meds     Row Name 08/04/19 1051          Labs/Procedures/Meds    Lab Results Reviewed  reviewed, pertinent     Lab Results Comments  Glu, K, Creat, BUN        Diagnostic Tests/Procedures    Diagnostic Test/Procedure Reviewed  reviewed, pertinent        Medications    Pertinent Medications Reviewed  reviewed, pertinent     Pertinent Medications Comments  insulin         Physical Findings     Row Name 08/04/19 1051          Physical Findings    Overall Physical Appearance  obese B=18     Skin  -- chronic foot wound           Nutrition Prescription Ordered     Row Name 08/04/19 1051          Nutrition Prescription PO    Supplement  Novasource Renal (Nepro)     Supplement Frequency  3 times a day     Common Modifiers  Consistent Carbohydrate;Renal         Evaluation of Received Nutrient/Fluid Intake     Row Name 08/04/19 1052          PO Evaluation    Number of Meals  3     % PO  Intake  50               Problem/Interventions:  Problem 1     Row Name 08/04/19 1052          Nutrition Diagnoses Problem 1    Problem 1  Limited Adherence to Diet Modifications     Etiology (related to)  MNT for Treatment/Condition     Signs/Symptoms (evidenced by)  Potential Information Deficit;Biochemical     Specific Labs Noted  Glucose;Creatinine;BUN;K+                 Intervention Goal     Row Name 08/04/19 1052          Intervention Goal    General  Maintain nutrition;Meet nutritional needs for age/condition     PO  Tolerate PO;Maintain intake     Weight  Appropriate weight loss         Nutrition Intervention     Row Name 08/04/19 1052          Nutrition Intervention    RD/Tech Action  Interview for preference;Follow Tx progress;Care plan reviewd;Encourage intake           Education/Evaluation     Row Name 08/04/19 1052          Education    Education  Provided education regarding     Provided education regarding  -- renal, consistent carb, healthy heart nutrition therapy        Monitor/Evaluation    Monitor  Per protocol           Electronically signed by:  Juli Goncalves RD  08/04/19 10:53 AM

## 2019-08-04 NOTE — PROGRESS NOTES
-multivessel CAD--- s/p CABGx4 POD#6 (Dr. Ferreira)   -HTN-- borderline hypotension   -HLD-- statin  -DM with diabetic neuropathy, A1c 7.56, hospitalist following  -CKD, stage V--- nephrology following, LUE AV fistula, HD per nephrology  -chronic anemia due to CKD, acute on chronic due to expected betito-op blood loss, transfusing with HD  -psoriatic arthritis--- apremilast at home   -seizure disorder-- topamax at home   -history of SVT/aflutter--- on eliquis pre-op, currently bradycardic   -leukocytosis--- trending down   -chronic foot wound, multiple areas with fungal infection---- seen by wound care and plastic surgery, nystatin powder  -likely undiagnosed DOMI, reported by family        Routine care.  Encourage increased activity and aggressive pulm toilet.   PT recs for rehab at discharge, patient refusing.  Tolerating beta blocker.   Making slow progress.     Nancy Cheng, IRENA  8:05AM  8/4/19

## 2019-08-04 NOTE — THERAPY TREATMENT NOTE
Acute Care - Physical Therapy Treatment Note  [facility]     Patient Name: Maribeth Rendon  : 1950  MRN: [id number]  Today's Date: 2019  Onset of Illness/Injury or Date of Surgery: 19  Date of Referral to PT: 19  Referring Physician: Jhon    Admit Date: 2019    Visit Dx:    ICD-10-CM ICD-9-CM   1. 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 pectoris, unspecified vessel or lesion type (CMS/HCC) I25.118 414.01     413.9   2. Abnormal cardiac function test R94.30 794.30   3. Impaired functional mobility and activity tolerance Z74.09 V49.89     Patient Active Problem List   Diagnosis   • Menstrual disorder   • Atopic rhinitis   • Anemia in CKD (chronic kidney disease)   • Spasm of cervical paraspinous muscle   • Cervical radiculopathy   • Chronic kidney disease, stage IV (severe) (CMS/[facility])   • Depression   • DM type 2 with diabetic peripheral neuropathy (CMS/[facility])   • Essential hypertension   • Duplay's periarthritis syndrome   • Gastroesophageal reflux disease   • Hyperlipidemia   • Hypertension   • Arthritis   • Seizure disorder (CMS/HCC)   • Phlebitis   • Type 2 diabetes mellitus (CMS/[facility])   • Vitamin D deficiency   • Chest pain   • Abscess   • Generalized muscle weakness   • Abdominal wall cellulitis   • HTN (hypertension)   • CKD (chronic kidney disease) stage 4, GFR 15-29 ml/min (CMS/HCC)   • Diabetes mellitus with peripheral autonomic neuropathy (CMS/HCC)   • Hyponatremia   • Hypercalcemia   • Dehydration   • DACIA (acute kidney injury) (CMS/[facility])   • Abdominal wall abscess   • Cellulitis of toe of left foot   • Partial Achilles tendon tear, left, initial encounter   • Arthritis of foot   • Essential tremor   • Primary Parkinsonism (CMS/HCC)   • Abnormal cardiac function test   • 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 pectoris (CMS/HCC)       Therapy Treatment    Rehabilitation Treatment Summary     Row Name 19 0938             Treatment  Time/Intention    Discipline  physical therapist  -JR      Document Type  therapy note (daily note)  -JR      Subjective Information  complains of;weakness;fatigue;pain  -JR      Mode of Treatment  physical therapy  -JR      Patient/Family Observations  SUPINE IN BED.  DAUGHTER PRESENT.  MORE ALERT TODAY.    -JR      Care Plan Review  evaluation/treatment results reviewed;care plan/treatment goals reviewed;patient/other agree to care plan;current/potential barriers reviewed;risks/benefits reviewed  -JR      Care Plan Review, Other Participant(s)  daughter  -JR      Total Minutes, Physical Therapy Treatment  20  -JR      Patient Effort  good  -JR      Comment  CONTINUING TO AMBULATE FURTHER.  MORE ACTIVE AS WELL.    -JR      Recorded by [JR] Giovanny Rucker, PT 08/04/19 0939      Row Name 08/04/19 0938             Vital Signs    Pre Systolic BP Rehab  129  -JR      Pre Treatment Diastolic BP  63  -JR      Posttreatment Heart Rate (beats/min)  75  -JR2      Pre SpO2 (%)  95  -JR2      O2 Delivery Pre Treatment  room air  -JR2      Recorded by [JR] Giovanny Rucker, PT 08/04/19 0939  [JR2] Giovanny Rucker, PT 08/04/19 0942      Row Name 08/04/19 0938             Cognitive Assessment/Intervention- PT/OT    Orientation Status (Cognition)  oriented x 4  -JR      Follows Commands (Cognition)  WNL  -JR      Recorded by [JR] Giovanny Rucker, PT 08/04/19 0942      Row Name 08/04/19 0938             Bed Mobility Assessment/Treatment    Supine-Sit [place] (Bed Mobility)  minimum assist (75% patient effort);1 person assist  -JR      Sit-Supine [place] (Bed Mobility)  minimum assist (75% patient effort);1 person assist  -JR      Recorded by [JR] Giovanny Rucker, PT 08/04/19 0942      Row Name 08/04/19 0938             Functional Mobility    Functional Mobility- Ind. Level  minimum assist (75% patient effort);1 person  -JR      Functional Mobility- Device  rolling walker  -JR      Recorded by [JR] Giovanny Rucker,  PT 08/04/19 0942      Row Name 08/04/19 0938             Sit-Stand Transfer    Sit-Stand [place] (Transfers)  minimum assist (75% patient effort);1 person assist  -JR      Assistive Device (Sit-Stand Transfers)  walker, front-wheeled  -JR      Recorded by [JR] Giovanny Rucker, PT 08/04/19 0942      Row Name 08/04/19 0938             Stand-Sit Transfer    Stand-Sit [place] (Transfers)  minimum assist (75% patient effort);1 person assist  -JR      Assistive Device (Stand-Sit Transfers)  walker, front-wheeled  -JR      Recorded by [JR] Giovanny Rucker, PT 08/04/19 0942      Row Name 08/04/19 0938             Gait/Stairs Assessment/Training    Gait/Stairs Assessment/Training  gait/ambulation assistive device;distance ambulated;gait/ambulation independence  -JR      [place] Level (Gait)  minimum assist (75% patient effort);1 person assist  -JR      Assistive Device (Gait)  walker, front-wheeled  -JR      Distance in Feet (Gait)  55  -JR      Deviations/Abnormal Patterns (Gait)  marlene decreased;gait speed decreased;stride length decreased;other (see comments)  -JR      Comment (Gait/Stairs)  AMBULATES SLOWLY, BUT FASTER THAN PREVIOUS SESSION.    -JR      Recorded by [JR] Giovanny Rucker, PT 08/04/19 0942      Row Name 08/04/19 0938             Therapeutic Exercise    Comment (Therapeutic Exercise)  CARDIAC PROTOCOL X 5 REPS IN SITTING.    -JR      Recorded by [JR] Giovanny Rucker, PT 08/04/19 0942      Row Name 08/04/19 0938             Positioning and Restraints    Pre-Treatment Position  in bed  -JR      Post Treatment Position  bed  -JR      In Bed  notified nsg;supine;call light within reach;encouraged to call for assist;with family/caregiver;RUE elevated;LUE elevated  -JR      Recorded by [JR] Giovanny Rucker, PT 08/04/19 0942      Row Name 08/04/19 0938             Pain Scale: Numbers Pre/Post-Treatment    Pain Scale: Numbers, Pretreatment  3/10  -JR      Pain Scale: Numbers, Post-Treatment   3/10  -JR      Pain Location  chest  -JR      Pain Intervention(s)  Repositioned;Ambulation/increased activity  -JR      Recorded by [] Giovanny Rucker, PT 08/04/19 0942      Row Name                Wound 07/29/19 0800 Bilateral chest incision    Wound - Properties Group Date first assessed: 07/29/19 [TL] Time first assessed: 0800 [TL] Side: Bilateral [TL] Location: chest [TL] Type: incision [TL] Recorded by:  [TL] Kylah Ruiz RN 07/29/19 0800    Row Name                Wound 07/29/19 0800 Right leg incision    Wound - Properties Group Date first assessed: 07/29/19 [TL] Time first assessed: 0800 [TL] Side: Right [TL] Location: leg [TL] Type: incision [TL] Recorded by:  [TL] Kylah Ruiz RN 07/29/19 0800    Row Name                Wound 07/29/19 1245 upper sternal incision    Wound - Properties Group Date first assessed: 07/29/19 [SM] Time first assessed: 1245 [SM] Present On Admission : no [SM] Orientation: upper [SM] Location: sternal [SM] Type: incision [SM] Recorded by:  [SM] Kiesha Escobedo RN 07/29/19 1441    Row Name                Wound 07/30/19 1300 Bilateral gluteal unspecified    Wound - Properties Group Date first assessed: 07/30/19 [AA] Time first assessed: 1300 [AA] Side: Bilateral [AA] Location: gluteal [AA] Type: unspecified [AA] Stage, Pressure Injury: deep tissue injury [AA] Recorded by:  [AA] Neena Altman RN 07/31/19 0903    Row Name 08/04/19 0938             Plan of Care Review    Plan of Care Reviewed With  patient  -JR      Recorded by [] Giovanny Rucker, PT 08/04/19 0942        User Key  (r) = Recorded By, (t) = Taken By, (c) = Cosigned By    Initials Name Effective Dates Discipline    AA Neena Altman RN 06/16/16 -  Nurse    Kylah Clark RN 06/16/16 -  Nurse    iKesha Johnson RN 06/16/16 -  Nurse    Giovanny Barbour, PT 04/03/18 -  PT          Rash 07/30/19 1300 other (see comments) breast other (see comments) (Active)    Distribution localized 8/4/2019  3:48 AM   Borders irregular 8/4/2019  3:48 AM   Characteristics crusted;moist 8/4/2019  3:48 AM   Color red 8/4/2019  3:48 AM   Care, Rash other (see comments) 8/3/2019  7:50 PM       Wound 07/29/19 0800 Bilateral chest incision (Active)   Closure Open to air;Approximated 8/4/2019  7:50 AM   Base dry;pink 8/4/2019  7:50 AM   Periwound dry;intact 8/4/2019  7:50 AM   Periwound Temperature warm 8/4/2019  7:50 AM   Drainage Amount none 8/4/2019  7:50 AM       Wound 07/29/19 0800 Right leg incision (Active)   Closure Liquid skin adhesive;Open to air 8/4/2019  7:50 AM   Base dry;scab 8/4/2019  7:50 AM   Periwound redness;dry 8/4/2019  7:50 AM   Periwound Temperature warm 8/4/2019  7:50 AM   Drainage Amount none 8/4/2019  7:50 AM       Wound 07/29/19 1245 upper sternal incision (Active)   Closure Approximated;Open to air 8/4/2019  7:50 AM   Base scab;dry 8/4/2019  7:50 AM   Periwound intact;dry;pink 8/4/2019  7:50 AM   Periwound Temperature warm 8/4/2019  7:50 AM   Drainage Amount none 8/4/2019  7:50 AM       Wound 07/30/19 1300 Bilateral gluteal unspecified (Active)   Closure Open to air 8/4/2019  7:50 AM   Base pink;moist;red 8/4/2019  7:50 AM   Drainage Amount none 8/4/2019  7:50 AM   Care, Wound cleansed with;soap and water 8/4/2019  3:48 AM   Periwound Care, Wound barrier ointment applied 8/4/2019  3:48 AM           Physical Therapy Education     Title: PT OT SLP Therapies (Done)     Topic: Physical Therapy (Done)     Point: Mobility training (Done)     Learning Progress Summary           Patient Acceptance, E,D, VU,DU by  at 8/4/2019  9:42 AM    Acceptance, E,D, VU,DU by JR at 8/3/2019 11:50 AM    Acceptance, E NR by EM at 8/2/2019 10:28 AM    Acceptance, E, NR by EM at 8/1/2019  9:37 AM    Acceptance, MARGIE NR by EM at 7/31/2019 11:05 AM    Acceptance, JAG HANSON NR by PC at 7/30/2019  9:13 AM   Family Acceptance, JAG HANSON VU, DU by JR at 8/4/2019  9:42 AM    Acceptance, JAG HANSON VU, DU by JR at  8/3/2019 11:50 AM                   Point: Home exercise program (Done)     Learning Progress Summary           Patient Acceptance, E,D, VU,DU by JR at 8/4/2019  9:42 AM    Acceptance, E,D, VU,DU by JR at 8/3/2019 11:50 AM    Acceptance, E, NR by EM at 8/2/2019 10:28 AM    Acceptance, E, NR by EM at 8/1/2019  9:37 AM    Acceptance, E, NR by EM at 7/31/2019 11:05 AM    Acceptance, E,D, NR by PC at 7/30/2019  9:13 AM   Family Acceptance, E,D, VU,DU by JR at 8/4/2019  9:42 AM    Acceptance, E,D, VU,DU by JR at 8/3/2019 11:50 AM                   Point: Body mechanics (Done)     Learning Progress Summary           Patient Acceptance, E,D, VU,DU by JR at 8/4/2019  9:42 AM    Acceptance, E,D, VU,DU by JR at 8/3/2019 11:50 AM    Acceptance, E,D, NR by PC at 7/30/2019  9:13 AM   Family Acceptance, E,D, VU,DU by JR at 8/4/2019  9:42 AM    Acceptance, E,D, VU,DU by JR at 8/3/2019 11:50 AM                   Point: Precautions (Done)     Learning Progress Summary           Patient Acceptance, E,D, VU,DU by JR at 8/4/2019  9:42 AM    Acceptance, E,D, VU,DU by JR at 8/3/2019 11:50 AM    Acceptance, E,D, NR by PC at 7/30/2019  9:13 AM   Family Acceptance, E,D, VU,DU by JR at 8/4/2019  9:42 AM    Acceptance, E,D, VU,DU by JR at 8/3/2019 11:50 AM                               User Key     Initials Effective Dates Name Provider Type Discipline    PC 04/03/18 -  Kayla Trent, PT Physical Therapist PT    EM 04/03/18 -  Shanique Monreal, PT Physical Therapist PT    JR 04/03/18 -  Giovanny Rucker, PT Physical Therapist PT                PT Recommendation and Plan     Plan of Care Reviewed With: patient, daughter  Progress: improving  Outcome Measures     Row Name 08/04/19 0943 08/03/19 1151 08/02/19 1000       How much help from another person do you currently need...    Turning from your back to your side while in flat bed without using bedrails?  3  -JR  3  -JR  2  -EM    Moving from lying on back to sitting on the side of a  flat bed without bedrails?  3  -JR  3  -JR  2  -EM    Moving to and from a bed to a chair (including a wheelchair)?  3  -JR  3  -JR  2  -EM    Standing up from a chair using your arms (e.g., wheelchair, bedside chair)?  3  -JR  3  -JR  2  -EM    Climbing 3-5 steps with a railing?  2  -JR  1  -JR  2  -EM    To walk in hospital room?  3  -JR  3  -JR  3  -EM    AM-PAC 6 Clicks Score (PT)  17  -JR  16  -JR  13  -EM       Functional Assessment    Outcome Measure Options  AM-PAC 6 Clicks Basic Mobility (PT)  (Significant)   -JR  --  --    Row Name 08/01/19 1358             How much help from another is currently needed...    Putting on and taking off regular lower body clothing?  1  -VS      Bathing (including washing, rinsing, and drying)  1  -VS      Toileting (which includes using toilet bed pan or urinal)  1  -VS      Putting on and taking off regular upper body clothing  2  -VS      Taking care of personal grooming (such as brushing teeth)  2  -VS      Eating meals  3  -VS      AM-PAC 6 Clicks Score (OT)  10  -VS         Functional Assessment    Outcome Measure Options  AM-PAC 6 Clicks Daily Activity (OT)  -VS        User Key  (r) = Recorded By, (t) = Taken By, (c) = Cosigned By    Initials Name Provider Type    VS Rhianna Garcia OTR Occupational Therapist    EM Shanique Monreal, PT Physical Therapist    Giovanny Barbour, PT Physical Therapist         Time Calculation:   PT Charges     Row Name 08/04/19 0943             Time Calculation    Start Time  0843  -      Stop Time  0913  -      Time Calculation (min)  30 min  -      PT Received On  08/04/19  -      PT - Next Appointment  08/05/19  -        User Key  (r) = Recorded By, (t) = Taken By, (c) = Cosigned By    Initials Name Provider Type    Giovanny Barbour, PT Physical Therapist        Therapy Charges for Today     Code Description Service Date Service Provider Modifiers Qty    55614892004 HC GAIT TRAINING EA 15 MIN 8/3/2019 Giovanny Rucker  DANIEL, PT GP 1    59070818115 HC GAIT TRAINING EA 15 MIN 8/4/2019 Giovanny Rucker, PT GP 1    76256102912 HC PT THERAPEUTIC ACT EA 15 MIN 8/4/2019 Giovanny Rucker, PT GP 1          PT G-Codes  Outcome Measure Options: (S) AM-PAC 6 Clicks Basic Mobility (PT)  AM-PAC 6 Clicks Score (PT): 17  AM-PAC 6 Clicks Score (OT): 10    Giovanny Rucker, PT  8/4/2019

## 2019-08-04 NOTE — PROGRESS NOTES
NEPHROLOGY PROGRESS NOTE    PATIENT IDENTIFICATION:   Name:  Maribeth Rendon      MRN:  [id number]     68 y.o.  female             Reason for visit: DACIA    SUBJECTIVE:   Appetite is better today; no shortness of breath on room air; continues to feel puffy; last HD was 8/2/2019    OBJECTIVE:  Vitals:    08/04/19 1011 08/04/19 1018 08/04/19 1046 08/04/19 1052   BP: 137/60 134/64     BP Location:       Patient Position:       Pulse: 74 75 73 73   Resp:   16    Temp:       TempSrc:       SpO2: 94% 95% 95% 100%   Weight:         FiO2 (%): 39 %     Body mass index is 33.64 kg/m².    Intake/Output Summary (Last 24 hours) at 8/4/2019 1119  Last data filed at 8/4/2019 0943  Gross per 24 hour   Intake 660 ml   Output 950 ml   Net -290 ml         Exam:  General Appearance: chronically ill-appearing; NAD; awake and appropriate  Looks older than stated age   Skin: warm and dry  HEENT: Anicteric, periorbital edema.   Neck: No JVD  Lungs: Coarse bs, rales in bases. No wheezes; not labored on room air  Heart: RRR; normal S1 and S2, no S3, no rub  Abdomen: soft, non-tender, +bs, body wall edema.   : Mejía catheter  Extremities: 1+ bilateral lower extremity edema, worse on the right than the left; +1 hip edema  Patent AV fistula in the left upper arm  Vasc: right foot wrapped  Neuro: Awake, alert, conversant; moves all extremities        Scheduled meds:      aspirin 81 mg Oral Daily   atorvastatin 40 mg Oral Nightly   bacitracin-polymyxin b  Topical Q24H   carvedilol 3.125 mg Oral Q12H   enoxaparin 30 mg Subcutaneous Daily   epoetin hermes 10,000 Units Intravenous Once per day on Mon Wed Fri   insulin glargine 30 Units Subcutaneous QAM   insulin lispro 0-7 Units Subcutaneous 4x Daily With Meals & Nightly   insulin lispro 9 Units Subcutaneous TID With Meals   ipratropium-albuterol 3 mL Nebulization 4x Daily - RT   lidocaine-prilocaine  Topical Once per day on Mon Wed Fri   miconazole  Topical Q12H   mupirocin 1 application Each Nare  Daily   nystatin 1 application Topical Q12H     IV meds:                             Data Review:    Results from last 7 days   Lab Units 08/04/19 0308 08/03/19 0911 08/02/19 0445 07/31/19 0305 07/29/19  0515   SODIUM mmol/L 139 134* 135*   < > 136   < > 137   POTASSIUM mmol/L 3.4* 3.7 3.4*   < > 4.8   < > 4.5   CHLORIDE mmol/L 100 95* 96*   < > 98   < > 102   CO2 mmol/L 27.0 22.0 24.7   < > 18.4*   < > 19.6*   BUN mg/dL 59* 44* 50*   < > 81*   < > 74*   CREATININE mg/dL 3.65* 3.00* 4.82*   < > 5.22*   < > 4.71*   CALCIUM mg/dL 8.4* 8.7 7.6*   < > 7.4*   < > 8.4*   BILIRUBIN mg/dL  --   --  0.2  --  0.2  --  0.2   ALK PHOS U/L  --   --  80  --  61  --  82   ALT (SGPT) U/L  --   --  <5  --  <5  --  10   AST (SGOT) U/L  --   --  15  --  24  --  12   GLUCOSE mg/dL 85 251* 174*   < > 321*   < > 197*    < > = values in this interval not displayed.       Estimated Creatinine Clearance: 15.9 mL/min (A) (by C-G formula based on SCr of 3.65 mg/dL (H)).    Results from last 7 days   Lab Units 08/04/19 0308 08/03/19 0911 08/02/19 0445 08/01/19 0416   MAGNESIUM mg/dL 2.1 2.0 1.9 2.1   PHOSPHORUS mg/dL 2.9 3.7  --  5.3*       Results from last 7 days   Lab Units 08/03/19 0911 08/02/19 0445 08/01/19 0416 07/31/19 0305 07/30/19  0248   WBC 10*3/mm3 11.33* 10.49 12.62* 13.79* 17.52*   HEMOGLOBIN g/dL 11.4* 7.8* 7.8* 7.3* 8.4*   PLATELETS 10*3/mm3 146 135* 136* 125* 142       Results from last 7 days   Lab Units 07/30/19  0248 07/29/19  1249   INR  1.31* 1.67*             ASSESSMENT:   1.  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  Stable electrolytes other than mild hypokalemia.  Patent AVF LUE.  Improving peripheral volume excess  2.  CABG 7/29.    3.  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  Hypertension, controlled  5.  Anemia of chronic kidney disease, on JANEEN and as-needed PRBCs   6.  PVD: wound right foot  7.  Leukocytosis        PLAN:  1.  Another dose of diuretic today;  will also give 40 meq KCl by mouth  2.  Likely HD tomorrow barring surprise in urine output and SCR  3.  Surveillance labs  4.  Discussed with daughter at bedside      Vinny Zaragoza MD  8/4/2019    11:19 AM

## 2019-08-04 NOTE — PROGRESS NOTES
Daily progress note    Chief complaint  Doing little better today  No specific complaints  Family at bedside    History of present Illness  68-year-old white female with history of hypertension hyperlipidemia diabetes mellitus chronic kidney disease stage IV directly admitted to cardiology service with abnormal stress Cardiolite for cardiac catheterization and I am asked to follow the patient for medical problem.  Patient denies any chest pain but has shortness of breath with exertion.  Patient denies any fever chills abdominal pain nausea vomiting diarrhea.  Patient stated that she is very close for hemodialysis she already had AV fistula but her kidney function closely followed by nephrology.  Patient fully alert oriented and give me a detailed history.  I am asked to follow the patient for medical problems.     REVIEW OF SYSTEMS  Remarkable for generalized weakness    PHYSICAL EXAM  Blood pressure 132/63, pulse 73, temperature 98.2 °F (36.8 °C), temperature source Oral, resp. rate 16, weight 88.9 kg (196 lb), SpO2 100 %, not currently breastfeeding.    General awake and alert  Cardiovascular: Normal rate and regular rhythm.    Pulmonary/Chest:  Moving air bilaterally  Abdominal: Soft.  Soft nontender bowel is most  Extremities no edema    LAB RESULTS  Lab Results (last 24 hours)     Procedure Component Value Units Date/Time    POC Glucose Once [[id number]]  (Normal) Collected:  08/04/19 1122    Specimen:  Blood Updated:  08/04/19 1125     Glucose 126 mg/dL     POC Glucose Once [[id number]]  (Normal) Collected:  08/04/19 0659    Specimen:  Blood Updated:  08/04/19 0702     Glucose 101 mg/dL     Renal Function Panel [[id number]]  (Abnormal) Collected:  08/04/19 0308    Specimen:  Blood Updated:  08/04/19 0428     Glucose 85 mg/dL      BUN 59 mg/dL      Creatinine 3.65 mg/dL      Sodium 139 mmol/L      Potassium 3.4 mmol/L      Chloride 100 mmol/L      CO2 27.0 mmol/L      Calcium 8.4 mg/dL      Albumin 2.80 g/dL       Phosphorus 2.9 mg/dL      Anion Gap 12.0 mmol/L      BUN/Creatinine Ratio 16.2     eGFR Non African Amer 12 mL/min/1.73      Comment: <15 Indicative of kidney failure.        eGFR   Amer -- mL/min/1.73      Comment: <15 Indicative of kidney failure.       Narrative:       GFR Normal >60  Chronic Kidney Disease <60  Kidney Failure <15    Magnesium [[id number]]  (Normal) Collected:  08/04/19 0308    Specimen:  Blood Updated:  08/04/19 0427     Magnesium 2.1 mg/dL     POC Glucose Once [[id number]]  (Abnormal) Collected:  08/03/19 1957    Specimen:  Blood Updated:  08/03/19 1959     Glucose 195 mg/dL     POC Glucose Once [[id number]]  (Abnormal) Collected:  08/03/19 1519    Specimen:  Blood Updated:  08/03/19 1520     Glucose 184 mg/dL         Imaging Results (last 24 hours)     ** No results found for the last 24 hours. **        ECG 12 Lead         ECG 12 Lead  Date/Time: 6/6/2019 11:21 AM  Performed by: Eddie Hodges MD  Authorized by: Eddie Hodges MD   Comparison: compared with previous ECG   Similar to previous ECG  Rhythm: sinus rhythm  ST Flattening: all  Clinical impression: non-specific ECG             Current Facility-Administered Medications:   •  acetaminophen (TYLENOL) tablet 650 mg, 650 mg, Oral, Q4H PRN **OR** acetaminophen (TYLENOL) 160 MG/5ML solution 650 mg, 650 mg, Oral, Q4H PRN **OR** acetaminophen (TYLENOL) suppository 650 mg, 650 mg, Rectal, Q4H PRN, Gordo Ferreira MD  •  aspirin EC tablet 81 mg, 81 mg, Oral, Daily, Gordo Ferreira MD, 81 mg at 08/04/19 1008  •  atorvastatin (LIPITOR) tablet 40 mg, 40 mg, Oral, Nightly, Gordo Ferreira MD, 40 mg at 08/03/19 2221  •  bacitracin-polymyxin b (POLYSPORIN) ointment, , Topical, Q24H, José Antonio Michael MD  •  bisacodyl (DULCOLAX) EC tablet 10 mg, 10 mg, Oral, Daily PRN, Gordo Ferreira MD  •  bisacodyl (DULCOLAX) suppository 10 mg, 10 mg, Rectal, Daily PRN, Gordo Ferreira MD  •   carvedilol (COREG) tablet 3.125 mg, 3.125 mg, Oral, Q12H, Nancy Renee, APRN, 3.125 mg at 08/04/19 1255  •  enoxaparin (LOVENOX) syringe 30 mg, 30 mg, Subcutaneous, Daily, Gordo Ferreira MD, 30 mg at 08/03/19 1651  •  epoetin hermes (EPOGEN,PROCRIT) injection 10,000 Units, 10,000 Units, Intravenous, Once per day on Mon Wed Fri, Flory Watt MD, 10,000 Units at 08/02/19 1712  •  HYDROcodone-acetaminophen (NORCO) 5-325 MG per tablet 1 tablet, 1 tablet, Oral, Q4H PRN, Justin Esparza PA-C, 1 tablet at 08/04/19 1200  •  insulin glargine (LANTUS) injection 30 Units, 30 Units, Subcutaneous, QAM, Dariel Vyas MD, 30 Units at 08/04/19 0725  •  insulin lispro (humaLOG) injection 0-7 Units, 0-7 Units, Subcutaneous, 4x Daily With Meals & Nightly, Dariel Vyas MD, 2 Units at 08/03/19 2222  •  insulin lispro (humaLOG) injection 9 Units, 9 Units, Subcutaneous, TID With Meals, Dariel Vyas MD, 9 Units at 08/04/19 1256  •  ipratropium-albuterol (DUO-NEB) nebulizer solution 3 mL, 3 mL, Nebulization, 4x Daily - RT, Justin Esparza PA-C, 3 mL at 08/04/19 1046  •  lidocaine-prilocaine (EMLA) 2.5-2.5 % cream, , Topical, Once per day on Mon Wed Fri, Flory Watt MD  •  miconazole (MICOTIN) 2 % powder, , Topical, Q12H, Gordo Ferreira MD  •  mupirocin (BACTROBAN) 2 % nasal ointment 1 application, 1 application, Each Nare, Daily, Gordo Ferreira MD, 1 application at 08/04/19 1008  •  [DISCONTINUED] morphine injection 1 mg, 1 mg, Intravenous, Q4H PRN **AND** naloxone (NARCAN) injection 0.4 mg, 0.4 mg, Intravenous, Q5 Min PRN, Gordo Ferreira MD  •  nystatin (MYCOSTATIN) powder 1 application, 1 application, Topical, Q12H, José Antonio Michael MD, 1 application at 08/04/19 1008  •  ondansetron (ZOFRAN) injection 4 mg, 4 mg, Intravenous, Q6H PRN, Gordo Ferreira MD     ASSESSMENT  Multivessel coronary artery disease post CABG pod #6  Hypertension  Hyperlipidemia  Diabetes  mellitus blood sugar very well controlled  Chronic anemia  Chronic kidney disease stage IV on hemodialysis  Gastroesophageal reflux disease    PLAN  CPM  Postop care  Hemodialysis PRN  Continue current dose of insulin  Accu-Chek with sliding scale insulin  Diuresis PRN  Stress ulcer DVT prophylaxis  Discussed with family  PT/OT  Will follow     MICHA LAGUERRE MD

## 2019-08-04 NOTE — PROGRESS NOTES
[facility]  Cardiology Progress Note    Patient Identification:  Name: Maribeth Rendon  Age: 68 y.o.  Sex: female  :  1950  MRN: [id number]                 Follow Up / Chief Complaint: abnormal stress test/cardiac catheterization       Interval History:  CAD s/p CABGx4  (Dr. Ferreira).  Had HD yesterday with PRBC's and had infiltration of AV fistula per nephrology note.         Subjective:  Tired after walking with PT.   Denies syncope, near syncope, or shob.  Poor inspiratory effort pulling 500 on IS.  Productive cough. + flatus no bm yet.       Past Medical History:  Past Medical History:   Diagnosis Date   • Achilles tendon tear     left    • Anemia    • Anxiety    • Depression    • Diabetes mellitus, type 2 (CMS/[facility])    • ESRD (end stage renal disease) (CMS/[facility])     HAS LEFT FOREARM FISTULA   • GERD (gastroesophageal reflux disease)    • History of MRSA infection 03/15/2016    ABDOMINAL WOUND,   INFECTION CONTROLL NOTIFIED 2017   • History of transfusion    • Hyperlipidemia    • Hypertension    • Hypokalemia    • Hypomagnesemia    • Hypoxic 2016    WHEN SHE HAD PNEUMONIA   • Intertrigo    • Leukocytosis    • Osteoarthritis    • Pneumonia 2016   • Psoriasis    • Psoriatic arthritis (CMS/[facility])    • Seizures (CMS/[facility])     one time   • Sepsis (CMS/[facility]) 2016   • SOB (shortness of breath)    • Stage 4 chronic renal impairment associated with type 2 diabetes mellitus (CMS/[facility])    • Staph infection     HX RIGHT FOOT AT [facility] 2010   • Streptococcal bacteremia    • Swelling of hand 10/27/2016    LEFT HAND SWELLIMG SINCE LEFT FISTULA SURGERY   • Tremor, essential    • Wears glasses      Past Surgical History:  Past Surgical History:   Procedure Laterality Date   • ABDOMINAL WALL ABSCESS INCISION AND DRAINAGE     • ARTERIOVENOUS FISTULA/SHUNT SURGERY Left 10/27/2016    Procedure: LT FOREARM FISTULA;  Surgeon: Lorelei Haque Jr., MD;  Location: [facility] OR;   Service:    • ARTERIOVENOUS FISTULA/SHUNT SURGERY Left 2017    Procedure: LT BRACHIAL CEPHALIC WITH FISTULA LIGATION RADIAL CEPHALIC.;  Surgeon: Gurmeet Carver MD;  Location: [facility];  Service:    • CATARACT EXTRACTION W/ INTRAOCULAR LENS IMPLANT Bilateral    • CORONARY ARTERY BYPASS GRAFT N/A 2019    Procedure: INTRAOP ERNESTO; CORONARY ARTERY BYPASS GRAFTING X 4 WITH LEFT INTERNAL MAMMARY ARTERY GRAFT AND UTILIZING ENDOSCOPICALLY HARVESTED GREATER SAPHENOUS VEIN; PRP;  Surgeon: Gordo Ferreira MD;  Location: [facility] OR;  Service: Cardiothoracic   • DILATATION AND CURETTAGE     • EXCISION MASS TRUNK N/A 3/22/2016    Procedure: I&D LOWER ABDOMINAL  WOUND;  Surgeon: Tru Yi MD;  Location: [facility];  Service:    • FOOT SURGERY Right     REMOVED BONE IN RIGHT GREAT TOE   • HYSTERECTOMY          Social History:   Social History     Tobacco Use   • Smoking status: Former Smoker     Packs/day: 2.00     Years: 20.00     Pack years: 40.00     Types: Cigarettes     Last attempt to quit: 10/21/1992     Years since quittin.8   • Smokeless tobacco: Never Used   • Tobacco comment: quit    Substance Use Topics   • Alcohol use: No      Family History:  Family History   Problem Relation Age of Onset   • Heart attack Mother    • Heart disease Mother    • Kidney disease Mother    • Heart attack Father    • Heart disease Father    • Hypertension Father    • Stroke Father         ISCHEMIC   • Diabetes Father         TYPE 2   • Kidney disease Brother    • Diabetes Brother         TYPE 1   • Thyroid disease Daughter    • Anxiety disorder Maternal Aunt    • Bipolar disorder Maternal Aunt    • Depression Maternal Aunt    • Lupus Maternal Aunt         LUPUS ANTICOAGLULANT   • Rheum arthritis Maternal Aunt    • Alcohol abuse Maternal Uncle    • Other Maternal Uncle         PULMONARY DISEASE          Allergies:  Allergies   Allergen Reactions   • Prednisone Hallucinations      Scheduled Meds:    aspirin 81 mg Daily   atorvastatin 40 mg Nightly   bacitracin-polymyxin b  Q24H   carvedilol 3.125 mg Q12H   enoxaparin 30 mg Daily   epoetin hermes 10,000 Units Once per day on Mon Wed Fri   insulin glargine 30 Units QAM   insulin lispro 0-7 Units 4x Daily With Meals & Nightly   insulin lispro 9 Units TID With Meals   ipratropium-albuterol 3 mL 4x Daily - RT   lidocaine-prilocaine  Once per day on Mon Wed Fri   miconazole  Q12H   mupirocin 1 application Daily   nystatin 1 application Q12H     I have reviewed the patient's recent medical history and current medications, as well as personally reviewed/interpreted the ECG/telemetry data      Objective:     INTAKE AND OUTPUT:    Intake/Output Summary (Last 24 hours) at 8/4/2019 1410  Last data filed at 8/4/2019 1330  Gross per 24 hour   Intake 810 ml   Output 1400 ml   Net -590 ml       08/03/19 0645  88.9 kg (196 lb)   08/02/19 0600  91.4 kg (201 lb 9.6 oz)   08/01/19 0615  90.4 kg (199 lb 6.4 oz)   07/31/19 0500  91.5 kg (201 lb 12.8 oz)   07/29/19 0545  89.1 kg (196 lb 6.4 oz)   07/28/19 0556  87.7 kg (193 lb 6 oz)   07/27/19 0556  87.1 kg (192 lb)         Review of Systems:   GI: Denies abdominal pain and n/v/d, + flatus  Cardiac: Denies chest pain and palpitations  Pulmonary: Denies shortness of breath and cough       /63 (BP Location: Right arm, Patient Position: Lying)   Pulse 73   Temp 98.2 °F (36.8 °C) (Oral)   Resp 16   Wt 88.9 kg (196 lb)   SpO2 100%   BMI 33.64 kg/m²   General appearance: No acute changes, looks fatigued   Neck: Trachea midline; NECK, supple, no thyromegaly or lymphadenopathy   Lungs: Resp even and unlabored. Dim to bases otherwise clear. equal distribution of air, no rales and rhonchi or wheezing.   CV: S1-S2 regular, no murmurs, no rub, no gallop   Abdomen: Soft, non-tender; no masses , no abnormal abdominal sounds   Extremities: No deformity , normal color , no peripheral edema; left upper ext AV fistula  + bruit /thrill.  Left upper arm bruising at site of fistula infiltration.  Dressing was removed by HD nurse.   Skin: Normal temperature, turgor and texture; no rash, ulcers        Cardiographics  Telemetry:                 EC/1 SR rate 70         AV paced rate 80s        19 SR rate 50s bradycardic        Echocardiogram:     Interpretation Summary 19    · Right ventricular cavity is borderline dilated.  · Left atrial cavity size is mildly dilated.  · Mild tricuspid valve regurgitation is present.  · Calculated EF = 57%.  · There is no evidence of pericardial effusion.              Interpretation Summary Myocardial perfusion stress testing 7/3/19       · Findings consistent with a normal ECG stress test.  · Findings consistent with a normal ECG stress test.  · Left ventricular ejection fraction is normal (Calculated EF = 60%).  · Myocardial perfusion imaging indicates a small-to-medium-sized, mild-to-moderately severe area of ischemia located in the anterior wall.  · Impressions are consistent with an intermediate risk study.     Asymptomatic for chest pain. ECG is negative for ischemia.   Ectopy: PAC's at baseline, episode of atrial tachycardia verses junctional tachycardia post Infusion.   B/P is appropriate Beta-blocker therapy  Pharmacologic study due to inability to tolerate increasing speed and grade of treadmill due to poor balance,  mobility  Issues and Beta-blocker therapy.  Unable to participate in low level exercise due to poor mobility and poor balance.             Lab Review       Results from last 7 days   Lab Units 19  0308   MAGNESIUM mg/dL 2.1     Results from last 7 days   Lab Units 19  0308   SODIUM mmol/L 139   POTASSIUM mmol/L 3.4*   BUN mg/dL 59*   CREATININE mg/dL 3.65*   CALCIUM mg/dL 8.4*       Results from last 7 days   Lab Units 19  0911 19  0445 19  0416   WBC 10*3/mm3 11.33* 10.49 12.62*   HEMOGLOBIN g/dL 11.4* 7.8* 7.8*   HEMATOCRIT %  "36.4 25.9* 26.2*   PLATELETS 10*3/mm3 146 135* 136*     Results from last 7 days   Lab Units 07/30/19  0248 07/29/19  1249 07/29/19  0515 07/28/19 2052   INR  1.31* 1.67*  --   --    APTT seconds  --  36.5* 48.1* 45.1*                 Estimated Creatinine Clearance: 15.9 mL/min (A) (by C-G formula based on SCr of 3.65 mg/dL (H)).         Assessment / Plan:      1.  CAD s/p CABGx4 7/29 (Dr. Ferreira)  2.  CKD IV/V:  Nephrology is following.   3.  Hypertension  4.  Hyperlipidemia: is on statin.   5.  DM2  6.  Hx of Atrial  fibrillation  7.  Anemia : is on procrit as outpt.    8.  Leukocytosis: monitor.  Up a bit today.    9.  Foot wound, chronic   10.  Cirrhotic arthritis  11. Hypokalemia: replaced.      VS stable, controlled, WNL.  SR.  Improved  8/3 Hgb 11.4 (7.8) after PRBC's on 8/2 in HD.     defer to CTS and nephrology.   Continue ASA, Statin.  HR and BP up.  Carvedilol started by CTS 8/3.  Blood sugars are better.  Wires out.           Continue supportive care.  Pushed IS and pulmonary toilet.   Recommend rehab at discharge. She is reluctant, family is supportive of rehab.       Check BMP and CBC in am.     )8/4/2019  IRENA King/Transcription:   \"Dictated utilizing Dragon dictation\".   "

## 2019-08-05 LAB
ALBUMIN SERPL-MCNC: 3.2 G/DL (ref 3.5–5.2)
ANION GAP SERPL CALCULATED.3IONS-SCNC: 13.9 MMOL/L (ref 5–15)
BUN BLD-MCNC: 76 MG/DL (ref 8–23)
BUN/CREAT SERPL: 21.1 (ref 7–25)
CALCIUM SPEC-SCNC: 8.7 MG/DL (ref 8.6–10.5)
CHLORIDE SERPL-SCNC: 97 MMOL/L (ref 98–107)
CO2 SERPL-SCNC: 25.1 MMOL/L (ref 22–29)
CREAT BLD-MCNC: 3.6 MG/DL (ref 0.57–1)
DEPRECATED RDW RBC AUTO: 56.1 FL (ref 37–54)
EOSINOPHIL # BLD MANUAL: 0.38 10*3/MM3 (ref 0–0.4)
EOSINOPHIL NFR BLD MANUAL: 3 % (ref 0.3–6.2)
ERYTHROCYTE [DISTWIDTH] IN BLOOD BY AUTOMATED COUNT: 16.5 % (ref 12.3–15.4)
GFR SERPL CREATININE-BSD FRML MDRD: 13 ML/MIN/1.73
GFR SERPL CREATININE-BSD FRML MDRD: ABNORMAL ML/MIN/1.73
GLUCOSE BLD-MCNC: 99 MG/DL (ref 65–99)
GLUCOSE BLDC GLUCOMTR-MCNC: 103 MG/DL (ref 70–130)
GLUCOSE BLDC GLUCOMTR-MCNC: 239 MG/DL (ref 70–130)
GLUCOSE BLDC GLUCOMTR-MCNC: 286 MG/DL (ref 70–130)
GLUCOSE BLDC GLUCOMTR-MCNC: 339 MG/DL (ref 70–130)
HCT VFR BLD AUTO: 33.7 % (ref 34–46.6)
HGB BLD-MCNC: 10.5 G/DL (ref 12–15.9)
LYMPHOCYTES # BLD MANUAL: 1.15 10*3/MM3 (ref 0.7–3.1)
LYMPHOCYTES NFR BLD MANUAL: 5.1 % (ref 5–12)
LYMPHOCYTES NFR BLD MANUAL: 9.1 % (ref 19.6–45.3)
MCH RBC QN AUTO: 29.2 PG (ref 26.6–33)
MCHC RBC AUTO-ENTMCNC: 31.2 G/DL (ref 31.5–35.7)
MCV RBC AUTO: 93.6 FL (ref 79–97)
METAMYELOCYTES NFR BLD MANUAL: 1 % (ref 0–0)
MONOCYTES # BLD AUTO: 0.64 10*3/MM3 (ref 0.1–0.9)
MYELOCYTES NFR BLD MANUAL: 2 % (ref 0–0)
NEUTROPHILS # BLD AUTO: 10.08 10*3/MM3 (ref 1.7–7)
NEUTROPHILS NFR BLD MANUAL: 79.8 % (ref 42.7–76)
NRBC SPEC MANUAL: 1 /100 WBC (ref 0–0.2)
PHOSPHATE SERPL-MCNC: 3.3 MG/DL (ref 2.5–4.5)
PLAT MORPH BLD: NORMAL
PLATELET # BLD AUTO: 177 10*3/MM3 (ref 140–450)
PMV BLD AUTO: 11.2 FL (ref 6–12)
POLYCHROMASIA BLD QL SMEAR: ABNORMAL
POTASSIUM BLD-SCNC: 3.8 MMOL/L (ref 3.5–5.2)
RBC # BLD AUTO: 3.6 10*6/MM3 (ref 3.77–5.28)
SODIUM BLD-SCNC: 136 MMOL/L (ref 136–145)
WBC MORPH BLD: NORMAL
WBC NRBC COR # BLD: 12.63 10*3/MM3 (ref 3.4–10.8)

## 2019-08-05 PROCEDURE — 25010000002 ENOXAPARIN PER 10 MG: Performed by: THORACIC SURGERY (CARDIOTHORACIC VASCULAR SURGERY)

## 2019-08-05 PROCEDURE — 97110 THERAPEUTIC EXERCISES: CPT

## 2019-08-05 PROCEDURE — 94799 UNLISTED PULMONARY SVC/PX: CPT

## 2019-08-05 PROCEDURE — 80069 RENAL FUNCTION PANEL: CPT | Performed by: INTERNAL MEDICINE

## 2019-08-05 PROCEDURE — 85007 BL SMEAR W/DIFF WBC COUNT: CPT | Performed by: HOSPITALIST

## 2019-08-05 PROCEDURE — 82962 GLUCOSE BLOOD TEST: CPT

## 2019-08-05 PROCEDURE — 63710000001 INSULIN LISPRO (HUMAN) PER 5 UNITS: Performed by: HOSPITALIST

## 2019-08-05 PROCEDURE — 63710000001 INSULIN GLARGINE PER 5 UNITS: Performed by: HOSPITALIST

## 2019-08-05 PROCEDURE — 85025 COMPLETE CBC W/AUTO DIFF WBC: CPT | Performed by: HOSPITALIST

## 2019-08-05 PROCEDURE — 99232 SBSQ HOSP IP/OBS MODERATE 35: CPT | Performed by: INTERNAL MEDICINE

## 2019-08-05 RX ORDER — HYDROCODONE BITARTRATE AND ACETAMINOPHEN 5; 325 MG/1; MG/1
1 TABLET ORAL EVERY 4 HOURS PRN
Qty: 42 TABLET | Refills: 0 | Status: SHIPPED | OUTPATIENT
Start: 2019-08-05 | End: 2019-08-08

## 2019-08-05 RX ORDER — CARVEDILOL 6.25 MG/1
6.25 TABLET ORAL EVERY 12 HOURS
Status: DISCONTINUED | OUTPATIENT
Start: 2019-08-05 | End: 2019-08-06 | Stop reason: HOSPADM

## 2019-08-05 RX ADMIN — INSULIN LISPRO 5 UNITS: 100 INJECTION, SOLUTION INTRAVENOUS; SUBCUTANEOUS at 17:13

## 2019-08-05 RX ADMIN — INSULIN LISPRO 4 UNITS: 100 INJECTION, SOLUTION INTRAVENOUS; SUBCUTANEOUS at 12:30

## 2019-08-05 RX ADMIN — IPRATROPIUM BROMIDE AND ALBUTEROL SULFATE 3 ML: 2.5; .5 SOLUTION RESPIRATORY (INHALATION) at 08:08

## 2019-08-05 RX ADMIN — IPRATROPIUM BROMIDE AND ALBUTEROL SULFATE 3 ML: 2.5; .5 SOLUTION RESPIRATORY (INHALATION) at 19:44

## 2019-08-05 RX ADMIN — INSULIN LISPRO 9 UNITS: 100 INJECTION, SOLUTION INTRAVENOUS; SUBCUTANEOUS at 09:01

## 2019-08-05 RX ADMIN — HYDROCODONE BITARTRATE AND ACETAMINOPHEN 1 TABLET: 5; 325 TABLET ORAL at 03:53

## 2019-08-05 RX ADMIN — IPRATROPIUM BROMIDE AND ALBUTEROL SULFATE 3 ML: 2.5; .5 SOLUTION RESPIRATORY (INHALATION) at 12:40

## 2019-08-05 RX ADMIN — INSULIN LISPRO 9 UNITS: 100 INJECTION, SOLUTION INTRAVENOUS; SUBCUTANEOUS at 12:30

## 2019-08-05 RX ADMIN — MICONAZOLE NITRATE: 2 POWDER TOPICAL at 09:02

## 2019-08-05 RX ADMIN — IPRATROPIUM BROMIDE AND ALBUTEROL SULFATE 3 ML: 2.5; .5 SOLUTION RESPIRATORY (INHALATION) at 16:49

## 2019-08-05 RX ADMIN — INSULIN GLARGINE 30 UNITS: 100 INJECTION, SOLUTION SUBCUTANEOUS at 07:07

## 2019-08-05 RX ADMIN — INSULIN LISPRO 10 UNITS: 100 INJECTION, SOLUTION INTRAVENOUS; SUBCUTANEOUS at 17:12

## 2019-08-05 RX ADMIN — CARVEDILOL 6.25 MG: 6.25 TABLET, FILM COATED ORAL at 23:10

## 2019-08-05 RX ADMIN — CARVEDILOL 6.25 MG: 6.25 TABLET, FILM COATED ORAL at 09:00

## 2019-08-05 RX ADMIN — MUPIROCIN 1 APPLICATION: 20 OINTMENT TOPICAL at 09:02

## 2019-08-05 RX ADMIN — INSULIN LISPRO 3 UNITS: 100 INJECTION, SOLUTION INTRAVENOUS; SUBCUTANEOUS at 21:30

## 2019-08-05 RX ADMIN — BACITRACIN ZINC AND POLYMYXIN B SULFATE: at 09:01

## 2019-08-05 RX ADMIN — ATORVASTATIN CALCIUM 40 MG: 20 TABLET, FILM COATED ORAL at 21:30

## 2019-08-05 RX ADMIN — HYDROCODONE BITARTRATE AND ACETAMINOPHEN 1 TABLET: 5; 325 TABLET ORAL at 07:58

## 2019-08-05 RX ADMIN — NYSTATIN 1 APPLICATION: 100000 POWDER TOPICAL at 09:01

## 2019-08-05 RX ADMIN — HYDROCODONE BITARTRATE AND ACETAMINOPHEN 1 TABLET: 5; 325 TABLET ORAL at 13:25

## 2019-08-05 RX ADMIN — HYDROCODONE BITARTRATE AND ACETAMINOPHEN 1 TABLET: 5; 325 TABLET ORAL at 21:30

## 2019-08-05 RX ADMIN — POLYETHYLENE GLYCOL 3350 17 G: 17 POWDER, FOR SOLUTION ORAL at 16:00

## 2019-08-05 RX ADMIN — ENOXAPARIN SODIUM 30 MG: 30 INJECTION SUBCUTANEOUS at 17:12

## 2019-08-05 RX ADMIN — ASPIRIN 81 MG: 81 TABLET, COATED ORAL at 09:01

## 2019-08-05 NOTE — PLAN OF CARE
Problem: Patient Care Overview  Goal: Plan of Care Review  Outcome: Ongoing (interventions implemented as appropriate)   08/05/19 1016   Coping/Psychosocial   Plan of Care Reviewed With patient   Plan of Care Review   Progress improving   OTHER   Outcome Summary pt more alert, requiring less assist for mobility, able to stand from chair and walk 100 ft with walker with contact guard assist , pt has , handicapped accessible house with raised toilet seat, ramp, walker and wheelchiar if needed. Pt hoping to go home with , based on her ability today, this is feasible,  is able bodied and able to provide assist

## 2019-08-05 NOTE — PROGRESS NOTES
Daily progress note    Chief complaint  Doing better   No specific complaints  Family at bedside    History of present Illness  68-year-old white female with history of hypertension hyperlipidemia diabetes mellitus chronic kidney disease stage IV directly admitted to cardiology service with abnormal stress Cardiolite for cardiac catheterization and I am asked to follow the patient for medical problem.  Patient denies any chest pain but has shortness of breath with exertion.  Patient denies any fever chills abdominal pain nausea vomiting diarrhea.  Patient stated that she is very close for hemodialysis she already had AV fistula but her kidney function closely followed by nephrology.  Patient fully alert oriented and give me a detailed history.  I am asked to follow the patient for medical problems.     REVIEW OF SYSTEMS  Remarkable for generalized weakness    PHYSICAL EXAM  Blood pressure 165/71, pulse 77, temperature 97.9 °F (36.6 °C), temperature source Oral, resp. rate 20, weight 88.9 kg (196 lb), SpO2 94 %, not currently breastfeeding.    General awake and alert  Cardiovascular: Normal rate and regular rhythm.    Pulmonary/Chest:  Moving air bilaterally  Abdominal: Soft.  Soft nontender bowel is most  Extremities no edema    LAB RESULTS  Lab Results (last 24 hours)     Procedure Component Value Units Date/Time    POC Glucose Once [[id number]]  (Abnormal) Collected:  08/05/19 1014    Specimen:  Blood Updated:  08/05/19 1023     Glucose 286 mg/dL     POC Glucose Once [[id number]]  (Normal) Collected:  08/05/19 0545    Specimen:  Blood Updated:  08/05/19 0546     Glucose 103 mg/dL     CBC & Differential [[id number]] Collected:  08/05/19 0343    Specimen:  Blood Updated:  08/05/19 7385    Narrative:       The following orders were created for panel order CBC & Differential.  Procedure                               Abnormality         Status                     ---------                               -----------          ------                     CBC Auto Differential[[id number]]        Abnormal            Final result                 Please view results for these tests on the individual orders.    CBC Auto Differential [[id number]]  (Abnormal) Collected:  08/05/19 0343    Specimen:  Blood from Arm, Right Updated:  08/05/19 0440     WBC 12.63 10*3/mm3      RBC 3.60 10*6/mm3      Hemoglobin 10.5 g/dL      Hematocrit 33.7 %      MCV 93.6 fL      MCH 29.2 pg      MCHC 31.2 g/dL      RDW 16.5 %      RDW-SD 56.1 fl      MPV 11.2 fL      Platelets 177 10*3/mm3     Manual Differential [[id number]]  (Abnormal) Collected:  08/05/19 0343    Specimen:  Blood from Arm, Right Updated:  08/05/19 0440     Neutrophil % 79.8 %      Lymphocyte % 9.1 %      Monocyte % 5.1 %      Eosinophil % 3.0 %      Metamyelocyte % 1.0 %      Myelocyte % 2.0 %      Neutrophils Absolute 10.08 10*3/mm3      Lymphocytes Absolute 1.15 10*3/mm3      Monocytes Absolute 0.64 10*3/mm3      Eosinophils Absolute 0.38 10*3/mm3      nRBC 1.0 /100 WBC      Polychromasia Mod/2+     WBC Morphology Normal     Platelet Morphology Normal    Renal Function Panel [[id number]]  (Abnormal) Collected:  08/05/19 0343    Specimen:  Blood from Arm, Right Updated:  08/05/19 0438     Glucose 99 mg/dL      BUN 76 mg/dL      Creatinine 3.60 mg/dL      Sodium 136 mmol/L      Potassium 3.8 mmol/L      Chloride 97 mmol/L      CO2 25.1 mmol/L      Calcium 8.7 mg/dL      Albumin 3.20 g/dL      Phosphorus 3.3 mg/dL      Anion Gap 13.9 mmol/L      BUN/Creatinine Ratio 21.1     eGFR Non African Amer 13 mL/min/1.73      Comment: <15 Indicative of kidney failure.        eGFR   Amer -- mL/min/1.73      Comment: <15 Indicative of kidney failure.       Narrative:       GFR Normal >60  Chronic Kidney Disease <60  Kidney Failure <15    POC Glucose Once [[id number]]  (Abnormal) Collected:  08/04/19 2033    Specimen:  Blood Updated:  08/04/19 2034     Glucose 166 mg/dL     POC Glucose Once [[id number]]   (Abnormal) Collected:  08/04/19 1530    Specimen:  Blood Updated:  08/04/19 1531     Glucose 185 mg/dL         Imaging Results (last 24 hours)     ** No results found for the last 24 hours. **        ECG 12 Lead         ECG 12 Lead  Date/Time: 6/6/2019 11:21 AM  Performed by: Eddie Hodges MD  Authorized by: Eddie Hodges MD   Comparison: compared with previous ECG   Similar to previous ECG  Rhythm: sinus rhythm  ST Flattening: all  Clinical impression: non-specific ECG             Current Facility-Administered Medications:   •  acetaminophen (TYLENOL) tablet 650 mg, 650 mg, Oral, Q4H PRN **OR** acetaminophen (TYLENOL) 160 MG/5ML solution 650 mg, 650 mg, Oral, Q4H PRN **OR** acetaminophen (TYLENOL) suppository 650 mg, 650 mg, Rectal, Q4H PRN, Gordo Ferreira MD  •  aspirin EC tablet 81 mg, 81 mg, Oral, Daily, Gordo Ferreira MD, 81 mg at 08/05/19 0901  •  atorvastatin (LIPITOR) tablet 40 mg, 40 mg, Oral, Nightly, Gordo Ferreira MD, 40 mg at 08/04/19 2215  •  bacitracin-polymyxin b (POLYSPORIN) ointment, , Topical, Q24H, José Antonio Michael MD  •  bisacodyl (DULCOLAX) EC tablet 10 mg, 10 mg, Oral, Daily PRN, Gordo Ferreira MD  •  bisacodyl (DULCOLAX) suppository 10 mg, 10 mg, Rectal, Daily PRN, Gordo Ferreira MD  •  carvedilol (COREG) tablet 6.25 mg, 6.25 mg, Oral, Q12H, Justin Esparza PA-C, 6.25 mg at 08/05/19 0900  •  enoxaparin (LOVENOX) syringe 30 mg, 30 mg, Subcutaneous, Daily, Gordo Ferreira MD, 30 mg at 08/04/19 1802  •  epoetin hermes (EPOGEN,PROCRIT) injection 10,000 Units, 10,000 Units, Intravenous, Once per day on Mon Wed Fri, Flory Watt MD, 10,000 Units at 08/02/19 1712  •  HYDROcodone-acetaminophen (NORCO) 5-325 MG per tablet 1 tablet, 1 tablet, Oral, Q4H PRN, Justin Esparza PA-C, 1 tablet at 08/05/19 1325  •  insulin glargine (LANTUS) injection 30 Units, 30 Units, Subcutaneous, QAAsael MARQUEZ Aftab, MD, 30 Units at  08/05/19 0707  •  insulin lispro (humaLOG) injection 0-7 Units, 0-7 Units, Subcutaneous, 4x Daily With Meals & Nightly, Micha Vyas MD, 4 Units at 08/05/19 1230  •  insulin lispro (humaLOG) injection 9 Units, 9 Units, Subcutaneous, TID With Meals, Micha Vyas MD, 9 Units at 08/05/19 1230  •  ipratropium-albuterol (DUO-NEB) nebulizer solution 3 mL, 3 mL, Nebulization, 4x Daily - RT, Justin Esparza PA-C, 3 mL at 08/05/19 1240  •  lidocaine-prilocaine (EMLA) 2.5-2.5 % cream, , Topical, Once per day on Mon Wed Fri, Flory Watt MD  •  miconazole (MICOTIN) 2 % powder, , Topical, Q12H, Gordo Ferreira MD  •  mupirocin (BACTROBAN) 2 % nasal ointment 1 application, 1 application, Each Nare, Daily, Gordo Ferreira MD, 1 application at 08/05/19 0902  •  [DISCONTINUED] morphine injection 1 mg, 1 mg, Intravenous, Q4H PRN **AND** naloxone (NARCAN) injection 0.4 mg, 0.4 mg, Intravenous, Q5 Min PRN, Gordo Ferreira MD  •  nystatin (MYCOSTATIN) powder 1 application, 1 application, Topical, Q12H, José Antonio Michael MD, 1 application at 08/05/19 0901  •  ondansetron (ZOFRAN) injection 4 mg, 4 mg, Intravenous, Q6H PRN, Gordo Ferreira MD     ASSESSMENT  Multivessel coronary artery disease post CABG pod #7  Hypertension  Hyperlipidemia  Diabetes mellitus blood sugar very well controlled  Chronic anemia  Chronic kidney disease stage IV on hemodialysis  Gastroesophageal reflux disease    PLAN  CPM  Postop care  Hemodialysis PRN  Adjust insulin dose  Accu-Chek with sliding scale insulin  Diuresis PRN  Stress ulcer DVT prophylaxis  Discussed with family  PT/OT  Will follow     MICHA VYAS MD

## 2019-08-05 NOTE — PROGRESS NOTES
[facility]  Cardiology Progress Note    Patient Identification:  Name: Maribeth Rendon  Age: 68 y.o.  Sex: female  :  1950  MRN: [id number]                 Follow Up / Chief Complaint: abnormal stress test/cardiac catheterization       Interval History:  Ambulating in [place] with PT.  Patient hopefully to consider rehab on discharge.       Subjective: Patient is feeling much stronger, ambulating well in hallway today.  Denies chest pain and shortness of breath      Past Medical History:  Past Medical History:   Diagnosis Date   • Achilles tendon tear     left    • Anemia    • Anxiety    • Depression    • Diabetes mellitus, type 2 (CMS/[facility])    • ESRD (end stage renal disease) (CMS/[facility])     HAS LEFT FOREARM FISTULA   • GERD (gastroesophageal reflux disease)    • History of MRSA infection 03/15/2016    ABDOMINAL WOUND,   INFECTION CONTROLL NOTIFIED 2017   • History of transfusion    • Hyperlipidemia    • Hypertension    • Hypokalemia    • Hypomagnesemia    • Hypoxic 2016    WHEN SHE HAD PNEUMONIA   • Intertrigo    • Leukocytosis    • Osteoarthritis    • Pneumonia 2016   • Psoriasis    • Psoriatic arthritis (CMS/[facility])    • Seizures (CMS/[facility])     one time   • Sepsis (CMS/[facility]) 2016   • SOB (shortness of breath)    • Stage 4 chronic renal impairment associated with type 2 diabetes mellitus (CMS/[facility])    • Staph infection     HX RIGHT FOOT AT [facility] 2010   • Streptococcal bacteremia    • Swelling of hand 10/27/2016    LEFT HAND SWELLIMG SINCE LEFT FISTULA SURGERY   • Tremor, essential    • Wears glasses      Past Surgical History:  Past Surgical History:   Procedure Laterality Date   • ABDOMINAL WALL ABSCESS INCISION AND DRAINAGE     • ARTERIOVENOUS FISTULA/SHUNT SURGERY Left 10/27/2016    Procedure: LT FOREARM FISTULA;  Surgeon: Lorelei Haque Jr., MD;  Location: [facility];  Service:    • ARTERIOVENOUS FISTULA/SHUNT SURGERY Left 2017    Procedure: LT BRACHIAL  CEPHALIC WITH FISTULA LIGATION RADIAL CEPHALIC.;  Surgeon: Gurmeet Carver MD;  Location: [facility];  Service:    • CATARACT EXTRACTION W/ INTRAOCULAR LENS IMPLANT Bilateral    • CORONARY ARTERY BYPASS GRAFT N/A 2019    Procedure: INTRAOP ERNESTO; CORONARY ARTERY BYPASS GRAFTING X 4 WITH LEFT INTERNAL MAMMARY ARTERY GRAFT AND UTILIZING ENDOSCOPICALLY HARVESTED GREATER SAPHENOUS VEIN; PRP;  Surgeon: Gordo Ferreira MD;  Location: [facility] OR;  Service: Cardiothoracic   • DILATATION AND CURETTAGE     • EXCISION MASS TRUNK N/A 3/22/2016    Procedure: I&D LOWER ABDOMINAL  WOUND;  Surgeon: Tru Yi MD;  Location: [facility] OR;  Service:    • FOOT SURGERY Right     REMOVED BONE IN RIGHT GREAT TOE   • HYSTERECTOMY          Social History:   Social History     Tobacco Use   • Smoking status: Former Smoker     Packs/day: 2.00     Years: 20.00     Pack years: 40.00     Types: Cigarettes     Last attempt to quit: 10/21/1992     Years since quittin.8   • Smokeless tobacco: Never Used   • Tobacco comment: quit    Substance Use Topics   • Alcohol use: No      Family History:  Family History   Problem Relation Age of Onset   • Heart attack Mother    • Heart disease Mother    • Kidney disease Mother    • Heart attack Father    • Heart disease Father    • Hypertension Father    • Stroke Father         ISCHEMIC   • Diabetes Father         TYPE 2   • Kidney disease Brother    • Diabetes Brother         TYPE 1   • Thyroid disease Daughter    • Anxiety disorder Maternal Aunt    • Bipolar disorder Maternal Aunt    • Depression Maternal Aunt    • Lupus Maternal Aunt         LUPUS ANTICOAGLULANT   • Rheum arthritis Maternal Aunt    • Alcohol abuse Maternal Uncle    • Other Maternal Uncle         PULMONARY DISEASE          Allergies:  Allergies   Allergen Reactions   • Prednisone Hallucinations     Scheduled Meds:    aspirin 81 mg Daily   atorvastatin 40 mg Nightly   bacitracin-polymyxin  b  Q24H   carvedilol 6.25 mg Q12H   enoxaparin 30 mg Daily   epoetin hermes 10,000 Units Once per day on Mon Wed Fri   insulin glargine 30 Units QAM   insulin lispro 0-7 Units 4x Daily With Meals & Nightly   insulin lispro 9 Units TID With Meals   ipratropium-albuterol 3 mL 4x Daily - RT   lidocaine-prilocaine  Once per day on Mon Wed Fri   miconazole  Q12H   mupirocin 1 application Daily   nystatin 1 application Q12H     I have reviewed the patient's recent medical history and current medications, as well as personally reviewed/interpreted the ECG/telemetry data      Objective:     INTAKE AND OUTPUT:    Intake/Output Summary (Last 24 hours) at 8/5/2019 1006  Last data filed at 8/4/2019 1400  Gross per 24 hour   Intake 360 ml   Output 750 ml   Net -390 ml       08/03/19 0645  88.9 kg (196 lb)   08/02/19 0600  91.4 kg (201 lb 9.6 oz)   08/01/19 0615  90.4 kg (199 lb 6.4 oz)   07/31/19 0500  91.5 kg (201 lb 12.8 oz)   07/29/19 0545  89.1 kg (196 lb 6.4 oz)   07/28/19 0556  87.7 kg (193 lb 6 oz)   07/27/19 0556  87.1 kg (192 lb)         ROS  Constitutional: Awake and alert, no fever. No nosebleeds  Abdomen           no abdominal pain   Cardiac              no chest pain  Pulmonary          no shortness of breath      /56 (BP Location: Right arm, Patient Position: Sitting)   Pulse 74   Temp 98.7 °F (37.1 °C) (Oral)   Resp 16   Wt 90.1 kg (198 lb 9.6 oz)   SpO2 97%   BMI 34.09 kg/m²   General appearance: No acute changes   Neck: Trachea midline; NECK, supple, no thyromegaly or lymphadenopathy   Lungs: Normal size and shape, normal breath sounds, equal distribution of air, no rales and rhonchi   CV: S1-S2 regular, no murmurs, no rub, no gallop   Abdomen: Soft, non-tender; no masses , no abnormal abdominal sounds   Extremities: No deformity , normal color , no peripheral edema   Skin: Normal temperature, turgor and texture; no rash, ulcers        Cardiographics  Telemetry:   8/4 sinus tach rate low 100s      ECG:    8/1 SR rate 70        7/31 AV paced rate 80s        7/30/19 SR rate 50s bradycardic        Echocardiogram:     Interpretation Summary 6/11/19    · Right ventricular cavity is borderline dilated.  · Left atrial cavity size is mildly dilated.  · Mild tricuspid valve regurgitation is present.  · Calculated EF = 57%.  · There is no evidence of pericardial effusion.              Interpretation Summary Myocardial perfusion stress testing 7/3/19       · Findings consistent with a normal ECG stress test.  · Findings consistent with a normal ECG stress test.  · Left ventricular ejection fraction is normal (Calculated EF = 60%).  · Myocardial perfusion imaging indicates a small-to-medium-sized, mild-to-moderately severe area of ischemia located in the anterior wall.  · Impressions are consistent with an intermediate risk study.     Asymptomatic for chest pain. ECG is negative for ischemia.   Ectopy: PAC's at baseline, episode of atrial tachycardia verses junctional tachycardia post Infusion.   B/P is appropriate Beta-blocker therapy  Pharmacologic study due to inability to tolerate increasing speed and grade of treadmill due to poor balance,  mobility  Issues and Beta-blocker therapy.  Unable to participate in low level exercise due to poor mobility and poor balance.             Lab Review       Results from last 7 days   Lab Units 08/04/19  0308   MAGNESIUM mg/dL 2.1     Results from last 7 days   Lab Units 08/05/19  0343   SODIUM mmol/L 136   POTASSIUM mmol/L 3.8   BUN mg/dL 76*   CREATININE mg/dL 3.60*   CALCIUM mg/dL 8.7       Results from last 7 days   Lab Units 08/05/19  0343 08/03/19  0911 08/02/19  0445   WBC 10*3/mm3 12.63* 11.33* 10.49   HEMOGLOBIN g/dL 10.5* 11.4* 7.8*   HEMATOCRIT % 33.7* 36.4 25.9*   PLATELETS 10*3/mm3 177 146 135*     Results from last 7 days   Lab Units 07/30/19  0248 07/29/19  1249   INR  1.31* 1.67*   APTT seconds  --  36.5*                 Estimated Creatinine Clearance: 16.2 mL/min (A)  "(by C-G formula based on SCr of 3.6 mg/dL (H)).     I have reviewed the medical decision making with Dr. Hodges in detail.       Assessment / Plan:      1.  CAD s/p CABGx4 7/29 (Dr. Ferreira)  2.  CKD IV/V:  Nephrology is following.   3.  Hypertension  4.  Hyperlipidemia: is on statin.   5.  DM2  6.  Hx of Atrial  fibrillation  7.  Anemia : is on procrit as outpt.    8.  Leukocytosis: monitor.  Up a bit today.    9.  Foot wound, chronic   10.  Cirrhotic arthritis  11. Hypokalemia: replaced.     Patient considering rehab upon discharge.  No need for HD today per nephrology.  Patient remains hypertensive, carvedilol increased this a.m.  We will continue to monitor.  Could restart amlodipine if blood pressure remains elevated.  Appreciate internal medicine attention to patient.  Potassium normalized today.  Leukocytosis trending up, afebrile.  Would ask case management to speak with patient regarding rehab.    BMP, CBC, mag    )8/5/2019  IRENA Alvarez      Patient personally interviewed and above subjective findings personally confirmed during a face to face contact with patient today  All findings of physical examination confirmed  All pertinent and performed labs, cardiac procedures ,  radiographs of the last 24 hours personally reviewed  Impression and plans discussed/elaborated and implemented jointly as described above     MD JOHNATHAN Mena/Transcription:   \"Dictated utilizing Dragon dictation\".   "

## 2019-08-05 NOTE — PROGRESS NOTES
NEPHROLOGY PROGRESS NOTE    PATIENT IDENTIFICATION:   Name:  Maribeth Rendon      MRN:  [id number]     68 y.o.  female             Reason for visit: DACIA    SUBJECTIVE:   Appetite is fair; reports several large voids that were not quantified yesterday; breathing is comfortable on room air    OBJECTIVE:  Vitals:    08/04/19 1900 08/04/19 2017 08/05/19 0730 08/05/19 0808   BP: 165/71      BP Location: Left arm      Patient Position: Sitting      Pulse:  88  103   Resp: 18 20  20   Temp: 98.7 °F (37.1 °C)  98.6 °F (37 °C)    TempSrc: Oral  Oral    SpO2:  96%     Weight:         FiO2 (%): 39 %     Body mass index is 33.64 kg/m².    Intake/Output Summary (Last 24 hours) at 8/5/2019 0823  Last data filed at 8/4/2019 1400  Gross per 24 hour   Intake 810 ml   Output 750 ml   Net 60 ml         Exam:  General Appearance: chronically ill-appearing; NAD; awake and appropriate  Looks older than stated age   Skin: warm and dry  HEENT: Anicteric; MMM  Neck: No JVD  Lungs: Coarse bs.  No wheezes; not labored on room air  Heart: RRR; normal S1 and S2, no S3, no rub  Abdomen: soft, non-tender, +bs, body wall edema.   Extremities: Trace-1+ bilateral lower extremity edema, worse on the right than the left; +1 hip edema  Patent AV fistula in the left upper arm  Vasc: right foot wrapped  Neuro: Awake, alert, conversant; moves all extremities        Scheduled meds:      aspirin 81 mg Oral Daily   atorvastatin 40 mg Oral Nightly   bacitracin-polymyxin b  Topical Q24H   carvedilol 3.125 mg Oral Q12H   enoxaparin 30 mg Subcutaneous Daily   epoetin hermes 10,000 Units Intravenous Once per day on Mon Wed Fri   insulin glargine 30 Units Subcutaneous QAM   insulin lispro 0-7 Units Subcutaneous 4x Daily With Meals & Nightly   insulin lispro 9 Units Subcutaneous TID With Meals   ipratropium-albuterol 3 mL Nebulization 4x Daily - RT   lidocaine-prilocaine  Topical Once per day on Mon Wed Fri   miconazole  Topical Q12H   mupirocin 1 application Each  Magda Daily   nystatin 1 application Topical Q12H     IV meds:                             Data Review:    Results from last 7 days   Lab Units 08/05/19  0343 08/04/19  0308 08/03/19  0911 08/02/19 0445 07/31/19  0305   SODIUM mmol/L 136 139 134* 135*   < > 136   POTASSIUM mmol/L 3.8 3.4* 3.7 3.4*   < > 4.8   CHLORIDE mmol/L 97* 100 95* 96*   < > 98   CO2 mmol/L 25.1 27.0 22.0 24.7   < > 18.4*   BUN mg/dL 76* 59* 44* 50*   < > 81*   CREATININE mg/dL 3.60* 3.65* 3.00* 4.82*   < > 5.22*   CALCIUM mg/dL 8.7 8.4* 8.7 7.6*   < > 7.4*   BILIRUBIN mg/dL  --   --   --  0.2  --  0.2   ALK PHOS U/L  --   --   --  80  --  61   ALT (SGPT) U/L  --   --   --  <5  --  <5   AST (SGOT) U/L  --   --   --  15  --  24   GLUCOSE mg/dL 99 85 251* 174*   < > 321*    < > = values in this interval not displayed.       Estimated Creatinine Clearance: 16.2 mL/min (A) (by C-G formula based on SCr of 3.6 mg/dL (H)).    Results from last 7 days   Lab Units 08/05/19  0343 08/04/19  0308 08/03/19 0911 08/02/19  0445   MAGNESIUM mg/dL  --  2.1 2.0 1.9   PHOSPHORUS mg/dL 3.3 2.9 3.7  --        Results from last 7 days   Lab Units 08/05/19  0343 08/03/19  0911 08/02/19 0445 08/01/19  0416 07/31/19  0305   WBC 10*3/mm3 12.63* 11.33* 10.49 12.62* 13.79*   HEMOGLOBIN g/dL 10.5* 11.4* 7.8* 7.8* 7.3*   PLATELETS 10*3/mm3 177 146 135* 136* 125*       Results from last 7 days   Lab Units 07/30/19  0248 07/29/19  1249   INR  1.31* 1.67*             ASSESSMENT:   1.  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  Stable electrolytes.  Patent AVF LUE.  Improving peripheral volume excess  2.  CABG 7/29.    3.  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  Hypertension, controlled  5.  Anemia of chronic kidney disease, on JANEEN and as-needed PRBCs   6.  PVD: wound right foot  7.  Leukocytosis        PLAN:  1.  No need for HD today as it appears renal function is recovering a bit  2.  Surveillance labs  3.   Discussed with daughter at bedside      Vinny Zaragoza MD  8/5/2019    8:23 AM

## 2019-08-05 NOTE — THERAPY TREATMENT NOTE
Acute Care - Physical Therapy Treatment Note  [facility]     Patient Name: Maribeth Rendon  : 1950  MRN: [id number]  Today's Date: 2019  Onset of Illness/Injury or Date of Surgery: 19  Date of Referral to PT: 19  Referring Physician: Jhon    Admit Date: 2019    Visit Dx:    ICD-10-CM ICD-9-CM   1. 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 pectoris, unspecified vessel or lesion type (CMS/HCC) I25.118 414.01     413.9   2. Abnormal cardiac function test R94.30 794.30   3. Impaired functional mobility and activity tolerance Z74.09 V49.89     Patient Active Problem List   Diagnosis   • Menstrual disorder   • Atopic rhinitis   • Anemia in CKD (chronic kidney disease)   • Spasm of cervical paraspinous muscle   • Cervical radiculopathy   • Chronic kidney disease, stage IV (severe) (CMS/[facility])   • Depression   • DM type 2 with diabetic peripheral neuropathy (CMS/[facility])   • Essential hypertension   • Duplay's periarthritis syndrome   • Gastroesophageal reflux disease   • Hyperlipidemia   • Hypertension   • Arthritis   • Seizure disorder (CMS/HCC)   • Phlebitis   • Type 2 diabetes mellitus (CMS/HCC)   • Vitamin D deficiency   • Chest pain   • Abscess   • Generalized muscle weakness   • Abdominal wall cellulitis   • HTN (hypertension)   • CKD (chronic kidney disease) stage 4, GFR 15-29 ml/min (CMS/HCC)   • Diabetes mellitus with peripheral autonomic neuropathy (CMS/HCC)   • Hyponatremia   • Hypercalcemia   • Dehydration   • DACIA (acute kidney injury) (CMS/[facility])   • Abdominal wall abscess   • Cellulitis of toe of left foot   • Partial Achilles tendon tear, left, initial encounter   • Arthritis of foot   • Essential tremor   • Primary Parkinsonism (CMS/HCC)   • Abnormal cardiac function test   • 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 pectoris (CMS/HCC)       Therapy Treatment    Rehabilitation Treatment Summary     Row Name 19 1011             Treatment  Time/Intention    Discipline  physical therapist  -PC      Document Type  therapy note (daily note)  -PC      Subjective Information  -- soreness and shoulder blade (L) pain  -PC      Mode of Treatment  physical therapy  -PC      Patient/Family Observations  pt is sitting up in a chair, no acute distress  -PC      Therapy Frequency (PT Clinical Impression)  daily  -PC      Patient Effort  good  -PC      Recorded by [PC] Kayla Trent, PT 08/05/19 1015      Row Name 08/05/19 1011             Cognitive Assessment/Intervention- PT/OT    Orientation Status (Cognition)  oriented x 4  -PC      Follows Commands (Cognition)  WNL  -PC      Recorded by [PC] Kayla Trent, PT 08/05/19 1015      Row Name 08/05/19 1011             Bed Mobility Assessment/Treatment    Comment (Bed Mobility)  in a chair  -PC      Recorded by [PC] Kayla Trent, PT 08/05/19 1015      Row Name 08/05/19 1011             Sit-Stand Transfer    Sit-Stand [place] (Transfers)  contact guard;verbal cues  -PC      Assistive Device (Sit-Stand Transfers)  walker, front-wheeled  -PC      Recorded by [PC] Kayla Trent, PT 08/05/19 1015      Row Name 08/05/19 1011             Stand-Sit Transfer    Stand-Sit [place] (Transfers)  supervision  -PC      Assistive Device (Stand-Sit Transfers)  walker, front-wheeled  -PC      Recorded by [PC] Kayla Trent, PT 08/05/19 1015      Row Name 08/05/19 1011             Gait/Stairs Assessment/Training    Gait/Stairs Assessment/Training  gait/ambulation assistive device;distance ambulated;gait/ambulation independence  -PC      [place] Level (Gait)  contact guard  -PC      Assistive Device (Gait)  walker, front-wheeled  -PC      Distance in Feet (Gait)  100 ft  -PC      Pattern (Gait)  step-through  -PC      Deviations/Abnormal Patterns (Gait)  marlene decreased;gait speed decreased;stride length decreased;other (see comments)  -PC      Recorded by [PC] Kayla Trent, PT 08/05/19 1015      Row Name  08/05/19 1011             Therapeutic Exercise    Comment (Therapeutic Exercise)  5 reps cardiac protocol, level III  -PC      Recorded by [PC] Kayla Trent, PT 08/05/19 1015      Row Name 08/05/19 1011             Positioning and Restraints    Pre-Treatment Position  sitting in chair/recliner  -PC      Post Treatment Position  chair  -PC      In Chair  sitting;call light within reach;encouraged to call for assist;with family/caregiver  -PC      Recorded by [PC] Kayla Trent, PT 08/05/19 1015      Row Name 08/05/19 1011             Pain Scale: Numbers Pre/Post-Treatment    Pre/Post Treatment Pain Comment  pt c/o soreness, pain did not interfere with completion of treatment  -PC      Recorded by [PC] Kayla Trent, PT 08/05/19 1015      Row Name                Wound 07/29/19 0800 Bilateral chest incision    Wound - Properties Group Date first assessed: 07/29/19 [TL] Time first assessed: 0800 [TL] Side: Bilateral [TL] Location: chest [TL] Type: incision [TL] Recorded by:  [TL] Kylah Ruiz RN 07/29/19 0800    Row Name                Wound 07/29/19 0800 Right leg incision    Wound - Properties Group Date first assessed: 07/29/19 [TL] Time first assessed: 0800 [TL] Side: Right [TL] Location: leg [TL] Type: incision [TL] Recorded by:  [TL] Kylah Ruiz RN 07/29/19 0800    Row Name                Wound 07/29/19 1245 upper sternal incision    Wound - Properties Group Date first assessed: 07/29/19 [SM] Time first assessed: 1245 [SM] Present On Admission : no [SM] Orientation: upper [SM] Location: sternal [SM] Type: incision [SM] Recorded by:  [SM] Kiesha Escobedo RN 07/29/19 1441    Row Name                Wound 07/30/19 1300 Bilateral gluteal unspecified    Wound - Properties Group Date first assessed: 07/30/19 [AA] Time first assessed: 1300 [AA] Side: Bilateral [AA] Location: gluteal [AA] Type: unspecified [AA] Stage, Pressure Injury: deep tissue injury [AA] Recorded by:  [AA]  Agostin, Neena, RN 07/31/19 0903    Row Name 08/05/19 1011             Outcome Summary/Treatment Plan (PT)    Anticipated Discharge Disposition (PT)  home with assist;home with home health  -PC      Recorded by [PC] Kayla Trent, PT 08/05/19 1015        User Key  (r) = Recorded By, (t) = Taken By, (c) = Cosigned By    Initials Name Effective Dates Discipline    AA Neena Altman, RN 06/16/16 -  Nurse    PC Kayla Trent, PT 04/03/18 -  PT    Kylah Clark RN 06/16/16 -  Nurse    SM Kiesha Escobedo RN 06/16/16 -  Nurse          Wound 07/29/19 0800 Bilateral chest incision (Active)   Closure Open to air;Approximated 8/5/2019  7:40 AM   Base dry;pink 8/5/2019  7:40 AM   Periwound dry;intact 8/5/2019  7:40 AM   Periwound Temperature warm 8/5/2019  7:40 AM   Drainage Amount none 8/5/2019  7:40 AM   Care, Wound cleansed with;antimicrobial agent applied 8/5/2019  7:40 AM       Wound 07/29/19 0800 Right leg incision (Active)   Closure Liquid skin adhesive;Open to air 8/5/2019  7:40 AM   Base dry;scab 8/5/2019  7:40 AM   Periwound redness;dry 8/5/2019  7:40 AM   Periwound Temperature warm 8/5/2019  7:40 AM   Drainage Amount none 8/5/2019  7:40 AM       Wound 07/29/19 1245 upper sternal incision (Active)   Closure Approximated;Open to air 8/5/2019  7:40 AM   Base scab;dry 8/5/2019  7:40 AM   Periwound intact;dry;pink 8/5/2019  7:40 AM   Periwound Temperature warm 8/5/2019  7:40 AM   Drainage Amount none 8/5/2019  7:40 AM       Wound 07/30/19 1300 Bilateral gluteal unspecified (Active)   Closure Open to air 8/5/2019  7:40 AM   Base pink;moist;red 8/5/2019  7:40 AM   Drainage Amount none 8/5/2019  7:40 AM           Physical Therapy Education     Title: PT OT SLP Therapies (In Progress)     Topic: Physical Therapy (In Progress)     Point: Mobility training (In Progress)     Learning Progress Summary           Patient Acceptance, JAG HANSON, NR by REBECA at 8/5/2019 10:16 AM    MARGIE Shafer D, MELINDA SANDRA by  at  8/4/2019  9:42 AM    Acceptance, E,D, VU,DU by JR at 8/3/2019 11:50 AM    Acceptance, E, NR by EM at 8/2/2019 10:28 AM    Acceptance, E, NR by EM at 8/1/2019  9:37 AM    Acceptance, E, NR by EM at 7/31/2019 11:05 AM    Acceptance, E,D, NR by PC at 7/30/2019  9:13 AM   Family Acceptance, E,D, VU,DU by JR at 8/4/2019  9:42 AM    Acceptance, E,D, VU,DU by JR at 8/3/2019 11:50 AM                   Point: Home exercise program (In Progress)     Learning Progress Summary           Patient Acceptance, E,D, NR by PC at 8/5/2019 10:16 AM    Acceptance, E,D, VU,DU by JR at 8/4/2019  9:42 AM    Acceptance, E,D, VU,DU by JR at 8/3/2019 11:50 AM    Acceptance, E, NR by EM at 8/2/2019 10:28 AM    Acceptance, E, NR by EM at 8/1/2019  9:37 AM    Acceptance, E, NR by EM at 7/31/2019 11:05 AM    Acceptance, E,D, NR by PC at 7/30/2019  9:13 AM   Family Acceptance, E,D, VU,DU by JR at 8/4/2019  9:42 AM    Acceptance, E,D, VU,DU by JR at 8/3/2019 11:50 AM                   Point: Body mechanics (In Progress)     Learning Progress Summary           Patient Acceptance, E,D, NR by PC at 8/5/2019 10:16 AM    Acceptance, E,D, VU,DU by JR at 8/4/2019  9:42 AM    Acceptance, E,D, VU,DU by JR at 8/3/2019 11:50 AM    Acceptance, E,D, NR by PC at 7/30/2019  9:13 AM   Family Acceptance, E,D, VU,DU by JR at 8/4/2019  9:42 AM    Acceptance, E,D, VU,DU by JR at 8/3/2019 11:50 AM                   Point: Precautions (In Progress)     Learning Progress Summary           Patient Acceptance, E,D, NR by PC at 8/5/2019 10:16 AM    Acceptance, E,D, VU,DU by JR at 8/4/2019  9:42 AM    Acceptance, JAG HANSON VU, DU by  at 8/3/2019 11:50 AM    Acceptance, JAG HANSON NR by REBECA at 7/30/2019  9:13 AM   Family Acceptance, JAG HANSON VU, DU by  at 8/4/2019  9:42 AM    Acceptance, JAG HANSON VU, DU by  at 8/3/2019 11:50 AM                               User Key     Initials Effective Dates Name Provider Type Discipline     04/03/18 -  Kayla Trent, PT Physical Therapist PT    EM  04/03/18 -  Shanique Monreal, PT Physical Therapist PT    JR 04/03/18 -  Giovanny Rucker, PT Physical Therapist PT                PT Recommendation and Plan  Anticipated Discharge Disposition (PT): home with assist, home with home health  Planned Therapy Interventions (PT Eval): balance training, bed mobility training, gait training, home exercise program, strengthening, transfer training  Therapy Frequency (PT Clinical Impression): daily  Outcome Summary/Treatment Plan (PT)  Anticipated Discharge Disposition (PT): home with assist, home with home health  Plan of Care Reviewed With: patient  Progress: improving  Outcome Summary: pt more alert, requiring less assist for mobility, able to stand from chair and walk 100 ft with walker with contact guard assist , pt has , handicapped accessible house with raised toilet seat, ramp, walker and wheelchiar if needed. Pt hoping to go home with , based on her ability today, this is feasible,  is able bodied and able to provide assist  Outcome Measures     Row Name 08/05/19 1000 08/04/19 0943 08/03/19 1151       How much help from another person do you currently need...    Turning from your back to your side while in flat bed without using bedrails?  3  -PC  3  -JR  3  -JR    Moving from lying on back to sitting on the side of a flat bed without bedrails?  3  -PC  3  -JR  3  -JR    Moving to and from a bed to a chair (including a wheelchair)?  3  -PC  3  -JR  3  -JR    Standing up from a chair using your arms (e.g., wheelchair, bedside chair)?  3  -PC  3  -JR  3  -JR    Climbing 3-5 steps with a railing?  2  -PC  2  -JR  1  -JR    To walk in hospital room?  3  -PC  3  -JR  3  -JR    AM-PAC 6 Clicks Score (PT)  17  -PC  17  -JR  16  -JR       Functional Assessment    Outcome Measure Options  --  AM-PAC 6 Clicks Basic Mobility (PT)  (Significant)   -JR  --      User Key  (r) = Recorded By, (t) = Taken By, (c) = Cosigned By    Initials Name Provider Type     Kayla Kramer, PT Physical Therapist    Giovanny Barbour, PT Physical Therapist         Time Calculation:   PT Charges     Row Name 08/05/19 1019             Time Calculation    Start Time  0948  -PC      Stop Time  1008  -PC      Time Calculation (min)  20 min  -PC      PT Received On  08/05/19  -PC      PT - Next Appointment  08/06/19  -PC        User Key  (r) = Recorded By, (t) = Taken By, (c) = Cosigned By    Initials Name Provider Type    PC Kayla Trent, PT Physical Therapist        Therapy Charges for Today     Code Description Service Date Service Provider Modifiers Qty    19167215819 HC PT THER PROC EA 15 MIN 8/5/2019 Kayla Trent, PT GP 1          PT G-Codes  Outcome Measure Options: (S) AM-PAC 6 Clicks Basic Mobility (PT)  AM-PAC 6 Clicks Score (PT): 17  AM-PAC 6 Clicks Score (OT): 10    Kayla Trent, PT  8/5/2019

## 2019-08-05 NOTE — PROGRESS NOTES
LOS: 10 days   Patient Care Team:  Robby Chaney MD as PCP - General  Robby Chaney MD as PCP - Family Medicine  Eddie Hodges MD as Consulting Physician (Cardiology)    Chief Complaint:   Post-op follow-up, s/p CABG    Subjective  Sitting up in chair. Feeling better today. Discussed the need for safe discharge plan, possible rehab.     Vital Signs  Temp:  [98.2 °F (36.8 °C)-99.2 °F (37.3 °C)] 98.6 °F (37 °C)  Heart Rate:  [] 103  Resp:  [16-20] 20  BP: (132-165)/(60-71) 165/71      08/01/19  0615 08/02/19  0600 08/03/19  0645   Weight: 90.4 kg (199 lb 6.4 oz) 91.4 kg (201 lb 9.6 oz) 88.9 kg (196 lb)     Body mass index is 33.64 kg/m².    Intake/Output Summary (Last 24 hours) at 8/5/2019 0830  Last data filed at 8/4/2019 1400  Gross per 24 hour   Intake 810 ml   Output 750 ml   Net 60 ml     No intake/output data recorded.        Objective    Physical Exam:   General Appearance: awake and alert, no acute distress   Lungs: respirations regular and respirations unlabored   Heart: normal S1, S2 and reg rhythm, HR 100s   Abdomen: soft or nontender, + bowel sounds    Skin: sternal incision clean, dry, intact; H site c/d/i   Neuro: alert and oriented, no focal deficits.     Results Review:        WBC WBC   Date Value Ref Range Status   08/05/2019 12.63 (H) 3.40 - 10.80 10*3/mm3 Final   08/03/2019 11.33 (H) 3.40 - 10.80 10*3/mm3 Final      HGB Hemoglobin   Date Value Ref Range Status   08/05/2019 10.5 (L) 12.0 - 15.9 g/dL Final   08/03/2019 11.4 (L) 12.0 - 15.9 g/dL Final      HCT Hematocrit   Date Value Ref Range Status   08/05/2019 33.7 (L) 34.0 - 46.6 % Final   08/03/2019 36.4 34.0 - 46.6 % Final      Platelets Platelets   Date Value Ref Range Status   08/05/2019 177 140 - 450 10*3/mm3 Final   08/03/2019 146 140 - 450 10*3/mm3 Final        PT/INR:  No results found for: PROTIME/No results found for: INR    Sodium Sodium   Date Value Ref Range Status   08/05/2019 136 136 - 145  mmol/L Final   08/04/2019 139 136 - 145 mmol/L Final   08/03/2019 134 (L) 136 - 145 mmol/L Final      Potassium Potassium   Date Value Ref Range Status   08/05/2019 3.8 3.5 - 5.2 mmol/L Final   08/04/2019 3.4 (L) 3.5 - 5.2 mmol/L Final   08/03/2019 3.7 3.5 - 5.2 mmol/L Final      Chloride Chloride   Date Value Ref Range Status   08/05/2019 97 (L) 98 - 107 mmol/L Final   08/04/2019 100 98 - 107 mmol/L Final   08/03/2019 95 (L) 98 - 107 mmol/L Final      Bicarbonate CO2   Date Value Ref Range Status   08/05/2019 25.1 22.0 - 29.0 mmol/L Final   08/04/2019 27.0 22.0 - 29.0 mmol/L Final   08/03/2019 22.0 22.0 - 29.0 mmol/L Final      BUN BUN   Date Value Ref Range Status   08/05/2019 76 (H) 8 - 23 mg/dL Final   08/04/2019 59 (H) 8 - 23 mg/dL Final   08/03/2019 44 (H) 8 - 23 mg/dL Final      Creatinine Creatinine   Date Value Ref Range Status   08/05/2019 3.60 (H) 0.57 - 1.00 mg/dL Final   08/04/2019 3.65 (H) 0.57 - 1.00 mg/dL Final   08/03/2019 3.00 (H) 0.57 - 1.00 mg/dL Final      Calcium Calcium   Date Value Ref Range Status   08/05/2019 8.7 8.6 - 10.5 mg/dL Final   08/04/2019 8.4 (L) 8.6 - 10.5 mg/dL Final   08/03/2019 8.7 8.6 - 10.5 mg/dL Final      Magnesium Magnesium   Date Value Ref Range Status   08/04/2019 2.1 1.6 - 2.4 mg/dL Final   08/03/2019 2.0 1.6 - 2.4 mg/dL Final            aspirin 81 mg Oral Daily   atorvastatin 40 mg Oral Nightly   bacitracin-polymyxin b  Topical Q24H   carvedilol 3.125 mg Oral Q12H   enoxaparin 30 mg Subcutaneous Daily   epoetin hermes 10,000 Units Intravenous Once per day on Mon Wed Fri   insulin glargine 30 Units Subcutaneous QAM   insulin lispro 0-7 Units Subcutaneous 4x Daily With Meals & Nightly   insulin lispro 9 Units Subcutaneous TID With Meals   ipratropium-albuterol 3 mL Nebulization 4x Daily - RT   lidocaine-prilocaine  Topical Once per day on Mon Wed Fri   miconazole  Topical Q12H   mupirocin 1 application Each Nare Daily   nystatin 1 application Topical Q12H               Patient Active Problem List   Diagnosis Code   • Menstrual disorder N92.6   • Atopic rhinitis J30.9   • Anemia in CKD (chronic kidney disease) N18.9, D63.1   • Spasm of cervical paraspinous muscle M62.838   • Cervical radiculopathy M54.12   • Chronic kidney disease, stage IV (severe) (CMS/[facility]) N18.4   • Depression F32.9   • DM type 2 with diabetic peripheral neuropathy (CMS/[facility]) E11.42   • Essential hypertension I10   • 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   • Gastroesophageal reflux disease K21.9   • Hyperlipidemia E78.5   • Hypertension I10   • Arthritis M19.90   • Seizure disorder (CMS/[facility]) G40.909   • Phlebitis I80.9   • Type 2 diabetes mellitus (CMS/[facility]) E11.9   • Vitamin D deficiency E55.9   • Chest pain R07.9   • Abscess L02.91   • Generalized muscle weakness M62.81   • Abdominal wall cellulitis L03.311   • HTN (hypertension) I10   • CKD (chronic kidney disease) stage 4, GFR 15-29 ml/min (CMS/[facility]) N18.4   • Diabetes mellitus with peripheral autonomic neuropathy (CMS/[facility]) E11.43   • Hyponatremia E87.1   • Hypercalcemia E83.52   • Dehydration E86.0   • DACIA (acute kidney injury) (CMS/[facility]) N17.9   • Abdominal wall abscess L02.211   • Cellulitis of toe of left foot L03.032   • Partial Achilles tendon tear, left, initial encounter S86.012A   • Arthritis of foot M19.079   • Essential tremor G25.0   • Primary Parkinsonism (CMS/[facility]) G20   • Abnormal cardiac function test R94.30   • 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 pectoris (CMS/[facility]) I25.118       Assessment & Plan    -multivessel CAD--- s/p CABGx4 POD#7 (Dr. Ferreira)   -HTN-- stable   -HLD-- statin  -DM with diabetic neuropathy, A1c 7.56, hospitalist following  -CKD, stage V--- nephrology following, LUE AV fistula, HD per nephrology  -chronic anemia due to CKD, acute on chronic due to expected betito-op blood loss, transfued with HD, stable   -psoriatic arthritis--- apremilast at home   -seizure disorder-- topamax at  home   -history of SVT/aflutter--- on eliquis pre-op, currently bradycardic   -leukocytosis--- afebrile, stable   -chronic foot wound, multiple areas with fungal infection---- seen by wound care and plastic surgery, nystatin powder  -likely undiagnosed DOMI, reported by family         Routine care.  Increase BB for HR/ BP control.   No HD today per nephrology.  Increase activity and encourage pulm toilet.   PT recs for rehab at discharge, patient now considering it.     Justin Esparza PA-C  08/05/19  8:30 AM

## 2019-08-06 VITALS
BODY MASS INDEX: 34.09 KG/M2 | DIASTOLIC BLOOD PRESSURE: 56 MMHG | HEART RATE: 74 BPM | TEMPERATURE: 98.7 F | SYSTOLIC BLOOD PRESSURE: 133 MMHG | RESPIRATION RATE: 16 BRPM | OXYGEN SATURATION: 97 % | WEIGHT: 198.6 LBS

## 2019-08-06 LAB
ALBUMIN SERPL-MCNC: 2.8 G/DL (ref 3.5–5.2)
ANION GAP SERPL CALCULATED.3IONS-SCNC: 15.9 MMOL/L (ref 5–15)
BASOPHILS # BLD AUTO: 0.06 10*3/MM3 (ref 0–0.2)
BASOPHILS NFR BLD AUTO: 0.4 % (ref 0–1.5)
BUN BLD-MCNC: 87 MG/DL (ref 8–23)
BUN/CREAT SERPL: 24.5 (ref 7–25)
CALCIUM SPEC-SCNC: 8.9 MG/DL (ref 8.6–10.5)
CHLORIDE SERPL-SCNC: 95 MMOL/L (ref 98–107)
CO2 SERPL-SCNC: 26.1 MMOL/L (ref 22–29)
CREAT BLD-MCNC: 3.55 MG/DL (ref 0.57–1)
DEPRECATED RDW RBC AUTO: 59.3 FL (ref 37–54)
EOSINOPHIL # BLD AUTO: 0.4 10*3/MM3 (ref 0–0.4)
EOSINOPHIL NFR BLD AUTO: 2.8 % (ref 0.3–6.2)
ERYTHROCYTE [DISTWIDTH] IN BLOOD BY AUTOMATED COUNT: 16.4 % (ref 12.3–15.4)
GFR SERPL CREATININE-BSD FRML MDRD: 13 ML/MIN/1.73
GFR SERPL CREATININE-BSD FRML MDRD: ABNORMAL ML/MIN/1.73
GLUCOSE BLD-MCNC: 154 MG/DL (ref 65–99)
GLUCOSE BLDC GLUCOMTR-MCNC: 137 MG/DL (ref 70–130)
GLUCOSE BLDC GLUCOMTR-MCNC: 150 MG/DL (ref 70–130)
GLUCOSE BLDC GLUCOMTR-MCNC: 198 MG/DL (ref 70–130)
HCT VFR BLD AUTO: 36.3 % (ref 34–46.6)
HGB BLD-MCNC: 10.6 G/DL (ref 12–15.9)
IMM GRANULOCYTES # BLD AUTO: 0.71 10*3/MM3 (ref 0–0.05)
IMM GRANULOCYTES NFR BLD AUTO: 5 % (ref 0–0.5)
LYMPHOCYTES # BLD AUTO: 1.44 10*3/MM3 (ref 0.7–3.1)
LYMPHOCYTES NFR BLD AUTO: 10.2 % (ref 19.6–45.3)
MAGNESIUM SERPL-MCNC: 2 MG/DL (ref 1.6–2.4)
MCH RBC QN AUTO: 29.1 PG (ref 26.6–33)
MCHC RBC AUTO-ENTMCNC: 29.2 G/DL (ref 31.5–35.7)
MCV RBC AUTO: 99.7 FL (ref 79–97)
MONOCYTES # BLD AUTO: 1.4 10*3/MM3 (ref 0.1–0.9)
MONOCYTES NFR BLD AUTO: 9.9 % (ref 5–12)
NEUTROPHILS # BLD AUTO: 10.17 10*3/MM3 (ref 1.7–7)
NEUTROPHILS NFR BLD AUTO: 71.7 % (ref 42.7–76)
NRBC BLD AUTO-RTO: 0.1 /100 WBC (ref 0–0.2)
PHOSPHATE SERPL-MCNC: 3.2 MG/DL (ref 2.5–4.5)
PLATELET # BLD AUTO: 179 10*3/MM3 (ref 140–450)
PMV BLD AUTO: 10.3 FL (ref 6–12)
POTASSIUM BLD-SCNC: 4 MMOL/L (ref 3.5–5.2)
RBC # BLD AUTO: 3.64 10*6/MM3 (ref 3.77–5.28)
SODIUM BLD-SCNC: 137 MMOL/L (ref 136–145)
WBC NRBC COR # BLD: 14.18 10*3/MM3 (ref 3.4–10.8)

## 2019-08-06 PROCEDURE — 97110 THERAPEUTIC EXERCISES: CPT

## 2019-08-06 PROCEDURE — 99238 HOSP IP/OBS DSCHRG MGMT 30/<: CPT | Performed by: INTERNAL MEDICINE

## 2019-08-06 PROCEDURE — 94799 UNLISTED PULMONARY SVC/PX: CPT

## 2019-08-06 PROCEDURE — 80069 RENAL FUNCTION PANEL: CPT | Performed by: INTERNAL MEDICINE

## 2019-08-06 PROCEDURE — 94799 UNLISTED PULMONARY SVC/PX: CPT | Performed by: NURSE PRACTITIONER

## 2019-08-06 PROCEDURE — 63710000001 INSULIN LISPRO (HUMAN) PER 5 UNITS: Performed by: HOSPITALIST

## 2019-08-06 PROCEDURE — 82962 GLUCOSE BLOOD TEST: CPT

## 2019-08-06 PROCEDURE — 85025 COMPLETE CBC W/AUTO DIFF WBC: CPT | Performed by: NURSE PRACTITIONER

## 2019-08-06 PROCEDURE — 83735 ASSAY OF MAGNESIUM: CPT | Performed by: INTERNAL MEDICINE

## 2019-08-06 PROCEDURE — 63710000001 INSULIN GLARGINE PER 5 UNITS: Performed by: HOSPITALIST

## 2019-08-06 RX ORDER — ATORVASTATIN CALCIUM 40 MG/1
40 TABLET, FILM COATED ORAL NIGHTLY
Qty: 30 TABLET | Refills: 0 | Status: SHIPPED | OUTPATIENT
Start: 2019-08-06 | End: 2019-09-05

## 2019-08-06 RX ORDER — INSULIN GLARGINE 100 [IU]/ML
33 INJECTION, SOLUTION SUBCUTANEOUS EVERY MORNING
Qty: 100 UNITS | Refills: 3 | Status: SHIPPED | OUTPATIENT
Start: 2019-08-07 | End: 2019-08-15

## 2019-08-06 RX ORDER — BUMETANIDE 2 MG/1
2 TABLET ORAL 2 TIMES DAILY
Qty: 60 TABLET | Refills: 0 | OUTPATIENT
Start: 2019-08-06 | End: 2021-05-09 | Stop reason: HOSPADM

## 2019-08-06 RX ORDER — CARVEDILOL 6.25 MG/1
6.25 TABLET ORAL EVERY 12 HOURS
Qty: 60 TABLET | Refills: 0 | Status: SHIPPED | OUTPATIENT
Start: 2019-08-06 | End: 2021-07-28 | Stop reason: SDUPTHER

## 2019-08-06 RX ORDER — PEN NEEDLE, DIABETIC 30 GX5/16"
1 NEEDLE, DISPOSABLE MISCELLANEOUS 3 TIMES DAILY
Qty: 100 EACH | Refills: 3 | Status: SHIPPED | OUTPATIENT
Start: 2019-08-06 | End: 2019-08-15

## 2019-08-06 RX ORDER — DOXYCYCLINE 100 MG/1
100 CAPSULE ORAL EVERY 12 HOURS SCHEDULED
Qty: 19 CAPSULE | Refills: 0 | Status: SHIPPED | OUTPATIENT
Start: 2019-08-06 | End: 2019-08-16

## 2019-08-06 RX ORDER — INSULIN GLARGINE 100 [IU]/ML
33 INJECTION, SOLUTION SUBCUTANEOUS EVERY MORNING
Status: DISCONTINUED | OUTPATIENT
Start: 2019-08-07 | End: 2019-08-06 | Stop reason: HOSPADM

## 2019-08-06 RX ORDER — BUMETANIDE 2 MG/1
2 TABLET ORAL 2 TIMES DAILY
Status: DISCONTINUED | OUTPATIENT
Start: 2019-08-06 | End: 2019-08-06 | Stop reason: HOSPADM

## 2019-08-06 RX ORDER — DOXYCYCLINE 100 MG/1
100 CAPSULE ORAL EVERY 12 HOURS SCHEDULED
Status: DISCONTINUED | OUTPATIENT
Start: 2019-08-06 | End: 2019-08-06 | Stop reason: HOSPADM

## 2019-08-06 RX ADMIN — BACITRACIN ZINC AND POLYMYXIN B SULFATE: at 10:00

## 2019-08-06 RX ADMIN — IPRATROPIUM BROMIDE AND ALBUTEROL SULFATE 3 ML: 2.5; .5 SOLUTION RESPIRATORY (INHALATION) at 07:30

## 2019-08-06 RX ADMIN — ASPIRIN 81 MG: 81 TABLET, COATED ORAL at 10:00

## 2019-08-06 RX ADMIN — INSULIN GLARGINE 30 UNITS: 100 INJECTION, SOLUTION SUBCUTANEOUS at 06:53

## 2019-08-06 RX ADMIN — CARVEDILOL 6.25 MG: 6.25 TABLET, FILM COATED ORAL at 10:53

## 2019-08-06 RX ADMIN — HYDROCODONE BITARTRATE AND ACETAMINOPHEN 1 TABLET: 5; 325 TABLET ORAL at 06:54

## 2019-08-06 RX ADMIN — HYDROCODONE BITARTRATE AND ACETAMINOPHEN 1 TABLET: 5; 325 TABLET ORAL at 01:08

## 2019-08-06 RX ADMIN — MICONAZOLE NITRATE: 2 POWDER TOPICAL at 10:00

## 2019-08-06 RX ADMIN — INSULIN LISPRO 10 UNITS: 100 INJECTION, SOLUTION INTRAVENOUS; SUBCUTANEOUS at 06:54

## 2019-08-06 RX ADMIN — INSULIN LISPRO 10 UNITS: 100 INJECTION, SOLUTION INTRAVENOUS; SUBCUTANEOUS at 11:37

## 2019-08-06 RX ADMIN — DOXYCYCLINE 100 MG: 100 CAPSULE ORAL at 10:53

## 2019-08-06 RX ADMIN — IPRATROPIUM BROMIDE AND ALBUTEROL SULFATE 3 ML: 2.5; .5 SOLUTION RESPIRATORY (INHALATION) at 12:00

## 2019-08-06 RX ADMIN — HYDROCODONE BITARTRATE AND ACETAMINOPHEN 1 TABLET: 5; 325 TABLET ORAL at 10:53

## 2019-08-06 RX ADMIN — INSULIN LISPRO 2 UNITS: 100 INJECTION, SOLUTION INTRAVENOUS; SUBCUTANEOUS at 11:37

## 2019-08-06 RX ADMIN — MUPIROCIN 1 APPLICATION: 20 OINTMENT TOPICAL at 10:00

## 2019-08-06 RX ADMIN — IPRATROPIUM BROMIDE AND ALBUTEROL SULFATE 3 ML: 2.5; .5 SOLUTION RESPIRATORY (INHALATION) at 16:32

## 2019-08-06 RX ADMIN — INSULIN LISPRO 2 UNITS: 100 INJECTION, SOLUTION INTRAVENOUS; SUBCUTANEOUS at 06:54

## 2019-08-06 RX ADMIN — HYDROCODONE BITARTRATE AND ACETAMINOPHEN 1 TABLET: 5; 325 TABLET ORAL at 16:03

## 2019-08-06 RX ADMIN — BUMETANIDE 2 MG: 2 TABLET ORAL at 10:53

## 2019-08-06 RX ADMIN — NYSTATIN 1 APPLICATION: 100000 POWDER TOPICAL at 10:00

## 2019-08-06 NOTE — PROGRESS NOTES
Daily progress note    Chief complaint  Doing better   No specific complaints  Family at bedside    History of present Illness  68-year-old white female with history of hypertension hyperlipidemia diabetes mellitus chronic kidney disease stage IV directly admitted to cardiology service with abnormal stress Cardiolite for cardiac catheterization and I am asked to follow the patient for medical problem.  Patient denies any chest pain but has shortness of breath with exertion.  Patient denies any fever chills abdominal pain nausea vomiting diarrhea.  Patient stated that she is very close for hemodialysis she already had AV fistula but her kidney function closely followed by nephrology.  Patient fully alert oriented and give me a detailed history.  I am asked to follow the patient for medical problems.     REVIEW OF SYSTEMS  Remarkable for generalized weakness    PHYSICAL EXAM  Blood pressure 114/68, pulse 101, temperature 98.4 °F (36.9 °C), temperature source Oral, resp. rate 16, weight 90.1 kg (198 lb 9.6 oz), SpO2 96 %, not currently breastfeeding.    General awake and alert  Cardiovascular: Normal rate and regular rhythm.    Pulmonary/Chest:  Moving air bilaterally  Abdominal: Soft.  Soft nontender bowel is most  Extremities no edema    LAB RESULTS  Lab Results (last 24 hours)     Procedure Component Value Units Date/Time    POC Glucose Once [[id number]]  (Abnormal) Collected:  08/06/19 1026    Specimen:  Blood Updated:  08/06/19 1027     Glucose 198 mg/dL     POC Glucose Once [[id number]]  (Abnormal) Collected:  08/06/19 0534    Specimen:  Blood Updated:  08/06/19 0536     Glucose 150 mg/dL     Renal Function Panel [[id number]]  (Abnormal) Collected:  08/06/19 0301    Specimen:  Blood Updated:  08/06/19 0428     Glucose 154 mg/dL      BUN 87 mg/dL      Creatinine 3.55 mg/dL      Sodium 137 mmol/L      Potassium 4.0 mmol/L      Chloride 95 mmol/L      CO2 26.1 mmol/L      Calcium 8.9 mg/dL      Albumin 2.80 g/dL       Phosphorus 3.2 mg/dL      Anion Gap 15.9 mmol/L      BUN/Creatinine Ratio 24.5     eGFR Non African Amer 13 mL/min/1.73      Comment: <15 Indicative of kidney failure.        eGFR   Amer -- mL/min/1.73      Comment: <15 Indicative of kidney failure.       Narrative:       GFR Normal >60  Chronic Kidney Disease <60  Kidney Failure <15    Magnesium [[id number]]  (Normal) Collected:  08/06/19 0301    Specimen:  Blood Updated:  08/06/19 0416     Magnesium 2.0 mg/dL     CBC & Differential [[id number]] Collected:  08/06/19 0301    Specimen:  Blood Updated:  08/06/19 0351    Narrative:       The following orders were created for panel order CBC & Differential.  Procedure                               Abnormality         Status                     ---------                               -----------         ------                     CBC Auto Differential[[id number]]        Abnormal            Final result                 Please view results for these tests on the individual orders.    CBC Auto Differential [[id number]]  (Abnormal) Collected:  08/06/19 0301    Specimen:  Blood Updated:  08/06/19 0351     WBC 14.18 10*3/mm3      RBC 3.64 10*6/mm3      Hemoglobin 10.6 g/dL      Hematocrit 36.3 %      MCV 99.7 fL      MCH 29.1 pg      MCHC 29.2 g/dL      RDW 16.4 %      RDW-SD 59.3 fl      MPV 10.3 fL      Platelets 179 10*3/mm3      Neutrophil % 71.7 %      Lymphocyte % 10.2 %      Monocyte % 9.9 %      Eosinophil % 2.8 %      Basophil % 0.4 %      Immature Grans % 5.0 %      Neutrophils, Absolute 10.17 10*3/mm3      Lymphocytes, Absolute 1.44 10*3/mm3      Monocytes, Absolute 1.40 10*3/mm3      Eosinophils, Absolute 0.40 10*3/mm3      Basophils, Absolute 0.06 10*3/mm3      Immature Grans, Absolute 0.71 10*3/mm3      nRBC 0.1 /100 WBC     POC Glucose Once [[id number]]  (Abnormal) Collected:  08/05/19 2002    Specimen:  Blood Updated:  08/05/19 2004     Glucose 239 mg/dL     POC Glucose Once [[id number]]  (Abnormal) Collected:   08/05/19 1545    Specimen:  Blood Updated:  08/05/19 1546     Glucose 339 mg/dL         Imaging Results (last 24 hours)     ** No results found for the last 24 hours. **        ECG 12 Lead         ECG 12 Lead  Date/Time: 6/6/2019 11:21 AM  Performed by: Eddie Hodges MD  Authorized by: Eddie Hodges MD   Comparison: compared with previous ECG   Similar to previous ECG  Rhythm: sinus rhythm  ST Flattening: all  Clinical impression: non-specific ECG             Current Facility-Administered Medications:   •  acetaminophen (TYLENOL) tablet 650 mg, 650 mg, Oral, Q4H PRN **OR** acetaminophen (TYLENOL) 160 MG/5ML solution 650 mg, 650 mg, Oral, Q4H PRN **OR** acetaminophen (TYLENOL) suppository 650 mg, 650 mg, Rectal, Q4H PRN, Gordo Ferreira MD  •  aspirin EC tablet 81 mg, 81 mg, Oral, Daily, Gordo Ferreira MD, 81 mg at 08/06/19 1000  •  atorvastatin (LIPITOR) tablet 40 mg, 40 mg, Oral, Nightly, Gordo Ferreira MD, 40 mg at 08/05/19 2130  •  bacitracin-polymyxin b (POLYSPORIN) ointment, , Topical, Q24H, José Antonio Michael MD  •  bisacodyl (DULCOLAX) EC tablet 10 mg, 10 mg, Oral, Daily PRN, Gordo Ferreira MD  •  bisacodyl (DULCOLAX) suppository 10 mg, 10 mg, Rectal, Daily PRN, Gordo Ferreira MD  •  bumetanide (BUMEX) tablet 2 mg, 2 mg, Oral, BID, Flory Watt MD, 2 mg at 08/06/19 1053  •  carvedilol (COREG) tablet 6.25 mg, 6.25 mg, Oral, Q12H, Justin Esparza PA-C, 6.25 mg at 08/06/19 1053  •  doxycycline (MONODOX) capsule 100 mg, 100 mg, Oral, Q12H, Justin Esparza PA-C, 100 mg at 08/06/19 1053  •  enoxaparin (LOVENOX) syringe 30 mg, 30 mg, Subcutaneous, Daily, Gordo Ferreira MD, 30 mg at 08/05/19 1712  •  epoetin hermes (EPOGEN,PROCRIT) injection 20,000 Units, 20,000 Units, Subcutaneous, Once, Flory Watt MD  •  HYDROcodone-acetaminophen (NORCO) 5-325 MG per tablet 1 tablet, 1 tablet, Oral, Q4H PRN, Justin Esparza PA-C, 1 tablet  at 08/06/19 1053  •  insulin glargine (LANTUS) injection 30 Units, 30 Units, Subcutaneous, QAM, Micha Vyas MD, 30 Units at 08/06/19 0653  •  insulin lispro (humaLOG) injection 0-7 Units, 0-7 Units, Subcutaneous, 4x Daily With Meals & Nightly, Micha Vyas MD, 2 Units at 08/06/19 1137  •  insulin lispro (humaLOG) injection 10 Units, 10 Units, Subcutaneous, TID With Meals, Micha Vyas MD, 10 Units at 08/06/19 1137  •  ipratropium-albuterol (DUO-NEB) nebulizer solution 3 mL, 3 mL, Nebulization, 4x Daily - RT, Justin Esparza PA-C, 3 mL at 08/06/19 1200  •  miconazole (MICOTIN) 2 % powder, , Topical, Q12H, Gordo Ferreira MD  •  mupirocin (BACTROBAN) 2 % nasal ointment 1 application, 1 application, Each Nare, Daily, Gordo Ferreira MD, 1 application at 08/06/19 1000  •  [DISCONTINUED] morphine injection 1 mg, 1 mg, Intravenous, Q4H PRN **AND** naloxone (NARCAN) injection 0.4 mg, 0.4 mg, Intravenous, Q5 Min PRN, Gordo Ferreira MD  •  nystatin (MYCOSTATIN) powder 1 application, 1 application, Topical, Q12H, José Antonio Michael MD, 1 application at 08/06/19 1000  •  ondansetron (ZOFRAN) injection 4 mg, 4 mg, Intravenous, Q6H PRN, Gordo Ferreira MD  •  polyethylene glycol 3350 powder (packet), 17 g, Oral, Daily PRN, Vinny Zaragoza MD     ASSESSMENT  Multivessel coronary artery disease post CABG pod #8  Hypertension  Hyperlipidemia  Diabetes mellitus blood sugar very well controlled  Chronic anemia  Chronic kidney disease stage IV on hemodialysis  Gastroesophageal reflux disease    PLAN  CPM  Postop care  Hemodialysis PRN  Insulin dose adjusted  Accu-Chek with sliding scale insulin  Stress ulcer DVT prophylaxis  Discussed with family   PT/OT  Okay to discharge on current dose of insulin    MICHA VYAS MD

## 2019-08-06 NOTE — PROGRESS NOTES
NEPHROLOGY PROGRESS NOTE    PATIENT IDENTIFICATION:   Name:  Maribeth Rendon      MRN:  [id number]     68 y.o.  female             Reason for visit: DACIA    SUBJECTIVE:   Feels better. Walked with PT.  Urinating a lot.  A little winded.  Eating. Wants to go home.  OBJECTIVE:  Vitals:    08/06/19 0307 08/06/19 0500 08/06/19 0700 08/06/19 0730   BP: 122/59  139/64    BP Location: Right arm  Right arm    Patient Position: Lying  Sitting    Pulse: 76  76 74   Resp: 16  16 16   Temp: 97.8 °F (36.6 °C)  98.4 °F (36.9 °C)    TempSrc: Oral  Oral    SpO2: 95%  96% 96%   Weight:  90.1 kg (198 lb 9.6 oz)       FiO2 (%): 39 %     Body mass index is 34.09 kg/m².    Intake/Output Summary (Last 24 hours) at 8/6/2019 1034  Last data filed at 8/6/2019 0823  Gross per 24 hour   Intake 990 ml   Output 500 ml   Net 490 ml         Exam:  General Appearance: Chronically ill-appearing; No myoclonic movements.  Much more awake and interactive,. Looks older than stated age .  Skin: warm and dry  HEENT: Anicteric.   Neck:no JVD  Lungs: Coarse bs, rales at left base. No wheezes .  Heart: RRR. normal S1 and S2, no S3. No rub  Abdomen: soft, non-tender, +bs.   Extremities: 1+ pretibial edema.    AV fistula in the left upper arm very bruised.   Vasc: right foot wrapped  Neuro: Awake, alert, more conversant . moves all extremities        Scheduled meds:      aspirin 81 mg Oral Daily   atorvastatin 40 mg Oral Nightly   bacitracin-polymyxin b  Topical Q24H   bumetanide 2 mg Oral BID   carvedilol 6.25 mg Oral Q12H   doxycycline 100 mg Oral Q12H   enoxaparin 30 mg Subcutaneous Daily   epoetin hermes/hermes-epbx 20,000 Units Subcutaneous Once   insulin glargine 30 Units Subcutaneous QAM   insulin lispro 0-7 Units Subcutaneous 4x Daily With Meals & Nightly   insulin lispro 10 Units Subcutaneous TID With Meals   ipratropium-albuterol 3 mL Nebulization 4x Daily - RT   miconazole  Topical Q12H   mupirocin 1 application Each Nare Daily   nystatin 1 application  Topical Q12H     IV meds:                             Data Review:    Results from last 7 days   Lab Units 08/06/19  0301 08/05/19 0343 08/04/19 0308 08/02/19 0445 07/31/19  0305   SODIUM mmol/L 137 136 139   < > 135*   < > 136   POTASSIUM mmol/L 4.0 3.8 3.4*   < > 3.4*   < > 4.8   CHLORIDE mmol/L 95* 97* 100   < > 96*   < > 98   CO2 mmol/L 26.1 25.1 27.0   < > 24.7   < > 18.4*   BUN mg/dL 87* 76* 59*   < > 50*   < > 81*   CREATININE mg/dL 3.55* 3.60* 3.65*   < > 4.82*   < > 5.22*   CALCIUM mg/dL 8.9 8.7 8.4*   < > 7.6*   < > 7.4*   BILIRUBIN mg/dL  --   --   --   --  0.2  --  0.2   ALK PHOS U/L  --   --   --   --  80  --  61   ALT (SGPT) U/L  --   --   --   --  <5  --  <5   AST (SGOT) U/L  --   --   --   --  15  --  24   GLUCOSE mg/dL 154* 99 85   < > 174*   < > 321*    < > = values in this interval not displayed.       Estimated Creatinine Clearance: 16.5 mL/min (A) (by C-G formula based on SCr of 3.55 mg/dL (H)).    Results from last 7 days   Lab Units 08/06/19 0301 08/05/19 0343 08/04/19 0308 08/03/19 0911   MAGNESIUM mg/dL 2.0  --  2.1 2.0   PHOSPHORUS mg/dL 3.2 3.3 2.9 3.7       Results from last 7 days   Lab Units 08/06/19 0301 08/05/19 0343 08/03/19 0911 08/02/19 0445 08/01/19  0416   WBC 10*3/mm3 14.18* 12.63* 11.33* 10.49 12.62*   HEMOGLOBIN g/dL 10.6* 10.5* 11.4* 7.8* 7.8*   PLATELETS 10*3/mm3 179 177 146 135* 136*                   ASSESSMENT:   1.  Stage V chronic kidney disease.  Creatinine at plateau.  No uremic signs or symptoms.  Last HD   AVF LUE. May be starting to make more urine today .  2.  CABG 7/29.    3.  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 Not controlled. On scheduled lantus. Meal time insulin added today . DW Dr. Vyas.  He is to address her home insulin as she was on oral agents at home.   4.  Hypertension, controlled on low dose coreg.   5.   Anemia of chronic kidney disease;  Hg stable after PRBC last week and procrit with HD.  Gets outpt procrit q 2 weeks.   Scheduled for Thursday.  Offered today, but she wants to wait.   6.  PVD: wound right foot         PLAN:  1. Ok for dc  2. No outpt HD for now  3. Labs Thursday here at Outpt lab.  BMP and CBC  4. Appt with IRENA Lu at our office Aug 21 at 9:50 am  5. Appt with Dr. Mcclain Sept 25 at 2:20 pm.   6. Dr. Vyas to address insulin  7. Bumex 2 mg bid at home  8. No lasix, no ziac, no neurontin, no oral agents for DM2, no norvasc.      Flory Watt MD  8/6/2019    10:34 AM

## 2019-08-06 NOTE — THERAPY TREATMENT NOTE
Acute Care - Physical Therapy Treatment Note  [facility]     Patient Name: Maribeth Rendon  : 1950  MRN: [id number]  Today's Date: 2019  Onset of Illness/Injury or Date of Surgery: 19  Date of Referral to PT: 19  Referring Physician: Jhon    Admit Date: 2019    Visit Dx:    ICD-10-CM ICD-9-CM   1. 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 pectoris, unspecified vessel or lesion type (CMS/HCC) I25.118 414.01     413.9   2. Abnormal cardiac function test R94.30 794.30   3. Impaired functional mobility and activity tolerance Z74.09 V49.89     Patient Active Problem List   Diagnosis   • Menstrual disorder   • Atopic rhinitis   • Anemia in CKD (chronic kidney disease)   • Spasm of cervical paraspinous muscle   • Cervical radiculopathy   • Chronic kidney disease, stage IV (severe) (CMS/[facility])   • Depression   • DM type 2 with diabetic peripheral neuropathy (CMS/[facility])   • Essential hypertension   • Duplay's periarthritis syndrome   • Gastroesophageal reflux disease   • Hyperlipidemia   • Hypertension   • Arthritis   • Seizure disorder (CMS/HCC)   • Phlebitis   • Type 2 diabetes mellitus (CMS/HCC)   • Vitamin D deficiency   • Chest pain   • Abscess   • Generalized muscle weakness   • Abdominal wall cellulitis   • HTN (hypertension)   • CKD (chronic kidney disease) stage 4, GFR 15-29 ml/min (CMS/HCC)   • Diabetes mellitus with peripheral autonomic neuropathy (CMS/HCC)   • Hyponatremia   • Hypercalcemia   • Dehydration   • DACIA (acute kidney injury) (CMS/[facility])   • Abdominal wall abscess   • Cellulitis of toe of left foot   • Partial Achilles tendon tear, left, initial encounter   • Arthritis of foot   • Essential tremor   • Primary Parkinsonism (CMS/HCC)   • Abnormal cardiac function test   • 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 pectoris (CMS/HCC)       Therapy Treatment    Rehabilitation Treatment Summary     Row Name 19 0835             Treatment  Time/Intention    Discipline  physical therapist  -MA      Document Type  therapy note (daily note)  -MA      Subjective Information  no complaints  -MA      Mode of Treatment  physical therapy;individual therapy  -MA      Patient/Family Observations  up in chair, no acute distress  -MA      Patient Effort  good  -MA      Existing Precautions/Restrictions  cardiac;fall;sternal  -MA      Recorded by [MA] Iva Britt, PT 08/06/19 0907      Row Name 08/06/19 0835             Vital Signs    O2 Delivery Pre Treatment  room air  -MA      Post SpO2 (%)  95  -MA      O2 Delivery Post Treatment  room air  -MA      Recorded by [MA] Iva Britt, PT 08/06/19 0907      Row Name 08/06/19 0835             Cognitive Assessment/Intervention- PT/OT    Orientation Status (Cognition)  oriented x 4  -MA      Follows Commands (Cognition)  WNL  -MA      Safety Deficit (Cognitive)  mild deficit;insight into deficits/self awareness  -MA      Recorded by [MA] Iva Britt, PT 08/06/19 0907      Row Name 08/06/19 0835             Bed Mobility Assessment/Treatment    Comment (Bed Mobility)  NT- UI  -MA      Recorded by [MA] Iva Britt, PT 08/06/19 0907      Row Name 08/06/19 0835             Sit-Stand Transfer    Sit-Stand [place] (Transfers)  contact guard  -MA      Assistive Device (Sit-Stand Transfers)  walker, front-wheeled  -MA      Recorded by [MA] Iva Britt, PT 08/06/19 0907      Row Name 08/06/19 0835             Stand-Sit Transfer    Stand-Sit [place] (Transfers)  supervision  -MA      Assistive Device (Stand-Sit Transfers)  walker, front-wheeled  -MA      Recorded by [MA] Iva Britt, PT 08/06/19 0907      Row Name 08/06/19 0835             Gait/Stairs Assessment/Training    [place] Level (Gait)  supervision  -MA      Assistive Device (Gait)  walker, front-wheeled  -MA      Distance in Feet (Gait)  100  -MA      Pattern (Gait)  step-through  -MA      Deviations/Abnormal Patterns (Gait)  base of support,  wide;marlene decreased;stride length decreased  -MA      Recorded by [MA] Iva Britt, PT 08/06/19 0907      Row Name 08/06/19 0835             Therapeutic Exercise    Comment (Therapeutic Exercise)  5 reps cardiac protocol   -MA      Recorded by [MA] Iva Britt, PT 08/06/19 0907      Row Name 08/06/19 0835             Positioning and Restraints    Pre-Treatment Position  sitting in chair/recliner  -MA      Post Treatment Position  chair  -MA      In Chair  sitting;call light within reach;encouraged to call for assist;with family/caregiver no alarm on when entering room   -MA      Recorded by [MA] Iva Britt, PT 08/06/19 0907      Row Name 08/06/19 0835             Pain Scale: Numbers Pre/Post-Treatment    Pain Scale: Numbers, Pretreatment  2/10  -MA      Pain Scale: Numbers, Post-Treatment  2/10  -MA      Pre/Post Treatment Pain Comment  pt reports her L shoulder blade feels better  -MA      Recorded by [MA] Iva Britt, PT 08/06/19 0907      Row Name                Wound 07/29/19 0800 Bilateral chest incision    Wound - Properties Group Date first assessed: 07/29/19 [TL] Time first assessed: 0800 [TL] Side: Bilateral [TL] Location: chest [TL] Type: incision [TL] Recorded by:  [TL] Kylah Ruiz RN 07/29/19 0800    Row Name                Wound 07/29/19 0800 Right leg incision    Wound - Properties Group Date first assessed: 07/29/19 [TL] Time first assessed: 0800 [TL] Side: Right [TL] Location: leg [TL] Type: incision [TL] Recorded by:  [TL] Kylah Ruiz RN 07/29/19 0800    Row Name                Wound 07/29/19 1245 upper sternal incision    Wound - Properties Group Date first assessed: 07/29/19 [SM] Time first assessed: 1245 [SM] Orientation: upper [SM] Location: sternal [SM] Present On Admission : no [SM] Type: incision [SM] Recorded by:  [SM] Kiesha Escobedo RN 07/29/19 1441    Row Name                Wound 07/30/19 1300 Bilateral gluteal unspecified    Wound - Properties  Group Date first assessed: 07/30/19 [AA] Time first assessed: 1300 [AA] Side: Bilateral [AA] Location: gluteal [AA] Stage, Pressure Injury: deep tissue injury [AA] Type: unspecified [AA] Recorded by:  [AA] Neena Altman RN 07/31/19 0903      User Key  (r) = Recorded By, (t) = Taken By, (c) = Cosigned By    Initials Name Effective Dates Discipline    AA Neena Altman, RN 06/16/16 -  Nurse    Kylah Clark RN 06/16/16 -  Nurse    Kiehsa Johnson RN 06/16/16 -  Nurse    Iva Vegas, PT 10/19/18 -  PT          Rash 07/30/19 1300 other (see comments) breast other (see comments) (Active)   Distribution localized 8/6/2019  8:23 AM   Borders irregular 8/6/2019  8:23 AM   Characteristics crusted;moist 8/6/2019  8:23 AM   Color red 8/6/2019  8:23 AM       Wound 07/29/19 0800 Bilateral chest incision (Active)   Dressing Appearance open to air 8/6/2019  8:23 AM   Closure Approximated;Open to air 8/6/2019  8:23 AM   Base clean;dry;pink 8/6/2019  8:23 AM   Periwound intact;dry 8/6/2019  8:23 AM   Periwound Temperature warm 8/6/2019  8:23 AM   Drainage Amount none 8/6/2019  8:23 AM   Dressing Care, Wound open to air 8/6/2019  3:03 AM       Wound 07/29/19 0800 Right leg incision (Active)   Dressing Appearance open to air 8/6/2019  8:23 AM   Closure Liquid skin adhesive;Open to air 8/6/2019  8:23 AM   Base dry;scab 8/5/2019  4:00 PM   Periwound intact;dry;redness 8/6/2019  8:23 AM   Periwound Temperature warm 8/6/2019  8:23 AM   Drainage Amount none 8/6/2019  8:23 AM   Dressing Care, Wound open to air 8/6/2019  3:03 AM       Wound 07/29/19 1245 upper sternal incision (Active)   Dressing Appearance dry;intact 8/6/2019  8:23 AM   Closure Approximated;Open to air 8/6/2019  8:23 AM   Base red;dry 8/6/2019  8:23 AM   Periwound intact;dry;pink 8/5/2019  4:00 PM   Periwound Temperature warm 8/5/2019  4:00 PM   Drainage Amount none 8/5/2019  4:00 PM       Wound 07/30/19 1300 Bilateral gluteal unspecified  (Active)   Dressing Appearance open to air 8/6/2019  8:23 AM   Closure Open to air 8/6/2019  8:23 AM   Base pink;moist;red 8/6/2019  8:23 AM   Periwound intact;dry;pink 8/6/2019  8:23 AM   Periwound Temperature warm 8/6/2019  8:23 AM   Drainage Amount none 8/6/2019  8:23 AM   Dressing Care, Wound open to air 8/6/2019  3:03 AM       Rehab Goal Summary     Row Name 08/06/19 0900             Cardiac Level Goal (PT)    Cardiac Level (Cardiac Goal, PT)  Level 4  -MA      Time Frame (Cardiac Goal, PT)  1 week  -MA      Progress/Outcomes (Cardiac Goal, PT)  goal ongoing  -MA        User Key  (r) = Recorded By, (t) = Taken By, (c) = Cosigned By    Initials Name Provider Type Discipline    Iva Vegas, PT Physical Therapist PT          Physical Therapy Education     Title: PT OT SLP Therapies (Done)     Topic: Physical Therapy (Done)     Point: Mobility training (Done)     Learning Progress Summary           Patient Acceptance, E, VU,NR by MA at 8/6/2019  9:07 AM    Acceptance, E,D, NR by PC at 8/5/2019 10:16 AM    Acceptance, E,D, VU,DU by  at 8/4/2019  9:42 AM    Acceptance, E,D, VU,DU by JR at 8/3/2019 11:50 AM    Acceptance, E, NR by EM at 8/2/2019 10:28 AM    Acceptance, E, NR by EM at 8/1/2019  9:37 AM    Acceptance, E, NR by EM at 7/31/2019 11:05 AM    Acceptance, E,D, NR by PC at 7/30/2019  9:13 AM   Family Acceptance, E,D, VU,DU by JR at 8/4/2019  9:42 AM    Acceptance, E,D, VU,DU by JR at 8/3/2019 11:50 AM                   Point: Home exercise program (Done)     Learning Progress Summary           Patient Acceptance, E, VU,NR by MA at 8/6/2019  9:07 AM    Acceptance, E,D, NR by PC at 8/5/2019 10:16 AM    Acceptance, E,D, VU,DU by JR at 8/4/2019  9:42 AM    Acceptance, E,D, VU,DU by JR at 8/3/2019 11:50 AM    Acceptance, E, NR by EM at 8/2/2019 10:28 AM    Acceptance, E, NR by EM at 8/1/2019  9:37 AM    Acceptance, E, NR by EM at 7/31/2019 11:05 AM    Acceptance, ED, NR by PC at 7/30/2019  9:13 AM   Family  Acceptance, E,D, VU,DU by JR at 8/4/2019  9:42 AM    Acceptance, E,D, VU,DU by JR at 8/3/2019 11:50 AM                   Point: Body mechanics (Done)     Learning Progress Summary           Patient Acceptance, E, VU,NR by MA at 8/6/2019  9:07 AM    Acceptance, E,D, NR by PC at 8/5/2019 10:16 AM    Acceptance, E,D, VU,DU by JR at 8/4/2019  9:42 AM    Acceptance, E,D, VU,DU by JR at 8/3/2019 11:50 AM    Acceptance, E,D, NR by PC at 7/30/2019  9:13 AM   Family Acceptance, E,D, VU,DU by JR at 8/4/2019  9:42 AM    Acceptance, E,D, VU,DU by JR at 8/3/2019 11:50 AM                   Point: Precautions (Done)     Learning Progress Summary           Patient Acceptance, E, VU,NR by MA at 8/6/2019  9:07 AM    Acceptance, E,D, NR by PC at 8/5/2019 10:16 AM    Acceptance, E,D, VU,DU by JR at 8/4/2019  9:42 AM    Acceptance, E,D, VU,DU by JR at 8/3/2019 11:50 AM    Acceptance, E,D, NR by PC at 7/30/2019  9:13 AM   Family Acceptance, E,D, VU,DU by JR at 8/4/2019  9:42 AM    Acceptance, E,D, VU,DU by JR at 8/3/2019 11:50 AM                               User Key     Initials Effective Dates Name Provider Type Discipline    PC 04/03/18 -  Kayla Trent, PT Physical Therapist PT    EM 04/03/18 -  Shanique Monreal, PT Physical Therapist PT    JR 04/03/18 -  Giovanny Rucker, PT Physical Therapist PT    MA 10/19/18 -  Iva Britt, PT Physical Therapist PT                PT Recommendation and Plan     Outcome Summary: Continues to demonstrate improved mobility. Possible dc home today. Recommend pt continue to use RWx at all times.   Outcome Measures     Row Name 08/06/19 0900 08/05/19 1000 08/04/19 0943       How much help from another person do you currently need...    Turning from your back to your side while in flat bed without using bedrails?  3  -MA  3  -PC  3  -JR    Moving from lying on back to sitting on the side of a flat bed without bedrails?  3  -MA  3  -PC  3  -JR    Moving to and from a bed to a chair (including a  wheelchair)?  3  -MA  3  -PC  3  -JR    Standing up from a chair using your arms (e.g., wheelchair, bedside chair)?  3  -MA  3  -PC  3  -JR    Climbing 3-5 steps with a railing?  2  -MA  2  -PC  2  -JR    To walk in hospital room?  3  -MA  3  -PC  3  -JR    AM-PAC 6 Clicks Score (PT)  17  -MA  17  -PC  17  -JR       Functional Assessment    Outcome Measure Options  AM-PAC 6 Clicks Basic Mobility (PT)  -MA  --  AM-PAC 6 Clicks Basic Mobility (PT)  (Significant)   -JR    Row Name 08/03/19 1151             How much help from another person do you currently need...    Turning from your back to your side while in flat bed without using bedrails?  3  -JR      Moving from lying on back to sitting on the side of a flat bed without bedrails?  3  -JR      Moving to and from a bed to a chair (including a wheelchair)?  3  -JR      Standing up from a chair using your arms (e.g., wheelchair, bedside chair)?  3  -JR      Climbing 3-5 steps with a railing?  1  -JR      To walk in hospital room?  3  -JR      AM-PAC 6 Clicks Score (PT)  16  -JR        User Key  (r) = Recorded By, (t) = Taken By, (c) = Cosigned By    Initials Name Provider Type    Kayla Kramer, PT Physical Therapist    JR Giovanny Rucker, PT Physical Therapist    Iva Vegas, PT Physical Therapist         Time Calculation:   PT Charges     Row Name 08/06/19 0908             Time Calculation    Start Time  0825  -MA      Stop Time  0835  -MA      Time Calculation (min)  10 min  -MA      PT Received On  08/06/19  -MA      PT - Next Appointment  08/07/19  -MA      PT Goal Re-Cert Due Date  08/13/19  -MA         Time Calculation- PT    Total Timed Code Minutes- PT  10 minute(s)  -MA        User Key  (r) = Recorded By, (t) = Taken By, (c) = Cosigned By    Initials Name Provider Type    Iva Vegas, PT Physical Therapist        Therapy Charges for Today     Code Description Service Date Service Provider Modifiers Qty    [id number] HC PT THER PROC EA 15 MIN  8/6/2019 Iva Britt, PT GP 1          PT G-Codes  Outcome Measure Options: AM-PAC 6 Clicks Basic Mobility (PT)  AM-PAC 6 Clicks Score (PT): 17  AM-PAC 6 Clicks Score (OT): 10    Iva Britt, PT  8/6/2019

## 2019-08-06 NOTE — DISCHARGE INSTR - ACTIVITY
Continue to use your incentive spirometer for an additional 2 weeks.  Continue to wear your DO hose for an additional 2 weeks. You may remove them at night.  Walk 10 minutes at a time at least 3 times a day.  Do not drive for 2 weeks and no longer taking narcotics.  Do not lift, push or pull greater than 10 pounds for 6 weeks.  You may shower, but do not submerge your incisions until your surgeon approves (i.e., no baths, pools, hot tubs, etc.).  Clean your incision daily in the shower with Dial or Ivory soap. Do not put any additional lotions, creams, or any other substance on your incision without your surgeon's approval.

## 2019-08-06 NOTE — PROGRESS NOTES
Case Management Discharge Note    Final Note: Pt d/c home with A  scheduled to follow.    Destination      No service has been selected for the patient.      Durable Medical Equipment      No service has been selected for the patient.      Dialysis/Infusion      No service has been selected for the patient.      Home Medical Care - Selection Complete      Service Provider Request Status Selected Services Address Phone Number Fax Number    [facility] [address] [place] [address] [phone]      Therapy      No service has been selected for the patient.      Community Resources      No service has been selected for the patient.        Transportation Services  Private: Car    Final Discharge Disposition Code: 06 - home with home health care

## 2019-08-06 NOTE — PLAN OF CARE
Problem: Patient Care Overview  Goal: Plan of Care Review   08/06/19 0907   OTHER   Outcome Summary Continues to demonstrate improved mobility. Possible dc home today. Recommend pt continue to use RWx at all times. Able to recall post op cardiac protocol.

## 2019-08-06 NOTE — PROGRESS NOTES
LOS: 11 days   Patient Care Team:  Robby Chaney MD as PCP - General  Robby Chaney MD as PCP - Family Medicine  Eddie Hodges MD as Consulting Physician (Cardiology)    Chief Complaint:   Post-op follow-up, s/p CABG    Subjective  Sitting up in chair. Feeling better today. Wants to go home. No new complaints.    Vital Signs  Temp:  [97.8 °F (36.6 °C)-98.5 °F (36.9 °C)] 98.4 °F (36.9 °C)  Heart Rate:  [74-90] 74  Resp:  [16-20] 16  BP: (122-148)/(57-73) 139/64      08/02/19  0600 08/03/19  0645 08/06/19  0500   Weight: 91.4 kg (201 lb 9.6 oz) 88.9 kg (196 lb) 90.1 kg (198 lb 9.6 oz)     Body mass index is 34.09 kg/m².    Intake/Output Summary (Last 24 hours) at 8/6/2019 0821  Last data filed at 8/6/2019 0700  Gross per 24 hour   Intake 610 ml   Output 500 ml   Net 110 ml     No intake/output data recorded.        Objective    Physical Exam:   General Appearance: awake and alert, no acute distress   Lungs: respirations regular and respirations unlabored   Heart: regular rhythm & normal rate and normal S1, S2   Abdomen: soft or nontender, + bowel sounds    Skin: sternal incision clean, dry, intact; EVH site c/d/i, erythematous/warm to touch, no drainage, not exquisitely tender   Neuro: alert and oriented, no focal deficits.     Results Review:        WBC WBC   Date Value Ref Range Status   08/06/2019 14.18 (H) 3.40 - 10.80 10*3/mm3 Final   08/05/2019 12.63 (H) 3.40 - 10.80 10*3/mm3 Final   08/03/2019 11.33 (H) 3.40 - 10.80 10*3/mm3 Final      HGB Hemoglobin   Date Value Ref Range Status   08/06/2019 10.6 (L) 12.0 - 15.9 g/dL Final   08/05/2019 10.5 (L) 12.0 - 15.9 g/dL Final   08/03/2019 11.4 (L) 12.0 - 15.9 g/dL Final      HCT Hematocrit   Date Value Ref Range Status   08/06/2019 36.3 34.0 - 46.6 % Final   08/05/2019 33.7 (L) 34.0 - 46.6 % Final   08/03/2019 36.4 34.0 - 46.6 % Final      Platelets Platelets   Date Value Ref Range Status   08/06/2019 179 140 - 450 10*3/mm3 Final    08/05/2019 177 140 - 450 10*3/mm3 Final   08/03/2019 146 140 - 450 10*3/mm3 Final        PT/INR:  No results found for: PROTIME/No results found for: INR    Sodium Sodium   Date Value Ref Range Status   08/06/2019 137 136 - 145 mmol/L Final   08/05/2019 136 136 - 145 mmol/L Final   08/04/2019 139 136 - 145 mmol/L Final   08/03/2019 134 (L) 136 - 145 mmol/L Final      Potassium Potassium   Date Value Ref Range Status   08/06/2019 4.0 3.5 - 5.2 mmol/L Final   08/05/2019 3.8 3.5 - 5.2 mmol/L Final   08/04/2019 3.4 (L) 3.5 - 5.2 mmol/L Final   08/03/2019 3.7 3.5 - 5.2 mmol/L Final      Chloride Chloride   Date Value Ref Range Status   08/06/2019 95 (L) 98 - 107 mmol/L Final   08/05/2019 97 (L) 98 - 107 mmol/L Final   08/04/2019 100 98 - 107 mmol/L Final   08/03/2019 95 (L) 98 - 107 mmol/L Final      Bicarbonate CO2   Date Value Ref Range Status   08/06/2019 26.1 22.0 - 29.0 mmol/L Final   08/05/2019 25.1 22.0 - 29.0 mmol/L Final   08/04/2019 27.0 22.0 - 29.0 mmol/L Final   08/03/2019 22.0 22.0 - 29.0 mmol/L Final      BUN BUN   Date Value Ref Range Status   08/06/2019 87 (H) 8 - 23 mg/dL Final   08/05/2019 76 (H) 8 - 23 mg/dL Final   08/04/2019 59 (H) 8 - 23 mg/dL Final   08/03/2019 44 (H) 8 - 23 mg/dL Final      Creatinine Creatinine   Date Value Ref Range Status   08/06/2019 3.55 (H) 0.57 - 1.00 mg/dL Final   08/05/2019 3.60 (H) 0.57 - 1.00 mg/dL Final   08/04/2019 3.65 (H) 0.57 - 1.00 mg/dL Final   08/03/2019 3.00 (H) 0.57 - 1.00 mg/dL Final      Calcium Calcium   Date Value Ref Range Status   08/06/2019 8.9 8.6 - 10.5 mg/dL Final   08/05/2019 8.7 8.6 - 10.5 mg/dL Final   08/04/2019 8.4 (L) 8.6 - 10.5 mg/dL Final   08/03/2019 8.7 8.6 - 10.5 mg/dL Final      Magnesium Magnesium   Date Value Ref Range Status   08/06/2019 2.0 1.6 - 2.4 mg/dL Final   08/04/2019 2.1 1.6 - 2.4 mg/dL Final   08/03/2019 2.0 1.6 - 2.4 mg/dL Final            aspirin 81 mg Oral Daily   atorvastatin 40 mg Oral Nightly   bacitracin-polymyxin b   Topical Q24H   carvedilol 6.25 mg Oral Q12H   enoxaparin 30 mg Subcutaneous Daily   insulin glargine 30 Units Subcutaneous QAM   insulin lispro 0-7 Units Subcutaneous 4x Daily With Meals & Nightly   insulin lispro 10 Units Subcutaneous TID With Meals   ipratropium-albuterol 3 mL Nebulization 4x Daily - RT   miconazole  Topical Q12H   mupirocin 1 application Each Nare Daily   nystatin 1 application Topical Q12H              Patient Active Problem List   Diagnosis Code   • Menstrual disorder N92.6   • Atopic rhinitis J30.9   • Anemia in CKD (chronic kidney disease) N18.9, D63.1   • Spasm of cervical paraspinous muscle M62.838   • Cervical radiculopathy M54.12   • Chronic kidney disease, stage IV (severe) (CMS/[facility]) N18.4   • Depression F32.9   • DM type 2 with diabetic peripheral neuropathy (CMS/[facility]) E11.42   • Essential hypertension I10   • 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   • Gastroesophageal reflux disease K21.9   • Hyperlipidemia E78.5   • Hypertension I10   • Arthritis M19.90   • Seizure disorder (CMS/[facility]) G40.909   • Phlebitis I80.9   • Type 2 diabetes mellitus (CMS/[facility]) E11.9   • Vitamin D deficiency E55.9   • Chest pain R07.9   • Abscess L02.91   • Generalized muscle weakness M62.81   • Abdominal wall cellulitis L03.311   • HTN (hypertension) I10   • CKD (chronic kidney disease) stage 4, GFR 15-29 ml/min (CMS/[facility]) N18.4   • Diabetes mellitus with peripheral autonomic neuropathy (CMS/[facility]) E11.43   • Hyponatremia E87.1   • Hypercalcemia E83.52   • Dehydration E86.0   • DACIA (acute kidney injury) (CMS/[facility]) N17.9   • Abdominal wall abscess L02.211   • Cellulitis of toe of left foot L03.032   • Partial Achilles tendon tear, left, initial encounter S86.012A   • Arthritis of foot M19.079   • Essential tremor G25.0   • Primary Parkinsonism (CMS/[facility]) G20   • Abnormal cardiac function test R94.30   • 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 pectoris (CMS/[facility]) I25.118       Assessment &  Plan    -multivessel CAD--- s/p CABGx4 POD#8 (Dr. Ferreira)   -HTN-- stable   -HLD-- statin  -DM with diabetic neuropathy, A1c 7.56, hospitalist following  -CKD, stage V--- nephrology following, LUE AV fistula, HD per nephrology  -chronic anemia due to CKD, acute on chronic due to expected betito-op blood loss, transfued with HD, stable   -psoriatic arthritis--- apremilast at home, hold during post-op (4-6 weeks)   -seizure disorder-- topamax at home   -history of SVT/aflutter--- on eliquis pre-op, currently bradycardic   -leukocytosis--- afebrile, stable   -chronic foot wound, multiple areas with fungal infection---- seen by wound care and plastic surgery, nystatin powder  -likely undiagnosed DOMI, reported by family        Routine care.  Increase activity and encourage pulm toilet.   Start doxy for possible lower extrem cellulitis.   Home with family and home health vs rehab placement.   Likely ok for d/c from surgical standpoint.      Justin Esparza PA-C  08/06/19  8:21 AM

## 2019-08-06 NOTE — PLAN OF CARE
Problem: Patient Care Overview  Goal: Plan of Care Review  Outcome: Ongoing (interventions implemented as appropriate)   08/06/19 0542   Coping/Psychosocial   Plan of Care Reviewed With patient;daughter   Plan of Care Review   Progress improving   OTHER   Outcome Summary Vitals stable, had a burst of afib at 0110. No falls. Pt c/o pain in left shoulder, medicated per MAR. Possible d/c today. Resting comfortably. Monitoring closely.        Problem: Cardiac Surgery (Adult)  Goal: Signs and Symptoms of Listed Potential Problems Will be Absent, Minimized or Managed (Cardiac Surgery)  Outcome: Ongoing (interventions implemented as appropriate)   08/06/19 0542   Goal/Outcome Evaluation   Problems Assessed (Cardiac Surgery) all   Problems Present (Cardiac Surgery) functional deficit;pain;situational response       Problem: Skin Injury Risk (Adult)  Goal: Identify Related Risk Factors and Signs and Symptoms  Outcome: Outcome(s) achieved Date Met: 08/06/19 08/06/19 0542   Skin Injury Risk (Adult)   Related Risk Factors (Skin Injury Risk) body weight extremes;critical care admission;hospitalization prolonged;medication;mobility impaired     Goal: Skin Health and Integrity  Outcome: Ongoing (interventions implemented as appropriate)   08/06/19 0542   Skin Injury Risk (Adult)   Skin Health and Integrity making progress toward outcome       Problem: Fall Risk (Adult)  Goal: Identify Related Risk Factors and Signs and Symptoms  Outcome: Outcome(s) achieved Date Met: 08/06/19 08/06/19 0542   Fall Risk (Adult)   Related Risk Factors (Fall Risk) gait/mobility problems;sleep pattern alteration;slippery/uneven surfaces;environment unfamiliar;polypharmacy   Signs and Symptoms (Fall Risk) presence of risk factors     Goal: Absence of Fall  Outcome: Ongoing (interventions implemented as appropriate)   08/06/19 0542   Fall Risk (Adult)   Absence of Fall making progress toward outcome

## 2019-08-06 NOTE — DISCHARGE SUMMARY
[facility]  Physician Discharge Summary    Patient Identification:  Name: Maribeth Rendon  Age: 68 y.o.  Sex: female  :  1950  MRN: [id number]    Admit date: 2019    Discharge date and time:  No discharge date for patient encounter.       Admitting Physician: Eddie Hodges MD     Discharge Provider: IRENA Hansen    Discharge Diagnoses: CAD/CKD IV/HTN/HLD/DM2/anemia        Patient Active Problem List   Diagnosis   • Menstrual disorder   • Atopic rhinitis   • Anemia in CKD (chronic kidney disease)   • Spasm of cervical paraspinous muscle   • Cervical radiculopathy   • Chronic kidney disease, stage IV (severe) (CMS/[facility])   • Depression   • DM type 2 with diabetic peripheral neuropathy (CMS/[facility])   • Essential hypertension   • Duplay's periarthritis syndrome   • Gastroesophageal reflux disease   • Hyperlipidemia   • Hypertension   • Arthritis   • Seizure disorder (CMS/[facility])   • Phlebitis   • Type 2 diabetes mellitus (CMS/[facility])   • Vitamin D deficiency   • Chest pain   • Abscess   • Generalized muscle weakness   • Abdominal wall cellulitis   • HTN (hypertension)   • CKD (chronic kidney disease) stage 4, GFR 15-29 ml/min (CMS/[facility])   • Diabetes mellitus with peripheral autonomic neuropathy (CMS/[facility])   • Hyponatremia   • Hypercalcemia   • Dehydration   • DACIA (acute kidney injury) (CMS/[facility])   • Abdominal wall abscess   • Cellulitis of toe of left foot   • Partial Achilles tendon tear, left, initial encounter   • Arthritis of foot   • Essential tremor   • Primary Parkinsonism (CMS/[facility])   • Abnormal cardiac function test   • 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 pectoris (CMS/[facility])       Discharged Condition: good    Hospital Course: This 68-year-old female recently underwent stress testing, which was found to be abnormal.  History as listed above.  The patient is anticoagulated at home on Eliquis for paroxysmal atrial fibrillation.  This was held prior to cardiac  catheterization and not resumed until discharge.  She was admitted for cardiac catheterization, and nephrology was consulted for clearance as she also has an end-stage of renal disease.  Nephrology saw the patient, provided recommendations, and cleared the patient.  She underwent cardiac catheterization on July 26 and it was recommended that she be evaluated by CT surgery for CABG.  CT surgery saw the patient, recommended IV heparin, and scheduled her for CABG.  As the patient was anemic with hemoglobin at 8.8 on 7/27 she was started on Procrit per nephrology recommendation.    The patient was advised that she would likely need dialysis this admission as she has very advanced CKD.  On July 29 she underwent CABG x4 with right EVH site.  Her recovery was slow, but progressive.  Ultimately she did end up needing hemodialysis postoperatively, and on discharge medications have been adjusted according to renal recommendations.  She has no further imminent plan for hemodialysis on discharge.  Amlodipine, Lasix, Neurontin, Ziac, and all oral diabetic medications were discontinued per nephrology recommendation.  Internal medicine was consulted as well regarding all comorbidities.  They provided recommendations for her insulin on discharge.  The patient was educated extensively on administration of insulin as well as side effects and adverse reactions to watch for.    She received some IV diuretics per nephrology recommendation throughout admission postoperatively, she is discharged home with no fluid imbalance on exam.  PRBCs transfused during hemodialysis given her anemia.  She has been working extensively with physical therapy and made good strides in terms of recovery since surgery.  She is now ambulating in halls with assistance from nursing staff, but does require the use of a walker in order to do so.  It was discussed several times with the patient recommendation of doing inpatient rehab on discharge, however she was  resistant to this idea.  She is going home with home health, a walker, and her  who states that he will help her ambulate and encourage exercise as tolerated daily.  Hemoglobin has been stable for the last 4 days.  Apixaban resumed on discharge.  Electrolytes and fluid balance managed per nephrology and CT surgery throughout admission.  Patient is discharged home, very much looking forward to leaving, in good condition, with no complaints of chest pain, shortness of breath, dizziness, weakness, syncope, or near syncope.  Sternotomy on discharge is closed, healing, edges are well approximated, and there is no drainage, erythema, heat, bruising, or edema.    Consults:   IP CONSULT TO INTERNAL MEDICINE  IP CONSULT TO NEPHROLOGY  CARDIAC REHAB EVALUATION AND ENROLLMENT  IP CONSULT TO CASE MANAGEMENT   IP CONSULT TO WOUND CARE MD  IP CONSULT TO NUTRITION SERVICES             Physical Exam  /56 (BP Location: Right arm, Patient Position: Sitting)   Pulse 74   Temp 98.7 °F (37.1 °C) (Oral)   Resp 16   Wt 90.1 kg (198 lb 9.6 oz)   SpO2 97%   BMI 34.09 kg/m²     General appearance: No acute changes   Eyes: Sclera conjunctiva normal, pupils reactive   HENT: Atraumatic; oropharynx clear with moist mucous membranes and no mucosal ulcerations;  Neck: Trachea midline; NECK, supple, no thyromegaly or lymphadenopathy   Lungs: Normal size and shape, normal breath sounds, equal distribution of air, no rales and rhonchi   CV: S1-S2 regular, no murmurs, no rub, no gallop   Abdomen: Soft, non-tender; no masses , no abnormal abdominal sounds   Extremities: No deformity , normal color , no peripheral edema   Skin: Normal temperature, turgor and texture; no rash, ulcers  Psych: Appropriate affect, alert and oriented to person, place and time             LABS:      Results from last 7 days   Lab Units 08/06/19  0301   MAGNESIUM mg/dL 2.0     Results from last 7 days   Lab Units 08/06/19  0301   SODIUM mmol/L  137   POTASSIUM mmol/L 4.0   BUN mg/dL 87*   CREATININE mg/dL 3.55*   CALCIUM mg/dL 8.9       Results from last 7 days   Lab Units 08/06/19  0301 08/05/19  0343 08/03/19  0911   WBC 10*3/mm3 14.18* 12.63* 11.33*   HEMOGLOBIN g/dL 10.6* 10.5* 11.4*   HEMATOCRIT % 36.3 33.7* 36.4   PLATELETS 10*3/mm3 179 177 146             Disposition:  Home    Discharge Medications:      Discharge Medications      New Medications      Instructions Start Date   atorvastatin 40 MG tablet  Commonly known as:  LIPITOR   40 mg, Oral, Nightly      carvedilol 6.25 MG tablet  Commonly known as:  COREG   6.25 mg, Oral, Every 12 Hours      doxycycline 100 MG capsule  Commonly known as:  MONODOX   100 mg, Oral, Every 12 Hours Scheduled      HYDROcodone-acetaminophen 5-325 MG per tablet  Commonly known as:  NORCO   1 tablet, Oral, Every 4 Hours PRN      Insulin Glargine 100 UNIT/ML injection pen  Commonly known as:  LANTUS SOLOSTAR   33 Units, Subcutaneous, Nightly      insulin glargine 100 UNIT/ML injection  Commonly known as:  LANTUS   33 Units, Subcutaneous, Every Morning   Start Date:  8/7/2019     insulin lispro 100 UNIT/ML injection  Commonly known as:  humaLOG   11 Units, Subcutaneous, 3 Times Daily With Meals         Changes to Medications      Instructions Start Date   ACCU-CHEK JESUS MANUEL PLUS test strip  Generic drug:  glucose blood  What changed:  Another medication with the same name was added. Make sure you understand how and when to take each.   TEST THREE TIMES DAILY      glucose blood test strip  What changed:  You were already taking a medication with the same name, and this prescription was added. Make sure you understand how and when to take each.   Use as instructed      bumetanide 2 MG tablet  Commonly known as:  BUMEX  What changed:  when to take this   2 mg, Oral, 2 Times Daily         Continue These Medications      Instructions Start Date   apixaban 5 MG tablet tablet  Commonly known as:  ELIQUIS   5 mg, Oral, 2 Times  Daily      aspirin 81 MG tablet   81 mg, Oral, Every Morning, TO STOP THE DAY BEFORE SURGERY      epoetin hermes 59028 UNIT/ML injection  Commonly known as:  EPOGEN,PROCRIT   20,000 Units, Subcutaneous, Every 14 Days, LAST TIME 2-6-2017      ferrous fumarate 50 MG CR tablet  Commonly known as:  YADIRA-SEQUELS   65 mg, Oral, 2 Times Daily      montelukast 10 MG tablet  Commonly known as:  SINGULAIR   10 mg, Oral, Nightly      MULTI VITAMIN DAILY PO   1 tablet, Oral, Every Morning      PROBIOTIC DAILY PO   1 capsule, Oral, Daily      pyridoxine 500 MG tablet  Commonly known as:  VITAMIN B-6   500 mg, Oral, Daily      sertraline 50 MG tablet  Commonly known as:  ZOLOFT   50 mg, Oral, Daily      topiramate 50 MG tablet  Commonly known as:  TOPAMAX   100 mg, Oral, Daily      Vitamin D-3 1000 units capsule   5,000 Units, Oral, Daily         Stop These Medications    amLODIPine 10 MG tablet  Commonly known as:  NORVASC     bisoprolol-hydrochlorothiazide 10-6.25 MG per tablet  Commonly known as:  ZIAC     calcium acetate 667 MG capsule  Commonly known as:  PHOSLO     furosemide 80 MG tablet  Commonly known as:  LASIX     gabapentin 300 MG capsule  Commonly known as:  NEURONTIN     glimepiride 2 MG tablet  Commonly known as:  AMARYL     HAVRIX 1440 EL U/ML vaccine  Generic drug:  hepatitis A     IODOSORB 0.9 % gel  Generic drug:  cadexomer iodine     linagliptin 5 MG tablet tablet  Commonly known as:  TRADJENTA     OTEZLA 30 MG tablet  Generic drug:  Apremilast              Discharge Home Instructions:     Discharge Follow-up with PCP    Currently Documented PCP:   Robby Chaney MD   PCP Phone Number:   [phone]   Discharge Follow-up with Specified Provider: Follow up with Dr. Motta's nurse practitioner IRENA Hernandez on August 16th at 9:30am    Discharge Follow-up with Specified Provider: Follow up with Dr. Watt as scheduled.    Discharge Follow-up with Specified Provider: Follow up with   [facility] within one month of discharge    Discharge Instructions    Continue to use your incentive spirometer for an additional 2 weeks.    Continue to wear your DO hose for an additional 2 weeks. You may remove them at night.    Walk 10 minutes at a time at least 3 times a day.    Do not drive for 2 weeks and no longer taking narcotics.    Do not lift, push or pull greater than 10 pounds for 6 weeks.    You may shower, but do not submerge your incisions until your surgeon approves (i.e., no baths, pools, hot tubs, etc.).    Clean your incision daily in the shower with Dial or Ivory soap. Do not put any additional lotions, creams, or any other substance on your incision without your surgeon's approval.    1.  Follow-up with PCP within 1 week of discharge.  2.  Follow-up with Dr. Hodges's nurse practitioner IRENA Resendiz on August 16 at 9:30 AM.  3.  Follow-up with CT surgery within 1 month of discharge.  4.  Follow-up with nephrology as previously scheduled.  5.  Stop Lasix.  6.  Stop Ziac.  7.  Stop amlodipine.  8.  Stop all oral diabetic medications.  9.  Start insulin as listed in discharge medication list.  Follow-up with primary care physician regarding management of this.  10.  Resume Eliquis at previous dose.  If you experience any issues with bleeding such as dark tarry stools, bleeding gums while brushing teeth, nosebleeds that are difficult to control, or notice any bruising, swelling, or bleeding at your incision sites please call Dr. Hodges's office immediately.  11.  Start atorvastatin daily.  You will need to have your liver function and cholesterol panel monitored while on this medication.  If you experience any muscle aches, pains, or notice that your urine is dark or you are experiencing back pain please call Dr. Hdoges's office immediately.  12.  Stop gabapentin.  13.  If you experience any chest pain, shortness of breath, dizziness, weakness, or pass out please go  "immediately to the emergency department.       Signed:  Iva Martinez, APRN  8/6/2019  3:58 PM      Patient personally interviewed and above subjective findings personally confirmed during a face to face contact with patient today  All findings of physical examination confirmed  All pertinent and performed labs, cardiac procedures ,  radiographs of the last 24 hours personally reviewed  Impression and plans discussed/elaborated and implemented jointly as described above     Eddie Hodges MD          EMR Dragon/Transcription:   \"Dictated utilizing Dragon dictation\".   "

## 2019-08-07 ENCOUNTER — READMISSION MANAGEMENT (OUTPATIENT)
Dept: CALL CENTER | Facility: HOSPITAL | Age: 69
End: 2019-08-07

## 2019-08-07 NOTE — OUTREACH NOTE
Prep Survey      Responses   Facility patient discharged from?  [facility]   Is patient eligible?  Yes   Discharge diagnosis  CAD, CKD IV, HTN, HLD, DM II, anemia, s/p cardiac cath., s/p CABG x4 with right EVH   Does the patient have one of the following disease processes/diagnoses(primary or secondary)?  Cardiothoracic surgery   Does the patient have Home health ordered?  Yes   What is the Home health agency?   [facility]   Is there a DME ordered?  Yes   What DME was ordered?  Walker no agency noted in AVS   Comments regarding appointments  Pt. to schedule follow up with PCP   Prep survey completed?  Yes          Risa Jones RN

## 2019-08-08 ENCOUNTER — READMISSION MANAGEMENT (OUTPATIENT)
Dept: CALL CENTER | Facility: HOSPITAL | Age: 69
End: 2019-08-08

## 2019-08-08 NOTE — OUTREACH NOTE
CT Surgery Week 1 Survey      Responses   Facility patient discharged from?  [place]   Does the patient have one of the following disease processes/diagnoses(primary or secondary)?  Cardiothoracic surgery   Is there a successful TCM telephone encounter documented?  No   Week 1 attempt successful?  Yes   Call start time  0915   Call end time  0923   Discharge diagnosis  CAD, CKD IV, HTN, HLD, DM II, anemia, s/p cardiac cath., s/p CABG x4 with right EVH   Person spoke with today (if not patient) and relationship  Tru,    Meds reviewed with patient/caregiver?  Yes   Is the patient having any side effects they believe may be caused by any medication additions or changes?  No   Does the patient have all medications related to this admission filled (includes all antibiotics, pain medications, cardiac medications, etc.)  No   What is keeping the patient from filling the prescriptions?  Script on hold per patient   Prescription comments  pt chose not to fill Norco   Is the patient taking all medications as directed (includes completed medication regime)?  Yes   Does the patient have a primary care provider?   Yes   Does the patient have an appointment scheduled with their C/T surgeon?  Yes   Has the patient kept scheduled appointments due by today?  N/A   What is the Home health agency?   [facility]    Has home health visited the patient within 72 hours of discharge?  Call prior to 72 hours   Psychosocial issues?  No   Did the patient receive a copy of their discharge instructions?  Yes   Nursing interventions  Reviewed instructions with patient   What is the patient's perception of their health status since discharge?  Improving   Nursing interventions  Nurse provided patient education   Is the patient/caregiver able to teach back normal signs of recovery?  Nausea and lack of appetite, Pain or discomfort at incisional site   Nursing interventions  Reassured on normal signs of recovery   Is the patient /caregiver  able to teach back basic post-op care?  Continue use of incentive spirometry at least 1 week post discharge, Practice 'cough and deep breath', Drive as instructed by MD in discharge instructions, Take showers only when approved by MD-sponge bathe until then, No tub bath, swimming, or hot tub until instructed by MD, Keep incision areas clean, dry and protected, Do not remove steri-strips, Lifting as instructed by MD in discharge instructions   Is the patient/caregiver able to teach back signs and symptoms of incisional infection?  Increased redness, swelling or pain at the incisonal site, Increased drainage or bleeding, Incisional warmth, Pus or odor from incision, Fever   Is the patient/caregiver able to teach back steps to recovery at home?  Set small, achievable goals for return to baseline health, Rest and rebuild strength, gradually increase activity   Is the patient /caregiver able to teach back the importance of cardiac rehab?  Yes   Is the patient/caregiver able to teach back the hierarchy of who to call/visit for symptoms/problems? PCP, Specialist, Home health nurse, Urgent Care, ED, 911  Yes   Week 1 call completed?  Yes            Mikaela Chirinos RN

## 2019-08-09 ENCOUNTER — HOSPITAL ENCOUNTER (OUTPATIENT)
Dept: INFUSION THERAPY | Facility: HOSPITAL | Age: 69
Discharge: HOME | End: 2019-08-09
Admitting: INTERNAL MEDICINE

## 2019-08-09 ENCOUNTER — APPOINTMENT (OUTPATIENT)
Dept: INFUSION THERAPY | Facility: HOSPITAL | Age: 69
End: 2019-08-09

## 2019-08-09 VITALS
HEART RATE: 75 BPM | OXYGEN SATURATION: 100 % | TEMPERATURE: 98.7 F | SYSTOLIC BLOOD PRESSURE: 133 MMHG | RESPIRATION RATE: 16 BRPM | DIASTOLIC BLOOD PRESSURE: 55 MMHG

## 2019-08-09 DIAGNOSIS — N18.5 ANEMIA IN STAGE 5 CHRONIC KIDNEY DISEASE, NOT ON CHRONIC DIALYSIS (HCC): Primary | ICD-10-CM

## 2019-08-09 DIAGNOSIS — D63.1 ANEMIA IN STAGE 5 CHRONIC KIDNEY DISEASE, NOT ON CHRONIC DIALYSIS (HCC): Primary | ICD-10-CM

## 2019-08-09 DIAGNOSIS — N18.4 CHRONIC KIDNEY DISEASE, STAGE IV (SEVERE) (HCC): ICD-10-CM

## 2019-08-09 LAB
ANION GAP SERPL CALCULATED.3IONS-SCNC: 14.4 MMOL/L (ref 5–15)
BUN BLD-MCNC: 106 MG/DL (ref 8–23)
BUN/CREAT SERPL: 26.6 (ref 7–25)
CALCIUM SPEC-SCNC: 8.3 MG/DL (ref 8.6–10.5)
CHLORIDE SERPL-SCNC: 91 MMOL/L (ref 98–107)
CO2 SERPL-SCNC: 27.6 MMOL/L (ref 22–29)
CREAT BLD-MCNC: 3.99 MG/DL (ref 0.57–1)
GFR SERPL CREATININE-BSD FRML MDRD: 11 ML/MIN/1.73
GFR SERPL CREATININE-BSD FRML MDRD: ABNORMAL ML/MIN/1.73
GLUCOSE BLD-MCNC: 53 MG/DL (ref 65–99)
HCT VFR BLD AUTO: 34.3 % (ref 34–46.6)
HGB BLD-MCNC: 10.4 G/DL (ref 12–15.9)
MAGNESIUM SERPL-MCNC: 1.8 MG/DL (ref 1.6–2.4)
POTASSIUM BLD-SCNC: 4 MMOL/L (ref 3.5–5.2)
SODIUM BLD-SCNC: 133 MMOL/L (ref 136–145)

## 2019-08-09 PROCEDURE — 36415 COLL VENOUS BLD VENIPUNCTURE: CPT

## 2019-08-09 PROCEDURE — 80048 BASIC METABOLIC PNL TOTAL CA: CPT | Performed by: INTERNAL MEDICINE

## 2019-08-09 PROCEDURE — 96372 THER/PROPH/DIAG INJ SC/IM: CPT

## 2019-08-09 PROCEDURE — 85018 HEMOGLOBIN: CPT | Performed by: INTERNAL MEDICINE

## 2019-08-09 PROCEDURE — 85014 HEMATOCRIT: CPT | Performed by: INTERNAL MEDICINE

## 2019-08-09 PROCEDURE — 25010000002 EPOETIN ALFA PER 1000 UNITS: Performed by: INTERNAL MEDICINE

## 2019-08-09 PROCEDURE — 83735 ASSAY OF MAGNESIUM: CPT | Performed by: INTERNAL MEDICINE

## 2019-08-09 RX ADMIN — ERYTHROPOIETIN 20000 UNITS: 20000 INJECTION, SOLUTION INTRAVENOUS; SUBCUTANEOUS at 15:33

## 2019-08-15 ENCOUNTER — OFFICE VISIT (OUTPATIENT)
Dept: FAMILY MEDICINE CLINIC | Facility: CLINIC | Age: 69
End: 2019-08-15

## 2019-08-15 ENCOUNTER — READMISSION MANAGEMENT (OUTPATIENT)
Dept: CALL CENTER | Facility: HOSPITAL | Age: 69
End: 2019-08-15

## 2019-08-15 VITALS
HEART RATE: 81 BPM | DIASTOLIC BLOOD PRESSURE: 60 MMHG | SYSTOLIC BLOOD PRESSURE: 130 MMHG | BODY MASS INDEX: 34.09 KG/M2 | OXYGEN SATURATION: 97 % | TEMPERATURE: 98.4 F | HEIGHT: 64 IN

## 2019-08-15 DIAGNOSIS — N18.5 ANEMIA IN STAGE 5 CHRONIC KIDNEY DISEASE, NOT ON CHRONIC DIALYSIS (HCC): ICD-10-CM

## 2019-08-15 DIAGNOSIS — N18.4 CHRONIC KIDNEY DISEASE, STAGE IV (SEVERE) (HCC): ICD-10-CM

## 2019-08-15 DIAGNOSIS — E11.43 TYPE 2 DIABETES MELLITUS WITH DIABETIC AUTONOMIC NEUROPATHY, WITHOUT LONG-TERM CURRENT USE OF INSULIN (HCC): Primary | ICD-10-CM

## 2019-08-15 DIAGNOSIS — D63.1 ANEMIA IN STAGE 5 CHRONIC KIDNEY DISEASE, NOT ON CHRONIC DIALYSIS (HCC): ICD-10-CM

## 2019-08-15 PROCEDURE — 99213 OFFICE O/P EST LOW 20 MIN: CPT | Performed by: INTERNAL MEDICINE

## 2019-08-15 NOTE — OUTREACH NOTE
CT Surgery Week 2 Survey      Responses   Facility patient discharged from?  [facility]   Does the patient have one of the following disease processes/diagnoses(primary or secondary)?  Cardiothoracic surgery   Week 2 attempt successful?  No   Unsuccessful attempts  Attempt 1          Marely Jones RN

## 2019-08-15 NOTE — PROGRESS NOTES
Subjective Chief complaint is follow-up after quadruple bypass  Maribeth Rendon is a 68 y.o. female.     History of Present Illness   Maribeth is here today for hospital follow-up after a quadruple bypass.  Her postoperative course was remarkable for some worsening of her kidney disease and having to be dialyzed a few times.  They basically took her off all of her oral diabetic medications and gave her insulin.  Since she returned home she did not fill the prescription for insulin and put herself back on her glimepiride and Tradjenta.  Her blood sugars have basically been in the 1 20-1 60 range at home.  She has had some low sugar reactions which may be due to the glimepiride hanging around longer with her chronic kidney disease.  He also stopped a number of her blood pressure medicines and now have her on carvedilol.  She did require being off of her Eliquis in the perioperative period but has resumed that as well.  She was anemic preoperatively and she did receive Procrit injections while in the hospital.Current outpatient and discharge medications have been reconciled for the patient.  Reviewed by: Robby Chaney MD    The following portions of the patient's history were reviewed and updated as appropriate: allergies, current medications, past family history, past medical history, past social history, past surgical history and problem list.    Review of Systems   Constitutional: Positive for fatigue.   Respiratory: Negative for chest tightness and shortness of breath.    Cardiovascular: Positive for leg swelling.       Objective   Physical Exam   Constitutional: She appears well-developed and well-nourished.   Cardiovascular: Normal rate, regular rhythm and normal heart sounds.   She does have some bilateral lower extremity edema.  This is worse on the right side where the venectomy was performed.   Pulmonary/Chest: Effort normal and breath sounds normal. She has no wheezes. She has no rales.   Nursing note  and vitals reviewed.        Assessment/Plan   Maribeth was seen today for follow-up.    Diagnoses and all orders for this visit:    Type 2 diabetes mellitus with diabetic autonomic neuropathy, without long-term current use of insulin (CMS/[facility])    Chronic kidney disease, stage IV (severe) (CMS/[facility])    Anemia in stage 5 chronic kidney disease, not on chronic dialysis (CMS/[facility])    Maribeth is here today for follow-up.  We did discuss medications that she is likely going to be able to take with her chronic kidney disease.  I did advise stopping the glimepiride.  I think that is the reason for her low blood sugars.  Unfortunately the only medicine that she can really take orally would now be Tradjenta.  She is fairly adamant that she is not going to take insulin.  I am going to see her back in 2 months and recheck her A1c.  I did discuss the rationale for using insulin in terms of better outcomes after this type of surgery but she remains adamant about not taking it.

## 2019-08-16 ENCOUNTER — READMISSION MANAGEMENT (OUTPATIENT)
Dept: CALL CENTER | Facility: HOSPITAL | Age: 69
End: 2019-08-16

## 2019-08-16 ENCOUNTER — OFFICE VISIT (OUTPATIENT)
Dept: CARDIOLOGY | Facility: CLINIC | Age: 69
End: 2019-08-16

## 2019-08-16 VITALS
BODY MASS INDEX: 31.32 KG/M2 | HEIGHT: 65 IN | HEART RATE: 84 BPM | DIASTOLIC BLOOD PRESSURE: 62 MMHG | WEIGHT: 188 LBS | SYSTOLIC BLOOD PRESSURE: 114 MMHG

## 2019-08-16 DIAGNOSIS — I10 ESSENTIAL HYPERTENSION: Primary | ICD-10-CM

## 2019-08-16 DIAGNOSIS — I25.118 CORONARY ARTERY DISEASE OF NATIVE ARTERY OF NATIVE HEART WITH STABLE ANGINA PECTORIS (HCC): ICD-10-CM

## 2019-08-16 DIAGNOSIS — E78.5 HYPERLIPIDEMIA, UNSPECIFIED HYPERLIPIDEMIA TYPE: ICD-10-CM

## 2019-08-16 DIAGNOSIS — I48.0 PAROXYSMAL ATRIAL FIBRILLATION (HCC): ICD-10-CM

## 2019-08-16 DIAGNOSIS — Z95.1 S/P CABG (CORONARY ARTERY BYPASS GRAFT): ICD-10-CM

## 2019-08-16 PROCEDURE — 99212 OFFICE O/P EST SF 10 MIN: CPT | Performed by: NURSE PRACTITIONER

## 2019-08-16 NOTE — PROGRESS NOTES
Subjective:        Maribeth Rendon is a 68 y.o. female who here for follow up    Chief Complaint   Patient presents with   • Follow-up     CATH FOLLOW UP       HPI   To know is an 68-year-old female, who is current with this provider.  She has a history of anxiety, depression, diabetes mellitus type 2, ESRD, proximal atrial fibrillation on Eliquis, hyperlipidemia, and hypertension.  Recently had an abnormal stress test and then nephrology was consulted prior to cardiac catheterization.  On July 29 she had a CABG x4 with right EVH site.  Her recovery was slow.  During her hospital stay she needed hemodialysis postoperatively and renal adjusted her medications at discharge.  At discharge she was educated on administering herself insulin and the side effects and adverse reactions at that time.  At the time of discharge she declined rehab.  CV stable at discharge.      She states that she is not taking the insulin but she has been followed by her primary care physician at this time regarding her diabetes.    Echo on 6/12/2019 showed EF 57%, RBC is borderline dilated, L AC size is mildly dilated, mild tricuspid valve regurgitation is present, no evidence of pericardial effusion.        Denies chest pain, shortness of breath, and cough    The following portions of the patient's history were reviewed and updated as appropriate: allergies, current medications, past family history, past medical history, past social history, past surgical history and problem list.    Past Medical History:   Diagnosis Date   • Achilles tendon tear     left    • Anemia    • Anxiety    • Depression    • Diabetes mellitus, type 2 (CMS/[facility])    • ESRD (end stage renal disease) (CMS/[facility])     HAS LEFT FOREARM FISTULA   • GERD (gastroesophageal reflux disease)    • History of MRSA infection 03/15/2016    ABDOMINAL WOUND,   INFECTION CONTROLL NOTIFIED 2-6-2017   • History of transfusion    • Hyperlipidemia    • Hypertension    • Hypokalemia    •  Hypomagnesemia    • Hypoxic 01/2016    WHEN SHE HAD PNEUMONIA   • Intertrigo    • Leukocytosis    • Osteoarthritis    • Pneumonia 01/2016   • Psoriasis    • Psoriatic arthritis (CMS/[facility])    • Seizures (CMS/[facility])     one time   • Sepsis (CMS/[facility]) 01/2016   • SOB (shortness of breath)    • Stage 4 chronic renal impairment associated with type 2 diabetes mellitus (CMS/[facility])    • Staph infection     HX RIGHT FOOT AT [facility] 01/09/2010   • Streptococcal bacteremia    • Swelling of hand 10/27/2016    LEFT HAND SWELLIMG SINCE LEFT FISTULA SURGERY   • Tremor, essential    • Wears glasses          reports that she quit smoking about 26 years ago. Her smoking use included cigarettes. She has a 40.00 pack-year smoking history. She has never used smokeless tobacco. She reports that she does not drink alcohol or use drugs.     Family History   Problem Relation Age of Onset   • Heart attack Mother    • Heart disease Mother    • Kidney disease Mother    • Heart attack Father    • Heart disease Father    • Hypertension Father    • Stroke Father         ISCHEMIC   • Diabetes Father         TYPE 2   • Kidney disease Brother    • Diabetes Brother         TYPE 1   • Thyroid disease Daughter    • Anxiety disorder Maternal Aunt    • Bipolar disorder Maternal Aunt    • Depression Maternal Aunt    • Lupus Maternal Aunt         LUPUS ANTICOAGLULANT   • Rheum arthritis Maternal Aunt    • Alcohol abuse Maternal Uncle    • Other Maternal Uncle         PULMONARY DISEASE       ROS     Review of Systems  Constitutional: No weight loss, fever, or fatigue   gastrointestinal: No nausea, or abdominal pain   behavioral/Psych: No insomnia or anxiety   cardiovascular: No chest pain, shortness of breath and cough     Objective:           Physical Exam   Constitutional: She is oriented to person, place, and time. She appears well-developed and well-nourished.   HENT:   Head: Normocephalic.   Right Ear: External ear normal.   Left Ear: External ear normal.    Eyes: EOM are normal.   Neck: Normal range of motion. No JVD present.   Cardiovascular: Normal rate, regular rhythm, normal heart sounds and intact distal pulses. Exam reveals no gallop and no friction rub.   No murmur heard.  Trace BLE  edema   Pulmonary/Chest: Effort normal and breath sounds normal. No stridor. No respiratory distress. She has no rales.   Abdominal: Soft. Bowel sounds are normal. She exhibits no distension. There is no tenderness. There is no guarding.   Musculoskeletal: Normal range of motion. She exhibits no edema or tenderness.   Neurological: She is alert and oriented to person, place, and time. She has normal reflexes.   Skin: Skin is warm.   Right radial no signs or symptoms of infection or hematoma.  Sternotomy is closed, healing, edges are well approximated, and there is no drainage, arrhythmia, heat, bruising, or edema.    Psychiatric: She has a normal mood and affect. Judgment normal.   Nursing note and vitals reviewed.     Procedures:     Carotid duplex 7/26/2019  Interpretation Summary     · Proximal right internal carotid artery is normal.  · Proximal left internal carotid artery is normal.        Stress test 7/5/2019  Interpretation Summary        · Findings consistent with a normal ECG stress test.  · Findings consistent with a normal ECG stress test.  · Left ventricular ejection fraction is normal (Calculated EF = 60%).  · Myocardial perfusion imaging indicates a small-to-medium-sized, mild-to-moderately severe area of ischemia located in the anterior wall.  · Impressions are consistent with an intermediate risk study.     Echo 6/12/2019  Interpretation Summary     · Right ventricular cavity is borderline dilated.  · Left atrial cavity size is mildly dilated.  · Mild tricuspid valve regurgitation is present.  · Calculated EF = 57%.  · There is no evidence of pericardial effusion.        X-ray 7/31/2019    Study Result     XR CHEST 1 VW-     HISTORY: Female who is 68 years-old,   chest tube removal     TECHNIQUE: Frontal view of the chest     COMPARISON: 07/31/2019     FINDINGS: Interval chest tube removal. Heart is enlarged. Sternotomy  wires, right IJ sheath noted. Mild pulmonary vascular congestion. Mild  left basilar atelectasis/infiltrate/effusion, continued follow-up  suggested. No pneumothorax. No acute osseous process.     IMPRESSION:  Chest tube removal. No pneumothorax.     This report was finalized on 7/31/2019 3:35 PM by Dr. Garland Garcia M.D.              Current Outpatient Medications:   •  ACCU-CHEK JESUS MANUEL PLUS test strip, TEST THREE TIMES DAILY, Disp: 300 each, Rfl: 2  •  apixaban (ELIQUIS) 5 MG tablet tablet, Take 1 tablet by mouth 2 (Two) Times a Day., Disp: 180 tablet, Rfl: 1  •  aspirin 81 MG tablet, Take 81 mg by mouth Every Morning. TO STOP THE DAY BEFORE SURGERY, Disp: , Rfl:   •  atorvastatin (LIPITOR) 40 MG tablet, Take 1 tablet by mouth Every Night for 30 days., Disp: 30 tablet, Rfl: 0  •  bumetanide (BUMEX) 2 MG tablet, Take 1 tablet by mouth 2 (Two) Times a Day for 30 days., Disp: 60 tablet, Rfl: 0  •  carvedilol (COREG) 6.25 MG tablet, Take 1 tablet by mouth Every 12 (Twelve) Hours for 30 days., Disp: 60 tablet, Rfl: 0  •  Cholecalciferol (VITAMIN D-3) 1000 UNITS capsule, Take 5,000 Units by mouth Daily., Disp: , Rfl:   •  doxycycline (MONODOX) 100 MG capsule, Take 1 capsule by mouth Every 12 (Twelve) Hours for 19 doses., Disp: 19 capsule, Rfl: 0  •  epoetin hermes (EPOGEN,PROCRIT) 62095 UNIT/ML injection, Inject 20,000 Units under the skin Every 14 (Fourteen) Days. LAST TIME 2-6-2017, Disp: , Rfl:   •  ferrous fumarate (YADIRA-SEQUELS) 50 MG CR tablet, Take 65 mg by mouth 2 (Two) Times a Day., Disp: , Rfl:   •  glucose blood test strip, Use as instructed, Disp: 100 each, Rfl: 0  •  linagliptin (TRADJENTA) 5 MG tablet tablet, Take 5 mg by mouth Daily., Disp: , Rfl:   •  montelukast (SINGULAIR) 10 MG tablet, Take 1 tablet by mouth Every Night., Disp: 90 tablet,  Rfl: 1  •  Multiple Vitamin (MULTI VITAMIN DAILY PO), Take 1 tablet by mouth Every Morning., Disp: , Rfl:   •  Probiotic Product (PROBIOTIC DAILY PO), Take 1 capsule by mouth Daily., Disp: , Rfl:   •  pyridoxine (VITAMIN B-6) 500 MG tablet, Take 500 mg by mouth Daily., Disp: , Rfl:   •  sertraline (ZOLOFT) 50 MG tablet, Take 1 tablet by mouth Daily., Disp: 90 tablet, Rfl: 1  •  topiramate (TOPAMAX) 50 MG tablet, Take 100 mg by mouth Daily., Disp: , Rfl:      Assessment:        Patient Active Problem List   Diagnosis   • Menstrual disorder   • Atopic rhinitis   • Anemia in CKD (chronic kidney disease)   • Spasm of cervical paraspinous muscle   • Cervical radiculopathy   • Chronic kidney disease, stage IV (severe) (CMS/[facility])   • Depression   • DM type 2 with diabetic peripheral neuropathy (CMS/[facility])   • Essential hypertension   • Duplay's periarthritis syndrome   • Gastroesophageal reflux disease   • Hyperlipidemia   • Hypertension   • Arthritis   • Seizure disorder (CMS/[facility])   • Phlebitis   • Type 2 diabetes mellitus (CMS/[facility])   • Vitamin D deficiency   • Chest pain   • Abscess   • Generalized muscle weakness   • Abdominal wall cellulitis   • HTN (hypertension)   • CKD (chronic kidney disease) stage 4, GFR 15-29 ml/min (CMS/[facility])   • Diabetes mellitus with peripheral autonomic neuropathy (CMS/[facility])   • Hyponatremia   • Hypercalcemia   • Dehydration   • DACIA (acute kidney injury) (CMS/[facility])   • Abdominal wall abscess   • Cellulitis of toe of left foot   • Partial Achilles tendon tear, left, initial encounter   • Arthritis of foot   • Essential tremor   • Primary Parkinsonism (CMS/[facility])   • Abnormal cardiac function test   • 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 pectoris (CMS/[facility])               Plan:   1. CAD S/p CABG x4 with right EVH site: Denies chest pain.  currently on BB, aspirin, and statin.  Continue current medications.    2.  Atrial fibrillation on Eliquis: Denies palpitations    Pros and cons  as well as indication of the anticoagulation has been explained to the patient in detail. There are no obvious complications at this stage. Risk of  the bleedings has been explained. Need for the regular blood workup and adjust the dose has been explained. Need for proper follow-up on anticoagulation also has been explained.     3..  Hyperlipidemia: Lipids on 7/26/2019 , HDL 38, 74, and triglycerides 112.  Good control on current medication.  Continue statin.    Risk of the hyperlipidemia, importance of the treatment has been explained. Pros and cons of the statins has been explained. Regular blood workup as well as side effects including the liver failure, myelopathy death has been explained.    4.  Hypertension: Stable today in the office, continue current medications.  That she is working with physical therapy and doing well.    Educated patient on exercising for at least 30 minutes a day for 2 to 3 days a week. Importance of controlling hypertension and blood pressure checkup on the regular basis has been explained. Hypertension as a silent killer has been discussed. Risk reduction of the weight and regular exercises to control the hypertension has been explained.    5. DOMI?: will have her follow up with her PCP to be tested for sleep apnea.     No diagnosis found.    There are no diagnoses linked to this encounter.    COUNSELING:    Maribeth Eagle was given to patient for the following topics: diagnostic results, risk factor reductions, impressions, risks and benefits of treatment options and importance of treatment compliance .       SMOKING COUNSELING:    Counseling given: Not Answered  Comment: quit 1993    She will follow up with Dr. Hodges in 3 months.     Sincerely,   IRENA Kohler  [facility]  08/16/19  9:45 AM

## 2019-08-16 NOTE — OUTREACH NOTE
CT Surgery Week 2 Survey      Responses   Facility patient discharged from?  [place]   Does the patient have one of the following disease processes/diagnoses(primary or secondary)?  Cardiothoracic surgery   Week 2 attempt successful?  No   Unsuccessful attempts  Attempt 2          Nehemiah Madrid RN

## 2019-08-20 ENCOUNTER — READMISSION MANAGEMENT (OUTPATIENT)
Dept: CALL CENTER | Facility: HOSPITAL | Age: 69
End: 2019-08-20

## 2019-08-20 NOTE — OUTREACH NOTE
CT Surgery Week 3 Survey      Responses   Facility patient discharged from?  [place]   Does the patient have one of the following disease processes/diagnoses(primary or secondary)?  Cardiothoracic surgery   Week 3 attempt successful?  No   Unsuccessful attempts  Attempt 1          [place] Isaac RN

## 2019-08-21 ENCOUNTER — READMISSION MANAGEMENT (OUTPATIENT)
Dept: CALL CENTER | Facility: HOSPITAL | Age: 69
End: 2019-08-21

## 2019-08-21 NOTE — OUTREACH NOTE
CT Surgery Week 3 Survey      Responses   Facility patient discharged from?  [place]   Does the patient have one of the following disease processes/diagnoses(primary or secondary)?  Cardiothoracic surgery   Week 3 attempt successful?  Yes   Call start time  0810   Rescheduled  Rescheduled-pt requested [Readying for MD appt. Requests reschedule.]   Discharge diagnosis  CAD, CKD IV, HTN, HLD, DM II, anemia, s/p cardiac cath., s/p CABG x4 with right EVH   Person spoke with today (if not patient) and relationship  Tru,           Violet Douglas, RN

## 2019-08-22 ENCOUNTER — READMISSION MANAGEMENT (OUTPATIENT)
Dept: CALL CENTER | Facility: HOSPITAL | Age: 69
End: 2019-08-22

## 2019-08-22 NOTE — OUTREACH NOTE
CT Surgery Week 3 Survey      Responses   Facility patient discharged from?  [place]   Does the patient have one of the following disease processes/diagnoses(primary or secondary)?  Cardiothoracic surgery   Week 3 attempt successful?  No   Rescheduled  Revoked [No answer after asking to reschedule. ]          Vance Villanueva RN

## 2019-08-23 ENCOUNTER — HOSPITAL ENCOUNTER (OUTPATIENT)
Dept: INFUSION THERAPY | Facility: HOSPITAL | Age: 69
Discharge: HOME | End: 2019-08-23
Admitting: INTERNAL MEDICINE

## 2019-08-23 VITALS
OXYGEN SATURATION: 97 % | SYSTOLIC BLOOD PRESSURE: 148 MMHG | RESPIRATION RATE: 20 BRPM | TEMPERATURE: 99.2 F | DIASTOLIC BLOOD PRESSURE: 68 MMHG | HEART RATE: 101 BPM

## 2019-08-23 DIAGNOSIS — D63.1 ANEMIA IN STAGE 5 CHRONIC KIDNEY DISEASE, NOT ON CHRONIC DIALYSIS (HCC): Primary | ICD-10-CM

## 2019-08-23 DIAGNOSIS — N18.5 ANEMIA IN STAGE 5 CHRONIC KIDNEY DISEASE, NOT ON CHRONIC DIALYSIS (HCC): Primary | ICD-10-CM

## 2019-08-23 DIAGNOSIS — N18.4 CHRONIC KIDNEY DISEASE, STAGE IV (SEVERE) (HCC): ICD-10-CM

## 2019-08-23 LAB
HCT VFR BLD AUTO: 31.9 % (ref 34–46.6)
HGB BLD-MCNC: 9.7 G/DL (ref 12–15.9)

## 2019-08-23 PROCEDURE — 85018 HEMOGLOBIN: CPT | Performed by: INTERNAL MEDICINE

## 2019-08-23 PROCEDURE — 96372 THER/PROPH/DIAG INJ SC/IM: CPT

## 2019-08-23 PROCEDURE — 36415 COLL VENOUS BLD VENIPUNCTURE: CPT

## 2019-08-23 PROCEDURE — 85014 HEMATOCRIT: CPT | Performed by: INTERNAL MEDICINE

## 2019-08-23 PROCEDURE — 25010000002 EPOETIN ALFA PER 1000 UNITS: Performed by: INTERNAL MEDICINE

## 2019-08-23 RX ADMIN — ERYTHROPOIETIN 20000 UNITS: 20000 INJECTION, SOLUTION INTRAVENOUS; SUBCUTANEOUS at 13:49

## 2019-08-23 NOTE — PROGRESS NOTES
Procrit indicated per lab results & MD order. Injection site x 1 with band aide applied.  AVS instructions given.  Pt DC per wheelchair with spouse @ 0237.

## 2019-08-28 ENCOUNTER — TELEPHONE (OUTPATIENT)
Dept: CARDIAC SURGERY | Facility: CLINIC | Age: 69
End: 2019-08-28

## 2019-08-28 NOTE — TELEPHONE ENCOUNTER
Reena with [facility] home health called stating that upon evaluating Ms. Rendon today she noticed a 2 cm section of her sternum that is widening and has depth to it. She states that it has a yellow/tan-kira film drainage, mild redness but denies fever or chills. Dr. Ferreira informed he wants to see Ms. Rendon on 8-29-19 [facility]. Pt aware and agreeable to this.

## 2019-08-29 ENCOUNTER — OFFICE VISIT (OUTPATIENT)
Dept: CARDIAC SURGERY | Facility: CLINIC | Age: 69
End: 2019-08-29

## 2019-08-29 VITALS
TEMPERATURE: 98.1 F | SYSTOLIC BLOOD PRESSURE: 133 MMHG | OXYGEN SATURATION: 96 % | HEART RATE: 85 BPM | HEIGHT: 65 IN | DIASTOLIC BLOOD PRESSURE: 73 MMHG | WEIGHT: 184 LBS | BODY MASS INDEX: 30.66 KG/M2

## 2019-08-29 DIAGNOSIS — Z95.1 S/P CABG (CORONARY ARTERY BYPASS GRAFT): Primary | ICD-10-CM

## 2019-08-29 PROCEDURE — 99024 POSTOP FOLLOW-UP VISIT: CPT | Performed by: THORACIC SURGERY (CARDIOTHORACIC VASCULAR SURGERY)

## 2019-08-29 RX ORDER — NYSTATIN 100000 [USP'U]/G
POWDER TOPICAL 2 TIMES DAILY PRN
COMMUNITY
End: 2020-10-15

## 2019-08-29 NOTE — PROGRESS NOTES
Cardiothoracic Post-Op Visit    Date of Visit:   8/29/2019    Primary Care Physician: Robby Chaney MD    Cardiologist:   Eddie Hodges MD     Chief Complaint:  Sternal wound drainage and routine post-op visit.    History of Present Illness:    Dear Colleagues,    I had the pleasure of seeing Maribeth Rendon in the office following her CABG x 4 2019/7/29.    She reports that she is recovering very well.    She denies chest pain and shortness of breath.    She complains of some minor serous drainage from her sternal wound.    Review of Systems:    Constitutional:  Negative for fevers, chills, diaphoresis.  Cardiovascular: Negative for orthopnea, PND, lower extremity edema,palpitations, chest pain.  Gastrointestinal: Negative for nausea, vomiting, diarrhea, abdominal pain.  Pulmonary:  Negative cough, wheezing, exertional dyspnea.   Neurological:  Negative for dizziness, light-headedness, syncope.  Genitourinary:  Negative for dysuria, difficulty urinating.  Musculoskeletal: Negative.  Skin:   Negative.  Hematologic:  Negative.  Phychiatric:  Negative.    Her relevant past medical history, past surgical history, social history, family history allergies and medications have been previously documented in the medical record and are unchanged unless specifically noted.    Physical Exam:    Vitals:  Reviewed and within normal limits. Body mass index is 30.62 kg/m²., Weight: 83.5 kg (184 lb), Temp: 98.1 °F (36.7 °C), Heart Rate: 85, BP: 133/73  General: No apparent distress, conversant.   HEENT: Atraumatic, anicetric sclerae, oropharynx clear with moist mucous membranes.  Neck:  Trachea midline, normal ROM, supple, no lymphadenopathy or thyromagaly.  Lungs:  CTA, normal respiratory effort, no intercostal retractions, no wheezes.  CV:  Regular rate and rhythm, no murmurs.   Abdomen: Soft, non-tender, no masses, no HSM.  Extremities:  No peripheral edema or lymphadenopathy, normal ROM, no sign of venous  varicosities.  Skin:   Warm, dry, normal turgor and texture, no rashes. Incisions clean, dry, intact and well-healed.  Psych:  Appropriate affect, alert and oriented to person, place and time.  Sternum: Stable to palpation. No clicking or shifting. No evidence of infection at inferior aspect of sternal wound. Approximately 1 cm open. Granulating wound bed. Healing.    Assessment:    68 y.o. female s/p CABG.    She is recovering very well.    The inferior aspect of her sternal wound is healing by secondary intention. Her sternum is stable. There is no fluctulance or sign of infection or infiltration.    Recommendation/Plan:    Sternal precautions lifted.    She is clear to proceed with cardiac rehab as desired.    Continued routine follow-up with Cardiologists and Primary Care Provider.    She was advised that she could return for follow-up at any time.    No further surgical intervention required at this time.    Thank you for allowing me to participate in her care.      Best regards,    Gordo Ferreira MD, PhD  08/29/19  9:28 AM

## 2019-09-06 ENCOUNTER — HOSPITAL ENCOUNTER (OUTPATIENT)
Dept: INFUSION THERAPY | Facility: HOSPITAL | Age: 69
Discharge: HOME | End: 2019-09-06
Admitting: INTERNAL MEDICINE

## 2019-09-06 VITALS
RESPIRATION RATE: 20 BRPM | TEMPERATURE: 97.8 F | SYSTOLIC BLOOD PRESSURE: 125 MMHG | OXYGEN SATURATION: 96 % | HEART RATE: 109 BPM | DIASTOLIC BLOOD PRESSURE: 67 MMHG

## 2019-09-06 DIAGNOSIS — D63.1 ANEMIA IN STAGE 4 CHRONIC KIDNEY DISEASE (HCC): Primary | ICD-10-CM

## 2019-09-06 DIAGNOSIS — D63.1 ANEMIA IN STAGE 5 CHRONIC KIDNEY DISEASE, NOT ON CHRONIC DIALYSIS (HCC): ICD-10-CM

## 2019-09-06 DIAGNOSIS — N18.5 ANEMIA IN STAGE 5 CHRONIC KIDNEY DISEASE, NOT ON CHRONIC DIALYSIS (HCC): ICD-10-CM

## 2019-09-06 DIAGNOSIS — N18.4 CHRONIC KIDNEY DISEASE, STAGE IV (SEVERE) (HCC): ICD-10-CM

## 2019-09-06 DIAGNOSIS — N18.4 ANEMIA IN STAGE 4 CHRONIC KIDNEY DISEASE (HCC): Primary | ICD-10-CM

## 2019-09-06 LAB
ALBUMIN SERPL-MCNC: 3.2 G/DL (ref 3.5–5.2)
ALBUMIN/GLOB SERPL: 0.7 G/DL
ALP SERPL-CCNC: 96 U/L (ref 39–117)
ALT SERPL W P-5'-P-CCNC: 9 U/L (ref 1–33)
ANION GAP SERPL CALCULATED.3IONS-SCNC: 12.8 MMOL/L (ref 5–15)
AST SERPL-CCNC: 15 U/L (ref 1–32)
BILIRUB SERPL-MCNC: 0.3 MG/DL (ref 0.2–1.2)
BUN BLD-MCNC: 46 MG/DL (ref 8–23)
BUN/CREAT SERPL: 12.9 (ref 7–25)
CALCIUM SPEC-SCNC: 8.3 MG/DL (ref 8.6–10.5)
CHLORIDE SERPL-SCNC: 97 MMOL/L (ref 98–107)
CO2 SERPL-SCNC: 23.2 MMOL/L (ref 22–29)
CREAT BLD-MCNC: 3.57 MG/DL (ref 0.57–1)
GFR SERPL CREATININE-BSD FRML MDRD: 13 ML/MIN/1.73
GFR SERPL CREATININE-BSD FRML MDRD: ABNORMAL ML/MIN/{1.73_M2}
GLOBULIN UR ELPH-MCNC: 4.9 GM/DL
GLUCOSE BLD-MCNC: 410 MG/DL (ref 65–99)
HCT VFR BLD AUTO: 33.2 % (ref 34–46.6)
HGB BLD-MCNC: 9.9 G/DL (ref 12–15.9)
POTASSIUM BLD-SCNC: 4.4 MMOL/L (ref 3.5–5.2)
PROT SERPL-MCNC: 8.1 G/DL (ref 6–8.5)
SODIUM BLD-SCNC: 133 MMOL/L (ref 136–145)

## 2019-09-06 PROCEDURE — 25010000002 EPOETIN ALFA PER 1000 UNITS: Performed by: INTERNAL MEDICINE

## 2019-09-06 PROCEDURE — 80053 COMPREHEN METABOLIC PANEL: CPT | Performed by: INTERNAL MEDICINE

## 2019-09-06 PROCEDURE — 85018 HEMOGLOBIN: CPT | Performed by: INTERNAL MEDICINE

## 2019-09-06 PROCEDURE — 96372 THER/PROPH/DIAG INJ SC/IM: CPT

## 2019-09-06 PROCEDURE — 85014 HEMATOCRIT: CPT | Performed by: INTERNAL MEDICINE

## 2019-09-06 PROCEDURE — 36415 COLL VENOUS BLD VENIPUNCTURE: CPT

## 2019-09-06 RX ADMIN — ERYTHROPOIETIN 20000 UNITS: 20000 INJECTION, SOLUTION INTRAVENOUS; SUBCUTANEOUS at 15:00

## 2019-09-06 NOTE — PROGRESS NOTES
Additional labs orders given per pt from Dr Mcclain's office.  Procrit given per order due to parameters met.   Pt given AVS and dc'ed ambulatory with cane accompanied by .     Critical lab result called from chemistry.  Blood glucose 410.  Labs faxed to Dr Dhaval Mcclain's office.  Dr Mcclain's office called and reported critical lab value.  Additional fax number given and labs faxed with attn to Kristin Mcclain.

## 2019-09-11 ENCOUNTER — TRANSCRIBE ORDERS (OUTPATIENT)
Dept: CARDIOLOGY | Facility: CLINIC | Age: 69
End: 2019-09-11

## 2019-09-11 DIAGNOSIS — Z95.1 S/P CABG (CORONARY ARTERY BYPASS GRAFT): Primary | ICD-10-CM

## 2019-09-20 ENCOUNTER — HOSPITAL ENCOUNTER (OUTPATIENT)
Dept: INFUSION THERAPY | Facility: HOSPITAL | Age: 69
Discharge: HOME | End: 2019-09-20
Admitting: INTERNAL MEDICINE

## 2019-09-20 ENCOUNTER — OFFICE VISIT (OUTPATIENT)
Dept: CARDIAC REHAB | Facility: HOSPITAL | Age: 69
End: 2019-09-20

## 2019-09-20 VITALS
SYSTOLIC BLOOD PRESSURE: 130 MMHG | WEIGHT: 184.4 LBS | DIASTOLIC BLOOD PRESSURE: 70 MMHG | HEIGHT: 65 IN | OXYGEN SATURATION: 97 % | BODY MASS INDEX: 30.72 KG/M2

## 2019-09-20 VITALS
HEART RATE: 118 BPM | RESPIRATION RATE: 20 BRPM | OXYGEN SATURATION: 98 % | DIASTOLIC BLOOD PRESSURE: 75 MMHG | SYSTOLIC BLOOD PRESSURE: 129 MMHG | TEMPERATURE: 96.7 F

## 2019-09-20 DIAGNOSIS — Z95.1 S/P CABG X 4: Primary | ICD-10-CM

## 2019-09-20 DIAGNOSIS — N18.4 ANEMIA IN STAGE 4 CHRONIC KIDNEY DISEASE (HCC): Primary | ICD-10-CM

## 2019-09-20 DIAGNOSIS — D63.1 ANEMIA IN STAGE 5 CHRONIC KIDNEY DISEASE, NOT ON CHRONIC DIALYSIS (HCC): ICD-10-CM

## 2019-09-20 DIAGNOSIS — D63.1 ANEMIA IN STAGE 4 CHRONIC KIDNEY DISEASE (HCC): Primary | ICD-10-CM

## 2019-09-20 DIAGNOSIS — N18.5 ANEMIA IN STAGE 5 CHRONIC KIDNEY DISEASE, NOT ON CHRONIC DIALYSIS (HCC): ICD-10-CM

## 2019-09-20 DIAGNOSIS — N18.4 CHRONIC KIDNEY DISEASE, STAGE IV (SEVERE) (HCC): ICD-10-CM

## 2019-09-20 LAB
25(OH)D3 SERPL-MCNC: 29.6 NG/ML (ref 30–100)
ALBUMIN SERPL-MCNC: 3.3 G/DL (ref 3.5–5.2)
ALBUMIN/GLOB SERPL: 0.7 G/DL
ALP SERPL-CCNC: 96 U/L (ref 39–117)
ALT SERPL W P-5'-P-CCNC: 10 U/L (ref 1–33)
ANION GAP SERPL CALCULATED.3IONS-SCNC: 13.1 MMOL/L (ref 5–15)
AST SERPL-CCNC: 16 U/L (ref 1–32)
BASOPHILS # BLD AUTO: 0.06 10*3/MM3 (ref 0–0.2)
BASOPHILS NFR BLD AUTO: 0.7 % (ref 0–1.5)
BILIRUB SERPL-MCNC: 0.3 MG/DL (ref 0.2–1.2)
BILIRUB UR QL STRIP: NEGATIVE
BUN BLD-MCNC: 41 MG/DL (ref 8–23)
BUN/CREAT SERPL: 10.6 (ref 7–25)
CALCIUM SPEC-SCNC: 8.1 MG/DL (ref 8.6–10.5)
CALCIUM SPEC-SCNC: 8.2 MG/DL (ref 8.6–10.5)
CHLORIDE SERPL-SCNC: 107 MMOL/L (ref 98–107)
CLARITY UR: CLEAR
CO2 SERPL-SCNC: 22.9 MMOL/L (ref 22–29)
COLOR UR: YELLOW
CREAT BLD-MCNC: 3.87 MG/DL (ref 0.57–1)
DEPRECATED RDW RBC AUTO: 50.5 FL (ref 37–54)
EOSINOPHIL # BLD AUTO: 0.47 10*3/MM3 (ref 0–0.4)
EOSINOPHIL NFR BLD AUTO: 5.2 % (ref 0.3–6.2)
ERYTHROCYTE [DISTWIDTH] IN BLOOD BY AUTOMATED COUNT: 15.4 % (ref 12.3–15.4)
FERRITIN SERPL-MCNC: 676 NG/ML (ref 13–150)
FOLATE SERPL-MCNC: 7.52 NG/ML (ref 4.78–24.2)
GFR SERPL CREATININE-BSD FRML MDRD: 12 ML/MIN/1.73
GFR SERPL CREATININE-BSD FRML MDRD: ABNORMAL ML/MIN/{1.73_M2}
GLOBULIN UR ELPH-MCNC: 4.7 GM/DL
GLUCOSE BLD-MCNC: 164 MG/DL (ref 65–99)
GLUCOSE UR STRIP-MCNC: NEGATIVE MG/DL
HCT VFR BLD AUTO: 31.2 % (ref 34–46.6)
HGB BLD-MCNC: 9.7 G/DL (ref 12–15.9)
HGB UR QL STRIP.AUTO: NEGATIVE
IMM GRANULOCYTES # BLD AUTO: 0.05 10*3/MM3 (ref 0–0.05)
IMM GRANULOCYTES NFR BLD AUTO: 0.6 % (ref 0–0.5)
IRON 24H UR-MRATE: 78 MCG/DL (ref 37–145)
IRON SATN MFR SERPL: 40 % (ref 20–50)
KETONES UR QL STRIP: NEGATIVE
LEUKOCYTE ESTERASE UR QL STRIP.AUTO: NEGATIVE
LYMPHOCYTES # BLD AUTO: 1.43 10*3/MM3 (ref 0.7–3.1)
LYMPHOCYTES NFR BLD AUTO: 15.9 % (ref 19.6–45.3)
MAGNESIUM SERPL-MCNC: 1.6 MG/DL (ref 1.6–2.4)
MCH RBC QN AUTO: 28.6 PG (ref 26.6–33)
MCHC RBC AUTO-ENTMCNC: 31.1 G/DL (ref 31.5–35.7)
MCV RBC AUTO: 92 FL (ref 79–97)
MONOCYTES # BLD AUTO: 0.79 10*3/MM3 (ref 0.1–0.9)
MONOCYTES NFR BLD AUTO: 8.8 % (ref 5–12)
NEUTROPHILS # BLD AUTO: 6.21 10*3/MM3 (ref 1.7–7)
NEUTROPHILS NFR BLD AUTO: 68.8 % (ref 42.7–76)
NITRITE UR QL STRIP: NEGATIVE
NRBC BLD AUTO-RTO: 0 /100 WBC (ref 0–0.2)
PH UR STRIP.AUTO: 5.5 [PH] (ref 5–8)
PHOSPHATE SERPL-MCNC: 4.6 MG/DL (ref 2.5–4.5)
PLATELET # BLD AUTO: 269 10*3/MM3 (ref 140–450)
PMV BLD AUTO: 10.1 FL (ref 6–12)
POTASSIUM BLD-SCNC: 4.2 MMOL/L (ref 3.5–5.2)
PROT SERPL-MCNC: 8 G/DL (ref 6–8.5)
PROT UR QL STRIP: ABNORMAL
PTH-INTACT SERPL-MCNC: 316 PG/ML (ref 15–65)
RBC # BLD AUTO: 3.39 10*6/MM3 (ref 3.77–5.28)
SODIUM BLD-SCNC: 143 MMOL/L (ref 136–145)
SP GR UR STRIP: 1.01 (ref 1–1.03)
TIBC SERPL-MCNC: 197 MCG/DL (ref 298–536)
TRANSFERRIN SERPL-MCNC: 132 MG/DL (ref 200–360)
URATE SERPL-MCNC: 9.9 MG/DL (ref 2.4–5.7)
UROBILINOGEN UR QL STRIP: ABNORMAL
VIT B12 BLD-MCNC: 573 PG/ML (ref 211–946)
WBC NRBC COR # BLD: 9.01 10*3/MM3 (ref 3.4–10.8)

## 2019-09-20 PROCEDURE — 93797 PHYS/QHP OP CAR RHAB WO ECG: CPT

## 2019-09-20 PROCEDURE — 84550 ASSAY OF BLOOD/URIC ACID: CPT | Performed by: INTERNAL MEDICINE

## 2019-09-20 PROCEDURE — 82746 ASSAY OF FOLIC ACID SERUM: CPT | Performed by: INTERNAL MEDICINE

## 2019-09-20 PROCEDURE — 84466 ASSAY OF TRANSFERRIN: CPT | Performed by: INTERNAL MEDICINE

## 2019-09-20 PROCEDURE — 81003 URINALYSIS AUTO W/O SCOPE: CPT | Performed by: INTERNAL MEDICINE

## 2019-09-20 PROCEDURE — 96372 THER/PROPH/DIAG INJ SC/IM: CPT

## 2019-09-20 PROCEDURE — 84100 ASSAY OF PHOSPHORUS: CPT | Performed by: INTERNAL MEDICINE

## 2019-09-20 PROCEDURE — 36415 COLL VENOUS BLD VENIPUNCTURE: CPT

## 2019-09-20 PROCEDURE — 82306 VITAMIN D 25 HYDROXY: CPT | Performed by: INTERNAL MEDICINE

## 2019-09-20 PROCEDURE — 82607 VITAMIN B-12: CPT | Performed by: INTERNAL MEDICINE

## 2019-09-20 PROCEDURE — 85025 COMPLETE CBC W/AUTO DIFF WBC: CPT | Performed by: INTERNAL MEDICINE

## 2019-09-20 PROCEDURE — 25010000002 EPOETIN ALFA PER 1000 UNITS: Performed by: INTERNAL MEDICINE

## 2019-09-20 PROCEDURE — 83970 ASSAY OF PARATHORMONE: CPT | Performed by: INTERNAL MEDICINE

## 2019-09-20 PROCEDURE — 83540 ASSAY OF IRON: CPT | Performed by: INTERNAL MEDICINE

## 2019-09-20 PROCEDURE — 80053 COMPREHEN METABOLIC PANEL: CPT | Performed by: INTERNAL MEDICINE

## 2019-09-20 PROCEDURE — 83735 ASSAY OF MAGNESIUM: CPT | Performed by: INTERNAL MEDICINE

## 2019-09-20 PROCEDURE — 82728 ASSAY OF FERRITIN: CPT | Performed by: INTERNAL MEDICINE

## 2019-09-20 RX ADMIN — ERYTHROPOIETIN 20000 UNITS: 20000 INJECTION, SOLUTION INTRAVENOUS; SUBCUTANEOUS at 13:53

## 2019-09-20 NOTE — PROGRESS NOTES
Cardiac Rehab Initial Assessment      Name: Maribeth Rendon  :1950 Allergies:Prednisone   MRN: [id number] 68 y.o. Physician: Robby Chaney MD   Primary Diagnosis:    Diagnosis Plan   1. S/P CABG x 4      Event Date: 2019   Specialist: Eddie Hodges MD   Secondary Diagnosis:  Risk Stratification:High Risk Note Author: Claudette Archer RN     Cardiovascular History: Comments 1st heart event, presented as Afib with RVR in [facility] for lab work and was sent to ER, but no current Afib.     EXERCISE AT HOME  no  But active  N/A    EF: 57%      Source: echo  2019          Ambulatory Status:Independent  Ambulatory Fall Risk Assessed on Initial Visit: yes 6 Minute Walk Pre- Cardiac Rehab:  Distance:372 ft      RPE:4  Max. HR: 127       SPO2:95-98%    MET: 1.5  MPH: 0.7             RPD: 1  Resting BP: unable to obtain, no BP's or sticks in LA, 130/70 RA    Peak BP: 150/80  Recovery BP: 112/72  Comments: Only able to walk for 3 1/2 minutes and had to stop twice for total of 5 seconds rest.  She reported that her legs got tired and she was slightly SOA.       NUTRITION  Lipids:yes If yes, labs as follows;  Total: No components found for: CHOLESTEROL  HDL:   HDL Cholesterol   Date Value Ref Range Status   2019 38 (L) 40 - 60 mg/dL Final    Lipids continued:  LDL:  LDL Cholesterol    Date Value Ref Range Status   2019 74 0 - 100 mg/dL Final     Triglyceride: No components found for: TRIGLYCERIDE   Weight Management:                 Weight: 184.4 lbs  Height: 65 inches                                   BMI: Body mass index is 30.69 kg/m².  Waist Circumference: 41.5  inches   Alcohol Use: 1 glasses of wine per year(s) Diabetes:Yes,  Monitors BS at home- yes, Frequency: 1 time daily 3-4 days a week, Random BS: 137    Last HGBA1C with date if applicable:No components found for: A1C         SOCIAL HISTORY  Social History     Socioeconomic History   • Marital status:      Spouse name:  "Not on file   • Number of children: Not on file   • Years of education: Not on file   • Highest education level: Not on file   Tobacco Use   • Smoking status: Former Smoker     Packs/day: 2.00     Years: 20.00     Pack years: 40.00     Types: Cigarettes     Last attempt to quit: 10/21/1992     Years since quittin.9   • Smokeless tobacco: Never Used   • Tobacco comment: quit    Substance and Sexual Activity   • Alcohol use: No   • Drug use: No   • Sexual activity: Defer     Birth control/protection: Surgical       Educational Level (choose one that applies) 11th grade Learning Barriers:Ready to Learn    Family Support:yes    Living Arrangement: lives with their spouse    Risk Factors: Stress  Yes, Clinical Depression  Yes, Heredity  Yes If Yes brother had CAB in his 40s,  in his mid to late 50's; sister has PM but not sure what problem; dad has MI and  age 67; mom had heart problem but lived to age 90., Hyperlipidemia  Yes, Diabetes  Yes If Yes: Do you check blood glucose daily  No Today's glucose level 137, Exercise prior to event  No If Yes: Activity active, Minutes per Francisca, Days per week NA, Obesity  Yes and NA     Tobacco Adjunct: No    Former 2 PPD smoker x 26 yrs, quit .        Comorbidities: Cerebrovascular disease (old CVA seen on scan), Connective tissue disease (psoriatic arthritis), Renal disease (ESRD 5), Diabetes Mellitus.      Gets Procrit infusions every 2 weeks here in ACU to \"stay off dialysis\", seizure disorder, essential tremor, bilateral neuropathy, Charcot foot disease - right foot.          PSYCHOSOCIAL  Clinical Depression: yes    Stress: yes     Assess presence or absence of depression using a valid screening tool: yes      PHYSICAL ASSESSMENT  Influenza vaccine: yes  Pneumococcal vaccine: yes          Angina: no    Describe angina scale of 0 - 4: 0 = none    Today are you having incisional pain? No. If, Yes, Scale: NA        Today are you having any other pain? No. If, " Yes, Scale: NA     Diagnosed with Hypertension:yes    Heart Sounds: Normal     Lung Sounds: normal air entry, lungs clear to auscultation         Assessment: Alert & oriented x 3 and talkative. Orthopedic Problems: Psoriatic arthritis affects her hands.  No complaints of any other joint problems.     Are you being hurt, hit, or frightened by anyone at home or in your life? no    Are you being neglected by a caregiver? No Shoulder flexibility/Range of motion: Below average     Recommended arm activity: Any    Chair sit and reach within: 4 inches   Leg flexibility: Below average    Leg Strength/Balance/Five times sit to stand: 16 seconds.     Chose one: Fall Risk    Recommended stretching: Chair    Assessment: Below average upper and lower extremity flexibililty.  Her midline incision continues to have some yellow drainage at the bottom of the incision, but  told her just to keep a bandage over it and that he believes it is healing just fine.  The two drainage tube insertion sites are completely healed.  The two endoscopic harvest sites on her inner right calf look to be healed, but she has an outbreak of what appears to be psoriasis there.  Both lower extremities are dry and scaly, but particularly the right lower leg. She does have a minimal amount of swelling in the right ankle (less than 1+).  She also has reddened areas under both her breasts which she attributes to the psoriasis.  Her left middle finger is very reddened and painful per patient at the nailbed to the tip of the nail, which she believes she will lose.  She & her  both state that something happened to it the hospital, but they are not sure what.  She has burned several fingers on her right hand with hot coffee recently and has them bandaged today.    Family attends [place]: yes,  Time of arrival: 11:10 am  Time of departure: 12:30 pm     Patient Goals: MET  3-4.  To develop a regular exercise program and exercise at least 150  minutes a week per AHA guidelines.  To keep up with her grandchildren's activities- go watch their sport games, chorus, cheerleading, etc - to be able to walk a certain distance to the event and also go up/down bleachers.  Learn about healthy heart and diabetic diets to benefit her and her .  Lose weight - 5-10 lbs during the program with long term weight goal of 140 lbs.  Return to scrapbooking, if possible with her hands being in such bad shape.         9/20/2019  10:56 AM  Claudette Archer RN

## 2019-09-20 NOTE — PROGRESS NOTES
Procrit indicated per lab results & MD order.  Injection site x 1 with band aide applied.  Pt DC per ambulation with spouse @ 1355.  AVS given.

## 2019-09-25 ENCOUNTER — TREATMENT (OUTPATIENT)
Dept: CARDIAC REHAB | Facility: HOSPITAL | Age: 69
End: 2019-09-25

## 2019-09-25 DIAGNOSIS — Z95.1 S/P CABG X 4: Primary | ICD-10-CM

## 2019-09-25 PROCEDURE — 93798 PHYS/QHP OP CAR RHAB W/ECG: CPT

## 2019-09-27 ENCOUNTER — TREATMENT (OUTPATIENT)
Dept: CARDIAC REHAB | Facility: HOSPITAL | Age: 69
End: 2019-09-27

## 2019-09-27 DIAGNOSIS — Z95.1 S/P CABG X 4: Primary | ICD-10-CM

## 2019-09-27 PROCEDURE — 93798 PHYS/QHP OP CAR RHAB W/ECG: CPT

## 2019-09-30 ENCOUNTER — TREATMENT (OUTPATIENT)
Dept: CARDIAC REHAB | Facility: HOSPITAL | Age: 69
End: 2019-09-30

## 2019-09-30 DIAGNOSIS — Z95.1 S/P CABG X 4: Primary | ICD-10-CM

## 2019-09-30 PROCEDURE — 93798 PHYS/QHP OP CAR RHAB W/ECG: CPT

## 2019-10-02 ENCOUNTER — TREATMENT (OUTPATIENT)
Dept: CARDIAC REHAB | Facility: HOSPITAL | Age: 69
End: 2019-10-02

## 2019-10-02 DIAGNOSIS — Z95.1 S/P CABG X 4: Primary | ICD-10-CM

## 2019-10-02 PROCEDURE — 93798 PHYS/QHP OP CAR RHAB W/ECG: CPT

## 2019-10-04 ENCOUNTER — HOSPITAL ENCOUNTER (OUTPATIENT)
Dept: INFUSION THERAPY | Facility: HOSPITAL | Age: 69
Discharge: HOME | End: 2019-10-04
Admitting: INTERNAL MEDICINE

## 2019-10-04 ENCOUNTER — TREATMENT (OUTPATIENT)
Dept: CARDIAC REHAB | Facility: HOSPITAL | Age: 69
End: 2019-10-04

## 2019-10-04 VITALS
HEART RATE: 120 BPM | OXYGEN SATURATION: 99 % | RESPIRATION RATE: 20 BRPM | SYSTOLIC BLOOD PRESSURE: 150 MMHG | TEMPERATURE: 98.7 F | DIASTOLIC BLOOD PRESSURE: 97 MMHG

## 2019-10-04 DIAGNOSIS — N18.4 CHRONIC KIDNEY DISEASE, STAGE IV (SEVERE) (HCC): ICD-10-CM

## 2019-10-04 DIAGNOSIS — Z95.1 S/P CABG X 4: Primary | ICD-10-CM

## 2019-10-04 DIAGNOSIS — N18.5 ANEMIA IN STAGE 5 CHRONIC KIDNEY DISEASE, NOT ON CHRONIC DIALYSIS (HCC): Primary | ICD-10-CM

## 2019-10-04 DIAGNOSIS — D63.1 ANEMIA IN STAGE 5 CHRONIC KIDNEY DISEASE, NOT ON CHRONIC DIALYSIS (HCC): Primary | ICD-10-CM

## 2019-10-04 LAB
HCT VFR BLD AUTO: 29.9 % (ref 34–46.6)
HGB BLD-MCNC: 9.4 G/DL (ref 12–15.9)

## 2019-10-04 PROCEDURE — 25010000002 EPOETIN ALFA PER 1000 UNITS: Performed by: INTERNAL MEDICINE

## 2019-10-04 PROCEDURE — 93798 PHYS/QHP OP CAR RHAB W/ECG: CPT

## 2019-10-04 PROCEDURE — 36415 COLL VENOUS BLD VENIPUNCTURE: CPT

## 2019-10-04 PROCEDURE — 85018 HEMOGLOBIN: CPT | Performed by: INTERNAL MEDICINE

## 2019-10-04 PROCEDURE — 85014 HEMATOCRIT: CPT | Performed by: INTERNAL MEDICINE

## 2019-10-04 PROCEDURE — 96372 THER/PROPH/DIAG INJ SC/IM: CPT

## 2019-10-04 RX ADMIN — ERYTHROPOIETIN 20000 UNITS: 20000 INJECTION, SOLUTION INTRAVENOUS; SUBCUTANEOUS at 14:48

## 2019-10-07 ENCOUNTER — TREATMENT (OUTPATIENT)
Dept: CARDIAC REHAB | Facility: HOSPITAL | Age: 69
End: 2019-10-07

## 2019-10-07 DIAGNOSIS — Z95.1 S/P CABG X 4: Primary | ICD-10-CM

## 2019-10-07 PROCEDURE — 93798 PHYS/QHP OP CAR RHAB W/ECG: CPT

## 2019-10-09 ENCOUNTER — TREATMENT (OUTPATIENT)
Dept: CARDIAC REHAB | Facility: HOSPITAL | Age: 69
End: 2019-10-09

## 2019-10-09 DIAGNOSIS — Z95.1 S/P CABG X 4: Primary | ICD-10-CM

## 2019-10-09 PROCEDURE — 93798 PHYS/QHP OP CAR RHAB W/ECG: CPT

## 2019-10-11 ENCOUNTER — TREATMENT (OUTPATIENT)
Dept: CARDIAC REHAB | Facility: HOSPITAL | Age: 69
End: 2019-10-11

## 2019-10-11 DIAGNOSIS — Z95.1 S/P CABG X 4: Primary | ICD-10-CM

## 2019-10-11 PROCEDURE — 93798 PHYS/QHP OP CAR RHAB W/ECG: CPT

## 2019-10-14 ENCOUNTER — TREATMENT (OUTPATIENT)
Dept: CARDIAC REHAB | Facility: HOSPITAL | Age: 69
End: 2019-10-14

## 2019-10-14 DIAGNOSIS — Z95.1 S/P CABG X 4: Primary | ICD-10-CM

## 2019-10-14 PROCEDURE — 93798 PHYS/QHP OP CAR RHAB W/ECG: CPT

## 2019-10-16 ENCOUNTER — OFFICE VISIT (OUTPATIENT)
Dept: FAMILY MEDICINE CLINIC | Facility: CLINIC | Age: 69
End: 2019-10-16

## 2019-10-16 VITALS
DIASTOLIC BLOOD PRESSURE: 80 MMHG | BODY MASS INDEX: 31.65 KG/M2 | TEMPERATURE: 98.2 F | HEART RATE: 121 BPM | SYSTOLIC BLOOD PRESSURE: 130 MMHG | OXYGEN SATURATION: 94 % | WEIGHT: 190 LBS | HEIGHT: 65 IN

## 2019-10-16 DIAGNOSIS — E11.42 DM TYPE 2 WITH DIABETIC PERIPHERAL NEUROPATHY (HCC): Primary | ICD-10-CM

## 2019-10-16 PROCEDURE — 99213 OFFICE O/P EST LOW 20 MIN: CPT | Performed by: INTERNAL MEDICINE

## 2019-10-16 PROCEDURE — G0008 ADMIN INFLUENZA VIRUS VAC: HCPCS | Performed by: INTERNAL MEDICINE

## 2019-10-16 PROCEDURE — 90653 IIV ADJUVANT VACCINE IM: CPT | Performed by: INTERNAL MEDICINE

## 2019-10-16 NOTE — PROGRESS NOTES
Subjective Chief complaint follow-up on diabetes  Maribeth Rendon is a 68 y.o. female.     History of Present Illness   Maribeth is here today for follow-up on her diabetes at her last visit we did start some Tradjenta.  We were trying to stay away from medicines that would be a problem with her chronic kidney disease.  The Tradjenta was not controlling her sugar well and she did go back to taking some of her glimepiride.  However she has had some blood sugars down into the 40s with this.  The following portions of the patient's history were reviewed and updated as appropriate: allergies, current medications, past family history, past medical history, past social history, past surgical history and problem list.    Review of Systems   Respiratory: Negative for chest tightness and shortness of breath.    Cardiovascular: Negative for chest pain.       Objective   Physical Exam   Constitutional: She appears well-developed and well-nourished.   Cardiovascular: Normal rate and regular rhythm.   Pulmonary/Chest: Effort normal and breath sounds normal.   Musculoskeletal: She exhibits no edema.   Nursing note and vitals reviewed.        Assessment/Plan   Maribeth was seen today for diabetes.    Diagnoses and all orders for this visit:    DM type 2 with diabetic peripheral neuropathy (CMS/[facility])  -     Cancel: Hemoglobin A1c  -     Cancel: Comprehensive Metabolic Panel  -     Hemoglobin A1c; Future  -     Comprehensive Metabolic Panel; Future    Other orders  -     Fluad Quad >65 years ([id number])      Maribeth is here today for follow-up.  With her low blood sugars I did advise her to only take one half of a 2 mg glimepiride once daily.  I am going to check her A1c.  She is going to get this drawn at the ambulatory care unit.

## 2019-10-18 ENCOUNTER — APPOINTMENT (OUTPATIENT)
Dept: LAB | Facility: HOSPITAL | Age: 69
End: 2019-10-18

## 2019-10-18 ENCOUNTER — TREATMENT (OUTPATIENT)
Dept: CARDIAC REHAB | Facility: HOSPITAL | Age: 69
End: 2019-10-18

## 2019-10-18 ENCOUNTER — HOSPITAL ENCOUNTER (OUTPATIENT)
Dept: INFUSION THERAPY | Facility: HOSPITAL | Age: 69
Discharge: HOME | End: 2019-10-18
Admitting: INTERNAL MEDICINE

## 2019-10-18 VITALS
HEART RATE: 118 BPM | RESPIRATION RATE: 20 BRPM | DIASTOLIC BLOOD PRESSURE: 84 MMHG | SYSTOLIC BLOOD PRESSURE: 163 MMHG | OXYGEN SATURATION: 100 % | TEMPERATURE: 98.5 F

## 2019-10-18 DIAGNOSIS — N18.4 CHRONIC KIDNEY DISEASE, STAGE IV (SEVERE) (HCC): ICD-10-CM

## 2019-10-18 DIAGNOSIS — Z95.1 S/P CABG X 4: Primary | ICD-10-CM

## 2019-10-18 DIAGNOSIS — N18.5 ANEMIA IN STAGE 5 CHRONIC KIDNEY DISEASE, NOT ON CHRONIC DIALYSIS (HCC): Primary | ICD-10-CM

## 2019-10-18 DIAGNOSIS — D63.1 ANEMIA IN STAGE 5 CHRONIC KIDNEY DISEASE, NOT ON CHRONIC DIALYSIS (HCC): Primary | ICD-10-CM

## 2019-10-18 DIAGNOSIS — E11.42 DM TYPE 2 WITH DIABETIC PERIPHERAL NEUROPATHY (HCC): ICD-10-CM

## 2019-10-18 LAB
ALBUMIN SERPL-MCNC: 3.3 G/DL (ref 3.5–5.2)
ALBUMIN/GLOB SERPL: 0.7 G/DL
ALP SERPL-CCNC: 79 U/L (ref 39–117)
ALT SERPL W P-5'-P-CCNC: 8 U/L (ref 1–33)
ANION GAP SERPL CALCULATED.3IONS-SCNC: 15.8 MMOL/L (ref 5–15)
AST SERPL-CCNC: 13 U/L (ref 1–32)
BILIRUB SERPL-MCNC: 0.3 MG/DL (ref 0.2–1.2)
BUN BLD-MCNC: 35 MG/DL (ref 8–23)
BUN/CREAT SERPL: 9.8 (ref 7–25)
CALCIUM SPEC-SCNC: 8.4 MG/DL (ref 8.6–10.5)
CHLORIDE SERPL-SCNC: 103 MMOL/L (ref 98–107)
CO2 SERPL-SCNC: 20.2 MMOL/L (ref 22–29)
CREAT BLD-MCNC: 3.58 MG/DL (ref 0.57–1)
GFR SERPL CREATININE-BSD FRML MDRD: 13 ML/MIN/1.73
GFR SERPL CREATININE-BSD FRML MDRD: ABNORMAL ML/MIN/{1.73_M2}
GLOBULIN UR ELPH-MCNC: 4.6 GM/DL
GLUCOSE BLD-MCNC: 183 MG/DL (ref 65–99)
HBA1C MFR BLD: 7.2 % (ref 4.8–5.6)
HCT VFR BLD AUTO: 28.5 % (ref 34–46.6)
HGB BLD-MCNC: 9 G/DL (ref 12–15.9)
MAGNESIUM SERPL-MCNC: 1.6 MG/DL (ref 1.6–2.4)
POTASSIUM BLD-SCNC: 3.9 MMOL/L (ref 3.5–5.2)
PROT SERPL-MCNC: 7.9 G/DL (ref 6–8.5)
SODIUM BLD-SCNC: 139 MMOL/L (ref 136–145)

## 2019-10-18 PROCEDURE — 96372 THER/PROPH/DIAG INJ SC/IM: CPT

## 2019-10-18 PROCEDURE — 36415 COLL VENOUS BLD VENIPUNCTURE: CPT

## 2019-10-18 PROCEDURE — 80053 COMPREHEN METABOLIC PANEL: CPT | Performed by: INTERNAL MEDICINE

## 2019-10-18 PROCEDURE — 85018 HEMOGLOBIN: CPT | Performed by: INTERNAL MEDICINE

## 2019-10-18 PROCEDURE — 83735 ASSAY OF MAGNESIUM: CPT | Performed by: INTERNAL MEDICINE

## 2019-10-18 PROCEDURE — 85014 HEMATOCRIT: CPT | Performed by: INTERNAL MEDICINE

## 2019-10-18 PROCEDURE — 93798 PHYS/QHP OP CAR RHAB W/ECG: CPT

## 2019-10-18 PROCEDURE — 83036 HEMOGLOBIN GLYCOSYLATED A1C: CPT | Performed by: INTERNAL MEDICINE

## 2019-10-18 PROCEDURE — 25010000002 EPOETIN ALFA PER 1000 UNITS: Performed by: INTERNAL MEDICINE

## 2019-10-18 RX ADMIN — ERYTHROPOIETIN 20000 UNITS: 20000 INJECTION, SOLUTION INTRAVENOUS; SUBCUTANEOUS at 15:22

## 2019-10-21 ENCOUNTER — TREATMENT (OUTPATIENT)
Dept: CARDIAC REHAB | Facility: HOSPITAL | Age: 69
End: 2019-10-21

## 2019-10-21 DIAGNOSIS — Z95.1 S/P CABG X 4: Primary | ICD-10-CM

## 2019-10-21 PROCEDURE — 93798 PHYS/QHP OP CAR RHAB W/ECG: CPT

## 2019-10-23 ENCOUNTER — TREATMENT (OUTPATIENT)
Dept: CARDIAC REHAB | Facility: HOSPITAL | Age: 69
End: 2019-10-23

## 2019-10-23 DIAGNOSIS — Z95.1 S/P CABG X 4: Primary | ICD-10-CM

## 2019-10-23 PROCEDURE — 93798 PHYS/QHP OP CAR RHAB W/ECG: CPT

## 2019-10-28 ENCOUNTER — TREATMENT (OUTPATIENT)
Dept: CARDIAC REHAB | Facility: HOSPITAL | Age: 69
End: 2019-10-28

## 2019-10-28 DIAGNOSIS — Z95.1 S/P CABG X 4: Primary | ICD-10-CM

## 2019-10-28 PROCEDURE — 93798 PHYS/QHP OP CAR RHAB W/ECG: CPT

## 2019-10-30 ENCOUNTER — TREATMENT (OUTPATIENT)
Dept: CARDIAC REHAB | Facility: HOSPITAL | Age: 69
End: 2019-10-30

## 2019-10-30 DIAGNOSIS — Z95.1 S/P CABG X 4: Primary | ICD-10-CM

## 2019-10-30 PROCEDURE — 93798 PHYS/QHP OP CAR RHAB W/ECG: CPT

## 2019-11-01 ENCOUNTER — HOSPITAL ENCOUNTER (OUTPATIENT)
Dept: INFUSION THERAPY | Facility: HOSPITAL | Age: 69
Discharge: HOME | End: 2019-11-01
Admitting: INTERNAL MEDICINE

## 2019-11-01 ENCOUNTER — TREATMENT (OUTPATIENT)
Dept: CARDIAC REHAB | Facility: HOSPITAL | Age: 69
End: 2019-11-01

## 2019-11-01 ENCOUNTER — TELEPHONE (OUTPATIENT)
Dept: CARDIOLOGY | Facility: CLINIC | Age: 69
End: 2019-11-01

## 2019-11-01 VITALS
TEMPERATURE: 98.9 F | SYSTOLIC BLOOD PRESSURE: 154 MMHG | DIASTOLIC BLOOD PRESSURE: 77 MMHG | HEART RATE: 114 BPM | RESPIRATION RATE: 20 BRPM | OXYGEN SATURATION: 98 %

## 2019-11-01 DIAGNOSIS — N18.5 ANEMIA IN STAGE 5 CHRONIC KIDNEY DISEASE, NOT ON CHRONIC DIALYSIS (HCC): Primary | ICD-10-CM

## 2019-11-01 DIAGNOSIS — Z95.1 S/P CABG X 4: Primary | ICD-10-CM

## 2019-11-01 DIAGNOSIS — N18.4 CHRONIC KIDNEY DISEASE, STAGE IV (SEVERE) (HCC): ICD-10-CM

## 2019-11-01 DIAGNOSIS — D63.1 ANEMIA IN STAGE 5 CHRONIC KIDNEY DISEASE, NOT ON CHRONIC DIALYSIS (HCC): Primary | ICD-10-CM

## 2019-11-01 LAB
HCT VFR BLD AUTO: 28.7 % (ref 34–46.6)
HGB BLD-MCNC: 8.8 G/DL (ref 12–15.9)

## 2019-11-01 PROCEDURE — 85018 HEMOGLOBIN: CPT | Performed by: INTERNAL MEDICINE

## 2019-11-01 PROCEDURE — 93798 PHYS/QHP OP CAR RHAB W/ECG: CPT

## 2019-11-01 PROCEDURE — 25010000002 EPOETIN ALFA PER 1000 UNITS: Performed by: INTERNAL MEDICINE

## 2019-11-01 PROCEDURE — 36415 COLL VENOUS BLD VENIPUNCTURE: CPT

## 2019-11-01 PROCEDURE — 96372 THER/PROPH/DIAG INJ SC/IM: CPT

## 2019-11-01 PROCEDURE — 85014 HEMATOCRIT: CPT | Performed by: INTERNAL MEDICINE

## 2019-11-01 RX ORDER — CARVEDILOL 6.25 MG/1
6.25 TABLET ORAL 2 TIMES DAILY
Qty: 60 TABLET | Refills: 0 | Status: SHIPPED | OUTPATIENT
Start: 2019-11-01 | End: 2019-11-14 | Stop reason: SDUPTHER

## 2019-11-01 RX ADMIN — ERYTHROPOIETIN 20000 UNITS: 20000 INJECTION, SOLUTION INTRAVENOUS; SUBCUTANEOUS at 14:59

## 2019-11-01 NOTE — TELEPHONE ENCOUNTER
----- Message from Risa MELVIN Ojeda sent at 10/30/2019  2:16 PM EDT -----  Regarding: RE GOING BACK ON BP MEDICATION  Contact: [phone]  HOME # [phone]    MEDICATION WAS STOPPED SHE DOESN'T KNOW WHO DID IT   [facility] TOLD HER TO CALL US BECAUSE PRIOR TO EXERCISE HER BP -160S    PLEASE ADVISE

## 2019-11-01 NOTE — PROGRESS NOTES
HGB result noted and Procrit indicated per physician parameter.   Injection given.  AVS printed and given to patient and she was discharged home ambulatory with her  after appointment completed.

## 2019-11-04 ENCOUNTER — TREATMENT (OUTPATIENT)
Dept: CARDIAC REHAB | Facility: HOSPITAL | Age: 69
End: 2019-11-04

## 2019-11-04 DIAGNOSIS — Z95.1 S/P CABG X 4: Primary | ICD-10-CM

## 2019-11-04 PROCEDURE — 93798 PHYS/QHP OP CAR RHAB W/ECG: CPT

## 2019-11-06 ENCOUNTER — TREATMENT (OUTPATIENT)
Dept: CARDIAC REHAB | Facility: HOSPITAL | Age: 69
End: 2019-11-06

## 2019-11-06 DIAGNOSIS — Z95.1 S/P CABG X 4: Primary | ICD-10-CM

## 2019-11-06 PROCEDURE — 93798 PHYS/QHP OP CAR RHAB W/ECG: CPT

## 2019-11-06 NOTE — PROGRESS NOTES
CARDIAC/PULMONARY REHAB NUTRITION EDUCATION/ASSESSMENT      Attended Heart and Diet Education class. Class content included a review of AHA recommendations as well as DASH diet considerations and the merits of a plant based diet. We discusseed diet strategies  Concepts from the Mediterranean diet were reviewed. Ongoing research.Grocery products were presented and discussed. Supportive written information was provided.      5:25 PM  11/6/2019  Danita Lama RD

## 2019-11-08 ENCOUNTER — TREATMENT (OUTPATIENT)
Dept: CARDIAC REHAB | Facility: HOSPITAL | Age: 69
End: 2019-11-08

## 2019-11-08 DIAGNOSIS — Z95.1 S/P CABG X 4: Primary | ICD-10-CM

## 2019-11-08 PROCEDURE — 93798 PHYS/QHP OP CAR RHAB W/ECG: CPT

## 2019-11-13 ENCOUNTER — TREATMENT (OUTPATIENT)
Dept: CARDIAC REHAB | Facility: HOSPITAL | Age: 69
End: 2019-11-13

## 2019-11-13 DIAGNOSIS — Z95.1 S/P CABG X 4: Primary | ICD-10-CM

## 2019-11-13 PROCEDURE — 93798 PHYS/QHP OP CAR RHAB W/ECG: CPT

## 2019-11-14 ENCOUNTER — OFFICE VISIT (OUTPATIENT)
Dept: CARDIOLOGY | Facility: CLINIC | Age: 69
End: 2019-11-14

## 2019-11-14 VITALS
BODY MASS INDEX: 31.99 KG/M2 | HEART RATE: 125 BPM | SYSTOLIC BLOOD PRESSURE: 179 MMHG | WEIGHT: 192 LBS | HEIGHT: 65 IN | DIASTOLIC BLOOD PRESSURE: 91 MMHG

## 2019-11-14 DIAGNOSIS — I10 ESSENTIAL HYPERTENSION: ICD-10-CM

## 2019-11-14 DIAGNOSIS — I48.91 ATRIAL FIBRILLATION WITH RVR (HCC): Primary | ICD-10-CM

## 2019-11-14 DIAGNOSIS — E78.5 HYPERLIPIDEMIA, UNSPECIFIED HYPERLIPIDEMIA TYPE: ICD-10-CM

## 2019-11-14 DIAGNOSIS — I25.118 CORONARY ARTERY DISEASE OF NATIVE ARTERY OF NATIVE HEART WITH STABLE ANGINA PECTORIS (HCC): ICD-10-CM

## 2019-11-14 DIAGNOSIS — Z79.01 ANTICOAGULATED: ICD-10-CM

## 2019-11-14 PROCEDURE — 93000 ELECTROCARDIOGRAM COMPLETE: CPT | Performed by: INTERNAL MEDICINE

## 2019-11-14 PROCEDURE — 99214 OFFICE O/P EST MOD 30 MIN: CPT | Performed by: INTERNAL MEDICINE

## 2019-11-14 RX ORDER — TOPIRAMATE 100 MG/1
100 TABLET, FILM COATED ORAL DAILY
COMMUNITY
Start: 2019-10-24 | End: 2021-07-08 | Stop reason: HOSPADM

## 2019-11-14 RX ORDER — GLIMEPIRIDE 2 MG/1
TABLET ORAL
COMMUNITY
Start: 2019-10-27 | End: 2020-02-20

## 2019-11-14 RX ORDER — CARVEDILOL 12.5 MG/1
12.5 TABLET ORAL 2 TIMES DAILY
Qty: 180 TABLET | Refills: 3 | Status: SHIPPED | OUTPATIENT
Start: 2019-11-14 | End: 2020-07-10 | Stop reason: SDUPTHER

## 2019-11-14 NOTE — PROGRESS NOTES
Subjective:        Maribeth Rendon is a 68 y.o. female who here for follow up    CC  Atrial fibrillation  HPI  69-year-old female with known history of the coronary artery disease, benign essential arterial hypertension, hyperlipidemia atrial fibrillation here for the follow-up complaints of palpitations as well as her shortness of breath     Problem List Items Addressed This Visit        Cardiovascular and Mediastinum    Hyperlipidemia    HTN (hypertension)    Relevant Medications    carvedilol (COREG) 12.5 MG tablet    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 pectoris (CMS/HCC)    Relevant Medications    carvedilol (COREG) 12.5 MG tablet    Atrial fibrillation with RVR (CMS/[facility]) - Primary    Relevant Medications    carvedilol (COREG) 12.5 MG tablet       Other    Anticoagulated        .    The following portions of the patient's history were reviewed and updated as appropriate: allergies, current medications, past family history, past medical history, past social history, past surgical history and problem list.    Past Medical History:   Diagnosis Date   • Achilles tendon tear     left    • Anemia    • Anxiety    • Depression    • Diabetes mellitus, type 2 (CMS/[facility])    • ESRD (end stage renal disease) (CMS/[facility])     HAS LEFT FOREARM FISTULA   • GERD (gastroesophageal reflux disease)    • History of MRSA infection 03/15/2016    ABDOMINAL WOUND,   INFECTION CONTROLL NOTIFIED 2-6-2017   • History of transfusion    • Hyperlipidemia    • Hypertension    • Hypokalemia    • Hypomagnesemia    • Hypoxic 01/2016    WHEN SHE HAD PNEUMONIA   • Intertrigo    • Leukocytosis    • Osteoarthritis    • Pneumonia 01/2016   • Psoriasis    • Psoriatic arthritis (CMS/[facility])    • Seizures (CMS/[facility])     one time   • Sepsis (CMS/[facility]) 01/2016   • SOB (shortness of breath)    • Stage 4 chronic renal impairment associated with type 2 diabetes mellitus (CMS/[facility])    • Staph infection     HX RIGHT FOOT AT [facility] 01/09/2010   •  "Streptococcal bacteremia    • Swelling of hand 10/27/2016    LEFT HAND SWELLIMG SINCE LEFT FISTULA SURGERY   • Tremor, essential    • Wears glasses      reports that she quit smoking about 27 years ago. Her smoking use included cigarettes. She has a 52.00 pack-year smoking history. She has never used smokeless tobacco. She reports that she drinks alcohol. She reports that she does not use drugs.   Family History   Problem Relation Age of Onset   • Heart attack Mother    • Heart disease Mother    • Kidney disease Mother    • Heart attack Father    • Heart disease Father    • Hypertension Father    • Stroke Father         ISCHEMIC   • Diabetes Father         TYPE 2   • Kidney disease Brother    • Diabetes Brother         TYPE 1   • Thyroid disease Daughter    • Anxiety disorder Maternal Aunt    • Bipolar disorder Maternal Aunt    • Depression Maternal Aunt    • Lupus Maternal Aunt         LUPUS ANTICOAGLULANT   • Rheum arthritis Maternal Aunt    • Alcohol abuse Maternal Uncle    • Other Maternal Uncle         PULMONARY DISEASE       Review of Systems  Constitutional: No wt loss, fever, fatigue  Gastrointestinal: No nausea, abdominal pain  Behavioral/Psych: No insomnia or anxiety   Cardiovascular palpitation  Objective:       Physical Exam  /91 (BP Location: Right arm, Patient Position: Sitting)   Pulse (!) 125   Ht 165.1 cm (65\")   Wt 87.1 kg (192 lb)   BMI 31.95 kg/m²   General appearance: No acute changes   Neck: Trachea midline; NECK, supple, no thyromegaly or lymphadenopathy   Lungs: Normal size and shape, normal breath sounds, equal distribution of air, no rales and rhonchi   CV: S1-S2 regular, no murmurs, no rub, no gallop   Abdomen: Soft, non-tender; no masses , no abnormal abdominal sounds   Extremities: No deformity , normal color , no peripheral edema   Skin: Normal temperature, turgor and texture; no rash, ulcers            ECG 12 Lead  Date/Time: 11/14/2019 10:29 AM  Performed by: Carroll" MD Eddie  Authorized by: Eddie Hodges MD   Comparison: compared with previous ECG   Similar to previous ECG  Rhythm: atrial fibrillation  ST Flattening: all    Clinical impression: abnormal EKG              Echocardiogram:        Current Outpatient Medications:   •  ACCU-CHEK JESUS MANUEL PLUS test strip, TEST THREE TIMES DAILY, Disp: 300 each, Rfl: 2  •  apixaban (ELIQUIS) 5 MG tablet tablet, Take 1 tablet by mouth 2 (Two) Times a Day., Disp: 180 tablet, Rfl: 1  •  aspirin 81 MG tablet, Take 81 mg by mouth Every Morning. TO STOP THE DAY BEFORE SURGERY, Disp: , Rfl:   •  carvedilol (COREG) 6.25 MG tablet, Take 1 tablet by mouth 2 (Two) Times a Day., Disp: 60 tablet, Rfl: 0  •  Cholecalciferol (VITAMIN D-3) 1000 UNITS capsule, Take 5,000 Units by mouth Daily., Disp: , Rfl:   •  epoetin hermes (EPOGEN,PROCRIT) 55122 UNIT/ML injection, Inject 20,000 Units under the skin Every 14 (Fourteen) Days. LAST TIME 2-6-2017, Disp: , Rfl:   •  ferrous fumarate (YADIRA-SEQUELS) 50 MG CR tablet, Take 65 mg by mouth 2 (Two) Times a Day., Disp: , Rfl:   •  glimepiride (AMARYL) 2 MG tablet, , Disp: , Rfl:   •  glucose blood test strip, Use as instructed, Disp: 100 each, Rfl: 0  •  linagliptin (TRADJENTA) 5 MG tablet tablet, Take 5 mg by mouth Daily., Disp: , Rfl:   •  montelukast (SINGULAIR) 10 MG tablet, Take 1 tablet by mouth Every Night., Disp: 90 tablet, Rfl: 1  •  Multiple Vitamin (MULTI VITAMIN DAILY PO), Take 1 tablet by mouth Every Morning., Disp: , Rfl:   •  nystatin (MYCOSTATIN) 811290 UNIT/GM powder, Apply  topically to the appropriate area as directed 2 (Two) Times a Day As Needed., Disp: , Rfl:   •  Probiotic Product (PROBIOTIC DAILY PO), Take 1 capsule by mouth Daily., Disp: , Rfl:   •  pyridoxine (VITAMIN B-6) 500 MG tablet, Take 500 mg by mouth Daily., Disp: , Rfl:   •  sertraline (ZOLOFT) 50 MG tablet, Take 1 tablet by mouth Daily., Disp: 90 tablet, Rfl: 1  •  topiramate (TOPAMAX) 100 MG tablet, Take 100 mg by  mouth Daily., Disp: , Rfl:   •  topiramate (TOPAMAX) 50 MG tablet, Take 100 mg by mouth Daily., Disp: , Rfl:    Assessment:        Patient Active Problem List   Diagnosis   • Menstrual disorder   • Atopic rhinitis   • Anemia in CKD (chronic kidney disease)   • Spasm of cervical paraspinous muscle   • Cervical radiculopathy   • Chronic kidney disease, stage IV (severe) (CMS/[facility])   • Depression   • DM type 2 with diabetic peripheral neuropathy (CMS/[facility])   • Essential hypertension   • Duplay's periarthritis syndrome   • Gastroesophageal reflux disease   • Hyperlipidemia   • Hypertension   • Arthritis   • Seizure disorder (CMS/[facility])   • Phlebitis   • Type 2 diabetes mellitus (CMS/[facility])   • Vitamin D deficiency   • Chest pain   • Abscess   • Generalized muscle weakness   • Abdominal wall cellulitis   • HTN (hypertension)   • CKD (chronic kidney disease) stage 4, GFR 15-29 ml/min (CMS/[facility])   • Diabetes mellitus with peripheral autonomic neuropathy (CMS/[facility])   • Hyponatremia   • Hypercalcemia   • Dehydration   • DACIA (acute kidney injury) (CMS/[facility])   • Abdominal wall abscess   • Cellulitis of toe of left foot   • Partial Achilles tendon tear, left, initial encounter   • Arthritis of foot   • Essential tremor   • Primary Parkinsonism (CMS/[facility])   • Abnormal cardiac function test   • 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 pectoris (CMS/[facility])               Plan:            ICD-10-CM ICD-9-CM   1. Atrial fibrillation with RVR (CMS/[facility]) I48.91 427.31   2. Hyperlipidemia, unspecified hyperlipidemia type E78.5 272.4   3. Essential hypertension I10 401.9   4. Coronary artery disease of native artery of native heart with stable angina pectoris (CMS/[facility]) I25.118 414.01     413.9     1. Atrial fibrillation with RVR (CMS/[facility])  We will increase the beta-blockers and patient will be considered for the cardioversion next month    2. Hyperlipidemia, unspecified hyperlipidemia type  Continue current medications    3.  Essential hypertension  Pressure controlled    4. Coronary artery disease of native artery of native heart with stable angina pectoris (CMS/[facility])  No angina pectoris    5. Anticoagulated  Pros and cons as well as indication of the anticoagulation has been explained to the patient in detail    There are no obvious complications at this stage    Risk of  the bleedings has been explained    Need for the regular blood workup and adjust the dose has been explained    Need for proper follow-up on anticoagulation also has been explained         Increase coreg 12.5 mg po bid    Pros and cons of this new medication / change medication has been explained to  the patient    Possible side effects has been explained    Associated need of the blood  Work has been explained    Need for the compliance of the medication has been explained      See in 1 month for cv  COUNSELING:    Maribeth Eagle was given to patient for the following topics: diagnostic results, risk factor reductions, impressions, risks and benefits of treatment options and importance of treatment compliance .       SMOKING COUNSELING:    [unfilled]    Dictated using Dragon dictation

## 2019-11-15 ENCOUNTER — TREATMENT (OUTPATIENT)
Dept: CARDIAC REHAB | Facility: HOSPITAL | Age: 69
End: 2019-11-15

## 2019-11-15 ENCOUNTER — HOSPITAL ENCOUNTER (OUTPATIENT)
Dept: INFUSION THERAPY | Facility: HOSPITAL | Age: 69
Discharge: HOME | End: 2019-11-15
Admitting: INTERNAL MEDICINE

## 2019-11-15 VITALS
SYSTOLIC BLOOD PRESSURE: 154 MMHG | HEART RATE: 119 BPM | RESPIRATION RATE: 20 BRPM | DIASTOLIC BLOOD PRESSURE: 81 MMHG | OXYGEN SATURATION: 93 % | TEMPERATURE: 98.1 F

## 2019-11-15 DIAGNOSIS — N18.4 CHRONIC KIDNEY DISEASE, STAGE IV (SEVERE) (HCC): ICD-10-CM

## 2019-11-15 DIAGNOSIS — N18.5 ANEMIA IN STAGE 5 CHRONIC KIDNEY DISEASE, NOT ON CHRONIC DIALYSIS (HCC): Primary | ICD-10-CM

## 2019-11-15 DIAGNOSIS — Z95.1 S/P CABG X 4: Primary | ICD-10-CM

## 2019-11-15 DIAGNOSIS — D63.1 ANEMIA IN STAGE 5 CHRONIC KIDNEY DISEASE, NOT ON CHRONIC DIALYSIS (HCC): Primary | ICD-10-CM

## 2019-11-15 LAB
ANION GAP SERPL CALCULATED.3IONS-SCNC: 13.9 MMOL/L (ref 5–15)
BUN BLD-MCNC: 40 MG/DL (ref 8–23)
BUN/CREAT SERPL: 12.4 (ref 7–25)
CALCIUM SPEC-SCNC: 8.8 MG/DL (ref 8.6–10.5)
CHLORIDE SERPL-SCNC: 100 MMOL/L (ref 98–107)
CO2 SERPL-SCNC: 24.1 MMOL/L (ref 22–29)
CREAT BLD-MCNC: 3.22 MG/DL (ref 0.57–1)
GFR SERPL CREATININE-BSD FRML MDRD: 14 ML/MIN/1.73
GFR SERPL CREATININE-BSD FRML MDRD: ABNORMAL ML/MIN/{1.73_M2}
GLUCOSE BLD-MCNC: 100 MG/DL (ref 65–99)
HCT VFR BLD AUTO: 29.3 % (ref 34–46.6)
HGB BLD-MCNC: 9 G/DL (ref 12–15.9)
MAGNESIUM SERPL-MCNC: 1.8 MG/DL (ref 1.6–2.4)
POTASSIUM BLD-SCNC: 4.3 MMOL/L (ref 3.5–5.2)
SODIUM BLD-SCNC: 138 MMOL/L (ref 136–145)

## 2019-11-15 PROCEDURE — 36415 COLL VENOUS BLD VENIPUNCTURE: CPT

## 2019-11-15 PROCEDURE — 85014 HEMATOCRIT: CPT | Performed by: INTERNAL MEDICINE

## 2019-11-15 PROCEDURE — 96372 THER/PROPH/DIAG INJ SC/IM: CPT

## 2019-11-15 PROCEDURE — 93798 PHYS/QHP OP CAR RHAB W/ECG: CPT

## 2019-11-15 PROCEDURE — 80048 BASIC METABOLIC PNL TOTAL CA: CPT | Performed by: INTERNAL MEDICINE

## 2019-11-15 PROCEDURE — 83735 ASSAY OF MAGNESIUM: CPT | Performed by: INTERNAL MEDICINE

## 2019-11-15 PROCEDURE — 85018 HEMOGLOBIN: CPT | Performed by: INTERNAL MEDICINE

## 2019-11-15 PROCEDURE — 25010000002 EPOETIN ALFA PER 1000 UNITS: Performed by: INTERNAL MEDICINE

## 2019-11-15 RX ADMIN — ERYTHROPOIETIN 20000 UNITS: 20000 INJECTION, SOLUTION INTRAVENOUS; SUBCUTANEOUS at 14:36

## 2019-11-18 ENCOUNTER — TREATMENT (OUTPATIENT)
Dept: CARDIAC REHAB | Facility: HOSPITAL | Age: 69
End: 2019-11-18

## 2019-11-18 DIAGNOSIS — Z95.1 S/P CABG X 4: Primary | ICD-10-CM

## 2019-11-18 PROCEDURE — 93798 PHYS/QHP OP CAR RHAB W/ECG: CPT

## 2019-11-20 ENCOUNTER — TREATMENT (OUTPATIENT)
Dept: CARDIAC REHAB | Facility: HOSPITAL | Age: 69
End: 2019-11-20

## 2019-11-20 DIAGNOSIS — Z95.1 S/P CABG X 4: Primary | ICD-10-CM

## 2019-11-20 PROBLEM — Z79.01 ANTICOAGULATED: Status: ACTIVE | Noted: 2019-11-20

## 2019-11-20 PROBLEM — I48.91 ATRIAL FIBRILLATION WITH RVR (HCC): Status: ACTIVE | Noted: 2019-11-20

## 2019-11-20 PROCEDURE — 93798 PHYS/QHP OP CAR RHAB W/ECG: CPT

## 2019-11-26 RX ORDER — CARVEDILOL 6.25 MG/1
TABLET ORAL
Qty: 60 TABLET | Refills: 0 | OUTPATIENT
Start: 2019-11-26

## 2019-11-29 ENCOUNTER — HOSPITAL ENCOUNTER (OUTPATIENT)
Dept: INFUSION THERAPY | Facility: HOSPITAL | Age: 69
Discharge: HOME | End: 2019-11-29
Admitting: INTERNAL MEDICINE

## 2019-11-29 VITALS
DIASTOLIC BLOOD PRESSURE: 74 MMHG | RESPIRATION RATE: 20 BRPM | SYSTOLIC BLOOD PRESSURE: 155 MMHG | OXYGEN SATURATION: 96 % | HEART RATE: 93 BPM | TEMPERATURE: 98.2 F

## 2019-11-29 DIAGNOSIS — N18.5 ANEMIA IN STAGE 5 CHRONIC KIDNEY DISEASE, NOT ON CHRONIC DIALYSIS (HCC): Primary | ICD-10-CM

## 2019-11-29 DIAGNOSIS — D63.1 ANEMIA IN STAGE 5 CHRONIC KIDNEY DISEASE, NOT ON CHRONIC DIALYSIS (HCC): Primary | ICD-10-CM

## 2019-11-29 DIAGNOSIS — N18.4 CHRONIC KIDNEY DISEASE, STAGE IV (SEVERE) (HCC): ICD-10-CM

## 2019-11-29 LAB
HCT VFR BLD AUTO: 25.5 % (ref 34–46.6)
HGB BLD-MCNC: 8 G/DL (ref 12–15.9)

## 2019-11-29 PROCEDURE — 25010000002 EPOETIN ALFA PER 1000 UNITS: Performed by: INTERNAL MEDICINE

## 2019-11-29 PROCEDURE — 85014 HEMATOCRIT: CPT | Performed by: INTERNAL MEDICINE

## 2019-11-29 PROCEDURE — 36415 COLL VENOUS BLD VENIPUNCTURE: CPT

## 2019-11-29 PROCEDURE — 85018 HEMOGLOBIN: CPT | Performed by: INTERNAL MEDICINE

## 2019-11-29 PROCEDURE — 96372 THER/PROPH/DIAG INJ SC/IM: CPT

## 2019-11-29 RX ADMIN — ERYTHROPOIETIN 20000 UNITS: 20000 INJECTION, SOLUTION INTRAVENOUS; SUBCUTANEOUS at 13:43

## 2019-12-06 ENCOUNTER — TREATMENT (OUTPATIENT)
Dept: CARDIAC REHAB | Facility: HOSPITAL | Age: 69
End: 2019-12-06

## 2019-12-06 DIAGNOSIS — Z95.1 S/P CABG X 4: Primary | ICD-10-CM

## 2019-12-06 PROCEDURE — 93798 PHYS/QHP OP CAR RHAB W/ECG: CPT

## 2019-12-09 ENCOUNTER — APPOINTMENT (OUTPATIENT)
Dept: CARDIAC REHAB | Facility: HOSPITAL | Age: 69
End: 2019-12-09

## 2019-12-11 ENCOUNTER — APPOINTMENT (OUTPATIENT)
Dept: CARDIAC REHAB | Facility: HOSPITAL | Age: 69
End: 2019-12-11

## 2019-12-13 ENCOUNTER — APPOINTMENT (OUTPATIENT)
Dept: CARDIAC REHAB | Facility: HOSPITAL | Age: 69
End: 2019-12-13

## 2019-12-13 ENCOUNTER — HOSPITAL ENCOUNTER (OUTPATIENT)
Dept: INFUSION THERAPY | Facility: HOSPITAL | Age: 69
Discharge: HOME | End: 2019-12-13
Admitting: INTERNAL MEDICINE

## 2019-12-13 VITALS
DIASTOLIC BLOOD PRESSURE: 77 MMHG | SYSTOLIC BLOOD PRESSURE: 182 MMHG | OXYGEN SATURATION: 98 % | HEART RATE: 102 BPM | TEMPERATURE: 99.1 F | RESPIRATION RATE: 20 BRPM

## 2019-12-13 DIAGNOSIS — N18.4 CHRONIC KIDNEY DISEASE, STAGE IV (SEVERE) (HCC): ICD-10-CM

## 2019-12-13 DIAGNOSIS — N18.5 ANEMIA IN STAGE 5 CHRONIC KIDNEY DISEASE, NOT ON CHRONIC DIALYSIS (HCC): Primary | ICD-10-CM

## 2019-12-13 DIAGNOSIS — D63.1 ANEMIA IN STAGE 5 CHRONIC KIDNEY DISEASE, NOT ON CHRONIC DIALYSIS (HCC): Primary | ICD-10-CM

## 2019-12-13 LAB
HCT VFR BLD AUTO: 25.3 % (ref 34–46.6)
HGB BLD-MCNC: 8 G/DL (ref 12–15.9)

## 2019-12-13 PROCEDURE — 36415 COLL VENOUS BLD VENIPUNCTURE: CPT

## 2019-12-13 PROCEDURE — 25010000002 EPOETIN ALFA PER 1000 UNITS: Performed by: INTERNAL MEDICINE

## 2019-12-13 PROCEDURE — 85018 HEMOGLOBIN: CPT | Performed by: INTERNAL MEDICINE

## 2019-12-13 PROCEDURE — 85014 HEMATOCRIT: CPT | Performed by: INTERNAL MEDICINE

## 2019-12-13 PROCEDURE — 96372 THER/PROPH/DIAG INJ SC/IM: CPT

## 2019-12-13 RX ADMIN — ERYTHROPOIETIN 20000 UNITS: 20000 INJECTION, SOLUTION INTRAVENOUS; SUBCUTANEOUS at 14:45

## 2019-12-16 ENCOUNTER — APPOINTMENT (OUTPATIENT)
Dept: CARDIAC REHAB | Facility: HOSPITAL | Age: 69
End: 2019-12-16

## 2019-12-18 ENCOUNTER — APPOINTMENT (OUTPATIENT)
Dept: CARDIAC REHAB | Facility: HOSPITAL | Age: 69
End: 2019-12-18

## 2019-12-19 ENCOUNTER — OFFICE VISIT (OUTPATIENT)
Dept: CARDIOLOGY | Facility: CLINIC | Age: 69
End: 2019-12-19

## 2019-12-19 VITALS
HEART RATE: 91 BPM | DIASTOLIC BLOOD PRESSURE: 73 MMHG | WEIGHT: 200 LBS | SYSTOLIC BLOOD PRESSURE: 127 MMHG | HEIGHT: 65 IN | BODY MASS INDEX: 33.32 KG/M2

## 2019-12-19 DIAGNOSIS — I48.91 ATRIAL FIBRILLATION WITH RVR (HCC): ICD-10-CM

## 2019-12-19 DIAGNOSIS — I10 ESSENTIAL HYPERTENSION: Primary | ICD-10-CM

## 2019-12-19 DIAGNOSIS — E11.42 DM TYPE 2 WITH DIABETIC PERIPHERAL NEUROPATHY (HCC): Primary | ICD-10-CM

## 2019-12-19 DIAGNOSIS — M54.12 CERVICAL RADICULOPATHY: ICD-10-CM

## 2019-12-19 DIAGNOSIS — E78.5 HYPERLIPIDEMIA, UNSPECIFIED HYPERLIPIDEMIA TYPE: ICD-10-CM

## 2019-12-19 DIAGNOSIS — I25.118 CORONARY ARTERY DISEASE OF NATIVE ARTERY OF NATIVE HEART WITH STABLE ANGINA PECTORIS (HCC): ICD-10-CM

## 2019-12-19 PROCEDURE — 99213 OFFICE O/P EST LOW 20 MIN: CPT | Performed by: INTERNAL MEDICINE

## 2019-12-19 RX ORDER — GABAPENTIN 300 MG/1
300 CAPSULE ORAL 2 TIMES DAILY
Qty: 180 CAPSULE | Refills: 1 | Status: SHIPPED | OUTPATIENT
Start: 2019-12-19 | End: 2020-06-30 | Stop reason: SDUPTHER

## 2019-12-19 RX ORDER — GABAPENTIN 300 MG/1
300 CAPSULE ORAL 2 TIMES DAILY
Qty: 180 CAPSULE | Refills: 1 | OUTPATIENT
Start: 2019-12-19 | End: 2019-12-19 | Stop reason: SDUPTHER

## 2019-12-19 NOTE — PROGRESS NOTES
Subjective:        Maribeth Rendon is a 69 y.o. female who here for follow up    CC  The follow-up for the coronary artery disease hypertension hyperlipidemia  HPI  69-year-old female with known history of the coronary artery disease, benign essential arterial hypertension and hyperlipidemia here for the follow-up with no complaints of chest pains or tightness in the chest     Problem List Items Addressed This Visit        Cardiovascular and Mediastinum    Hyperlipidemia    Hypertension - Primary    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 pectoris (CMS/HCC)        .    The following portions of the patient's history were reviewed and updated as appropriate: allergies, current medications, past family history, past medical history, past social history, past surgical history and problem list.    Past Medical History:   Diagnosis Date   • Achilles tendon tear     left    • Anemia    • Anxiety    • Depression    • Diabetes mellitus, type 2 (CMS/HCC)    • ESRD (end stage renal disease) (CMS/[facility])     HAS LEFT FOREARM FISTULA   • GERD (gastroesophageal reflux disease)    • History of MRSA infection 03/15/2016    ABDOMINAL WOUND,   INFECTION CONTROLL NOTIFIED 2-6-2017   • History of transfusion    • Hyperlipidemia    • Hypertension    • Hypokalemia    • Hypomagnesemia    • Hypoxic 01/2016    WHEN SHE HAD PNEUMONIA   • Intertrigo    • Leukocytosis    • Osteoarthritis    • Pneumonia 01/2016   • Psoriasis    • Psoriatic arthritis (CMS/HCC)    • Seizures (CMS/[facility])     one time   • Sepsis (CMS/HCC) 01/2016   • SOB (shortness of breath)    • Stage 4 chronic renal impairment associated with type 2 diabetes mellitus (CMS/HCC)    • Staph infection     HX RIGHT FOOT AT [facility] 01/09/2010   • Streptococcal bacteremia    • Swelling of hand 10/27/2016    LEFT HAND SWELLIMG SINCE LEFT FISTULA SURGERY   • Tremor, essential    • Wears glasses      reports that she quit smoking about 27 years ago. Her smoking use included  "cigarettes. She has a 52.00 pack-year smoking history. She has never used smokeless tobacco. She reports that she drinks alcohol. She reports that she does not use drugs.   Family History   Problem Relation Age of Onset   • Heart attack Mother    • Heart disease Mother    • Kidney disease Mother    • Heart attack Father    • Heart disease Father    • Hypertension Father    • Stroke Father         ISCHEMIC   • Diabetes Father         TYPE 2   • Kidney disease Brother    • Diabetes Brother         TYPE 1   • Thyroid disease Daughter    • Anxiety disorder Maternal Aunt    • Bipolar disorder Maternal Aunt    • Depression Maternal Aunt    • Lupus Maternal Aunt         LUPUS ANTICOAGLULANT   • Rheum arthritis Maternal Aunt    • Alcohol abuse Maternal Uncle    • Other Maternal Uncle         PULMONARY DISEASE       Review of Systems  Constitutional: No wt loss, fever, fatigue  Gastrointestinal: No nausea, abdominal pain  Behavioral/Psych: No insomnia or anxiety   Cardiovascular no chest pains or tightness in the chest  Objective:       Physical Exam  /73   Pulse 91   Ht 165.1 cm (65\")   Wt 90.7 kg (200 lb)   BMI 33.28 kg/m²   General appearance: No acute changes   Neck: Trachea midline; NECK, supple, no thyromegaly or lymphadenopathy   Lungs: Normal size and shape, normal breath sounds, equal distribution of air, no rales and rhonchi   CV: S1-S2 regular, no murmurs, no rub, no gallop   Abdomen: Soft, non-tender; no masses , no abnormal abdominal sounds   Extremities: No deformity , normal color , no peripheral edema   Skin: Normal temperature, turgor and texture; no rash, ulcers          Procedures      Echocardiogram:        Current Outpatient Medications:   •  apixaban (ELIQUIS) 5 MG tablet tablet, Take 1 tablet by mouth 2 (Two) Times a Day., Disp: 180 tablet, Rfl: 1  •  aspirin 81 MG tablet, Take 81 mg by mouth Every Morning. TO STOP THE DAY BEFORE SURGERY, Disp: , Rfl:   •  carvedilol (COREG) 12.5 MG tablet, " Take 1 tablet by mouth 2 (Two) Times a Day., Disp: 180 tablet, Rfl: 3  •  Cholecalciferol (VITAMIN D-3) 1000 UNITS capsule, Take 5,000 Units by mouth Daily., Disp: , Rfl:   •  epoetin hermes (EPOGEN,PROCRIT) 64868 UNIT/ML injection, Inject 20,000 Units under the skin Every 14 (Fourteen) Days. LAST TIME 2-6-2017, Disp: , Rfl:   •  ferrous fumarate (YADIRA-SEQUELS) 50 MG CR tablet, Take 65 mg by mouth 2 (Two) Times a Day., Disp: , Rfl:   •  glimepiride (AMARYL) 2 MG tablet, , Disp: , Rfl:   •  linagliptin (TRADJENTA) 5 MG tablet tablet, Take 5 mg by mouth Daily., Disp: , Rfl:   •  montelukast (SINGULAIR) 10 MG tablet, Take 1 tablet by mouth Every Night., Disp: 90 tablet, Rfl: 1  •  Multiple Vitamin (MULTI VITAMIN DAILY PO), Take 1 tablet by mouth Every Morning., Disp: , Rfl:   •  nystatin (MYCOSTATIN) 340225 UNIT/GM powder, Apply  topically to the appropriate area as directed 2 (Two) Times a Day As Needed., Disp: , Rfl:   •  Probiotic Product (PROBIOTIC DAILY PO), Take 1 capsule by mouth Daily., Disp: , Rfl:   •  pyridoxine (VITAMIN B-6) 500 MG tablet, Take 500 mg by mouth Daily., Disp: , Rfl:   •  topiramate (TOPAMAX) 100 MG tablet, Take 100 mg by mouth Daily., Disp: , Rfl:   •  ACCU-CHEK JESUS MANUEL PLUS test strip, TEST THREE TIMES DAILY, Disp: 300 each, Rfl: 2  •  gabapentin (NEURONTIN) 300 MG capsule, Take 1 capsule by mouth 2 (Two) Times a Day., Disp: 180 capsule, Rfl: 1  •  glucose blood test strip, Use as instructed, Disp: 100 each, Rfl: 0  •  sertraline (ZOLOFT) 50 MG tablet, Take 1 tablet by mouth Daily., Disp: 90 tablet, Rfl: 1  •  topiramate (TOPAMAX) 50 MG tablet, Take 100 mg by mouth Daily., Disp: , Rfl:    Assessment:        Patient Active Problem List   Diagnosis   • Menstrual disorder   • Atopic rhinitis   • Anemia in CKD (chronic kidney disease)   • Spasm of cervical paraspinous muscle   • Cervical radiculopathy   • Chronic kidney disease, stage IV (severe) (CMS/HCC)   • Depression   • DM type 2 with diabetic  peripheral neuropathy (CMS/[facility])   • Essential hypertension   • Duplay's periarthritis syndrome   • Gastroesophageal reflux disease   • Hyperlipidemia   • Hypertension   • Arthritis   • Seizure disorder (CMS/[facility])   • Phlebitis   • Type 2 diabetes mellitus (CMS/[facility])   • Vitamin D deficiency   • Chest pain   • Abscess   • Generalized muscle weakness   • Abdominal wall cellulitis   • HTN (hypertension)   • CKD (chronic kidney disease) stage 4, GFR 15-29 ml/min (CMS/[facility])   • Diabetes mellitus with peripheral autonomic neuropathy (CMS/[facility])   • Hyponatremia   • Hypercalcemia   • Dehydration   • DACIA (acute kidney injury) (CMS/[facility])   • Abdominal wall abscess   • Cellulitis of toe of left foot   • Partial Achilles tendon tear, left, initial encounter   • Arthritis of foot   • Essential tremor   • Primary Parkinsonism (CMS/[facility])   • Abnormal cardiac function test   • 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 pectoris (CMS/[facility])   • Atrial fibrillation with RVR (CMS/[facility])   • Anticoagulated               Plan:            ICD-10-CM ICD-9-CM   1. Essential hypertension I10 401.9   2. Hyperlipidemia, unspecified hyperlipidemia type E78.5 272.4   3. Coronary artery disease of native artery of native heart with stable angina pectoris (CMS/[facility]) I25.118 414.01     413.9   4. Atrial fibrillation with RVR (CMS/[facility]) I48.91 427.31     1. Essential hypertension  Blood pressure controlled    2. Hyperlipidemia, unspecified hyperlipidemia type  Continue current management    3. Coronary artery disease of native artery of native heart with stable angina pectoris (CMS/[facility])  No angina pectoris    4. Atrial fibrillation with RVR (CMS/[facility])  Controlled    COUNSELING:    Maribeth Eagle was given to patient for the following topics: diagnostic results, risk factor reductions, impressions, risks and benefits of treatment options and importance of treatment compliance .       SMOKING COUNSELING:    [unfilled]    Dictated using  April dictation

## 2019-12-20 ENCOUNTER — APPOINTMENT (OUTPATIENT)
Dept: CARDIAC REHAB | Facility: HOSPITAL | Age: 69
End: 2019-12-20

## 2019-12-27 ENCOUNTER — HOSPITAL ENCOUNTER (OUTPATIENT)
Dept: INFUSION THERAPY | Facility: HOSPITAL | Age: 69
Discharge: HOME | End: 2019-12-27
Admitting: INTERNAL MEDICINE

## 2019-12-27 VITALS
SYSTOLIC BLOOD PRESSURE: 174 MMHG | RESPIRATION RATE: 20 BRPM | OXYGEN SATURATION: 95 % | DIASTOLIC BLOOD PRESSURE: 73 MMHG | HEART RATE: 96 BPM | TEMPERATURE: 98 F

## 2019-12-27 DIAGNOSIS — N18.4 CHRONIC KIDNEY DISEASE, STAGE IV (SEVERE) (HCC): ICD-10-CM

## 2019-12-27 DIAGNOSIS — N18.5 ANEMIA IN STAGE 5 CHRONIC KIDNEY DISEASE, NOT ON CHRONIC DIALYSIS (HCC): Primary | ICD-10-CM

## 2019-12-27 DIAGNOSIS — D63.1 ANEMIA IN STAGE 5 CHRONIC KIDNEY DISEASE, NOT ON CHRONIC DIALYSIS (HCC): Primary | ICD-10-CM

## 2019-12-27 LAB
25(OH)D3 SERPL-MCNC: 28 NG/ML (ref 30–100)
ALBUMIN SERPL-MCNC: 3 G/DL (ref 3.5–5.2)
ALBUMIN/GLOB SERPL: 0.6 G/DL
ALP SERPL-CCNC: 77 U/L (ref 39–117)
ALT SERPL W P-5'-P-CCNC: 9 U/L (ref 1–33)
ANION GAP SERPL CALCULATED.3IONS-SCNC: 13.3 MMOL/L (ref 5–15)
AST SERPL-CCNC: 8 U/L (ref 1–32)
BILIRUB SERPL-MCNC: 0.2 MG/DL (ref 0.2–1.2)
BUN BLD-MCNC: 37 MG/DL (ref 8–23)
BUN/CREAT SERPL: 10.2 (ref 7–25)
CALCIUM SPEC-SCNC: 7.8 MG/DL (ref 8.6–10.5)
CALCIUM SPEC-SCNC: 8.1 MG/DL (ref 8.6–10.5)
CHLORIDE SERPL-SCNC: 103 MMOL/L (ref 98–107)
CO2 SERPL-SCNC: 20.7 MMOL/L (ref 22–29)
CREAT BLD-MCNC: 3.64 MG/DL (ref 0.57–1)
DEPRECATED RDW RBC AUTO: 47.1 FL (ref 37–54)
ERYTHROCYTE [DISTWIDTH] IN BLOOD BY AUTOMATED COUNT: 14.9 % (ref 12.3–15.4)
FERRITIN SERPL-MCNC: 521 NG/ML (ref 13–150)
FOLATE SERPL-MCNC: 6.86 NG/ML (ref 4.78–24.2)
GFR SERPL CREATININE-BSD FRML MDRD: 12 ML/MIN/1.73
GFR SERPL CREATININE-BSD FRML MDRD: ABNORMAL ML/MIN/{1.73_M2}
GLOBULIN UR ELPH-MCNC: 5.1 GM/DL
GLUCOSE BLD-MCNC: 298 MG/DL (ref 65–99)
HCT VFR BLD AUTO: 25.2 % (ref 34–46.6)
HGB BLD-MCNC: 7.9 G/DL (ref 12–15.9)
IRON 24H UR-MRATE: 17 MCG/DL (ref 37–145)
IRON SATN MFR SERPL: 10 % (ref 20–50)
MAGNESIUM SERPL-MCNC: 1.6 MG/DL (ref 1.6–2.4)
MCH RBC QN AUTO: 27.2 PG (ref 26.6–33)
MCHC RBC AUTO-ENTMCNC: 31.3 G/DL (ref 31.5–35.7)
MCV RBC AUTO: 86.9 FL (ref 79–97)
PHOSPHATE SERPL-MCNC: 3.8 MG/DL (ref 2.5–4.5)
PLATELET # BLD AUTO: 252 10*3/MM3 (ref 140–450)
PMV BLD AUTO: 9.3 FL (ref 6–12)
POTASSIUM BLD-SCNC: 4.3 MMOL/L (ref 3.5–5.2)
PROT SERPL-MCNC: 8.1 G/DL (ref 6–8.5)
PTH-INTACT SERPL-MCNC: 291 PG/ML (ref 15–65)
RBC # BLD AUTO: 2.9 10*6/MM3 (ref 3.77–5.28)
SODIUM BLD-SCNC: 137 MMOL/L (ref 136–145)
TIBC SERPL-MCNC: 170 MCG/DL (ref 298–536)
TRANSFERRIN SERPL-MCNC: 114 MG/DL (ref 200–360)
URATE SERPL-MCNC: 10 MG/DL (ref 2.4–5.7)
VIT B12 BLD-MCNC: 482 PG/ML (ref 211–946)
WBC NRBC COR # BLD: 10.08 10*3/MM3 (ref 3.4–10.8)

## 2019-12-27 PROCEDURE — 82607 VITAMIN B-12: CPT | Performed by: INTERNAL MEDICINE

## 2019-12-27 PROCEDURE — 80053 COMPREHEN METABOLIC PANEL: CPT | Performed by: INTERNAL MEDICINE

## 2019-12-27 PROCEDURE — 82728 ASSAY OF FERRITIN: CPT | Performed by: INTERNAL MEDICINE

## 2019-12-27 PROCEDURE — 84466 ASSAY OF TRANSFERRIN: CPT | Performed by: INTERNAL MEDICINE

## 2019-12-27 PROCEDURE — 85027 COMPLETE CBC AUTOMATED: CPT | Performed by: INTERNAL MEDICINE

## 2019-12-27 PROCEDURE — 83540 ASSAY OF IRON: CPT | Performed by: INTERNAL MEDICINE

## 2019-12-27 PROCEDURE — 96372 THER/PROPH/DIAG INJ SC/IM: CPT

## 2019-12-27 PROCEDURE — 36415 COLL VENOUS BLD VENIPUNCTURE: CPT

## 2019-12-27 PROCEDURE — 83735 ASSAY OF MAGNESIUM: CPT | Performed by: INTERNAL MEDICINE

## 2019-12-27 PROCEDURE — 82746 ASSAY OF FOLIC ACID SERUM: CPT | Performed by: INTERNAL MEDICINE

## 2019-12-27 PROCEDURE — 25010000002 EPOETIN ALFA PER 1000 UNITS: Performed by: INTERNAL MEDICINE

## 2019-12-27 PROCEDURE — 82306 VITAMIN D 25 HYDROXY: CPT | Performed by: INTERNAL MEDICINE

## 2019-12-27 PROCEDURE — 84550 ASSAY OF BLOOD/URIC ACID: CPT | Performed by: INTERNAL MEDICINE

## 2019-12-27 PROCEDURE — 84100 ASSAY OF PHOSPHORUS: CPT | Performed by: INTERNAL MEDICINE

## 2019-12-27 PROCEDURE — 83970 ASSAY OF PARATHORMONE: CPT | Performed by: INTERNAL MEDICINE

## 2019-12-27 RX ADMIN — ERYTHROPOIETIN 20000 UNITS: 20000 INJECTION, SOLUTION INTRAVENOUS; SUBCUTANEOUS at 14:07

## 2019-12-30 ENCOUNTER — OFFICE VISIT (OUTPATIENT)
Dept: RETAIL CLINIC | Facility: CLINIC | Age: 69
End: 2019-12-30

## 2019-12-30 VITALS
TEMPERATURE: 98.1 F | HEART RATE: 94 BPM | RESPIRATION RATE: 20 BRPM | SYSTOLIC BLOOD PRESSURE: 152 MMHG | OXYGEN SATURATION: 96 % | DIASTOLIC BLOOD PRESSURE: 84 MMHG

## 2019-12-30 DIAGNOSIS — B97.89 VIRAL RESPIRATORY ILLNESS: ICD-10-CM

## 2019-12-30 DIAGNOSIS — J98.8 VIRAL RESPIRATORY ILLNESS: ICD-10-CM

## 2019-12-30 DIAGNOSIS — J01.00 ACUTE MAXILLARY SINUSITIS, RECURRENCE NOT SPECIFIED: Primary | ICD-10-CM

## 2019-12-30 PROCEDURE — 99213 OFFICE O/P EST LOW 20 MIN: CPT | Performed by: NURSE PRACTITIONER

## 2019-12-30 RX ORDER — BENZONATATE 200 MG/1
200 CAPSULE ORAL 3 TIMES DAILY PRN
Qty: 15 CAPSULE | Refills: 0 | Status: SHIPPED | OUTPATIENT
Start: 2019-12-30 | End: 2020-01-04

## 2019-12-30 RX ORDER — BUMETANIDE 2 MG/1
TABLET ORAL DAILY
COMMUNITY
Start: 2019-12-18 | End: 2020-06-30 | Stop reason: SDUPTHER

## 2019-12-30 RX ORDER — AMOXICILLIN 500 MG/1
500 CAPSULE ORAL 2 TIMES DAILY
Qty: 20 CAPSULE | Refills: 0 | Status: SHIPPED | OUTPATIENT
Start: 2019-12-30 | End: 2020-01-09

## 2019-12-30 NOTE — PROGRESS NOTES
Subjective   Patient ID: Maribeth Rendon is a 69 y.o. female presents with   Chief Complaint   Patient presents with   • Cough       Cough   This is a new problem. The current episode started more than 1 month ago (6w). The problem has been unchanged. The cough is productive of sputum (yellow). Associated symptoms include headaches, postnasal drip, rhinorrhea and shortness of breath (have had that since heart surgery in July). Pertinent negatives include no chills, ear pain, fever, sore throat or wheezing. The symptoms are aggravated by lying down. Risk factors for lung disease include animal exposure. She has tried nothing for the symptoms. The treatment provided no relief. Her past medical history is significant for bronchitis and pneumonia. There is no history of asthma.       Allergies   Allergen Reactions   • Prednisone Hallucinations       The following portions of the patient's history were reviewed and updated as appropriate: allergies, current medications, past family history, past medical history, past social history, past surgical history and problem list.      Review of Systems   Constitutional: Positive for fatigue. Negative for chills, diaphoresis and fever.   HENT: Positive for congestion, postnasal drip, rhinorrhea, sinus pressure and sneezing. Negative for ear pain and sore throat.    Respiratory: Positive for cough and shortness of breath (have had that since heart surgery in July). Negative for chest tightness and wheezing.    Cardiovascular: Negative.    Gastrointestinal: Negative.    Neurological: Positive for headaches.       Objective     Vitals:    12/30/19 1649   BP: 152/84   Pulse: 94   Resp: 20   Temp: 98.1 °F (36.7 °C)   SpO2: 96%         Physical Exam   Constitutional: She is oriented to person, place, and time. She appears well-developed and well-nourished. She does not appear ill. No distress.   HENT:   Head: Normocephalic.   Right Ear: Hearing, tympanic membrane, external ear and ear  canal normal.   Left Ear: Hearing, tympanic membrane, external ear and ear canal normal.   Nose: Mucosal edema and sinus tenderness present. No rhinorrhea. Right sinus exhibits maxillary sinus tenderness. Left sinus exhibits maxillary sinus tenderness.   Mouth/Throat: Mucous membranes are normal. Posterior oropharyngeal erythema (mild) present. No tonsillar exudate.   Eyes: Conjunctivae are normal.   Sclera white.   Neck: No tracheal deviation present.   Cardiovascular: Normal rate, regular rhythm, S1 normal, S2 normal and normal heart sounds.   Pulmonary/Chest: Effort normal and breath sounds normal. No accessory muscle usage. No respiratory distress.   Abdominal: Soft. Bowel sounds are normal. There is no tenderness.   Lymphadenopathy:     She has no cervical adenopathy.   Neurological: She is alert and oriented to person, place, and time.   Skin: Skin is warm and dry.   Vitals reviewed.        Maribeth was seen today for cough.    Diagnoses and all orders for this visit:    Acute maxillary sinusitis, recurrence not specified  -     amoxicillin (AMOXIL) 500 MG capsule; Take 1 capsule by mouth 2 (Two) Times a Day for 10 days.    Viral respiratory illness  -     benzonatate (TESSALON) 200 MG capsule; Take 1 capsule by mouth 3 (Three) Times a Day As Needed for Cough for up to 5 days.        Patient understands possible side effects of all medications ordered. Follow-up with Primary Care Physician in 48-72 hours if these symptoms worsen or fail to improve as anticipated. Patient verbalizes understanding.    Sinusitis, Adult  Sinusitis is inflammation of your sinuses. Sinuses are hollow spaces in the bones around your face. Your sinuses are located:  · Around your eyes.  · In the middle of your forehead.  · Behind your nose.  · In your cheekbones.  Mucus normally drains out of your sinuses. When your nasal tissues become inflamed or swollen, mucus can become trapped or blocked. This allows bacteria, viruses, and fungi to  grow, which leads to infection. Most infections of the sinuses are caused by a virus.  Sinusitis can develop quickly. It can last for up to 4 weeks (acute) or for more than 12 weeks (chronic). Sinusitis often develops after a cold.  What are the causes?  This condition is caused by anything that creates swelling in the sinuses or stops mucus from draining. This includes:  · Allergies.  · Asthma.  · Infection from bacteria or viruses.  · Deformities or blockages in your nose or sinuses.  · Abnormal growths in the nose (nasal polyps).  · Pollutants, such as chemicals or irritants in the air.  · Infection from fungi (rare).  What increases the risk?  You are more likely to develop this condition if you:  · Have a weak body defense system (immune system).  · Do a lot of swimming or diving.  · Overuse nasal sprays.  · Smoke.  What are the signs or symptoms?  The main symptoms of this condition are pain and a feeling of pressure around the affected sinuses. Other symptoms include:  · Stuffy nose or congestion.  · Thick drainage from your nose.  · Swelling and warmth over the affected sinuses.  · Headache.  · Upper toothache.  · A cough that may get worse at night.  · Extra mucus that collects in the throat or the back of the nose (postnasal drip).  · Decreased sense of smell and taste.  · Fatigue.  · A fever.  · Sore throat.  · Bad breath.  How is this diagnosed?  This condition is diagnosed based on:  · Your symptoms.  · Your medical history.  · A physical exam.  · Tests to find out if your condition is acute or chronic. This may include:  ? Checking your nose for nasal polyps.  ? Viewing your sinuses using a device that has a light (endoscope).  ? Testing for allergies or bacteria.  ? Imaging tests, such as an MRI or CT scan.  In rare cases, a bone biopsy may be done to rule out more serious types of fungal sinus disease.  How is this treated?  Treatment for sinusitis depends on the cause and whether your condition is  chronic or acute.  · If caused by a virus, your symptoms should go away on their own within 10 days. You may be given medicines to relieve symptoms. They include:  ? Medicines that shrink swollen nasal passages (topical intranasal decongestants).  ? Medicines that treat allergies (antihistamines).  ? A spray that eases inflammation of the nostrils (topical intranasal corticosteroids).  ? Rinses that help get rid of thick mucus in your nose (nasal saline washes).  · If caused by bacteria, your health care provider may recommend waiting to see if your symptoms improve. Most bacterial infections will get better without antibiotic medicine. You may be given antibiotics if you have:  ? A severe infection.  ? A weak immune system.  · If caused by narrow nasal passages or nasal polyps, you may need to have surgery.  Follow these instructions at home:  Medicines  · Take, use, or apply over-the-counter and prescription medicines only as told by your health care provider. These may include nasal sprays.  · If you were prescribed an antibiotic medicine, take it as told by your health care provider. Do not stop taking the antibiotic even if you start to feel better.  Hydrate and humidify    · Drink enough fluid to keep your urine pale yellow. Staying hydrated will help to thin your mucus.  · Use a cool mist humidifier to keep the humidity level in your home above 50%.  · Inhale steam for 10-15 minutes, 3-4 times a day, or as told by your health care provider. You can do this in the bathroom while a hot shower is running.  · Limit your exposure to cool or dry air.  Rest  · Rest as much as possible.  · Sleep with your head raised (elevated).  · Make sure you get enough sleep each night.  General instructions    · Apply a warm, moist washcloth to your face 3-4 times a day or as told by your health care provider. This will help with discomfort.  · Wash your hands often with soap and water to reduce your exposure to germs. If soap  and water are not available, use hand .  · Do not smoke. Avoid being around people who are smoking (secondhand smoke).  · Keep all follow-up visits as told by your health care provider. This is important.  Contact a health care provider if:  · You have a fever.  · Your symptoms get worse.  · Your symptoms do not improve within 10 days.  Get help right away if:  · You have a severe headache.  · You have persistent vomiting.  · You have severe pain or swelling around your face or eyes.  · You have vision problems.  · You develop confusion.  · Your neck is stiff.  · You have trouble breathing.  Summary  · Sinusitis is soreness and inflammation of your sinuses. Sinuses are hollow spaces in the bones around your face.  · This condition is caused by nasal tissues that become inflamed or swollen. The swelling traps or blocks the flow of mucus. This allows bacteria, viruses, and fungi to grow, which leads to infection.  · If you were prescribed an antibiotic medicine, take it as told by your health care provider. Do not stop taking the antibiotic even if you start to feel better.  · Keep all follow-up visits as told by your health care provider. This is important.  This information is not intended to replace advice given to you by your health care provider. Make sure you discuss any questions you have with your health care provider.  Document Released: 12/18/2006 Document Revised: 05/20/2019 Document Reviewed: 05/20/2019  [employer] Interactive Patient Education © 2019 [employer]  Viral Respiratory Infection  A respiratory infection is an illness that affects part of the respiratory system, such as the lungs, nose, or throat. A respiratory infection that is caused by a virus is called a viral respiratory infection.  Common types of viral respiratory infections include:  · A cold.  · The flu (influenza).  · A respiratory syncytial virus (RSV) infection.  What are the causes?  This condition is caused by a  virus.  What are the signs or symptoms?  Symptoms of this condition include:  · A stuffy or runny nose.  · Yellow or green nasal discharge.  · A cough.  · Sneezing.  · Fatigue.  · Achy muscles.  · A sore throat.  · Sweating or chills.  · A fever.  · A headache.  How is this diagnosed?  This condition may be diagnosed based on:  · Your symptoms.  · A physical exam.  · Testing of nasal swabs.  How is this treated?  This condition may be treated with medicines, such as:  · Antiviral medicine. This may shorten the length of time a person has symptoms.  · Expectorants. These make it easier to cough up mucus.  · Decongestant nasal sprays.  · Acetaminophen or NSAIDs to relieve fever and pain.  Antibiotic medicines are not prescribed for viral infections. This is because antibiotics are designed to kill bacteria. They are not effective against viruses.  Follow these instructions at home:    Managing pain and congestion  · Take over-the-counter and prescription medicines only as told by your health care provider.  · If you have a sore throat, gargle with a salt-water mixture 3-4 times a day or as needed. To make a salt-water mixture, completely dissolve ½-1 tsp of salt in 1 cup of warm water.  · Use nose drops made from salt water to ease congestion and soften raw skin around your nose.  · Drink enough fluid to keep your urine pale yellow. This helps prevent dehydration and helps loosen up mucus.  General instructions  · Rest as much as possible.  · Do not drink alcohol.  · Do not use any products that contain nicotine or tobacco, such as cigarettes and e-cigarettes. If you need help quitting, ask your health care provider.  · Keep all follow-up visits as told by your health care provider. This is important.  How is this prevented?    · Get an annual flu shot. You may get the flu shot in late summer, fall, or winter. Ask your health care provider when you should get your flu shot.  · Avoid exposing others to your respiratory  infection.  ? Stay home from work or school as told by your health care provider.  ? Wash your hands with soap and water often, especially after you cough or sneeze. If soap and water are not available, use alcohol-based hand .  · Avoid contact with people who are sick during cold and flu season. This is generally fall and winter.  Contact a health care provider if:  · Your symptoms last for 10 days or longer.  · Your symptoms get worse over time.  · You have a fever.  · You have severe sinus pain in your face or forehead.  · The glands in your jaw or neck become very swollen.  Get help right away if you:  · Feel pain or pressure in your chest.  · Have shortness of breath.  · Faint or feel like you will faint.  · Have severe and persistent vomiting.  · Feel confused or disoriented.  Summary  · A respiratory infection is an illness that affects part of the respiratory system, such as the lungs, nose, or throat. A respiratory infection that is caused by a virus is called a viral respiratory infection.  · Common types of viral respiratory infections are a cold, influenza, and respiratory syncytial virus (RSV) infection.  · Symptoms of this condition include a stuffy or runny nose, cough, sneezing, fatigue, achy muscles, sore throat, and fevers or chills.  · Antibiotic medicines are not prescribed for viral infections. This is because antibiotics are designed to kill bacteria. They are not effective against viruses.  This information is not intended to replace advice given to you by your health care provider. Make sure you discuss any questions you have with your health care provider.  Document Released: 09/27/2006 Document Revised: 01/28/2019 Document Reviewed: 01/28/2019  [employer] Interactive Patient Education © 2019 [employer]

## 2019-12-30 NOTE — PATIENT INSTRUCTIONS
Sinusitis, Adult  Sinusitis is inflammation of your sinuses. Sinuses are hollow spaces in the bones around your face. Your sinuses are located:  · Around your eyes.  · In the middle of your forehead.  · Behind your nose.  · In your cheekbones.  Mucus normally drains out of your sinuses. When your nasal tissues become inflamed or swollen, mucus can become trapped or blocked. This allows bacteria, viruses, and fungi to grow, which leads to infection. Most infections of the sinuses are caused by a virus.  Sinusitis can develop quickly. It can last for up to 4 weeks (acute) or for more than 12 weeks (chronic). Sinusitis often develops after a cold.  What are the causes?  This condition is caused by anything that creates swelling in the sinuses or stops mucus from draining. This includes:  · Allergies.  · Asthma.  · Infection from bacteria or viruses.  · Deformities or blockages in your nose or sinuses.  · Abnormal growths in the nose (nasal polyps).  · Pollutants, such as chemicals or irritants in the air.  · Infection from fungi (rare).  What increases the risk?  You are more likely to develop this condition if you:  · Have a weak body defense system (immune system).  · Do a lot of swimming or diving.  · Overuse nasal sprays.  · Smoke.  What are the signs or symptoms?  The main symptoms of this condition are pain and a feeling of pressure around the affected sinuses. Other symptoms include:  · Stuffy nose or congestion.  · Thick drainage from your nose.  · Swelling and warmth over the affected sinuses.  · Headache.  · Upper toothache.  · A cough that may get worse at night.  · Extra mucus that collects in the throat or the back of the nose (postnasal drip).  · Decreased sense of smell and taste.  · Fatigue.  · A fever.  · Sore throat.  · Bad breath.  How is this diagnosed?  This condition is diagnosed based on:  · Your symptoms.  · Your medical history.  · A physical exam.  · Tests to find out if your condition is  acute or chronic. This may include:  ? Checking your nose for nasal polyps.  ? Viewing your sinuses using a device that has a light (endoscope).  ? Testing for allergies or bacteria.  ? Imaging tests, such as an MRI or CT scan.  In rare cases, a bone biopsy may be done to rule out more serious types of fungal sinus disease.  How is this treated?  Treatment for sinusitis depends on the cause and whether your condition is chronic or acute.  · If caused by a virus, your symptoms should go away on their own within 10 days. You may be given medicines to relieve symptoms. They include:  ? Medicines that shrink swollen nasal passages (topical intranasal decongestants).  ? Medicines that treat allergies (antihistamines).  ? A spray that eases inflammation of the nostrils (topical intranasal corticosteroids).  ? Rinses that help get rid of thick mucus in your nose (nasal saline washes).  · If caused by bacteria, your health care provider may recommend waiting to see if your symptoms improve. Most bacterial infections will get better without antibiotic medicine. You may be given antibiotics if you have:  ? A severe infection.  ? A weak immune system.  · If caused by narrow nasal passages or nasal polyps, you may need to have surgery.  Follow these instructions at home:  Medicines  · Take, use, or apply over-the-counter and prescription medicines only as told by your health care provider. These may include nasal sprays.  · If you were prescribed an antibiotic medicine, take it as told by your health care provider. Do not stop taking the antibiotic even if you start to feel better.  Hydrate and humidify    · Drink enough fluid to keep your urine pale yellow. Staying hydrated will help to thin your mucus.  · Use a cool mist humidifier to keep the humidity level in your home above 50%.  · Inhale steam for 10-15 minutes, 3-4 times a day, or as told by your health care provider. You can do this in the bathroom while a hot shower is  running.  · Limit your exposure to cool or dry air.  Rest  · Rest as much as possible.  · Sleep with your head raised (elevated).  · Make sure you get enough sleep each night.  General instructions    · Apply a warm, moist washcloth to your face 3-4 times a day or as told by your health care provider. This will help with discomfort.  · Wash your hands often with soap and water to reduce your exposure to germs. If soap and water are not available, use hand .  · Do not smoke. Avoid being around people who are smoking (secondhand smoke).  · Keep all follow-up visits as told by your health care provider. This is important.  Contact a health care provider if:  · You have a fever.  · Your symptoms get worse.  · Your symptoms do not improve within 10 days.  Get help right away if:  · You have a severe headache.  · You have persistent vomiting.  · You have severe pain or swelling around your face or eyes.  · You have vision problems.  · You develop confusion.  · Your neck is stiff.  · You have trouble breathing.  Summary  · Sinusitis is soreness and inflammation of your sinuses. Sinuses are hollow spaces in the bones around your face.  · This condition is caused by nasal tissues that become inflamed or swollen. The swelling traps or blocks the flow of mucus. This allows bacteria, viruses, and fungi to grow, which leads to infection.  · If you were prescribed an antibiotic medicine, take it as told by your health care provider. Do not stop taking the antibiotic even if you start to feel better.  · Keep all follow-up visits as told by your health care provider. This is important.  This information is not intended to replace advice given to you by your health care provider. Make sure you discuss any questions you have with your health care provider.  Document Released: 12/18/2006 Document Revised: 05/20/2019 Document Reviewed: 05/20/2019  [employer] Interactive Patient Education © 2019 [employer]  Viral Respiratory  Infection  A respiratory infection is an illness that affects part of the respiratory system, such as the lungs, nose, or throat. A respiratory infection that is caused by a virus is called a viral respiratory infection.  Common types of viral respiratory infections include:  · A cold.  · The flu (influenza).  · A respiratory syncytial virus (RSV) infection.  What are the causes?  This condition is caused by a virus.  What are the signs or symptoms?  Symptoms of this condition include:  · A stuffy or runny nose.  · Yellow or green nasal discharge.  · A cough.  · Sneezing.  · Fatigue.  · Achy muscles.  · A sore throat.  · Sweating or chills.  · A fever.  · A headache.  How is this diagnosed?  This condition may be diagnosed based on:  · Your symptoms.  · A physical exam.  · Testing of nasal swabs.  How is this treated?  This condition may be treated with medicines, such as:  · Antiviral medicine. This may shorten the length of time a person has symptoms.  · Expectorants. These make it easier to cough up mucus.  · Decongestant nasal sprays.  · Acetaminophen or NSAIDs to relieve fever and pain.  Antibiotic medicines are not prescribed for viral infections. This is because antibiotics are designed to kill bacteria. They are not effective against viruses.  Follow these instructions at home:    Managing pain and congestion  · Take over-the-counter and prescription medicines only as told by your health care provider.  · If you have a sore throat, gargle with a salt-water mixture 3-4 times a day or as needed. To make a salt-water mixture, completely dissolve ½-1 tsp of salt in 1 cup of warm water.  · Use nose drops made from salt water to ease congestion and soften raw skin around your nose.  · Drink enough fluid to keep your urine pale yellow. This helps prevent dehydration and helps loosen up mucus.  General instructions  · Rest as much as possible.  · Do not drink alcohol.  · Do not use any products that contain nicotine  or tobacco, such as cigarettes and e-cigarettes. If you need help quitting, ask your health care provider.  · Keep all follow-up visits as told by your health care provider. This is important.  How is this prevented?    · Get an annual flu shot. You may get the flu shot in late summer, fall, or winter. Ask your health care provider when you should get your flu shot.  · Avoid exposing others to your respiratory infection.  ? Stay home from work or school as told by your health care provider.  ? Wash your hands with soap and water often, especially after you cough or sneeze. If soap and water are not available, use alcohol-based hand .  · Avoid contact with people who are sick during cold and flu season. This is generally fall and winter.  Contact a health care provider if:  · Your symptoms last for 10 days or longer.  · Your symptoms get worse over time.  · You have a fever.  · You have severe sinus pain in your face or forehead.  · The glands in your jaw or neck become very swollen.  Get help right away if you:  · Feel pain or pressure in your chest.  · Have shortness of breath.  · Faint or feel like you will faint.  · Have severe and persistent vomiting.  · Feel confused or disoriented.  Summary  · A respiratory infection is an illness that affects part of the respiratory system, such as the lungs, nose, or throat. A respiratory infection that is caused by a virus is called a viral respiratory infection.  · Common types of viral respiratory infections are a cold, influenza, and respiratory syncytial virus (RSV) infection.  · Symptoms of this condition include a stuffy or runny nose, cough, sneezing, fatigue, achy muscles, sore throat, and fevers or chills.  · Antibiotic medicines are not prescribed for viral infections. This is because antibiotics are designed to kill bacteria. They are not effective against viruses.  This information is not intended to replace advice given to you by your health care  provider. Make sure you discuss any questions you have with your health care provider.  Document Released: 09/27/2006 Document Revised: 01/28/2019 Document Reviewed: 01/28/2019  [employer] Interactive Patient Education © 2019 [employer]

## 2020-01-10 ENCOUNTER — HOSPITAL ENCOUNTER (OUTPATIENT)
Dept: INFUSION THERAPY | Facility: HOSPITAL | Age: 70
Discharge: HOME | End: 2020-01-10
Admitting: INTERNAL MEDICINE

## 2020-01-10 VITALS
HEART RATE: 96 BPM | OXYGEN SATURATION: 99 % | RESPIRATION RATE: 22 BRPM | SYSTOLIC BLOOD PRESSURE: 150 MMHG | TEMPERATURE: 96.4 F | DIASTOLIC BLOOD PRESSURE: 62 MMHG

## 2020-01-10 DIAGNOSIS — N18.4 CKD (CHRONIC KIDNEY DISEASE), STAGE IV (HCC): Primary | ICD-10-CM

## 2020-01-10 DIAGNOSIS — D63.1 ANEMIA IN STAGE 5 CHRONIC KIDNEY DISEASE, NOT ON CHRONIC DIALYSIS (HCC): ICD-10-CM

## 2020-01-10 DIAGNOSIS — N18.4 CHRONIC KIDNEY DISEASE, STAGE IV (SEVERE) (HCC): ICD-10-CM

## 2020-01-10 DIAGNOSIS — D63.1 ANEMIA DUE TO STAGE 4 CHRONIC KIDNEY DISEASE (HCC): ICD-10-CM

## 2020-01-10 DIAGNOSIS — N18.5 ANEMIA IN STAGE 5 CHRONIC KIDNEY DISEASE, NOT ON CHRONIC DIALYSIS (HCC): ICD-10-CM

## 2020-01-10 DIAGNOSIS — N18.4 ANEMIA DUE TO STAGE 4 CHRONIC KIDNEY DISEASE (HCC): ICD-10-CM

## 2020-01-10 LAB
HCT VFR BLD AUTO: 25.3 % (ref 34–46.6)
HGB BLD-MCNC: 8.1 G/DL (ref 12–15.9)

## 2020-01-10 PROCEDURE — 96372 THER/PROPH/DIAG INJ SC/IM: CPT

## 2020-01-10 PROCEDURE — 36415 COLL VENOUS BLD VENIPUNCTURE: CPT

## 2020-01-10 PROCEDURE — 25010000002 EPOETIN ALFA PER 1000 UNITS: Performed by: INTERNAL MEDICINE

## 2020-01-10 PROCEDURE — 85018 HEMOGLOBIN: CPT | Performed by: INTERNAL MEDICINE

## 2020-01-10 PROCEDURE — 85014 HEMATOCRIT: CPT | Performed by: INTERNAL MEDICINE

## 2020-01-10 RX ORDER — BENZONATATE 100 MG/1
100 CAPSULE ORAL 3 TIMES DAILY PRN
Status: ON HOLD | COMMUNITY
End: 2020-05-30

## 2020-01-10 RX ADMIN — ERYTHROPOIETIN 20000 UNITS: 20000 INJECTION, SOLUTION INTRAVENOUS; SUBCUTANEOUS at 13:34

## 2020-01-24 ENCOUNTER — HOSPITAL ENCOUNTER (OUTPATIENT)
Dept: INFUSION THERAPY | Facility: HOSPITAL | Age: 70
Discharge: HOME | End: 2020-01-24
Admitting: INTERNAL MEDICINE

## 2020-01-24 VITALS
RESPIRATION RATE: 20 BRPM | HEART RATE: 62 BPM | SYSTOLIC BLOOD PRESSURE: 152 MMHG | OXYGEN SATURATION: 97 % | DIASTOLIC BLOOD PRESSURE: 76 MMHG | TEMPERATURE: 97.8 F

## 2020-01-24 DIAGNOSIS — N18.5 ANEMIA IN STAGE 5 CHRONIC KIDNEY DISEASE, NOT ON CHRONIC DIALYSIS (HCC): ICD-10-CM

## 2020-01-24 DIAGNOSIS — D63.1 ANEMIA IN STAGE 5 CHRONIC KIDNEY DISEASE, NOT ON CHRONIC DIALYSIS (HCC): ICD-10-CM

## 2020-01-24 LAB
ANION GAP SERPL CALCULATED.3IONS-SCNC: 13 MMOL/L (ref 5–15)
BUN BLD-MCNC: 43 MG/DL (ref 8–23)
BUN/CREAT SERPL: 12.2 (ref 7–25)
CALCIUM SPEC-SCNC: 8 MG/DL (ref 8.6–10.5)
CHLORIDE SERPL-SCNC: 99 MMOL/L (ref 98–107)
CO2 SERPL-SCNC: 23 MMOL/L (ref 22–29)
CREAT BLD-MCNC: 3.53 MG/DL (ref 0.57–1)
GFR SERPL CREATININE-BSD FRML MDRD: 13 ML/MIN/1.73
GFR SERPL CREATININE-BSD FRML MDRD: ABNORMAL ML/MIN/{1.73_M2}
GLUCOSE BLD-MCNC: 294 MG/DL (ref 65–99)
HCT VFR BLD AUTO: 25.9 % (ref 34–46.6)
HGB BLD-MCNC: 7.7 G/DL (ref 12–15.9)
MAGNESIUM SERPL-MCNC: 1.7 MG/DL (ref 1.6–2.4)
POTASSIUM BLD-SCNC: 3.9 MMOL/L (ref 3.5–5.2)
SODIUM BLD-SCNC: 135 MMOL/L (ref 136–145)

## 2020-01-24 PROCEDURE — 85018 HEMOGLOBIN: CPT | Performed by: INTERNAL MEDICINE

## 2020-01-24 PROCEDURE — 36415 COLL VENOUS BLD VENIPUNCTURE: CPT

## 2020-01-24 PROCEDURE — 25010000002 EPOETIN ALFA PER 1000 UNITS: Performed by: INTERNAL MEDICINE

## 2020-01-24 PROCEDURE — 85014 HEMATOCRIT: CPT | Performed by: INTERNAL MEDICINE

## 2020-01-24 PROCEDURE — 83735 ASSAY OF MAGNESIUM: CPT | Performed by: INTERNAL MEDICINE

## 2020-01-24 PROCEDURE — 80048 BASIC METABOLIC PNL TOTAL CA: CPT | Performed by: INTERNAL MEDICINE

## 2020-01-24 PROCEDURE — 96372 THER/PROPH/DIAG INJ SC/IM: CPT

## 2020-01-24 RX ADMIN — ERYTHROPOIETIN 20000 UNITS: 20000 INJECTION, SOLUTION INTRAVENOUS; SUBCUTANEOUS at 13:40

## 2020-01-24 NOTE — PROGRESS NOTES
Procrit given per order due to parameters met.  Pt's Hgb 7.7.  Labs faxed to Dr Mcclain's office and office called with lab result. Notified pt of faxing labs and calling office.  Pt given AVS and dc'ed ambulatory.       Dr Mcclain returned call and request pt to change appts. To weekly.  Dr Mcclain states he will put the order in.  Informed Dr Mcclain new order already needed for pt with lab work.  Shilpa Colon to call pt and schedule appt.

## 2020-01-27 PROBLEM — N18.5 CKD (CHRONIC KIDNEY DISEASE) STAGE 5, GFR LESS THAN 15 ML/MIN (HCC): Status: ACTIVE | Noted: 2020-01-27

## 2020-01-31 ENCOUNTER — HOSPITAL ENCOUNTER (OUTPATIENT)
Dept: INFUSION THERAPY | Facility: HOSPITAL | Age: 70
Discharge: HOME | End: 2020-01-31
Admitting: INTERNAL MEDICINE

## 2020-01-31 VITALS
RESPIRATION RATE: 20 BRPM | DIASTOLIC BLOOD PRESSURE: 63 MMHG | HEART RATE: 101 BPM | TEMPERATURE: 98.9 F | SYSTOLIC BLOOD PRESSURE: 137 MMHG | OXYGEN SATURATION: 94 %

## 2020-01-31 DIAGNOSIS — N18.5 ANEMIA IN STAGE 5 CHRONIC KIDNEY DISEASE, NOT ON CHRONIC DIALYSIS (HCC): ICD-10-CM

## 2020-01-31 DIAGNOSIS — D63.1 ANEMIA IN STAGE 5 CHRONIC KIDNEY DISEASE, NOT ON CHRONIC DIALYSIS (HCC): ICD-10-CM

## 2020-01-31 DIAGNOSIS — N18.5 CKD (CHRONIC KIDNEY DISEASE) STAGE 5, GFR LESS THAN 15 ML/MIN (HCC): Primary | ICD-10-CM

## 2020-01-31 LAB
HCT VFR BLD AUTO: 24.3 % (ref 34–46.6)
HGB BLD-MCNC: 7.5 G/DL (ref 12–15.9)

## 2020-01-31 PROCEDURE — 85014 HEMATOCRIT: CPT | Performed by: INTERNAL MEDICINE

## 2020-01-31 PROCEDURE — 96372 THER/PROPH/DIAG INJ SC/IM: CPT

## 2020-01-31 PROCEDURE — 85018 HEMOGLOBIN: CPT | Performed by: INTERNAL MEDICINE

## 2020-01-31 PROCEDURE — 36415 COLL VENOUS BLD VENIPUNCTURE: CPT

## 2020-01-31 PROCEDURE — 25010000002 EPOETIN ALFA PER 1000 UNITS: Performed by: INTERNAL MEDICINE

## 2020-01-31 RX ADMIN — ERYTHROPOIETIN 20000 UNITS: 20000 INJECTION, SOLUTION INTRAVENOUS; SUBCUTANEOUS at 10:25

## 2020-02-07 ENCOUNTER — HOSPITAL ENCOUNTER (OUTPATIENT)
Dept: INFUSION THERAPY | Facility: HOSPITAL | Age: 70
Discharge: HOME | End: 2020-02-07
Admitting: INTERNAL MEDICINE

## 2020-02-07 VITALS
DIASTOLIC BLOOD PRESSURE: 65 MMHG | OXYGEN SATURATION: 96 % | RESPIRATION RATE: 20 BRPM | TEMPERATURE: 98.2 F | HEART RATE: 98 BPM | SYSTOLIC BLOOD PRESSURE: 145 MMHG

## 2020-02-07 DIAGNOSIS — N18.5 ANEMIA IN STAGE 5 CHRONIC KIDNEY DISEASE, NOT ON CHRONIC DIALYSIS (HCC): ICD-10-CM

## 2020-02-07 DIAGNOSIS — N18.5 CKD (CHRONIC KIDNEY DISEASE) STAGE 5, GFR LESS THAN 15 ML/MIN (HCC): Primary | ICD-10-CM

## 2020-02-07 DIAGNOSIS — D63.1 ANEMIA IN STAGE 5 CHRONIC KIDNEY DISEASE, NOT ON CHRONIC DIALYSIS (HCC): ICD-10-CM

## 2020-02-07 LAB
HCT VFR BLD AUTO: 25.4 % (ref 34–46.6)
HGB BLD-MCNC: 7.7 G/DL (ref 12–15.9)

## 2020-02-07 PROCEDURE — 85018 HEMOGLOBIN: CPT | Performed by: INTERNAL MEDICINE

## 2020-02-07 PROCEDURE — 36415 COLL VENOUS BLD VENIPUNCTURE: CPT

## 2020-02-07 PROCEDURE — 85014 HEMATOCRIT: CPT | Performed by: INTERNAL MEDICINE

## 2020-02-07 PROCEDURE — 25010000002 EPOETIN ALFA PER 1000 UNITS: Performed by: INTERNAL MEDICINE

## 2020-02-07 PROCEDURE — 96372 THER/PROPH/DIAG INJ SC/IM: CPT

## 2020-02-07 RX ADMIN — ERYTHROPOIETIN 20000 UNITS: 20000 INJECTION, SOLUTION INTRAVENOUS; SUBCUTANEOUS at 12:57

## 2020-02-14 ENCOUNTER — HOSPITAL ENCOUNTER (OUTPATIENT)
Dept: INFUSION THERAPY | Facility: HOSPITAL | Age: 70
Discharge: HOME | End: 2020-02-14
Admitting: INTERNAL MEDICINE

## 2020-02-14 VITALS
SYSTOLIC BLOOD PRESSURE: 158 MMHG | OXYGEN SATURATION: 95 % | DIASTOLIC BLOOD PRESSURE: 79 MMHG | RESPIRATION RATE: 20 BRPM | TEMPERATURE: 98.5 F | HEART RATE: 96 BPM

## 2020-02-14 DIAGNOSIS — D63.1 ANEMIA IN STAGE 5 CHRONIC KIDNEY DISEASE, NOT ON CHRONIC DIALYSIS (HCC): ICD-10-CM

## 2020-02-14 DIAGNOSIS — N18.5 CKD (CHRONIC KIDNEY DISEASE) STAGE 5, GFR LESS THAN 15 ML/MIN (HCC): Primary | ICD-10-CM

## 2020-02-14 DIAGNOSIS — N18.5 ANEMIA IN STAGE 5 CHRONIC KIDNEY DISEASE, NOT ON CHRONIC DIALYSIS (HCC): ICD-10-CM

## 2020-02-14 LAB
HCT VFR BLD AUTO: 26.1 % (ref 34–46.6)
HGB BLD-MCNC: 7.8 G/DL (ref 12–15.9)

## 2020-02-14 PROCEDURE — 85014 HEMATOCRIT: CPT | Performed by: INTERNAL MEDICINE

## 2020-02-14 PROCEDURE — 96372 THER/PROPH/DIAG INJ SC/IM: CPT

## 2020-02-14 PROCEDURE — 25010000002 EPOETIN ALFA PER 1000 UNITS: Performed by: INTERNAL MEDICINE

## 2020-02-14 PROCEDURE — 36415 COLL VENOUS BLD VENIPUNCTURE: CPT

## 2020-02-14 PROCEDURE — 85018 HEMOGLOBIN: CPT | Performed by: INTERNAL MEDICINE

## 2020-02-14 RX ADMIN — ERYTHROPOIETIN 20000 UNITS: 20000 INJECTION, SOLUTION INTRAVENOUS; SUBCUTANEOUS at 13:47

## 2020-02-14 NOTE — PROGRESS NOTES
Procrit indicated per lab results, tolerated well. D/C'd per ambulation with cane, spouse at side.

## 2020-02-20 ENCOUNTER — APPOINTMENT (OUTPATIENT)
Dept: CT IMAGING | Facility: HOSPITAL | Age: 70
End: 2020-02-20

## 2020-02-20 ENCOUNTER — HOSPITAL ENCOUNTER (EMERGENCY)
Facility: HOSPITAL | Age: 70
Discharge: HOME | End: 2020-02-20
Attending: EMERGENCY MEDICINE | Admitting: EMERGENCY MEDICINE

## 2020-02-20 VITALS
SYSTOLIC BLOOD PRESSURE: 185 MMHG | HEIGHT: 66 IN | HEART RATE: 89 BPM | OXYGEN SATURATION: 98 % | RESPIRATION RATE: 15 BRPM | BODY MASS INDEX: 32.14 KG/M2 | TEMPERATURE: 98.2 F | WEIGHT: 200 LBS | DIASTOLIC BLOOD PRESSURE: 93 MMHG

## 2020-02-20 DIAGNOSIS — E16.2 HYPOGLYCEMIA: Primary | ICD-10-CM

## 2020-02-20 DIAGNOSIS — S02.2XXA CLOSED FRACTURE OF NASAL BONE, INITIAL ENCOUNTER: ICD-10-CM

## 2020-02-20 DIAGNOSIS — S01.21XA LACERATION OF NOSE, INITIAL ENCOUNTER: ICD-10-CM
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
## 2020-02-20 LAB
ALBUMIN SERPL-MCNC: 3.1 G/DL (ref 3.5–5.2)
ALBUMIN/GLOB SERPL: 0.6 G/DL
ALP SERPL-CCNC: 70 U/L (ref 39–117)
ALT SERPL W P-5'-P-CCNC: 7 U/L (ref 1–33)
ANION GAP SERPL CALCULATED.3IONS-SCNC: 11.2 MMOL/L (ref 5–15)
AST SERPL-CCNC: 16 U/L (ref 1–32)
BILIRUB SERPL-MCNC: 0.3 MG/DL (ref 0.2–1.2)
BUN BLD-MCNC: 38 MG/DL (ref 8–23)
BUN/CREAT SERPL: 11 (ref 7–25)
CALCIUM SPEC-SCNC: 8.7 MG/DL (ref 8.6–10.5)
CHLORIDE SERPL-SCNC: 107 MMOL/L (ref 98–107)
CO2 SERPL-SCNC: 22.8 MMOL/L (ref 22–29)
CREAT BLD-MCNC: 3.47 MG/DL (ref 0.57–1)
DEPRECATED RDW RBC AUTO: 50.2 FL (ref 37–54)
EOSINOPHIL # BLD MANUAL: 0.47 10*3/MM3 (ref 0–0.4)
EOSINOPHIL NFR BLD MANUAL: 5 % (ref 0.3–6.2)
ERYTHROCYTE [DISTWIDTH] IN BLOOD BY AUTOMATED COUNT: 16.4 % (ref 12.3–15.4)
GFR SERPL CREATININE-BSD FRML MDRD: 13 ML/MIN/1.73
GFR SERPL CREATININE-BSD FRML MDRD: ABNORMAL ML/MIN/{1.73_M2}
GLOBULIN UR ELPH-MCNC: 5 GM/DL
GLUCOSE BLD-MCNC: 60 MG/DL (ref 65–99)
GLUCOSE BLDC GLUCOMTR-MCNC: 113 MG/DL (ref 70–130)
GLUCOSE BLDC GLUCOMTR-MCNC: 147 MG/DL (ref 70–130)
GLUCOSE BLDC GLUCOMTR-MCNC: 62 MG/DL (ref 70–130)
HCT VFR BLD AUTO: 29 % (ref 34–46.6)
HGB BLD-MCNC: 8.8 G/DL (ref 12–15.9)
HOLD SPECIMEN: NORMAL
HOLD SPECIMEN: NORMAL
LYMPHOCYTES # BLD MANUAL: 1.51 10*3/MM3 (ref 0.7–3.1)
LYMPHOCYTES NFR BLD MANUAL: 16 % (ref 19.6–45.3)
LYMPHOCYTES NFR BLD MANUAL: 6 % (ref 5–12)
MCH RBC QN AUTO: 25.8 PG (ref 26.6–33)
MCHC RBC AUTO-ENTMCNC: 30.3 G/DL (ref 31.5–35.7)
MCV RBC AUTO: 85 FL (ref 79–97)
MONOCYTES # BLD AUTO: 0.56 10*3/MM3 (ref 0.1–0.9)
NEUTROPHILS # BLD AUTO: 6.87 10*3/MM3 (ref 1.7–7)
NEUTROPHILS NFR BLD MANUAL: 73 % (ref 42.7–76)
PLAT MORPH BLD: NORMAL
PLATELET # BLD AUTO: 364 10*3/MM3 (ref 140–450)
PMV BLD AUTO: 9 FL (ref 6–12)
POTASSIUM BLD-SCNC: 4.4 MMOL/L (ref 3.5–5.2)
PROT SERPL-MCNC: 8.1 G/DL (ref 6–8.5)
RBC # BLD AUTO: 3.41 10*6/MM3 (ref 3.77–5.28)
RBC MORPH BLD: NORMAL
SODIUM BLD-SCNC: 141 MMOL/L (ref 136–145)
WBC MORPH BLD: NORMAL
WBC NRBC COR # BLD: 9.41 10*3/MM3 (ref 3.4–10.8)
WHOLE BLOOD HOLD SPECIMEN: NORMAL
WHOLE BLOOD HOLD SPECIMEN: NORMAL

## 2020-02-20 PROCEDURE — 82962 GLUCOSE BLOOD TEST: CPT

## 2020-02-20 PROCEDURE — 25010000002 TDAP 5-2.5-18.5 LF-MCG/0.5 SUSPENSION: Performed by: EMERGENCY MEDICINE

## 2020-02-20 PROCEDURE — 90471 IMMUNIZATION ADMIN: CPT | Performed by: EMERGENCY MEDICINE

## 2020-02-20 PROCEDURE — 80053 COMPREHEN METABOLIC PANEL: CPT | Performed by: EMERGENCY MEDICINE

## 2020-02-20 PROCEDURE — 96374 THER/PROPH/DIAG INJ IV PUSH: CPT

## 2020-02-20 PROCEDURE — 99284 EMERGENCY DEPT VISIT MOD MDM: CPT

## 2020-02-20 PROCEDURE — 85025 COMPLETE CBC W/AUTO DIFF WBC: CPT | Performed by: EMERGENCY MEDICINE

## 2020-02-20 PROCEDURE — 70450 CT HEAD/BRAIN W/O DYE: CPT

## 2020-02-20 PROCEDURE — 90715 TDAP VACCINE 7 YRS/> IM: CPT | Performed by: EMERGENCY MEDICINE

## 2020-02-20 PROCEDURE — 85007 BL SMEAR W/DIFF WBC COUNT: CPT | Performed by: EMERGENCY MEDICINE

## 2020-02-20 PROCEDURE — 36415 COLL VENOUS BLD VENIPUNCTURE: CPT

## 2020-02-20 RX ORDER — LIDOCAINE HYDROCHLORIDE AND EPINEPHRINE 10; 10 MG/ML; UG/ML
10 INJECTION, SOLUTION INFILTRATION; PERINEURAL ONCE
Status: COMPLETED | OUTPATIENT
Start: 2020-02-20 | End: 2020-02-20

## 2020-02-20 RX ORDER — DEXTROSE MONOHYDRATE 25 G/50ML
INJECTION, SOLUTION INTRAVENOUS
Status: COMPLETED
Start: 2020-02-20 | End: 2020-02-20

## 2020-02-20 RX ORDER — SODIUM CHLORIDE 0.9 % (FLUSH) 0.9 %
10 SYRINGE (ML) INJECTION AS NEEDED
Status: DISCONTINUED | OUTPATIENT
Start: 2020-02-20 | End: 2020-02-20 | Stop reason: HOSPADM

## 2020-02-20 RX ORDER — DEXTROSE MONOHYDRATE 25 G/50ML
25 INJECTION, SOLUTION INTRAVENOUS ONCE
Status: COMPLETED | OUTPATIENT
Start: 2020-02-20 | End: 2020-02-20

## 2020-02-20 RX ADMIN — LIDOCAINE HYDROCHLORIDE,EPINEPHRINE BITARTRATE 10 ML: 10; .01 INJECTION, SOLUTION INFILTRATION; PERINEURAL at 08:45

## 2020-02-20 RX ADMIN — TETANUS TOXOID, REDUCED DIPHTHERIA TOXOID AND ACELLULAR PERTUSSIS VACCINE, ADSORBED 0.5 ML: 5; 2.5; 8; 8; 2.5 SUSPENSION INTRAMUSCULAR at 11:54

## 2020-02-20 RX ADMIN — DEXTROSE MONOHYDRATE 25 G: 25 INJECTION, SOLUTION INTRAVENOUS at 08:31

## 2020-02-20 NOTE — ED PROVIDER NOTES
EMERGENCY DEPARTMENT ENCOUNTER    CHIEF COMPLAINT  Chief Complaint:  Head injury s/p fall; hypoglycemia.   History given by: pt, EMS  History limited by: none  Room Number: 34/34  PMD: Robby Chaney MD      HPI:  Pt is a 69 y.o. female who presents complaining of head injury s/p mechanical fall earlier today. Per family, the pt has a Hx of chronic diarrhea. This morning, the pt states she felt like she was going to have an episode of diarrhea and was heading to the bathroom. Pt states she is unsteady on her feet at baseline. Pt states she lost her balance and struck her face against the wall. Per EMS, the pt was found to have a BG of 37 with stable vitals initially. Pt was given 10 grams of PO glucose en route. Pt given an amp of D50 here. Pt's BG is currently 62. Pt states she is on Trajenta and Glimepiride. She recently had her Glimepiride lowered due to hypoglycemia and states she only takes it once in the morning. Her last dose was yesterday morning. Pt is unsure if her tetanus is UTD. PMHx of CABGx4.     PAST MEDICAL HISTORY  Active Ambulatory Problems     Diagnosis Date Noted   • Menstrual disorder 01/27/2016   • Atopic rhinitis 01/27/2016   • Anemia in CKD (chronic kidney disease) 01/27/2016   • Spasm of cervical paraspinous muscle 01/27/2016   • Cervical radiculopathy 01/27/2016   • Chronic kidney disease, stage IV (severe) (CMS/Formerly [facility]) 01/27/2016   • Depression 01/27/2016   • DM type 2 with diabetic peripheral neuropathy (CMS/Formerly [facility]) 01/27/2016   • Essential hypertension 01/27/2016   • Duplay's periarthritis syndrome 01/27/2016   • Gastroesophageal reflux disease 01/27/2016   • Hyperlipidemia 01/27/2016   • Hypertension 01/27/2016   • Arthritis 01/27/2016   • Seizure disorder (CMS/Formerly [facility]) 01/27/2016   • Phlebitis 01/27/2016   • Type 2 diabetes mellitus (CMS/[facility]ly [facility]) 01/27/2016   • Vitamin D deficiency 01/27/2016   • Chest pain 01/27/2016   • Abscess 03/17/2016   • Generalized muscle weakness 03/17/2016   •  Abdominal wall cellulitis 03/18/2016   • HTN (hypertension) 03/18/2016   • CKD (chronic kidney disease) stage 4, GFR 15-29 ml/min (CMS/HCC) 03/18/2016   • Diabetes mellitus with peripheral autonomic neuropathy (CMS/HCC) 03/18/2016   • Hyponatremia 03/18/2016   • Hypercalcemia 03/18/2016   • Dehydration 03/18/2016   • DACIA (acute kidney injury) (CMS/HCC) 03/19/2016   • Abdominal wall abscess 03/23/2016   • Cellulitis of toe of left foot 09/22/2017   • Partial Achilles tendon tear, left, initial encounter 11/08/2017   • Arthritis of foot 01/18/2018   • Essential tremor 06/30/2016   • Primary Parkinsonism (CMS/HCC) 03/10/2017   • Abnormal cardiac function test 07/15/2019   • 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 pectoris (CMS/HCC) 07/15/2019   • Atrial fibrillation with RVR (CMS/HCC) 11/20/2019   • Anticoagulated 11/20/2019   • CKD (chronic kidney disease) stage 5, GFR less than 15 ml/min (CMS/HCC) 01/27/2020     Resolved Ambulatory Problems     Diagnosis Date Noted   • Community acquired pneumonia 01/27/2016   • Diabetes mellitus (CMS/HCC) 03/18/2016     Past Medical History:   Diagnosis Date   • Achilles tendon tear    • Anemia    • Anxiety    • Diabetes mellitus, type 2 (CMS/HCC)    • ESRD (end stage renal disease) (CMS/HCC)    • GERD (gastroesophageal reflux disease)    • History of MRSA infection 03/15/2016   • History of transfusion    • Hypokalemia    • Hypomagnesemia    • Hypoxic 01/2016   • Intertrigo    • Leukocytosis    • Osteoarthritis    • Pneumonia 01/2016   • Psoriasis    • Psoriatic arthritis (CMS/HCC)    • Seizures (CMS/HCC)    • Sepsis (CMS/HCC) 01/2016   • SOB (shortness of breath)    • Stage 4 chronic renal impairment associated with type 2 diabetes mellitus (CMS/HCC)    • Staph infection    • Streptococcal bacteremia    • Swelling of hand 10/27/2016   • Tremor, essential    • Wears glasses        PAST SURGICAL HISTORY  Past Surgical History:   Procedure Laterality Date   •  ABDOMINAL WALL ABSCESS INCISION AND DRAINAGE  2016   • ARTERIOVENOUS FISTULA/SHUNT SURGERY Left 10/27/2016    Procedure: LT FOREARM FISTULA;  Surgeon: Lorelei Haque Jr., MD;  Location: [facility] OR;  Service:    • ARTERIOVENOUS FISTULA/SHUNT SURGERY Left 2/16/2017    Procedure: LT BRACHIAL CEPHALIC WITH FISTULA LIGATION RADIAL CEPHALIC.;  Surgeon: Gurmeet Carver MD;  Location: [facility] OR;  Service:    • CARDIAC CATHETERIZATION N/A 7/26/2019    Procedure: Left Heart Cath;  Surgeon: Eddie Hodges MD;  Location: [facility] CATH INVASIVE LOCATION;  Service: Cardiology   • CARDIAC CATHETERIZATION N/A 7/26/2019    Procedure: Coronary angiography;  Surgeon: Eddie Hodges MD;  Location: [facility] CATH INVASIVE LOCATION;  Service: Cardiology   • CARDIAC SURGERY     • CATARACT EXTRACTION W/ INTRAOCULAR LENS IMPLANT Bilateral 2011   • CORONARY ARTERY BYPASS GRAFT N/A 7/29/2019    Procedure: INTRAOP ERNESTO; CORONARY ARTERY BYPASS GRAFTING X 4 WITH LEFT INTERNAL MAMMARY ARTERY GRAFT AND UTILIZING ENDOSCOPICALLY HARVESTED GREATER SAPHENOUS VEIN; PRP;  Surgeon: Gordo Ferreira MD;  Location: [facility] OR;  Service: Cardiothoracic   • CORONARY ARTERY BYPASS GRAFT     • DILATATION AND CURETTAGE  1991   • EXCISION MASS TRUNK N/A 3/22/2016    Procedure: I&D LOWER ABDOMINAL  WOUND;  Surgeon: Tru Yi MD;  Location: [facility] OR;  Service:    • FOOT SURGERY Right 2010    REMOVED BONE IN RIGHT GREAT TOE   • HYSTERECTOMY  1992       FAMILY HISTORY  Family History   Problem Relation Age of Onset   • Heart attack Mother    • Heart disease Mother    • Kidney disease Mother    • Heart attack Father    • Heart disease Father    • Hypertension Father    • Stroke Father         ISCHEMIC   • Diabetes Father         TYPE 2   • Kidney disease Brother    • Diabetes Brother         TYPE 1   • Thyroid disease Daughter    • Anxiety disorder Maternal Aunt    • Bipolar disorder Maternal Aunt    • Depression  Maternal Aunt    • Lupus Maternal Aunt         LUPUS ANTICOAGLULANT   • Rheum arthritis Maternal Aunt    • Alcohol abuse Maternal Uncle    • Other Maternal Uncle         PULMONARY DISEASE       SOCIAL HISTORY  Social History     Socioeconomic History   • Marital status:      Spouse name: Tru   • Number of children: 4   • Years of education: 11   • Highest education level: 11th grade   Occupational History   • Occupation: Retired   Social Needs   • Financial resource strain: Not on file   • Food insecurity:     Worry: Not on file     Inability: Not on file   • Transportation needs:     Medical: No     Non-medical: No   Tobacco Use   • Smoking status: Former Smoker     Packs/day: 2.00     Years: 26.00     Pack years: 52.00     Types: Cigarettes     Last attempt to quit: 10/21/1992     Years since quittin.3   • Smokeless tobacco: Never Used   • Tobacco comment: quit    Substance and Sexual Activity   • Alcohol use: Yes     Alcohol/week: 0.0 standard drinks     Frequency: Monthly or less     Drinks per session: 1 or 2     Binge frequency: Never   • Drug use: No   • Sexual activity: Defer     Birth control/protection: Surgical       ALLERGIES  Prednisone    REVIEW OF SYSTEMS  Review of Systems   Constitutional: Negative.  Negative for fever.   HENT: Negative.  Negative for sore throat.    Eyes: Negative.    Respiratory: Negative.  Negative for cough.    Cardiovascular: Negative.  Negative for chest pain.   Gastrointestinal: Negative.    Genitourinary: Negative.  Negative for dysuria.   Musculoskeletal: Negative.  Negative for back pain.   Skin: Positive for wound (laceration to nose s/p fall). Negative for rash.   Neurological: Negative.  Negative for headaches.   All other systems reviewed and are negative.      PHYSICAL EXAM  ED Triage Vitals [20 0817]   Temp Heart Rate Resp BP SpO2   -- 82 -- 142/86 98 %      Temp src Heart Rate Source Patient Position BP Location FiO2 (%)   -- -- -- -- --            Physical Exam   Constitutional: No distress.   HENT:   Head: Normocephalic. Head is without raccoon's eyes and without Plaza's sign.   Right Ear: No hemotympanum.   Left Ear: No hemotympanum.   Nose: No nasal septal hematoma.   small hematoma to forehead. laceration to bridge of her nose. Moist mucous membranes    Eyes: Pupils are equal, round, and reactive to light. EOM are normal.   Neck: Normal range of motion. No spinous process tenderness and no muscular tenderness present.   Cardiovascular: Normal rate and regular rhythm.   Pulmonary/Chest: Effort normal and breath sounds normal. No respiratory distress. She has no wheezes. She has no rales.   Abdominal: Soft. Bowel sounds are normal. She exhibits no distension. There is no tenderness.   Musculoskeletal: Normal range of motion. She exhibits edema (2+ pedal bilaterally).   No C-spine tenderness.    Neurological: She is alert. She has normal sensation and normal strength. GCS score is 15.   Skin: Skin is warm.   Nursing note and vitals reviewed.      LAB RESULTS  Lab Results (last 24 hours)     Procedure Component Value Units Date/Time    POC Glucose Once [[id number]]  (Abnormal) Collected:  02/20/20 0824    Specimen:  Blood Updated:  02/20/20 0826     Glucose 62 mg/dL     CBC & Differential [[id number]] Collected:  02/20/20 0831    Specimen:  Blood Updated:  02/20/20 0957    Narrative:       The following orders were created for panel order CBC & Differential.  Procedure                               Abnormality         Status                     ---------                               -----------         ------                     CBC Auto Differential[[id number]]        Abnormal            Final result                 Please view results for these tests on the individual orders.    Comprehensive Metabolic Panel [[id number]]  (Abnormal) Collected:  02/20/20 0831    Specimen:  Blood Updated:  02/20/20 0934     Glucose 60 mg/dL      BUN 38 mg/dL       Creatinine 3.47 mg/dL      Sodium 141 mmol/L      Potassium 4.4 mmol/L      Chloride 107 mmol/L      CO2 22.8 mmol/L      Calcium 8.7 mg/dL      Total Protein 8.1 g/dL      Albumin 3.10 g/dL      ALT (SGPT) 7 U/L      AST (SGOT) 16 U/L      Alkaline Phosphatase 70 U/L      Total Bilirubin 0.3 mg/dL      eGFR Non African Amer 13 mL/min/1.73      Comment: <15 Indicative of kidney failure.        eGFR   Amer --     Comment: <15 Indicative of kidney failure.        Globulin 5.0 gm/dL      A/G Ratio 0.6 g/dL      BUN/Creatinine Ratio 11.0     Anion Gap 11.2 mmol/L     Narrative:       GFR Normal >60  Chronic Kidney Disease <60  Kidney Failure <15      CBC Auto Differential [[id number]]  (Abnormal) Collected:  02/20/20 0831    Specimen:  Blood Updated:  02/20/20 0957     WBC 9.41 10*3/mm3      RBC 3.41 10*6/mm3      Hemoglobin 8.8 g/dL      Hematocrit 29.0 %      MCV 85.0 fL      MCH 25.8 pg      MCHC 30.3 g/dL      RDW 16.4 %      RDW-SD 50.2 fl      MPV 9.0 fL      Platelets 364 10*3/mm3     Manual Differential [[id number]]  (Abnormal) Collected:  02/20/20 0831    Specimen:  Blood Updated:  02/20/20 0957     Neutrophil % 73.0 %      Lymphocyte % 16.0 %      Monocyte % 6.0 %      Eosinophil % 5.0 %      Neutrophils Absolute 6.87 10*3/mm3      Lymphocytes Absolute 1.51 10*3/mm3      Monocytes Absolute 0.56 10*3/mm3      Eosinophils Absolute 0.47 10*3/mm3      RBC Morphology Normal     WBC Morphology Normal     Platelet Morphology Normal    POC Glucose Once [[id number]]  (Normal) Collected:  02/20/20 1015    Specimen:  Blood Updated:  02/20/20 1017     Glucose 113 mg/dL     POC Glucose Once [[id number]]  (Abnormal) Collected:  02/20/20 1132    Specimen:  Blood Updated:  02/20/20 1147     Glucose 147 mg/dL           I ordered the above labs and reviewed the results    RADIOLOGY  CT Head Without Contrast   Final Result   A lucency is identified within the right nasal bone with an adjacent 1-2   mm ossific density. The  findings are likely representative of an acute   chip fracture at this site. There is overlying soft tissue swelling.   Otherwise, there is no evidence for acute traumatic intracranial   pathology.       These findings were discussed with Toy Peters MD of the emergency room   staff on 02/20/2020 at approximately 9:12 AM.       Radiation dose reduction techniques were utilized, including automated   exposure control and exposure modulation based on body size.       This report was finalized on 2/20/2020 3:46 PM by Dr. Jonathan Hilliard M.D.               I ordered the above noted radiological studies. Interpreted by radiologist. Discussed with radiologist (). Reviewed by me in PACS.       PROCEDURES  Laceration Repair  Date/Time: 2/20/2020 10:06 AM  Performed by: Antony Peters MD  Authorized by: Antony Peters MD     Consent:     Consent obtained:  Verbal    Consent given by:  Patient  Anesthesia (see MAR for exact dosages):     Anesthesia method:  Local infiltration    Local anesthetic:  Lidocaine 1% WITH epi  Laceration details:     Location:  Face    Face location:  Nose    Length (cm):  2  Repair type:     Repair type:  Simple  Pre-procedure details:     Preparation:  Patient was prepped and draped in usual sterile fashion and imaging obtained to evaluate for foreign bodies  Treatment:     Area cleansed with:  Hibiclens and saline    Amount of cleaning:  Standard    Irrigation solution:  Sterile saline  Skin repair:     Repair method:  Sutures    Suture size:  5-0    Suture material:  Nylon    Suture technique:  Simple interrupted    Number of sutures:  3  Approximation:     Approximation:  Close  Post-procedure details:     Dressing:  Open (no dressing)    Patient tolerance of procedure:  Tolerated well, no immediate complications          PROGRESS AND CONSULTS       0838 Ordered Tdap.     0914 Head CT per Dr. Hilliard is negative acute except for a right nasal bone fracture.     0956 Pt recheck. Notified pt of  CT head results. Repaired the pt's laceration. Pt given some food to eat. BG = 113.     1154 Pt recheck. Pt resting in bed comfortably and is stable. Pt is feeling much better. Her BG is 147. Discussed plan to discharge the pt home and plan to discontinue her Glimepiride. I instructed the pt to f/u with her PCP. Pt understands and agrees with the plan, all concerns addressed.      MEDICAL DECISION MAKING  Results were reviewed/discussed with the patient and they were also made aware of online access. Pt also made aware that some labs, such as cultures, will not be resulted during ER visit and follow up with PMD is necessary.     MDM       DIAGNOSIS  Final diagnoses:   Hypoglycemia   Fall, initial encounter   Laceration of nose, initial encounter   Closed fracture of nasal bone, initial encounter       DISPOSITION  DISCHARGE    Patient discharged in stable condition.    Reviewed implications of results, diagnosis, meds, responsibility to follow up, warning signs and symptoms of possible worsening, potential complications and reasons to return to ER.  Patient/Family voiced understanding of above instructions.    Discussed plan for discharge, as there is no emergent indication for admission. Patient referred to primary care provider for BP management due to today's BP. Pt/family is agreeable and understands need for follow up and repeat testing.  Pt is aware that discharge does not mean that nothing is wrong but it indicates no emergency is present that requires admission and they must continue care with follow-up as given below or physician of their choice.     FOLLOW-UP  Robby Chaney MD  [address] [facility] [id number] [phone]    In 5 days  For suture removal         Medication List      Stop    glimepiride 2 MG tablet  Commonly known as:  AMARYL              Latest Documented Vital Signs:  As of 4:03 PM  BP- (!) 185/93 HR- 89 Temp- 98.2 °F (36.8 °C) O2 sat- 98%    --  Documentation assistance  provided by leona Ramachandran for Dr. Peters.  Information recorded by the scriblele was done at my direction and has been verified and validated by me.           Kahlil Ramachandran  02/20/20 1328       Antony Peters MD  02/20/20 9079

## 2020-02-20 NOTE — ED NOTES
Patient to er from home per ems with c/o she was walking to bathroom because of diarrhea and lost her footing and fell into wall resulting in head laceration. No loc reported/ and reported she is on blood thinners. Reported her sugar was 37 at scene.      Sebastian Castellon RN  02/20/20 0858

## 2020-02-21 ENCOUNTER — HOSPITAL ENCOUNTER (OUTPATIENT)
Dept: INFUSION THERAPY | Facility: HOSPITAL | Age: 70
Discharge: HOME | End: 2020-02-21
Admitting: INTERNAL MEDICINE

## 2020-02-21 VITALS
DIASTOLIC BLOOD PRESSURE: 64 MMHG | HEART RATE: 93 BPM | RESPIRATION RATE: 20 BRPM | TEMPERATURE: 97.8 F | SYSTOLIC BLOOD PRESSURE: 142 MMHG | OXYGEN SATURATION: 99 %

## 2020-02-21 DIAGNOSIS — N18.5 CKD (CHRONIC KIDNEY DISEASE) STAGE 5, GFR LESS THAN 15 ML/MIN (HCC): Primary | ICD-10-CM

## 2020-02-21 DIAGNOSIS — N18.5 ANEMIA IN STAGE 5 CHRONIC KIDNEY DISEASE, NOT ON CHRONIC DIALYSIS (HCC): ICD-10-CM

## 2020-02-21 DIAGNOSIS — D63.1 ANEMIA IN STAGE 5 CHRONIC KIDNEY DISEASE, NOT ON CHRONIC DIALYSIS (HCC): ICD-10-CM

## 2020-02-21 PROCEDURE — 96372 THER/PROPH/DIAG INJ SC/IM: CPT

## 2020-02-21 PROCEDURE — 25010000002 EPOETIN ALFA PER 1000 UNITS: Performed by: INTERNAL MEDICINE

## 2020-02-21 RX ADMIN — ERYTHROPOIETIN 20000 UNITS: 20000 INJECTION, SOLUTION INTRAVENOUS; SUBCUTANEOUS at 13:28

## 2020-02-21 NOTE — PROGRESS NOTES
Labs drawn in ER yesterday after fall at home. Per lab results, Procrit indicated. Tolerated well. D/C'd per ambulation with walker.

## 2020-02-26 ENCOUNTER — OFFICE VISIT (OUTPATIENT)
Dept: FAMILY MEDICINE CLINIC | Facility: CLINIC | Age: 70
End: 2020-02-26

## 2020-02-26 VITALS
OXYGEN SATURATION: 94 % | BODY MASS INDEX: 31.85 KG/M2 | WEIGHT: 198.2 LBS | SYSTOLIC BLOOD PRESSURE: 126 MMHG | DIASTOLIC BLOOD PRESSURE: 76 MMHG | TEMPERATURE: 94.4 F | HEIGHT: 66 IN | HEART RATE: 110 BPM

## 2020-02-26 DIAGNOSIS — S01.21XS LACERATION OF NOSE, SEQUELA: Primary | ICD-10-CM

## 2020-02-26 DIAGNOSIS — E16.2 HYPOGLYCEMIA: ICD-10-CM

## 2020-02-26 PROCEDURE — 99213 OFFICE O/P EST LOW 20 MIN: CPT | Performed by: INTERNAL MEDICINE

## 2020-02-26 NOTE — PROGRESS NOTES
Subjective Chief complaint is suture removal  Maribeth Rendon is a 69 y.o. female.     History of Present Illness   Maribeth is here today for need of suture removal.  She fell in her home.  She must of hit her nose against the wall.  There was a nasal fracture with an associated laceration.  The laceration required 3 sutures.  These were placed on the 20th and they are due to come out.  Her blood sugar was low when EMS arrived and was still a little bit low at the emergency room.  We had been backing off on her glimepiride.  She is now stopped it altogether which I think is okay.  The following portions of the patient's history were reviewed and updated as appropriate: allergies, current medications, past family history, past medical history, past social history, past surgical history and problem list.    Review of Systems    Objective   Physical Exam   Skin:   There is a laceration on the bridge of her nose.  There is considerable amount of scabbing and the sutures appear to be buried.  I was able to successfully remove the upper 2 sutures.  There may be part of the last suture embedded.  I did advise them to contact me when the scab falls off and if there is still suture present that I will remove it.         Assessment/Plan   Maribeth was seen today for suture / staple removal.    Diagnoses and all orders for this visit:    Laceration of nose, sequela    Hypoglycemia    Maribeth is here today for removal of sutures.  Unfortunately there may be part of the final suture still in place.  I am going to have them checked this area out when the scab falls off.  They see anything that looks like a suture they will bring her back.  We are going to leave her off of her glimepiride and let her sugars just run a little bit higher.  I did advise that with her kidney disease this glimepiride may hang around in her system for another week.

## 2020-02-28 ENCOUNTER — HOSPITAL ENCOUNTER (OUTPATIENT)
Dept: INFUSION THERAPY | Facility: HOSPITAL | Age: 70
Discharge: HOME | End: 2020-02-28
Admitting: INTERNAL MEDICINE

## 2020-02-28 VITALS
OXYGEN SATURATION: 93 % | DIASTOLIC BLOOD PRESSURE: 71 MMHG | HEART RATE: 90 BPM | SYSTOLIC BLOOD PRESSURE: 139 MMHG | TEMPERATURE: 98.3 F | RESPIRATION RATE: 20 BRPM

## 2020-02-28 DIAGNOSIS — N18.5 ANEMIA IN STAGE 5 CHRONIC KIDNEY DISEASE, NOT ON CHRONIC DIALYSIS (HCC): ICD-10-CM

## 2020-02-28 DIAGNOSIS — D63.1 ANEMIA IN STAGE 5 CHRONIC KIDNEY DISEASE, NOT ON CHRONIC DIALYSIS (HCC): ICD-10-CM

## 2020-02-28 DIAGNOSIS — N18.5 CKD (CHRONIC KIDNEY DISEASE) STAGE 5, GFR LESS THAN 15 ML/MIN (HCC): Primary | ICD-10-CM

## 2020-02-28 LAB
HCT VFR BLD AUTO: 26.4 % (ref 34–46.6)
HGB BLD-MCNC: 8 G/DL (ref 12–15.9)

## 2020-02-28 PROCEDURE — 85018 HEMOGLOBIN: CPT | Performed by: INTERNAL MEDICINE

## 2020-02-28 PROCEDURE — 25010000002 EPOETIN ALFA PER 1000 UNITS: Performed by: INTERNAL MEDICINE

## 2020-02-28 PROCEDURE — 85014 HEMATOCRIT: CPT | Performed by: INTERNAL MEDICINE

## 2020-02-28 PROCEDURE — 96372 THER/PROPH/DIAG INJ SC/IM: CPT

## 2020-02-28 PROCEDURE — 36416 COLLJ CAPILLARY BLOOD SPEC: CPT

## 2020-02-28 RX ADMIN — ERYTHROPOIETIN 20000 UNITS: 20000 INJECTION, SOLUTION INTRAVENOUS; SUBCUTANEOUS at 14:04

## 2020-03-06 ENCOUNTER — HOSPITAL ENCOUNTER (OUTPATIENT)
Dept: INFUSION THERAPY | Facility: HOSPITAL | Age: 70
Discharge: HOME | End: 2020-03-06
Admitting: INTERNAL MEDICINE

## 2020-03-06 VITALS
TEMPERATURE: 97.6 F | OXYGEN SATURATION: 96 % | HEART RATE: 86 BPM | RESPIRATION RATE: 20 BRPM | DIASTOLIC BLOOD PRESSURE: 64 MMHG | SYSTOLIC BLOOD PRESSURE: 135 MMHG

## 2020-03-06 DIAGNOSIS — N18.5 CKD (CHRONIC KIDNEY DISEASE) STAGE 5, GFR LESS THAN 15 ML/MIN (HCC): ICD-10-CM

## 2020-03-06 DIAGNOSIS — N18.5 ANEMIA OF CHRONIC KIDNEY FAILURE, STAGE 5 (HCC): ICD-10-CM

## 2020-03-06 DIAGNOSIS — D63.1 ANEMIA IN STAGE 5 CHRONIC KIDNEY DISEASE, NOT ON CHRONIC DIALYSIS (HCC): ICD-10-CM

## 2020-03-06 DIAGNOSIS — N18.5 ANEMIA IN STAGE 5 CHRONIC KIDNEY DISEASE, NOT ON CHRONIC DIALYSIS (HCC): ICD-10-CM

## 2020-03-06 DIAGNOSIS — N18.5 CKD (CHRONIC KIDNEY DISEASE), SYMPTOM MANAGEMENT ONLY, STAGE 5 (HCC): Primary | ICD-10-CM

## 2020-03-06 DIAGNOSIS — D63.1 ANEMIA OF CHRONIC KIDNEY FAILURE, STAGE 5 (HCC): ICD-10-CM

## 2020-03-06 LAB
HCT VFR BLD AUTO: 30.8 % (ref 34–46.6)
HGB BLD-MCNC: 9.4 G/DL (ref 12–15.9)

## 2020-03-06 PROCEDURE — 25010000002 EPOETIN ALFA PER 1000 UNITS: Performed by: INTERNAL MEDICINE

## 2020-03-06 PROCEDURE — 36416 COLLJ CAPILLARY BLOOD SPEC: CPT

## 2020-03-06 PROCEDURE — 96372 THER/PROPH/DIAG INJ SC/IM: CPT

## 2020-03-06 PROCEDURE — 85014 HEMATOCRIT: CPT | Performed by: INTERNAL MEDICINE

## 2020-03-06 PROCEDURE — 85018 HEMOGLOBIN: CPT | Performed by: INTERNAL MEDICINE

## 2020-03-06 RX ADMIN — ERYTHROPOIETIN 20000 UNITS: 20000 INJECTION, SOLUTION INTRAVENOUS; SUBCUTANEOUS at 13:53

## 2020-03-13 ENCOUNTER — HOSPITAL ENCOUNTER (OUTPATIENT)
Dept: INFUSION THERAPY | Facility: HOSPITAL | Age: 70
Discharge: HOME | End: 2020-03-13
Admitting: INTERNAL MEDICINE

## 2020-03-13 VITALS
SYSTOLIC BLOOD PRESSURE: 135 MMHG | HEART RATE: 89 BPM | DIASTOLIC BLOOD PRESSURE: 61 MMHG | TEMPERATURE: 97.8 F | OXYGEN SATURATION: 92 % | RESPIRATION RATE: 20 BRPM

## 2020-03-13 DIAGNOSIS — D63.1 ANEMIA IN STAGE 5 CHRONIC KIDNEY DISEASE, NOT ON CHRONIC DIALYSIS (HCC): ICD-10-CM

## 2020-03-13 DIAGNOSIS — N18.5 ANEMIA IN STAGE 5 CHRONIC KIDNEY DISEASE, NOT ON CHRONIC DIALYSIS (HCC): ICD-10-CM

## 2020-03-13 DIAGNOSIS — N18.5 CKD (CHRONIC KIDNEY DISEASE) STAGE 5, GFR LESS THAN 15 ML/MIN (HCC): Primary | ICD-10-CM

## 2020-03-13 LAB
ANION GAP SERPL CALCULATED.3IONS-SCNC: 12.4 MMOL/L (ref 5–15)
BUN BLD-MCNC: 42 MG/DL (ref 8–23)
BUN/CREAT SERPL: 12.3 (ref 7–25)
CALCIUM SPEC-SCNC: 8 MG/DL (ref 8.6–10.5)
CHLORIDE SERPL-SCNC: 103 MMOL/L (ref 98–107)
CO2 SERPL-SCNC: 21.6 MMOL/L (ref 22–29)
CREAT BLD-MCNC: 3.42 MG/DL (ref 0.57–1)
GFR SERPL CREATININE-BSD FRML MDRD: 13 ML/MIN/1.73
GFR SERPL CREATININE-BSD FRML MDRD: ABNORMAL ML/MIN/{1.73_M2}
GLUCOSE BLD-MCNC: 244 MG/DL (ref 65–99)
HCT VFR BLD AUTO: 29.7 % (ref 34–46.6)
HGB BLD-MCNC: 8.8 G/DL (ref 12–15.9)
MAGNESIUM SERPL-MCNC: 1.7 MG/DL (ref 1.6–2.4)
POTASSIUM BLD-SCNC: 3.9 MMOL/L (ref 3.5–5.2)
SODIUM BLD-SCNC: 137 MMOL/L (ref 136–145)

## 2020-03-13 PROCEDURE — 36415 COLL VENOUS BLD VENIPUNCTURE: CPT

## 2020-03-13 PROCEDURE — 80048 BASIC METABOLIC PNL TOTAL CA: CPT | Performed by: INTERNAL MEDICINE

## 2020-03-13 PROCEDURE — 96372 THER/PROPH/DIAG INJ SC/IM: CPT

## 2020-03-13 PROCEDURE — 85014 HEMATOCRIT: CPT | Performed by: INTERNAL MEDICINE

## 2020-03-13 PROCEDURE — 25010000002 EPOETIN ALFA PER 1000 UNITS: Performed by: INTERNAL MEDICINE

## 2020-03-13 PROCEDURE — 83735 ASSAY OF MAGNESIUM: CPT | Performed by: INTERNAL MEDICINE

## 2020-03-13 PROCEDURE — 85018 HEMOGLOBIN: CPT | Performed by: INTERNAL MEDICINE

## 2020-03-13 RX ADMIN — ERYTHROPOIETIN 20000 UNITS: 20000 INJECTION, SOLUTION INTRAVENOUS; SUBCUTANEOUS at 13:35

## 2020-03-13 NOTE — PROGRESS NOTES
Pt to ACU wearing mask. States she has a cold and cough that she has had for approximately 1 week. No fever reported or observed.  Procrit indicated per lab results. Tolerated well. D/C'd per ambulation with walker.  to  at main entrance.

## 2020-03-20 ENCOUNTER — HOSPITAL ENCOUNTER (OUTPATIENT)
Dept: INFUSION THERAPY | Facility: HOSPITAL | Age: 70
Discharge: HOME | End: 2020-03-20
Admitting: INTERNAL MEDICINE

## 2020-03-20 VITALS
DIASTOLIC BLOOD PRESSURE: 75 MMHG | TEMPERATURE: 97.8 F | OXYGEN SATURATION: 96 % | SYSTOLIC BLOOD PRESSURE: 138 MMHG | RESPIRATION RATE: 18 BRPM | HEART RATE: 93 BPM

## 2020-03-20 DIAGNOSIS — D63.1 ANEMIA IN STAGE 5 CHRONIC KIDNEY DISEASE, NOT ON CHRONIC DIALYSIS (HCC): ICD-10-CM

## 2020-03-20 DIAGNOSIS — N18.5 ANEMIA IN STAGE 5 CHRONIC KIDNEY DISEASE, NOT ON CHRONIC DIALYSIS (HCC): ICD-10-CM

## 2020-03-20 DIAGNOSIS — N18.5 CKD (CHRONIC KIDNEY DISEASE) STAGE 5, GFR LESS THAN 15 ML/MIN (HCC): Primary | ICD-10-CM

## 2020-03-20 LAB
HCT VFR BLD AUTO: 33.1 % (ref 34–46.6)
HGB BLD-MCNC: 10 G/DL (ref 12–15.9)

## 2020-03-20 PROCEDURE — 85018 HEMOGLOBIN: CPT | Performed by: INTERNAL MEDICINE

## 2020-03-20 PROCEDURE — 85014 HEMATOCRIT: CPT | Performed by: INTERNAL MEDICINE

## 2020-03-20 PROCEDURE — 96372 THER/PROPH/DIAG INJ SC/IM: CPT

## 2020-03-20 PROCEDURE — 25010000002 EPOETIN ALFA PER 1000 UNITS: Performed by: INTERNAL MEDICINE

## 2020-03-20 PROCEDURE — 36416 COLLJ CAPILLARY BLOOD SPEC: CPT

## 2020-03-20 RX ADMIN — ERYTHROPOIETIN 20000 UNITS: 20000 INJECTION, SOLUTION INTRAVENOUS; SUBCUTANEOUS at 13:34

## 2020-03-20 NOTE — PROGRESS NOTES
Pt wearing a mask due to a current cough and cold.  Pt screened with no other symptoms.  Pt given injection per protocol due to parameters met.  Pt dc'ed per walker.

## 2020-03-27 ENCOUNTER — HOSPITAL ENCOUNTER (OUTPATIENT)
Dept: INFUSION THERAPY | Facility: HOSPITAL | Age: 70
Discharge: HOME | End: 2020-03-27
Admitting: INTERNAL MEDICINE

## 2020-03-27 VITALS
SYSTOLIC BLOOD PRESSURE: 120 MMHG | DIASTOLIC BLOOD PRESSURE: 51 MMHG | OXYGEN SATURATION: 96 % | TEMPERATURE: 97.5 F | RESPIRATION RATE: 20 BRPM | HEART RATE: 88 BPM

## 2020-03-27 DIAGNOSIS — N18.5 ANEMIA IN STAGE 5 CHRONIC KIDNEY DISEASE, NOT ON CHRONIC DIALYSIS (HCC): ICD-10-CM

## 2020-03-27 DIAGNOSIS — N18.5 CKD (CHRONIC KIDNEY DISEASE) STAGE 5, GFR LESS THAN 15 ML/MIN (HCC): Primary | ICD-10-CM

## 2020-03-27 DIAGNOSIS — D63.1 ANEMIA IN STAGE 5 CHRONIC KIDNEY DISEASE, NOT ON CHRONIC DIALYSIS (HCC): ICD-10-CM

## 2020-03-27 LAB
25(OH)D3 SERPL-MCNC: 27.1 NG/ML (ref 30–100)
ALBUMIN SERPL-MCNC: 2.7 G/DL (ref 3.5–5.2)
ALBUMIN/GLOB SERPL: 0.6 G/DL
ALP SERPL-CCNC: 80 U/L (ref 39–117)
ALT SERPL W P-5'-P-CCNC: 7 U/L (ref 1–33)
ANION GAP SERPL CALCULATED.3IONS-SCNC: 11.4 MMOL/L (ref 5–15)
AST SERPL-CCNC: 14 U/L (ref 1–32)
BILIRUB SERPL-MCNC: 0.3 MG/DL (ref 0.2–1.2)
BUN BLD-MCNC: 38 MG/DL (ref 8–23)
BUN/CREAT SERPL: 11.8 (ref 7–25)
CALCIUM SPEC-SCNC: 8.1 MG/DL (ref 8.6–10.5)
CALCIUM SPEC-SCNC: 8.2 MG/DL (ref 8.6–10.5)
CHLORIDE SERPL-SCNC: 107 MMOL/L (ref 98–107)
CO2 SERPL-SCNC: 23.6 MMOL/L (ref 22–29)
CREAT BLD-MCNC: 3.21 MG/DL (ref 0.57–1)
DEPRECATED RDW RBC AUTO: 51.4 FL (ref 37–54)
ERYTHROCYTE [DISTWIDTH] IN BLOOD BY AUTOMATED COUNT: 16.8 % (ref 12.3–15.4)
FERRITIN SERPL-MCNC: 331 NG/ML (ref 13–150)
FOLATE SERPL-MCNC: 5.2 NG/ML (ref 4.78–24.2)
GFR SERPL CREATININE-BSD FRML MDRD: 14 ML/MIN/1.73
GFR SERPL CREATININE-BSD FRML MDRD: ABNORMAL ML/MIN/{1.73_M2}
GLOBULIN UR ELPH-MCNC: 4.8 GM/DL
GLUCOSE BLD-MCNC: 180 MG/DL (ref 65–99)
HCT VFR BLD AUTO: 33.4 % (ref 34–46.6)
HGB BLD-MCNC: 10 G/DL (ref 12–15.9)
IRON 24H UR-MRATE: 26 MCG/DL (ref 37–145)
IRON SATN MFR SERPL: 15 % (ref 20–50)
MAGNESIUM SERPL-MCNC: 1.8 MG/DL (ref 1.6–2.4)
MCH RBC QN AUTO: 25.3 PG (ref 26.6–33)
MCHC RBC AUTO-ENTMCNC: 29.9 G/DL (ref 31.5–35.7)
MCV RBC AUTO: 84.6 FL (ref 79–97)
PHOSPHATE SERPL-MCNC: 4.3 MG/DL (ref 2.5–4.5)
PLATELET # BLD AUTO: 310 10*3/MM3 (ref 140–450)
PMV BLD AUTO: 9.5 FL (ref 6–12)
POTASSIUM BLD-SCNC: 4.4 MMOL/L (ref 3.5–5.2)
PROT SERPL-MCNC: 7.5 G/DL (ref 6–8.5)
PTH-INTACT SERPL-MCNC: 258 PG/ML (ref 15–65)
RBC # BLD AUTO: 3.95 10*6/MM3 (ref 3.77–5.28)
SODIUM BLD-SCNC: 142 MMOL/L (ref 136–145)
TIBC SERPL-MCNC: 176 MCG/DL (ref 298–536)
TRANSFERRIN SERPL-MCNC: 118 MG/DL (ref 200–360)
URATE SERPL-MCNC: 11.1 MG/DL (ref 2.4–5.7)
VIT B12 BLD-MCNC: 591 PG/ML (ref 211–946)
WBC NRBC COR # BLD: 8.18 10*3/MM3 (ref 3.4–10.8)

## 2020-03-27 PROCEDURE — 80053 COMPREHEN METABOLIC PANEL: CPT | Performed by: INTERNAL MEDICINE

## 2020-03-27 PROCEDURE — 83735 ASSAY OF MAGNESIUM: CPT | Performed by: INTERNAL MEDICINE

## 2020-03-27 PROCEDURE — 84550 ASSAY OF BLOOD/URIC ACID: CPT | Performed by: INTERNAL MEDICINE

## 2020-03-27 PROCEDURE — 36415 COLL VENOUS BLD VENIPUNCTURE: CPT

## 2020-03-27 PROCEDURE — 82306 VITAMIN D 25 HYDROXY: CPT | Performed by: INTERNAL MEDICINE

## 2020-03-27 PROCEDURE — 96372 THER/PROPH/DIAG INJ SC/IM: CPT

## 2020-03-27 PROCEDURE — 25010000002 EPOETIN ALFA PER 1000 UNITS: Performed by: INTERNAL MEDICINE

## 2020-03-27 PROCEDURE — 83540 ASSAY OF IRON: CPT | Performed by: INTERNAL MEDICINE

## 2020-03-27 PROCEDURE — 82746 ASSAY OF FOLIC ACID SERUM: CPT | Performed by: INTERNAL MEDICINE

## 2020-03-27 PROCEDURE — 82728 ASSAY OF FERRITIN: CPT | Performed by: INTERNAL MEDICINE

## 2020-03-27 PROCEDURE — 83970 ASSAY OF PARATHORMONE: CPT | Performed by: INTERNAL MEDICINE

## 2020-03-27 PROCEDURE — 84100 ASSAY OF PHOSPHORUS: CPT | Performed by: INTERNAL MEDICINE

## 2020-03-27 PROCEDURE — 82607 VITAMIN B-12: CPT | Performed by: INTERNAL MEDICINE

## 2020-03-27 PROCEDURE — 84466 ASSAY OF TRANSFERRIN: CPT | Performed by: INTERNAL MEDICINE

## 2020-03-27 PROCEDURE — 85027 COMPLETE CBC AUTOMATED: CPT | Performed by: INTERNAL MEDICINE

## 2020-03-27 RX ADMIN — ERYTHROPOIETIN 20000 UNITS: 20000 INJECTION, SOLUTION INTRAVENOUS; SUBCUTANEOUS at 13:37

## 2020-03-28 ENCOUNTER — HOSPITAL ENCOUNTER (EMERGENCY)
Facility: HOSPITAL | Age: 70
Discharge: HOME | End: 2020-03-28
Attending: EMERGENCY MEDICINE | Admitting: EMERGENCY MEDICINE

## 2020-03-28 VITALS
WEIGHT: 190 LBS | HEART RATE: 84 BPM | TEMPERATURE: 97.4 F | OXYGEN SATURATION: 95 % | HEIGHT: 72 IN | RESPIRATION RATE: 16 BRPM | DIASTOLIC BLOOD PRESSURE: 74 MMHG | BODY MASS INDEX: 25.73 KG/M2 | SYSTOLIC BLOOD PRESSURE: 174 MMHG

## 2020-03-28 DIAGNOSIS — N18.9 CHRONIC RENAL FAILURE, UNSPECIFIED CKD STAGE: ICD-10-CM

## 2020-03-28 DIAGNOSIS — Z79.01 ANTICOAGULATED: ICD-10-CM

## 2020-03-28 DIAGNOSIS — E16.2 HYPOGLYCEMIA: Primary | ICD-10-CM

## 2020-03-28 LAB
ANION GAP SERPL CALCULATED.3IONS-SCNC: 12.3 MMOL/L (ref 5–15)
BASOPHILS # BLD AUTO: 0.04 10*3/MM3 (ref 0–0.2)
BASOPHILS NFR BLD AUTO: 0.4 % (ref 0–1.5)
BUN BLD-MCNC: 42 MG/DL (ref 8–23)
BUN/CREAT SERPL: 12 (ref 7–25)
CALCIUM SPEC-SCNC: 8.7 MG/DL (ref 8.6–10.5)
CHLORIDE SERPL-SCNC: 103 MMOL/L (ref 98–107)
CO2 SERPL-SCNC: 25.7 MMOL/L (ref 22–29)
CREAT BLD-MCNC: 3.49 MG/DL (ref 0.57–1)
DEPRECATED RDW RBC AUTO: 54.2 FL (ref 37–54)
EOSINOPHIL # BLD AUTO: 0.41 10*3/MM3 (ref 0–0.4)
EOSINOPHIL NFR BLD AUTO: 4.4 % (ref 0.3–6.2)
ERYTHROCYTE [DISTWIDTH] IN BLOOD BY AUTOMATED COUNT: 17.2 % (ref 12.3–15.4)
GFR SERPL CREATININE-BSD FRML MDRD: 13 ML/MIN/1.73
GFR SERPL CREATININE-BSD FRML MDRD: ABNORMAL ML/MIN/{1.73_M2}
GLUCOSE BLD-MCNC: 113 MG/DL (ref 65–99)
GLUCOSE BLDC GLUCOMTR-MCNC: 86 MG/DL (ref 70–130)
GLUCOSE BLDC GLUCOMTR-MCNC: 99 MG/DL (ref 70–130)
HCT VFR BLD AUTO: 36.7 % (ref 34–46.6)
HGB BLD-MCNC: 11.1 G/DL (ref 12–15.9)
IMM GRANULOCYTES # BLD AUTO: 0.08 10*3/MM3 (ref 0–0.05)
IMM GRANULOCYTES NFR BLD AUTO: 0.9 % (ref 0–0.5)
LYMPHOCYTES # BLD AUTO: 1.01 10*3/MM3 (ref 0.7–3.1)
LYMPHOCYTES NFR BLD AUTO: 10.9 % (ref 19.6–45.3)
MCH RBC QN AUTO: 25.9 PG (ref 26.6–33)
MCHC RBC AUTO-ENTMCNC: 30.2 G/DL (ref 31.5–35.7)
MCV RBC AUTO: 85.7 FL (ref 79–97)
MONOCYTES # BLD AUTO: 0.76 10*3/MM3 (ref 0.1–0.9)
MONOCYTES NFR BLD AUTO: 8.2 % (ref 5–12)
NEUTROPHILS # BLD AUTO: 6.95 10*3/MM3 (ref 1.7–7)
NEUTROPHILS NFR BLD AUTO: 75.2 % (ref 42.7–76)
NRBC BLD AUTO-RTO: 0.1 /100 WBC (ref 0–0.2)
PLATELET # BLD AUTO: 317 10*3/MM3 (ref 140–450)
PMV BLD AUTO: 9.7 FL (ref 6–12)
POTASSIUM BLD-SCNC: 4.3 MMOL/L (ref 3.5–5.2)
RBC # BLD AUTO: 4.28 10*6/MM3 (ref 3.77–5.28)
SODIUM BLD-SCNC: 141 MMOL/L (ref 136–145)
WBC NRBC COR # BLD: 9.25 10*3/MM3 (ref 3.4–10.8)

## 2020-03-28 PROCEDURE — 82962 GLUCOSE BLOOD TEST: CPT

## 2020-03-28 PROCEDURE — 80048 BASIC METABOLIC PNL TOTAL CA: CPT | Performed by: EMERGENCY MEDICINE

## 2020-03-28 PROCEDURE — 99284 EMERGENCY DEPT VISIT MOD MDM: CPT

## 2020-03-28 PROCEDURE — 85025 COMPLETE CBC W/AUTO DIFF WBC: CPT | Performed by: EMERGENCY MEDICINE

## 2020-04-03 ENCOUNTER — HOSPITAL ENCOUNTER (OUTPATIENT)
Dept: INFUSION THERAPY | Facility: HOSPITAL | Age: 70
Discharge: HOME | End: 2020-04-03
Admitting: INTERNAL MEDICINE

## 2020-04-03 VITALS
HEART RATE: 86 BPM | SYSTOLIC BLOOD PRESSURE: 110 MMHG | RESPIRATION RATE: 20 BRPM | TEMPERATURE: 98.2 F | DIASTOLIC BLOOD PRESSURE: 48 MMHG | OXYGEN SATURATION: 99 %

## 2020-04-03 DIAGNOSIS — N18.5 CKD (CHRONIC KIDNEY DISEASE) STAGE 5, GFR LESS THAN 15 ML/MIN (HCC): ICD-10-CM

## 2020-04-03 DIAGNOSIS — D63.1 ANEMIA IN STAGE 5 CHRONIC KIDNEY DISEASE, NOT ON CHRONIC DIALYSIS (HCC): ICD-10-CM

## 2020-04-03 DIAGNOSIS — N18.5 ANEMIA ASSOCIATED WITH STAGE 5 CHRONIC RENAL FAILURE (HCC): ICD-10-CM

## 2020-04-03 DIAGNOSIS — E11.43 TYPE 2 DIABETES MELLITUS WITH DIABETIC AUTONOMIC NEUROPATHY, WITHOUT LONG-TERM CURRENT USE OF INSULIN (HCC): Primary | ICD-10-CM

## 2020-04-03 DIAGNOSIS — N18.5 ANEMIA IN STAGE 5 CHRONIC KIDNEY DISEASE, NOT ON CHRONIC DIALYSIS (HCC): ICD-10-CM

## 2020-04-03 DIAGNOSIS — D63.1 ANEMIA ASSOCIATED WITH STAGE 5 CHRONIC RENAL FAILURE (HCC): ICD-10-CM

## 2020-04-03 LAB
HCT VFR BLD AUTO: 33 % (ref 34–46.6)
HGB BLD-MCNC: 10.3 G/DL (ref 12–15.9)

## 2020-04-03 PROCEDURE — 96372 THER/PROPH/DIAG INJ SC/IM: CPT

## 2020-04-03 PROCEDURE — 85014 HEMATOCRIT: CPT | Performed by: INTERNAL MEDICINE

## 2020-04-03 PROCEDURE — 36416 COLLJ CAPILLARY BLOOD SPEC: CPT

## 2020-04-03 PROCEDURE — 85018 HEMOGLOBIN: CPT | Performed by: INTERNAL MEDICINE

## 2020-04-03 PROCEDURE — 25010000002 EPOETIN ALFA PER 1000 UNITS: Performed by: INTERNAL MEDICINE

## 2020-04-03 RX ADMIN — ERYTHROPOIETIN 20000 UNITS: 20000 INJECTION, SOLUTION INTRAVENOUS; SUBCUTANEOUS at 13:04

## 2020-04-03 NOTE — PROGRESS NOTES
HGB result noted and Procrit indicated per physician order.  Tolerated injection well, AVS, and discharged ambulatory(walker) to front entrance where she states her  is picking her up.

## 2020-04-10 ENCOUNTER — HOSPITAL ENCOUNTER (OUTPATIENT)
Dept: INFUSION THERAPY | Facility: HOSPITAL | Age: 70
Discharge: HOME | End: 2020-04-10
Admitting: INTERNAL MEDICINE

## 2020-04-10 VITALS
TEMPERATURE: 97.6 F | SYSTOLIC BLOOD PRESSURE: 126 MMHG | DIASTOLIC BLOOD PRESSURE: 65 MMHG | RESPIRATION RATE: 20 BRPM | HEART RATE: 88 BPM | OXYGEN SATURATION: 97 %

## 2020-04-10 DIAGNOSIS — D63.1 ANEMIA IN STAGE 5 CHRONIC KIDNEY DISEASE, NOT ON CHRONIC DIALYSIS (HCC): ICD-10-CM

## 2020-04-10 DIAGNOSIS — N18.5 ANEMIA IN STAGE 5 CHRONIC KIDNEY DISEASE, NOT ON CHRONIC DIALYSIS (HCC): ICD-10-CM

## 2020-04-10 DIAGNOSIS — N18.5 CKD (CHRONIC KIDNEY DISEASE) STAGE 5, GFR LESS THAN 15 ML/MIN (HCC): Primary | ICD-10-CM

## 2020-04-10 LAB
ANION GAP SERPL CALCULATED.3IONS-SCNC: 12.9 MMOL/L (ref 5–15)
BUN BLD-MCNC: 45 MG/DL (ref 8–23)
BUN/CREAT SERPL: 12.5 (ref 7–25)
CALCIUM SPEC-SCNC: 8.6 MG/DL (ref 8.6–10.5)
CHLORIDE SERPL-SCNC: 102 MMOL/L (ref 98–107)
CO2 SERPL-SCNC: 22.1 MMOL/L (ref 22–29)
CREAT BLD-MCNC: 3.61 MG/DL (ref 0.57–1)
GFR SERPL CREATININE-BSD FRML MDRD: 12 ML/MIN/1.73
GFR SERPL CREATININE-BSD FRML MDRD: ABNORMAL ML/MIN/{1.73_M2}
GLUCOSE BLD-MCNC: 312 MG/DL (ref 65–99)
HCT VFR BLD AUTO: 35.3 % (ref 34–46.6)
HGB BLD-MCNC: 10.6 G/DL (ref 12–15.9)
MAGNESIUM SERPL-MCNC: 1.8 MG/DL (ref 1.6–2.4)
POTASSIUM BLD-SCNC: 4.3 MMOL/L (ref 3.5–5.2)
SODIUM BLD-SCNC: 137 MMOL/L (ref 136–145)

## 2020-04-10 PROCEDURE — 85018 HEMOGLOBIN: CPT | Performed by: INTERNAL MEDICINE

## 2020-04-10 PROCEDURE — 36415 COLL VENOUS BLD VENIPUNCTURE: CPT

## 2020-04-10 PROCEDURE — 96372 THER/PROPH/DIAG INJ SC/IM: CPT

## 2020-04-10 PROCEDURE — 80048 BASIC METABOLIC PNL TOTAL CA: CPT | Performed by: INTERNAL MEDICINE

## 2020-04-10 PROCEDURE — 83735 ASSAY OF MAGNESIUM: CPT | Performed by: INTERNAL MEDICINE

## 2020-04-10 PROCEDURE — 85014 HEMATOCRIT: CPT | Performed by: INTERNAL MEDICINE

## 2020-04-10 PROCEDURE — 25010000002 EPOETIN ALFA PER 1000 UNITS: Performed by: INTERNAL MEDICINE

## 2020-04-10 RX ADMIN — ERYTHROPOIETIN 20000 UNITS: 20000 INJECTION, SOLUTION INTRAVENOUS; SUBCUTANEOUS at 13:14

## 2020-04-17 ENCOUNTER — HOSPITAL ENCOUNTER (OUTPATIENT)
Dept: INFUSION THERAPY | Facility: HOSPITAL | Age: 70
Discharge: HOME | End: 2020-04-17
Admitting: INTERNAL MEDICINE

## 2020-04-17 VITALS
HEART RATE: 90 BPM | RESPIRATION RATE: 16 BRPM | TEMPERATURE: 98.2 F | OXYGEN SATURATION: 99 % | DIASTOLIC BLOOD PRESSURE: 63 MMHG | SYSTOLIC BLOOD PRESSURE: 142 MMHG

## 2020-04-17 DIAGNOSIS — N18.5 CKD (CHRONIC KIDNEY DISEASE) STAGE 5, GFR LESS THAN 15 ML/MIN (HCC): Primary | ICD-10-CM

## 2020-04-17 DIAGNOSIS — D63.1 ANEMIA IN STAGE 5 CHRONIC KIDNEY DISEASE, NOT ON CHRONIC DIALYSIS (HCC): ICD-10-CM

## 2020-04-17 DIAGNOSIS — N18.5 ANEMIA IN STAGE 5 CHRONIC KIDNEY DISEASE, NOT ON CHRONIC DIALYSIS (HCC): ICD-10-CM

## 2020-04-17 LAB
HCT VFR BLD AUTO: 34.8 % (ref 34–46.6)
HGB BLD-MCNC: 10.5 G/DL (ref 12–15.9)

## 2020-04-17 PROCEDURE — 25010000002 EPOETIN ALFA PER 1000 UNITS: Performed by: INTERNAL MEDICINE

## 2020-04-17 PROCEDURE — 96372 THER/PROPH/DIAG INJ SC/IM: CPT

## 2020-04-17 PROCEDURE — 36415 COLL VENOUS BLD VENIPUNCTURE: CPT

## 2020-04-17 PROCEDURE — 85014 HEMATOCRIT: CPT | Performed by: INTERNAL MEDICINE

## 2020-04-17 PROCEDURE — 85018 HEMOGLOBIN: CPT | Performed by: INTERNAL MEDICINE

## 2020-04-17 RX ADMIN — ERYTHROPOIETIN 20000 UNITS: 20000 INJECTION, SOLUTION INTRAVENOUS; SUBCUTANEOUS at 12:30

## 2020-04-17 NOTE — PROGRESS NOTES
Procrit indicated per lab results. Tolerated well. D/C'd per ambulation with walker.  in parking lot to drive pt home.

## 2020-04-24 ENCOUNTER — HOSPITAL ENCOUNTER (OUTPATIENT)
Dept: INFUSION THERAPY | Facility: HOSPITAL | Age: 70
Discharge: HOME | End: 2020-04-24
Admitting: INTERNAL MEDICINE

## 2020-04-24 VITALS
RESPIRATION RATE: 20 BRPM | TEMPERATURE: 98.2 F | SYSTOLIC BLOOD PRESSURE: 135 MMHG | HEART RATE: 50 BPM | OXYGEN SATURATION: 92 % | DIASTOLIC BLOOD PRESSURE: 74 MMHG

## 2020-04-24 DIAGNOSIS — N18.5 ANEMIA IN STAGE 5 CHRONIC KIDNEY DISEASE, NOT ON CHRONIC DIALYSIS (HCC): ICD-10-CM

## 2020-04-24 DIAGNOSIS — D63.1 ANEMIA DUE TO STAGE 5 CHRONIC KIDNEY DISEASE, NOT ON CHRONIC DIALYSIS (HCC): Primary | ICD-10-CM

## 2020-04-24 DIAGNOSIS — N18.5 CKD (CHRONIC KIDNEY DISEASE) STAGE 5, GFR LESS THAN 15 ML/MIN (HCC): ICD-10-CM

## 2020-04-24 DIAGNOSIS — N18.5 ANEMIA DUE TO STAGE 5 CHRONIC KIDNEY DISEASE, NOT ON CHRONIC DIALYSIS (HCC): Primary | ICD-10-CM

## 2020-04-24 DIAGNOSIS — D63.1 ANEMIA IN STAGE 5 CHRONIC KIDNEY DISEASE, NOT ON CHRONIC DIALYSIS (HCC): ICD-10-CM

## 2020-04-24 LAB
HCT VFR BLD AUTO: 36.2 % (ref 34–46.6)
HGB BLD-MCNC: 11.1 G/DL (ref 12–15.9)

## 2020-04-24 PROCEDURE — 36416 COLLJ CAPILLARY BLOOD SPEC: CPT

## 2020-04-24 PROCEDURE — 85018 HEMOGLOBIN: CPT | Performed by: INTERNAL MEDICINE

## 2020-04-24 PROCEDURE — G0463 HOSPITAL OUTPT CLINIC VISIT: HCPCS

## 2020-04-24 PROCEDURE — 85014 HEMATOCRIT: CPT | Performed by: INTERNAL MEDICINE

## 2020-04-24 NOTE — PROGRESS NOTES
HGB noted and Procrit not indicated per physician order.  Results discussed with patient, AVS given and she was discharged ambulatory(walker) after her appointment was completed.  She arrived wearing a mask and left the unit with her mask on.

## 2020-05-01 ENCOUNTER — HOSPITAL ENCOUNTER (OUTPATIENT)
Dept: INFUSION THERAPY | Facility: HOSPITAL | Age: 70
Discharge: HOME | End: 2020-05-01
Admitting: INTERNAL MEDICINE

## 2020-05-01 VITALS
HEART RATE: 104 BPM | SYSTOLIC BLOOD PRESSURE: 152 MMHG | RESPIRATION RATE: 18 BRPM | DIASTOLIC BLOOD PRESSURE: 79 MMHG | TEMPERATURE: 97.6 F | OXYGEN SATURATION: 94 %

## 2020-05-01 DIAGNOSIS — N18.5 ANEMIA IN STAGE 5 CHRONIC KIDNEY DISEASE, NOT ON CHRONIC DIALYSIS (HCC): ICD-10-CM

## 2020-05-01 DIAGNOSIS — N18.5 CKD (CHRONIC KIDNEY DISEASE) STAGE 5, GFR LESS THAN 15 ML/MIN (HCC): Primary | ICD-10-CM

## 2020-05-01 DIAGNOSIS — D63.1 ANEMIA IN STAGE 5 CHRONIC KIDNEY DISEASE, NOT ON CHRONIC DIALYSIS (HCC): ICD-10-CM

## 2020-05-01 LAB
HCT VFR BLD AUTO: 34.6 % (ref 34–46.6)
HGB BLD-MCNC: 10.5 G/DL (ref 12–15.9)

## 2020-05-01 PROCEDURE — 85014 HEMATOCRIT: CPT | Performed by: INTERNAL MEDICINE

## 2020-05-01 PROCEDURE — 96372 THER/PROPH/DIAG INJ SC/IM: CPT

## 2020-05-01 PROCEDURE — 36416 COLLJ CAPILLARY BLOOD SPEC: CPT

## 2020-05-01 PROCEDURE — 85018 HEMOGLOBIN: CPT | Performed by: INTERNAL MEDICINE

## 2020-05-01 PROCEDURE — 25010000002 EPOETIN ALFA PER 1000 UNITS: Performed by: INTERNAL MEDICINE

## 2020-05-01 RX ADMIN — ERYTHROPOIETIN 20000 UNITS: 20000 INJECTION, SOLUTION INTRAVENOUS; SUBCUTANEOUS at 13:58

## 2020-05-08 ENCOUNTER — HOSPITAL ENCOUNTER (OUTPATIENT)
Dept: INFUSION THERAPY | Facility: HOSPITAL | Age: 70
Discharge: HOME | End: 2020-05-08
Admitting: INTERNAL MEDICINE

## 2020-05-08 VITALS
OXYGEN SATURATION: 99 % | TEMPERATURE: 97.6 F | RESPIRATION RATE: 20 BRPM | HEART RATE: 85 BPM | SYSTOLIC BLOOD PRESSURE: 149 MMHG | DIASTOLIC BLOOD PRESSURE: 72 MMHG

## 2020-05-08 DIAGNOSIS — N18.5 CKD (CHRONIC KIDNEY DISEASE) STAGE 5, GFR LESS THAN 15 ML/MIN (HCC): ICD-10-CM

## 2020-05-08 DIAGNOSIS — N18.5 ANEMIA IN STAGE 5 CHRONIC KIDNEY DISEASE, NOT ON CHRONIC DIALYSIS (HCC): ICD-10-CM

## 2020-05-08 DIAGNOSIS — D63.1 ANEMIA IN STAGE 5 CHRONIC KIDNEY DISEASE, NOT ON CHRONIC DIALYSIS (HCC): ICD-10-CM

## 2020-05-08 DIAGNOSIS — N18.4 ANEMIA IN STAGE 4 CHRONIC KIDNEY DISEASE (HCC): Primary | ICD-10-CM

## 2020-05-08 DIAGNOSIS — D63.1 ANEMIA IN STAGE 4 CHRONIC KIDNEY DISEASE (HCC): Primary | ICD-10-CM

## 2020-05-08 LAB
HCT VFR BLD AUTO: 36.4 % (ref 34–46.6)
HGB BLD-MCNC: 11.2 G/DL (ref 12–15.9)

## 2020-05-08 PROCEDURE — G0463 HOSPITAL OUTPT CLINIC VISIT: HCPCS

## 2020-05-08 PROCEDURE — 85018 HEMOGLOBIN: CPT | Performed by: INTERNAL MEDICINE

## 2020-05-08 PROCEDURE — 85014 HEMATOCRIT: CPT | Performed by: INTERNAL MEDICINE

## 2020-05-08 PROCEDURE — 36416 COLLJ CAPILLARY BLOOD SPEC: CPT

## 2020-05-08 PROCEDURE — 36415 COLL VENOUS BLD VENIPUNCTURE: CPT

## 2020-05-15 ENCOUNTER — HOSPITAL ENCOUNTER (OUTPATIENT)
Dept: INFUSION THERAPY | Facility: HOSPITAL | Age: 70
Discharge: HOME | End: 2020-05-15
Admitting: INTERNAL MEDICINE

## 2020-05-15 VITALS
RESPIRATION RATE: 20 BRPM | TEMPERATURE: 98.4 F | HEART RATE: 95 BPM | OXYGEN SATURATION: 98 % | DIASTOLIC BLOOD PRESSURE: 73 MMHG | SYSTOLIC BLOOD PRESSURE: 142 MMHG

## 2020-05-15 DIAGNOSIS — N18.5 ANEMIA IN STAGE 5 CHRONIC KIDNEY DISEASE, NOT ON CHRONIC DIALYSIS (HCC): ICD-10-CM

## 2020-05-15 DIAGNOSIS — N18.5 CKD (CHRONIC KIDNEY DISEASE) STAGE 5, GFR LESS THAN 15 ML/MIN (HCC): Primary | ICD-10-CM

## 2020-05-15 DIAGNOSIS — D63.1 ANEMIA IN STAGE 5 CHRONIC KIDNEY DISEASE, NOT ON CHRONIC DIALYSIS (HCC): ICD-10-CM

## 2020-05-15 LAB
HCT VFR BLD AUTO: 34.9 % (ref 34–46.6)
HGB BLD-MCNC: 10.7 G/DL (ref 12–15.9)

## 2020-05-15 PROCEDURE — 36416 COLLJ CAPILLARY BLOOD SPEC: CPT

## 2020-05-15 PROCEDURE — 85018 HEMOGLOBIN: CPT | Performed by: INTERNAL MEDICINE

## 2020-05-15 PROCEDURE — 85014 HEMATOCRIT: CPT | Performed by: INTERNAL MEDICINE

## 2020-05-15 PROCEDURE — 96372 THER/PROPH/DIAG INJ SC/IM: CPT

## 2020-05-15 PROCEDURE — 25010000002 EPOETIN ALFA PER 1000 UNITS: Performed by: INTERNAL MEDICINE

## 2020-05-15 RX ADMIN — ERYTHROPOIETIN 20000 UNITS: 20000 INJECTION, SOLUTION INTRAVENOUS; SUBCUTANEOUS at 13:35

## 2020-05-22 ENCOUNTER — HOSPITAL ENCOUNTER (OUTPATIENT)
Dept: INFUSION THERAPY | Facility: HOSPITAL | Age: 70
Discharge: HOME | End: 2020-05-22
Admitting: INTERNAL MEDICINE

## 2020-05-22 VITALS
SYSTOLIC BLOOD PRESSURE: 148 MMHG | TEMPERATURE: 97.9 F | DIASTOLIC BLOOD PRESSURE: 72 MMHG | RESPIRATION RATE: 20 BRPM | OXYGEN SATURATION: 98 % | HEART RATE: 97 BPM

## 2020-05-22 DIAGNOSIS — D63.1 ANEMIA IN STAGE 5 CHRONIC KIDNEY DISEASE, NOT ON CHRONIC DIALYSIS (HCC): ICD-10-CM

## 2020-05-22 DIAGNOSIS — N18.5 CKD (CHRONIC KIDNEY DISEASE) STAGE 5, GFR LESS THAN 15 ML/MIN (HCC): Primary | ICD-10-CM

## 2020-05-22 DIAGNOSIS — N18.5 ANEMIA IN STAGE 5 CHRONIC KIDNEY DISEASE, NOT ON CHRONIC DIALYSIS (HCC): ICD-10-CM

## 2020-05-22 LAB
ANION GAP SERPL CALCULATED.3IONS-SCNC: 12.1 MMOL/L (ref 5–15)
BUN BLD-MCNC: 36 MG/DL (ref 8–23)
BUN/CREAT SERPL: 11.7 (ref 7–25)
CALCIUM SPEC-SCNC: 8.2 MG/DL (ref 8.6–10.5)
CHLORIDE SERPL-SCNC: 110 MMOL/L (ref 98–107)
CO2 SERPL-SCNC: 18.9 MMOL/L (ref 22–29)
CREAT BLD-MCNC: 3.09 MG/DL (ref 0.57–1)
GFR SERPL CREATININE-BSD FRML MDRD: 15 ML/MIN/1.73
GLUCOSE BLD-MCNC: 197 MG/DL (ref 65–99)
HCT VFR BLD AUTO: 34.8 % (ref 34–46.6)
HGB BLD-MCNC: 10.7 G/DL (ref 12–15.9)
MAGNESIUM SERPL-MCNC: 1.6 MG/DL (ref 1.6–2.4)
POTASSIUM BLD-SCNC: 4.3 MMOL/L (ref 3.5–5.2)
SODIUM BLD-SCNC: 141 MMOL/L (ref 136–145)

## 2020-05-22 PROCEDURE — 83735 ASSAY OF MAGNESIUM: CPT | Performed by: INTERNAL MEDICINE

## 2020-05-22 PROCEDURE — 80048 BASIC METABOLIC PNL TOTAL CA: CPT | Performed by: INTERNAL MEDICINE

## 2020-05-22 PROCEDURE — 36415 COLL VENOUS BLD VENIPUNCTURE: CPT

## 2020-05-22 PROCEDURE — 96372 THER/PROPH/DIAG INJ SC/IM: CPT

## 2020-05-22 PROCEDURE — 85014 HEMATOCRIT: CPT | Performed by: INTERNAL MEDICINE

## 2020-05-22 PROCEDURE — 25010000002 EPOETIN ALFA PER 1000 UNITS: Performed by: INTERNAL MEDICINE

## 2020-05-22 PROCEDURE — 85018 HEMOGLOBIN: CPT | Performed by: INTERNAL MEDICINE

## 2020-05-22 RX ADMIN — ERYTHROPOIETIN 20000 UNITS: 20000 INJECTION, SOLUTION INTRAVENOUS; SUBCUTANEOUS at 13:41

## 2020-05-29 ENCOUNTER — HOSPITAL ENCOUNTER (OUTPATIENT)
Facility: HOSPITAL | Age: 70
Setting detail: OBSERVATION
Discharge: HOME | End: 2020-05-31
Attending: EMERGENCY MEDICINE | Admitting: INTERNAL MEDICINE

## 2020-05-29 ENCOUNTER — HOSPITAL ENCOUNTER (OUTPATIENT)
Dept: INFUSION THERAPY | Facility: HOSPITAL | Age: 70
Discharge: HOME | End: 2020-05-29
Admitting: INTERNAL MEDICINE

## 2020-05-29 VITALS
DIASTOLIC BLOOD PRESSURE: 64 MMHG | RESPIRATION RATE: 20 BRPM | OXYGEN SATURATION: 97 % | HEART RATE: 95 BPM | TEMPERATURE: 97.7 F | SYSTOLIC BLOOD PRESSURE: 139 MMHG

## 2020-05-29 DIAGNOSIS — E16.2 HYPOGLYCEMIA: Primary | ICD-10-CM

## 2020-05-29 DIAGNOSIS — N18.5 CKD (CHRONIC KIDNEY DISEASE) STAGE 5, GFR LESS THAN 15 ML/MIN (HCC): Primary | ICD-10-CM

## 2020-05-29 DIAGNOSIS — N18.5 ANEMIA IN STAGE 5 CHRONIC KIDNEY DISEASE, NOT ON CHRONIC DIALYSIS (HCC): ICD-10-CM

## 2020-05-29 DIAGNOSIS — D63.1 ANEMIA IN STAGE 5 CHRONIC KIDNEY DISEASE, NOT ON CHRONIC DIALYSIS (HCC): ICD-10-CM

## 2020-05-29 LAB
HCT VFR BLD AUTO: 36.7 % (ref 34–46.6)
HGB BLD-MCNC: 11.7 G/DL (ref 12–15.9)

## 2020-05-29 PROCEDURE — 96374 THER/PROPH/DIAG INJ IV PUSH: CPT

## 2020-05-29 PROCEDURE — 36416 COLLJ CAPILLARY BLOOD SPEC: CPT

## 2020-05-29 PROCEDURE — 99284 EMERGENCY DEPT VISIT MOD MDM: CPT

## 2020-05-29 PROCEDURE — 85014 HEMATOCRIT: CPT | Performed by: INTERNAL MEDICINE

## 2020-05-29 PROCEDURE — 85018 HEMOGLOBIN: CPT | Performed by: INTERNAL MEDICINE

## 2020-05-29 PROCEDURE — G0463 HOSPITAL OUTPT CLINIC VISIT: HCPCS

## 2020-05-30 PROBLEM — E16.2 HYPOGLYCEMIA: Status: ACTIVE | Noted: 2020-05-30

## 2020-05-30 LAB
ALBUMIN SERPL-MCNC: 3.1 G/DL (ref 3.5–5.2)
ALBUMIN/GLOB SERPL: 0.7 G/DL
ALP SERPL-CCNC: 70 U/L (ref 39–117)
ALT SERPL W P-5'-P-CCNC: 11 U/L (ref 1–33)
ANION GAP SERPL CALCULATED.3IONS-SCNC: 8.6 MMOL/L (ref 5–15)
AST SERPL-CCNC: 15 U/L (ref 1–32)
BASOPHILS # BLD AUTO: 0.04 10*3/MM3 (ref 0–0.2)
BASOPHILS NFR BLD AUTO: 0.4 % (ref 0–1.5)
BILIRUB SERPL-MCNC: 0.3 MG/DL (ref 0.2–1.2)
BUN BLD-MCNC: 38 MG/DL (ref 8–23)
BUN/CREAT SERPL: 11.5 (ref 7–25)
CALCIUM SPEC-SCNC: 7.9 MG/DL (ref 8.6–10.5)
CHLORIDE SERPL-SCNC: 103 MMOL/L (ref 98–107)
CO2 SERPL-SCNC: 24.4 MMOL/L (ref 22–29)
CREAT BLD-MCNC: 3.3 MG/DL (ref 0.57–1)
DEPRECATED RDW RBC AUTO: 56.5 FL (ref 37–54)
EOSINOPHIL # BLD AUTO: 0.55 10*3/MM3 (ref 0–0.4)
EOSINOPHIL NFR BLD AUTO: 5.5 % (ref 0.3–6.2)
ERYTHROCYTE [DISTWIDTH] IN BLOOD BY AUTOMATED COUNT: 17.9 % (ref 12.3–15.4)
GFR SERPL CREATININE-BSD FRML MDRD: 14 ML/MIN/1.73
GFR SERPL CREATININE-BSD FRML MDRD: ABNORMAL ML/MIN/{1.73_M2}
GLOBULIN UR ELPH-MCNC: 4.4 GM/DL
GLUCOSE BLD-MCNC: 56 MG/DL (ref 65–99)
GLUCOSE BLDC GLUCOMTR-MCNC: 123 MG/DL (ref 70–130)
GLUCOSE BLDC GLUCOMTR-MCNC: 137 MG/DL (ref 70–130)
GLUCOSE BLDC GLUCOMTR-MCNC: 149 MG/DL (ref 70–130)
GLUCOSE BLDC GLUCOMTR-MCNC: 30 MG/DL (ref 70–130)
GLUCOSE BLDC GLUCOMTR-MCNC: 59 MG/DL (ref 70–130)
GLUCOSE BLDC GLUCOMTR-MCNC: 59 MG/DL (ref 70–130)
GLUCOSE BLDC GLUCOMTR-MCNC: 63 MG/DL (ref 70–130)
GLUCOSE BLDC GLUCOMTR-MCNC: 82 MG/DL (ref 70–130)
GLUCOSE BLDC GLUCOMTR-MCNC: 83 MG/DL (ref 70–130)
GLUCOSE BLDC GLUCOMTR-MCNC: 84 MG/DL (ref 70–130)
GLUCOSE BLDC GLUCOMTR-MCNC: 84 MG/DL (ref 70–130)
GLUCOSE BLDC GLUCOMTR-MCNC: 90 MG/DL (ref 70–130)
GLUCOSE BLDC GLUCOMTR-MCNC: 92 MG/DL (ref 70–130)
GLUCOSE BLDC GLUCOMTR-MCNC: 98 MG/DL (ref 70–130)
HBA1C MFR BLD: 7.52 % (ref 4.8–5.6)
HCT VFR BLD AUTO: 35.3 % (ref 34–46.6)
HGB BLD-MCNC: 10.7 G/DL (ref 12–15.9)
IMM GRANULOCYTES # BLD AUTO: 0.07 10*3/MM3 (ref 0–0.05)
IMM GRANULOCYTES NFR BLD AUTO: 0.7 % (ref 0–0.5)
LYMPHOCYTES # BLD AUTO: 1.33 10*3/MM3 (ref 0.7–3.1)
LYMPHOCYTES NFR BLD AUTO: 13.2 % (ref 19.6–45.3)
MCH RBC QN AUTO: 26 PG (ref 26.6–33)
MCHC RBC AUTO-ENTMCNC: 30.3 G/DL (ref 31.5–35.7)
MCV RBC AUTO: 85.9 FL (ref 79–97)
MONOCYTES # BLD AUTO: 0.99 10*3/MM3 (ref 0.1–0.9)
MONOCYTES NFR BLD AUTO: 9.8 % (ref 5–12)
NEUTROPHILS # BLD AUTO: 7.09 10*3/MM3 (ref 1.7–7)
NEUTROPHILS NFR BLD AUTO: 70.4 % (ref 42.7–76)
NRBC BLD AUTO-RTO: 0 /100 WBC (ref 0–0.2)
PLATELET # BLD AUTO: 253 10*3/MM3 (ref 140–450)
PMV BLD AUTO: 9.1 FL (ref 6–12)
POTASSIUM BLD-SCNC: 3.9 MMOL/L (ref 3.5–5.2)
PROT SERPL-MCNC: 7.5 G/DL (ref 6–8.5)
RBC # BLD AUTO: 4.11 10*6/MM3 (ref 3.77–5.28)
SODIUM BLD-SCNC: 136 MMOL/L (ref 136–145)
WBC NRBC COR # BLD: 10.07 10*3/MM3 (ref 3.4–10.8)

## 2020-05-30 PROCEDURE — 96366 THER/PROPH/DIAG IV INF ADDON: CPT

## 2020-05-30 PROCEDURE — G0378 HOSPITAL OBSERVATION PER HR: HCPCS

## 2020-05-30 PROCEDURE — 82962 GLUCOSE BLOOD TEST: CPT

## 2020-05-30 PROCEDURE — 96365 THER/PROPH/DIAG IV INF INIT: CPT

## 2020-05-30 PROCEDURE — 83036 HEMOGLOBIN GLYCOSYLATED A1C: CPT | Performed by: NURSE PRACTITIONER

## 2020-05-30 PROCEDURE — 85025 COMPLETE CBC W/AUTO DIFF WBC: CPT | Performed by: PHYSICIAN ASSISTANT

## 2020-05-30 PROCEDURE — 96376 TX/PRO/DX INJ SAME DRUG ADON: CPT

## 2020-05-30 PROCEDURE — 80053 COMPREHEN METABOLIC PANEL: CPT | Performed by: PHYSICIAN ASSISTANT

## 2020-05-30 PROCEDURE — 94799 UNLISTED PULMONARY SVC/PX: CPT

## 2020-05-30 RX ORDER — BISACODYL 10 MG
10 SUPPOSITORY, RECTAL RECTAL DAILY PRN
Status: DISCONTINUED | OUTPATIENT
Start: 2020-05-30 | End: 2020-05-31 | Stop reason: HOSPADM

## 2020-05-30 RX ORDER — GLIMEPIRIDE 2 MG/1
1 TABLET ORAL 3 TIMES WEEKLY
COMMUNITY
End: 2020-05-31 | Stop reason: HOSPADM

## 2020-05-30 RX ORDER — ACETAMINOPHEN 325 MG/1
650 TABLET ORAL EVERY 4 HOURS PRN
Status: DISCONTINUED | OUTPATIENT
Start: 2020-05-30 | End: 2020-05-31 | Stop reason: HOSPADM

## 2020-05-30 RX ORDER — ALUMINA, MAGNESIA, AND SIMETHICONE 2400; 2400; 240 MG/30ML; MG/30ML; MG/30ML
15 SUSPENSION ORAL EVERY 6 HOURS PRN
Status: DISCONTINUED | OUTPATIENT
Start: 2020-05-30 | End: 2020-05-31 | Stop reason: HOSPADM

## 2020-05-30 RX ORDER — GABAPENTIN 300 MG/1
300 CAPSULE ORAL 2 TIMES DAILY
Status: DISCONTINUED | OUTPATIENT
Start: 2020-05-30 | End: 2020-05-31 | Stop reason: HOSPADM

## 2020-05-30 RX ORDER — ACETAMINOPHEN 160 MG/5ML
650 SOLUTION ORAL EVERY 4 HOURS PRN
Status: DISCONTINUED | OUTPATIENT
Start: 2020-05-30 | End: 2020-05-31 | Stop reason: HOSPADM

## 2020-05-30 RX ORDER — DEXTROSE MONOHYDRATE 25 G/50ML
25 INJECTION, SOLUTION INTRAVENOUS ONCE
Status: COMPLETED | OUTPATIENT
Start: 2020-05-30 | End: 2020-05-30

## 2020-05-30 RX ORDER — DEXTROSE AND SODIUM CHLORIDE 5; .45 G/100ML; G/100ML
100 INJECTION, SOLUTION INTRAVENOUS CONTINUOUS
Status: DISCONTINUED | OUTPATIENT
Start: 2020-05-30 | End: 2020-05-31

## 2020-05-30 RX ORDER — SODIUM CHLORIDE 0.9 % (FLUSH) 0.9 %
10 SYRINGE (ML) INJECTION AS NEEDED
Status: DISCONTINUED | OUTPATIENT
Start: 2020-05-30 | End: 2020-05-31 | Stop reason: HOSPADM

## 2020-05-30 RX ORDER — ONDANSETRON 2 MG/ML
4 INJECTION INTRAMUSCULAR; INTRAVENOUS EVERY 6 HOURS PRN
Status: DISCONTINUED | OUTPATIENT
Start: 2020-05-30 | End: 2020-05-31 | Stop reason: HOSPADM

## 2020-05-30 RX ORDER — BUMETANIDE 2 MG/1
2 TABLET ORAL DAILY
Status: DISCONTINUED | OUTPATIENT
Start: 2020-05-30 | End: 2020-05-31 | Stop reason: HOSPADM

## 2020-05-30 RX ORDER — MONTELUKAST SODIUM 10 MG/1
10 TABLET ORAL NIGHTLY
Status: DISCONTINUED | OUTPATIENT
Start: 2020-05-30 | End: 2020-05-31 | Stop reason: HOSPADM

## 2020-05-30 RX ORDER — DEXTROSE MONOHYDRATE 25 G/50ML
25 INJECTION, SOLUTION INTRAVENOUS
Status: DISCONTINUED | OUTPATIENT
Start: 2020-05-30 | End: 2020-05-31 | Stop reason: HOSPADM

## 2020-05-30 RX ORDER — ASPIRIN 81 MG/1
81 TABLET ORAL DAILY
Status: DISCONTINUED | OUTPATIENT
Start: 2020-05-30 | End: 2020-05-31 | Stop reason: HOSPADM

## 2020-05-30 RX ORDER — CARVEDILOL 12.5 MG/1
12.5 TABLET ORAL 2 TIMES DAILY
Status: DISCONTINUED | OUTPATIENT
Start: 2020-05-30 | End: 2020-05-31 | Stop reason: HOSPADM

## 2020-05-30 RX ORDER — NICOTINE POLACRILEX 4 MG
15 LOZENGE BUCCAL
Status: DISCONTINUED | OUTPATIENT
Start: 2020-05-30 | End: 2020-05-31 | Stop reason: HOSPADM

## 2020-05-30 RX ORDER — DIPHENOXYLATE HYDROCHLORIDE AND ATROPINE SULFATE 2.5; .025 MG/1; MG/1
1 TABLET ORAL EVERY MORNING
Status: DISCONTINUED | OUTPATIENT
Start: 2020-05-31 | End: 2020-05-31

## 2020-05-30 RX ORDER — ONDANSETRON 4 MG/1
4 TABLET, FILM COATED ORAL EVERY 6 HOURS PRN
Status: DISCONTINUED | OUTPATIENT
Start: 2020-05-30 | End: 2020-05-31 | Stop reason: HOSPADM

## 2020-05-30 RX ORDER — BISACODYL 5 MG/1
5 TABLET, DELAYED RELEASE ORAL DAILY PRN
Status: DISCONTINUED | OUTPATIENT
Start: 2020-05-30 | End: 2020-05-31 | Stop reason: HOSPADM

## 2020-05-30 RX ORDER — ACETAMINOPHEN 650 MG/1
650 SUPPOSITORY RECTAL EVERY 4 HOURS PRN
Status: DISCONTINUED | OUTPATIENT
Start: 2020-05-30 | End: 2020-05-31 | Stop reason: HOSPADM

## 2020-05-30 RX ADMIN — DEXTROSE MONOHYDRATE 25 G: 500 INJECTION PARENTERAL at 02:03

## 2020-05-30 RX ADMIN — BUMETANIDE 2 MG: 2 TABLET ORAL at 17:49

## 2020-05-30 RX ADMIN — DEXTROSE AND SODIUM CHLORIDE 100 ML/HR: 5; 450 INJECTION, SOLUTION INTRAVENOUS at 16:18

## 2020-05-30 RX ADMIN — ASPIRIN 81 MG: 81 TABLET, COATED ORAL at 17:49

## 2020-05-30 RX ADMIN — APIXABAN 5 MG: 5 TABLET, FILM COATED ORAL at 20:20

## 2020-05-30 RX ADMIN — GABAPENTIN 300 MG: 300 CAPSULE ORAL at 20:20

## 2020-05-30 RX ADMIN — DEXTROSE MONOHYDRATE 25 G: 25 INJECTION, SOLUTION INTRAVENOUS at 10:43

## 2020-05-30 RX ADMIN — MONTELUKAST SODIUM 10 MG: 10 TABLET, FILM COATED ORAL at 20:20

## 2020-05-30 RX ADMIN — CARVEDILOL 12.5 MG: 12.5 TABLET, FILM COATED ORAL at 20:20

## 2020-05-31 ENCOUNTER — READMISSION MANAGEMENT (OUTPATIENT)
Dept: CALL CENTER | Facility: HOSPITAL | Age: 70
End: 2020-05-31

## 2020-05-31 VITALS
WEIGHT: 185.1 LBS | BODY MASS INDEX: 31.6 KG/M2 | HEIGHT: 64 IN | HEART RATE: 99 BPM | DIASTOLIC BLOOD PRESSURE: 78 MMHG | RESPIRATION RATE: 18 BRPM | OXYGEN SATURATION: 98 % | SYSTOLIC BLOOD PRESSURE: 155 MMHG | TEMPERATURE: 98 F

## 2020-05-31 LAB
ANION GAP SERPL CALCULATED.3IONS-SCNC: 10.4 MMOL/L (ref 5–15)
BUN BLD-MCNC: 45 MG/DL (ref 8–23)
BUN/CREAT SERPL: 13.5 (ref 7–25)
CALCIUM SPEC-SCNC: 7.5 MG/DL (ref 8.6–10.5)
CHLORIDE SERPL-SCNC: 106 MMOL/L (ref 98–107)
CO2 SERPL-SCNC: 21.6 MMOL/L (ref 22–29)
CREAT BLD-MCNC: 3.33 MG/DL (ref 0.57–1)
DEPRECATED RDW RBC AUTO: 55.2 FL (ref 37–54)
ERYTHROCYTE [DISTWIDTH] IN BLOOD BY AUTOMATED COUNT: 18 % (ref 12.3–15.4)
GFR SERPL CREATININE-BSD FRML MDRD: 14 ML/MIN/1.73
GFR SERPL CREATININE-BSD FRML MDRD: ABNORMAL ML/MIN/{1.73_M2}
GLUCOSE BLD-MCNC: 130 MG/DL (ref 65–99)
GLUCOSE BLDC GLUCOMTR-MCNC: 134 MG/DL (ref 70–130)
GLUCOSE BLDC GLUCOMTR-MCNC: 314 MG/DL (ref 70–130)
HCT VFR BLD AUTO: 32.9 % (ref 34–46.6)
HGB BLD-MCNC: 10.3 G/DL (ref 12–15.9)
MAGNESIUM SERPL-MCNC: 1.7 MG/DL (ref 1.6–2.4)
MCH RBC QN AUTO: 26.5 PG (ref 26.6–33)
MCHC RBC AUTO-ENTMCNC: 31.3 G/DL (ref 31.5–35.7)
MCV RBC AUTO: 84.8 FL (ref 79–97)
PLATELET # BLD AUTO: 240 10*3/MM3 (ref 140–450)
PMV BLD AUTO: 9.8 FL (ref 6–12)
POTASSIUM BLD-SCNC: 4.5 MMOL/L (ref 3.5–5.2)
RBC # BLD AUTO: 3.88 10*6/MM3 (ref 3.77–5.28)
SODIUM BLD-SCNC: 138 MMOL/L (ref 136–145)
WBC NRBC COR # BLD: 7.88 10*3/MM3 (ref 3.4–10.8)

## 2020-05-31 PROCEDURE — 85027 COMPLETE CBC AUTOMATED: CPT | Performed by: INTERNAL MEDICINE

## 2020-05-31 PROCEDURE — 83735 ASSAY OF MAGNESIUM: CPT | Performed by: INTERNAL MEDICINE

## 2020-05-31 PROCEDURE — G0378 HOSPITAL OBSERVATION PER HR: HCPCS

## 2020-05-31 PROCEDURE — 80048 BASIC METABOLIC PNL TOTAL CA: CPT | Performed by: INTERNAL MEDICINE

## 2020-05-31 PROCEDURE — 96366 THER/PROPH/DIAG IV INF ADDON: CPT

## 2020-05-31 PROCEDURE — 82962 GLUCOSE BLOOD TEST: CPT

## 2020-05-31 PROCEDURE — 99204 OFFICE O/P NEW MOD 45 MIN: CPT | Performed by: INTERNAL MEDICINE

## 2020-05-31 RX ORDER — MULTIVITAMIN
1 TABLET ORAL DAILY
Status: DISCONTINUED | OUTPATIENT
Start: 2020-05-31 | End: 2020-05-31 | Stop reason: HOSPADM

## 2020-05-31 RX ADMIN — APIXABAN 5 MG: 5 TABLET, FILM COATED ORAL at 09:20

## 2020-05-31 RX ADMIN — GABAPENTIN 300 MG: 300 CAPSULE ORAL at 09:20

## 2020-05-31 RX ADMIN — BUMETANIDE 2 MG: 2 TABLET ORAL at 10:37

## 2020-05-31 RX ADMIN — CARVEDILOL 12.5 MG: 12.5 TABLET, FILM COATED ORAL at 09:20

## 2020-05-31 RX ADMIN — Medication 1 TABLET: at 06:30

## 2020-05-31 RX ADMIN — ASPIRIN 81 MG: 81 TABLET, COATED ORAL at 09:20

## 2020-05-31 RX ADMIN — DEXTROSE AND SODIUM CHLORIDE 100 ML/HR: 5; 450 INJECTION, SOLUTION INTRAVENOUS at 01:37

## 2020-05-31 NOTE — OUTREACH NOTE
Prep Survey      Responses   [facility] patient discharged from?  [place]   Is LACE score < 7 ?  No   Eligibility  [facility]   Date of Admission  05/29/20   Date of Discharge  05/31/20   Discharge Disposition  Home or Self Care   Discharge diagnosis  Hypoglycemia   COVID-19 Test Status  Not tested   Does the patient have one of the following disease processes/diagnoses(primary or secondary)?  Other   Does the patient have Home health ordered?  No   Is there a DME ordered?  No   Prep survey completed?  Yes          Indy Russell RN

## 2020-06-01 ENCOUNTER — TRANSITIONAL CARE MANAGEMENT TELEPHONE ENCOUNTER (OUTPATIENT)
Dept: CALL CENTER | Facility: HOSPITAL | Age: 70
End: 2020-06-01

## 2020-06-01 NOTE — OUTREACH NOTE
"Call Center TCM Note      Responses   [facility] patient discharged from?  [place]   COVID-19 Test Status  Not tested   Does the patient have one of the following disease processes/diagnoses(primary or secondary)?  Other   TCM attempt successful?  Yes   Call start time  1453   Call end time  1500   Discharge diagnosis  Hypoglycemia   Is patient permission given to speak with other caregiver?  Yes   List who call center can speak with  Tru Rendon, Spouse   Meds reviewed with patient/caregiver?  Yes   Is the patient having any side effects they believe may be caused by any medication additions or changes?  No   Does the patient have all medications ordered at discharge?  Yes   Is the patient taking all medications as directed (includes completed medication regime)?  Yes   Does the patient have a primary care provider?   Yes   Does the patient have an appointment with their PCP within 7 days of discharge?  Greater than 7 days   Comments regarding PCP  PCP Dr Chaney. Scheduled a hospital f/u appt for 6/11/20 3pm   What is preventing the patient from scheduling follow up appointments within 7 days of discharge?  Earlier appointment not available   Nursing Interventions  Verified appointment date/time/provider   Has the patient kept scheduled appointments due by today?  N/A   Comments  Patient reports that she has a call in to endocrinology for appt.    Has home health visited the patient within 72 hours of discharge?  N/A   Pulse Ox monitoring  None   Psychosocial issues?  No   Comments  Patient monitoring home blood sugar. She reports readings have been \"a little high\" but did not give exact numbers.    Did the patient receive a copy of their discharge instructions?  Yes   Nursing interventions  Reviewed instructions with patient   What is the patient's perception of their health status since discharge?  Improving   Is the patient/caregiver able to teach back signs and symptoms related to disease process for " when to call PCP?  Yes   Is the patient/caregiver able to teach back signs and symptoms related to disease process for when to call 911?  Yes   Is the patient/caregiver able to teach back the hierarchy of who to call/visit for symptoms/problems? PCP, Specialist, Home health nurse, Urgent Care, ED, 911  Yes   TCM call completed?  Yes          Risa Tellez RN    6/1/2020, 15:00

## 2020-06-01 NOTE — PROGRESS NOTES
Case Management Discharge Note      Final Note: Discharged home. Shannen Alvarez, AUDREY              Transportation Services  Private: Car    Final Discharge Disposition Code: 01 - home or self-care

## 2020-06-05 ENCOUNTER — HOSPITAL ENCOUNTER (OUTPATIENT)
Dept: INFUSION THERAPY | Facility: HOSPITAL | Age: 70
Discharge: HOME | End: 2020-06-05
Admitting: INTERNAL MEDICINE

## 2020-06-05 VITALS
TEMPERATURE: 98.4 F | SYSTOLIC BLOOD PRESSURE: 120 MMHG | OXYGEN SATURATION: 100 % | RESPIRATION RATE: 20 BRPM | HEART RATE: 85 BPM | DIASTOLIC BLOOD PRESSURE: 59 MMHG

## 2020-06-05 DIAGNOSIS — D63.1 ANEMIA IN STAGE 5 CHRONIC KIDNEY DISEASE, NOT ON CHRONIC DIALYSIS (HCC): ICD-10-CM

## 2020-06-05 DIAGNOSIS — N18.5 CKD (CHRONIC KIDNEY DISEASE) STAGE 5, GFR LESS THAN 15 ML/MIN (HCC): Primary | ICD-10-CM

## 2020-06-05 DIAGNOSIS — N18.5 ANEMIA IN STAGE 5 CHRONIC KIDNEY DISEASE, NOT ON CHRONIC DIALYSIS (HCC): ICD-10-CM

## 2020-06-05 LAB
HCT VFR BLD AUTO: 32.9 % (ref 34–46.6)
HGB BLD-MCNC: 10.1 G/DL (ref 12–15.9)

## 2020-06-05 PROCEDURE — 25010000002 EPOETIN ALFA PER 1000 UNITS: Performed by: INTERNAL MEDICINE

## 2020-06-05 PROCEDURE — 85014 HEMATOCRIT: CPT | Performed by: INTERNAL MEDICINE

## 2020-06-05 PROCEDURE — 96372 THER/PROPH/DIAG INJ SC/IM: CPT

## 2020-06-05 PROCEDURE — 36416 COLLJ CAPILLARY BLOOD SPEC: CPT

## 2020-06-05 PROCEDURE — 85018 HEMOGLOBIN: CPT | Performed by: INTERNAL MEDICINE

## 2020-06-05 RX ADMIN — ERYTHROPOIETIN 20000 UNITS: 20000 INJECTION, SOLUTION INTRAVENOUS; SUBCUTANEOUS at 14:32

## 2020-06-05 NOTE — PROGRESS NOTES
Procrit indicated per lab results & MD order.  Injection site x 1 with band aide applied.  AVS given & pt DC per wheeled walker to front entrance for ride with spouse.

## 2020-06-08 ENCOUNTER — READMISSION MANAGEMENT (OUTPATIENT)
Dept: CALL CENTER | Facility: HOSPITAL | Age: 70
End: 2020-06-08

## 2020-06-08 NOTE — OUTREACH NOTE
Medical Week 2 Survey      Responses   [facility]   C[facility] Test Status  Not tested   Does the patient have one of the following disease processes/diagnoses(primary or secondary)?  Other   Week 2 attempt successful?  Yes   Call start time  0925   Discharge diagnosis  Hypoglycemia   Call end time  0929   Person spoke with today (if not patient) and relationship  alyssa Ott   Meds reviewed with patient/caregiver?  Yes   Is the patient having any side effects they believe may be caused by any medication additions or changes?  No   Does the patient have all medications ordered at discharge?  Yes   Is the patient taking all medications as directed (includes completed medication regime)?  Yes   Does the patient have a primary care provider?   Yes   Does the patient have an appointment with their PCP within 7 days of discharge?  Greater than 7 days   What is preventing the patient from scheduling follow up appointments within 7 days of discharge?  Earlier appointment not available   Nursing Interventions  Verified appointment date/time/provider   Has the patient kept scheduled appointments due by today?  N/A   What is the Home health agency?   none   Has home health visited the patient within 72 hours of discharge?  N/A   Comments  Tru states pt has had no hypoglycemia and glucoses have been around 140's which is stable for pt    Did the patient receive a copy of their discharge instructions?  Yes   Nursing interventions  Reviewed instructions with patient   What is the patient's perception of their health status since discharge?  Improving   Is the patient/caregiver able to teach back signs and symptoms related to disease process for when to call PCP?  Yes   Is the patient/caregiver able to teach back signs and symptoms related to disease process for when to call 911?  Yes   Is the patient/caregiver able to teach back the hierarchy of who to call/visit for  symptoms/problems? PCP, Specialist, Home health nurse, Urgent Care, ED, 911  Yes   Week 2 Call Completed?  Yes          Mikaela Chirinos RN

## 2020-06-11 ENCOUNTER — OFFICE VISIT (OUTPATIENT)
Dept: FAMILY MEDICINE CLINIC | Facility: CLINIC | Age: 70
End: 2020-06-11

## 2020-06-11 VITALS
HEIGHT: 64 IN | TEMPERATURE: 98.5 F | DIASTOLIC BLOOD PRESSURE: 80 MMHG | WEIGHT: 185 LBS | OXYGEN SATURATION: 97 % | SYSTOLIC BLOOD PRESSURE: 124 MMHG | HEART RATE: 89 BPM | BODY MASS INDEX: 31.58 KG/M2

## 2020-06-11 DIAGNOSIS — E11.42 DM TYPE 2 WITH DIABETIC PERIPHERAL NEUROPATHY (HCC): Primary | ICD-10-CM

## 2020-06-11 DIAGNOSIS — N18.4 CKD (CHRONIC KIDNEY DISEASE) STAGE 4, GFR 15-29 ML/MIN (HCC): ICD-10-CM

## 2020-06-11 DIAGNOSIS — E16.2 HYPOGLYCEMIA: ICD-10-CM

## 2020-06-11 PROCEDURE — 99495 TRANSJ CARE MGMT MOD F2F 14D: CPT | Performed by: INTERNAL MEDICINE

## 2020-06-11 NOTE — PROGRESS NOTES
Transitional Care Follow Up Visit  Subjective Complaint is hospital follow-up for low blood sugar    Maribeth Rendon is a 69 y.o. female who presents for a transitional care management visit.    Within 48 business hours after discharge our office contacted her via telephone to coordinate her care and needs.      I reviewed and discussed the details of that call along with the discharge summary, hospital problems, inpatient lab results, inpatient diagnostic studies, and consultation reports with Maribeth.     Current outpatient and discharge medications have been reconciled for the patient.  Reviewed by: Robby Chaney MD      Date of TCM Phone Call 5/31/2020   [facility]   Date of Admission 5/29/2020   Date of Discharge 5/31/2020   Discharge Disposition Home or Self Care     Risk for Readmission (LACE) Score: 9 (5/31/2020  6:00 AM)      History of Present Illness   Course During Hospital Stay: Maribeth is here today for follow-up on low blood sugar reaction.  We had been trying to wean her off of her glimepiride because she had been having some intermittent low blood sugars.  Apparently following a drive by graduation ceremony she became near comatose.  In the emergency room her sugar initially was 59.  Apparently it kept dropping despite IV fluids.  She was admitted for an overnight stay and was seen in consultation by Dr. Flornetino.  She recommended discontinuing glimepiride completely.  She is now only on Tradjenta.  No other changes to her medication were made.  She does have some chronic anemia secondary to her chronic kidney disease.  Those numbers appear to be stable.     The following portions of the patient's history were reviewed and updated as appropriate: allergies, current medications, past family history, past medical history, past social history, past surgical history and problem list.    Review of Systems   Respiratory: Negative for chest tightness and shortness of breath.     Cardiovascular: Negative for chest pain and leg swelling.       Objective   Physical Exam   Constitutional: She is oriented to person, place, and time. She appears well-developed and well-nourished.   Cardiovascular: Normal rate.   There is an occasional ectopic   Pulmonary/Chest: Effort normal and breath sounds normal.   Neurological: She is alert and oriented to person, place, and time. No cranial nerve deficit.   Psychiatric: She has a normal mood and affect.   Nursing note and vitals reviewed.      Assessment/Plan   Maribeth was seen today for follow-up.    Diagnoses and all orders for this visit:    DM type 2 with diabetic peripheral neuropathy (CMS/[facility])    Hypoglycemia    CKD (chronic kidney disease) stage 4, GFR 15-29 ml/min (CMS/[facility])    Maribeth is here today for follow-up.  She is now only on Tradjenta.  Her sugars at home of been no lower than 112.  She is going to follow-up with  in August.  I think that would be a good time to recheck her A1c on the monotherapy.  I am going to see her back in 6 months unless she has problems sooner.

## 2020-06-12 ENCOUNTER — HOSPITAL ENCOUNTER (OUTPATIENT)
Dept: INFUSION THERAPY | Facility: HOSPITAL | Age: 70
Discharge: HOME | End: 2020-06-12
Admitting: INTERNAL MEDICINE

## 2020-06-12 VITALS
TEMPERATURE: 98.4 F | DIASTOLIC BLOOD PRESSURE: 66 MMHG | SYSTOLIC BLOOD PRESSURE: 143 MMHG | OXYGEN SATURATION: 99 % | HEART RATE: 91 BPM | RESPIRATION RATE: 20 BRPM

## 2020-06-12 DIAGNOSIS — D63.1 ANEMIA IN STAGE 5 CHRONIC KIDNEY DISEASE, NOT ON CHRONIC DIALYSIS (HCC): ICD-10-CM

## 2020-06-12 DIAGNOSIS — N18.5 CKD (CHRONIC KIDNEY DISEASE) STAGE 5, GFR LESS THAN 15 ML/MIN (HCC): Primary | ICD-10-CM

## 2020-06-12 DIAGNOSIS — N18.5 ANEMIA IN STAGE 5 CHRONIC KIDNEY DISEASE, NOT ON CHRONIC DIALYSIS (HCC): ICD-10-CM

## 2020-06-12 LAB
HCT VFR BLD AUTO: 35.4 % (ref 34–46.6)
HGB BLD-MCNC: 11.2 G/DL (ref 12–15.9)

## 2020-06-12 PROCEDURE — 36416 COLLJ CAPILLARY BLOOD SPEC: CPT

## 2020-06-12 PROCEDURE — 85014 HEMATOCRIT: CPT | Performed by: INTERNAL MEDICINE

## 2020-06-12 PROCEDURE — 85018 HEMOGLOBIN: CPT | Performed by: INTERNAL MEDICINE

## 2020-06-19 ENCOUNTER — HOSPITAL ENCOUNTER (OUTPATIENT)
Dept: INFUSION THERAPY | Facility: HOSPITAL | Age: 70
Discharge: HOME | End: 2020-06-19
Admitting: INTERNAL MEDICINE

## 2020-06-19 VITALS
OXYGEN SATURATION: 98 % | TEMPERATURE: 98.9 F | SYSTOLIC BLOOD PRESSURE: 151 MMHG | HEART RATE: 90 BPM | DIASTOLIC BLOOD PRESSURE: 73 MMHG | RESPIRATION RATE: 20 BRPM

## 2020-06-19 DIAGNOSIS — N18.5 CKD (CHRONIC KIDNEY DISEASE) STAGE 5, GFR LESS THAN 15 ML/MIN (HCC): ICD-10-CM

## 2020-06-19 DIAGNOSIS — N18.5 ANEMIA IN STAGE 5 CHRONIC KIDNEY DISEASE, NOT ON CHRONIC DIALYSIS (HCC): Primary | ICD-10-CM

## 2020-06-19 DIAGNOSIS — D63.1 ANEMIA IN STAGE 5 CHRONIC KIDNEY DISEASE, NOT ON CHRONIC DIALYSIS (HCC): Primary | ICD-10-CM

## 2020-06-19 LAB
HCT VFR BLD AUTO: 34.1 % (ref 34–46.6)
HGB BLD-MCNC: 10.5 G/DL (ref 12–15.9)

## 2020-06-19 PROCEDURE — 36416 COLLJ CAPILLARY BLOOD SPEC: CPT

## 2020-06-19 PROCEDURE — 85014 HEMATOCRIT: CPT | Performed by: INTERNAL MEDICINE

## 2020-06-19 PROCEDURE — 85018 HEMOGLOBIN: CPT | Performed by: INTERNAL MEDICINE

## 2020-06-19 PROCEDURE — 96372 THER/PROPH/DIAG INJ SC/IM: CPT

## 2020-06-19 PROCEDURE — 25010000002 EPOETIN ALFA PER 1000 UNITS: Performed by: INTERNAL MEDICINE

## 2020-06-19 RX ADMIN — ERYTHROPOIETIN 20000 UNITS: 10000 INJECTION, SOLUTION INTRAVENOUS; SUBCUTANEOUS at 13:43

## 2020-06-26 ENCOUNTER — HOSPITAL ENCOUNTER (OUTPATIENT)
Dept: INFUSION THERAPY | Facility: HOSPITAL | Age: 70
Discharge: HOME | End: 2020-06-26
Admitting: INTERNAL MEDICINE

## 2020-06-26 VITALS
TEMPERATURE: 98.1 F | HEART RATE: 86 BPM | OXYGEN SATURATION: 96 % | DIASTOLIC BLOOD PRESSURE: 59 MMHG | SYSTOLIC BLOOD PRESSURE: 144 MMHG | RESPIRATION RATE: 20 BRPM

## 2020-06-26 DIAGNOSIS — N18.5 ANEMIA IN STAGE 5 CHRONIC KIDNEY DISEASE, NOT ON CHRONIC DIALYSIS (HCC): ICD-10-CM

## 2020-06-26 DIAGNOSIS — D63.1 ANEMIA IN STAGE 5 CHRONIC KIDNEY DISEASE, NOT ON CHRONIC DIALYSIS (HCC): ICD-10-CM

## 2020-06-26 DIAGNOSIS — N18.5 CKD (CHRONIC KIDNEY DISEASE) STAGE 5, GFR LESS THAN 15 ML/MIN (HCC): Primary | ICD-10-CM

## 2020-06-26 LAB
25(OH)D3 SERPL-MCNC: 27.3 NG/ML (ref 30–100)
ALBUMIN SERPL-MCNC: 2.9 G/DL (ref 3.5–5.2)
ALBUMIN/GLOB SERPL: 0.7 G/DL
ALP SERPL-CCNC: 69 U/L (ref 39–117)
ALT SERPL W P-5'-P-CCNC: 11 U/L (ref 1–33)
ANION GAP SERPL CALCULATED.3IONS-SCNC: 10.3 MMOL/L (ref 5–15)
AST SERPL-CCNC: 13 U/L (ref 1–32)
BILIRUB SERPL-MCNC: 0.3 MG/DL (ref 0.2–1.2)
BUN BLD-MCNC: 45 MG/DL (ref 8–23)
BUN/CREAT SERPL: 12.1 (ref 7–25)
CALCIUM SPEC-SCNC: 7.9 MG/DL (ref 8.6–10.5)
CALCIUM SPEC-SCNC: 8.3 MG/DL (ref 8.6–10.5)
CHLORIDE SERPL-SCNC: 100 MMOL/L (ref 98–107)
CO2 SERPL-SCNC: 22.7 MMOL/L (ref 22–29)
CREAT BLD-MCNC: 3.71 MG/DL (ref 0.57–1)
DEPRECATED RDW RBC AUTO: 54.2 FL (ref 37–54)
ERYTHROCYTE [DISTWIDTH] IN BLOOD BY AUTOMATED COUNT: 17.1 % (ref 12.3–15.4)
FERRITIN SERPL-MCNC: 411 NG/ML (ref 13–150)
FOLATE SERPL-MCNC: 5.72 NG/ML (ref 4.78–24.2)
GFR SERPL CREATININE-BSD FRML MDRD: 12 ML/MIN/1.73
GFR SERPL CREATININE-BSD FRML MDRD: ABNORMAL ML/MIN/{1.73_M2}
GLOBULIN UR ELPH-MCNC: 4.4 GM/DL
GLUCOSE BLD-MCNC: 331 MG/DL (ref 65–99)
HCT VFR BLD AUTO: 32.4 % (ref 34–46.6)
HGB BLD-MCNC: 10 G/DL (ref 12–15.9)
IRON 24H UR-MRATE: 31 MCG/DL (ref 37–145)
IRON SATN MFR SERPL: 18 % (ref 20–50)
MAGNESIUM SERPL-MCNC: 1.7 MG/DL (ref 1.6–2.4)
MCH RBC QN AUTO: 26.8 PG (ref 26.6–33)
MCHC RBC AUTO-ENTMCNC: 30.9 G/DL (ref 31.5–35.7)
MCV RBC AUTO: 86.9 FL (ref 79–97)
PHOSPHATE SERPL-MCNC: 4.6 MG/DL (ref 2.5–4.5)
PLATELET # BLD AUTO: 280 10*3/MM3 (ref 140–450)
PMV BLD AUTO: 9.7 FL (ref 6–12)
POTASSIUM BLD-SCNC: 4 MMOL/L (ref 3.5–5.2)
PROT SERPL-MCNC: 7.3 G/DL (ref 6–8.5)
PTH-INTACT SERPL-MCNC: 355 PG/ML (ref 15–65)
RBC # BLD AUTO: 3.73 10*6/MM3 (ref 3.77–5.28)
SODIUM BLD-SCNC: 133 MMOL/L (ref 136–145)
TIBC SERPL-MCNC: 173 MCG/DL (ref 298–536)
TRANSFERRIN SERPL-MCNC: 116 MG/DL (ref 200–360)
URATE SERPL-MCNC: 11 MG/DL (ref 2.4–5.7)
VIT B12 BLD-MCNC: 453 PG/ML (ref 211–946)
WBC NRBC COR # BLD: 8.06 10*3/MM3 (ref 3.4–10.8)

## 2020-06-26 PROCEDURE — 84550 ASSAY OF BLOOD/URIC ACID: CPT | Performed by: INTERNAL MEDICINE

## 2020-06-26 PROCEDURE — 83540 ASSAY OF IRON: CPT | Performed by: INTERNAL MEDICINE

## 2020-06-26 PROCEDURE — 84100 ASSAY OF PHOSPHORUS: CPT | Performed by: INTERNAL MEDICINE

## 2020-06-26 PROCEDURE — 84466 ASSAY OF TRANSFERRIN: CPT | Performed by: INTERNAL MEDICINE

## 2020-06-26 PROCEDURE — 83970 ASSAY OF PARATHORMONE: CPT | Performed by: INTERNAL MEDICINE

## 2020-06-26 PROCEDURE — 85027 COMPLETE CBC AUTOMATED: CPT | Performed by: INTERNAL MEDICINE

## 2020-06-26 PROCEDURE — 83735 ASSAY OF MAGNESIUM: CPT | Performed by: INTERNAL MEDICINE

## 2020-06-26 PROCEDURE — 82607 VITAMIN B-12: CPT | Performed by: INTERNAL MEDICINE

## 2020-06-26 PROCEDURE — 82728 ASSAY OF FERRITIN: CPT | Performed by: INTERNAL MEDICINE

## 2020-06-26 PROCEDURE — 25010000002 EPOETIN ALFA PER 1000 UNITS: Performed by: INTERNAL MEDICINE

## 2020-06-26 PROCEDURE — 96372 THER/PROPH/DIAG INJ SC/IM: CPT

## 2020-06-26 PROCEDURE — 36415 COLL VENOUS BLD VENIPUNCTURE: CPT

## 2020-06-26 PROCEDURE — 80053 COMPREHEN METABOLIC PANEL: CPT | Performed by: INTERNAL MEDICINE

## 2020-06-26 PROCEDURE — 82306 VITAMIN D 25 HYDROXY: CPT | Performed by: INTERNAL MEDICINE

## 2020-06-26 PROCEDURE — 82746 ASSAY OF FOLIC ACID SERUM: CPT | Performed by: INTERNAL MEDICINE

## 2020-06-26 RX ADMIN — ERYTHROPOIETIN 10000 UNITS: 10000 INJECTION, SOLUTION INTRAVENOUS; SUBCUTANEOUS at 13:36

## 2020-06-26 RX ADMIN — ERYTHROPOIETIN 10000 UNITS: 10000 INJECTION, SOLUTION INTRAVENOUS; SUBCUTANEOUS at 13:37

## 2020-06-30 DIAGNOSIS — M54.12 CERVICAL RADICULOPATHY: ICD-10-CM

## 2020-06-30 DIAGNOSIS — E11.42 DM TYPE 2 WITH DIABETIC PERIPHERAL NEUROPATHY (HCC): ICD-10-CM

## 2020-07-01 RX ORDER — GABAPENTIN 300 MG/1
300 CAPSULE ORAL 2 TIMES DAILY
Qty: 180 CAPSULE | Refills: 1 | Status: SHIPPED | OUTPATIENT
Start: 2020-07-01 | End: 2020-07-10 | Stop reason: SDUPTHER

## 2020-07-01 RX ORDER — BUMETANIDE 2 MG/1
2 TABLET ORAL DAILY
Qty: 90 TABLET | Refills: 1 | Status: ON HOLD | OUTPATIENT
Start: 2020-07-01 | End: 2021-05-09 | Stop reason: SDUPTHER

## 2020-07-02 ENCOUNTER — HOSPITAL ENCOUNTER (OUTPATIENT)
Dept: INFUSION THERAPY | Facility: HOSPITAL | Age: 70
Discharge: HOME | End: 2020-07-02
Admitting: INTERNAL MEDICINE

## 2020-07-02 VITALS
SYSTOLIC BLOOD PRESSURE: 163 MMHG | DIASTOLIC BLOOD PRESSURE: 72 MMHG | HEART RATE: 93 BPM | RESPIRATION RATE: 20 BRPM | OXYGEN SATURATION: 96 % | TEMPERATURE: 97.8 F

## 2020-07-02 DIAGNOSIS — N18.5 ANEMIA IN STAGE 5 CHRONIC KIDNEY DISEASE, NOT ON CHRONIC DIALYSIS (HCC): ICD-10-CM

## 2020-07-02 DIAGNOSIS — N18.5 CKD (CHRONIC KIDNEY DISEASE) STAGE 5, GFR LESS THAN 15 ML/MIN (HCC): ICD-10-CM

## 2020-07-02 DIAGNOSIS — N18.4 CHRONIC KIDNEY DISEASE, STAGE IV (SEVERE) (HCC): Primary | ICD-10-CM

## 2020-07-02 DIAGNOSIS — D63.1 ANEMIA IN STAGE 5 CHRONIC KIDNEY DISEASE, NOT ON CHRONIC DIALYSIS (HCC): ICD-10-CM

## 2020-07-02 LAB
HCT VFR BLD AUTO: 33.7 % (ref 34–46.6)
HGB BLD-MCNC: 10.7 G/DL (ref 12–15.9)

## 2020-07-02 PROCEDURE — 36416 COLLJ CAPILLARY BLOOD SPEC: CPT

## 2020-07-02 PROCEDURE — 85014 HEMATOCRIT: CPT | Performed by: INTERNAL MEDICINE

## 2020-07-02 PROCEDURE — 25010000002 EPOETIN ALFA PER 1000 UNITS: Performed by: INTERNAL MEDICINE

## 2020-07-02 PROCEDURE — 96372 THER/PROPH/DIAG INJ SC/IM: CPT

## 2020-07-02 PROCEDURE — 85018 HEMOGLOBIN: CPT | Performed by: INTERNAL MEDICINE

## 2020-07-02 RX ADMIN — ERYTHROPOIETIN 10000 UNITS: 20000 INJECTION, SOLUTION INTRAVENOUS; SUBCUTANEOUS at 13:11

## 2020-07-02 RX ADMIN — ERYTHROPOIETIN 10000 UNITS: 10000 INJECTION, SOLUTION INTRAVENOUS; SUBCUTANEOUS at 13:11

## 2020-07-02 NOTE — PROGRESS NOTES
Procrit given due to order parameters met.  Pt tolerated well.  Pt given AVS and dc'ed ambulatory with walker.

## 2020-07-09 ENCOUNTER — OFFICE VISIT (OUTPATIENT)
Dept: ENDOCRINOLOGY | Age: 70
End: 2020-07-09

## 2020-07-09 VITALS
OXYGEN SATURATION: 98 % | HEIGHT: 65 IN | BODY MASS INDEX: 29.66 KG/M2 | SYSTOLIC BLOOD PRESSURE: 130 MMHG | HEART RATE: 84 BPM | WEIGHT: 178 LBS | DIASTOLIC BLOOD PRESSURE: 64 MMHG

## 2020-07-09 DIAGNOSIS — E11.42 DM TYPE 2 WITH DIABETIC PERIPHERAL NEUROPATHY (HCC): Primary | ICD-10-CM

## 2020-07-09 DIAGNOSIS — E16.2 HYPOGLYCEMIA: ICD-10-CM

## 2020-07-09 PROCEDURE — 99214 OFFICE O/P EST MOD 30 MIN: CPT | Performed by: INTERNAL MEDICINE

## 2020-07-09 NOTE — PROGRESS NOTES
69 y.o.    Patient Care Team:  Robby Chaney MD as PCP - General  Robby Chaney MD as PCP - Family Medicine  Dhaval Mcclain MD as PCP - Claims Attributed  Eddie Hodges MD as Consulting Physician (Cardiology)    Chief Complaint:    FOLLOW UP/ TYPE 2 DIABETES MELLITUS   Subjective     HPI    69-year-old white female with past medical history of type 2 diabetes mellitus, end-stage renal disease is here for the follow-up as a hospital follow-up.  She was recently admitted in May 2020 with significant low blood sugars and also secondary to a fall.    Type 2 diabetes mellitus-diagnosed more than 10 years ago.  Patient used to be on glimepiride and Tradjenta.  During the hospitalization patient had severe low blood sugars.  And she reports being on glimepiride half a pill every other day.  Given her abnormal kidney function patient has been educated not to restart this medication upon discharge.    Today in clinic patient remains on Tradjenta 5 mg oral daily.  Checks her blood sugars 1-2 times a day.  Average blood sugars are around 180s-200s.  She does have history of seizures.  No recent fall secondary to low blood sugars after changing the medications.  No history of diabetic retinopathy.  Does have history of diabetic neuropathy on Neurontin.  History of CABG and end-stage renal disease.    Reviewed primary care physician's/consulting physician documentation and lab results :     Interval History      The following portions of the patient's history were reviewed and updated as appropriate: allergies, current medications, past family history, past medical history, past social history, past surgical history and problem list.    Past Medical History:   Diagnosis Date   • Achilles tendon tear     left    • Anemia    • Anxiety    • Depression    • Diabetes mellitus, type 2 (CMS/[facility])    • ESRD (end stage renal disease) (CMS/[facility])     HAS LEFT FOREARM FISTULA   • GERD (gastroesophageal reflux  disease)    • History of MRSA infection 03/15/2016    ABDOMINAL WOUND,   INFECTION CONTROLL NOTIFIED 2-6-2017   • History of transfusion    • Hyperlipidemia    • Hypertension    • Hypokalemia    • Hypomagnesemia    • Hypoxic 01/2016    WHEN SHE HAD PNEUMONIA   • Intertrigo    • Leukocytosis    • Osteoarthritis    • Pneumonia 01/2016   • Psoriasis    • Psoriatic arthritis (CMS/[facility])    • Seizures (CMS/[facility])     one time   • Sepsis (CMS/[facility]) 01/2016   • SOB (shortness of breath)    • Stage 4 chronic renal impairment associated with type 2 diabetes mellitus (CMS/[facility])    • Staph infection     HX RIGHT FOOT AT [facility] 01/09/2010   • Streptococcal bacteremia    • Swelling of hand 10/27/2016    LEFT HAND SWELLIMG SINCE LEFT FISTULA SURGERY   • Tremor, essential    • Wears glasses      Family History   Problem Relation Age of Onset   • Heart attack Mother    • Heart disease Mother    • Kidney disease Mother    • Heart attack Father    • Heart disease Father    • Hypertension Father    • Stroke Father         ISCHEMIC   • Diabetes Father         TYPE 2   • Kidney disease Brother    • Diabetes Brother         TYPE 1   • Thyroid disease Daughter    • Anxiety disorder Maternal Aunt    • Bipolar disorder Maternal Aunt    • Depression Maternal Aunt    • Lupus Maternal Aunt         LUPUS ANTICOAGLULANT   • Rheum arthritis Maternal Aunt    • Alcohol abuse Maternal Uncle    • Other Maternal Uncle         PULMONARY DISEASE     Social History     Socioeconomic History   • Marital status:      Spouse name: Tru   • Number of children: 4   • Years of education: 11   • Highest education level: 11th grade   Occupational History   • Occupation: Retired   Social Needs   • Financial resource strain: Not on file   • Food insecurity:     Worry: Not on file     Inability: Not on file   • Transportation needs:     Medical: No     Non-medical: No   Tobacco Use   • Smoking status: Former Smoker     Packs/day: 2.00     Years: 26.00     Pack  years: 52.00     Types: Cigarettes     Last attempt to quit: 10/21/1992     Years since quittin.7   • Smokeless tobacco: Never Used   • Tobacco comment: quit    Substance and Sexual Activity   • Alcohol use: Yes     Alcohol/week: 0.0 standard drinks     Frequency: Monthly or less     Drinks per session: 1 or 2     Binge frequency: Never   • Drug use: No   • Sexual activity: Defer     Birth control/protection: Surgical     Allergies   Allergen Reactions   • Prednisone Hallucinations       Current Outpatient Medications:   •  ACCU-CHEK JESUS MANUEL PLUS test strip, TEST THREE TIMES DAILY, Disp: 300 each, Rfl: 2  •  apixaban (ELIQUIS) 5 MG tablet tablet, Take 1 tablet by mouth 2 (Two) Times a Day., Disp: 180 tablet, Rfl: 1  •  aspirin 81 MG tablet, Take 81 mg by mouth Every Morning. TO STOP THE DAY BEFORE SURGERY, Disp: , Rfl:   •  bumetanide (BUMEX) 2 MG tablet, Take 1 tablet by mouth Daily., Disp: 90 tablet, Rfl: 1  •  carvedilol (COREG) 12.5 MG tablet, Take 1 tablet by mouth 2 (Two) Times a Day., Disp: 180 tablet, Rfl: 3  •  Cholecalciferol (VITAMIN D-3) 1000 UNITS capsule, Take 5,000 Units by mouth Daily., Disp: , Rfl:   •  epoetin hermes (EPOGEN,PROCRIT) 06376 UNIT/ML injection, Inject 20,000 Units under the skin into the appropriate area as directed 1 (One) Time Per Week. LAST TIME 2017, Disp: , Rfl:   •  ferrous fumarate (YADIRA-SEQUELS) 50 MG CR tablet, Take 65 mg by mouth 2 (Two) Times a Day., Disp: , Rfl:   •  gabapentin (NEURONTIN) 300 MG capsule, Take 1 capsule by mouth 2 (Two) Times a Day., Disp: 180 capsule, Rfl: 1  •  glucose blood test strip, Use as instructed, Disp: 100 each, Rfl: 0  •  linagliptin (TRADJENTA) 5 MG tablet tablet, Take 5 mg by mouth Daily., Disp: , Rfl:   •  montelukast (SINGULAIR) 10 MG tablet, Take 1 tablet by mouth Every Night., Disp: 90 tablet, Rfl: 1  •  Multiple Vitamin (MULTI VITAMIN DAILY PO), Take 1 tablet by mouth Every Morning., Disp: , Rfl:   •  nystatin (MYCOSTATIN)  "296429 UNIT/GM powder, Apply  topically to the appropriate area as directed 2 (Two) Times a Day As Needed., Disp: , Rfl:   •  Probiotic Product (PROBIOTIC DAILY PO), Take 1 capsule by mouth Daily., Disp: , Rfl:   •  pyridoxine (VITAMIN B-6) 500 MG tablet, Take 500 mg by mouth Daily., Disp: , Rfl:   •  topiramate (TOPAMAX) 100 MG tablet, Take 100 mg by mouth Daily., Disp: , Rfl:         Review of Systems   Constitutional: Positive for fatigue. Negative for appetite change and fever.   Eyes: Negative for visual disturbance.   Respiratory: Negative for shortness of breath.    Cardiovascular: Positive for palpitations. Negative for leg swelling.   Gastrointestinal: Negative for abdominal pain, constipation, diarrhea and vomiting.   Endocrine: Negative for polydipsia and polyuria.   Musculoskeletal: Negative for joint swelling and neck pain.   Skin: Negative for rash.   Neurological: Positive for weakness and numbness.   Psychiatric/Behavioral: Negative for behavioral problems.     I have reviewed the ROS as documented by the MA; Jose Florentino MD.      Objective       Vitals:    07/09/20 1305   BP: 130/64   Pulse: 84   SpO2: 98%   Weight: 80.7 kg (178 lb)   Height: 165.1 cm (65\")     Body mass index is 29.62 kg/m².      Physical Exam   Constitutional: She is oriented to person, place, and time. She appears well-nourished.   Obese     HENT:   Head: Normocephalic and atraumatic.   Wide neck   Eyes: Conjunctivae and EOM are normal. No scleral icterus.   Neck: Normal range of motion. Neck supple. No thyromegaly present.   Acanthosis nigricans   Cardiovascular: Normal rate.   Murmur heard.  Pulmonary/Chest: Effort normal and breath sounds normal. No stridor. She has no wheezes.   Abdominal: Soft. Bowel sounds are normal. She exhibits no distension. There is no tenderness.   Central obesity   Musculoskeletal: She exhibits no edema or tenderness.   Neurological: She is alert and oriented to person, place, and time.   Walks " with walker   Skin: Skin is warm and dry. She is not diaphoretic.   Psychiatric: She has a normal mood and affect.   Vitals reviewed.      Results Review:     I reviewed the patient's new clinical results and mentioned them above in HPI and in plan as well.    Medical records reviewed  Summary:Done      Hospital Outpatient Visit on 07/02/2020   Component Date Value Ref Range Status   • Hemoglobin 07/02/2020 10.7* 12.0 - 15.9 g/dL Final   • Hematocrit 07/02/2020 33.7* 34.0 - 46.6 % Final     Lab Results   Component Value Date    HGBA1C 7.52 (H) 05/30/2020    HGBA1C 7.20 (H) 10/18/2019    HGBA1C 7.56 (H) 07/26/2019     Lab Results   Component Value Date    CREATININE 3.71 (H) 06/26/2020     Imaging Results (Most Recent)     None                Assessment and Plan:    Maribeth was seen today for diabetes.    Diagnoses and all orders for this visit:    DM type 2 with diabetic peripheral neuropathy (CMS/[facility])  -     Cancel: Hemoglobin A1c; Future  -     Cancel: Creatinine, Serum; Future  -     eGFR-Glomerular Filtration; Future  -     Cancel: Lipid Panel; Future  -     Cancel: Microalbumin / Creatinine Urine Ratio - Urine, Clean Catch; Future  -     Cancel: TSH; Future  -     Cancel: Vitamin B12 & Folate; Future  -     Cancel: T4, Free; Future  -     Vitamin B12 & Folate; Future  -     T4, Free; Future  -     TSH; Future  -     Microalbumin / Creatinine Urine Ratio - Urine, Clean Catch; Future  -     Lipid Panel; Future  -     Creatinine, Serum; Future  -     Hemoglobin A1c; Future    Hypoglycemia  -     Cancel: Hemoglobin A1c; Future  -     Cancel: Creatinine, Serum; Future  -     eGFR-Glomerular Filtration; Future  -     Cancel: Lipid Panel; Future  -     Cancel: Microalbumin / Creatinine Urine Ratio - Urine, Clean Catch; Future  -     Cancel: TSH; Future  -     Cancel: Vitamin B12 & Folate; Future  -     Cancel: T4, Free; Future  -     Vitamin B12 & Folate; Future  -     T4, Free; Future  -     TSH; Future  -      "Microalbumin / Creatinine Urine Ratio - Urine, Clean Catch; Future  -     Lipid Panel; Future  -     Creatinine, Serum; Future  -     Hemoglobin A1c; Future      Type 2 diabetes mellitus-uncontrolled, complicated with low blood sugar issues  Continue Tradjenta  Hold off on glimepiride  Given her renal function patient is not a candidate for other oral hypoglycemic agents  Placed CGM on the patient for further evaluation of blood glucose trends.  Might consider Bydureon.    Hyperlipidemia  Continue statin.    Reviewed Lab results with the patient.         Jose Florentino MD  07/09/20    EMR Dragon / transcription disclaimer:     \"Dictated utilizing Dragon dictation\".      "

## 2020-07-10 ENCOUNTER — HOSPITAL ENCOUNTER (OUTPATIENT)
Dept: INFUSION THERAPY | Facility: HOSPITAL | Age: 70
Discharge: HOME | End: 2020-07-10
Admitting: INTERNAL MEDICINE

## 2020-07-10 VITALS
OXYGEN SATURATION: 97 % | TEMPERATURE: 99.4 F | DIASTOLIC BLOOD PRESSURE: 73 MMHG | SYSTOLIC BLOOD PRESSURE: 198 MMHG | HEART RATE: 94 BPM | RESPIRATION RATE: 20 BRPM

## 2020-07-10 DIAGNOSIS — N18.5 ANEMIA IN STAGE 5 CHRONIC KIDNEY DISEASE, NOT ON CHRONIC DIALYSIS (HCC): ICD-10-CM

## 2020-07-10 DIAGNOSIS — E11.42 DM TYPE 2 WITH DIABETIC PERIPHERAL NEUROPATHY (HCC): ICD-10-CM

## 2020-07-10 DIAGNOSIS — M54.12 CERVICAL RADICULOPATHY: ICD-10-CM

## 2020-07-10 DIAGNOSIS — N18.5 CKD (CHRONIC KIDNEY DISEASE) STAGE 5, GFR LESS THAN 15 ML/MIN (HCC): Primary | ICD-10-CM

## 2020-07-10 DIAGNOSIS — D63.1 ANEMIA IN STAGE 5 CHRONIC KIDNEY DISEASE, NOT ON CHRONIC DIALYSIS (HCC): ICD-10-CM

## 2020-07-10 LAB
HCT VFR BLD AUTO: 35.5 % (ref 34–46.6)
HGB BLD-MCNC: 11.2 G/DL (ref 12–15.9)

## 2020-07-10 PROCEDURE — 85014 HEMATOCRIT: CPT | Performed by: INTERNAL MEDICINE

## 2020-07-10 PROCEDURE — G0463 HOSPITAL OUTPT CLINIC VISIT: HCPCS

## 2020-07-10 PROCEDURE — 85018 HEMOGLOBIN: CPT | Performed by: INTERNAL MEDICINE

## 2020-07-10 RX ORDER — MONTELUKAST SODIUM 10 MG/1
10 TABLET ORAL NIGHTLY
Qty: 90 TABLET | Refills: 1 | Status: SHIPPED | OUTPATIENT
Start: 2020-07-10 | End: 2021-02-19 | Stop reason: SDUPTHER

## 2020-07-10 RX ORDER — CARVEDILOL 12.5 MG/1
12.5 TABLET ORAL 2 TIMES DAILY
Qty: 180 TABLET | Refills: 3 | Status: SHIPPED | OUTPATIENT
Start: 2020-07-10 | End: 2021-07-28

## 2020-07-10 RX ORDER — GABAPENTIN 300 MG/1
300 CAPSULE ORAL 2 TIMES DAILY
Qty: 180 CAPSULE | Refills: 1 | Status: SHIPPED | OUTPATIENT
Start: 2020-07-10 | End: 2021-03-29

## 2020-07-10 NOTE — PROGRESS NOTES
Pt states she had not taken her bp medication in the past 2 days due to delivery issues with med provider.  bp's 200-190's/70-90's.  Pt denies headache.   Procrit not indicated due to parameters not met.  Pt given AVS and dc'ed per walker

## 2020-07-10 NOTE — TELEPHONE ENCOUNTER
Caller: Maribeth Rendon    Relationship: Self    Best call back number: [phone]     Medication needed:   Requested Prescriptions     Pending Prescriptions Disp Refills   • apixaban (Eliquis) 5 MG tablet tablet 180 tablet 1     Sig: Take 1 tablet by mouth 2 (Two) Times a Day.   • carvedilol (COREG) 12.5 MG tablet 180 tablet 3     Sig: Take 1 tablet by mouth 2 (Two) Times a Day.   • gabapentin (NEURONTIN) 300 MG capsule 180 capsule 1     Sig: Take 1 capsule by mouth 2 (Two) Times a Day.   • montelukast (SINGULAIR) 10 MG tablet 90 tablet 1     Sig: Take 1 tablet by mouth Every Night.       When do you need the refill by: 10/11/2020      What details did the patient provide when requesting the medication: SHE IS ABOUT TO RUN OUT OF ALL OF THESE         Does the patient have less than a 3 day supply:  [x] Yes  [] No    What is the patient's preferred pharmacy:      [id number] [place] [address] [phone] [facility] [phone]

## 2020-07-14 NOTE — TELEPHONE ENCOUNTER
Caller: Maribeth Rendon    Relationship: Self    Best call back number: [phone]       Medication needed:   Requested Prescriptions     Pending Prescriptions Disp Refills   • linagliptin (TRADJENTA) 5 MG tablet tablet       Sig: Take 1 tablet by mouth Daily.       When do you need the refill by: AS SOON AS POSSIBLE    What details did the patient provide when requesting the medication: N/A    Does the patient have less than a 3 day supply:  [x] Yes  [] No    What is the patient's preferred pharmacy: [id number] [place], [place] [address] [address] [place], [place] [address]

## 2020-07-17 ENCOUNTER — HOSPITAL ENCOUNTER (OUTPATIENT)
Dept: INFUSION THERAPY | Facility: HOSPITAL | Age: 70
Discharge: HOME | End: 2020-07-17
Admitting: INTERNAL MEDICINE

## 2020-07-17 VITALS
RESPIRATION RATE: 20 BRPM | HEART RATE: 87 BPM | OXYGEN SATURATION: 94 % | DIASTOLIC BLOOD PRESSURE: 60 MMHG | TEMPERATURE: 98 F | SYSTOLIC BLOOD PRESSURE: 162 MMHG

## 2020-07-17 DIAGNOSIS — N18.5 CKD (CHRONIC KIDNEY DISEASE) STAGE 5, GFR LESS THAN 15 ML/MIN (HCC): Primary | ICD-10-CM

## 2020-07-17 DIAGNOSIS — D63.1 ANEMIA IN STAGE 5 CHRONIC KIDNEY DISEASE, NOT ON CHRONIC DIALYSIS (HCC): ICD-10-CM

## 2020-07-17 DIAGNOSIS — N18.5 ANEMIA IN STAGE 5 CHRONIC KIDNEY DISEASE, NOT ON CHRONIC DIALYSIS (HCC): ICD-10-CM

## 2020-07-17 LAB
HCT VFR BLD AUTO: 32 % (ref 34–46.6)
HGB BLD-MCNC: 9.9 G/DL (ref 12–15.9)

## 2020-07-17 PROCEDURE — 96372 THER/PROPH/DIAG INJ SC/IM: CPT

## 2020-07-17 PROCEDURE — 85014 HEMATOCRIT: CPT | Performed by: INTERNAL MEDICINE

## 2020-07-17 PROCEDURE — 85018 HEMOGLOBIN: CPT | Performed by: INTERNAL MEDICINE

## 2020-07-17 PROCEDURE — 25010000002 EPOETIN ALFA PER 1000 UNITS: Performed by: INTERNAL MEDICINE

## 2020-07-17 PROCEDURE — 36416 COLLJ CAPILLARY BLOOD SPEC: CPT

## 2020-07-17 RX ADMIN — ERYTHROPOIETIN 20000 UNITS: 20000 INJECTION, SOLUTION INTRAVENOUS; SUBCUTANEOUS at 13:46

## 2020-07-17 NOTE — PROGRESS NOTES
HGB noted and Procrit indicated per physician order.  Injection given without difficulty.  AVS given and patient discharged home ambulatory(walker) to front entrance where her  is to pick her up for her ride home.

## 2020-07-23 ENCOUNTER — TELEPHONE (OUTPATIENT)
Dept: FAMILY MEDICINE CLINIC | Facility: CLINIC | Age: 70
End: 2020-07-23

## 2020-07-23 ENCOUNTER — HOSPITAL ENCOUNTER (EMERGENCY)
Facility: HOSPITAL | Age: 70
Discharge: HOME | End: 2020-07-23
Attending: EMERGENCY MEDICINE | Admitting: EMERGENCY MEDICINE

## 2020-07-23 VITALS
HEIGHT: 65 IN | HEART RATE: 60 BPM | TEMPERATURE: 98.4 F | SYSTOLIC BLOOD PRESSURE: 143 MMHG | OXYGEN SATURATION: 99 % | DIASTOLIC BLOOD PRESSURE: 73 MMHG | BODY MASS INDEX: 29.49 KG/M2 | RESPIRATION RATE: 16 BRPM | WEIGHT: 177 LBS

## 2020-07-23 DIAGNOSIS — R51.9 ACUTE NONINTRACTABLE HEADACHE, UNSPECIFIED HEADACHE TYPE: ICD-10-CM

## 2020-07-23 DIAGNOSIS — I10 HYPERTENSION, UNSPECIFIED TYPE: Primary | ICD-10-CM

## 2020-07-23 DIAGNOSIS — D64.9 CHRONIC ANEMIA: ICD-10-CM

## 2020-07-23 LAB
ALBUMIN SERPL-MCNC: 3.1 G/DL (ref 3.5–5.2)
ALBUMIN/GLOB SERPL: 0.7 G/DL
ALP SERPL-CCNC: 70 U/L (ref 39–117)
ALT SERPL W P-5'-P-CCNC: 12 U/L (ref 1–33)
ANION GAP SERPL CALCULATED.3IONS-SCNC: 10.9 MMOL/L (ref 5–15)
AST SERPL-CCNC: 14 U/L (ref 1–32)
BASOPHILS # BLD AUTO: 0.04 10*3/MM3 (ref 0–0.2)
BASOPHILS NFR BLD AUTO: 0.5 % (ref 0–1.5)
BILIRUB SERPL-MCNC: 0.2 MG/DL (ref 0–1.2)
BUN SERPL-MCNC: 35 MG/DL (ref 8–23)
BUN/CREAT SERPL: 10.8 (ref 7–25)
CALCIUM SPEC-SCNC: 8.3 MG/DL (ref 8.6–10.5)
CHLORIDE SERPL-SCNC: 106 MMOL/L (ref 98–107)
CO2 SERPL-SCNC: 20.1 MMOL/L (ref 22–29)
CREAT SERPL-MCNC: 3.24 MG/DL (ref 0.57–1)
DEPRECATED RDW RBC AUTO: 55.6 FL (ref 37–54)
EOSINOPHIL # BLD AUTO: 0.33 10*3/MM3 (ref 0–0.4)
EOSINOPHIL NFR BLD AUTO: 4.2 % (ref 0.3–6.2)
ERYTHROCYTE [DISTWIDTH] IN BLOOD BY AUTOMATED COUNT: 17.3 % (ref 12.3–15.4)
GFR SERPL CREATININE-BSD FRML MDRD: 14 ML/MIN/1.73
GFR SERPL CREATININE-BSD FRML MDRD: ABNORMAL ML/MIN/{1.73_M2}
GLOBULIN UR ELPH-MCNC: 4.4 GM/DL
GLUCOSE SERPL-MCNC: 167 MG/DL (ref 65–99)
HCT VFR BLD AUTO: 31 % (ref 34–46.6)
HGB BLD-MCNC: 9.8 G/DL (ref 12–15.9)
HOLD SPECIMEN: NORMAL
HOLD SPECIMEN: NORMAL
IMM GRANULOCYTES # BLD AUTO: 0.04 10*3/MM3 (ref 0–0.05)
IMM GRANULOCYTES NFR BLD AUTO: 0.5 % (ref 0–0.5)
LYMPHOCYTES # BLD AUTO: 1.35 10*3/MM3 (ref 0.7–3.1)
LYMPHOCYTES NFR BLD AUTO: 17.2 % (ref 19.6–45.3)
MCH RBC QN AUTO: 27.5 PG (ref 26.6–33)
MCHC RBC AUTO-ENTMCNC: 31.6 G/DL (ref 31.5–35.7)
MCV RBC AUTO: 87.1 FL (ref 79–97)
MONOCYTES # BLD AUTO: 0.69 10*3/MM3 (ref 0.1–0.9)
MONOCYTES NFR BLD AUTO: 8.8 % (ref 5–12)
NEUTROPHILS NFR BLD AUTO: 5.41 10*3/MM3 (ref 1.7–7)
NEUTROPHILS NFR BLD AUTO: 68.8 % (ref 42.7–76)
NRBC BLD AUTO-RTO: 0.1 /100 WBC (ref 0–0.2)
PLATELET # BLD AUTO: 276 10*3/MM3 (ref 140–450)
PMV BLD AUTO: 10.7 FL (ref 6–12)
POTASSIUM SERPL-SCNC: 4.2 MMOL/L (ref 3.5–5.2)
PROT SERPL-MCNC: 7.5 G/DL (ref 6–8.5)
RBC # BLD AUTO: 3.56 10*6/MM3 (ref 3.77–5.28)
SODIUM SERPL-SCNC: 137 MMOL/L (ref 136–145)
WBC # BLD AUTO: 7.86 10*3/MM3 (ref 3.4–10.8)
WHOLE BLOOD HOLD SPECIMEN: NORMAL
WHOLE BLOOD HOLD SPECIMEN: NORMAL

## 2020-07-23 PROCEDURE — 36415 COLL VENOUS BLD VENIPUNCTURE: CPT

## 2020-07-23 PROCEDURE — 80053 COMPREHEN METABOLIC PANEL: CPT | Performed by: EMERGENCY MEDICINE

## 2020-07-23 PROCEDURE — 99283 EMERGENCY DEPT VISIT LOW MDM: CPT

## 2020-07-23 PROCEDURE — 85025 COMPLETE CBC W/AUTO DIFF WBC: CPT

## 2020-07-23 RX ORDER — HYDROCODONE BITARTRATE AND ACETAMINOPHEN 5; 325 MG/1; MG/1
1 TABLET ORAL EVERY 4 HOURS PRN
Qty: 8 TABLET | Refills: 0 | Status: SHIPPED | OUTPATIENT
Start: 2020-07-23 | End: 2020-10-15

## 2020-07-23 NOTE — ED TRIAGE NOTES
"Pt sent here by PCP for elevated BP of 172/91. Pt reports \"I come here every Friday for a procrit shot and they won't give it to me if my BP is high\". Patient was placed in face mask during first look triage.  Patient was wearing a face mask throughout encounter.  I wore personal protective equipment throughout the encounter.  Hand hygiene was performed before and after patient encounter.     "

## 2020-07-23 NOTE — TELEPHONE ENCOUNTER
PT CALLED STATING HER BLOOD PRESSURE HAS BEEN VERY HIGH, AND SHE IS SCHEDULED FOR A PROCRIT SHOT TOMORROW. SHE NEEDS TO BRING HER BP DOWN BEFORE THEN, BECAUSE THE SHOT ELEVATES IT EVEN MORE BUT SHE NEEDS IT FOR HER KIDNEYS. HER LATEST READING /91. SHE STATES HER HEAD HAS BEEN POUNDING AND SHE HAS VOMITED TWICE IN THE LAST 24 HOURS.  SHE WANTS TO KNOW IF SHE CAN TAKE AN EXTRA BLOOD PRESSURE PILL TO GET IT UNDER CONTROL BEFORE TOMORROW AFTERNOON.    PLEASE ADVISE.    CALLBACK NUMBER: [phone]

## 2020-07-23 NOTE — TELEPHONE ENCOUNTER
Pt has been called and advised per Dr. Chaney, given her history of having strokes and her symptoms it is best for pt to go to the ER. Pt has been called and advised to do such. Pt gave verbal understanding

## 2020-07-24 ENCOUNTER — EPISODE CHANGES (OUTPATIENT)
Dept: CASE MANAGEMENT | Facility: OTHER | Age: 70
End: 2020-07-24

## 2020-07-24 ENCOUNTER — TELEPHONE (OUTPATIENT)
Dept: FAMILY MEDICINE CLINIC | Facility: CLINIC | Age: 70
End: 2020-07-24

## 2020-07-24 ENCOUNTER — HOSPITAL ENCOUNTER (OUTPATIENT)
Dept: INFUSION THERAPY | Facility: HOSPITAL | Age: 70
Discharge: HOME | End: 2020-07-24
Admitting: INTERNAL MEDICINE

## 2020-07-24 ENCOUNTER — PATIENT OUTREACH (OUTPATIENT)
Dept: CASE MANAGEMENT | Facility: OTHER | Age: 70
End: 2020-07-24

## 2020-07-24 VITALS
DIASTOLIC BLOOD PRESSURE: 65 MMHG | OXYGEN SATURATION: 97 % | SYSTOLIC BLOOD PRESSURE: 142 MMHG | HEART RATE: 56 BPM | RESPIRATION RATE: 16 BRPM | TEMPERATURE: 97.6 F

## 2020-07-24 DIAGNOSIS — N18.5 CKD (CHRONIC KIDNEY DISEASE) STAGE 5, GFR LESS THAN 15 ML/MIN (HCC): Primary | ICD-10-CM

## 2020-07-24 DIAGNOSIS — D63.1 ANEMIA IN STAGE 5 CHRONIC KIDNEY DISEASE, NOT ON CHRONIC DIALYSIS (HCC): ICD-10-CM

## 2020-07-24 DIAGNOSIS — N18.5 ANEMIA IN STAGE 5 CHRONIC KIDNEY DISEASE, NOT ON CHRONIC DIALYSIS (HCC): ICD-10-CM

## 2020-07-24 PROCEDURE — 36415 COLL VENOUS BLD VENIPUNCTURE: CPT

## 2020-07-24 PROCEDURE — 96372 THER/PROPH/DIAG INJ SC/IM: CPT

## 2020-07-24 PROCEDURE — 25010000002 EPOETIN ALFA PER 1000 UNITS: Performed by: INTERNAL MEDICINE

## 2020-07-24 RX ADMIN — ERYTHROPOIETIN 20000 UNITS: 20000 INJECTION, SOLUTION INTRAVENOUS; SUBCUTANEOUS at 13:24

## 2020-07-24 NOTE — TELEPHONE ENCOUNTER
PATIENT IS CALLING TO SCHEDULE A ER VISIT FOLLOW UP      PATIENT WAS SEEN IN THE ER YESTERDAY        PLEASE CALL CELL  [phone]

## 2020-07-24 NOTE — ED PROVIDER NOTES
EMERGENCY DEPARTMENT ENCOUNTER    CHIEF COMPLAINT  Chief Complaint: Hypertension/headache  History given by: Patient  History limited by: None  Room Number: 22/22  PMD: Robby Chaney MD      HPI:  Pt is a 69 y.o. female who presents complaining of intermittent headache for the past 2 weeks.  The patient states that she takes Tylenol and does get occasional relief of her headache with this.  She describes it as a sharp sensation and diffusely along her head.  She denies any associated photophobia, phonophobia, or lightheadedness/dizziness.  She also presents today with elevations in her blood pressure this morning.  She states that shortly thereafter she took her blood pressure medication which did seem to lower her blood pressure.  The patient states that she gets a Procrit shot on Friday and her blood pressure cannot be elevated for that.  The patient denies chest pain, shortness of breath, fever/chills, or any known sick contacts.  She does state that she had 2 episodes of vomiting with nausea yesterday that has since resolved.  The patient has had previous episodes of similar symptoms with improvement with medication.  Symptoms are mild in intensity and nonradiating.      PAST MEDICAL HISTORY  Active Ambulatory Problems     Diagnosis Date Noted   • Menstrual disorder 01/27/2016   • Atopic rhinitis 01/27/2016   • Anemia in CKD (chronic kidney disease) 01/27/2016   • Spasm of cervical paraspinous muscle 01/27/2016   • Cervical radiculopathy 01/27/2016   • Chronic kidney disease, stage IV (severe) (CMS/[facility]) 01/27/2016   • Depression 01/27/2016   • DM type 2 with diabetic peripheral neuropathy (CMS/[facility]) 01/27/2016   • Essential hypertension 01/27/2016   • Duplay's periarthritis syndrome 01/27/2016   • Gastroesophageal reflux disease 01/27/2016   • Hyperlipidemia 01/27/2016   • Hypertension 01/27/2016   • Arthritis 01/27/2016   • Seizure disorder (CMS/[facility]) 01/27/2016   • Phlebitis 01/27/2016   • Type 2  diabetes mellitus (CMS/HCC) 01/27/2016   • Vitamin D deficiency 01/27/2016   • Chest pain 01/27/2016   • Abscess 03/17/2016   • Generalized muscle weakness 03/17/2016   • Abdominal wall cellulitis 03/18/2016   • HTN (hypertension) 03/18/2016   • CKD (chronic kidney disease) stage 4, GFR 15-29 ml/min (CMS/HCC) 03/18/2016   • Diabetes mellitus with peripheral autonomic neuropathy (CMS/HCC) 03/18/2016   • Hyponatremia 03/18/2016   • Hypercalcemia 03/18/2016   • Dehydration 03/18/2016   • DACIA (acute kidney injury) (CMS/HCC) 03/19/2016   • Abdominal wall abscess 03/23/2016   • Cellulitis of toe of left foot 09/22/2017   • Partial Achilles tendon tear, left, initial encounter 11/08/2017   • Arthritis of foot 01/18/2018   • Essential tremor 06/30/2016   • Primary Parkinsonism (CMS/HCC) 03/10/2017   • Abnormal cardiac function test 07/15/2019   • 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 pectoris (CMS/HCC) 07/15/2019   • Atrial fibrillation with RVR (CMS/HCC) 11/20/2019   • Anticoagulated 11/20/2019   • CKD (chronic kidney disease) stage 5, GFR less than 15 ml/min (CMS/HCC) 01/27/2020   • Hypoglycemia 05/30/2020     Resolved Ambulatory Problems     Diagnosis Date Noted   • Community acquired pneumonia 01/27/2016   • Diabetes mellitus (CMS/HCC) 03/18/2016     Past Medical History:   Diagnosis Date   • Achilles tendon tear    • Anemia    • Anxiety    • Diabetes mellitus, type 2 (CMS/HCC)    • ESRD (end stage renal disease) (CMS/HCC)    • GERD (gastroesophageal reflux disease)    • History of MRSA infection 03/15/2016   • History of transfusion    • Hypokalemia    • Hypomagnesemia    • Hypoxic 01/2016   • Intertrigo    • Leukocytosis    • Osteoarthritis    • Pneumonia 01/2016   • Psoriasis    • Psoriatic arthritis (CMS/HCC)    • Seizures (CMS/HCC)    • Sepsis (CMS/HCC) 01/2016   • SOB (shortness of breath)    • Stage 4 chronic renal impairment associated with type 2 diabetes mellitus (CMS/HCC)    • Staph  infection    • Streptococcal bacteremia    • Swelling of hand 10/27/2016   • Tremor, essential    • Wears glasses        PAST SURGICAL HISTORY  Past Surgical History:   Procedure Laterality Date   • ABDOMINAL WALL ABSCESS INCISION AND DRAINAGE  2016   • ARTERIOVENOUS FISTULA/SHUNT SURGERY Left 10/27/2016    Procedure: LT FOREARM FISTULA;  Surgeon: Lorelei Haque Jr., MD;  Location: [facility] MAIN OR;  Service:    • ARTERIOVENOUS FISTULA/SHUNT SURGERY Left 2/16/2017    Procedure: LT BRACHIAL CEPHALIC WITH FISTULA LIGATION RADIAL CEPHALIC.;  Surgeon: Gurmeet Carver MD;  Location: [facility] MAIN OR;  Service:    • CARDIAC CATHETERIZATION N/A 7/26/2019    Procedure: Left Heart Cath;  Surgeon: Eddie Hodges MD;  Location: [facility] CATH INVASIVE LOCATION;  Service: Cardiology   • CARDIAC CATHETERIZATION N/A 7/26/2019    Procedure: Coronary angiography;  Surgeon: Eddie Hodges MD;  Location: [facility] CATH INVASIVE LOCATION;  Service: Cardiology   • CARDIAC SURGERY     • CATARACT EXTRACTION W/ INTRAOCULAR LENS IMPLANT Bilateral 2011   • CORONARY ARTERY BYPASS GRAFT N/A 7/29/2019    Procedure: INTRAOP ERNESTO; CORONARY ARTERY BYPASS GRAFTING X 4 WITH LEFT INTERNAL MAMMARY ARTERY GRAFT AND UTILIZING ENDOSCOPICALLY HARVESTED GREATER SAPHENOUS VEIN; PRP;  Surgeon: Gordo Ferreira MD;  Location: [facility] MAIN OR;  Service: Cardiothoracic   • CORONARY ARTERY BYPASS GRAFT     • DILATATION AND CURETTAGE  1991   • EXCISION MASS TRUNK N/A 3/22/2016    Procedure: I&D LOWER ABDOMINAL  WOUND;  Surgeon: Tru Yi MD;  Location: [facility] OR;  Service:    • FOOT SURGERY Right 2010    REMOVED BONE IN RIGHT GREAT TOE   • HYSTERECTOMY  1992       FAMILY HISTORY  Family History   Problem Relation Age of Onset   • Heart attack Mother    • Heart disease Mother    • Kidney disease Mother    • Heart attack Father    • Heart disease Father    • Hypertension Father    • Stroke Father         ISCHEMIC   • Diabetes Father          TYPE 2   • Kidney disease Brother    • Diabetes Brother         TYPE 1   • Thyroid disease Daughter    • Anxiety disorder Maternal Aunt    • Bipolar disorder Maternal Aunt    • Depression Maternal Aunt    • Lupus Maternal Aunt         LUPUS ANTICOAGLULANT   • Rheum arthritis Maternal Aunt    • Alcohol abuse Maternal Uncle    • Other Maternal Uncle         PULMONARY DISEASE       SOCIAL HISTORY  Social History     Socioeconomic History   • Marital status:      Spouse name: Tru   • Number of children: 4   • Years of education: 11   • Highest education level: 11th grade   Occupational History   • Occupation: Retired   Social Needs   • Financial resource strain: Not on file   • Food insecurity:     Worry: Not on file     Inability: Not on file   • Transportation needs:     Medical: No     Non-medical: No   Tobacco Use   • Smoking status: Former Smoker     Packs/day: 2.00     Years: 26.00     Pack years: 52.00     Types: Cigarettes     Last attempt to quit: 10/21/1992     Years since quittin.7   • Smokeless tobacco: Never Used   • Tobacco comment: quit    Substance and Sexual Activity   • Alcohol use: Yes     Alcohol/week: 0.0 standard drinks     Frequency: Monthly or less     Drinks per session: 1 or 2     Binge frequency: Never   • Drug use: No   • Sexual activity: Defer     Birth control/protection: Surgical       ALLERGIES  Prednisone    REVIEW OF SYSTEMS  Review of Systems   Constitutional: Negative for fever.   HENT: Negative for sore throat.    Eyes: Negative.    Respiratory: Negative for cough and shortness of breath.    Cardiovascular: Negative for chest pain.   Gastrointestinal: Positive for nausea and vomiting. Negative for abdominal pain and diarrhea.   Genitourinary: Negative for dysuria.   Musculoskeletal: Negative for neck pain.   Skin: Negative for rash.   Allergic/Immunologic: Negative.    Neurological: Positive for headaches. Negative for weakness and numbness.   Hematological:  Negative.    Psychiatric/Behavioral: Negative.    All other systems reviewed and are negative.      PHYSICAL EXAM  ED Triage Vitals   Temp Heart Rate Resp BP SpO2   07/23/20 1416 07/23/20 1416 07/23/20 1416 07/23/20 1428 07/23/20 1416   98.4 °F (36.9 °C) 65 18 155/62 96 %      Temp src Heart Rate Source Patient Position BP Location FiO2 (%)   07/23/20 1416 -- 07/23/20 1428 07/23/20 1428 --   Tympanic  Sitting Right arm        Physical Exam   Constitutional: She is oriented to person, place, and time. No distress.   HENT:   Head: Normocephalic and atraumatic.   Eyes: Pupils are equal, round, and reactive to light. EOM are normal.   Neck: Normal range of motion. Neck supple.   Cardiovascular: Normal rate, regular rhythm and normal heart sounds.   Pulmonary/Chest: Effort normal and breath sounds normal. No respiratory distress.   Abdominal: Soft. There is no tenderness. There is no rebound and no guarding.   Musculoskeletal: Normal range of motion. She exhibits no edema.   Neurological: She is alert and oriented to person, place, and time. She has normal sensation and normal strength.   Skin: Skin is warm and dry. No rash noted.   Psychiatric: Mood and affect normal.   Nursing note and vitals reviewed.      LAB RESULTS  Lab Results (last 24 hours)     ** No results found for the last 24 hours. **          I ordered the above labs and reviewed the results    RADIOLOGY  No orders to display        I ordered the above noted radiological studies. Interpreted by radiologist. Reviewed by me in PACS.       PROCEDURES  Procedures      PROGRESS AND CONSULTS     The patient was wearing a facemask upon entrance into the room and remained in such throughout their visit.  I was wearing PPE including a facemask, eye protection, as well as gloves at any point entering the room and throughout the visit    2100  I did discuss the patient that though her labs are abnormal, they appear to be at her baseline and currently her blood  pressure and heart rate are acceptable and do not need emergent treatment.  I strongly discussed her to discuss her case with her primary doctor as well as Dr. Watson, her nephrologist, for tighter blood pressure control.  All questions have been answered and she will be stable for discharge      MEDICAL DECISION MAKING  Results were reviewed/discussed with the patient and they were also made aware of online access. Pt also made aware that some labs, such as cultures, will not be resulted during ER visit and follow up with PMD is necessary.     MDM  Number of Diagnoses or Management Options     Amount and/or Complexity of Data Reviewed  Review and summarize past medical records: yes (Upon medical records review, the patient was last seen and evaluated on 5/29/2020 and was admitted secondary to hypoglycemia as well as anemia.)           DIAGNOSIS  Final diagnoses:   Hypertension, unspecified type   Acute nonintractable headache, unspecified headache type   Chronic anemia       DISPOSITION  DISCHARGE    Patient discharged in stable condition.    Reviewed implications of results, diagnosis, meds, responsibility to follow up, warning signs and symptoms of possible worsening, potential complications and reasons to return to ER    Patient/Family voiced understanding of above instructions.    Discussed plan for discharge, as there is no emergent indication for admission. Patient referred to primary care provider for BP management due to today's BP. Pt/family is agreeable and understands need for follow up and repeat testing.  Pt is aware that discharge does not mean that nothing is wrong but it indicates no emergency is present that requires admission and they must continue care with follow-up as given below or physician of their choice.     FOLLOW-UP  Robby Chaney MD  [address] [facility] [id number] [phone]    Schedule an appointment as soon as possible for a visit            Medication List      New  Prescriptions    HYDROcodone-acetaminophen 5-325 MG per tablet  Commonly known as:  NORCO  Take 1 tablet by mouth Every 4 (Four) Hours As Needed for Moderate Pain .              Latest Documented Vital Signs:  As of 19:15  BP- 143/73 HR- 60 Temp- 98.4 °F (36.9 °C) (Tympanic) O2 sat- 99%         Sebastian Pizarro MD  07/24/20 6406

## 2020-07-24 NOTE — ED NOTES
Pt to ED from home for hypertension. Pt reports she called Dr. Chaney to obtain philip and was advised to come to ED for BP of 172/91. Pt expresses concern of being unable to get procrit shot tomorrow due to Hypertension. Pt reports associated symptoms-intermittent headache, intermittent blurry vision.    /63 at this time. MD has already seen pt.     Pt rates headache 4/10 at this time. Denies blurry vision at this time. Denies cp, SOA, and nausea.    This RN wore goggles, mask, and gloves while in pts room. Pt wearing surgical mask.       Sharonda Romero, AUDREY  07/23/20 2048

## 2020-07-24 NOTE — OUTREACH NOTE
Care Plan Note      Responses   Annual Wellness Visit:   Patient Refuses at This Time   Care Gaps Addressed  Flu Shot, Pneumonia Vaccine, Diabetic Eye Exam, Diabetic A1C   HbA1c Status  Up to Date-within defined limits   Diabetic Eye Exam Status  Up to Date   Flu Shot Status  Up to Date   Flu Shot Completion at [facility] or Other  [facility]   Pneumonia Vaccine Status  Up to Date   Specific Disease Process Teaching  Hypertension   Other Patient Education/Resources   24/7 [facility] Nurse Call Line, Advanced Care Planning, [facility]   ACP Education Method  -- [Completed]   MyChart Education Method  Verbal [Active ]   Advanced Directives:  Patient Has   Medication Adherence  Medications understood        The main concerns and/or symptoms the patient would like to address are: Talked with patient. Discussed 7/23/20 ED visit regarding HTN and headache. Patient states to continue with headache and has been monitoring blood pressure at home. Reports today's value of 150/73. Patient states to have contacted PCP today for recommendations and is waiting for return phone call. Patient takes Eliquis; reports episode of nosebleed; which has stopped. She obtains Procrit injection weekly every Friday. Patient lives with spouse; independent with ADL's; meal preparation; receiving assistance with transportation and  ambulates with walker. She is compliant with medications; medical appointments and monitoring of blood pressure and blood sugars. She is seeing endocrinologist and using Gina patch to monitor blood sugars; will follow with endocrinologist regarding gina patch.     Education/instruction provided by Care Coordinator: Reviewed with patient ED recommendations; education regarding HTN/ DM ; monitoring blood pressure; blood sugars; diet  COVID 19 precautions; 24/7 Nurse Line Telephone number; ACM contact information; Advance Directives; My Chart; gaps in care; MWV and Case Management services.  Patient verbalized  understanding and states to appreciate phone call.  No further questions or concerns voiced at this time.     Follow Up Outreach Due: Follow up as needed.     Gladys Merino RN  Ambulatory     7/24/2020, 11:06

## 2020-07-27 ENCOUNTER — OFFICE VISIT (OUTPATIENT)
Dept: FAMILY MEDICINE CLINIC | Facility: CLINIC | Age: 70
End: 2020-07-27

## 2020-07-27 ENCOUNTER — EPISODE CHANGES (OUTPATIENT)
Dept: CASE MANAGEMENT | Facility: OTHER | Age: 70
End: 2020-07-27

## 2020-07-27 VITALS
HEART RATE: 82 BPM | BODY MASS INDEX: 29.49 KG/M2 | OXYGEN SATURATION: 98 % | WEIGHT: 177 LBS | HEIGHT: 65 IN | TEMPERATURE: 98.2 F | SYSTOLIC BLOOD PRESSURE: 145 MMHG | DIASTOLIC BLOOD PRESSURE: 70 MMHG

## 2020-07-27 DIAGNOSIS — I10 ESSENTIAL HYPERTENSION: Primary | ICD-10-CM

## 2020-07-27 PROCEDURE — 99214 OFFICE O/P EST MOD 30 MIN: CPT | Performed by: INTERNAL MEDICINE

## 2020-07-27 RX ORDER — AMLODIPINE BESYLATE 2.5 MG/1
2.5 TABLET ORAL DAILY
Qty: 30 TABLET | Refills: 5 | Status: SHIPPED | OUTPATIENT
Start: 2020-07-27 | End: 2020-10-15

## 2020-07-27 NOTE — PROGRESS NOTES
Subjective Chief complaint is ER follow-up for high blood pressure  Maribeth Rendon is a 69 y.o. female.     History of Present Illness Maribeth is here today for follow-up.  She had called the office on Thursday of last week.  She was complaining of headache and high blood pressure as well as a few episodes of nausea and vomiting.  We advised her to go to the emergency room.  Her blood pressure there was not terribly elevated.  Did give her something for headache which she has not filled.  Her blood pressure today is slightly elevated.  She also complains of some nosebleeds.  She does have a prior history of a fall with a nasal injury.    The following portions of the patient's history were reviewed and updated as appropriate: allergies, current medications, past family history, past medical history, past social history, past surgical history and problem list.    Review of Systems   Constitutional: Negative for chills and fever.   HENT: Positive for nosebleeds.    Respiratory: Negative for chest tightness and shortness of breath.    Neurological: Positive for headache.       Objective   Physical Exam   Constitutional: She appears well-developed and well-nourished.   HENT:   Tympanic membranes are normal.  There is some bilateral minimal nasal congestion.  Rhinorrhea is clear.  I do not see any bleeding abnormalities anteriorly.   Cardiovascular: Normal rate, normal heart sounds and intact distal pulses.   Pulmonary/Chest: Effort normal and breath sounds normal.   Musculoskeletal: She exhibits no edema.   Nursing note and vitals reviewed.        Assessment/Plan   Maribeth was seen today for follow-up.    Diagnoses and all orders for this visit:    Essential hypertension    Other orders  -     amLODIPine (NORVASC) 2.5 MG tablet; Take 1 tablet by mouth Daily.    Maribeth is here today for follow-up on her blood pressure.  I am going to add some low-dose amlodipine to her carvedilol.  We will see her back in 3 weeks.  We can  always adjust this up or down as needed.

## 2020-07-28 ENCOUNTER — TREATMENT (OUTPATIENT)
Dept: ENDOCRINOLOGY | Age: 70
End: 2020-07-28

## 2020-07-28 DIAGNOSIS — IMO0002 DM (DIABETES MELLITUS), TYPE 2, UNCONTROLLED W/NEUROLOGIC COMPLICATION: ICD-10-CM

## 2020-07-28 PROCEDURE — 95251 CONT GLUC MNTR ANALYSIS I&R: CPT | Performed by: INTERNAL MEDICINE

## 2020-07-28 PROCEDURE — 95250 CONT GLUC MNTR PHYS/QHP EQP: CPT | Performed by: INTERNAL MEDICINE

## 2020-07-28 NOTE — PROGRESS NOTES
Continues glucose monitoring data    Continuous glucose monitoring was placed on July 9 by Linda Tsai  Patient has been trained/educated by the MA regarding the CGM    Patient wore continuous glucose monitoring-free style richard Pro from July 9-July 23   average blood glucose reading was 163  Estimated HbA1c 7.5-8%  Likelihood of low blood glucose levels was low  Likelihood of high blood glucose levels was moderate  Blood glucose trends showed high BG after mealtime    Current treatment regimen  On Tradjenta    Changes to the treatment regimen  Continue Tradjenta  Add Bydureon once a week injection.    Talk to the patient and need to send in the prescription of the Bydureon

## 2020-07-31 ENCOUNTER — HOSPITAL ENCOUNTER (OUTPATIENT)
Dept: INFUSION THERAPY | Facility: HOSPITAL | Age: 70
Discharge: HOME | End: 2020-07-31
Admitting: INTERNAL MEDICINE

## 2020-07-31 VITALS
HEART RATE: 72 BPM | OXYGEN SATURATION: 96 % | TEMPERATURE: 97.5 F | SYSTOLIC BLOOD PRESSURE: 162 MMHG | RESPIRATION RATE: 20 BRPM | DIASTOLIC BLOOD PRESSURE: 75 MMHG

## 2020-07-31 DIAGNOSIS — N18.5 ANEMIA IN STAGE 5 CHRONIC KIDNEY DISEASE, NOT ON CHRONIC DIALYSIS (HCC): ICD-10-CM

## 2020-07-31 DIAGNOSIS — N18.5 CKD (CHRONIC KIDNEY DISEASE) STAGE 5, GFR LESS THAN 15 ML/MIN (HCC): Primary | ICD-10-CM

## 2020-07-31 DIAGNOSIS — D63.1 ANEMIA IN STAGE 5 CHRONIC KIDNEY DISEASE, NOT ON CHRONIC DIALYSIS (HCC): ICD-10-CM

## 2020-07-31 LAB
HCT VFR BLD AUTO: 32.1 % (ref 34–46.6)
HGB BLD-MCNC: 9.8 G/DL (ref 12–15.9)

## 2020-07-31 PROCEDURE — 25010000002 EPOETIN ALFA PER 1000 UNITS: Performed by: INTERNAL MEDICINE

## 2020-07-31 PROCEDURE — 85018 HEMOGLOBIN: CPT | Performed by: INTERNAL MEDICINE

## 2020-07-31 PROCEDURE — 85014 HEMATOCRIT: CPT | Performed by: INTERNAL MEDICINE

## 2020-07-31 PROCEDURE — 36416 COLLJ CAPILLARY BLOOD SPEC: CPT

## 2020-07-31 PROCEDURE — 96372 THER/PROPH/DIAG INJ SC/IM: CPT

## 2020-07-31 RX ADMIN — ERYTHROPOIETIN 20000 UNITS: 20000 INJECTION, SOLUTION INTRAVENOUS; SUBCUTANEOUS at 13:57

## 2020-08-07 ENCOUNTER — HOSPITAL ENCOUNTER (OUTPATIENT)
Dept: INFUSION THERAPY | Facility: HOSPITAL | Age: 70
Discharge: HOME | End: 2020-08-07
Admitting: INTERNAL MEDICINE

## 2020-08-07 VITALS
OXYGEN SATURATION: 96 % | DIASTOLIC BLOOD PRESSURE: 65 MMHG | HEART RATE: 76 BPM | TEMPERATURE: 97.7 F | RESPIRATION RATE: 20 BRPM | SYSTOLIC BLOOD PRESSURE: 159 MMHG

## 2020-08-07 DIAGNOSIS — D63.1 ANEMIA IN STAGE 5 CHRONIC KIDNEY DISEASE, NOT ON CHRONIC DIALYSIS (HCC): ICD-10-CM

## 2020-08-07 DIAGNOSIS — N18.5 ANEMIA IN STAGE 5 CHRONIC KIDNEY DISEASE, NOT ON CHRONIC DIALYSIS (HCC): ICD-10-CM

## 2020-08-07 DIAGNOSIS — N18.5 CKD (CHRONIC KIDNEY DISEASE) STAGE 5, GFR LESS THAN 15 ML/MIN (HCC): Primary | ICD-10-CM

## 2020-08-07 LAB
HCT VFR BLD AUTO: 31.7 % (ref 34–46.6)
HGB BLD-MCNC: 9.6 G/DL (ref 12–15.9)

## 2020-08-07 PROCEDURE — 85014 HEMATOCRIT: CPT | Performed by: INTERNAL MEDICINE

## 2020-08-07 PROCEDURE — 85018 HEMOGLOBIN: CPT | Performed by: INTERNAL MEDICINE

## 2020-08-07 PROCEDURE — 96372 THER/PROPH/DIAG INJ SC/IM: CPT

## 2020-08-07 PROCEDURE — 25010000002 EPOETIN ALFA PER 1000 UNITS: Performed by: INTERNAL MEDICINE

## 2020-08-07 PROCEDURE — 36416 COLLJ CAPILLARY BLOOD SPEC: CPT

## 2020-08-07 RX ADMIN — ERYTHROPOIETIN 20000 UNITS: 20000 INJECTION, SOLUTION INTRAVENOUS; SUBCUTANEOUS at 13:40

## 2020-08-07 NOTE — PROGRESS NOTES
Procrit indicated per lab results & MD order.  Injection site x 1 with band aide applied.  AVS given & pt DC per wheeled walker after completion of visit.

## 2020-08-10 NOTE — TELEPHONE ENCOUNTER
Called and spoke with patient about the freestyle richard pro cgm results and medication change  Patient voice understanding

## 2020-08-11 ENCOUNTER — TELEPHONE (OUTPATIENT)
Dept: ENDOCRINOLOGY | Age: 70
End: 2020-08-11

## 2020-08-13 NOTE — TELEPHONE ENCOUNTER
Quin could you please see if any of the alternatives are cheaper for patient.  In the interim she could come and  samples

## 2020-08-14 ENCOUNTER — HOSPITAL ENCOUNTER (OUTPATIENT)
Dept: INFUSION THERAPY | Facility: HOSPITAL | Age: 70
Discharge: HOME | End: 2020-08-14
Admitting: INTERNAL MEDICINE

## 2020-08-14 VITALS
RESPIRATION RATE: 20 BRPM | SYSTOLIC BLOOD PRESSURE: 174 MMHG | TEMPERATURE: 97.3 F | DIASTOLIC BLOOD PRESSURE: 80 MMHG | HEART RATE: 73 BPM | OXYGEN SATURATION: 98 %

## 2020-08-14 DIAGNOSIS — N18.5 ANEMIA IN STAGE 5 CHRONIC KIDNEY DISEASE, NOT ON CHRONIC DIALYSIS (HCC): Primary | ICD-10-CM

## 2020-08-14 DIAGNOSIS — N18.5 CKD (CHRONIC KIDNEY DISEASE) STAGE 5, GFR LESS THAN 15 ML/MIN (HCC): ICD-10-CM

## 2020-08-14 DIAGNOSIS — D63.1 ANEMIA IN STAGE 5 CHRONIC KIDNEY DISEASE, NOT ON CHRONIC DIALYSIS (HCC): Primary | ICD-10-CM

## 2020-08-14 LAB
HCT VFR BLD AUTO: 33.2 % (ref 34–46.6)
HGB BLD-MCNC: 10.3 G/DL (ref 12–15.9)

## 2020-08-14 PROCEDURE — 36416 COLLJ CAPILLARY BLOOD SPEC: CPT

## 2020-08-14 PROCEDURE — 85018 HEMOGLOBIN: CPT | Performed by: INTERNAL MEDICINE

## 2020-08-14 PROCEDURE — 96372 THER/PROPH/DIAG INJ SC/IM: CPT

## 2020-08-14 PROCEDURE — 85014 HEMATOCRIT: CPT | Performed by: INTERNAL MEDICINE

## 2020-08-14 PROCEDURE — 25010000002 EPOETIN ALFA PER 1000 UNITS: Performed by: INTERNAL MEDICINE

## 2020-08-14 RX ADMIN — ERYTHROPOIETIN 20000 UNITS: 20000 INJECTION, SOLUTION INTRAVENOUS; SUBCUTANEOUS at 13:49

## 2020-08-17 ENCOUNTER — OFFICE VISIT (OUTPATIENT)
Dept: FAMILY MEDICINE CLINIC | Facility: CLINIC | Age: 70
End: 2020-08-17

## 2020-08-17 VITALS
HEART RATE: 96 BPM | SYSTOLIC BLOOD PRESSURE: 120 MMHG | DIASTOLIC BLOOD PRESSURE: 70 MMHG | BODY MASS INDEX: 29.49 KG/M2 | WEIGHT: 177 LBS | TEMPERATURE: 99.5 F | HEIGHT: 65 IN | OXYGEN SATURATION: 98 %

## 2020-08-17 DIAGNOSIS — I10 ESSENTIAL HYPERTENSION: Primary | ICD-10-CM

## 2020-08-17 PROBLEM — R79.89 LOW VITAMIN B12 LEVEL: Status: ACTIVE | Noted: 2019-05-23

## 2020-08-17 PROBLEM — E53.8 LOW VITAMIN B12 LEVEL: Status: ACTIVE | Noted: 2019-05-23

## 2020-08-17 PROCEDURE — 99213 OFFICE O/P EST LOW 20 MIN: CPT | Performed by: INTERNAL MEDICINE

## 2020-08-17 NOTE — PROGRESS NOTES
Subjective Chief complaint is check up on blood pressure  Maribeth Rendon is a 69 y.o. female.     History of Present Illness Raj is here today for checkup on her blood pressure.  She started on some low-dose amlodipine at her last visit.  Her blood pressure seems to have settled down here but at other office visits it looks high.  For now I am going to leave her on the 2.5 mg we can always go up to 5 mg if needed.    The following portions of the patient's history were reviewed and updated as appropriate: allergies, current medications, past family history, past medical history, past social history, past surgical history and problem list.    Review of Systems   Respiratory: Negative for chest tightness and shortness of breath.    Cardiovascular:        Her legs are not swelling any more than usual       Objective   Physical Exam   Constitutional: She appears well-developed and well-nourished.   Cardiovascular: Normal rate and normal heart sounds.   Blood pressure my exam is 132/60   Nursing note and vitals reviewed.        Assessment/Plan   Maribeth was seen today for hypertension.    Diagnoses and all orders for this visit:    Essential hypertension        Maribeth is here today for follow-up.  Her blood pressure my exam is 132/60.  I am going to have her stay on the 2.5 mg of amlodipine.  She can always contact me in the interim if her blood pressures going up and we can increase to 5 mg.

## 2020-08-18 ENCOUNTER — TELEPHONE (OUTPATIENT)
Dept: ENDOCRINOLOGY | Age: 70
End: 2020-08-18

## 2020-08-18 NOTE — TELEPHONE ENCOUNTER
Patient called stating that she came to the office to get sample of bydureon  After taking the first weekly injection  She was reading the insert and notice that it stats that people with kidney issue should not take this medication  Patient in in stage 5 of kidney disease  Should she be taking this medication

## 2020-08-18 NOTE — TELEPHONE ENCOUNTER
Yes it would be a good idea to avoid the Bydureon.  Trulicity is the only option for her, see if we can give her the Trulicity samples.

## 2020-08-21 ENCOUNTER — HOSPITAL ENCOUNTER (OUTPATIENT)
Dept: INFUSION THERAPY | Facility: HOSPITAL | Age: 70
Discharge: HOME | End: 2020-08-21
Admitting: INTERNAL MEDICINE

## 2020-08-21 VITALS
DIASTOLIC BLOOD PRESSURE: 70 MMHG | RESPIRATION RATE: 20 BRPM | TEMPERATURE: 97.3 F | SYSTOLIC BLOOD PRESSURE: 153 MMHG | HEART RATE: 89 BPM | OXYGEN SATURATION: 96 %

## 2020-08-21 DIAGNOSIS — D63.1 ANEMIA DUE TO STAGE 5 CHRONIC KIDNEY DISEASE, NOT ON CHRONIC DIALYSIS (HCC): Primary | ICD-10-CM

## 2020-08-21 DIAGNOSIS — D63.1 ANEMIA IN STAGE 5 CHRONIC KIDNEY DISEASE, NOT ON CHRONIC DIALYSIS (HCC): ICD-10-CM

## 2020-08-21 DIAGNOSIS — N18.5 ANEMIA IN STAGE 5 CHRONIC KIDNEY DISEASE, NOT ON CHRONIC DIALYSIS (HCC): ICD-10-CM

## 2020-08-21 DIAGNOSIS — N18.5 ANEMIA DUE TO STAGE 5 CHRONIC KIDNEY DISEASE, NOT ON CHRONIC DIALYSIS (HCC): Primary | ICD-10-CM

## 2020-08-21 DIAGNOSIS — N18.5 CKD (CHRONIC KIDNEY DISEASE) STAGE 5, GFR LESS THAN 15 ML/MIN (HCC): ICD-10-CM

## 2020-08-21 LAB
HCT VFR BLD AUTO: 33.9 % (ref 34–46.6)
HGB BLD-MCNC: 10.4 G/DL (ref 12–15.9)

## 2020-08-21 PROCEDURE — 85014 HEMATOCRIT: CPT | Performed by: INTERNAL MEDICINE

## 2020-08-21 PROCEDURE — 85018 HEMOGLOBIN: CPT | Performed by: INTERNAL MEDICINE

## 2020-08-21 PROCEDURE — 96372 THER/PROPH/DIAG INJ SC/IM: CPT

## 2020-08-21 PROCEDURE — 36416 COLLJ CAPILLARY BLOOD SPEC: CPT

## 2020-08-21 PROCEDURE — 25010000002 EPOETIN ALFA PER 1000 UNITS: Performed by: INTERNAL MEDICINE

## 2020-08-21 RX ADMIN — ERYTHROPOIETIN 20000 UNITS: 20000 INJECTION, SOLUTION INTRAVENOUS; SUBCUTANEOUS at 14:02

## 2020-08-28 ENCOUNTER — HOSPITAL ENCOUNTER (OUTPATIENT)
Dept: INFUSION THERAPY | Facility: HOSPITAL | Age: 70
Discharge: HOME | End: 2020-08-28
Admitting: INTERNAL MEDICINE

## 2020-08-28 VITALS
OXYGEN SATURATION: 98 % | RESPIRATION RATE: 20 BRPM | HEART RATE: 81 BPM | DIASTOLIC BLOOD PRESSURE: 67 MMHG | SYSTOLIC BLOOD PRESSURE: 134 MMHG | TEMPERATURE: 97.7 F

## 2020-08-28 DIAGNOSIS — D63.1 ANEMIA IN STAGE 5 CHRONIC KIDNEY DISEASE, NOT ON CHRONIC DIALYSIS (HCC): ICD-10-CM

## 2020-08-28 DIAGNOSIS — N18.5 ANEMIA DUE TO STAGE 5 CHRONIC KIDNEY DISEASE, NOT ON CHRONIC DIALYSIS (HCC): Primary | ICD-10-CM

## 2020-08-28 DIAGNOSIS — N18.5 CKD (CHRONIC KIDNEY DISEASE) STAGE 5, GFR LESS THAN 15 ML/MIN (HCC): ICD-10-CM

## 2020-08-28 DIAGNOSIS — N18.5 ANEMIA IN STAGE 5 CHRONIC KIDNEY DISEASE, NOT ON CHRONIC DIALYSIS (HCC): ICD-10-CM

## 2020-08-28 DIAGNOSIS — D63.1 ANEMIA DUE TO STAGE 5 CHRONIC KIDNEY DISEASE, NOT ON CHRONIC DIALYSIS (HCC): Primary | ICD-10-CM

## 2020-08-28 LAB
ANION GAP SERPL CALCULATED.3IONS-SCNC: 6.5 MMOL/L (ref 5–15)
BUN SERPL-MCNC: 38 MG/DL (ref 8–23)
BUN/CREAT SERPL: 11.9 (ref 7–25)
CALCIUM SPEC-SCNC: 8.3 MG/DL (ref 8.6–10.5)
CHLORIDE SERPL-SCNC: 114 MMOL/L (ref 98–107)
CO2 SERPL-SCNC: 19.5 MMOL/L (ref 22–29)
CREAT SERPL-MCNC: 3.18 MG/DL (ref 0.57–1)
GFR SERPL CREATININE-BSD FRML MDRD: 14 ML/MIN/1.73
GFR SERPL CREATININE-BSD FRML MDRD: ABNORMAL ML/MIN/{1.73_M2}
GLUCOSE SERPL-MCNC: 152 MG/DL (ref 65–99)
HCT VFR BLD AUTO: 35.8 % (ref 34–46.6)
HGB BLD-MCNC: 10.7 G/DL (ref 12–15.9)
MAGNESIUM SERPL-MCNC: 1.9 MG/DL (ref 1.6–2.4)
POTASSIUM SERPL-SCNC: 4.2 MMOL/L (ref 3.5–5.2)
SODIUM SERPL-SCNC: 140 MMOL/L (ref 136–145)

## 2020-08-28 PROCEDURE — 80048 BASIC METABOLIC PNL TOTAL CA: CPT | Performed by: INTERNAL MEDICINE

## 2020-08-28 PROCEDURE — 85014 HEMATOCRIT: CPT | Performed by: INTERNAL MEDICINE

## 2020-08-28 PROCEDURE — 25010000002 EPOETIN ALFA PER 1000 UNITS: Performed by: INTERNAL MEDICINE

## 2020-08-28 PROCEDURE — 36415 COLL VENOUS BLD VENIPUNCTURE: CPT

## 2020-08-28 PROCEDURE — 96372 THER/PROPH/DIAG INJ SC/IM: CPT

## 2020-08-28 PROCEDURE — 83735 ASSAY OF MAGNESIUM: CPT | Performed by: INTERNAL MEDICINE

## 2020-08-28 PROCEDURE — 85018 HEMOGLOBIN: CPT | Performed by: INTERNAL MEDICINE

## 2020-08-28 RX ADMIN — ERYTHROPOIETIN 20000 UNITS: 20000 INJECTION, SOLUTION INTRAVENOUS; SUBCUTANEOUS at 13:47

## 2020-09-04 ENCOUNTER — HOSPITAL ENCOUNTER (OUTPATIENT)
Dept: INFUSION THERAPY | Facility: HOSPITAL | Age: 70
Discharge: HOME | End: 2020-09-04
Admitting: INTERNAL MEDICINE

## 2020-09-04 VITALS
RESPIRATION RATE: 20 BRPM | OXYGEN SATURATION: 92 % | HEART RATE: 76 BPM | SYSTOLIC BLOOD PRESSURE: 163 MMHG | DIASTOLIC BLOOD PRESSURE: 76 MMHG | TEMPERATURE: 97.9 F

## 2020-09-04 DIAGNOSIS — D63.1 ANEMIA DUE TO STAGE 5 CHRONIC KIDNEY DISEASE, NOT ON CHRONIC DIALYSIS (HCC): Primary | ICD-10-CM

## 2020-09-04 DIAGNOSIS — N18.5 ANEMIA DUE TO STAGE 5 CHRONIC KIDNEY DISEASE, NOT ON CHRONIC DIALYSIS (HCC): Primary | ICD-10-CM

## 2020-09-04 LAB
HCT VFR BLD AUTO: 37.1 % (ref 34–46.6)
HGB BLD-MCNC: 11.6 G/DL (ref 12–15.9)

## 2020-09-04 PROCEDURE — G0463 HOSPITAL OUTPT CLINIC VISIT: HCPCS

## 2020-09-04 PROCEDURE — 85014 HEMATOCRIT: CPT | Performed by: INTERNAL MEDICINE

## 2020-09-04 PROCEDURE — 85018 HEMOGLOBIN: CPT | Performed by: INTERNAL MEDICINE

## 2020-09-04 PROCEDURE — 36416 COLLJ CAPILLARY BLOOD SPEC: CPT

## 2020-09-11 ENCOUNTER — HOSPITAL ENCOUNTER (OUTPATIENT)
Dept: INFUSION THERAPY | Facility: HOSPITAL | Age: 70
Discharge: HOME | End: 2020-09-11
Admitting: INTERNAL MEDICINE

## 2020-09-11 VITALS
OXYGEN SATURATION: 94 % | SYSTOLIC BLOOD PRESSURE: 150 MMHG | RESPIRATION RATE: 22 BRPM | HEART RATE: 80 BPM | DIASTOLIC BLOOD PRESSURE: 72 MMHG | TEMPERATURE: 97.3 F

## 2020-09-11 DIAGNOSIS — D63.1 ANEMIA IN STAGE 5 CHRONIC KIDNEY DISEASE, NOT ON CHRONIC DIALYSIS (HCC): Primary | ICD-10-CM

## 2020-09-11 DIAGNOSIS — N18.5 ANEMIA IN STAGE 5 CHRONIC KIDNEY DISEASE, NOT ON CHRONIC DIALYSIS (HCC): Primary | ICD-10-CM

## 2020-09-11 DIAGNOSIS — N18.5 CKD (CHRONIC KIDNEY DISEASE) STAGE 5, GFR LESS THAN 15 ML/MIN (HCC): ICD-10-CM

## 2020-09-11 LAB
HCT VFR BLD AUTO: 37.3 % (ref 34–46.6)
HGB BLD-MCNC: 11.2 G/DL (ref 12–15.9)

## 2020-09-11 PROCEDURE — 36416 COLLJ CAPILLARY BLOOD SPEC: CPT

## 2020-09-11 PROCEDURE — G0463 HOSPITAL OUTPT CLINIC VISIT: HCPCS

## 2020-09-11 PROCEDURE — 85018 HEMOGLOBIN: CPT | Performed by: INTERNAL MEDICINE

## 2020-09-11 PROCEDURE — 85014 HEMATOCRIT: CPT | Performed by: INTERNAL MEDICINE

## 2020-09-18 ENCOUNTER — HOSPITAL ENCOUNTER (OUTPATIENT)
Dept: INFUSION THERAPY | Facility: HOSPITAL | Age: 70
Discharge: HOME | End: 2020-09-18
Admitting: INTERNAL MEDICINE

## 2020-09-18 VITALS
OXYGEN SATURATION: 96 % | HEART RATE: 94 BPM | SYSTOLIC BLOOD PRESSURE: 179 MMHG | DIASTOLIC BLOOD PRESSURE: 74 MMHG | TEMPERATURE: 96.9 F | RESPIRATION RATE: 20 BRPM

## 2020-09-18 DIAGNOSIS — N18.5 ANEMIA IN STAGE 5 CHRONIC KIDNEY DISEASE, NOT ON CHRONIC DIALYSIS (HCC): ICD-10-CM

## 2020-09-18 DIAGNOSIS — D63.1 ANEMIA IN STAGE 5 CHRONIC KIDNEY DISEASE, NOT ON CHRONIC DIALYSIS (HCC): ICD-10-CM

## 2020-09-18 DIAGNOSIS — N18.5 CKD (CHRONIC KIDNEY DISEASE) STAGE 5, GFR LESS THAN 15 ML/MIN (HCC): Primary | ICD-10-CM

## 2020-09-18 LAB
HCT VFR BLD AUTO: 37.3 % (ref 34–46.6)
HGB BLD-MCNC: 11.9 G/DL (ref 12–15.9)

## 2020-09-18 PROCEDURE — 85018 HEMOGLOBIN: CPT | Performed by: INTERNAL MEDICINE

## 2020-09-18 PROCEDURE — 85014 HEMATOCRIT: CPT | Performed by: INTERNAL MEDICINE

## 2020-09-18 PROCEDURE — G0463 HOSPITAL OUTPT CLINIC VISIT: HCPCS

## 2020-09-18 PROCEDURE — 36416 COLLJ CAPILLARY BLOOD SPEC: CPT

## 2020-09-18 NOTE — PROGRESS NOTES
Procrit not indicated per lab results & MD order.  AVS given & pt DC per wheeled walker to front entrance after completion of visit.

## 2020-09-25 ENCOUNTER — APPOINTMENT (OUTPATIENT)
Dept: INFUSION THERAPY | Facility: HOSPITAL | Age: 70
End: 2020-09-25

## 2020-10-02 ENCOUNTER — APPOINTMENT (OUTPATIENT)
Dept: INFUSION THERAPY | Facility: HOSPITAL | Age: 70
End: 2020-10-02

## 2020-10-09 ENCOUNTER — RESULTS ENCOUNTER (OUTPATIENT)
Dept: ENDOCRINOLOGY | Age: 70
End: 2020-10-09

## 2020-10-09 ENCOUNTER — APPOINTMENT (OUTPATIENT)
Dept: INFUSION THERAPY | Facility: HOSPITAL | Age: 70
End: 2020-10-09

## 2020-10-09 DIAGNOSIS — E16.2 HYPOGLYCEMIA: ICD-10-CM

## 2020-10-09 DIAGNOSIS — E11.42 DM TYPE 2 WITH DIABETIC PERIPHERAL NEUROPATHY (HCC): ICD-10-CM

## 2020-10-13 NOTE — PROGRESS NOTES
69 y.o.    Patient Care Team:  Robby Chaney MD as PCP - General  Robby Chaney MD as PCP - Family Medicine  Eddie Hodges MD as Consulting Physician (Cardiology)    Chief Complaint:    FOLLOW UP/ TYPE 2 DM  Subjective     HPI  69-year-old white female is here for the follow-up of type 2 diabetes mellitus.  Patient was last seen by me in July 2020, she has been hospitalized in September 2020 with stroke and she has right-sided hemiplegia.      Type 2 dm - Diagnosed about 10-15 years ago.  Today in clinic pt reports being on Tradjenta 5 mg oral daily, Trulicity 0.75 mg subcutaneous weekly.  FBG -100-140  Pre meals -below 140 for most times  Checks BG -2-3 times  Sensor -no  Dm retinopathy -no,Last eye exam -in the last 1 year  Dm nephropathy -yes, end-stage renal disease  Dm neuropathy -yes,Dm neuropathy meds -not on meds  CAD -history of CABG  CVA -recent admission in September 2020 with stroke  Episodes of hypoglycemia -few months back when she was on glimepiride and the medication was discontinued  Pt is physically active. weight has been stable.   Pt tries to follow DM diet for most part.   On Ace inb.    Reviewed primary care physician's/consulting physician documentation and lab results         Interval History      The following portions of the patient's history were reviewed and updated as appropriate: allergies, current medications, past family history, past medical history, past social history, past surgical history and problem list.    Past Medical History:   Diagnosis Date   • Achilles tendon tear     left    • Anemia    • Anxiety    • Depression    • Diabetes mellitus, type 2 (CMS/[facility])    • ESRD (end stage renal disease) (CMS/[facility])     HAS LEFT FOREARM FISTULA   • GERD (gastroesophageal reflux disease)    • History of MRSA infection 03/15/2016    ABDOMINAL WOUND,   INFECTION CONTROLL NOTIFIED 2-6-2017   • History of transfusion    • Hyperlipidemia    • Hypertension    •  Hypokalemia    • Hypomagnesemia    • Hypoxic 2016    WHEN SHE HAD PNEUMONIA   • Intertrigo    • Leukocytosis    • Osteoarthritis    • Pneumonia 2016   • Psoriasis    • Psoriatic arthritis (CMS/[facility])    • Seizures (CMS/[facility])     one time   • Sepsis (CMS/[facility]) 2016   • SOB (shortness of breath)    • Stage 4 chronic renal impairment associated with type 2 diabetes mellitus (CMS/[facility])    • Staph infection     HX RIGHT FOOT AT [facility] 2010   • Streptococcal bacteremia    • Stroke (CMS/[facility])    • Swelling of hand 10/27/2016    LEFT HAND SWELLIMG SINCE LEFT FISTULA SURGERY   • Tremor, essential    • Wears glasses      Family History   Problem Relation Age of Onset   • Heart attack Mother    • Heart disease Mother    • Kidney disease Mother    • Heart attack Father    • Heart disease Father    • Hypertension Father    • Stroke Father         ISCHEMIC   • Diabetes Father         TYPE 2   • Kidney disease Brother    • Diabetes Brother         TYPE 1   • Thyroid disease Daughter    • Anxiety disorder Maternal Aunt    • Bipolar disorder Maternal Aunt    • Depression Maternal Aunt    • Lupus Maternal Aunt         LUPUS ANTICOAGLULANT   • Rheum arthritis Maternal Aunt    • Alcohol abuse Maternal Uncle    • Other Maternal Uncle         PULMONARY DISEASE     Social History     Socioeconomic History   • Marital status:      Spouse name: Tru   • Number of children: 4   • Years of education: 11   • Highest education level: 11th grade   Occupational History   • Occupation: Retired   Social Needs   • Financial resource strain: Not on file   • Food insecurity     Worry: Not on file     Inability: Not on file   • Transportation needs     Medical: No     Non-medical: No   Tobacco Use   • Smoking status: Former Smoker     Packs/day: 2.00     Years: 26.00     Pack years: 52.00     Types: Cigarettes     Quit date: 10/21/1992     Years since quittin.0   • Smokeless tobacco: Never Used   • Tobacco comment: quit     Substance and Sexual Activity   • Alcohol use: Yes     Alcohol/week: 0.0 standard drinks     Frequency: Monthly or less     Drinks per session: 1 or 2     Binge frequency: Never   • Drug use: No   • Sexual activity: Defer     Birth control/protection: Surgical     Allergies   Allergen Reactions   • Prednisone Hallucinations       Current Outpatient Medications:   •  ACCU-CHEK JESUS MANUEL PLUS test strip, TEST THREE TIMES DAILY, Disp: 300 each, Rfl: 2  •  apixaban (Eliquis) 5 MG tablet tablet, Take 1 tablet by mouth 2 (Two) Times a Day., Disp: 180 tablet, Rfl: 1  •  aspirin 81 MG tablet, Take 81 mg by mouth Every Morning. TO STOP THE DAY BEFORE SURGERY, Disp: , Rfl:   •  bumetanide (BUMEX) 2 MG tablet, Take 1 tablet by mouth Daily., Disp: 90 tablet, Rfl: 1  •  carvedilol (COREG) 12.5 MG tablet, Take 1 tablet by mouth 2 (Two) Times a Day., Disp: 180 tablet, Rfl: 3  •  Cholecalciferol (VITAMIN D-3) 1000 UNITS capsule, Take 5,000 Units by mouth Daily., Disp: , Rfl:   •  Dulaglutide (Trulicity) 0.75 MG/0.5ML solution pen-injector, Inject 0.75 mg under the skin into the appropriate area as directed 1 (One) Time Per Week. Patient is starting medication on Sunday 09/30/2020, Disp: 6 mL, Rfl: 3  •  epoetin hermes (EPOGEN,PROCRIT) 81403 UNIT/ML injection, Inject 20,000 Units under the skin into the appropriate area as directed 1 (One) Time Per Week. LAST TIME 2-6-2017, Disp: , Rfl:   •  Ferrous Fumarate-Vitamin C ER (Florencia-Sequels) 65-25 MG CR tablet, Take 65 mg by mouth., Disp: , Rfl:   •  gabapentin (NEURONTIN) 300 MG capsule, Take 1 capsule by mouth 2 (Two) Times a Day., Disp: 180 capsule, Rfl: 1  •  glucose blood test strip, Use as instructed, Disp: 100 each, Rfl: 0  •  linagliptin (TRADJENTA) 5 MG tablet tablet, Take 1 tablet by mouth Daily., Disp: 90 tablet, Rfl: 1  •  Loperamide HCl (IMODIUM A-D PO), Take 1 tablet by mouth Every 4 (Four) Hours As Needed., Disp: , Rfl:   •  montelukast (SINGULAIR) 10 MG tablet, Take 1 tablet  "by mouth Every Night., Disp: 90 tablet, Rfl: 1  •  Multiple Vitamin (MULTI VITAMIN DAILY PO), Take 1 tablet by mouth Every Morning., Disp: , Rfl:   •  Probiotic Product (PROBIOTIC DAILY PO), Take 1 capsule by mouth Daily., Disp: , Rfl:   •  pyridoxine (VITAMIN B-6) 500 MG tablet, Take 500 mg by mouth Daily., Disp: , Rfl:   •  topiramate (TOPAMAX) 100 MG tablet, Take 100 mg by mouth Daily., Disp: , Rfl:   No current facility-administered medications for this visit.         Review of Systems   Constitutional: Positive for appetite change and fatigue. Negative for fever.   Eyes: Negative for visual disturbance.   Respiratory: Negative for shortness of breath.    Cardiovascular: Negative for palpitations and leg swelling.   Gastrointestinal: Negative for abdominal pain and vomiting.   Endocrine: Negative for polydipsia and polyuria.   Musculoskeletal: Negative for joint swelling and neck pain.   Skin: Negative for rash.   Neurological: Positive for weakness and numbness.   Psychiatric/Behavioral: Negative for behavioral problems.         I have reviewed and confirmed the accuracy of the ROS as documented by the MA/LPN/RN Jose Flornetino MD  Objective       Vitals:    10/30/20 1502   BP: 148/72   Resp: 16   Weight: 74.8 kg (165 lb)   Height: 165.1 cm (65\")     Body mass index is 27.46 kg/m².      Physical Exam  Vitals signs reviewed.   Constitutional:       Appearance: She is not diaphoretic.      Comments: Obese     HENT:      Head: Normocephalic and atraumatic.   Eyes:      General: No scleral icterus.     Conjunctiva/sclera: Conjunctivae normal.   Neck:      Musculoskeletal: Normal range of motion and neck supple.      Thyroid: No thyromegaly.      Comments: Acanthosis nigricans  Cardiovascular:      Rate and Rhythm: Normal rate.      Heart sounds: Normal heart sounds.   Pulmonary:      Effort: Pulmonary effort is normal.      Breath sounds: Normal breath sounds. No stridor. No wheezing.   Abdominal:      General: " Bowel sounds are normal. There is no distension.      Palpations: Abdomen is soft.      Tenderness: There is no abdominal tenderness.      Comments: Central obesity   Musculoskeletal:         General: No tenderness.   Skin:     General: Skin is warm and dry.   Neurological:      Mental Status: She is alert and oriented to person, place, and time.      Motor: Weakness present.      Gait: Gait abnormal.      Comments: Wheelchair-bound         Results Review:     I reviewed the patient's new clinical results and mentioned them above in HPI and in plan as well.    Medical records reviewed  Summary:Done      Hospital Outpatient Visit on 10/30/2020   Component Date Value Ref Range Status   • Hemoglobin 10/30/2020 8.6* 12.0 - 15.9 g/dL Final   • Hematocrit 10/30/2020 28.3* 34.0 - 46.6 % Final     Lab Results   Component Value Date    HGBA1C 6.57 (H) 10/16/2020    HGBA1C 6.2 (H) 09/20/2020    HGBA1C 6.3 (H) 09/19/2020     Lab Results   Component Value Date    MICROALBUR 11.3 10/23/2020    CREATININE 3.78 (H) 10/16/2020     Imaging Results (Most Recent)     None                Assessment and Plan:    Diagnoses and all orders for this visit:    1. DM (diabetes mellitus), type 2, uncontrolled w/neurologic complication (CMS/[facility]) (Primary)  -     Hemoglobin A1c; Future  -     Basic Metabolic Panel; Future  -     Lipid Panel; Future  -     TSH; Future  -     T4, Free; Future    2. Stage 3a chronic kidney disease  -     Hemoglobin A1c; Future  -     Basic Metabolic Panel; Future  -     Lipid Panel; Future  -     TSH; Future  -     T4, Free; Future    Other orders  -     Dulaglutide (Trulicity) 0.75 MG/0.5ML solution pen-injector; Inject 0.75 mg under the skin into the appropriate area as directed 1 (One) Time Per Week. Patient is starting medication on Sunday 09/30/2020  Dispense: 6 mL; Refill: 3      Type 2 diabetes mellitus-uncontrolled  Given the recent stroke emphasized to the patient that the blood glucose control is even  "more important  Continue Trulicity  Continue Tradjenta.    End-stage renal disease  Monitored by nephrologist    Patient had multiple questions about what was the contributing factor for the stroke-explained to the patient that it could be multifactorial-age, family history, diabetes, high blood pressure.  Pertaining to the diabetes it is important that we maintain the A1c below 7% in order to prevent further cardiovascular events.    Reviewed Lab results with the patient.     F/u 3 - 4 months with Reymundo Lamar. APRN  F/u 6 - 7 months with me.   Labs 2 weeks prior to the next visit            Jose Florentino MD  10/30/20    EMR Dragon / transcription disclaimer:     \"Dictated utilizing Dragon dictation\".      "

## 2020-10-15 ENCOUNTER — FLU SHOT (OUTPATIENT)
Dept: FAMILY MEDICINE CLINIC | Facility: CLINIC | Age: 70
End: 2020-10-15

## 2020-10-15 ENCOUNTER — OFFICE VISIT (OUTPATIENT)
Dept: FAMILY MEDICINE CLINIC | Facility: CLINIC | Age: 70
End: 2020-10-15

## 2020-10-15 VITALS
BODY MASS INDEX: 29.45 KG/M2 | OXYGEN SATURATION: 99 % | RESPIRATION RATE: 16 BRPM | HEART RATE: 68 BPM | HEIGHT: 65 IN | TEMPERATURE: 96 F

## 2020-10-15 DIAGNOSIS — E11.43 TYPE 2 DIABETES MELLITUS WITH DIABETIC AUTONOMIC NEUROPATHY, WITHOUT LONG-TERM CURRENT USE OF INSULIN (HCC): ICD-10-CM

## 2020-10-15 DIAGNOSIS — I48.91 ATRIAL FIBRILLATION, UNSPECIFIED TYPE (HCC): ICD-10-CM

## 2020-10-15 DIAGNOSIS — I61.9 HEMORRHAGIC STROKE (HCC): Primary | ICD-10-CM

## 2020-10-15 DIAGNOSIS — Z23 NEED FOR IMMUNIZATION AGAINST INFLUENZA: Primary | ICD-10-CM

## 2020-10-15 PROCEDURE — 99214 OFFICE O/P EST MOD 30 MIN: CPT | Performed by: INTERNAL MEDICINE

## 2020-10-15 PROCEDURE — G0008 ADMIN INFLUENZA VIRUS VAC: HCPCS | Performed by: INTERNAL MEDICINE

## 2020-10-15 PROCEDURE — 90694 VACC AIIV4 NO PRSRV 0.5ML IM: CPT | Performed by: INTERNAL MEDICINE

## 2020-10-15 RX ORDER — PANTOPRAZOLE SODIUM 40 MG/1
40 TABLET, DELAYED RELEASE ORAL DAILY
COMMUNITY
Start: 2020-09-29 | End: 2020-10-15

## 2020-10-15 RX ORDER — HEPARIN SODIUM 5000 [USP'U]/ML
5000 INJECTION, SOLUTION INTRAVENOUS; SUBCUTANEOUS EVERY 8 HOURS
COMMUNITY
Start: 2020-09-28 | End: 2020-10-15

## 2020-10-15 RX ORDER — HYDRALAZINE HYDROCHLORIDE 100 MG/1
100 TABLET, FILM COATED ORAL 3 TIMES DAILY
COMMUNITY
Start: 2020-09-28 | End: 2020-10-15

## 2020-10-15 RX ORDER — TRAMADOL HYDROCHLORIDE 50 MG/1
TABLET ORAL
COMMUNITY
Start: 2020-10-10 | End: 2020-10-15

## 2020-10-15 RX ORDER — CALCIPOTRIENE 50 UG/G
CREAM TOPICAL
COMMUNITY
Start: 2020-08-26 | End: 2020-10-15

## 2020-10-15 RX ORDER — FERROUS FUMARATE/ASCORBIC ACID 65MG-25 MG
65 TABLET, EXTENDED RELEASE ORAL
COMMUNITY
End: 2021-07-08 | Stop reason: HOSPADM

## 2020-10-15 RX ORDER — INSULIN GLARGINE 100 [IU]/ML
10 INJECTION, SOLUTION SUBCUTANEOUS DAILY
COMMUNITY
Start: 2020-09-28 | End: 2020-10-15

## 2020-10-15 RX ORDER — DIPHENOXYLATE HYDROCHLORIDE AND ATROPINE SULFATE 2.5; .025 MG/1; MG/1
1 TABLET ORAL
COMMUNITY
Start: 2020-09-28 | End: 2020-10-15

## 2020-10-15 RX ORDER — SODIUM BICARBONATE 650 MG/1
650 TABLET ORAL 2 TIMES DAILY
COMMUNITY
Start: 2020-10-08 | End: 2020-10-15

## 2020-10-15 NOTE — PROGRESS NOTES
Subjective Chief complaint is follow-up from hospital admission.  Maribeth Rendon is a 69 y.o. female.     History of Present Illness Maribeth is following up today after hospital admission to a [facility].  She had sudden onset of neurologic symptoms involving her right arm.  In the emergency room she was found to have a left basal ganglia intracerebral hemorrhage.  Her blood pressure was considerably elevated.  She did require a Cardene drip.  She was later able to be weaned off the Cardene drip.  It is difficult to tell what medication she is supposed to be on.  She was discharged to a rehab facility.  Their information is very scant.  The patient reports that she was really not given any new medicines at discharge other than tramadol.  Although the discharge summary states that she should be on Lantus insulin.  She does report that she has talked to her kidney doctor who is that it is okay for her to start Trulicity and be on the Tradjenta.  She has not had the follow-up CAT scan of the head to decide whether she can start her Eliquis or aspirin.  She is still having trouble with right-sided weakness.  The arm seems to be progressing faster than the leg.  Current outpatient and discharge medications have been reconciled for the patient.  Reviewed by: Robby Chaney MD    The following portions of the patient's history were reviewed and updated as appropriate: allergies, current medications, past family history, past medical history, past social history, past surgical history and problem list.    Review of Systems   Constitutional: Negative for chills and fever.   Respiratory: Negative for cough.    Cardiovascular: Negative for chest pain.   Neurological: Positive for weakness and numbness.       Objective   Physical Exam  Vitals signs and nursing note reviewed.   Cardiovascular:      Rate and Rhythm: Normal rate.   Pulmonary:      Effort: Pulmonary effort is normal.      Breath sounds: No wheezing or  rales.   Neurological:      Mental Status: She is alert.      Comments: The patient is awake and alert.  She has some  strength on the right.  She still has some clumsy bare palm movement of the right arm.  The right leg is able to be lifted somewhat against gravity.           Assessment/Plan   Diagnoses and all orders for this visit:    1. Hemorrhagic stroke (CMS/[facility]) (Primary)    2. Atrial fibrillation, unspecified type (CMS/[facility])    3. Type 2 diabetes mellitus with diabetic autonomic neuropathy, without long-term current use of insulin (CMS/[facility])    Maribeth is here today for follow-up    Tried to reconcile her medicines as best I can.  I am going to allow her to use the Trulicity and Tradjenta.  Her kidney doctor will monitor her kidney function with that.  I did advise her remain off the Eliquis and aspirin until the CT scan results are known.  She is on carvedilol for her blood pressure and seems to be doing okay with that today.  She is no longer taking hydralazine or amlodipine.  I think she can remain off of those.  She may continue her Topamax and Singulair.  She may continue her gabapentin.  Remainder of medications look to be vitamins and those appear to be okay.

## 2020-10-16 ENCOUNTER — HOSPITAL ENCOUNTER (OUTPATIENT)
Dept: INFUSION THERAPY | Facility: HOSPITAL | Age: 70
Discharge: HOME | End: 2020-10-16
Admitting: INTERNAL MEDICINE

## 2020-10-16 VITALS
HEART RATE: 71 BPM | SYSTOLIC BLOOD PRESSURE: 118 MMHG | DIASTOLIC BLOOD PRESSURE: 61 MMHG | TEMPERATURE: 97.3 F | RESPIRATION RATE: 20 BRPM | OXYGEN SATURATION: 99 %

## 2020-10-16 DIAGNOSIS — N18.5 ANEMIA IN STAGE 5 CHRONIC KIDNEY DISEASE, NOT ON CHRONIC DIALYSIS (HCC): ICD-10-CM

## 2020-10-16 DIAGNOSIS — N18.5 CKD (CHRONIC KIDNEY DISEASE) STAGE 5, GFR LESS THAN 15 ML/MIN (HCC): Primary | ICD-10-CM

## 2020-10-16 DIAGNOSIS — D63.1 ANEMIA IN STAGE 5 CHRONIC KIDNEY DISEASE, NOT ON CHRONIC DIALYSIS (HCC): ICD-10-CM

## 2020-10-16 LAB
25(OH)D3 SERPL-MCNC: 26.3 NG/ML (ref 30–100)
ALBUMIN SERPL-MCNC: 2.7 G/DL (ref 3.5–5.2)
ALBUMIN/GLOB SERPL: 0.7 G/DL
ALP SERPL-CCNC: 85 U/L (ref 39–117)
ALT SERPL W P-5'-P-CCNC: 26 U/L (ref 1–33)
ANION GAP SERPL CALCULATED.3IONS-SCNC: 8.4 MMOL/L (ref 5–15)
AST SERPL-CCNC: 25 U/L (ref 1–32)
BILIRUB SERPL-MCNC: 0.3 MG/DL (ref 0–1.2)
BUN SERPL-MCNC: 71 MG/DL (ref 8–23)
BUN/CREAT SERPL: 18.8 (ref 7–25)
CALCIUM SPEC-SCNC: 8.5 MG/DL (ref 8.6–10.5)
CALCIUM SPEC-SCNC: 8.5 MG/DL (ref 8.6–10.5)
CHLORIDE SERPL-SCNC: 108 MMOL/L (ref 98–107)
CHOLEST SERPL-MCNC: 118 MG/DL (ref 0–200)
CO2 SERPL-SCNC: 21.6 MMOL/L (ref 22–29)
CREAT SERPL-MCNC: 3.78 MG/DL (ref 0.57–1)
DEPRECATED RDW RBC AUTO: 49.3 FL (ref 37–54)
ERYTHROCYTE [DISTWIDTH] IN BLOOD BY AUTOMATED COUNT: 15.2 % (ref 12.3–15.4)
FERRITIN SERPL-MCNC: 408 NG/ML (ref 13–150)
FOLATE SERPL-MCNC: 5.76 NG/ML (ref 4.78–24.2)
GFR SERPL CREATININE-BSD FRML MDRD: 12 ML/MIN/1.73
GFR SERPL CREATININE-BSD FRML MDRD: ABNORMAL ML/MIN/{1.73_M2}
GLOBULIN UR ELPH-MCNC: 3.8 GM/DL
GLUCOSE SERPL-MCNC: 202 MG/DL (ref 65–99)
HBA1C MFR BLD: 6.57 % (ref 4.8–5.6)
HCT VFR BLD AUTO: 28 % (ref 34–46.6)
HDLC SERPL-MCNC: 42 MG/DL (ref 40–60)
HGB BLD-MCNC: 8.7 G/DL (ref 12–15.9)
IRON 24H UR-MRATE: 34 MCG/DL (ref 37–145)
IRON SATN MFR SERPL: 17 % (ref 20–50)
LDLC SERPL CALC-MCNC: 59 MG/DL (ref 0–100)
LDLC/HDLC SERPL: 1.39 {RATIO}
MAGNESIUM SERPL-MCNC: 1.8 MG/DL (ref 1.6–2.4)
MCH RBC QN AUTO: 27.8 PG (ref 26.6–33)
MCHC RBC AUTO-ENTMCNC: 31.1 G/DL (ref 31.5–35.7)
MCV RBC AUTO: 89.5 FL (ref 79–97)
PHOSPHATE SERPL-MCNC: 3.9 MG/DL (ref 2.5–4.5)
PLATELET # BLD AUTO: 207 10*3/MM3 (ref 140–450)
PMV BLD AUTO: 9.8 FL (ref 6–12)
POTASSIUM SERPL-SCNC: 4.6 MMOL/L (ref 3.5–5.2)
PROT SERPL-MCNC: 6.5 G/DL (ref 6–8.5)
PTH-INTACT SERPL-MCNC: 289 PG/ML (ref 15–65)
RBC # BLD AUTO: 3.13 10*6/MM3 (ref 3.77–5.28)
SODIUM SERPL-SCNC: 138 MMOL/L (ref 136–145)
T4 FREE SERPL-MCNC: 1.01 NG/DL (ref 0.93–1.7)
TIBC SERPL-MCNC: 206 MCG/DL (ref 298–536)
TRANSFERRIN SERPL-MCNC: 138 MG/DL (ref 200–360)
TRIGL SERPL-MCNC: 89 MG/DL (ref 0–150)
TSH SERPL DL<=0.05 MIU/L-ACNC: 1.59 UIU/ML (ref 0.27–4.2)
URATE SERPL-MCNC: 9 MG/DL (ref 2.4–5.7)
VIT B12 BLD-MCNC: 336 PG/ML (ref 211–946)
VLDLC SERPL-MCNC: 17 MG/DL (ref 5–40)
WBC # BLD AUTO: 7.21 10*3/MM3 (ref 3.4–10.8)

## 2020-10-16 PROCEDURE — 85027 COMPLETE CBC AUTOMATED: CPT | Performed by: INTERNAL MEDICINE

## 2020-10-16 PROCEDURE — 82728 ASSAY OF FERRITIN: CPT | Performed by: INTERNAL MEDICINE

## 2020-10-16 PROCEDURE — 80061 LIPID PANEL: CPT | Performed by: INTERNAL MEDICINE

## 2020-10-16 PROCEDURE — 36415 COLL VENOUS BLD VENIPUNCTURE: CPT

## 2020-10-16 PROCEDURE — 83970 ASSAY OF PARATHORMONE: CPT | Performed by: INTERNAL MEDICINE

## 2020-10-16 PROCEDURE — 25010000002 EPOETIN ALFA PER 1000 UNITS: Performed by: INTERNAL MEDICINE

## 2020-10-16 PROCEDURE — 84550 ASSAY OF BLOOD/URIC ACID: CPT | Performed by: INTERNAL MEDICINE

## 2020-10-16 PROCEDURE — 83036 HEMOGLOBIN GLYCOSYLATED A1C: CPT | Performed by: INTERNAL MEDICINE

## 2020-10-16 PROCEDURE — 84443 ASSAY THYROID STIM HORMONE: CPT | Performed by: INTERNAL MEDICINE

## 2020-10-16 PROCEDURE — 83540 ASSAY OF IRON: CPT | Performed by: INTERNAL MEDICINE

## 2020-10-16 PROCEDURE — 82306 VITAMIN D 25 HYDROXY: CPT | Performed by: INTERNAL MEDICINE

## 2020-10-16 PROCEDURE — 84466 ASSAY OF TRANSFERRIN: CPT | Performed by: INTERNAL MEDICINE

## 2020-10-16 PROCEDURE — 96372 THER/PROPH/DIAG INJ SC/IM: CPT

## 2020-10-16 PROCEDURE — 82746 ASSAY OF FOLIC ACID SERUM: CPT | Performed by: INTERNAL MEDICINE

## 2020-10-16 PROCEDURE — 82607 VITAMIN B-12: CPT | Performed by: INTERNAL MEDICINE

## 2020-10-16 PROCEDURE — 80053 COMPREHEN METABOLIC PANEL: CPT | Performed by: INTERNAL MEDICINE

## 2020-10-16 PROCEDURE — 84100 ASSAY OF PHOSPHORUS: CPT | Performed by: INTERNAL MEDICINE

## 2020-10-16 PROCEDURE — 83735 ASSAY OF MAGNESIUM: CPT | Performed by: INTERNAL MEDICINE

## 2020-10-16 PROCEDURE — 84439 ASSAY OF FREE THYROXINE: CPT | Performed by: INTERNAL MEDICINE

## 2020-10-16 RX ADMIN — ERYTHROPOIETIN 20000 UNITS: 20000 INJECTION, SOLUTION INTRAVENOUS; SUBCUTANEOUS at 14:02

## 2020-10-16 NOTE — PROGRESS NOTES
Unable to collect labs as ordered by Dr Florentino and entered in manually.  Pt unable to provide clean catch urine and requested supplies to take home and will bring specimen to appt. Next Friday.  Due to pt's recent hx of stroke, pt states it is very difficult to get on and off the toilet.  Instructed pt to refrigerate specimen after obtaining.  Pt and  verbalized understanding.      Procrit given per order due to parameters met.  Pt states she had only received procrit one time during her hospital stay.  Pt tolerated injection well. Pt given AVS and dc'ed per wc with spouse.

## 2020-10-23 ENCOUNTER — HOSPITAL ENCOUNTER (OUTPATIENT)
Dept: INFUSION THERAPY | Facility: HOSPITAL | Age: 70
Discharge: HOME | End: 2020-10-23
Admitting: INTERNAL MEDICINE

## 2020-10-23 VITALS
DIASTOLIC BLOOD PRESSURE: 78 MMHG | HEART RATE: 85 BPM | TEMPERATURE: 98.1 F | OXYGEN SATURATION: 96 % | RESPIRATION RATE: 16 BRPM | SYSTOLIC BLOOD PRESSURE: 168 MMHG

## 2020-10-23 DIAGNOSIS — N18.5 ANEMIA IN STAGE 5 CHRONIC KIDNEY DISEASE, NOT ON CHRONIC DIALYSIS (HCC): ICD-10-CM

## 2020-10-23 DIAGNOSIS — N18.5 CKD (CHRONIC KIDNEY DISEASE) STAGE 5, GFR LESS THAN 15 ML/MIN (HCC): Primary | ICD-10-CM

## 2020-10-23 DIAGNOSIS — D63.1 ANEMIA IN STAGE 5 CHRONIC KIDNEY DISEASE, NOT ON CHRONIC DIALYSIS (HCC): ICD-10-CM

## 2020-10-23 LAB
ALBUMIN UR-MCNC: 11.3 MG/DL
CREAT UR-MCNC: 88.7 MG/DL
HCT VFR BLD AUTO: 22.3 % (ref 34–46.6)
HGB BLD-MCNC: 7 G/DL (ref 12–15.9)
MICROALBUMIN/CREAT UR: 127.4 MG/G

## 2020-10-23 PROCEDURE — 25010000002 EPOETIN ALFA PER 1000 UNITS: Performed by: INTERNAL MEDICINE

## 2020-10-23 PROCEDURE — 96365 THER/PROPH/DIAG IV INF INIT: CPT

## 2020-10-23 PROCEDURE — 82043 UR ALBUMIN QUANTITATIVE: CPT | Performed by: INTERNAL MEDICINE

## 2020-10-23 PROCEDURE — 36416 COLLJ CAPILLARY BLOOD SPEC: CPT

## 2020-10-23 PROCEDURE — 85018 HEMOGLOBIN: CPT | Performed by: INTERNAL MEDICINE

## 2020-10-23 PROCEDURE — 85014 HEMATOCRIT: CPT | Performed by: INTERNAL MEDICINE

## 2020-10-23 PROCEDURE — 96372 THER/PROPH/DIAG INJ SC/IM: CPT

## 2020-10-23 PROCEDURE — 25010000002 FERUMOXYTOL 510 MG/17ML SOLUTION 510 MG VIAL: Performed by: INTERNAL MEDICINE

## 2020-10-23 PROCEDURE — 96374 THER/PROPH/DIAG INJ IV PUSH: CPT

## 2020-10-23 PROCEDURE — 82570 ASSAY OF URINE CREATININE: CPT | Performed by: INTERNAL MEDICINE

## 2020-10-23 RX ADMIN — FERUMOXYTOL 510 MG: 510 INJECTION INTRAVENOUS at 14:26

## 2020-10-23 RX ADMIN — ERYTHROPOIETIN 20000 UNITS: 20000 INJECTION, SOLUTION INTRAVENOUS; SUBCUTANEOUS at 13:53

## 2020-10-23 NOTE — PROGRESS NOTES
Dr Mcclain's office called concerning Hgb of 7.0.  Waiting for return call.   Dr Mcclain returned call and notified of pt's Hgb.  Order obtained for Injectafer.  Following phone order pharmacy states Injectafer is a special order and insurance requirements are indicated to infuse.  Dr Mcclain's office called and waiting for return call.    Dr Mcclain returned call concerning Injectafer and order obtained for Feraheme 510mg IV to be administered today and next Friday ith procrit injection.  Pt and  informed and verbalized understanding. Pt transferred from  to [facility] with assistance x 2 and  Gait belt.

## 2020-10-30 ENCOUNTER — HOSPITAL ENCOUNTER (OUTPATIENT)
Dept: INFUSION THERAPY | Facility: HOSPITAL | Age: 70
Discharge: HOME | End: 2020-10-30
Admitting: INTERNAL MEDICINE

## 2020-10-30 ENCOUNTER — OFFICE VISIT (OUTPATIENT)
Dept: ENDOCRINOLOGY | Age: 70
End: 2020-10-30

## 2020-10-30 VITALS
OXYGEN SATURATION: 99 % | TEMPERATURE: 98.4 F | HEART RATE: 87 BPM | RESPIRATION RATE: 20 BRPM | SYSTOLIC BLOOD PRESSURE: 150 MMHG | DIASTOLIC BLOOD PRESSURE: 78 MMHG

## 2020-10-30 VITALS
HEIGHT: 65 IN | WEIGHT: 165 LBS | BODY MASS INDEX: 27.49 KG/M2 | SYSTOLIC BLOOD PRESSURE: 148 MMHG | RESPIRATION RATE: 16 BRPM | DIASTOLIC BLOOD PRESSURE: 72 MMHG

## 2020-10-30 DIAGNOSIS — N18.5 CKD (CHRONIC KIDNEY DISEASE) STAGE 5, GFR LESS THAN 15 ML/MIN (HCC): ICD-10-CM

## 2020-10-30 DIAGNOSIS — N18.31 STAGE 3A CHRONIC KIDNEY DISEASE (HCC): ICD-10-CM

## 2020-10-30 DIAGNOSIS — D63.1 ANEMIA IN STAGE 5 CHRONIC KIDNEY DISEASE, NOT ON CHRONIC DIALYSIS (HCC): Primary | ICD-10-CM

## 2020-10-30 DIAGNOSIS — IMO0002 DM (DIABETES MELLITUS), TYPE 2, UNCONTROLLED W/NEUROLOGIC COMPLICATION: Primary | ICD-10-CM

## 2020-10-30 DIAGNOSIS — N18.5 ANEMIA IN STAGE 5 CHRONIC KIDNEY DISEASE, NOT ON CHRONIC DIALYSIS (HCC): Primary | ICD-10-CM

## 2020-10-30 DIAGNOSIS — N18.4 CHRONIC KIDNEY DISEASE, STAGE IV (SEVERE) (HCC): ICD-10-CM

## 2020-10-30 LAB
HCT VFR BLD AUTO: 28.3 % (ref 34–46.6)
HGB BLD-MCNC: 8.6 G/DL (ref 12–15.9)

## 2020-10-30 PROCEDURE — 36415 COLL VENOUS BLD VENIPUNCTURE: CPT

## 2020-10-30 PROCEDURE — 96365 THER/PROPH/DIAG IV INF INIT: CPT

## 2020-10-30 PROCEDURE — 96374 THER/PROPH/DIAG INJ IV PUSH: CPT

## 2020-10-30 PROCEDURE — 25010000002 EPOETIN ALFA PER 1000 UNITS: Performed by: INTERNAL MEDICINE

## 2020-10-30 PROCEDURE — 25010000002 FERUMOXYTOL 510 MG/17ML SOLUTION 510 MG VIAL: Performed by: INTERNAL MEDICINE

## 2020-10-30 PROCEDURE — 85018 HEMOGLOBIN: CPT | Performed by: INTERNAL MEDICINE

## 2020-10-30 PROCEDURE — 85014 HEMATOCRIT: CPT | Performed by: INTERNAL MEDICINE

## 2020-10-30 PROCEDURE — 99214 OFFICE O/P EST MOD 30 MIN: CPT | Performed by: INTERNAL MEDICINE

## 2020-10-30 PROCEDURE — 96372 THER/PROPH/DIAG INJ SC/IM: CPT

## 2020-10-30 RX ORDER — DULAGLUTIDE 0.75 MG/.5ML
0.75 INJECTION, SOLUTION SUBCUTANEOUS WEEKLY
Qty: 6 ML | Refills: 3 | Status: SHIPPED | OUTPATIENT
Start: 2020-10-30 | End: 2021-02-03 | Stop reason: SINTOL

## 2020-10-30 RX ADMIN — ERYTHROPOIETIN 20000 UNITS: 20000 INJECTION, SOLUTION INTRAVENOUS; SUBCUTANEOUS at 13:39

## 2020-10-30 RX ADMIN — FERUMOXYTOL 510 MG: 510 INJECTION INTRAVENOUS at 13:30

## 2020-11-06 ENCOUNTER — HOSPITAL ENCOUNTER (OUTPATIENT)
Dept: INFUSION THERAPY | Facility: HOSPITAL | Age: 70
Discharge: HOME | End: 2020-11-06
Admitting: INTERNAL MEDICINE

## 2020-11-06 VITALS
HEART RATE: 59 BPM | TEMPERATURE: 97.5 F | DIASTOLIC BLOOD PRESSURE: 62 MMHG | SYSTOLIC BLOOD PRESSURE: 118 MMHG | OXYGEN SATURATION: 98 % | RESPIRATION RATE: 20 BRPM

## 2020-11-06 DIAGNOSIS — N18.5 CKD (CHRONIC KIDNEY DISEASE) STAGE 5, GFR LESS THAN 15 ML/MIN (HCC): Primary | ICD-10-CM

## 2020-11-06 DIAGNOSIS — N18.5 ANEMIA IN STAGE 5 CHRONIC KIDNEY DISEASE, NOT ON CHRONIC DIALYSIS (HCC): ICD-10-CM

## 2020-11-06 DIAGNOSIS — D63.1 ANEMIA IN STAGE 5 CHRONIC KIDNEY DISEASE, NOT ON CHRONIC DIALYSIS (HCC): ICD-10-CM

## 2020-11-06 LAB
ANION GAP SERPL CALCULATED.3IONS-SCNC: 10.6 MMOL/L (ref 5–15)
BUN SERPL-MCNC: 38 MG/DL (ref 8–23)
BUN/CREAT SERPL: 10 (ref 7–25)
CALCIUM SPEC-SCNC: 7.9 MG/DL (ref 8.6–10.5)
CHLORIDE SERPL-SCNC: 115 MMOL/L (ref 98–107)
CO2 SERPL-SCNC: 20.4 MMOL/L (ref 22–29)
CREAT SERPL-MCNC: 3.81 MG/DL (ref 0.57–1)
GFR SERPL CREATININE-BSD FRML MDRD: 12 ML/MIN/1.73
GFR SERPL CREATININE-BSD FRML MDRD: ABNORMAL ML/MIN/{1.73_M2}
GLUCOSE SERPL-MCNC: 128 MG/DL (ref 65–99)
HCT VFR BLD AUTO: 28.4 % (ref 34–46.6)
HGB BLD-MCNC: 8.8 G/DL (ref 12–15.9)
MAGNESIUM SERPL-MCNC: 1.7 MG/DL (ref 1.6–2.4)
POTASSIUM SERPL-SCNC: 4.6 MMOL/L (ref 3.5–5.2)
SODIUM SERPL-SCNC: 146 MMOL/L (ref 136–145)

## 2020-11-06 PROCEDURE — 85014 HEMATOCRIT: CPT | Performed by: INTERNAL MEDICINE

## 2020-11-06 PROCEDURE — 85018 HEMOGLOBIN: CPT | Performed by: INTERNAL MEDICINE

## 2020-11-06 PROCEDURE — 96372 THER/PROPH/DIAG INJ SC/IM: CPT

## 2020-11-06 PROCEDURE — 36415 COLL VENOUS BLD VENIPUNCTURE: CPT

## 2020-11-06 PROCEDURE — 80048 BASIC METABOLIC PNL TOTAL CA: CPT | Performed by: INTERNAL MEDICINE

## 2020-11-06 PROCEDURE — 25010000002 EPOETIN ALFA PER 1000 UNITS: Performed by: INTERNAL MEDICINE

## 2020-11-06 PROCEDURE — 83735 ASSAY OF MAGNESIUM: CPT | Performed by: INTERNAL MEDICINE

## 2020-11-06 RX ADMIN — ERYTHROPOIETIN 20000 UNITS: 20000 INJECTION, SOLUTION INTRAVENOUS; SUBCUTANEOUS at 13:49

## 2020-11-13 ENCOUNTER — HOSPITAL ENCOUNTER (OUTPATIENT)
Dept: INFUSION THERAPY | Facility: HOSPITAL | Age: 70
Discharge: HOME | End: 2020-11-13
Admitting: INTERNAL MEDICINE

## 2020-11-13 VITALS
RESPIRATION RATE: 18 BRPM | DIASTOLIC BLOOD PRESSURE: 68 MMHG | HEART RATE: 78 BPM | OXYGEN SATURATION: 97 % | SYSTOLIC BLOOD PRESSURE: 142 MMHG | TEMPERATURE: 97.6 F

## 2020-11-13 DIAGNOSIS — N18.5 CKD (CHRONIC KIDNEY DISEASE) STAGE 5, GFR LESS THAN 15 ML/MIN (HCC): Primary | ICD-10-CM

## 2020-11-13 DIAGNOSIS — N18.5 ANEMIA IN STAGE 5 CHRONIC KIDNEY DISEASE, NOT ON CHRONIC DIALYSIS (HCC): ICD-10-CM

## 2020-11-13 DIAGNOSIS — D63.1 ANEMIA IN STAGE 5 CHRONIC KIDNEY DISEASE, NOT ON CHRONIC DIALYSIS (HCC): ICD-10-CM

## 2020-11-13 LAB
HCT VFR BLD AUTO: 28.8 % (ref 34–46.6)
HGB BLD-MCNC: 9.2 G/DL (ref 12–15.9)

## 2020-11-13 PROCEDURE — 36416 COLLJ CAPILLARY BLOOD SPEC: CPT

## 2020-11-13 PROCEDURE — 96372 THER/PROPH/DIAG INJ SC/IM: CPT

## 2020-11-13 PROCEDURE — 85014 HEMATOCRIT: CPT | Performed by: INTERNAL MEDICINE

## 2020-11-13 PROCEDURE — 85018 HEMOGLOBIN: CPT | Performed by: INTERNAL MEDICINE

## 2020-11-13 PROCEDURE — 25010000002 EPOETIN ALFA PER 1000 UNITS: Performed by: INTERNAL MEDICINE

## 2020-11-13 RX ADMIN — ERYTHROPOIETIN 20000 UNITS: 20000 INJECTION, SOLUTION INTRAVENOUS; SUBCUTANEOUS at 13:58

## 2020-11-13 NOTE — PROGRESS NOTES
Procrit indicated per lab results & MD order.  Injection site x 1 with band aide applied.  AVS given & pt DC per personal wheelchair with spouse after completion of visit.

## 2020-11-19 ENCOUNTER — OFFICE VISIT (OUTPATIENT)
Dept: FAMILY MEDICINE CLINIC | Facility: CLINIC | Age: 70
End: 2020-11-19

## 2020-11-19 VITALS
HEIGHT: 65 IN | BODY MASS INDEX: 27.46 KG/M2 | TEMPERATURE: 97.9 F | OXYGEN SATURATION: 99 % | SYSTOLIC BLOOD PRESSURE: 120 MMHG | HEART RATE: 58 BPM | DIASTOLIC BLOOD PRESSURE: 70 MMHG

## 2020-11-19 DIAGNOSIS — I48.91 ATRIAL FIBRILLATION, UNSPECIFIED TYPE (HCC): ICD-10-CM

## 2020-11-19 DIAGNOSIS — I61.9 HEMORRHAGIC STROKE (HCC): Primary | ICD-10-CM

## 2020-11-19 PROCEDURE — 99213 OFFICE O/P EST LOW 20 MIN: CPT | Performed by: INTERNAL MEDICINE

## 2020-11-19 RX ORDER — LOSARTAN POTASSIUM 50 MG/1
50 TABLET ORAL
COMMUNITY
Start: 2020-11-06 | End: 2021-07-08 | Stop reason: HOSPADM

## 2020-11-19 RX ORDER — AMLODIPINE BESYLATE 2.5 MG/1
2.5 TABLET ORAL DAILY
COMMUNITY
Start: 2020-11-13 | End: 2020-12-23 | Stop reason: SDUPTHER

## 2020-11-19 NOTE — PROGRESS NOTES
Subjective Chief complaint is follow-up on blood pressure and after stroke  Maribeth Rendon is a 70 y.o. female.     History of Present Illness Maribeth is here today for follow-up.  Since her last visit she did have a CAT scan of the head on October 26 at [facility].  It showed complete resolution of the hemorrhage.  There was still some edema although it had improved.  Her symptoms are getting better.  She is less clumsy with her right arm.  She is able to walk approximately the length of the house.  She did see her kidney doctor who added medicine back on.  She is unsure if this is amlodipine or losartan.  Both of these showed up in her medicine reconciliation.  I did advise them that for her next appointment to bring all of her pill bottles of what she is taking.    The following portions of the patient's history were reviewed and updated as appropriate: allergies, current medications, past family history, past medical history, past social history, past surgical history and problem list.    Review of Systems   Constitutional: Negative for chills and fever.   Eyes: Positive for visual disturbance.   Respiratory: Negative for cough.    Neurological: Positive for weakness.       Objective   Physical Exam  Vitals signs and nursing note reviewed.   Cardiovascular:      Rate and Rhythm: Normal rate. Rhythm irregular.   Pulmonary:      Effort: Pulmonary effort is normal.      Breath sounds: No wheezing or rales.   Neurological:      Mental Status: She is alert.      Comments: She still is somewhat clumsy with the right hand but it is improved on finger-to-nose testing.           Assessment/Plan   Diagnoses and all orders for this visit:    1. Hemorrhagic stroke (CMS/[facility]) (Primary)    2. Atrial fibrillation, unspecified type (CMS/[facility])    Maribeth is here today for follow-up.  She is back on her anticoagulation and seems to be doing okay.  I suspect some of her symptoms will improve as some of the edema resolves.  I did  advise it was recommended to follow-up the CAT scan 3 months from October 26.  She does have an appointment with her neurologist in December.  If they do not order the CAT scan advised and let me know and I will order it.

## 2020-11-20 ENCOUNTER — HOSPITAL ENCOUNTER (OUTPATIENT)
Dept: INFUSION THERAPY | Facility: HOSPITAL | Age: 70
Discharge: HOME | End: 2020-11-20
Admitting: INTERNAL MEDICINE

## 2020-11-20 VITALS
HEART RATE: 83 BPM | TEMPERATURE: 97.1 F | DIASTOLIC BLOOD PRESSURE: 66 MMHG | SYSTOLIC BLOOD PRESSURE: 134 MMHG | OXYGEN SATURATION: 100 % | RESPIRATION RATE: 20 BRPM

## 2020-11-20 DIAGNOSIS — N18.5 CKD (CHRONIC KIDNEY DISEASE) STAGE 5, GFR LESS THAN 15 ML/MIN (HCC): Primary | ICD-10-CM

## 2020-11-20 DIAGNOSIS — N18.5 ANEMIA IN STAGE 5 CHRONIC KIDNEY DISEASE, NOT ON CHRONIC DIALYSIS (HCC): ICD-10-CM

## 2020-11-20 DIAGNOSIS — D63.1 ANEMIA IN STAGE 5 CHRONIC KIDNEY DISEASE, NOT ON CHRONIC DIALYSIS (HCC): ICD-10-CM

## 2020-11-20 LAB
HCT VFR BLD AUTO: 32.3 % (ref 34–46.6)
HGB BLD-MCNC: 10 G/DL (ref 12–15.9)

## 2020-11-20 PROCEDURE — 85018 HEMOGLOBIN: CPT | Performed by: PSYCHIATRY & NEUROLOGY

## 2020-11-20 PROCEDURE — 85014 HEMATOCRIT: CPT | Performed by: PSYCHIATRY & NEUROLOGY

## 2020-11-20 PROCEDURE — 36416 COLLJ CAPILLARY BLOOD SPEC: CPT

## 2020-11-20 PROCEDURE — 25010000002 EPOETIN ALFA PER 1000 UNITS: Performed by: INTERNAL MEDICINE

## 2020-11-20 PROCEDURE — 96372 THER/PROPH/DIAG INJ SC/IM: CPT

## 2020-11-20 RX ADMIN — ERYTHROPOIETIN 20000 UNITS: 20000 INJECTION, SOLUTION INTRAVENOUS; SUBCUTANEOUS at 13:40

## 2020-11-27 ENCOUNTER — HOSPITAL ENCOUNTER (OUTPATIENT)
Dept: INFUSION THERAPY | Facility: HOSPITAL | Age: 70
Discharge: HOME | End: 2020-11-27
Admitting: INTERNAL MEDICINE

## 2020-11-27 VITALS
OXYGEN SATURATION: 98 % | RESPIRATION RATE: 16 BRPM | HEART RATE: 51 BPM | TEMPERATURE: 96.9 F | DIASTOLIC BLOOD PRESSURE: 62 MMHG | SYSTOLIC BLOOD PRESSURE: 129 MMHG

## 2020-11-27 DIAGNOSIS — N18.5 CKD (CHRONIC KIDNEY DISEASE) STAGE 5, GFR LESS THAN 15 ML/MIN (HCC): Primary | ICD-10-CM

## 2020-11-27 DIAGNOSIS — N18.5 ANEMIA IN STAGE 5 CHRONIC KIDNEY DISEASE, NOT ON CHRONIC DIALYSIS (HCC): ICD-10-CM

## 2020-11-27 DIAGNOSIS — D63.1 ANEMIA IN STAGE 5 CHRONIC KIDNEY DISEASE, NOT ON CHRONIC DIALYSIS (HCC): ICD-10-CM

## 2020-11-27 LAB
HCT VFR BLD AUTO: 34.7 % (ref 34–46.6)
HGB BLD-MCNC: 10.5 G/DL (ref 12–15.9)

## 2020-11-27 PROCEDURE — 25010000002 EPOETIN ALFA PER 1000 UNITS: Performed by: INTERNAL MEDICINE

## 2020-11-27 PROCEDURE — 85014 HEMATOCRIT: CPT | Performed by: INTERNAL MEDICINE

## 2020-11-27 PROCEDURE — 36416 COLLJ CAPILLARY BLOOD SPEC: CPT

## 2020-11-27 PROCEDURE — 96372 THER/PROPH/DIAG INJ SC/IM: CPT

## 2020-11-27 PROCEDURE — 85018 HEMOGLOBIN: CPT | Performed by: INTERNAL MEDICINE

## 2020-11-27 RX ADMIN — ERYTHROPOIETIN 20000 UNITS: 20000 INJECTION, SOLUTION INTRAVENOUS; SUBCUTANEOUS at 13:34

## 2020-12-04 ENCOUNTER — HOSPITAL ENCOUNTER (OUTPATIENT)
Dept: INFUSION THERAPY | Facility: HOSPITAL | Age: 70
Discharge: HOME | End: 2020-12-04
Admitting: INTERNAL MEDICINE

## 2020-12-04 VITALS
SYSTOLIC BLOOD PRESSURE: 141 MMHG | OXYGEN SATURATION: 100 % | DIASTOLIC BLOOD PRESSURE: 68 MMHG | HEART RATE: 51 BPM | RESPIRATION RATE: 20 BRPM | TEMPERATURE: 96.8 F

## 2020-12-04 DIAGNOSIS — N18.5 ANEMIA IN STAGE 5 CHRONIC KIDNEY DISEASE, NOT ON CHRONIC DIALYSIS (HCC): ICD-10-CM

## 2020-12-04 DIAGNOSIS — D63.1 ANEMIA IN STAGE 5 CHRONIC KIDNEY DISEASE, NOT ON CHRONIC DIALYSIS (HCC): ICD-10-CM

## 2020-12-04 DIAGNOSIS — N18.5 CKD (CHRONIC KIDNEY DISEASE) STAGE 5, GFR LESS THAN 15 ML/MIN (HCC): Primary | ICD-10-CM

## 2020-12-04 LAB
ANION GAP SERPL CALCULATED.3IONS-SCNC: 10.6 MMOL/L (ref 5–15)
BUN SERPL-MCNC: 32 MG/DL (ref 8–23)
BUN/CREAT SERPL: 9.3 (ref 7–25)
CALCIUM SPEC-SCNC: 8.1 MG/DL (ref 8.6–10.5)
CHLORIDE SERPL-SCNC: 112 MMOL/L (ref 98–107)
CO2 SERPL-SCNC: 20.4 MMOL/L (ref 22–29)
CREAT SERPL-MCNC: 3.44 MG/DL (ref 0.57–1)
GFR SERPL CREATININE-BSD FRML MDRD: 13 ML/MIN/1.73
GFR SERPL CREATININE-BSD FRML MDRD: ABNORMAL ML/MIN/{1.73_M2}
GLUCOSE SERPL-MCNC: 131 MG/DL (ref 65–99)
HCT VFR BLD AUTO: 35.8 % (ref 34–46.6)
HGB BLD-MCNC: 10.9 G/DL (ref 12–15.9)
MAGNESIUM SERPL-MCNC: 2 MG/DL (ref 1.6–2.4)
POTASSIUM SERPL-SCNC: 4.3 MMOL/L (ref 3.5–5.2)
SODIUM SERPL-SCNC: 143 MMOL/L (ref 136–145)

## 2020-12-04 PROCEDURE — 80048 BASIC METABOLIC PNL TOTAL CA: CPT | Performed by: INTERNAL MEDICINE

## 2020-12-04 PROCEDURE — 36415 COLL VENOUS BLD VENIPUNCTURE: CPT

## 2020-12-04 PROCEDURE — 85014 HEMATOCRIT: CPT | Performed by: INTERNAL MEDICINE

## 2020-12-04 PROCEDURE — 85018 HEMOGLOBIN: CPT | Performed by: INTERNAL MEDICINE

## 2020-12-04 PROCEDURE — 25010000002 EPOETIN ALFA PER 1000 UNITS: Performed by: INTERNAL MEDICINE

## 2020-12-04 PROCEDURE — 83735 ASSAY OF MAGNESIUM: CPT | Performed by: INTERNAL MEDICINE

## 2020-12-04 PROCEDURE — 96372 THER/PROPH/DIAG INJ SC/IM: CPT

## 2020-12-04 RX ADMIN — ERYTHROPOIETIN 20000 UNITS: 20000 INJECTION, SOLUTION INTRAVENOUS; SUBCUTANEOUS at 13:04

## 2020-12-11 ENCOUNTER — HOSPITAL ENCOUNTER (OUTPATIENT)
Dept: INFUSION THERAPY | Facility: HOSPITAL | Age: 70
Discharge: HOME | End: 2020-12-11
Admitting: INTERNAL MEDICINE

## 2020-12-11 VITALS
TEMPERATURE: 97.1 F | RESPIRATION RATE: 20 BRPM | DIASTOLIC BLOOD PRESSURE: 65 MMHG | HEART RATE: 93 BPM | SYSTOLIC BLOOD PRESSURE: 144 MMHG | OXYGEN SATURATION: 99 %

## 2020-12-11 DIAGNOSIS — N18.5 CKD (CHRONIC KIDNEY DISEASE) STAGE 5, GFR LESS THAN 15 ML/MIN (HCC): Primary | ICD-10-CM

## 2020-12-11 DIAGNOSIS — N18.5 ANEMIA IN STAGE 5 CHRONIC KIDNEY DISEASE, NOT ON CHRONIC DIALYSIS (HCC): ICD-10-CM

## 2020-12-11 DIAGNOSIS — D63.1 ANEMIA IN STAGE 5 CHRONIC KIDNEY DISEASE, NOT ON CHRONIC DIALYSIS (HCC): ICD-10-CM

## 2020-12-11 LAB
HCT VFR BLD AUTO: 40 % (ref 34–46.6)
HGB BLD-MCNC: 11.8 G/DL (ref 12–15.9)

## 2020-12-11 PROCEDURE — 85014 HEMATOCRIT: CPT | Performed by: INTERNAL MEDICINE

## 2020-12-11 PROCEDURE — 85018 HEMOGLOBIN: CPT | Performed by: INTERNAL MEDICINE

## 2020-12-11 PROCEDURE — G0463 HOSPITAL OUTPT CLINIC VISIT: HCPCS

## 2020-12-11 PROCEDURE — 36416 COLLJ CAPILLARY BLOOD SPEC: CPT

## 2020-12-18 ENCOUNTER — HOSPITAL ENCOUNTER (OUTPATIENT)
Dept: INFUSION THERAPY | Facility: HOSPITAL | Age: 70
Discharge: HOME | End: 2020-12-18
Admitting: INTERNAL MEDICINE

## 2020-12-18 VITALS
TEMPERATURE: 97.1 F | RESPIRATION RATE: 16 BRPM | HEART RATE: 80 BPM | OXYGEN SATURATION: 100 % | DIASTOLIC BLOOD PRESSURE: 73 MMHG | SYSTOLIC BLOOD PRESSURE: 144 MMHG

## 2020-12-18 DIAGNOSIS — D63.1 ANEMIA IN STAGE 5 CHRONIC KIDNEY DISEASE, NOT ON CHRONIC DIALYSIS (HCC): ICD-10-CM

## 2020-12-18 DIAGNOSIS — N18.5 ANEMIA IN STAGE 5 CHRONIC KIDNEY DISEASE, NOT ON CHRONIC DIALYSIS (HCC): ICD-10-CM

## 2020-12-18 DIAGNOSIS — N18.5 CKD (CHRONIC KIDNEY DISEASE) STAGE 5, GFR LESS THAN 15 ML/MIN (HCC): Primary | ICD-10-CM

## 2020-12-18 LAB
HCT VFR BLD AUTO: 40.8 % (ref 34–46.6)
HGB BLD-MCNC: 12.3 G/DL (ref 12–15.9)

## 2020-12-18 PROCEDURE — G0463 HOSPITAL OUTPT CLINIC VISIT: HCPCS

## 2020-12-18 PROCEDURE — 85014 HEMATOCRIT: CPT | Performed by: INTERNAL MEDICINE

## 2020-12-18 PROCEDURE — 85018 HEMOGLOBIN: CPT | Performed by: INTERNAL MEDICINE

## 2020-12-18 PROCEDURE — 36416 COLLJ CAPILLARY BLOOD SPEC: CPT

## 2020-12-23 ENCOUNTER — OFFICE VISIT (OUTPATIENT)
Dept: CARDIOLOGY | Age: 70
End: 2020-12-23

## 2020-12-23 VITALS
BODY MASS INDEX: 28.32 KG/M2 | DIASTOLIC BLOOD PRESSURE: 80 MMHG | HEIGHT: 64 IN | HEART RATE: 89 BPM | SYSTOLIC BLOOD PRESSURE: 181 MMHG

## 2020-12-23 DIAGNOSIS — Z79.01 ANTICOAGULATED: ICD-10-CM

## 2020-12-23 DIAGNOSIS — Z95.1 S/P CABG (CORONARY ARTERY BYPASS GRAFT): ICD-10-CM

## 2020-12-23 DIAGNOSIS — I25.118 CORONARY ARTERY DISEASE OF NATIVE ARTERY OF NATIVE HEART WITH STABLE ANGINA PECTORIS (HCC): Primary | ICD-10-CM

## 2020-12-23 DIAGNOSIS — E78.5 HYPERLIPIDEMIA, UNSPECIFIED HYPERLIPIDEMIA TYPE: ICD-10-CM

## 2020-12-23 DIAGNOSIS — I10 ESSENTIAL HYPERTENSION: ICD-10-CM

## 2020-12-23 PROCEDURE — 99214 OFFICE O/P EST MOD 30 MIN: CPT | Performed by: INTERNAL MEDICINE

## 2020-12-23 PROCEDURE — 93000 ELECTROCARDIOGRAM COMPLETE: CPT | Performed by: INTERNAL MEDICINE

## 2020-12-23 RX ORDER — AMLODIPINE BESYLATE 5 MG/1
5 TABLET ORAL DAILY
Qty: 30 TABLET | Refills: 6 | Status: SHIPPED | OUTPATIENT
Start: 2020-12-23 | End: 2021-07-28

## 2020-12-23 NOTE — PROGRESS NOTES
1 year follow up   Subjective:        Maribeth Rendon is a 70 y.o. female who here for follow up    CC  Recent head bleed  HPI  70-year-old female with known history of the coronary artery disease coronary artery bypass graft surgery as well as history of the hypertension recently had a brain bleed due to the hypertension here for the follow-up continues to have the blood pressure running borderline high     Problems Addressed this Visit        Cardiovascular and Mediastinum    Essential hypertension    Hyperlipidemia    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 pectoris (CMS/Formerly [facility]) - Primary       Other    Anticoagulated      Diagnoses       Codes Comments    Coronary artery disease of native artery of native heart with stable angina pectoris (CMS/HCC)    -  Primary ICD-10-CM: I25.118  ICD-9-CM: 414.01, 413.9     Essential hypertension     ICD-10-CM: I10  ICD-9-CM: 401.9     Hyperlipidemia, unspecified hyperlipidemia type     ICD-10-CM: E78.5  ICD-9-CM: 272.4     Anticoagulated     ICD-10-CM: Z79.01  ICD-9-CM: V58.61         .    The following portions of the patient's history were reviewed and updated as appropriate: allergies, current medications, past family history, past medical history, past social history, past surgical history and problem list.    Past Medical History:   Diagnosis Date   • Achilles tendon tear     left    • Anemia    • Anxiety    • Depression    • Diabetes mellitus, type 2 (CMS/Formerly [facility])    • ESRD (end stage renal disease) (CMS/Formerly [facility])     HAS LEFT FOREARM FISTULA   • GERD (gastroesophageal reflux disease)    • History of MRSA infection 03/15/2016    ABDOMINAL WOUND,   INFECTION CONTROLL NOTIFIED 2-6-2017   • History of transfusion    • Hyperlipidemia    • Hypertension    • Hypokalemia    • Hypomagnesemia    • Hypoxic 01/2016    WHEN SHE HAD PNEUMONIA   • Intertrigo    • Leukocytosis    • Osteoarthritis    • Pneumonia 01/2016   • Psoriasis    • Psoriatic arthritis (CMS/Formerly [facility])    • Seizures  "(CMS/Formerly [facility])     one time   • Sepsis (CMS/[facility]) 01/2016   • SOB (shortness of breath)    • Stage 4 chronic renal impairment associated with type 2 diabetes mellitus (CMS/[facility])    • Staph infection     HX RIGHT FOOT AT [facility] 01/09/2010   • Streptococcal bacteremia    • Stroke (cerebrum) (CMS/[facility])    • Stroke (CMS/[facility])    • Swelling of hand 10/27/2016    LEFT HAND SWELLIMG SINCE LEFT FISTULA SURGERY   • Tremor, essential    • Wears glasses    • Wheelchair dependent     For mobility     reports that she quit smoking about 28 years ago. Her smoking use included cigarettes. She has a 52.00 pack-year smoking history. She has never used smokeless tobacco. She reports previous alcohol use. She reports that she does not use drugs.   Family History   Problem Relation Age of Onset   • Heart attack Mother    • Heart disease Mother    • Kidney disease Mother    • Heart attack Father    • Heart disease Father    • Hypertension Father    • Stroke Father         ISCHEMIC   • Diabetes Father         TYPE 2   • Kidney disease Brother    • Diabetes Brother         TYPE 1   • Thyroid disease Daughter    • Anxiety disorder Maternal Aunt    • Bipolar disorder Maternal Aunt    • Depression Maternal Aunt    • Lupus Maternal Aunt         LUPUS ANTICOAGLULANT   • Rheum arthritis Maternal Aunt    • Alcohol abuse Maternal Uncle    • Other Maternal Uncle         PULMONARY DISEASE       Review of Systems  Constitutional: No wt loss, fever, fatigue  Gastrointestinal: No nausea, abdominal pain  Behavioral/Psych: No insomnia or anxiety   Cardiovascular no chest pains or tightness in the chest    Objective:       Physical Exam  BP (!) 181/80 (BP Location: Right arm, Patient Position: Sitting)   Pulse 89   Ht 162.6 cm (64\")   LMP  (LMP Unknown)   BMI 28.32 kg/m²   General appearance: No acute changes   Neck: Trachea midline; NECK, supple, no thyromegaly or lymphadenopathy   Lungs: Normal size and shape, normal breath sounds, equal distribution of " air, no rales and rhonchi   CV: S1-S2 regular, no murmurs, no rub, no gallop   Abdomen: Soft, non-tender; no masses , no abnormal abdominal sounds   Extremities: No deformity , normal color , no peripheral edema   Skin: Normal temperature, turgor and texture; no rash, ulcers            ECG 12 Lead    Date/Time: 12/23/2020 2:30 PM  Performed by: Eddie Hodges MD  Authorized by: Eddie Hodges MD   Comparison: compared with previous ECG   Similar to previous ECG  Rhythm: sinus rhythm  ST Flattening: all    Clinical impression: non-specific ECG              Echocardiogram:        Current Outpatient Medications:   •  ACCU-CHEK JESUS MANUEL PLUS test strip, TEST THREE TIMES DAILY, Disp: 300 each, Rfl: 2  •  amLODIPine (NORVASC) 2.5 MG tablet, Take 2.5 mg by mouth Daily., Disp: , Rfl:   •  apixaban (Eliquis) 5 MG tablet tablet, Take 1 tablet by mouth 2 (Two) Times a Day., Disp: 180 tablet, Rfl: 1  •  aspirin 81 MG tablet, Take 81 mg by mouth Every Morning. TO STOP THE DAY BEFORE SURGERY, Disp: , Rfl:   •  bumetanide (BUMEX) 2 MG tablet, Take 1 tablet by mouth Daily., Disp: 90 tablet, Rfl: 1  •  carvedilol (COREG) 12.5 MG tablet, Take 1 tablet by mouth 2 (Two) Times a Day., Disp: 180 tablet, Rfl: 3  •  Cholecalciferol (VITAMIN D-3) 1000 UNITS capsule, Take 5,000 Units by mouth Daily., Disp: , Rfl:   •  Dulaglutide (Trulicity) 0.75 MG/0.5ML solution pen-injector, Inject 0.75 mg under the skin into the appropriate area as directed 1 (One) Time Per Week. Patient is starting medication on Sunday 09/30/2020, Disp: 6 mL, Rfl: 3  •  epoetin hermes (EPOGEN,PROCRIT) 30765 UNIT/ML injection, Inject 20,000 Units under the skin into the appropriate area as directed 1 (One) Time Per Week. LAST TIME 2-6-2017, Disp: , Rfl:   •  Ferrous Fumarate-Vitamin C ER (Florencia-Sequels) 65-25 MG CR tablet, Take 65 mg by mouth., Disp: , Rfl:   •  gabapentin (NEURONTIN) 300 MG capsule, Take 1 capsule by mouth 2 (Two) Times a Day., Disp: 180  capsule, Rfl: 1  •  glucose blood test strip, Use as instructed, Disp: 100 each, Rfl: 0  •  linagliptin (TRADJENTA) 5 MG tablet tablet, Take 1 tablet by mouth Daily., Disp: 90 tablet, Rfl: 1  •  Loperamide HCl (IMODIUM A-D PO), Take 1 tablet by mouth Every 4 (Four) Hours As Needed., Disp: , Rfl:   •  losartan (COZAAR) 50 MG tablet, Take 50 mg by mouth every night at bedtime., Disp: , Rfl:   •  montelukast (SINGULAIR) 10 MG tablet, Take 1 tablet by mouth Every Night., Disp: 90 tablet, Rfl: 1  •  Multiple Vitamin (MULTI VITAMIN DAILY PO), Take 1 tablet by mouth Every Morning., Disp: , Rfl:   •  Probiotic Product (PROBIOTIC DAILY PO), Take 1 capsule by mouth Daily., Disp: , Rfl:   •  pyridoxine (VITAMIN B-6) 500 MG tablet, Take 500 mg by mouth Daily., Disp: , Rfl:   •  topiramate (TOPAMAX) 100 MG tablet, Take 100 mg by mouth Daily., Disp: , Rfl:    Assessment:        Patient Active Problem List   Diagnosis   • Menstrual disorder   • Atopic rhinitis   • Anemia in CKD (chronic kidney disease)   • Spasm of cervical paraspinous muscle   • Cervical radiculopathy   • Chronic kidney disease, stage IV (severe) (CMS/[facility])   • Depression   • DM type 2 with diabetic peripheral neuropathy (CMS/HCC)   • Essential hypertension   • Duplay's periarthritis syndrome   • Gastroesophageal reflux disease   • Hyperlipidemia   • Hypertension   • Arthritis   • Seizure disorder (CMS/[facility])   • Phlebitis   • Type 2 diabetes mellitus (CMS/[facility])   • Vitamin D deficiency   • Chest pain   • Abscess   • Generalized muscle weakness   • Abdominal wall cellulitis   • HTN (hypertension)   • CKD (chronic kidney disease) stage 4, GFR 15-29 ml/min (CMS/[facility])   • Diabetes mellitus with peripheral autonomic neuropathy (CMS/HCC)   • Hyponatremia   • Hypercalcemia   • Dehydration   • DACIA (acute kidney injury) (CMS/[facility])   • Abdominal wall abscess   • Cellulitis of toe of left foot   • Partial Achilles tendon tear, left, initial encounter   • Arthritis of foot   •  Essential tremor   • Primary Parkinsonism (CMS/[facility])   • Abnormal cardiac function test   • 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 pectoris (CMS/[facility])   • Atrial fibrillation (CMS/[facility])   • Anticoagulated   • CKD (chronic kidney disease) stage 5, GFR less than 15 ml/min (CMS/[facility])   • Hypoglycemia   • Low vitamin B12 level   • Hemorrhagic stroke (CMS/[facility])               Plan:            ICD-10-CM ICD-9-CM   1. Coronary artery disease of native artery of native heart with stable angina pectoris (CMS/[facility])  I25.118 414.01     413.9   2. Essential hypertension  I10 401.9   3. Hyperlipidemia, unspecified hyperlipidemia type  E78.5 272.4   4. Anticoagulated  Z79.01 V58.61   5. S/P CABG (coronary artery bypass graft)  Z95.1 V45.81     1. Coronary artery disease of native artery of native heart with stable angina pectoris (CMS/[facility])  No angina pectoris CABG    2. Essential hypertension  Blood pressure borderline high we will increase the medicines    3. Hyperlipidemia, unspecified hyperlipidemia type  Continue current treatment    4. Anticoagulated  Counseling has been done    5. S/P CABG (coronary artery bypass graft)  No angina pectoris  increase norvasc to 5 mg po daily    Pros and cons of this new medication / change medication has been explained to  the patient    Possible side effects has been explained    Associated need of the blood  Work has been explained    Need for the compliance of the medication has been explained      Phone visit 1 month    COUNSELING:    Maribeth Eagle was given to patient for the following topics: diagnostic results, risk factor reductions, impressions, risks and benefits of treatment options and importance of treatment compliance .       SMOKING COUNSELING:    [unfilled]    Dictated using Dragon dictation

## 2020-12-28 ENCOUNTER — HOSPITAL ENCOUNTER (OUTPATIENT)
Dept: INFUSION THERAPY | Facility: HOSPITAL | Age: 70
Discharge: HOME | End: 2020-12-28
Admitting: INTERNAL MEDICINE

## 2020-12-28 VITALS
HEART RATE: 84 BPM | RESPIRATION RATE: 16 BRPM | SYSTOLIC BLOOD PRESSURE: 169 MMHG | TEMPERATURE: 96.2 F | DIASTOLIC BLOOD PRESSURE: 65 MMHG | OXYGEN SATURATION: 98 %

## 2020-12-28 DIAGNOSIS — N18.5 ANEMIA IN STAGE 5 CHRONIC KIDNEY DISEASE, NOT ON CHRONIC DIALYSIS (HCC): ICD-10-CM

## 2020-12-28 DIAGNOSIS — D63.1 ANEMIA IN STAGE 5 CHRONIC KIDNEY DISEASE, NOT ON CHRONIC DIALYSIS (HCC): ICD-10-CM

## 2020-12-28 DIAGNOSIS — N18.5 CKD (CHRONIC KIDNEY DISEASE) STAGE 5, GFR LESS THAN 15 ML/MIN (HCC): Primary | ICD-10-CM

## 2020-12-28 LAB
HCT VFR BLD AUTO: 38.5 % (ref 34–46.6)
HGB BLD-MCNC: 12.1 G/DL (ref 12–15.9)

## 2020-12-28 PROCEDURE — 36416 COLLJ CAPILLARY BLOOD SPEC: CPT

## 2020-12-28 PROCEDURE — 85018 HEMOGLOBIN: CPT | Performed by: INTERNAL MEDICINE

## 2020-12-28 PROCEDURE — G0463 HOSPITAL OUTPT CLINIC VISIT: HCPCS

## 2020-12-28 PROCEDURE — 85014 HEMATOCRIT: CPT | Performed by: INTERNAL MEDICINE

## 2021-01-01 ENCOUNTER — OFFICE VISIT (OUTPATIENT)
Dept: ENDOCRINOLOGY | Age: 71
End: 2021-01-01

## 2021-01-01 ENCOUNTER — OFFICE VISIT (OUTPATIENT)
Dept: WOUND CARE | Facility: HOSPITAL | Age: 71
End: 2021-01-01

## 2021-01-01 ENCOUNTER — TELEPHONE (OUTPATIENT)
Dept: ENDOCRINOLOGY | Age: 71
End: 2021-01-01

## 2021-01-01 ENCOUNTER — APPOINTMENT (OUTPATIENT)
Dept: WOUND CARE | Facility: HOSPITAL | Age: 71
End: 2021-01-01

## 2021-01-01 ENCOUNTER — OFFICE VISIT (OUTPATIENT)
Dept: CARDIOLOGY | Facility: CLINIC | Age: 71
End: 2021-01-01

## 2021-01-01 ENCOUNTER — OFFICE VISIT (OUTPATIENT)
Dept: FAMILY MEDICINE CLINIC | Facility: CLINIC | Age: 71
End: 2021-01-01

## 2021-01-01 ENCOUNTER — FLU SHOT (OUTPATIENT)
Dept: FAMILY MEDICINE CLINIC | Facility: CLINIC | Age: 71
End: 2021-01-01

## 2021-01-01 ENCOUNTER — TELEPHONE (OUTPATIENT)
Dept: FAMILY MEDICINE CLINIC | Facility: CLINIC | Age: 71
End: 2021-01-01

## 2021-01-01 ENCOUNTER — APPOINTMENT (OUTPATIENT)
Dept: CARDIOLOGY | Facility: HOSPITAL | Age: 71
End: 2021-01-01

## 2021-01-01 ENCOUNTER — SPECIALTY PHARMACY (OUTPATIENT)
Dept: ENDOCRINOLOGY | Age: 71
End: 2021-01-01

## 2021-01-01 ENCOUNTER — HOSPITAL ENCOUNTER (OUTPATIENT)
Dept: CARDIOLOGY | Facility: HOSPITAL | Age: 71
Discharge: HOME | End: 2021-09-09
Admitting: SURGERY

## 2021-01-01 VITALS
OXYGEN SATURATION: 90 % | DIASTOLIC BLOOD PRESSURE: 86 MMHG | HEART RATE: 91 BPM | SYSTOLIC BLOOD PRESSURE: 122 MMHG | HEIGHT: 63 IN | BODY MASS INDEX: 28 KG/M2 | WEIGHT: 158 LBS

## 2021-01-01 VITALS
BODY MASS INDEX: 27.44 KG/M2 | OXYGEN SATURATION: 99 % | HEART RATE: 89 BPM | SYSTOLIC BLOOD PRESSURE: 130 MMHG | HEIGHT: 65 IN | DIASTOLIC BLOOD PRESSURE: 68 MMHG

## 2021-01-01 VITALS
HEART RATE: 98 BPM | DIASTOLIC BLOOD PRESSURE: 72 MMHG | BODY MASS INDEX: 29.21 KG/M2 | HEIGHT: 63 IN | SYSTOLIC BLOOD PRESSURE: 120 MMHG

## 2021-01-01 DIAGNOSIS — E78.5 HYPERLIPIDEMIA, UNSPECIFIED HYPERLIPIDEMIA TYPE: ICD-10-CM

## 2021-01-01 DIAGNOSIS — I48.91 ATRIAL FIBRILLATION, UNSPECIFIED TYPE (HCC): Primary | ICD-10-CM

## 2021-01-01 DIAGNOSIS — M54.12 CERVICAL RADICULOPATHY: ICD-10-CM

## 2021-01-01 DIAGNOSIS — E11.42 DM TYPE 2 WITH DIABETIC PERIPHERAL NEUROPATHY (HCC): ICD-10-CM

## 2021-01-01 DIAGNOSIS — I10 ESSENTIAL HYPERTENSION: Primary | ICD-10-CM

## 2021-01-01 DIAGNOSIS — Z23 NEED FOR INFLUENZA VACCINATION: Primary | ICD-10-CM

## 2021-01-01 DIAGNOSIS — N18.31 STAGE 3A CHRONIC KIDNEY DISEASE (HCC): ICD-10-CM

## 2021-01-01 DIAGNOSIS — I50.22 SYSTOLIC CHF, CHRONIC (HCC): ICD-10-CM

## 2021-01-01 DIAGNOSIS — I48.91 ATRIAL FIBRILLATION, UNSPECIFIED TYPE (HCC): ICD-10-CM

## 2021-01-01 DIAGNOSIS — E11.65 TYPE 2 DIABETES MELLITUS WITH HYPERGLYCEMIA, WITHOUT LONG-TERM CURRENT USE OF INSULIN (HCC): Primary | ICD-10-CM

## 2021-01-01 DIAGNOSIS — I10 PRIMARY HYPERTENSION: ICD-10-CM

## 2021-01-01 DIAGNOSIS — N18.6 ESRD (END STAGE RENAL DISEASE) (HCC): ICD-10-CM

## 2021-01-01 DIAGNOSIS — E11.59 TYPE 2 DIABETES MELLITUS WITH OTHER CIRCULATORY COMPLICATIONS (HCC): ICD-10-CM

## 2021-01-01 LAB
BH CV LOWER ARTERIAL LEFT ABI RATIO: 0.96
BH CV LOWER ARTERIAL LEFT GREAT TOE SYS MAX: 94 MMHG
BH CV LOWER ARTERIAL LEFT POST TIBIAL SYS MAX: 160 MMHG
BH CV LOWER ARTERIAL LEFT TBI RATIO: 0.57
MAXIMAL PREDICTED HEART RATE: 150 BPM
STRESS TARGET HR: 128 BPM
UPPER ARTERIAL RIGHT ARM BRACHIAL SYS MAX: 166 MMHG

## 2021-01-01 PROCEDURE — 99213 OFFICE O/P EST LOW 20 MIN: CPT | Performed by: INTERNAL MEDICINE

## 2021-01-01 PROCEDURE — 99214 OFFICE O/P EST MOD 30 MIN: CPT | Performed by: NURSE PRACTITIONER

## 2021-01-01 PROCEDURE — 90662 IIV NO PRSV INCREASED AG IM: CPT | Performed by: FAMILY MEDICINE

## 2021-01-01 PROCEDURE — G0463 HOSPITAL OUTPT CLINIC VISIT: HCPCS

## 2021-01-01 PROCEDURE — 93923 UPR/LXTR ART STDY 3+ LVLS: CPT

## 2021-01-01 PROCEDURE — 93000 ELECTROCARDIOGRAM COMPLETE: CPT | Performed by: INTERNAL MEDICINE

## 2021-01-01 PROCEDURE — G0008 ADMIN INFLUENZA VIRUS VAC: HCPCS | Performed by: FAMILY MEDICINE

## 2021-01-01 RX ORDER — GABAPENTIN 300 MG/1
300 CAPSULE ORAL 2 TIMES DAILY
Qty: 180 CAPSULE | Refills: 1 | Status: SHIPPED | OUTPATIENT
Start: 2021-01-01

## 2021-01-01 RX ORDER — GABAPENTIN 300 MG/1
CAPSULE ORAL
Qty: 180 CAPSULE | Refills: 0 | OUTPATIENT
Start: 2021-01-01

## 2021-01-01 RX ORDER — INSULIN DEGLUDEC INJECTION 100 U/ML
8 INJECTION, SOLUTION SUBCUTANEOUS DAILY
Qty: 15 ML | Refills: 3 | Status: SHIPPED | OUTPATIENT
Start: 2021-01-01 | End: 2022-01-01

## 2021-01-01 RX ORDER — LIDOCAINE AND PRILOCAINE 25; 25 MG/G; MG/G
CREAM TOPICAL
COMMUNITY
Start: 2021-01-01

## 2021-01-01 RX ORDER — CALCIUM ACETATE 667 MG/1
TABLET ORAL
COMMUNITY
Start: 2021-01-01 | End: 2022-01-01

## 2021-01-01 RX ORDER — APIXABAN 5 MG/1
5 TABLET, FILM COATED ORAL 2 TIMES DAILY
COMMUNITY
Start: 2021-01-01 | End: 2022-01-01 | Stop reason: HOSPADM

## 2021-01-08 ENCOUNTER — HOSPITAL ENCOUNTER (OUTPATIENT)
Dept: INFUSION THERAPY | Facility: HOSPITAL | Age: 71
Discharge: HOME | End: 2021-01-08
Admitting: INTERNAL MEDICINE

## 2021-01-08 VITALS
HEART RATE: 75 BPM | RESPIRATION RATE: 20 BRPM | TEMPERATURE: 97.1 F | SYSTOLIC BLOOD PRESSURE: 119 MMHG | OXYGEN SATURATION: 99 % | DIASTOLIC BLOOD PRESSURE: 58 MMHG

## 2021-01-08 DIAGNOSIS — D63.1 ANEMIA IN STAGE 5 CHRONIC KIDNEY DISEASE, NOT ON CHRONIC DIALYSIS (HCC): ICD-10-CM

## 2021-01-08 DIAGNOSIS — N18.5 CKD (CHRONIC KIDNEY DISEASE) STAGE 5, GFR LESS THAN 15 ML/MIN (HCC): Primary | ICD-10-CM

## 2021-01-08 DIAGNOSIS — N18.5 ANEMIA IN STAGE 5 CHRONIC KIDNEY DISEASE, NOT ON CHRONIC DIALYSIS (HCC): ICD-10-CM

## 2021-01-08 LAB
ANION GAP SERPL CALCULATED.3IONS-SCNC: 8.5 MMOL/L (ref 5–15)
BUN SERPL-MCNC: 40 MG/DL (ref 8–23)
BUN/CREAT SERPL: 11.6 (ref 7–25)
CALCIUM SPEC-SCNC: 8.2 MG/DL (ref 8.6–10.5)
CHLORIDE SERPL-SCNC: 113 MMOL/L (ref 98–107)
CO2 SERPL-SCNC: 20.5 MMOL/L (ref 22–29)
CREAT SERPL-MCNC: 3.45 MG/DL (ref 0.57–1)
GFR SERPL CREATININE-BSD FRML MDRD: 13 ML/MIN/1.73
GFR SERPL CREATININE-BSD FRML MDRD: ABNORMAL ML/MIN/{1.73_M2}
GLUCOSE SERPL-MCNC: 103 MG/DL (ref 65–99)
HCT VFR BLD AUTO: 36.5 % (ref 34–46.6)
HGB BLD-MCNC: 11.4 G/DL (ref 12–15.9)
MAGNESIUM SERPL-MCNC: 1.8 MG/DL (ref 1.6–2.4)
POTASSIUM SERPL-SCNC: 4.1 MMOL/L (ref 3.5–5.2)
SODIUM SERPL-SCNC: 142 MMOL/L (ref 136–145)

## 2021-01-08 PROCEDURE — 85018 HEMOGLOBIN: CPT | Performed by: INTERNAL MEDICINE

## 2021-01-08 PROCEDURE — 85014 HEMATOCRIT: CPT | Performed by: INTERNAL MEDICINE

## 2021-01-08 PROCEDURE — 83735 ASSAY OF MAGNESIUM: CPT | Performed by: INTERNAL MEDICINE

## 2021-01-08 PROCEDURE — 36415 COLL VENOUS BLD VENIPUNCTURE: CPT

## 2021-01-08 PROCEDURE — 80048 BASIC METABOLIC PNL TOTAL CA: CPT | Performed by: INTERNAL MEDICINE

## 2021-01-08 PROCEDURE — G0463 HOSPITAL OUTPT CLINIC VISIT: HCPCS

## 2021-01-15 ENCOUNTER — HOSPITAL ENCOUNTER (OUTPATIENT)
Dept: INFUSION THERAPY | Facility: HOSPITAL | Age: 71
Discharge: HOME | End: 2021-01-15
Admitting: INTERNAL MEDICINE

## 2021-01-15 VITALS
OXYGEN SATURATION: 97 % | DIASTOLIC BLOOD PRESSURE: 56 MMHG | HEART RATE: 71 BPM | TEMPERATURE: 97.3 F | RESPIRATION RATE: 20 BRPM | SYSTOLIC BLOOD PRESSURE: 110 MMHG

## 2021-01-15 DIAGNOSIS — D63.1 ANEMIA OF CHRONIC RENAL FAILURE, STAGE 5 (HCC): Primary | ICD-10-CM

## 2021-01-15 DIAGNOSIS — N18.5 CKD (CHRONIC KIDNEY DISEASE) STAGE 5, GFR LESS THAN 15 ML/MIN (HCC): ICD-10-CM

## 2021-01-15 DIAGNOSIS — D63.1 ANEMIA IN STAGE 5 CHRONIC KIDNEY DISEASE, NOT ON CHRONIC DIALYSIS (HCC): ICD-10-CM

## 2021-01-15 DIAGNOSIS — N18.5 ANEMIA OF CHRONIC RENAL FAILURE, STAGE 5 (HCC): Primary | ICD-10-CM

## 2021-01-15 DIAGNOSIS — N18.5 ANEMIA IN STAGE 5 CHRONIC KIDNEY DISEASE, NOT ON CHRONIC DIALYSIS (HCC): ICD-10-CM

## 2021-01-15 LAB
25(OH)D3 SERPL-MCNC: 26.4 NG/ML (ref 30–100)
ALBUMIN SERPL-MCNC: 2.9 G/DL (ref 3.5–5.2)
ALBUMIN/GLOB SERPL: 0.8 G/DL
ALP SERPL-CCNC: 75 U/L (ref 39–117)
ALT SERPL W P-5'-P-CCNC: 15 U/L (ref 1–33)
ANION GAP SERPL CALCULATED.3IONS-SCNC: 10.7 MMOL/L (ref 5–15)
AST SERPL-CCNC: 20 U/L (ref 1–32)
BILIRUB SERPL-MCNC: 0.2 MG/DL (ref 0–1.2)
BUN SERPL-MCNC: 53 MG/DL (ref 8–23)
BUN/CREAT SERPL: 12.2 (ref 7–25)
CALCIUM SPEC-SCNC: 7.6 MG/DL (ref 8.6–10.5)
CALCIUM SPEC-SCNC: 7.8 MG/DL (ref 8.6–10.5)
CHLORIDE SERPL-SCNC: 114 MMOL/L (ref 98–107)
CO2 SERPL-SCNC: 19.3 MMOL/L (ref 22–29)
CREAT SERPL-MCNC: 4.33 MG/DL (ref 0.57–1)
DEPRECATED RDW RBC AUTO: 46.6 FL (ref 37–54)
ERYTHROCYTE [DISTWIDTH] IN BLOOD BY AUTOMATED COUNT: 14.1 % (ref 12.3–15.4)
FERRITIN SERPL-MCNC: 587 NG/ML (ref 13–150)
FOLATE SERPL-MCNC: 4.62 NG/ML (ref 4.78–24.2)
GFR SERPL CREATININE-BSD FRML MDRD: 10 ML/MIN/1.73
GFR SERPL CREATININE-BSD FRML MDRD: ABNORMAL ML/MIN/{1.73_M2}
GLOBULIN UR ELPH-MCNC: 3.5 GM/DL
GLUCOSE SERPL-MCNC: 143 MG/DL (ref 65–99)
HCT VFR BLD AUTO: 34.2 % (ref 34–46.6)
HGB BLD-MCNC: 10.8 G/DL (ref 12–15.9)
IRON 24H UR-MRATE: 115 MCG/DL (ref 37–145)
IRON SATN MFR SERPL: 78 % (ref 20–50)
MCH RBC QN AUTO: 28.5 PG (ref 26.6–33)
MCHC RBC AUTO-ENTMCNC: 31.6 G/DL (ref 31.5–35.7)
MCV RBC AUTO: 90.2 FL (ref 79–97)
PHOSPHATE SERPL-MCNC: 6.8 MG/DL (ref 2.5–4.5)
PLATELET # BLD AUTO: 173 10*3/MM3 (ref 140–450)
PMV BLD AUTO: 10.9 FL (ref 6–12)
POTASSIUM SERPL-SCNC: 4.4 MMOL/L (ref 3.5–5.2)
PROT SERPL-MCNC: 6.4 G/DL (ref 6–8.5)
PTH-INTACT SERPL-MCNC: 329 PG/ML (ref 15–65)
RBC # BLD AUTO: 3.79 10*6/MM3 (ref 3.77–5.28)
SODIUM SERPL-SCNC: 144 MMOL/L (ref 136–145)
TIBC SERPL-MCNC: 148 MCG/DL (ref 298–536)
TRANSFERRIN SERPL-MCNC: 99 MG/DL (ref 200–360)
URATE SERPL-MCNC: 9.2 MG/DL (ref 2.4–5.7)
VIT B12 BLD-MCNC: 445 PG/ML (ref 211–946)
WBC # BLD AUTO: 7.32 10*3/MM3 (ref 3.4–10.8)

## 2021-01-15 PROCEDURE — 84550 ASSAY OF BLOOD/URIC ACID: CPT | Performed by: INTERNAL MEDICINE

## 2021-01-15 PROCEDURE — 85027 COMPLETE CBC AUTOMATED: CPT | Performed by: INTERNAL MEDICINE

## 2021-01-15 PROCEDURE — 84100 ASSAY OF PHOSPHORUS: CPT | Performed by: INTERNAL MEDICINE

## 2021-01-15 PROCEDURE — 36415 COLL VENOUS BLD VENIPUNCTURE: CPT

## 2021-01-15 PROCEDURE — 80053 COMPREHEN METABOLIC PANEL: CPT | Performed by: INTERNAL MEDICINE

## 2021-01-15 PROCEDURE — 84466 ASSAY OF TRANSFERRIN: CPT | Performed by: INTERNAL MEDICINE

## 2021-01-15 PROCEDURE — 82746 ASSAY OF FOLIC ACID SERUM: CPT | Performed by: INTERNAL MEDICINE

## 2021-01-15 PROCEDURE — 82306 VITAMIN D 25 HYDROXY: CPT | Performed by: INTERNAL MEDICINE

## 2021-01-15 PROCEDURE — 82607 VITAMIN B-12: CPT | Performed by: INTERNAL MEDICINE

## 2021-01-15 PROCEDURE — 83970 ASSAY OF PARATHORMONE: CPT | Performed by: INTERNAL MEDICINE

## 2021-01-15 PROCEDURE — 83540 ASSAY OF IRON: CPT | Performed by: INTERNAL MEDICINE

## 2021-01-15 PROCEDURE — 96372 THER/PROPH/DIAG INJ SC/IM: CPT

## 2021-01-15 PROCEDURE — 82728 ASSAY OF FERRITIN: CPT | Performed by: INTERNAL MEDICINE

## 2021-01-15 PROCEDURE — 25010000002 EPOETIN ALFA PER 1000 UNITS: Performed by: INTERNAL MEDICINE

## 2021-01-15 RX ADMIN — ERYTHROPOIETIN 20000 UNITS: 20000 INJECTION, SOLUTION INTRAVENOUS; SUBCUTANEOUS at 13:02

## 2021-01-15 NOTE — PROGRESS NOTES
Procrit indicated per lab results & MD order.  Injection site x 1 with band aide applied.  AVS given & pt DC per wheelchair with spouse after completion of visit.

## 2021-01-22 ENCOUNTER — HOSPITAL ENCOUNTER (OUTPATIENT)
Dept: INFUSION THERAPY | Facility: HOSPITAL | Age: 71
Discharge: HOME | End: 2021-01-22
Admitting: INTERNAL MEDICINE

## 2021-01-22 VITALS
DIASTOLIC BLOOD PRESSURE: 64 MMHG | TEMPERATURE: 97.1 F | SYSTOLIC BLOOD PRESSURE: 140 MMHG | RESPIRATION RATE: 20 BRPM | OXYGEN SATURATION: 99 % | HEART RATE: 80 BPM

## 2021-01-22 DIAGNOSIS — D63.1 ANEMIA IN STAGE 5 CHRONIC KIDNEY DISEASE, NOT ON CHRONIC DIALYSIS (HCC): ICD-10-CM

## 2021-01-22 DIAGNOSIS — N18.5 ANEMIA IN STAGE 5 CHRONIC KIDNEY DISEASE, NOT ON CHRONIC DIALYSIS (HCC): ICD-10-CM

## 2021-01-22 DIAGNOSIS — N18.5 CKD (CHRONIC KIDNEY DISEASE) STAGE 5, GFR LESS THAN 15 ML/MIN (HCC): Primary | ICD-10-CM

## 2021-01-22 LAB
HCT VFR BLD AUTO: 36 % (ref 34–46.6)
HGB BLD-MCNC: 11.4 G/DL (ref 12–15.9)

## 2021-01-22 PROCEDURE — 85014 HEMATOCRIT: CPT | Performed by: INTERNAL MEDICINE

## 2021-01-22 PROCEDURE — G0463 HOSPITAL OUTPT CLINIC VISIT: HCPCS

## 2021-01-22 PROCEDURE — 36416 COLLJ CAPILLARY BLOOD SPEC: CPT

## 2021-01-22 PROCEDURE — 85018 HEMOGLOBIN: CPT | Performed by: INTERNAL MEDICINE

## 2021-01-29 ENCOUNTER — HOSPITAL ENCOUNTER (OUTPATIENT)
Dept: INFUSION THERAPY | Facility: HOSPITAL | Age: 71
Discharge: HOME | End: 2021-01-29
Admitting: INTERNAL MEDICINE

## 2021-01-29 VITALS
TEMPERATURE: 97.3 F | RESPIRATION RATE: 20 BRPM | HEART RATE: 80 BPM | SYSTOLIC BLOOD PRESSURE: 141 MMHG | OXYGEN SATURATION: 97 % | DIASTOLIC BLOOD PRESSURE: 76 MMHG

## 2021-01-29 DIAGNOSIS — N18.5 CKD (CHRONIC KIDNEY DISEASE) STAGE 5, GFR LESS THAN 15 ML/MIN (HCC): Primary | ICD-10-CM

## 2021-01-29 DIAGNOSIS — N18.5 ANEMIA IN STAGE 5 CHRONIC KIDNEY DISEASE, NOT ON CHRONIC DIALYSIS (HCC): ICD-10-CM

## 2021-01-29 DIAGNOSIS — D63.1 ANEMIA IN STAGE 5 CHRONIC KIDNEY DISEASE, NOT ON CHRONIC DIALYSIS (HCC): ICD-10-CM

## 2021-01-29 LAB
HCT VFR BLD AUTO: 34.1 % (ref 34–46.6)
HGB BLD-MCNC: 10.8 G/DL (ref 12–15.9)

## 2021-01-29 PROCEDURE — 36416 COLLJ CAPILLARY BLOOD SPEC: CPT

## 2021-01-29 PROCEDURE — 36415 COLL VENOUS BLD VENIPUNCTURE: CPT

## 2021-01-29 PROCEDURE — 96372 THER/PROPH/DIAG INJ SC/IM: CPT

## 2021-01-29 PROCEDURE — 85014 HEMATOCRIT: CPT | Performed by: INTERNAL MEDICINE

## 2021-01-29 PROCEDURE — 25010000002 EPOETIN ALFA PER 1000 UNITS: Performed by: INTERNAL MEDICINE

## 2021-01-29 PROCEDURE — 85018 HEMOGLOBIN: CPT | Performed by: INTERNAL MEDICINE

## 2021-01-29 RX ADMIN — ERYTHROPOIETIN 20000 UNITS: 20000 INJECTION, SOLUTION INTRAVENOUS; SUBCUTANEOUS at 14:21

## 2021-02-03 ENCOUNTER — OFFICE VISIT (OUTPATIENT)
Dept: ENDOCRINOLOGY | Age: 71
End: 2021-02-03

## 2021-02-03 VITALS
HEIGHT: 64 IN | WEIGHT: 157 LBS | RESPIRATION RATE: 16 BRPM | DIASTOLIC BLOOD PRESSURE: 68 MMHG | BODY MASS INDEX: 26.8 KG/M2 | SYSTOLIC BLOOD PRESSURE: 124 MMHG

## 2021-02-03 DIAGNOSIS — E11.649 TYPE 2 DIABETES MELLITUS WITH HYPOGLYCEMIA WITHOUT COMA, WITHOUT LONG-TERM CURRENT USE OF INSULIN (HCC): Primary | ICD-10-CM

## 2021-02-03 PROCEDURE — 99214 OFFICE O/P EST MOD 30 MIN: CPT | Performed by: NURSE PRACTITIONER

## 2021-02-03 RX ORDER — GLUCAGON 3 MG/1
3 POWDER NASAL AS NEEDED
Qty: 1 EACH | Refills: 1 | Status: SHIPPED | OUTPATIENT
Start: 2021-02-03 | End: 2021-07-08 | Stop reason: HOSPADM

## 2021-02-03 RX ORDER — PROCHLORPERAZINE 25 MG/1
SUPPOSITORY RECTAL
Qty: 3 EACH | Refills: 5 | Status: SHIPPED | OUTPATIENT
Start: 2021-02-03 | End: 2021-05-19

## 2021-02-03 RX ORDER — PROCHLORPERAZINE 25 MG/1
1 SUPPOSITORY RECTAL
Qty: 1 EACH | Refills: 3 | Status: SHIPPED | OUTPATIENT
Start: 2021-02-03 | End: 2021-05-19

## 2021-02-03 RX ORDER — BLOOD-GLUCOSE METER
1 KIT MISCELLANEOUS AS NEEDED
Qty: 1 EACH | Refills: 1 | Status: SHIPPED | OUTPATIENT
Start: 2021-02-03 | End: 2022-01-01

## 2021-02-05 ENCOUNTER — HOSPITAL ENCOUNTER (OUTPATIENT)
Dept: INFUSION THERAPY | Facility: HOSPITAL | Age: 71
Discharge: HOME | End: 2021-02-05
Admitting: INTERNAL MEDICINE

## 2021-02-05 VITALS
HEART RATE: 73 BPM | RESPIRATION RATE: 20 BRPM | OXYGEN SATURATION: 100 % | DIASTOLIC BLOOD PRESSURE: 59 MMHG | TEMPERATURE: 97.1 F | SYSTOLIC BLOOD PRESSURE: 119 MMHG

## 2021-02-05 DIAGNOSIS — N18.5 ANEMIA IN STAGE 5 CHRONIC KIDNEY DISEASE, NOT ON CHRONIC DIALYSIS (HCC): ICD-10-CM

## 2021-02-05 DIAGNOSIS — N18.5 CKD (CHRONIC KIDNEY DISEASE) STAGE 5, GFR LESS THAN 15 ML/MIN (HCC): Primary | ICD-10-CM

## 2021-02-05 DIAGNOSIS — D63.1 ANEMIA IN STAGE 5 CHRONIC KIDNEY DISEASE, NOT ON CHRONIC DIALYSIS (HCC): ICD-10-CM

## 2021-02-05 LAB
HCT VFR BLD AUTO: 35.2 % (ref 34–46.6)
HGB BLD-MCNC: 11.2 G/DL (ref 12–15.9)

## 2021-02-05 PROCEDURE — 85018 HEMOGLOBIN: CPT | Performed by: INTERNAL MEDICINE

## 2021-02-05 PROCEDURE — 85014 HEMATOCRIT: CPT | Performed by: INTERNAL MEDICINE

## 2021-02-05 PROCEDURE — G0463 HOSPITAL OUTPT CLINIC VISIT: HCPCS

## 2021-02-05 PROCEDURE — 36416 COLLJ CAPILLARY BLOOD SPEC: CPT

## 2021-02-08 ENCOUNTER — TELEPHONE (OUTPATIENT)
Dept: ENDOCRINOLOGY | Age: 71
End: 2021-02-08

## 2021-02-08 NOTE — TELEPHONE ENCOUNTER
Pt left a voice mail  Would like a return call  Insurance will not pay for dexcom 6  And for something else but she didn't know the name of it     Please call patient at [phone]

## 2021-02-09 ENCOUNTER — TELEPHONE (OUTPATIENT)
Dept: ENDOCRINOLOGY | Age: 71
End: 2021-02-09

## 2021-02-12 ENCOUNTER — APPOINTMENT (OUTPATIENT)
Dept: INFUSION THERAPY | Facility: HOSPITAL | Age: 71
End: 2021-02-12

## 2021-02-12 RX ORDER — BLOOD SUGAR DIAGNOSTIC
STRIP MISCELLANEOUS
Qty: 400 EACH | Refills: 1 | Status: SHIPPED | OUTPATIENT
Start: 2021-02-12 | End: 2021-07-08 | Stop reason: HOSPADM

## 2021-02-18 RX ORDER — LANCETS
EACH MISCELLANEOUS
Qty: 400 EACH | Refills: 3 | Status: SHIPPED | OUTPATIENT
Start: 2021-02-18 | End: 2022-01-01

## 2021-02-18 RX ORDER — BLOOD-GLUCOSE METER
1 EACH MISCELLANEOUS 4 TIMES DAILY
Qty: 1 KIT | Refills: 0 | Status: SHIPPED | OUTPATIENT
Start: 2021-02-18 | End: 2022-01-01

## 2021-02-18 RX ORDER — BLOOD SUGAR DIAGNOSTIC
STRIP MISCELLANEOUS
Qty: 400 EACH | Refills: 3 | Status: SHIPPED | OUTPATIENT
Start: 2021-02-18 | End: 2021-07-08 | Stop reason: HOSPADM

## 2021-02-19 ENCOUNTER — HOSPITAL ENCOUNTER (OUTPATIENT)
Dept: INFUSION THERAPY | Facility: HOSPITAL | Age: 71
Discharge: HOME | End: 2021-02-19
Admitting: INTERNAL MEDICINE

## 2021-02-19 ENCOUNTER — OFFICE VISIT (OUTPATIENT)
Dept: FAMILY MEDICINE CLINIC | Facility: CLINIC | Age: 71
End: 2021-02-19

## 2021-02-19 VITALS
DIASTOLIC BLOOD PRESSURE: 54 MMHG | SYSTOLIC BLOOD PRESSURE: 141 MMHG | OXYGEN SATURATION: 97 % | HEART RATE: 62 BPM | RESPIRATION RATE: 20 BRPM | TEMPERATURE: 97.7 F

## 2021-02-19 DIAGNOSIS — E16.2 HYPOGLYCEMIA: Primary | ICD-10-CM

## 2021-02-19 DIAGNOSIS — N18.5 CKD (CHRONIC KIDNEY DISEASE) STAGE 5, GFR LESS THAN 15 ML/MIN (HCC): Primary | ICD-10-CM

## 2021-02-19 DIAGNOSIS — N18.5 ANEMIA IN STAGE 5 CHRONIC KIDNEY DISEASE, NOT ON CHRONIC DIALYSIS (HCC): ICD-10-CM

## 2021-02-19 DIAGNOSIS — D63.1 ANEMIA IN STAGE 5 CHRONIC KIDNEY DISEASE, NOT ON CHRONIC DIALYSIS (HCC): ICD-10-CM

## 2021-02-19 DIAGNOSIS — G93.41 METABOLIC ENCEPHALOPATHY: ICD-10-CM

## 2021-02-19 LAB
ANION GAP SERPL CALCULATED.3IONS-SCNC: 9.4 MMOL/L (ref 5–15)
BUN SERPL-MCNC: 34 MG/DL (ref 8–23)
BUN/CREAT SERPL: 10.1 (ref 7–25)
CALCIUM SPEC-SCNC: 8.4 MG/DL (ref 8.6–10.5)
CHLORIDE SERPL-SCNC: 114 MMOL/L (ref 98–107)
CO2 SERPL-SCNC: 20.6 MMOL/L (ref 22–29)
CREAT SERPL-MCNC: 3.37 MG/DL (ref 0.57–1)
GFR SERPL CREATININE-BSD FRML MDRD: 13 ML/MIN/1.73
GFR SERPL CREATININE-BSD FRML MDRD: ABNORMAL ML/MIN/{1.73_M2}
GLUCOSE SERPL-MCNC: 167 MG/DL (ref 65–99)
HCT VFR BLD AUTO: 35.7 % (ref 34–46.6)
HGB BLD-MCNC: 10.8 G/DL (ref 12–15.9)
MAGNESIUM SERPL-MCNC: 1.8 MG/DL (ref 1.6–2.4)
POTASSIUM SERPL-SCNC: 4.7 MMOL/L (ref 3.5–5.2)
SODIUM SERPL-SCNC: 144 MMOL/L (ref 136–145)

## 2021-02-19 PROCEDURE — 80048 BASIC METABOLIC PNL TOTAL CA: CPT | Performed by: INTERNAL MEDICINE

## 2021-02-19 PROCEDURE — 96372 THER/PROPH/DIAG INJ SC/IM: CPT

## 2021-02-19 PROCEDURE — 85018 HEMOGLOBIN: CPT | Performed by: INTERNAL MEDICINE

## 2021-02-19 PROCEDURE — 99213 OFFICE O/P EST LOW 20 MIN: CPT | Performed by: INTERNAL MEDICINE

## 2021-02-19 PROCEDURE — 36415 COLL VENOUS BLD VENIPUNCTURE: CPT

## 2021-02-19 PROCEDURE — 83735 ASSAY OF MAGNESIUM: CPT | Performed by: INTERNAL MEDICINE

## 2021-02-19 PROCEDURE — 25010000002 EPOETIN ALFA PER 1000 UNITS: Performed by: INTERNAL MEDICINE

## 2021-02-19 PROCEDURE — 85014 HEMATOCRIT: CPT | Performed by: INTERNAL MEDICINE

## 2021-02-19 RX ORDER — MONTELUKAST SODIUM 10 MG/1
10 TABLET ORAL NIGHTLY
Qty: 90 TABLET | Refills: 1 | Status: SHIPPED | OUTPATIENT
Start: 2021-02-19

## 2021-02-19 RX ADMIN — ERYTHROPOIETIN 20000 UNITS: 20000 INJECTION, SOLUTION INTRAVENOUS; SUBCUTANEOUS at 13:15

## 2021-02-19 NOTE — PROGRESS NOTES
Subjective Chief complaint is follow-up after hospital visit  Maribeth Rendon is a 70 y.o. female.     History of Present Illness Maribeth is here today for follow-up.  She was admitted to [facility] on January 25 for altered mental status.  Her blood sugar was extremely low.  She was treated with intravenous D10.  Her CT of the head showed changes consistent with atrophy and small vessel disease.  It did shows an area of encephalomalacia from a prior bleed.  She seems to be back at her baseline according to her .  Her Trulicity was stopped.    Current outpatient and discharge medications have been reconciled for the patient.  Reviewed by: Robby Chaney MD    The following portions of the patient's history were reviewed and updated as appropriate: allergies, current medications, past family history, past medical history, past social history, past surgical history and problem list.    Review of Systems   Constitutional: Negative for chills and fever.   HENT:        She is complaining of some excessive nasal dripping.   Respiratory: Negative for shortness of breath.        Objective   Physical Exam  HENT:      Nose:      Comments: There is no significant congestion or erythema in the nasal passage.  Cardiovascular:      Rate and Rhythm: Normal rate and regular rhythm.   Pulmonary:      Effort: Pulmonary effort is normal.      Breath sounds: No wheezing or rales.   Neurological:      General: No focal deficit present.      Mental Status: She is alert.           Assessment/Plan   Diagnoses and all orders for this visit:    1. Hypoglycemia (Primary)    2. Metabolic encephalopathy    Other orders  -     montelukast (SINGULAIR) 10 MG tablet; Take 1 tablet by mouth Every Night.  Dispense: 90 tablet; Refill: 1      Maribeth is here today for follow-up.  She seems to be doing well after hospitalization.  I suspect she does have some degree of a vasomotor rhinitis.  I did advise that there may be other we can do  for this.  She is already taking some Singulair and that is not helping.  She could try using some over-the-counter Claritin but I think she do best just to use Kleenex.

## 2021-02-19 NOTE — PROGRESS NOTES
Procrit indicated per lab results. Tolerated well. D/C'd per wheelchair with .    Pt returned call and states that he did not have the PVT test as he is exploring other options. Pt states that everything is on hold for now. Will update Dr. Potter via staff message sent today 3/15/18. Staff message sent....Corina Duval RN

## 2021-02-22 ENCOUNTER — APPOINTMENT (OUTPATIENT)
Dept: INFUSION THERAPY | Facility: HOSPITAL | Age: 71
End: 2021-02-22

## 2021-02-26 ENCOUNTER — HOSPITAL ENCOUNTER (OUTPATIENT)
Dept: INFUSION THERAPY | Facility: HOSPITAL | Age: 71
Discharge: HOME | End: 2021-02-26
Admitting: INTERNAL MEDICINE

## 2021-02-26 VITALS
OXYGEN SATURATION: 100 % | HEART RATE: 47 BPM | RESPIRATION RATE: 18 BRPM | DIASTOLIC BLOOD PRESSURE: 57 MMHG | TEMPERATURE: 97.6 F | SYSTOLIC BLOOD PRESSURE: 128 MMHG

## 2021-02-26 DIAGNOSIS — N18.5 ANEMIA IN STAGE 5 CHRONIC KIDNEY DISEASE, NOT ON CHRONIC DIALYSIS (HCC): ICD-10-CM

## 2021-02-26 DIAGNOSIS — N18.5 CKD (CHRONIC KIDNEY DISEASE) STAGE 5, GFR LESS THAN 15 ML/MIN (HCC): Primary | ICD-10-CM

## 2021-02-26 DIAGNOSIS — D63.1 ANEMIA IN STAGE 5 CHRONIC KIDNEY DISEASE, NOT ON CHRONIC DIALYSIS (HCC): ICD-10-CM

## 2021-02-26 LAB
HCT VFR BLD AUTO: 36 % (ref 34–46.6)
HGB BLD-MCNC: 10.9 G/DL (ref 12–15.9)

## 2021-02-26 PROCEDURE — 96372 THER/PROPH/DIAG INJ SC/IM: CPT

## 2021-02-26 PROCEDURE — 85018 HEMOGLOBIN: CPT | Performed by: INTERNAL MEDICINE

## 2021-02-26 PROCEDURE — 85014 HEMATOCRIT: CPT | Performed by: INTERNAL MEDICINE

## 2021-02-26 PROCEDURE — 25010000002 EPOETIN ALFA PER 1000 UNITS: Performed by: INTERNAL MEDICINE

## 2021-02-26 PROCEDURE — 36416 COLLJ CAPILLARY BLOOD SPEC: CPT

## 2021-02-26 RX ADMIN — ERYTHROPOIETIN 20000 UNITS: 20000 INJECTION, SOLUTION INTRAVENOUS; SUBCUTANEOUS at 13:34

## 2021-03-02 DIAGNOSIS — Z23 IMMUNIZATION DUE: ICD-10-CM

## 2021-03-03 ENCOUNTER — TELEPHONE (OUTPATIENT)
Dept: ENDOCRINOLOGY | Age: 71
End: 2021-03-03

## 2021-03-03 NOTE — TELEPHONE ENCOUNTER
Spoke with patient let patient know that the first dme company I sent her information did not take her insurance  But I have another compan I am sending it to patient voice understanding and will come to the office and  a sample of dexcom

## 2021-03-03 NOTE — TELEPHONE ENCOUNTER
Patient hasnt received her dexcom meter yet nor has it been approved and she wanted to check the status of it

## 2021-03-05 ENCOUNTER — HOSPITAL ENCOUNTER (OUTPATIENT)
Dept: INFUSION THERAPY | Facility: HOSPITAL | Age: 71
Discharge: HOME | End: 2021-03-05
Admitting: INTERNAL MEDICINE

## 2021-03-05 VITALS
HEART RATE: 95 BPM | SYSTOLIC BLOOD PRESSURE: 148 MMHG | TEMPERATURE: 98.2 F | OXYGEN SATURATION: 98 % | DIASTOLIC BLOOD PRESSURE: 66 MMHG | RESPIRATION RATE: 22 BRPM

## 2021-03-05 DIAGNOSIS — D63.1 ANEMIA IN STAGE 5 CHRONIC KIDNEY DISEASE, NOT ON CHRONIC DIALYSIS (HCC): ICD-10-CM

## 2021-03-05 DIAGNOSIS — N18.5 CKD (CHRONIC KIDNEY DISEASE) STAGE 5, GFR LESS THAN 15 ML/MIN (HCC): Primary | ICD-10-CM

## 2021-03-05 DIAGNOSIS — N18.5 ANEMIA IN STAGE 5 CHRONIC KIDNEY DISEASE, NOT ON CHRONIC DIALYSIS (HCC): ICD-10-CM

## 2021-03-05 LAB
HCT VFR BLD AUTO: 37.2 % (ref 34–46.6)
HGB BLD-MCNC: 11.6 G/DL (ref 12–15.9)

## 2021-03-05 PROCEDURE — 85014 HEMATOCRIT: CPT | Performed by: INTERNAL MEDICINE

## 2021-03-05 PROCEDURE — G0463 HOSPITAL OUTPT CLINIC VISIT: HCPCS

## 2021-03-05 PROCEDURE — 36416 COLLJ CAPILLARY BLOOD SPEC: CPT

## 2021-03-05 PROCEDURE — 85018 HEMOGLOBIN: CPT | Performed by: INTERNAL MEDICINE

## 2021-03-05 NOTE — PROGRESS NOTES
Procrit not indicated per lab results & MD order.  AVS given & pt DC per personal wheelchair and with spouse after completion of visit.

## 2021-03-12 ENCOUNTER — HOSPITAL ENCOUNTER (OUTPATIENT)
Dept: INFUSION THERAPY | Facility: HOSPITAL | Age: 71
Discharge: HOME | End: 2021-03-12
Admitting: INTERNAL MEDICINE

## 2021-03-12 VITALS
DIASTOLIC BLOOD PRESSURE: 91 MMHG | SYSTOLIC BLOOD PRESSURE: 142 MMHG | TEMPERATURE: 96.9 F | RESPIRATION RATE: 20 BRPM | HEART RATE: 90 BPM | OXYGEN SATURATION: 99 %

## 2021-03-12 DIAGNOSIS — D63.1 ANEMIA IN STAGE 5 CHRONIC KIDNEY DISEASE, NOT ON CHRONIC DIALYSIS (HCC): ICD-10-CM

## 2021-03-12 DIAGNOSIS — N18.5 ANEMIA IN STAGE 5 CHRONIC KIDNEY DISEASE, NOT ON CHRONIC DIALYSIS (HCC): ICD-10-CM

## 2021-03-12 LAB
HCT VFR BLD AUTO: 35.1 % (ref 34–46.6)
HGB BLD-MCNC: 11.3 G/DL (ref 12–15.9)

## 2021-03-12 PROCEDURE — 85014 HEMATOCRIT: CPT | Performed by: INTERNAL MEDICINE

## 2021-03-12 PROCEDURE — G0463 HOSPITAL OUTPT CLINIC VISIT: HCPCS

## 2021-03-12 PROCEDURE — 36416 COLLJ CAPILLARY BLOOD SPEC: CPT

## 2021-03-12 PROCEDURE — 85018 HEMOGLOBIN: CPT | Performed by: INTERNAL MEDICINE

## 2021-03-17 ENCOUNTER — TELEPHONE (OUTPATIENT)
Dept: ENDOCRINOLOGY | Age: 71
End: 2021-03-17

## 2021-03-18 RX ORDER — BLOOD SUGAR DIAGNOSTIC
STRIP MISCELLANEOUS
Qty: 300 EACH | Refills: 0 | Status: SHIPPED | OUTPATIENT
Start: 2021-03-18 | End: 2021-03-29 | Stop reason: SDUPTHER

## 2021-03-18 RX ORDER — LANCETS
EACH MISCELLANEOUS
Qty: 300 EACH | Refills: 0 | Status: SHIPPED | OUTPATIENT
Start: 2021-03-18 | End: 2022-01-01

## 2021-03-19 ENCOUNTER — HOSPITAL ENCOUNTER (OUTPATIENT)
Dept: INFUSION THERAPY | Facility: HOSPITAL | Age: 71
Discharge: HOME | End: 2021-03-19
Admitting: INTERNAL MEDICINE

## 2021-03-19 VITALS
HEART RATE: 89 BPM | DIASTOLIC BLOOD PRESSURE: 62 MMHG | TEMPERATURE: 97.3 F | OXYGEN SATURATION: 98 % | RESPIRATION RATE: 20 BRPM | SYSTOLIC BLOOD PRESSURE: 142 MMHG

## 2021-03-19 DIAGNOSIS — D63.1 ANEMIA IN STAGE 5 CHRONIC KIDNEY DISEASE, NOT ON CHRONIC DIALYSIS (HCC): ICD-10-CM

## 2021-03-19 DIAGNOSIS — N18.5 CKD (CHRONIC KIDNEY DISEASE) STAGE 5, GFR LESS THAN 15 ML/MIN (HCC): Primary | ICD-10-CM

## 2021-03-19 DIAGNOSIS — N18.5 ANEMIA IN STAGE 5 CHRONIC KIDNEY DISEASE, NOT ON CHRONIC DIALYSIS (HCC): ICD-10-CM

## 2021-03-19 LAB
ANION GAP SERPL CALCULATED.3IONS-SCNC: 8.2 MMOL/L (ref 5–15)
BUN SERPL-MCNC: 47 MG/DL (ref 8–23)
BUN/CREAT SERPL: 12.7 (ref 7–25)
CALCIUM SPEC-SCNC: 7.8 MG/DL (ref 8.6–10.5)
CHLORIDE SERPL-SCNC: 112 MMOL/L (ref 98–107)
CO2 SERPL-SCNC: 18.8 MMOL/L (ref 22–29)
CREAT SERPL-MCNC: 3.7 MG/DL (ref 0.57–1)
GFR SERPL CREATININE-BSD FRML MDRD: 12 ML/MIN/1.73
GFR SERPL CREATININE-BSD FRML MDRD: ABNORMAL ML/MIN/{1.73_M2}
GLUCOSE SERPL-MCNC: 244 MG/DL (ref 65–99)
HCT VFR BLD AUTO: 31.3 % (ref 34–46.6)
HGB BLD-MCNC: 9.5 G/DL (ref 12–15.9)
MAGNESIUM SERPL-MCNC: 1.8 MG/DL (ref 1.6–2.4)
POTASSIUM SERPL-SCNC: 4.9 MMOL/L (ref 3.5–5.2)
SODIUM SERPL-SCNC: 139 MMOL/L (ref 136–145)

## 2021-03-19 PROCEDURE — 85018 HEMOGLOBIN: CPT | Performed by: INTERNAL MEDICINE

## 2021-03-19 PROCEDURE — 36415 COLL VENOUS BLD VENIPUNCTURE: CPT

## 2021-03-19 PROCEDURE — 80048 BASIC METABOLIC PNL TOTAL CA: CPT | Performed by: INTERNAL MEDICINE

## 2021-03-19 PROCEDURE — 85014 HEMATOCRIT: CPT | Performed by: INTERNAL MEDICINE

## 2021-03-19 PROCEDURE — 25010000002 EPOETIN ALFA PER 1000 UNITS: Performed by: INTERNAL MEDICINE

## 2021-03-19 PROCEDURE — 96372 THER/PROPH/DIAG INJ SC/IM: CPT

## 2021-03-19 PROCEDURE — 83735 ASSAY OF MAGNESIUM: CPT | Performed by: INTERNAL MEDICINE

## 2021-03-19 RX ADMIN — ERYTHROPOIETIN 20000 UNITS: 20000 INJECTION, SOLUTION INTRAVENOUS; SUBCUTANEOUS at 13:30

## 2021-03-23 ENCOUNTER — TELEPHONE (OUTPATIENT)
Dept: ENDOCRINOLOGY | Age: 71
End: 2021-03-23

## 2021-03-26 ENCOUNTER — HOSPITAL ENCOUNTER (OUTPATIENT)
Dept: INFUSION THERAPY | Facility: HOSPITAL | Age: 71
Discharge: HOME | End: 2021-03-26
Admitting: INTERNAL MEDICINE

## 2021-03-26 VITALS
SYSTOLIC BLOOD PRESSURE: 132 MMHG | RESPIRATION RATE: 20 BRPM | OXYGEN SATURATION: 100 % | DIASTOLIC BLOOD PRESSURE: 61 MMHG | TEMPERATURE: 97.1 F | HEART RATE: 82 BPM

## 2021-03-26 DIAGNOSIS — D63.1 ANEMIA IN STAGE 5 CHRONIC KIDNEY DISEASE, NOT ON CHRONIC DIALYSIS (HCC): ICD-10-CM

## 2021-03-26 DIAGNOSIS — N18.5 ANEMIA IN STAGE 5 CHRONIC KIDNEY DISEASE, NOT ON CHRONIC DIALYSIS (HCC): ICD-10-CM

## 2021-03-26 DIAGNOSIS — N18.5 CKD (CHRONIC KIDNEY DISEASE) STAGE 5, GFR LESS THAN 15 ML/MIN (HCC): Primary | ICD-10-CM

## 2021-03-26 LAB
HCT VFR BLD AUTO: 32.6 % (ref 34–46.6)
HGB BLD-MCNC: 10.1 G/DL (ref 12–15.9)

## 2021-03-26 PROCEDURE — 25010000002 EPOETIN ALFA PER 1000 UNITS: Performed by: INTERNAL MEDICINE

## 2021-03-26 PROCEDURE — 96372 THER/PROPH/DIAG INJ SC/IM: CPT

## 2021-03-26 PROCEDURE — 85018 HEMOGLOBIN: CPT | Performed by: INTERNAL MEDICINE

## 2021-03-26 PROCEDURE — 36416 COLLJ CAPILLARY BLOOD SPEC: CPT

## 2021-03-26 PROCEDURE — 85014 HEMATOCRIT: CPT | Performed by: INTERNAL MEDICINE

## 2021-03-26 PROCEDURE — 36415 COLL VENOUS BLD VENIPUNCTURE: CPT

## 2021-03-26 RX ADMIN — ERYTHROPOIETIN 20000 UNITS: 20000 INJECTION, SOLUTION INTRAVENOUS; SUBCUTANEOUS at 13:35

## 2021-03-28 DIAGNOSIS — M54.12 CERVICAL RADICULOPATHY: ICD-10-CM

## 2021-03-28 DIAGNOSIS — E11.42 DM TYPE 2 WITH DIABETIC PERIPHERAL NEUROPATHY (HCC): ICD-10-CM

## 2021-03-29 RX ORDER — GABAPENTIN 300 MG/1
CAPSULE ORAL
Qty: 180 CAPSULE | Refills: 0 | Status: SHIPPED | OUTPATIENT
Start: 2021-03-29 | End: 2021-05-09 | Stop reason: HOSPADM

## 2021-03-30 RX ORDER — BLOOD SUGAR DIAGNOSTIC
STRIP MISCELLANEOUS
Qty: 100 EACH | Refills: 3 | Status: SHIPPED | OUTPATIENT
Start: 2021-03-30 | End: 2021-07-08 | Stop reason: HOSPADM

## 2021-04-02 ENCOUNTER — HOSPITAL ENCOUNTER (OUTPATIENT)
Dept: INFUSION THERAPY | Facility: HOSPITAL | Age: 71
Discharge: HOME | End: 2021-04-02
Admitting: INTERNAL MEDICINE

## 2021-04-02 VITALS
DIASTOLIC BLOOD PRESSURE: 64 MMHG | OXYGEN SATURATION: 100 % | RESPIRATION RATE: 20 BRPM | HEART RATE: 85 BPM | SYSTOLIC BLOOD PRESSURE: 141 MMHG | TEMPERATURE: 97.3 F

## 2021-04-02 DIAGNOSIS — N18.5 CKD (CHRONIC KIDNEY DISEASE) STAGE 5, GFR LESS THAN 15 ML/MIN (HCC): Primary | ICD-10-CM

## 2021-04-02 DIAGNOSIS — N18.5 ANEMIA IN STAGE 5 CHRONIC KIDNEY DISEASE, NOT ON CHRONIC DIALYSIS (HCC): ICD-10-CM

## 2021-04-02 DIAGNOSIS — D63.1 ANEMIA IN STAGE 5 CHRONIC KIDNEY DISEASE, NOT ON CHRONIC DIALYSIS (HCC): ICD-10-CM

## 2021-04-02 LAB
HCT VFR BLD AUTO: 32.5 % (ref 34–46.6)
HGB BLD-MCNC: 10 G/DL (ref 12–15.9)

## 2021-04-02 PROCEDURE — 25010000002 EPOETIN ALFA PER 1000 UNITS: Performed by: INTERNAL MEDICINE

## 2021-04-02 PROCEDURE — 85014 HEMATOCRIT: CPT | Performed by: INTERNAL MEDICINE

## 2021-04-02 PROCEDURE — 36416 COLLJ CAPILLARY BLOOD SPEC: CPT

## 2021-04-02 PROCEDURE — 96372 THER/PROPH/DIAG INJ SC/IM: CPT

## 2021-04-02 PROCEDURE — 85018 HEMOGLOBIN: CPT | Performed by: INTERNAL MEDICINE

## 2021-04-02 RX ADMIN — ERYTHROPOIETIN 20000 UNITS: 20000 INJECTION, SOLUTION INTRAVENOUS; SUBCUTANEOUS at 14:08

## 2021-04-09 ENCOUNTER — HOSPITAL ENCOUNTER (OUTPATIENT)
Dept: INFUSION THERAPY | Facility: HOSPITAL | Age: 71
Discharge: HOME | End: 2021-04-09
Admitting: INTERNAL MEDICINE

## 2021-04-09 VITALS
SYSTOLIC BLOOD PRESSURE: 139 MMHG | DIASTOLIC BLOOD PRESSURE: 69 MMHG | TEMPERATURE: 97.3 F | OXYGEN SATURATION: 100 % | HEART RATE: 87 BPM | RESPIRATION RATE: 20 BRPM

## 2021-04-09 DIAGNOSIS — N18.5 ANEMIA IN STAGE 5 CHRONIC KIDNEY DISEASE, NOT ON CHRONIC DIALYSIS (HCC): ICD-10-CM

## 2021-04-09 DIAGNOSIS — N18.5 CKD (CHRONIC KIDNEY DISEASE) STAGE 5, GFR LESS THAN 15 ML/MIN (HCC): Primary | ICD-10-CM

## 2021-04-09 DIAGNOSIS — D63.1 ANEMIA IN STAGE 5 CHRONIC KIDNEY DISEASE, NOT ON CHRONIC DIALYSIS (HCC): ICD-10-CM

## 2021-04-09 LAB
HCT VFR BLD AUTO: 34.4 % (ref 34–46.6)
HGB BLD-MCNC: 10.5 G/DL (ref 12–15.9)

## 2021-04-09 PROCEDURE — 85018 HEMOGLOBIN: CPT | Performed by: INTERNAL MEDICINE

## 2021-04-09 PROCEDURE — 85014 HEMATOCRIT: CPT | Performed by: INTERNAL MEDICINE

## 2021-04-09 PROCEDURE — 96372 THER/PROPH/DIAG INJ SC/IM: CPT

## 2021-04-09 PROCEDURE — 25010000002 EPOETIN ALFA PER 1000 UNITS: Performed by: INTERNAL MEDICINE

## 2021-04-09 PROCEDURE — 36416 COLLJ CAPILLARY BLOOD SPEC: CPT

## 2021-04-09 RX ADMIN — ERYTHROPOIETIN 20000 UNITS: 20000 INJECTION, SOLUTION INTRAVENOUS; SUBCUTANEOUS at 13:33

## 2021-04-14 RX ORDER — LINAGLIPTIN 5 MG/1
TABLET, FILM COATED ORAL
Qty: 90 TABLET | Refills: 1 | Status: SHIPPED | OUTPATIENT
Start: 2021-04-14 | End: 2021-07-08 | Stop reason: HOSPADM

## 2021-04-16 ENCOUNTER — HOSPITAL ENCOUNTER (OUTPATIENT)
Dept: INFUSION THERAPY | Facility: HOSPITAL | Age: 71
Discharge: HOME | End: 2021-04-16
Admitting: INTERNAL MEDICINE

## 2021-04-16 VITALS
HEART RATE: 85 BPM | SYSTOLIC BLOOD PRESSURE: 142 MMHG | DIASTOLIC BLOOD PRESSURE: 67 MMHG | OXYGEN SATURATION: 96 % | RESPIRATION RATE: 20 BRPM | TEMPERATURE: 97.3 F

## 2021-04-16 DIAGNOSIS — D63.1 ANEMIA IN STAGE 5 CHRONIC KIDNEY DISEASE, NOT ON CHRONIC DIALYSIS (HCC): ICD-10-CM

## 2021-04-16 DIAGNOSIS — N18.5 ANEMIA IN STAGE 5 CHRONIC KIDNEY DISEASE, NOT ON CHRONIC DIALYSIS (HCC): ICD-10-CM

## 2021-04-16 DIAGNOSIS — N18.5 CKD (CHRONIC KIDNEY DISEASE) STAGE 5, GFR LESS THAN 15 ML/MIN (HCC): Primary | ICD-10-CM

## 2021-04-16 LAB
25(OH)D3 SERPL-MCNC: 28.6 NG/ML
ALBUMIN SERPL-MCNC: 3.3 G/DL (ref 3.5–5.2)
ALBUMIN/GLOB SERPL: 0.8 G/DL
ALP SERPL-CCNC: 85 U/L (ref 39–117)
ALT SERPL W P-5'-P-CCNC: 10 U/L (ref 1–33)
ANION GAP SERPL CALCULATED.3IONS-SCNC: 13.1 MMOL/L (ref 5–15)
AST SERPL-CCNC: 16 U/L (ref 1–32)
BILIRUB SERPL-MCNC: 0.2 MG/DL (ref 0–1.2)
BUN SERPL-MCNC: 50 MG/DL (ref 8–23)
BUN/CREAT SERPL: 13 (ref 7–25)
CALCIUM SPEC-SCNC: 7.4 MG/DL (ref 8.6–10.5)
CALCIUM SPEC-SCNC: 7.7 MG/DL (ref 8.6–10.5)
CHLORIDE SERPL-SCNC: 111 MMOL/L (ref 98–107)
CO2 SERPL-SCNC: 17.9 MMOL/L (ref 22–29)
CREAT SERPL-MCNC: 3.84 MG/DL (ref 0.57–1)
DEPRECATED RDW RBC AUTO: 53.7 FL (ref 37–54)
ERYTHROCYTE [DISTWIDTH] IN BLOOD BY AUTOMATED COUNT: 15.5 % (ref 12.3–15.4)
FERRITIN SERPL-MCNC: 450 NG/ML (ref 13–150)
FOLATE SERPL-MCNC: 6.59 NG/ML (ref 4.78–24.2)
GFR SERPL CREATININE-BSD FRML MDRD: 12 ML/MIN/1.73
GFR SERPL CREATININE-BSD FRML MDRD: ABNORMAL ML/MIN/{1.73_M2}
GLOBULIN UR ELPH-MCNC: 3.9 GM/DL
GLUCOSE SERPL-MCNC: 170 MG/DL (ref 65–99)
HCT VFR BLD AUTO: 32.4 % (ref 34–46.6)
HGB BLD-MCNC: 10 G/DL (ref 12–15.9)
IRON 24H UR-MRATE: 27 MCG/DL (ref 37–145)
IRON SATN MFR SERPL: 15 % (ref 20–50)
MCH RBC QN AUTO: 29.3 PG (ref 26.6–33)
MCHC RBC AUTO-ENTMCNC: 30.9 G/DL (ref 31.5–35.7)
MCV RBC AUTO: 95 FL (ref 79–97)
PHOSPHATE SERPL-MCNC: 7.3 MG/DL (ref 2.5–4.5)
PLATELET # BLD AUTO: 185 10*3/MM3 (ref 140–450)
PMV BLD AUTO: 10 FL (ref 6–12)
POTASSIUM SERPL-SCNC: 5.1 MMOL/L (ref 3.5–5.2)
PROT SERPL-MCNC: 7.2 G/DL (ref 6–8.5)
PTH-INTACT SERPL-MCNC: 286 PG/ML (ref 15–65)
RBC # BLD AUTO: 3.41 10*6/MM3 (ref 3.77–5.28)
SODIUM SERPL-SCNC: 142 MMOL/L (ref 136–145)
TIBC SERPL-MCNC: 177 MCG/DL (ref 298–536)
TRANSFERRIN SERPL-MCNC: 119 MG/DL (ref 200–360)
URATE SERPL-MCNC: 8.8 MG/DL (ref 2.4–5.7)
VIT B12 BLD-MCNC: 552 PG/ML (ref 211–946)
WBC # BLD AUTO: 6.97 10*3/MM3 (ref 3.4–10.8)

## 2021-04-16 PROCEDURE — 84466 ASSAY OF TRANSFERRIN: CPT | Performed by: INTERNAL MEDICINE

## 2021-04-16 PROCEDURE — 82607 VITAMIN B-12: CPT | Performed by: INTERNAL MEDICINE

## 2021-04-16 PROCEDURE — 85027 COMPLETE CBC AUTOMATED: CPT | Performed by: INTERNAL MEDICINE

## 2021-04-16 PROCEDURE — 25010000002 EPOETIN ALFA PER 1000 UNITS: Performed by: INTERNAL MEDICINE

## 2021-04-16 PROCEDURE — 36415 COLL VENOUS BLD VENIPUNCTURE: CPT

## 2021-04-16 PROCEDURE — 83540 ASSAY OF IRON: CPT | Performed by: INTERNAL MEDICINE

## 2021-04-16 PROCEDURE — 80053 COMPREHEN METABOLIC PANEL: CPT | Performed by: INTERNAL MEDICINE

## 2021-04-16 PROCEDURE — 82306 VITAMIN D 25 HYDROXY: CPT | Performed by: INTERNAL MEDICINE

## 2021-04-16 PROCEDURE — 84100 ASSAY OF PHOSPHORUS: CPT | Performed by: INTERNAL MEDICINE

## 2021-04-16 PROCEDURE — 82728 ASSAY OF FERRITIN: CPT | Performed by: INTERNAL MEDICINE

## 2021-04-16 PROCEDURE — 83970 ASSAY OF PARATHORMONE: CPT | Performed by: INTERNAL MEDICINE

## 2021-04-16 PROCEDURE — 82746 ASSAY OF FOLIC ACID SERUM: CPT | Performed by: INTERNAL MEDICINE

## 2021-04-16 PROCEDURE — 96372 THER/PROPH/DIAG INJ SC/IM: CPT

## 2021-04-16 PROCEDURE — 84550 ASSAY OF BLOOD/URIC ACID: CPT | Performed by: INTERNAL MEDICINE

## 2021-04-16 RX ADMIN — ERYTHROPOIETIN 20000 UNITS: 20000 INJECTION, SOLUTION INTRAVENOUS; SUBCUTANEOUS at 13:47

## 2021-04-22 ENCOUNTER — OFFICE VISIT (OUTPATIENT)
Dept: ENDOCRINOLOGY | Age: 71
End: 2021-04-22

## 2021-04-22 VITALS
BODY MASS INDEX: 33.39 KG/M2 | WEIGHT: 195.6 LBS | HEIGHT: 64 IN | SYSTOLIC BLOOD PRESSURE: 136 MMHG | DIASTOLIC BLOOD PRESSURE: 58 MMHG | HEART RATE: 55 BPM | OXYGEN SATURATION: 98 %

## 2021-04-22 DIAGNOSIS — E11.649 TYPE 2 DIABETES MELLITUS WITH HYPOGLYCEMIA WITHOUT COMA, WITHOUT LONG-TERM CURRENT USE OF INSULIN (HCC): Primary | ICD-10-CM

## 2021-04-22 DIAGNOSIS — E16.2 HYPOGLYCEMIA: ICD-10-CM

## 2021-04-22 PROCEDURE — 99214 OFFICE O/P EST MOD 30 MIN: CPT | Performed by: INTERNAL MEDICINE

## 2021-04-22 NOTE — PROGRESS NOTES
"Chief Complaint  Chief Complaint   Patient presents with   • Diabetes       Subjective          History of Present Illness    Maribeth Rendon 70 y.o. presents with Type 2 dm as a F/u patient.     Type 2 dm - Diagnosed about 10 - 15 years ago.   Today in clinic pt reports being on Tradjenta 5 mg po daily, not on truliicty as they felt that caused the stroke.   Avg Bg - 80 -100  Checks BG - once a day.   Sensor - x  Dm retinopathy - x,Last eye exam - due for one, last exam was about a year.   Dm nephropathy - yes, CKD stage 4  Dm neuropathy - yes ,Dm neuropathy meds - x  CAD -s/p CABG  CVA - July 2020  Episodes of hypoglycemia - none  Pt is physically active. weight has been stable.   Pt tries to follow DM diet for most part.   On Ace inb.    CKD stage 3 - watched by .   Noted that the calcium, parathyroid levels are abnormal, patient reports that Dr. Castrejon is monitoring them.      Reviewed primary care physician's/consulting physician documentation and lab results         I have reviewed the patient's allergies, medicines, past medical hx, family hx and social hx in detail.    Objective   Vital Signs:   /58   Pulse 55   Ht 162.6 cm (64\")   Wt 88.7 kg (195 lb 9.6 oz)   LMP  (LMP Unknown)   SpO2 98%   BMI 33.57 kg/m²   Physical Exam   General appearance - no distress  Eyes- anicteric sclera  Ear nose and throat-external ears and nose normal.    Respiratory-normal chest on inspection.  No respiratory distress noted.  Skin-no rashes.  Neuro-alert and oriented x3            Result Review :   The following data was reviewed by: Jose Florentino MD on 04/22/2021:  Hospital Outpatient Visit on 04/16/2021   Component Date Value Ref Range Status   • Glucose 04/16/2021 170* 65 - 99 mg/dL Final   • BUN 04/16/2021 50* 8 - 23 mg/dL Final   • Creatinine 04/16/2021 3.84* 0.57 - 1.00 mg/dL Final   • Sodium 04/16/2021 142  136 - 145 mmol/L Final   • Potassium 04/16/2021 5.1  3.5 - 5.2 mmol/L Final   • Chloride 04/16/2021 " 111* 98 - 107 mmol/L Final   • CO2 04/16/2021 17.9* 22.0 - 29.0 mmol/L Final   • Calcium 04/16/2021 7.7* 8.6 - 10.5 mg/dL Final   • Total Protein 04/16/2021 7.2  6.0 - 8.5 g/dL Final   • Albumin 04/16/2021 3.30* 3.50 - 5.20 g/dL Final   • ALT (SGPT) 04/16/2021 10  1 - 33 U/L Final   • AST (SGOT) 04/16/2021 16  1 - 32 U/L Final   • Alkaline Phosphatase 04/16/2021 85  39 - 117 U/L Final   • Total Bilirubin 04/16/2021 0.2  0.0 - 1.2 mg/dL Final   • eGFR Non African Amer 04/16/2021 12* >60 mL/min/1.73 Final    <15 Indicative of kidney failure.   • eGFR   Amer 04/16/2021    Final    <15 Indicative of kidney failure.   • Globulin 04/16/2021 3.9  gm/dL Final   • A/G Ratio 04/16/2021 0.8  g/dL Final   • BUN/Creatinine Ratio 04/16/2021 13.0  7.0 - 25.0 Final   • Anion Gap 04/16/2021 13.1  5.0 - 15.0 mmol/L Final   • WBC 04/16/2021 6.97  3.40 - 10.80 10*3/mm3 Final   • RBC 04/16/2021 3.41* 3.77 - 5.28 10*6/mm3 Final   • Hemoglobin 04/16/2021 10.0* 12.0 - 15.9 g/dL Final   • Hematocrit 04/16/2021 32.4* 34.0 - 46.6 % Final   • MCV 04/16/2021 95.0  79.0 - 97.0 fL Final   • MCH 04/16/2021 29.3  26.6 - 33.0 pg Final   • MCHC 04/16/2021 30.9* 31.5 - 35.7 g/dL Final   • RDW 04/16/2021 15.5* 12.3 - 15.4 % Final   • RDW-SD 04/16/2021 53.7  37.0 - 54.0 fl Final   • MPV 04/16/2021 10.0  6.0 - 12.0 fL Final   • Platelets 04/16/2021 185  140 - 450 10*3/mm3 Final   • Ferritin 04/16/2021 450.00* 13.00 - 150.00 ng/mL Final   • Folate 04/16/2021 6.59  4.78 - 24.20 ng/mL Final   • Iron 04/16/2021 27* 37 - 145 mcg/dL Final   • Iron Saturation 04/16/2021 15* 20 - 50 % Final   • Transferrin 04/16/2021 119* 200 - 360 mg/dL Final   • TIBC 04/16/2021 177* 298 - 536 mcg/dL Final   • Phosphorus 04/16/2021 7.3* 2.5 - 4.5 mg/dL Final   • Uric Acid 04/16/2021 8.8* 2.4 - 5.7 mg/dL Final   • PTH, Intact 04/16/2021 286.0* 15.0 - 65.0 pg/mL Final   • Calcium 04/16/2021 7.4* 8.6 - 10.5 mg/dL Final   • Vitamin B-12 04/16/2021 552  211 - 946 pg/mL Final  "  • 25 Hydroxy, Vitamin D 04/16/2021 28.6  ng/ml Final     Data reviewed: PCP documentation and renal documentation       Results Review:    I reviewed the patient's new clinical results.     Assessment and Plan    Problem List Items Addressed This Visit        Other    Type 2 diabetes mellitus (CMS/HCC) - Primary    Relevant Orders    TSH    T4, Free    Hemoglobin A1c    Fructosamine    Hemoglobin A1c    Lipid Panel    Fructosamine    Hypoglycemia    Relevant Orders    TSH    T4, Free    Hemoglobin A1c    Fructosamine    Hemoglobin A1c    Lipid Panel    Fructosamine        Type 2 dm - uncontrolled - complicated with hyperglycemia   Concerned that patient's HbA1c would be elevated based on the blood sugar values.  A1c could be spuriously better given her anemia panel and CKD stage IV.  Check for fructosamine levels  Continue Tradjenta 5 mg oral daily  Would consider Tresiba if A1c or fructosamine levels are elevated.    Chronic fatigue  Multifactorial from CKD, diabetes etc.  Check thyroid panel.    Interpreted the blood work-up/imaging results performed by the primary care/consulting physician -    Refills sent to pharmacy    Follow Up     Patient was given instructions and counseling regarding her condition or for health maintenance advice. Please see specific information pulled into the AVS if appropriate.       Thank you for asking me to see your patient, Maribeth Rendon in consultation.         Jose Florentino MD  04/22/21      EMR Dragon / transcription disclaimer:     \"Dictated utilizing Dragon dictation\".         "

## 2021-04-23 ENCOUNTER — HOSPITAL ENCOUNTER (OUTPATIENT)
Dept: INFUSION THERAPY | Facility: HOSPITAL | Age: 71
Discharge: HOME | End: 2021-04-23
Admitting: INTERNAL MEDICINE

## 2021-04-23 VITALS
DIASTOLIC BLOOD PRESSURE: 57 MMHG | OXYGEN SATURATION: 99 % | SYSTOLIC BLOOD PRESSURE: 119 MMHG | RESPIRATION RATE: 16 BRPM | HEART RATE: 52 BPM | TEMPERATURE: 96.8 F

## 2021-04-23 DIAGNOSIS — D63.1 ANEMIA IN STAGE 5 CHRONIC KIDNEY DISEASE, NOT ON CHRONIC DIALYSIS (HCC): Primary | ICD-10-CM

## 2021-04-23 DIAGNOSIS — N18.5 ANEMIA IN STAGE 5 CHRONIC KIDNEY DISEASE, NOT ON CHRONIC DIALYSIS (HCC): Primary | ICD-10-CM

## 2021-04-23 DIAGNOSIS — N18.5 CKD (CHRONIC KIDNEY DISEASE) STAGE 5, GFR LESS THAN 15 ML/MIN (HCC): ICD-10-CM

## 2021-04-23 LAB
HCT VFR BLD AUTO: 30.4 % (ref 34–46.6)
HGB BLD-MCNC: 9.7 G/DL (ref 12–15.9)

## 2021-04-23 PROCEDURE — 36415 COLL VENOUS BLD VENIPUNCTURE: CPT

## 2021-04-23 PROCEDURE — 84443 ASSAY THYROID STIM HORMONE: CPT | Performed by: INTERNAL MEDICINE

## 2021-04-23 PROCEDURE — 96372 THER/PROPH/DIAG INJ SC/IM: CPT

## 2021-04-23 PROCEDURE — 85014 HEMATOCRIT: CPT | Performed by: INTERNAL MEDICINE

## 2021-04-23 PROCEDURE — 25010000002 EPOETIN ALFA PER 1000 UNITS: Performed by: INTERNAL MEDICINE

## 2021-04-23 PROCEDURE — 82985 ASSAY OF GLYCATED PROTEIN: CPT | Performed by: INTERNAL MEDICINE

## 2021-04-23 PROCEDURE — 85018 HEMOGLOBIN: CPT | Performed by: INTERNAL MEDICINE

## 2021-04-23 PROCEDURE — 84439 ASSAY OF FREE THYROXINE: CPT | Performed by: INTERNAL MEDICINE

## 2021-04-23 PROCEDURE — 83036 HEMOGLOBIN GLYCOSYLATED A1C: CPT | Performed by: INTERNAL MEDICINE

## 2021-04-23 RX ADMIN — ERYTHROPOIETIN 20000 UNITS: 20000 INJECTION, SOLUTION INTRAVENOUS; SUBCUTANEOUS at 13:46

## 2021-04-26 ENCOUNTER — TELEPHONE (OUTPATIENT)
Dept: ENDOCRINOLOGY | Age: 71
End: 2021-04-26

## 2021-04-26 RX ORDER — LEVOTHYROXINE SODIUM 0.05 MG/1
50 TABLET ORAL DAILY
Qty: 30 TABLET | Refills: 11 | Status: SHIPPED | OUTPATIENT
Start: 2021-04-26 | End: 2022-01-01

## 2021-04-26 NOTE — TELEPHONE ENCOUNTER
PATIENT CALLED IN STATED THAT WE CALLED IN NEW THYROID SCRIPT IN FOR HER AND SHE WANTS TO KNOW WHAT IT IS AND WHY SHE IS TO TAKE IT BEFORE SHE DOES WANTS DETAILED EXPLANATION ON THIS NEW MEDICATION.

## 2021-04-30 ENCOUNTER — HOSPITAL ENCOUNTER (OUTPATIENT)
Dept: INFUSION THERAPY | Facility: HOSPITAL | Age: 71
Discharge: HOME | End: 2021-04-30
Admitting: INTERNAL MEDICINE

## 2021-04-30 VITALS
HEART RATE: 50 BPM | SYSTOLIC BLOOD PRESSURE: 126 MMHG | DIASTOLIC BLOOD PRESSURE: 57 MMHG | RESPIRATION RATE: 20 BRPM | OXYGEN SATURATION: 96 % | TEMPERATURE: 96.9 F

## 2021-04-30 DIAGNOSIS — N18.5 ANEMIA IN STAGE 5 CHRONIC KIDNEY DISEASE, NOT ON CHRONIC DIALYSIS (HCC): ICD-10-CM

## 2021-04-30 DIAGNOSIS — D63.1 ANEMIA IN STAGE 5 CHRONIC KIDNEY DISEASE, NOT ON CHRONIC DIALYSIS (HCC): ICD-10-CM

## 2021-04-30 DIAGNOSIS — N18.5 CKD (CHRONIC KIDNEY DISEASE) STAGE 5, GFR LESS THAN 15 ML/MIN (HCC): Primary | ICD-10-CM

## 2021-04-30 LAB
HCT VFR BLD AUTO: 35 % (ref 34–46.6)
HGB BLD-MCNC: 10.6 G/DL (ref 12–15.9)

## 2021-04-30 PROCEDURE — 36416 COLLJ CAPILLARY BLOOD SPEC: CPT

## 2021-04-30 PROCEDURE — 85018 HEMOGLOBIN: CPT | Performed by: INTERNAL MEDICINE

## 2021-04-30 PROCEDURE — 96372 THER/PROPH/DIAG INJ SC/IM: CPT

## 2021-04-30 PROCEDURE — 25010000002 EPOETIN ALFA PER 1000 UNITS: Performed by: INTERNAL MEDICINE

## 2021-04-30 PROCEDURE — 85014 HEMATOCRIT: CPT | Performed by: INTERNAL MEDICINE

## 2021-04-30 RX ADMIN — ERYTHROPOIETIN 20000 UNITS: 20000 INJECTION, SOLUTION INTRAVENOUS; SUBCUTANEOUS at 13:46

## 2021-05-06 ENCOUNTER — APPOINTMENT (OUTPATIENT)
Dept: GENERAL RADIOLOGY | Facility: HOSPITAL | Age: 71
End: 2021-05-06

## 2021-05-06 ENCOUNTER — HOSPITAL ENCOUNTER (INPATIENT)
Facility: HOSPITAL | Age: 71
LOS: 2 days | Discharge: HOME HEALTH | End: 2021-05-09
Attending: EMERGENCY MEDICINE | Admitting: HOSPITALIST

## 2021-05-06 DIAGNOSIS — N18.5 CKD (CHRONIC KIDNEY DISEASE) STAGE 5, GFR LESS THAN 15 ML/MIN (HCC): ICD-10-CM

## 2021-05-06 DIAGNOSIS — Z86.39 HISTORY OF DIABETES MELLITUS, TYPE II: ICD-10-CM

## 2021-05-06 DIAGNOSIS — M54.12 CERVICAL RADICULOPATHY: ICD-10-CM

## 2021-05-06 DIAGNOSIS — N18.9 CHRONIC KIDNEY DISEASE, UNSPECIFIED CKD STAGE: ICD-10-CM

## 2021-05-06 DIAGNOSIS — J81.0 ACUTE PULMONARY EDEMA (HCC): Primary | ICD-10-CM

## 2021-05-06 DIAGNOSIS — E08.43 DIABETES MELLITUS DUE TO UNDERLYING CONDITION WITH DIABETIC AUTONOMIC NEUROPATHY, WITHOUT LONG-TERM CURRENT USE OF INSULIN (HCC): ICD-10-CM

## 2021-05-06 DIAGNOSIS — R60.9 PERIPHERAL EDEMA: ICD-10-CM

## 2021-05-06 DIAGNOSIS — R79.89 ELEVATED BRAIN NATRIURETIC PEPTIDE (BNP) LEVEL: ICD-10-CM

## 2021-05-06 DIAGNOSIS — Z86.79 HISTORY OF HYPERTENSION: ICD-10-CM

## 2021-05-06 DIAGNOSIS — I48.11 LONGSTANDING PERSISTENT ATRIAL FIBRILLATION (HCC): ICD-10-CM

## 2021-05-06 DIAGNOSIS — R06.09 DYSPNEA ON EXERTION: ICD-10-CM

## 2021-05-06 DIAGNOSIS — E11.42 DM TYPE 2 WITH DIABETIC PERIPHERAL NEUROPATHY (HCC): ICD-10-CM

## 2021-05-06 LAB
ALBUMIN SERPL-MCNC: 3.2 G/DL (ref 3.5–5.2)
ALBUMIN/GLOB SERPL: 0.8 G/DL
ALP SERPL-CCNC: 72 U/L (ref 39–117)
ALT SERPL W P-5'-P-CCNC: 10 U/L (ref 1–33)
ANION GAP SERPL CALCULATED.3IONS-SCNC: 11 MMOL/L (ref 5–15)
AST SERPL-CCNC: 13 U/L (ref 1–32)
BACTERIA UR QL AUTO: ABNORMAL /HPF
BASOPHILS # BLD AUTO: 0.05 10*3/MM3 (ref 0–0.2)
BASOPHILS NFR BLD AUTO: 0.7 % (ref 0–1.5)
BILIRUB SERPL-MCNC: 0.3 MG/DL (ref 0–1.2)
BILIRUB UR QL STRIP: NEGATIVE
BUN SERPL-MCNC: 51 MG/DL (ref 8–23)
BUN/CREAT SERPL: 14.4 (ref 7–25)
CALCIUM SPEC-SCNC: 8 MG/DL (ref 8.6–10.5)
CHLORIDE SERPL-SCNC: 116 MMOL/L (ref 98–107)
CLARITY UR: CLEAR
CO2 SERPL-SCNC: 17 MMOL/L (ref 22–29)
COLOR UR: YELLOW
CREAT SERPL-MCNC: 3.54 MG/DL (ref 0.57–1)
DEPRECATED RDW RBC AUTO: 53.1 FL (ref 37–54)
EOSINOPHIL # BLD AUTO: 0.48 10*3/MM3 (ref 0–0.4)
EOSINOPHIL NFR BLD AUTO: 6.4 % (ref 0.3–6.2)
ERYTHROCYTE [DISTWIDTH] IN BLOOD BY AUTOMATED COUNT: 15.1 % (ref 12.3–15.4)
GFR SERPL CREATININE-BSD FRML MDRD: 13 ML/MIN/1.73
GFR SERPL CREATININE-BSD FRML MDRD: ABNORMAL ML/MIN/{1.73_M2}
GLOBULIN UR ELPH-MCNC: 4.1 GM/DL
GLUCOSE SERPL-MCNC: 121 MG/DL (ref 65–99)
GLUCOSE UR STRIP-MCNC: NEGATIVE MG/DL
HCT VFR BLD AUTO: 34.3 % (ref 34–46.6)
HGB BLD-MCNC: 10.3 G/DL (ref 12–15.9)
HGB UR QL STRIP.AUTO: ABNORMAL
HOLD SPECIMEN: NORMAL
HOLD SPECIMEN: NORMAL
HYALINE CASTS UR QL AUTO: ABNORMAL /LPF
IMM GRANULOCYTES # BLD AUTO: 0.04 10*3/MM3 (ref 0–0.05)
IMM GRANULOCYTES NFR BLD AUTO: 0.5 % (ref 0–0.5)
KETONES UR QL STRIP: NEGATIVE
LEUKOCYTE ESTERASE UR QL STRIP.AUTO: ABNORMAL
LYMPHOCYTES # BLD AUTO: 0.92 10*3/MM3 (ref 0.7–3.1)
LYMPHOCYTES NFR BLD AUTO: 12.3 % (ref 19.6–45.3)
MAGNESIUM SERPL-MCNC: 1.7 MG/DL (ref 1.6–2.4)
MCH RBC QN AUTO: 29.1 PG (ref 26.6–33)
MCHC RBC AUTO-ENTMCNC: 30 G/DL (ref 31.5–35.7)
MCV RBC AUTO: 96.9 FL (ref 79–97)
MONOCYTES # BLD AUTO: 0.69 10*3/MM3 (ref 0.1–0.9)
MONOCYTES NFR BLD AUTO: 9.2 % (ref 5–12)
NEUTROPHILS NFR BLD AUTO: 5.31 10*3/MM3 (ref 1.7–7)
NEUTROPHILS NFR BLD AUTO: 70.9 % (ref 42.7–76)
NITRITE UR QL STRIP: NEGATIVE
NRBC BLD AUTO-RTO: 0.1 /100 WBC (ref 0–0.2)
NT-PROBNP SERPL-MCNC: ABNORMAL PG/ML (ref 0–900)
PH UR STRIP.AUTO: <=5 [PH] (ref 5–8)
PLATELET # BLD AUTO: 196 10*3/MM3 (ref 140–450)
PMV BLD AUTO: 9.8 FL (ref 6–12)
POTASSIUM SERPL-SCNC: 5.1 MMOL/L (ref 3.5–5.2)
PROT SERPL-MCNC: 7.3 G/DL (ref 6–8.5)
PROT UR QL STRIP: ABNORMAL
QT INTERVAL: 387 MS
RBC # BLD AUTO: 3.54 10*6/MM3 (ref 3.77–5.28)
RBC # UR: ABNORMAL /HPF
REF LAB TEST METHOD: ABNORMAL
SODIUM SERPL-SCNC: 144 MMOL/L (ref 136–145)
SP GR UR STRIP: 1.01 (ref 1–1.03)
SQUAMOUS #/AREA URNS HPF: ABNORMAL /HPF
TROPONIN T SERPL-MCNC: 0.07 NG/ML (ref 0–0.03)
UROBILINOGEN UR QL STRIP: ABNORMAL
WBC # BLD AUTO: 7.49 10*3/MM3 (ref 3.4–10.8)
WBC UR QL AUTO: ABNORMAL /HPF
WHOLE BLOOD HOLD SPECIMEN: NORMAL
WHOLE BLOOD HOLD SPECIMEN: NORMAL

## 2021-05-06 PROCEDURE — 80053 COMPREHEN METABOLIC PANEL: CPT

## 2021-05-06 PROCEDURE — U0005 INFEC AGEN DETEC AMPLI PROBE: HCPCS | Performed by: EMERGENCY MEDICINE

## 2021-05-06 PROCEDURE — 36415 COLL VENOUS BLD VENIPUNCTURE: CPT

## 2021-05-06 PROCEDURE — 81001 URINALYSIS AUTO W/SCOPE: CPT

## 2021-05-06 PROCEDURE — 85025 COMPLETE CBC W/AUTO DIFF WBC: CPT

## 2021-05-06 PROCEDURE — 99284 EMERGENCY DEPT VISIT MOD MDM: CPT

## 2021-05-06 PROCEDURE — 93010 ELECTROCARDIOGRAM REPORT: CPT | Performed by: INTERNAL MEDICINE

## 2021-05-06 PROCEDURE — 83880 ASSAY OF NATRIURETIC PEPTIDE: CPT | Performed by: EMERGENCY MEDICINE

## 2021-05-06 PROCEDURE — G0378 HOSPITAL OBSERVATION PER HR: HCPCS

## 2021-05-06 PROCEDURE — 71045 X-RAY EXAM CHEST 1 VIEW: CPT

## 2021-05-06 PROCEDURE — U0004 COV-19 TEST NON-CDC HGH THRU: HCPCS | Performed by: EMERGENCY MEDICINE

## 2021-05-06 PROCEDURE — 84484 ASSAY OF TROPONIN QUANT: CPT

## 2021-05-06 PROCEDURE — 93005 ELECTROCARDIOGRAM TRACING: CPT

## 2021-05-06 PROCEDURE — 83735 ASSAY OF MAGNESIUM: CPT

## 2021-05-06 RX ORDER — BUMETANIDE 0.25 MG/ML
2 INJECTION INTRAMUSCULAR; INTRAVENOUS ONCE
Status: COMPLETED | OUTPATIENT
Start: 2021-05-06 | End: 2021-05-06

## 2021-05-06 RX ORDER — SODIUM CHLORIDE 0.9 % (FLUSH) 0.9 %
10 SYRINGE (ML) INJECTION AS NEEDED
Status: DISCONTINUED | OUTPATIENT
Start: 2021-05-06 | End: 2021-05-09 | Stop reason: HOSPADM

## 2021-05-06 RX ADMIN — BUMETANIDE 2 MG: 0.25 INJECTION INTRAMUSCULAR; INTRAVENOUS at 21:30

## 2021-05-07 ENCOUNTER — APPOINTMENT (OUTPATIENT)
Dept: INFUSION THERAPY | Facility: HOSPITAL | Age: 71
End: 2021-05-07

## 2021-05-07 PROBLEM — E03.9 HYPOTHYROIDISM (ACQUIRED): Status: ACTIVE | Noted: 2021-05-07

## 2021-05-07 LAB
ANION GAP SERPL CALCULATED.3IONS-SCNC: 9.6 MMOL/L (ref 5–15)
BUN SERPL-MCNC: 51 MG/DL (ref 8–23)
BUN/CREAT SERPL: 13.7 (ref 7–25)
CALCIUM SPEC-SCNC: 7.8 MG/DL (ref 8.6–10.5)
CHLORIDE SERPL-SCNC: 118 MMOL/L (ref 98–107)
CO2 SERPL-SCNC: 18.4 MMOL/L (ref 22–29)
CREAT SERPL-MCNC: 3.71 MG/DL (ref 0.57–1)
DEPRECATED RDW RBC AUTO: 50.3 FL (ref 37–54)
ERYTHROCYTE [DISTWIDTH] IN BLOOD BY AUTOMATED COUNT: 14.8 % (ref 12.3–15.4)
GFR SERPL CREATININE-BSD FRML MDRD: 12 ML/MIN/1.73
GFR SERPL CREATININE-BSD FRML MDRD: ABNORMAL ML/MIN/{1.73_M2}
GLUCOSE BLDC GLUCOMTR-MCNC: 103 MG/DL (ref 70–130)
GLUCOSE BLDC GLUCOMTR-MCNC: 124 MG/DL (ref 70–130)
GLUCOSE BLDC GLUCOMTR-MCNC: 90 MG/DL (ref 70–130)
GLUCOSE BLDC GLUCOMTR-MCNC: 92 MG/DL (ref 70–130)
GLUCOSE SERPL-MCNC: 77 MG/DL (ref 65–99)
HCT VFR BLD AUTO: 30.5 % (ref 34–46.6)
HGB BLD-MCNC: 9.6 G/DL (ref 12–15.9)
MCH RBC QN AUTO: 29.8 PG (ref 26.6–33)
MCHC RBC AUTO-ENTMCNC: 31.5 G/DL (ref 31.5–35.7)
MCV RBC AUTO: 94.7 FL (ref 79–97)
PLATELET # BLD AUTO: 192 10*3/MM3 (ref 140–450)
PMV BLD AUTO: 9.6 FL (ref 6–12)
POTASSIUM SERPL-SCNC: 5.3 MMOL/L (ref 3.5–5.2)
RBC # BLD AUTO: 3.22 10*6/MM3 (ref 3.77–5.28)
SARS-COV-2 ORF1AB RESP QL NAA+PROBE: NOT DETECTED
SODIUM SERPL-SCNC: 146 MMOL/L (ref 136–145)
WBC # BLD AUTO: 7.55 10*3/MM3 (ref 3.4–10.8)

## 2021-05-07 PROCEDURE — 97116 GAIT TRAINING THERAPY: CPT

## 2021-05-07 PROCEDURE — 82962 GLUCOSE BLOOD TEST: CPT

## 2021-05-07 PROCEDURE — 97530 THERAPEUTIC ACTIVITIES: CPT

## 2021-05-07 PROCEDURE — 99221 1ST HOSP IP/OBS SF/LOW 40: CPT | Performed by: INTERNAL MEDICINE

## 2021-05-07 PROCEDURE — 80048 BASIC METABOLIC PNL TOTAL CA: CPT | Performed by: NURSE PRACTITIONER

## 2021-05-07 PROCEDURE — 97162 PT EVAL MOD COMPLEX 30 MIN: CPT

## 2021-05-07 PROCEDURE — 85027 COMPLETE CBC AUTOMATED: CPT | Performed by: NURSE PRACTITIONER

## 2021-05-07 RX ORDER — TOPIRAMATE 100 MG/1
100 TABLET, FILM COATED ORAL DAILY
Status: DISCONTINUED | OUTPATIENT
Start: 2021-05-07 | End: 2021-05-09 | Stop reason: HOSPADM

## 2021-05-07 RX ORDER — LEVOTHYROXINE SODIUM 0.05 MG/1
50 TABLET ORAL DAILY
Status: DISCONTINUED | OUTPATIENT
Start: 2021-05-07 | End: 2021-05-09 | Stop reason: HOSPADM

## 2021-05-07 RX ORDER — ALUMINA, MAGNESIA, AND SIMETHICONE 2400; 2400; 240 MG/30ML; MG/30ML; MG/30ML
15 SUSPENSION ORAL EVERY 6 HOURS PRN
Status: DISCONTINUED | OUTPATIENT
Start: 2021-05-07 | End: 2021-05-09 | Stop reason: HOSPADM

## 2021-05-07 RX ORDER — LOPERAMIDE HYDROCHLORIDE 2 MG/1
2 CAPSULE ORAL 4 TIMES DAILY PRN
Status: DISCONTINUED | OUTPATIENT
Start: 2021-05-07 | End: 2021-05-09 | Stop reason: HOSPADM

## 2021-05-07 RX ORDER — AMLODIPINE BESYLATE 5 MG/1
5 TABLET ORAL DAILY
Status: DISCONTINUED | OUTPATIENT
Start: 2021-05-07 | End: 2021-05-09 | Stop reason: HOSPADM

## 2021-05-07 RX ORDER — INSULIN LISPRO 100 [IU]/ML
0-9 INJECTION, SOLUTION INTRAVENOUS; SUBCUTANEOUS
Status: DISCONTINUED | OUTPATIENT
Start: 2021-05-07 | End: 2021-05-09 | Stop reason: HOSPADM

## 2021-05-07 RX ORDER — ACETAMINOPHEN 325 MG/1
650 TABLET ORAL EVERY 4 HOURS PRN
Status: DISCONTINUED | OUTPATIENT
Start: 2021-05-07 | End: 2021-05-09 | Stop reason: HOSPADM

## 2021-05-07 RX ORDER — L.ACID,PARA/B.BIFIDUM/S.THERM 8B CELL
1 CAPSULE ORAL DAILY
Status: DISCONTINUED | OUTPATIENT
Start: 2021-05-07 | End: 2021-05-09 | Stop reason: HOSPADM

## 2021-05-07 RX ORDER — BUMETANIDE 0.25 MG/ML
2 INJECTION INTRAMUSCULAR; INTRAVENOUS ONCE
Status: DISCONTINUED | OUTPATIENT
Start: 2021-05-08 | End: 2021-05-07

## 2021-05-07 RX ORDER — GABAPENTIN 100 MG/1
100 CAPSULE ORAL NIGHTLY
Status: DISCONTINUED | OUTPATIENT
Start: 2021-05-07 | End: 2021-05-09 | Stop reason: HOSPADM

## 2021-05-07 RX ORDER — DEXTROSE MONOHYDRATE 25 G/50ML
25 INJECTION, SOLUTION INTRAVENOUS
Status: DISCONTINUED | OUTPATIENT
Start: 2021-05-07 | End: 2021-05-09 | Stop reason: HOSPADM

## 2021-05-07 RX ORDER — SODIUM CHLORIDE 0.9 % (FLUSH) 0.9 %
10 SYRINGE (ML) INJECTION AS NEEDED
Status: DISCONTINUED | OUTPATIENT
Start: 2021-05-07 | End: 2021-05-09 | Stop reason: HOSPADM

## 2021-05-07 RX ORDER — NITROGLYCERIN 0.4 MG/1
0.4 TABLET SUBLINGUAL
Status: DISCONTINUED | OUTPATIENT
Start: 2021-05-07 | End: 2021-05-09 | Stop reason: HOSPADM

## 2021-05-07 RX ORDER — ASPIRIN 81 MG/1
81 TABLET ORAL DAILY
Status: DISCONTINUED | OUTPATIENT
Start: 2021-05-07 | End: 2021-05-09 | Stop reason: HOSPADM

## 2021-05-07 RX ORDER — ONDANSETRON 2 MG/ML
4 INJECTION INTRAMUSCULAR; INTRAVENOUS EVERY 6 HOURS PRN
Status: DISCONTINUED | OUTPATIENT
Start: 2021-05-07 | End: 2021-05-09 | Stop reason: HOSPADM

## 2021-05-07 RX ORDER — LOSARTAN POTASSIUM 50 MG/1
50 TABLET ORAL NIGHTLY
Status: DISCONTINUED | OUTPATIENT
Start: 2021-05-07 | End: 2021-05-09 | Stop reason: HOSPADM

## 2021-05-07 RX ORDER — DIPHENOXYLATE HYDROCHLORIDE AND ATROPINE SULFATE 2.5; .025 MG/1; MG/1
1 TABLET ORAL EVERY MORNING
Status: DISCONTINUED | OUTPATIENT
Start: 2021-05-07 | End: 2021-05-09

## 2021-05-07 RX ORDER — MONTELUKAST SODIUM 10 MG/1
10 TABLET ORAL NIGHTLY
Status: DISCONTINUED | OUTPATIENT
Start: 2021-05-07 | End: 2021-05-09 | Stop reason: HOSPADM

## 2021-05-07 RX ORDER — CARVEDILOL 12.5 MG/1
12.5 TABLET ORAL 2 TIMES DAILY
Status: DISCONTINUED | OUTPATIENT
Start: 2021-05-07 | End: 2021-05-09 | Stop reason: HOSPADM

## 2021-05-07 RX ORDER — NICOTINE POLACRILEX 4 MG
15 LOZENGE BUCCAL
Status: DISCONTINUED | OUTPATIENT
Start: 2021-05-07 | End: 2021-05-09 | Stop reason: HOSPADM

## 2021-05-07 RX ORDER — BUMETANIDE 0.25 MG/ML
4 INJECTION INTRAMUSCULAR; INTRAVENOUS EVERY 8 HOURS
Status: COMPLETED | OUTPATIENT
Start: 2021-05-07 | End: 2021-05-08

## 2021-05-07 RX ORDER — GABAPENTIN 300 MG/1
300 CAPSULE ORAL NIGHTLY
Status: DISCONTINUED | OUTPATIENT
Start: 2021-05-07 | End: 2021-05-07

## 2021-05-07 RX ORDER — ONDANSETRON 4 MG/1
4 TABLET, FILM COATED ORAL EVERY 6 HOURS PRN
Status: DISCONTINUED | OUTPATIENT
Start: 2021-05-07 | End: 2021-05-09 | Stop reason: HOSPADM

## 2021-05-07 RX ADMIN — TRIAMCINOLONE ACETONIDE: 1 OINTMENT TOPICAL at 16:00

## 2021-05-07 RX ADMIN — CARVEDILOL 12.5 MG: 12.5 TABLET, FILM COATED ORAL at 20:23

## 2021-05-07 RX ADMIN — GABAPENTIN 100 MG: 100 CAPSULE ORAL at 20:23

## 2021-05-07 RX ADMIN — AMLODIPINE BESYLATE 5 MG: 5 TABLET ORAL at 14:08

## 2021-05-07 RX ADMIN — APIXABAN 5 MG: 5 TABLET, FILM COATED ORAL at 20:23

## 2021-05-07 RX ADMIN — CARVEDILOL 12.5 MG: 12.5 TABLET, FILM COATED ORAL at 14:07

## 2021-05-07 RX ADMIN — BUMETANIDE 4 MG: 0.25 INJECTION INTRAMUSCULAR; INTRAVENOUS at 21:35

## 2021-05-07 RX ADMIN — MONTELUKAST SODIUM 10 MG: 10 TABLET, FILM COATED ORAL at 20:23

## 2021-05-07 RX ADMIN — Medication 1 TABLET: at 14:07

## 2021-05-07 RX ADMIN — ACETAMINOPHEN 650 MG: 325 TABLET, FILM COATED ORAL at 06:27

## 2021-05-07 RX ADMIN — BUMETANIDE 4 MG: 0.25 INJECTION INTRAMUSCULAR; INTRAVENOUS at 14:06

## 2021-05-07 RX ADMIN — LOSARTAN POTASSIUM 50 MG: 50 TABLET, FILM COATED ORAL at 21:37

## 2021-05-07 RX ADMIN — Medication 1 CAPSULE: at 14:07

## 2021-05-07 RX ADMIN — TOPIRAMATE 100 MG: 100 TABLET, FILM COATED ORAL at 14:07

## 2021-05-07 RX ADMIN — LEVOTHYROXINE SODIUM 50 MCG: 0.05 TABLET ORAL at 14:08

## 2021-05-07 RX ADMIN — APIXABAN 5 MG: 5 TABLET, FILM COATED ORAL at 14:07

## 2021-05-07 RX ADMIN — ASPIRIN 81 MG: 81 TABLET, COATED ORAL at 14:07

## 2021-05-08 ENCOUNTER — APPOINTMENT (OUTPATIENT)
Dept: CARDIOLOGY | Facility: HOSPITAL | Age: 71
End: 2021-05-08

## 2021-05-08 LAB
ALBUMIN SERPL-MCNC: 2.8 G/DL (ref 3.5–5.2)
ANION GAP SERPL CALCULATED.3IONS-SCNC: 8.1 MMOL/L (ref 5–15)
ASCENDING AORTA: 3.2 CM
BASOPHILS # BLD AUTO: 0.04 10*3/MM3 (ref 0–0.2)
BASOPHILS NFR BLD AUTO: 0.5 % (ref 0–1.5)
BH CV ECHO MEAS - ACS: 1.9 CM
BH CV ECHO MEAS - AO MAX PG (FULL): 15.4 MMHG
BH CV ECHO MEAS - AO MAX PG: 20.6 MMHG
BH CV ECHO MEAS - AO MEAN PG (FULL): 8 MMHG
BH CV ECHO MEAS - AO MEAN PG: 11 MMHG
BH CV ECHO MEAS - AO ROOT AREA (BSA CORRECTED): 1.8
BH CV ECHO MEAS - AO ROOT AREA: 8.6 CM^2
BH CV ECHO MEAS - AO ROOT DIAM: 3.3 CM
BH CV ECHO MEAS - AO V2 MAX: 227 CM/SEC
BH CV ECHO MEAS - AO V2 MEAN: 154 CM/SEC
BH CV ECHO MEAS - AO V2 VTI: 54.1 CM
BH CV ECHO MEAS - ASC AORTA: 3.2 CM
BH CV ECHO MEAS - AVA(I,A): 1.6 CM^2
BH CV ECHO MEAS - AVA(I,D): 1.6 CM^2
BH CV ECHO MEAS - AVA(V,A): 1.4 CM^2
BH CV ECHO MEAS - AVA(V,D): 1.4 CM^2
BH CV ECHO MEAS - BSA(HAYCOCK): 2 M^2
BH CV ECHO MEAS - BSA: 1.9 M^2
BH CV ECHO MEAS - BZI_BMI: 36.8 KILOGRAMS/M^2
BH CV ECHO MEAS - BZI_METRIC_HEIGHT: 154.9 CM
BH CV ECHO MEAS - BZI_METRIC_WEIGHT: 88.5 KG
BH CV ECHO MEAS - EDV(MOD-SP2): 87 ML
BH CV ECHO MEAS - EDV(MOD-SP4): 75 ML
BH CV ECHO MEAS - EDV(TEICH): 112.8 ML
BH CV ECHO MEAS - EF(CUBED): 72.1 %
BH CV ECHO MEAS - EF(MOD-BP): 57 %
BH CV ECHO MEAS - EF(MOD-SP2): 57.5 %
BH CV ECHO MEAS - EF(MOD-SP4): 56 %
BH CV ECHO MEAS - EF(TEICH): 63.7 %
BH CV ECHO MEAS - ESV(MOD-SP2): 37 ML
BH CV ECHO MEAS - ESV(MOD-SP4): 33 ML
BH CV ECHO MEAS - ESV(TEICH): 41 ML
BH CV ECHO MEAS - FS: 34.7 %
BH CV ECHO MEAS - IVS/LVPW: 0.89
BH CV ECHO MEAS - IVSD: 0.8 CM
BH CV ECHO MEAS - LAT PEAK E' VEL: 6.3 CM/SEC
BH CV ECHO MEAS - LV DIASTOLIC VOL/BSA (35-75): 40.1 ML/M^2
BH CV ECHO MEAS - LV MASS(C)D: 141.9 GRAMS
BH CV ECHO MEAS - LV MASS(C)DI: 75.9 GRAMS/M^2
BH CV ECHO MEAS - LV MAX PG: 5.2 MMHG
BH CV ECHO MEAS - LV MEAN PG: 3 MMHG
BH CV ECHO MEAS - LV SYSTOLIC VOL/BSA (12-30): 17.7 ML/M^2
BH CV ECHO MEAS - LV V1 MAX: 114 CM/SEC
BH CV ECHO MEAS - LV V1 MEAN: 83.6 CM/SEC
BH CV ECHO MEAS - LV V1 VTI: 30.8 CM
BH CV ECHO MEAS - LVIDD: 4.9 CM
BH CV ECHO MEAS - LVIDS: 3.2 CM
BH CV ECHO MEAS - LVLD AP2: 7.1 CM
BH CV ECHO MEAS - LVLD AP4: 6.8 CM
BH CV ECHO MEAS - LVLS AP2: 6.3 CM
BH CV ECHO MEAS - LVLS AP4: 6.1 CM
BH CV ECHO MEAS - LVOT AREA (M): 2.8 CM^2
BH CV ECHO MEAS - LVOT AREA: 2.8 CM^2
BH CV ECHO MEAS - LVOT DIAM: 1.9 CM
BH CV ECHO MEAS - LVPWD: 0.9 CM
BH CV ECHO MEAS - MED PEAK E' VEL: 6 CM/SEC
BH CV ECHO MEAS - MR MAX PG: 91.4 MMHG
BH CV ECHO MEAS - MR MAX VEL: 478 CM/SEC
BH CV ECHO MEAS - MV A DUR: 0.15 SEC
BH CV ECHO MEAS - MV A MAX VEL: 85.9 CM/SEC
BH CV ECHO MEAS - MV DEC SLOPE: 620.5 CM/SEC^2
BH CV ECHO MEAS - MV DEC TIME: 0.18 SEC
BH CV ECHO MEAS - MV E MAX VEL: 136 CM/SEC
BH CV ECHO MEAS - MV E/A: 1.6
BH CV ECHO MEAS - MV MAX PG: 8.2 MMHG
BH CV ECHO MEAS - MV MEAN PG: 4 MMHG
BH CV ECHO MEAS - MV P1/2T MAX VEL: 157 CM/SEC
BH CV ECHO MEAS - MV P1/2T: 74.1 MSEC
BH CV ECHO MEAS - MV V2 MAX: 143 CM/SEC
BH CV ECHO MEAS - MV V2 MEAN: 87.8 CM/SEC
BH CV ECHO MEAS - MV V2 VTI: 39.4 CM
BH CV ECHO MEAS - MVA P1/2T LCG: 1.4 CM^2
BH CV ECHO MEAS - MVA(P1/2T): 3 CM^2
BH CV ECHO MEAS - MVA(VTI): 2.2 CM^2
BH CV ECHO MEAS - PA ACC TIME: 0.1 SEC
BH CV ECHO MEAS - PA MAX PG (FULL): 2.7 MMHG
BH CV ECHO MEAS - PA MAX PG: 4.4 MMHG
BH CV ECHO MEAS - PA PR(ACCEL): 33.1 MMHG
BH CV ECHO MEAS - PA V2 MAX: 105 CM/SEC
BH CV ECHO MEAS - PULM A REVS DUR: 0.12 SEC
BH CV ECHO MEAS - PULM A REVS VEL: 17.4 CM/SEC
BH CV ECHO MEAS - PULM DIAS VEL: 31.7 CM/SEC
BH CV ECHO MEAS - PULM S/D: 0.98
BH CV ECHO MEAS - PULM SYS VEL: 31.2 CM/SEC
BH CV ECHO MEAS - PVA(V,A): 1.9 CM^2
BH CV ECHO MEAS - PVA(V,D): 1.9 CM^2
BH CV ECHO MEAS - QP/QS: 0.49
BH CV ECHO MEAS - RAP SYSTOLE: 3 MMHG
BH CV ECHO MEAS - RV MAX PG: 1.7 MMHG
BH CV ECHO MEAS - RV MEAN PG: 1 MMHG
BH CV ECHO MEAS - RV V1 MAX: 64.6 CM/SEC
BH CV ECHO MEAS - RV V1 MEAN: 48.8 CM/SEC
BH CV ECHO MEAS - RV V1 VTI: 13.7 CM
BH CV ECHO MEAS - RVOT AREA: 3.1 CM^2
BH CV ECHO MEAS - RVOT DIAM: 2 CM
BH CV ECHO MEAS - RVSP: 39.2 MMHG
BH CV ECHO MEAS - SI(AO): 247.6 ML/M^2
BH CV ECHO MEAS - SI(CUBED): 45.4 ML/M^2
BH CV ECHO MEAS - SI(LVOT): 46.7 ML/M^2
BH CV ECHO MEAS - SI(MOD-SP2): 26.8 ML/M^2
BH CV ECHO MEAS - SI(MOD-SP4): 22.5 ML/M^2
BH CV ECHO MEAS - SI(TEICH): 38.4 ML/M^2
BH CV ECHO MEAS - SUP REN AO DIAM: 2.4 CM
BH CV ECHO MEAS - SV(AO): 462.7 ML
BH CV ECHO MEAS - SV(CUBED): 84.9 ML
BH CV ECHO MEAS - SV(LVOT): 87.3 ML
BH CV ECHO MEAS - SV(MOD-SP2): 50 ML
BH CV ECHO MEAS - SV(MOD-SP4): 42 ML
BH CV ECHO MEAS - SV(RVOT): 43 ML
BH CV ECHO MEAS - SV(TEICH): 71.9 ML
BH CV ECHO MEAS - TAPSE (>1.6): 2.1 CM
BH CV ECHO MEAS - TR MAX VEL: 301 CM/SEC
BH CV ECHO MEASUREMENTS AVERAGE E/E' RATIO: 22.11
BH CV XLRA - RV BASE: 3.8 CM
BH CV XLRA - RV LENGTH: 6.9 CM
BH CV XLRA - RV MID: 3.5 CM
BH CV XLRA - TDI S': 8.9 CM/SEC
BUN SERPL-MCNC: 51 MG/DL (ref 8–23)
BUN/CREAT SERPL: 15 (ref 7–25)
CALCIUM SPEC-SCNC: 7.9 MG/DL (ref 8.6–10.5)
CHLORIDE SERPL-SCNC: 115 MMOL/L (ref 98–107)
CO2 SERPL-SCNC: 18.9 MMOL/L (ref 22–29)
CREAT SERPL-MCNC: 3.4 MG/DL (ref 0.57–1)
DEPRECATED RDW RBC AUTO: 52.6 FL (ref 37–54)
EOSINOPHIL # BLD AUTO: 0.34 10*3/MM3 (ref 0–0.4)
EOSINOPHIL NFR BLD AUTO: 4.7 % (ref 0.3–6.2)
ERYTHROCYTE [DISTWIDTH] IN BLOOD BY AUTOMATED COUNT: 15.3 % (ref 12.3–15.4)
GFR SERPL CREATININE-BSD FRML MDRD: 13 ML/MIN/1.73
GFR SERPL CREATININE-BSD FRML MDRD: ABNORMAL ML/MIN/{1.73_M2}
GLUCOSE BLDC GLUCOMTR-MCNC: 231 MG/DL (ref 70–130)
GLUCOSE BLDC GLUCOMTR-MCNC: 83 MG/DL (ref 70–130)
GLUCOSE BLDC GLUCOMTR-MCNC: 99 MG/DL (ref 70–130)
GLUCOSE SERPL-MCNC: 70 MG/DL (ref 65–99)
HCT VFR BLD AUTO: 30.3 % (ref 34–46.6)
HEMOCCULT STL QL: POSITIVE
HGB BLD-MCNC: 9.3 G/DL (ref 12–15.9)
IMM GRANULOCYTES # BLD AUTO: 0.04 10*3/MM3 (ref 0–0.05)
IMM GRANULOCYTES NFR BLD AUTO: 0.5 % (ref 0–0.5)
LEFT ATRIUM VOLUME INDEX: 34 ML/M2
LYMPHOCYTES # BLD AUTO: 0.95 10*3/MM3 (ref 0.7–3.1)
LYMPHOCYTES NFR BLD AUTO: 13 % (ref 19.6–45.3)
MAGNESIUM SERPL-MCNC: 1.7 MG/DL (ref 1.6–2.4)
MAXIMAL PREDICTED HEART RATE: 150 BPM
MCH RBC QN AUTO: 29.3 PG (ref 26.6–33)
MCHC RBC AUTO-ENTMCNC: 30.7 G/DL (ref 31.5–35.7)
MCV RBC AUTO: 95.6 FL (ref 79–97)
MONOCYTES # BLD AUTO: 0.69 10*3/MM3 (ref 0.1–0.9)
MONOCYTES NFR BLD AUTO: 9.5 % (ref 5–12)
NEUTROPHILS NFR BLD AUTO: 5.23 10*3/MM3 (ref 1.7–7)
NEUTROPHILS NFR BLD AUTO: 71.8 % (ref 42.7–76)
NRBC BLD AUTO-RTO: 0.1 /100 WBC (ref 0–0.2)
NT-PROBNP SERPL-MCNC: ABNORMAL PG/ML (ref 0–900)
PHOSPHATE SERPL-MCNC: 5.4 MG/DL (ref 2.5–4.5)
PLATELET # BLD AUTO: 188 10*3/MM3 (ref 140–450)
PMV BLD AUTO: 9.8 FL (ref 6–12)
POTASSIUM SERPL-SCNC: 5.5 MMOL/L (ref 3.5–5.2)
RBC # BLD AUTO: 3.17 10*6/MM3 (ref 3.77–5.28)
SINUS: 2.5 CM
SODIUM SERPL-SCNC: 142 MMOL/L (ref 136–145)
STJ: 2.4 CM
STRESS TARGET HR: 128 BPM
TROPONIN T SERPL-MCNC: 0.09 NG/ML (ref 0–0.03)
URATE SERPL-MCNC: 8.4 MG/DL (ref 2.4–5.7)
WBC # BLD AUTO: 7.29 10*3/MM3 (ref 3.4–10.8)

## 2021-05-08 PROCEDURE — 93306 TTE W/DOPPLER COMPLETE: CPT

## 2021-05-08 PROCEDURE — 82272 OCCULT BLD FECES 1-3 TESTS: CPT | Performed by: HOSPITALIST

## 2021-05-08 PROCEDURE — 83880 ASSAY OF NATRIURETIC PEPTIDE: CPT | Performed by: INTERNAL MEDICINE

## 2021-05-08 PROCEDURE — 80069 RENAL FUNCTION PANEL: CPT | Performed by: INTERNAL MEDICINE

## 2021-05-08 PROCEDURE — 83735 ASSAY OF MAGNESIUM: CPT | Performed by: INTERNAL MEDICINE

## 2021-05-08 PROCEDURE — 82962 GLUCOSE BLOOD TEST: CPT

## 2021-05-08 PROCEDURE — 99232 SBSQ HOSP IP/OBS MODERATE 35: CPT | Performed by: INTERNAL MEDICINE

## 2021-05-08 PROCEDURE — 93306 TTE W/DOPPLER COMPLETE: CPT | Performed by: INTERNAL MEDICINE

## 2021-05-08 PROCEDURE — 93010 ELECTROCARDIOGRAM REPORT: CPT | Performed by: INTERNAL MEDICINE

## 2021-05-08 PROCEDURE — 84550 ASSAY OF BLOOD/URIC ACID: CPT | Performed by: INTERNAL MEDICINE

## 2021-05-08 PROCEDURE — 85025 COMPLETE CBC W/AUTO DIFF WBC: CPT | Performed by: INTERNAL MEDICINE

## 2021-05-08 PROCEDURE — 84484 ASSAY OF TROPONIN QUANT: CPT | Performed by: NURSE PRACTITIONER

## 2021-05-08 PROCEDURE — 93005 ELECTROCARDIOGRAM TRACING: CPT | Performed by: NURSE PRACTITIONER

## 2021-05-08 RX ORDER — METOLAZONE 5 MG/1
5 TABLET ORAL DAILY
Status: DISCONTINUED | OUTPATIENT
Start: 2021-05-08 | End: 2021-05-09 | Stop reason: HOSPADM

## 2021-05-08 RX ORDER — BUMETANIDE 0.25 MG/ML
2 INJECTION INTRAMUSCULAR; INTRAVENOUS EVERY 12 HOURS
Status: DISCONTINUED | OUTPATIENT
Start: 2021-05-08 | End: 2021-05-09 | Stop reason: HOSPADM

## 2021-05-08 RX ORDER — CHOLESTYRAMINE LIGHT 4 G/5.7G
1 POWDER, FOR SUSPENSION ORAL DAILY
Status: DISCONTINUED | OUTPATIENT
Start: 2021-05-08 | End: 2021-05-09 | Stop reason: HOSPADM

## 2021-05-08 RX ADMIN — APIXABAN 5 MG: 5 TABLET, FILM COATED ORAL at 09:12

## 2021-05-08 RX ADMIN — CARVEDILOL 12.5 MG: 12.5 TABLET, FILM COATED ORAL at 09:11

## 2021-05-08 RX ADMIN — ASPIRIN 81 MG: 81 TABLET, COATED ORAL at 09:11

## 2021-05-08 RX ADMIN — Medication 1 CAPSULE: at 09:11

## 2021-05-08 RX ADMIN — METOLAZONE 5 MG: 5 TABLET ORAL at 23:04

## 2021-05-08 RX ADMIN — ACETAMINOPHEN 650 MG: 325 TABLET, FILM COATED ORAL at 01:32

## 2021-05-08 RX ADMIN — Medication 1 TABLET: at 07:01

## 2021-05-08 RX ADMIN — CHOLESTYRAMINE 4 G: 4 POWDER, FOR SUSPENSION ORAL at 14:57

## 2021-05-08 RX ADMIN — MONTELUKAST SODIUM 10 MG: 10 TABLET, FILM COATED ORAL at 20:16

## 2021-05-08 RX ADMIN — CARVEDILOL 12.5 MG: 12.5 TABLET, FILM COATED ORAL at 20:16

## 2021-05-08 RX ADMIN — BUMETANIDE 4 MG: 0.25 INJECTION INTRAMUSCULAR; INTRAVENOUS at 05:46

## 2021-05-08 RX ADMIN — AMLODIPINE BESYLATE 5 MG: 5 TABLET ORAL at 09:11

## 2021-05-08 RX ADMIN — LEVOTHYROXINE SODIUM 50 MCG: 0.05 TABLET ORAL at 09:11

## 2021-05-08 RX ADMIN — APIXABAN 5 MG: 5 TABLET, FILM COATED ORAL at 20:16

## 2021-05-08 RX ADMIN — BUMETANIDE 2 MG: 0.25 INJECTION INTRAMUSCULAR; INTRAVENOUS at 14:57

## 2021-05-08 RX ADMIN — TOPIRAMATE 100 MG: 100 TABLET, FILM COATED ORAL at 09:11

## 2021-05-08 RX ADMIN — TRIAMCINOLONE ACETONIDE 1 APPLICATION: 1 OINTMENT TOPICAL at 09:12

## 2021-05-08 RX ADMIN — LOSARTAN POTASSIUM 50 MG: 50 TABLET, FILM COATED ORAL at 20:16

## 2021-05-08 RX ADMIN — GABAPENTIN 100 MG: 100 CAPSULE ORAL at 20:16

## 2021-05-08 NOTE — PROGRESS NOTES
"DAILY PROGRESS NOTE  [facility]    Patient Identification:  Name: Maribeth Rendon  Age: 70 y.o.  Sex: female  :  1950  MRN: [id number]         Primary Care Physician: Robby Chaney MD      Subjective  Patient's major complaint today is her diarrhea.  She also notes she has a history of hemorrhoids which the diarrhea seems to be affecting.  She states she is take something over-the-counter but does not remember what it was.  Her daughter shows me one of her briefs with the diarrhea and it.  It is a dark green bilious diarrhea.    Objective:  General Appearance:  Comfortable, well-appearing, in no acute distress and not in pain.    Vital signs: (most recent): Blood pressure 152/72, pulse 70, temperature 98.3 °F (36.8 °C), temperature source Oral, resp. rate 18, height 154.9 cm (61\"), weight 88.5 kg (195 lb), SpO2 94 %, not currently breastfeeding.    Lungs:  Normal effort and normal respiratory rate.  Breath sounds clear to auscultation.    Heart: Normal rate.  Regular rhythm.    Extremities: There is dependent edema.  (Trace pedal to pitting edema bilaterally.)  Neurological: Patient is alert and oriented to person, place and time.    Skin:  Warm and dry.                Vital signs in last 24 hours:  Temp:  [98.3 °F (36.8 °C)-98.7 °F (37.1 °C)] 98.3 °F (36.8 °C)  Heart Rate:  [70-92] 70  Resp:  [16-18] 18  BP: (140-154)/(57-74) 152/72    Intake/Output:    Intake/Output Summary (Last 24 hours) at 2021 1336  Last data filed at 2021 0911  Gross per 24 hour   Intake 210 ml   Output --   Net 210 ml         Results from last 7 days   Lab Units 21  0621  0521  1906   WBC 10*3/mm3 7.29 7.55 7.49   HEMOGLOBIN g/dL 9.3* 9.6* 10.3*   PLATELETS 10*3/mm3 188 192 196     Results from last 7 days   Lab Units 21  0621  1906   SODIUM mmol/L 142 146* 144   POTASSIUM mmol/L 5.5* 5.3* 5.1   CHLORIDE mmol/L 115* 118* 116*   CO2 mmol/L 18.9* " 18.4* 17.0*   BUN mg/dL 51* 51* 51*   CREATININE mg/dL 3.40* 3.71* 3.54*   GLUCOSE mg/dL 70 77 121*   Estimated Creatinine Clearance: 15.6 mL/min (A) (by C-G formula based on SCr of 3.4 mg/dL (H)).  Results from last 7 days   Lab Units 05/08/21  0629 05/07/21  0522 05/06/21  1906   CALCIUM mg/dL 7.9* 7.8* 8.0*   ALBUMIN g/dL 2.80*  --  3.20*   MAGNESIUM mg/dL 1.7  --  1.7   PHOSPHORUS mg/dL 5.4*  --   --      Results from last 7 days   Lab Units 05/08/21  0629 05/06/21  1906   ALBUMIN g/dL 2.80* 3.20*   BILIRUBIN mg/dL  --  0.3   ALK PHOS U/L  --  72   AST (SGOT) U/L  --  13   ALT (SGPT) U/L  --  10       Assessment:    Acute pulmonary edema: Very good response to IV Bumex.  Cardiology input appreciated.    Anemia in CKD (chronic kidney disease): Stable.    DM type 2 with diabetic peripheral neuropathy:    Essential hypertension    Atrial fibrillation: Rate controlled.  Anticoagulated.    Anticoagulated    CKD (chronic kidney disease) stage 5, GFR less than 15 ml/min:  Remained stable with diuresis.    Hypothyroidism (acquired)    Chronic diarrhea: Bilious diarrhea.  Add Questran to her regime.  Heme positive stool secondary to hemorrhoids.  Okay to continue Eliquis.      Plan:  Please see above.  Probable discharge tomorrow.  Discussed with RN.  Discussed with daughter at bedside.    Cleveland Solis MD  5/8/2021  13:36 EDT

## 2021-05-08 NOTE — PROGRESS NOTES
[facility]  Cardiology Progress Note    Patient Identification:  Name: Maribeth Rendon  Age: 70 y.o.  Sex: female  :  1950  MRN: [id number]                 Follow Up / Chief Complaint: Shortness of breath    Interval History:  Shortness of breath has markedly improved     Subjective:  No chest pain, no shortness of breath    Objective:    Past Medical History:  Past Medical History:   Diagnosis Date   • Achilles tendon tear     left    • Anemia    • Anxiety    • Depression    • Diabetes mellitus, type 2 (CMS/[facility])    • ESRD (end stage renal disease) (CMS/[facility])     HAS LEFT FOREARM FISTULA   • GERD (gastroesophageal reflux disease)    • History of MRSA infection 03/15/2016    ABDOMINAL WOUND,   INFECTION CONTROLL NOTIFIED 2017   • History of transfusion    • Hyperlipidemia    • Hypertension    • Hypokalemia    • Hypomagnesemia    • Hypoxic 2016    WHEN SHE HAD PNEUMONIA   • Intertrigo    • Leukocytosis    • Osteoarthritis    • Pneumonia 2016   • Psoriasis    • Psoriatic arthritis (CMS/[facility])    • Seizures (CMS/[facility])     one time   • Sepsis (CMS/[facility]) 2016   • SOB (shortness of breath)    • Stage 4 chronic renal impairment associated with type 2 diabetes mellitus (CMS/[facility])    • Staph infection     HX RIGHT FOOT AT [facility] 2010   • Streptococcal bacteremia    • Stroke (cerebrum) (CMS/[facility])    • Stroke (CMS/[facility])    • Swelling of hand 10/27/2016    LEFT HAND SWELLIMG SINCE LEFT FISTULA SURGERY   • Tremor, essential    • Wears glasses    • Wheelchair dependent     For mobility     Past Surgical History:  Past Surgical History:   Procedure Laterality Date   • ABDOMINAL WALL ABSCESS INCISION AND DRAINAGE     • ARTERIOVENOUS FISTULA/SHUNT SURGERY Left 10/27/2016    Procedure: LT FOREARM FISTULA;  Surgeon: Lorelei Haque Jr., MD;  Location: [facility];  Service:    • ARTERIOVENOUS FISTULA/SHUNT SURGERY Left 2017    Procedure: LT BRACHIAL CEPHALIC WITH FISTULA LIGATION  RADIAL CEPHALIC.;  Surgeon: Gurmeet Carver MD;  Location: [facility] MAIN OR;  Service:    • CARDIAC CATHETERIZATION N/A 2019    Procedure: Left Heart Cath;  Surgeon: Eddie Hodges MD;  Location: [facility] CATH INVASIVE LOCATION;  Service: Cardiology   • CARDIAC CATHETERIZATION N/A 2019    Procedure: Coronary angiography;  Surgeon: Eddie Hodges MD;  Location: [facility] CATH INVASIVE LOCATION;  Service: Cardiology   • CARDIAC SURGERY     • CATARACT EXTRACTION W/ INTRAOCULAR LENS IMPLANT Bilateral 2011   • CORONARY ARTERY BYPASS GRAFT N/A 2019    Procedure: INTRAOP ERNESTO; CORONARY ARTERY BYPASS GRAFTING X 4 WITH LEFT INTERNAL MAMMARY ARTERY GRAFT AND UTILIZING ENDOSCOPICALLY HARVESTED GREATER SAPHENOUS VEIN; PRP;  Surgeon: Gordo Ferreira MD;  Location: [facility] MAIN OR;  Service: Cardiothoracic   • CORONARY ARTERY BYPASS GRAFT     • DILATATION AND CURETTAGE     • EXCISION MASS TRUNK N/A 3/22/2016    Procedure: I&D LOWER ABDOMINAL  WOUND;  Surgeon: Tru Yi MD;  Location: [facility] MAIN OR;  Service:    • FOOT SURGERY Right     REMOVED BONE IN RIGHT GREAT TOE   • HYSTERECTOMY          Social History:   Social History     Tobacco Use   • Smoking status: Former Smoker     Packs/day: 2.00     Years: 26.00     Pack years: 52.00     Types: Cigarettes     Quit date: 10/21/1992     Years since quittin.5   • Smokeless tobacco: Never Used   • Tobacco comment: quit    Substance Use Topics   • Alcohol use: Not Currently     Alcohol/week: 0.0 standard drinks      Family History:  Family History   Problem Relation Age of Onset   • Heart attack Mother    • Heart disease Mother    • Kidney disease Mother    • Heart attack Father    • Heart disease Father    • Hypertension Father    • Stroke Father         ISCHEMIC   • Diabetes Father         TYPE 2   • Kidney disease Brother    • Diabetes Brother         TYPE 1   • Thyroid disease Daughter    • Anxiety disorder Maternal Aunt     • Bipolar disorder Maternal Aunt    • Depression Maternal Aunt    • Lupus Maternal Aunt         LUPUS ANTICOAGLULANT   • Rheum arthritis Maternal Aunt    • Alcohol abuse Maternal Uncle    • Other Maternal Uncle         PULMONARY DISEASE          Allergies:  Allergies   Allergen Reactions   • Prednisone Hallucinations     Scheduled Meds:  amLODIPine, 5 mg, Daily  apixaban, 5 mg, BID  aspirin, 81 mg, Daily  carvedilol, 12.5 mg, BID  gabapentin, 100 mg, Nightly  insulin lispro, 0-9 Units, TID AC  lactobacillus acidophilus, 1 capsule, Daily  levothyroxine, 50 mcg, Daily  losartan, 50 mg, Nightly  montelukast, 10 mg, Nightly  multivitamin, 1 tablet, QAM  topiramate, 100 mg, Daily  triamcinolone, , Daily            INTAKE AND OUTPUT:    Intake/Output Summary (Last 24 hours) at 5/8/2021 1308  Last data filed at 5/8/2021 0911  Gross per 24 hour   Intake 210 ml   Output --   Net 210 ml       Review of Systems:   GI:  Cardiac:  Pulmonary: No shortness of breath    Constitutional:  Temp:  [98.3 °F (36.8 °C)-98.7 °F (37.1 °C)] 98.3 °F (36.8 °C)  Heart Rate:  [70-92] 70  Resp:  [16-18] 18  BP: (140-154)/(57-74) 152/72    Physical Exam:  General:  Appears in no acute distress  Eyes: PERTL,  HEENT:  No JVD. Thyroid not visibly enlarged. No mucosal pallor or cyanosis  Respiratory: Respirations regular and unlabored at rest. BBS with good air entry in all fields. No crackles, rubs or wheezes auscultated  Cardiovascular: S1S2 Regular rate and rhythm. No murmur, rub or gallop. No carotid bruits. DP/PT pulses     . No pretibial pitting edema  Gastrointestinal: Abdomen soft, flat, non tender. Bowel sounds present. No hepatosplenomegaly. No ascites  Musculoskeletal: PASCUAL x4. No abnormal movements  Extremities: No digital clubbing or cyanosis  Skin: Color pink. Skin warm and dry to touch. No rashes    Neuro: AAO x3 CN II-XII grossly intact  Psych: Mood and affect normal, pleasant and cooperative          Cardiographics  Telemetry:  "    ECG:     Echocardiogram:     Lab Review   Results from last 7 days   Lab Units 05/08/21  0629 05/06/21  1906   TROPONIN T ng/mL 0.091* 0.072*     Results from last 7 days   Lab Units 05/08/21  0629   MAGNESIUM mg/dL 1.7     Results from last 7 days   Lab Units 05/08/21  0629   SODIUM mmol/L 142   POTASSIUM mmol/L 5.5*   BUN mg/dL 51*   CREATININE mg/dL 3.40*   CALCIUM mg/dL 7.9*     @[id number]@  Results from last 7 days   Lab Units 05/08/21  0629 05/07/21  0522 05/06/21  1906   WBC 10*3/mm3 7.29 7.55 7.49   HEMOGLOBIN g/dL 9.3* 9.6* 10.3*   HEMATOCRIT % 30.3* 30.5* 34.3   PLATELETS 10*3/mm3 188 192 196             Assessment:  Acute pulmonary edema  Elevated troponin in the setting of CKD  anemia in CKD  diabetes mellitus type 2  Hypertension   CAD with history of CABG  Anticoagulated with Eliquis   Hypothyroidism      Plan:    Reduce Bumex to 2 mg IV every 12 patient seems to be feeling much better possible discharge tomorrow echo shows normal LV function      )5/8/2021  MD JOHNATHAN Mena Dragon/Transcription:   \"Dictated utilizing Dragon dictation\".     "

## 2021-05-08 NOTE — PLAN OF CARE
Goal Outcome Evaluation:  Plan of Care Reviewed With: daughter, patient (daughter (felicia) at bedside earlier in shift)     Outcome Summary: pt A/Ox4; able to make needs known to staff; pt assist x2 toileting to bedside commode; unsteady gait noted and generealized weakness; pt educated on limiting sodium intake, performing ROM to extremities and ambulating as tolerated;  c/o pain to left leg rated 5/10; given prn tylenol which has been effective; Vital signs stable; Room air; call light within reach; will cont to follow current plan of care

## 2021-05-08 NOTE — PROGRESS NOTES
" LOS: 1 day   Patient Care Team:  Robby Chaney MD as PCP - General  Robby Chaney MD as PCP - Family Medicine  Eddie Hodegs MD as Consulting Physician (Cardiology)        Subjective   Doing well.  She says that her appetite has been improving.  Denies any nausea and vomiting.  Shortness of breath has improved.  Notes that her lower extremity edema has improved.    Objective     Vital Sign Min/Max for last 24 hours  Temp  Min: 98.3 °F (36.8 °C)  Max: 98.7 °F (37.1 °C)   BP  Min: 140/57  Max: 154/74   Pulse  Min: 70  Max: 92   Resp  Min: 16  Max: 18   SpO2  Min: 94 %  Max: 96 %   No data recorded   Weight  Min: 88.5 kg (195 lb)  Max: 88.5 kg (195 lb 1.7 oz)     Flowsheet Rows      First Filed Value   Admission Height  154.9 cm (61\") Documented at 05/07/2021 0012   Admission Weight  90.6 kg (199 lb 11.8 oz) Documented at 05/06/2021 2146          No intake/output data recorded.  I/O last 3 completed shifts:  In: 780 [P.O.:780]  Out: 225 [Urine:225]    Objective  General Appearance: alert, oriented x 3, no acute distress, chronically ill, morbidly obese  Skin: warm and dry  HEENT: pupils round and reactive to light, oral mucosa normal, nonicteric sclera  Neck: Increased JVD, trachea midline, no carotid bruit, no cervical or supraclavicular lymphadenopathy  Lungs: Easy effort slightly labored, with scattered wheezes and rhonchi  Heart: RRR, normal S1 and S2, positive S3, no rub  Abdomen: soft, nontender, normoactive bowels  : no palpable bladder,  Extremities: 2+ edema, cyanosis or clubbing, functional AV fistula in the left arm  Neuro: normal speech and mental status         WBC WBC   Date Value Ref Range Status   05/08/2021 7.29 3.40 - 10.80 10*3/mm3 Final   05/07/2021 7.55 3.40 - 10.80 10*3/mm3 Final   05/06/2021 7.49 3.40 - 10.80 10*3/mm3 Final      HGB Hemoglobin   Date Value Ref Range Status   05/08/2021 9.3 (L) 12.0 - 15.9 g/dL Final   05/07/2021 9.6 (L) 12.0 - 15.9 g/dL Final "   05/06/2021 10.3 (L) 12.0 - 15.9 g/dL Final      HCT Hematocrit   Date Value Ref Range Status   05/08/2021 30.3 (L) 34.0 - 46.6 % Final   05/07/2021 30.5 (L) 34.0 - 46.6 % Final   05/06/2021 34.3 34.0 - 46.6 % Final      Platlets No results found for: LABPLAT   MCV MCV   Date Value Ref Range Status   05/08/2021 95.6 79.0 - 97.0 fL Final   05/07/2021 94.7 79.0 - 97.0 fL Final   05/06/2021 96.9 79.0 - 97.0 fL Final          Sodium Sodium   Date Value Ref Range Status   05/08/2021 142 136 - 145 mmol/L Final   05/07/2021 146 (H) 136 - 145 mmol/L Final   05/06/2021 144 136 - 145 mmol/L Final      Potassium Potassium   Date Value Ref Range Status   05/08/2021 5.5 (H) 3.5 - 5.2 mmol/L Final   05/07/2021 5.3 (H) 3.5 - 5.2 mmol/L Final   05/06/2021 5.1 3.5 - 5.2 mmol/L Final      Chloride Chloride   Date Value Ref Range Status   05/08/2021 115 (H) 98 - 107 mmol/L Final   05/07/2021 118 (H) 98 - 107 mmol/L Final   05/06/2021 116 (H) 98 - 107 mmol/L Final      CO2 CO2   Date Value Ref Range Status   05/08/2021 18.9 (L) 22.0 - 29.0 mmol/L Final   05/07/2021 18.4 (L) 22.0 - 29.0 mmol/L Final   05/06/2021 17.0 (L) 22.0 - 29.0 mmol/L Final      BUN BUN   Date Value Ref Range Status   05/08/2021 51 (H) 8 - 23 mg/dL Final   05/07/2021 51 (H) 8 - 23 mg/dL Final   05/06/2021 51 (H) 8 - 23 mg/dL Final      Creatinine Creatinine   Date Value Ref Range Status   05/08/2021 3.40 (H) 0.57 - 1.00 mg/dL Final   05/07/2021 3.71 (H) 0.57 - 1.00 mg/dL Final   05/06/2021 3.54 (H) 0.57 - 1.00 mg/dL Final      Calcium Calcium   Date Value Ref Range Status   05/08/2021 7.9 (L) 8.6 - 10.5 mg/dL Final   05/07/2021 7.8 (L) 8.6 - 10.5 mg/dL Final   05/06/2021 8.0 (L) 8.6 - 10.5 mg/dL Final      PO4 No results found for: CAPO4   Albumin Albumin   Date Value Ref Range Status   05/08/2021 2.80 (L) 3.50 - 5.20 g/dL Final   05/06/2021 3.20 (L) 3.50 - 5.20 g/dL Final      Magnesium Magnesium   Date Value Ref Range Status   05/08/2021 1.7 1.6 - 2.4 mg/dL  Final   05/06/2021 1.7 1.6 - 2.4 mg/dL Final      Uric Acid Uric Acid   Date Value Ref Range Status   05/08/2021 8.4 (H) 2.4 - 5.7 mg/dL Final        Medication Review:     Current Facility-Administered Medications:   •  acetaminophen (TYLENOL) tablet 650 mg, 650 mg, Oral, Q4H PRN, Yeny Lyles APRN, 650 mg at 05/08/21 0132  •  aluminum-magnesium hydroxide-simethicone (MAALOX MAX) 400-400-40 MG/5ML suspension 15 mL, 15 mL, Oral, Q6H PRN, Yeny Lyles APRN  •  amLODIPine (NORVASC) tablet 5 mg, 5 mg, Oral, Daily, Vane Cerda APRN, 5 mg at 05/08/21 0911  •  apixaban (ELIQUIS) tablet 5 mg, 5 mg, Oral, BID, Vane Cerda APRN, 5 mg at 05/08/21 0912  •  aspirin EC tablet 81 mg, 81 mg, Oral, Daily, Vane Cerda APRN, 81 mg at 05/08/21 0911  •  bumetanide (BUMEX) injection 2 mg, 2 mg, Intravenous, Q12H, Eddie Hodges MD, 2 mg at 05/08/21 1457  •  carvedilol (COREG) tablet 12.5 mg, 12.5 mg, Oral, BID, Vane Cerda APRN, 12.5 mg at 05/08/21 0911  •  cholestyramine light packet 4 g, 1 packet, Oral, Daily, Cleveland Solis MD, 4 g at 05/08/21 1457  •  dextrose (D50W) 25 g/ 50mL Intravenous Solution 25 g, 25 g, Intravenous, Q15 Min PRN, Vane Cerda APRN  •  dextrose (GLUTOSE) oral gel 15 g, 15 g, Oral, Q15 Min PRN, Vane Cerda APRN  •  gabapentin (NEURONTIN) capsule 100 mg, 100 mg, Oral, Nightly, José Antonio Watson MD, 100 mg at 05/07/21 2023  •  glucagon (human recombinant) (GLUCAGEN DIAGNOSTIC) injection 1 mg, 1 mg, Subcutaneous, Q15 Min PRN, Vane Cerda APRN  •  insulin lispro (ADMELOG) injection 0-9 Units, 0-9 Units, Subcutaneous, TID AC, Vane Cerda APRN  •  lactobacillus acidophilus (RISAQUAD) capsule 1 capsule, 1 capsule, Oral, Daily, Vane Cerda APRN, 1 capsule at 05/08/21 0911  •  levothyroxine (SYNTHROID, LEVOTHROID) tablet 50 mcg, 50 mcg, Oral, Daily, Vane Cerda APRN, 50 mcg at  05/08/21 0911  •  loperamide (IMODIUM) capsule 2 mg, 2 mg, Oral, 4x Daily PRN, Vane Cerda, IRENA  •  losartan (COZAAR) tablet 50 mg, 50 mg, Oral, Nightly, Vane Cerda, APRN, 50 mg at 05/07/21 2137  •  montelukast (SINGULAIR) tablet 10 mg, 10 mg, Oral, Nightly, Vane Cerda, APRN, 10 mg at 05/07/21 2023  •  multivitamin (THERAGRAN) tablet 1 tablet, 1 tablet, Oral, QAM, Vane Cerda, APRN, 1 tablet at 05/08/21 0701  •  nitroglycerin (NITROSTAT) SL tablet 0.4 mg, 0.4 mg, Sublingual, Q5 Min PRN, Yeny Lyles APRN  •  ondansetron (ZOFRAN) tablet 4 mg, 4 mg, Oral, Q6H PRN **OR** ondansetron (ZOFRAN) injection 4 mg, 4 mg, Intravenous, Q6H PRN, Yeny Lyles APRN  •  sodium chloride 0.9 % flush 10 mL, 10 mL, Intravenous, PRN, Emergency, Triage Protocol, MD  •  sodium chloride 0.9 % flush 10 mL, 10 mL, Intravenous, PRN, Yeny Lyles, IRENA  •  topiramate (TOPAMAX) tablet 100 mg, 100 mg, Oral, Daily, Vane Cerda APRN, 100 mg at 05/08/21 0911  •  triamcinolone (KENALOG) 0.1 % ointment, , Topical, Daily, Cleveland Solis MD, 1 application at 05/08/21 0912      Assessment/Plan       Acute pulmonary edema (CMS/HCC)    Anemia in CKD (chronic kidney disease)    DM type 2 with diabetic peripheral neuropathy (CMS/HCC)    Essential hypertension    Atrial fibrillation (CMS/HCC)    Anticoagulated    CKD (chronic kidney disease) stage 5, GFR less than 15 ml/min (CMS/HCC)    Hypothyroidism (acquired)      Assessment & Plan  1.  Chronic kidney disease stage V second diabetic and hypertensive glomerulosclerosis near prior baseline, she may have mild uremic symptoms  2.  Significant fluid overload with congestive heart failure  3.  Coronary artery disease  4.  Diabetes mellitus type 2  5.  Hypertension with acceptable control  6.  Anemia of chronic kidney disease, hemoglobin 9.6.  7.  History of paroxysmal atrial fibrillation, currently in normal sinus  rhythm        Plan:  1.  Will try to diurese aggressively if unable to achieve euvolemia then will consider initiating dialysis. We will add metolazone to her diuretic regimen  2.  Increase gabapentin to 100 mg daily  3.  Restrict sodium intake to 2 g daily  4.  Surveillance labs will reevaluate response to diuretics in the next 24 hours and decide if dialysis needs to be initiated           Thank you for asking me to see this patient in consultation              Wild Faust MD  05/08/21  19:09 EDT

## 2021-05-09 ENCOUNTER — READMISSION MANAGEMENT (OUTPATIENT)
Dept: CALL CENTER | Facility: HOSPITAL | Age: 71
End: 2021-05-09

## 2021-05-09 VITALS
SYSTOLIC BLOOD PRESSURE: 149 MMHG | OXYGEN SATURATION: 96 % | WEIGHT: 197.31 LBS | RESPIRATION RATE: 18 BRPM | DIASTOLIC BLOOD PRESSURE: 68 MMHG | BODY MASS INDEX: 37.25 KG/M2 | HEIGHT: 61 IN | TEMPERATURE: 98.3 F | HEART RATE: 63 BPM

## 2021-05-09 LAB
ANION GAP SERPL CALCULATED.3IONS-SCNC: 14.9 MMOL/L (ref 5–15)
BASOPHILS # BLD AUTO: 0.03 10*3/MM3 (ref 0–0.2)
BASOPHILS NFR BLD AUTO: 0.4 % (ref 0–1.5)
BUN SERPL-MCNC: 54 MG/DL (ref 8–23)
BUN/CREAT SERPL: 16.1 (ref 7–25)
CALCIUM SPEC-SCNC: 7.9 MG/DL (ref 8.6–10.5)
CHLORIDE SERPL-SCNC: 112 MMOL/L (ref 98–107)
CO2 SERPL-SCNC: 15.1 MMOL/L (ref 22–29)
CREAT SERPL-MCNC: 3.36 MG/DL (ref 0.57–1)
DEPRECATED RDW RBC AUTO: 52.1 FL (ref 37–54)
EOSINOPHIL # BLD AUTO: 0.35 10*3/MM3 (ref 0–0.4)
EOSINOPHIL NFR BLD AUTO: 4.7 % (ref 0.3–6.2)
ERYTHROCYTE [DISTWIDTH] IN BLOOD BY AUTOMATED COUNT: 15.2 % (ref 12.3–15.4)
GFR SERPL CREATININE-BSD FRML MDRD: 14 ML/MIN/1.73
GFR SERPL CREATININE-BSD FRML MDRD: ABNORMAL ML/MIN/{1.73_M2}
GLUCOSE BLDC GLUCOMTR-MCNC: 123 MG/DL (ref 70–130)
GLUCOSE BLDC GLUCOMTR-MCNC: 155 MG/DL (ref 70–130)
GLUCOSE BLDC GLUCOMTR-MCNC: 74 MG/DL (ref 70–130)
GLUCOSE SERPL-MCNC: 65 MG/DL (ref 65–99)
HCT VFR BLD AUTO: 30.9 % (ref 34–46.6)
HGB BLD-MCNC: 9.6 G/DL (ref 12–15.9)
IMM GRANULOCYTES # BLD AUTO: 0.04 10*3/MM3 (ref 0–0.05)
IMM GRANULOCYTES NFR BLD AUTO: 0.5 % (ref 0–0.5)
LYMPHOCYTES # BLD AUTO: 0.91 10*3/MM3 (ref 0.7–3.1)
LYMPHOCYTES NFR BLD AUTO: 12.2 % (ref 19.6–45.3)
MCH RBC QN AUTO: 29.4 PG (ref 26.6–33)
MCHC RBC AUTO-ENTMCNC: 31.1 G/DL (ref 31.5–35.7)
MCV RBC AUTO: 94.8 FL (ref 79–97)
MONOCYTES # BLD AUTO: 0.78 10*3/MM3 (ref 0.1–0.9)
MONOCYTES NFR BLD AUTO: 10.5 % (ref 5–12)
NEUTROPHILS NFR BLD AUTO: 5.33 10*3/MM3 (ref 1.7–7)
NEUTROPHILS NFR BLD AUTO: 71.7 % (ref 42.7–76)
NRBC BLD AUTO-RTO: 0.1 /100 WBC (ref 0–0.2)
PLATELET # BLD AUTO: 179 10*3/MM3 (ref 140–450)
PMV BLD AUTO: 9.6 FL (ref 6–12)
POTASSIUM SERPL-SCNC: 4.9 MMOL/L (ref 3.5–5.2)
QT INTERVAL: 365 MS
RBC # BLD AUTO: 3.26 10*6/MM3 (ref 3.77–5.28)
SODIUM SERPL-SCNC: 142 MMOL/L (ref 136–145)
WBC # BLD AUTO: 7.44 10*3/MM3 (ref 3.4–10.8)

## 2021-05-09 PROCEDURE — 85025 COMPLETE CBC W/AUTO DIFF WBC: CPT | Performed by: HOSPITALIST

## 2021-05-09 PROCEDURE — 97116 GAIT TRAINING THERAPY: CPT

## 2021-05-09 PROCEDURE — 99232 SBSQ HOSP IP/OBS MODERATE 35: CPT | Performed by: NURSE PRACTITIONER

## 2021-05-09 PROCEDURE — 82962 GLUCOSE BLOOD TEST: CPT

## 2021-05-09 PROCEDURE — 80048 BASIC METABOLIC PNL TOTAL CA: CPT | Performed by: INTERNAL MEDICINE

## 2021-05-09 RX ORDER — DIPHENOXYLATE HYDROCHLORIDE AND ATROPINE SULFATE 2.5; .025 MG/1; MG/1
1 TABLET ORAL DAILY
Status: DISCONTINUED | OUTPATIENT
Start: 2021-05-09 | End: 2021-05-09 | Stop reason: HOSPADM

## 2021-05-09 RX ORDER — BUMETANIDE 2 MG/1
2 TABLET ORAL 2 TIMES DAILY
Qty: 60 TABLET | Refills: 0 | Status: SHIPPED | OUTPATIENT
Start: 2021-05-09 | End: 2021-07-08 | Stop reason: HOSPADM

## 2021-05-09 RX ORDER — GABAPENTIN 100 MG/1
100 CAPSULE ORAL NIGHTLY
Qty: 14 CAPSULE | Refills: 0 | Status: SHIPPED | OUTPATIENT
Start: 2021-05-09 | End: 2021-05-19

## 2021-05-09 RX ADMIN — CHOLESTYRAMINE 4 G: 4 POWDER, FOR SUSPENSION ORAL at 08:40

## 2021-05-09 RX ADMIN — Medication 1 CAPSULE: at 09:38

## 2021-05-09 RX ADMIN — ASPIRIN 81 MG: 81 TABLET, COATED ORAL at 09:38

## 2021-05-09 RX ADMIN — TRIAMCINOLONE ACETONIDE 1 APPLICATION: 1 OINTMENT TOPICAL at 09:39

## 2021-05-09 RX ADMIN — AMLODIPINE BESYLATE 5 MG: 5 TABLET ORAL at 09:38

## 2021-05-09 RX ADMIN — APIXABAN 5 MG: 5 TABLET, FILM COATED ORAL at 09:38

## 2021-05-09 RX ADMIN — TOPIRAMATE 100 MG: 100 TABLET, FILM COATED ORAL at 09:38

## 2021-05-09 RX ADMIN — CARVEDILOL 12.5 MG: 12.5 TABLET, FILM COATED ORAL at 09:38

## 2021-05-09 RX ADMIN — Medication 1 TABLET: at 09:38

## 2021-05-09 RX ADMIN — LEVOTHYROXINE SODIUM 50 MCG: 0.05 TABLET ORAL at 09:38

## 2021-05-09 RX ADMIN — BUMETANIDE 2 MG: 0.25 INJECTION INTRAMUSCULAR; INTRAVENOUS at 02:26

## 2021-05-09 NOTE — THERAPY TREATMENT NOTE
Patient Name: Maribeth Rendon  : 1950    MRN: [id number]                              Today's Date: 2021       Admit Date: 2021    Visit Dx:     ICD-10-CM ICD-9-CM   1. Acute pulmonary edema (CMS/[facility])  J81.0 518.4   2. Dyspnea on exertion  R06.00 786.09   3. Peripheral edema  R60.9 782.3   4. Chronic kidney disease, unspecified CKD stage  N18.9 585.9   5. Elevated brain natriuretic peptide (BNP) level  R79.89 790.99   6. History of diabetes mellitus, type II  Z86.39 V12.29   7. History of hypertension  Z86.79 V12.59   8. DM type 2 with diabetic peripheral neuropathy (CMS/[facility])  E11.42 250.60     357.2   9. Cervical radiculopathy  M54.12 723.4   10. CKD (chronic kidney disease) stage 5, GFR less than 15 ml/min (CMS/[facility])  N18.5 585.5   11. Longstanding persistent atrial fibrillation (CMS/[facility])  I48.11 427.31   12. Diabetes mellitus due to underlying condition with diabetic autonomic neuropathy, without long-term current use of insulin (CMS/[facility])  E08.43 249.60     337.1     Patient Active Problem List   Diagnosis   • Menstrual disorder   • Atopic rhinitis   • Anemia in CKD (chronic kidney disease)   • Spasm of cervical paraspinous muscle   • Cervical radiculopathy   • Chronic kidney disease, stage IV (severe) (CMS/[facility])   • Depression   • DM type 2 with diabetic peripheral neuropathy (CMS/[facility])   • Essential hypertension   • Duplay's periarthritis syndrome   • Gastroesophageal reflux disease   • Hyperlipidemia   • Hypertension   • Arthritis   • Seizure disorder (CMS/[facility])   • Phlebitis   • Type 2 diabetes mellitus (CMS/[facility])   • Vitamin D deficiency   • Chest pain   • Abscess   • Generalized muscle weakness   • Abdominal wall cellulitis   • HTN (hypertension)   • CKD (chronic kidney disease) stage 4, GFR 15-29 ml/min (CMS/[facility])   • Diabetes mellitus with peripheral autonomic neuropathy (CMS/[facility])   • Hyponatremia   • Hypercalcemia   • Dehydration   • DACIA (acute kidney injury) (CMS/[facility])   • Abdominal wall abscess    • Cellulitis of toe of left foot   • Partial Achilles tendon tear, left, initial encounter   • Arthritis of foot   • Essential tremor   • Primary Parkinsonism (CMS/[facility])   • Abnormal cardiac function test   • 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 pectoris (CMS/[facility])   • Atrial fibrillation (CMS/[facility])   • Anticoagulated   • CKD (chronic kidney disease) stage 5, GFR less than 15 ml/min (CMS/[facility])   • Hypoglycemia   • Low vitamin B12 level   • Hemorrhagic stroke (CMS/[facility])   • S/P CABG (coronary artery bypass graft)   • Acute pulmonary edema (CMS/[facility])   • Hypothyroidism (acquired)     Past Medical History:   Diagnosis Date   • Achilles tendon tear     left    • Anemia    • Anxiety    • Depression    • Diabetes mellitus, type 2 (CMS/[facility])    • ESRD (end stage renal disease) (CMS/[facility])     HAS LEFT FOREARM FISTULA   • GERD (gastroesophageal reflux disease)    • History of MRSA infection 03/15/2016    ABDOMINAL WOUND,   INFECTION CONTROLL NOTIFIED 2-6-2017   • History of transfusion    • Hyperlipidemia    • Hypertension    • Hypokalemia    • Hypomagnesemia    • Hypoxic 01/2016    WHEN SHE HAD PNEUMONIA   • Intertrigo    • Leukocytosis    • Osteoarthritis    • Pneumonia 01/2016   • Psoriasis    • Psoriatic arthritis (CMS/[facility])    • Seizures (CMS/[facility])     one time   • Sepsis (CMS/[facility]) 01/2016   • SOB (shortness of breath)    • Stage 4 chronic renal impairment associated with type 2 diabetes mellitus (CMS/[facility])    • Staph infection     HX RIGHT FOOT AT [facility] 01/09/2010   • Streptococcal bacteremia    • Stroke (cerebrum) (CMS/[facility])    • Stroke (CMS/[facility])    • Swelling of hand 10/27/2016    LEFT HAND SWELLIMG SINCE LEFT FISTULA SURGERY   • Tremor, essential    • Wears glasses    • Wheelchair dependent     For mobility     Past Surgical History:   Procedure Laterality Date   • ABDOMINAL WALL ABSCESS INCISION AND DRAINAGE  2016   • ARTERIOVENOUS FISTULA/SHUNT SURGERY Left 10/27/2016    Procedure: LT FOREARM  FISTULA;  Surgeon: Lorelei Haque Jr., MD;  Location: [facility] OR;  Service:    • ARTERIOVENOUS FISTULA/SHUNT SURGERY Left 2/16/2017    Procedure: LT BRACHIAL CEPHALIC WITH FISTULA LIGATION RADIAL CEPHALIC.;  Surgeon: Gurmeet Carver MD;  Location: [facility] MAIN OR;  Service:    • CARDIAC CATHETERIZATION N/A 7/26/2019    Procedure: Left Heart Cath;  Surgeon: Eddie Hodges MD;  Location: [facility] CATH INVASIVE LOCATION;  Service: Cardiology   • CARDIAC CATHETERIZATION N/A 7/26/2019    Procedure: Coronary angiography;  Surgeon: Eddie Hodges MD;  Location: [facility] CATH INVASIVE LOCATION;  Service: Cardiology   • CARDIAC SURGERY     • CATARACT EXTRACTION W/ INTRAOCULAR LENS IMPLANT Bilateral 2011   • CORONARY ARTERY BYPASS GRAFT N/A 7/29/2019    Procedure: INTRAOP ERNESTO; CORONARY ARTERY BYPASS GRAFTING X 4 WITH LEFT INTERNAL MAMMARY ARTERY GRAFT AND UTILIZING ENDOSCOPICALLY HARVESTED GREATER SAPHENOUS VEIN; PRP;  Surgeon: Gordo Ferreira MD;  Location: [facility] OR;  Service: Cardiothoracic   • CORONARY ARTERY BYPASS GRAFT     • DILATATION AND CURETTAGE  1991   • EXCISION MASS TRUNK N/A 3/22/2016    Procedure: I&D LOWER ABDOMINAL  WOUND;  Surgeon: Tru Yi MD;  Location: [facility] OR;  Service:    • FOOT SURGERY Right 2010    REMOVED BONE IN RIGHT GREAT TOE   • HYSTERECTOMY  1992     General Information     Row Name 05/09/21 1110          Physical Therapy Time and Intention    Document Type  therapy note (daily note)  -EB     Mode of Treatment  physical therapy;individual therapy  -EB     Row Name 05/09/21 1110          General Information    Existing Precautions/Restrictions  fall  -EB     Row Name 05/09/21 1110          Cognition    Orientation Status (Cognition)  oriented x 3  -EB     Row Name 05/09/21 1110          Safety Issues, Functional Mobility    Impairments Affecting Function (Mobility)  endurance/activity tolerance;strength;range of motion (ROM)  -EB       User Key   (r) = Recorded By, (t) = Taken By, (c) = Cosigned By    Initials Name Provider Type    Marley Ayala PTA Physical Therapy Assistant        Mobility     Row Name 05/09/21 1111          Bed Mobility    Supine-Sit [place] (Bed Mobility)  contact guard;nonverbal cues (demo/gesture);verbal cues  -EB     Sit-Supine [place] (Bed Mobility)  not tested  -EB     Assistive Device (Bed Mobility)  bed rails;head of bed elevated  -EB     Row Name 05/09/21 1111          Sit-Stand Transfer    Sit-Stand [place] (Transfers)  minimum assist (75% patient effort);nonverbal cues (demo/gesture);verbal cues  -EB     Assistive Device (Sit-Stand Transfers)  walker, front-wheeled  -EB     Row Name 05/09/21 1111          Gait/Stairs (Locomotion)    [place] Level (Gait)  contact guard;2 person assist;minimum assist (75% patient effort)  -EB     Assistive Device (Gait)  walker, front-wheeled  -EB     Distance in Feet (Gait)  50  -EB     Deviations/Abnormal Patterns (Gait)  marlene decreased;stride length decreased;gait speed decreased  -EB     Bilateral Gait Deviations  forward flexed posture;heel strike decreased  -EB     Left Sided Gait Deviations  knee buckling, left side  -EB     Comment (Gait/Stairs)  slight left knee buckling when fatigued. Pt self correct forward flexed posture.  -EB       User Key  (r) = Recorded By, (t) = Taken By, (c) = Cosigned By    Initials Name Provider Type    Marley Ayala PTA Physical Therapy Assistant        Obj/Interventions     Row Name 05/09/21 1112          Motor Skills    Therapeutic Exercise  -- BLE: QS, HS, hip abd/add (X5)  -EB       User Key  (r) = Recorded By, (t) = Taken By, (c) = Cosigned By    Initials Name Provider Type    Marley Ayala PTA Physical Therapy Assistant        Goals/Plan    No documentation.       Clinical Impression     Row Name 05/09/21 1112          Plan of Care Review    Plan of Care Reviewed With  patient  -EB     Progress  improving  -EB      Outcome Summary  Pt tolerated treatment with no complaints. Pt is progressing well with mobility needing less assistance. Pt needed CGA with supine to sit and Tami with sit<->stand transfers. Pt ambulated 50ft with rwx, CGAX2/Tami. Pt started to fatigue and pt had slight left knee buckle. Pt self correcting on upright posture during ambulation. Will continue to progress pt as tolerated.  -EB     Row Name 05/09/21 1112          Positioning and Restraints    Pre-Treatment Position  in bed  -EB     Post Treatment Position  chair  -EB     In Chair  reclined;call light within reach;encouraged to call for assist  -EB       User Key  (r) = Recorded By, (t) = Taken By, (c) = Cosigned By    Initials Name Provider Type    Marley Ayala PTA Physical Therapy Assistant        Outcome Measures     Row Name 05/09/21 1114          How much help from another person do you currently need...    Turning from your back to your side while in flat bed without using bedrails?  3  -EB     Moving from lying on back to sitting on the side of a flat bed without bedrails?  3  -EB     Moving to and from a bed to a chair (including a wheelchair)?  3  -EB     Standing up from a chair using your arms (e.g., wheelchair, bedside chair)?  3  -EB     Climbing 3-5 steps with a railing?  2  -EB     To walk in hospital room?  3  -EB     AM-PAC 6 Clicks Score (PT)  17  -EB       User Key  (r) = Recorded By, (t) = Taken By, (c) = Cosigned By    Initials Name Provider Type    Marley Ayala PTA Physical Therapy Assistant        Physical Therapy Education                 Title: PT OT SLP Therapies (Done)     Topic: Physical Therapy (Done)     Point: Mobility training (Done)     Learning Progress Summary           Patient Acceptance, E,D, VU,DU by KEYON at 5/9/2021 1115    Acceptance, E,TB,D, VU,NR by QUAN at 5/7/2021 1404                   Point: Home exercise program (Done)     Learning Progress Summary           Patient Acceptance, E,D, VU,DU by KEYON at  5/9/2021 1115                   Point: Body mechanics (Done)     Learning Progress Summary           Patient Acceptance, E,D, VU,DU by EB at 5/9/2021 1115                   Point: Precautions (Done)     Learning Progress Summary           Patient Acceptance, E,D, VU,DU by  at 5/9/2021 1115                               User Key     Initials Effective Dates Name Provider Type Discipline     04/03/18 -  Glendy Sinclair, PT Physical Therapist PT     03/10/21 -  Marley Burr PTA Physical Therapy Assistant PT              PT Recommendation and Plan     Plan of Care Reviewed With: patient  Progress: improving  Outcome Summary: Pt tolerated treatment with no complaints. Pt is progressing well with mobility needing less assistance. Pt needed CGA with supine to sit and Tami with sit<->stand transfers. Pt ambulated 50ft with rwx, CGAX2/Tami. Pt started to fatigue and pt had slight left knee buckle. Pt self correcting on upright posture during ambulation. Will continue to progress pt as tolerated.     Time Calculation:   PT Charges     Row Name 05/09/21 1110             Time Calculation    Start Time  0916  -EB      Stop Time  0935  -      Time Calculation (min)  19 min  -      PT Received On  05/09/21  -      PT - Next Appointment  05/10/21  -         Time Calculation- PT    Total Timed Code Minutes- PT  19 minute(s)  -        User Key  (r) = Recorded By, (t) = Taken By, (c) = Cosigned By    Initials Name Provider Type    EB Marley Burr PTA Physical Therapy Assistant        Therapy Charges for Today     Code Description Service Date Service Provider Modifiers Qty    [id number] HC GAIT TRAINING EA 15 MIN 5/9/2021 Marley Burr PTA GP 1          PT G-Codes  Outcome Measure Options: AM-PAC 6 Clicks Basic Mobility (PT)  AM-PAC 6 Clicks Score (PT): 17    Marley Burr PTA  5/9/2021

## 2021-05-09 NOTE — PLAN OF CARE
Goal Outcome Evaluation:  Plan of Care Reviewed With: patient  Progress: improving  Outcome Summary: Pt tolerated treatment with no complaints. Pt is progressing well with mobility needing less assistance. Pt needed CGA with supine to sit and Tami with sit<->stand transfers. Pt ambulated 50ft with rwx, CGAX2/Tami. Pt started to fatigue and pt had slight left knee buckle. Pt self correcting on upright posture during ambulation. Will continue to progress pt as tolerated.    ..Patient was wearing a face mask during this therapy encounter. Therapist used appropriate personal protective equipment including eye protection, mask, and gloves.  Mask used was standard procedure mask. Appropriate PPE was worn during the entire therapy session. Hand hygiene was completed before and after therapy session. Patient is not in enhanced droplet precautions.

## 2021-05-09 NOTE — DISCHARGE SUMMARY
Patient Name: Maribeth Rendon  : 1950  MRN: [id number]    Date of Admission: 2021  Date of Discharge:  2021  Primary Care Physician: Robby Chaney MD      Chief Complaint:   Weakness - Generalized and Fatigue      Discharge Diagnoses     Active Hospital Problems    Diagnosis  POA   • **Acute pulmonary edema (CMS/HCC) [J81.0]  Yes   • Hypothyroidism (acquired) [E03.9]  Yes   • CKD (chronic kidney disease) stage 5, GFR less than 15 ml/min (CMS/HCC) [N18.5]  Yes   • Anticoagulated [Z79.01]  Not Applicable   • Atrial fibrillation (CMS/[facility]) [I48.91]  Yes   • Anemia in CKD (chronic kidney disease) [N18.9, D63.1]  Yes   • DM type 2 with diabetic peripheral neuropathy (CMS/[facility]) [E11.42]  Yes   • Essential hypertension [I10]  Yes      Resolved Hospital Problems   No resolved problems to display.        Hospital Course     Ms. Rendon is a 70 y.o. female with a history of heart failure and chronic kidney disease stage V who presented to [facility] initially complaining of edema and shortness of breath.  Please see the admitting history and physical for further details.  She was found to have volume overload and was admitted to the hospital for further evaluation and treatment.  She was admitted to the hospital and felt to have some decompensated diastolic heart failure related to volume overload for underlying chronic kidney disease and diuretic resistance.  She was started on IV diuretics and metolazone was added and she diuresed about 3 L here in the hospital with improvement in her symptoms.  She had an exacerbation of her chronic diarrhea symptoms and did have some bleeding from her hemorrhoids.  This is stabilized.  She was given a couple doses of Questran and her stools have improved and resolved.  Nephrology and cardiology both assisted with management throughout the hospitalization and both of signed off at the time of discharge and cleared her for discharge home.  It has been  arranged that she have home health at discharge she should follow-up with a cardiologist within 1 week and follow-up with the nephrologist within a month.    Day of Discharge     Subjective:  She is feeling much better and wants to go home denies new issues or complaints    Physical Exam:  Temp:  [98.3 °F (36.8 °C)-98.4 °F (36.9 °C)] 98.3 °F (36.8 °C)  Heart Rate:  [62-70] 63  Resp:  [16-18] 18  BP: (139-152)/(62-72) 149/68  Body mass index is 37.28 kg/m².  Physical Exam  Vitals and nursing note reviewed.   Constitutional:       Appearance: Normal appearance.   Cardiovascular:      Rate and Rhythm: Normal rate and regular rhythm.   Musculoskeletal:      Right lower leg: Edema present.      Left lower leg: Edema present.   Neurological:      General: No focal deficit present.      Mental Status: She is alert and oriented to person, place, and time.   Psychiatric:         Mood and Affect: Mood normal.         Behavior: Behavior normal.         Consultants     Consult Orders (all) (From admission, onward)     Start     Ordered    05/07/21 0855  Inpatient Nephrology Consult  Once     Specialty:  Nephrology  Provider:  Vinny Zaragoza MD    05/07/21 0854    05/07/21 0055  Inpatient Case Management  Consult  Once     Provider:  (Not yet assigned)    05/07/21 0054    05/07/21 0006  Inpatient Cardiology Consult  Once     Specialty:  Cardiology  Provider:  Eddie Hodges MD    05/07/21 0006    05/06/21 2202  [facility] (on-call MD unless specified) Details  Once     Specialty:  Hospitalist  Provider:  (Not yet assigned)    05/06/21 2201              Procedures     * Surgery not found *      Imaging Results (All)     Procedure Component Value Units Date/Time    XR Chest 1 View [[id number]] Collected: 05/06/21 2055     Updated: 05/06/21 2100    Narrative:      EXAMINATION: SINGLE VIEW CHEST RADIOGRAPH     HISTORY: 70-year-old female with a history of dizziness, weakness and  altered mental status.      FINDINGS: An upright AP chest radiograph was obtained. Comparison is  made to a chest radiograph dated 08/01/2019. There is mild interstitial  and hazy opacification seen throughout both lungs. Midline sternal wires  are stable. The cardiac silhouette is enlarged but stable. I see no  evidence for a pneumothorax or pleural effusion. Mild atheromatous  calcification in the aortic arch is noted.       Impression:      There is cardiomegaly with bilateral hazy ground-glass  pulmonary opacities which may represent an atypical pneumonia or  possibly interstitial edema.     This report was finalized on 5/6/2021 8:57 PM by Dr. Rod Tovar M.D.           Results for orders placed during the hospital encounter of 07/25/19    Duplex Vein Mapping Lower Extremity - Bilateral CAR    Interpretation Summary  · The right greater saphenous vein is patent and of adequate size in the thigh.  · The right greater saphenous vein is patent and of adequate size in the calf.  · The left greater saphenous vein is patent and of adequate size in the thigh.  · The left greater saphenous vein is patent and of adequate size in the calf.    Results for orders placed during the hospital encounter of 05/06/21    Adult Transthoracic Echo Complete W/ Cont if Necessary Per Protocol    Interpretation Summary  · Left atrial volume is mildly increased.  · Mild aortic valve stenosis is present.  · Calculated left ventricular EF = 57% Estimated left ventricular EF was in agreement with the calculated left ventricular EF.  · Left ventricular diastolic function was normal.  · There is no evidence of pericardial effusion. .    Pertinent Labs     Results from last 7 days   Lab Units 05/09/21  0629 05/08/21  0629 05/07/21 0522 05/06/21  1906   WBC 10*3/mm3 7.44 7.29 7.55 7.49   HEMOGLOBIN g/dL 9.6* 9.3* 9.6* 10.3*   PLATELETS 10*3/mm3 179 188 192 196     Results from last 7 days   Lab Units 05/09/21  0629 05/08/21  0629 05/07/21 0522 05/06/21  1906    SODIUM mmol/L 142 142 146* 144   POTASSIUM mmol/L 4.9 5.5* 5.3* 5.1   CHLORIDE mmol/L 112* 115* 118* 116*   CO2 mmol/L 15.1* 18.9* 18.4* 17.0*   BUN mg/dL 54* 51* 51* 51*   CREATININE mg/dL 3.36* 3.40* 3.71* 3.54*   GLUCOSE mg/dL 65 70 77 121*   Estimated Creatinine Clearance: 15.9 mL/min (A) (by C-G formula based on SCr of 3.36 mg/dL (H)).  Results from last 7 days   Lab Units 05/08/21  0629 05/06/21  1906   ALBUMIN g/dL 2.80* 3.20*   BILIRUBIN mg/dL  --  0.3   ALK PHOS U/L  --  72   AST (SGOT) U/L  --  13   ALT (SGPT) U/L  --  10     Results from last 7 days   Lab Units 05/09/21  0629 05/08/21  0629 05/07/21  0522 05/06/21  1906   CALCIUM mg/dL 7.9* 7.9* 7.8* 8.0*   ALBUMIN g/dL  --  2.80*  --  3.20*   MAGNESIUM mg/dL  --  1.7  --  1.7   PHOSPHORUS mg/dL  --  5.4*  --   --        Results from last 7 days   Lab Units 05/08/21  1355 05/08/21  0629 05/06/21  1906   TROPONIN T ng/mL  --  0.091* 0.072*   PROBNP pg/mL 21,713.0*  --  13,250.0*     Results from last 7 days   Lab Units 05/08/21  0629   URIC ACID mg/dL 8.4*         Invalid input(s): LDLCALC      Results from last 7 days   Lab Units 05/06/21  2327   COVID19  Not Detected       Test Results Pending at Discharge       Discharge Details        Discharge Medications      Changes to Medications      Instructions Start Date   bumetanide 2 MG tablet  Commonly known as: BUMEX  What changed: when to take this   2 mg, Oral, 2 Times Daily      gabapentin 100 MG capsule  Commonly known as: NEURONTIN  What changed:   · medication strength  · how much to take  · when to take this   100 mg, Oral, Nightly         Continue These Medications      Instructions Start Date   Accu-Chek Delia Plus test strip  Generic drug: glucose blood   TEST THREE TIMES DAILY      glucose blood test strip   Use as instructed      OneTouch Verio test strip  Generic drug: glucose blood   Use as instructed      Accu-Chek Guide test strip  Generic drug: glucose blood   Use to test blood sugar 4  times daily  Dispense what insurance will approve E11.8      Accu-Chek Guide test strip  Generic drug: glucose blood   Test blood sugar 1 times daily e11.8      Accu-Chek FastClix Lancets misc   Use to test blood sugar 4 times daily  Dispense what insurance will approve E11.8      Accu-Chek Softclix Lancets lancets   Use to test blood sugar 3 times daily e11.8      Accu-Chek Guide w/Device kit   1 Device, Does not apply, 4 Times Daily, Use to test blood sugar 4 times daily  Dispense what insurance will approve E11.8      amLODIPine 5 MG tablet  Commonly known as: NORVASC   5 mg, Oral, Daily      apixaban 5 MG tablet tablet  Commonly known as: Eliquis   5 mg, Oral, 2 Times Daily      aspirin 81 MG tablet   81 mg, Oral, Every Morning, TO STOP THE DAY BEFORE SURGERY      Baqsimi Two Pack 3 MG/DOSE powder  Generic drug: Glucagon   3 mg, Nasal, As Needed      carvedilol 12.5 MG tablet  Commonly known as: COREG   12.5 mg, Oral, 2 Times Daily      Dexcom G6 Sensor   Does not apply, Every 10 Days      Dexcom G6 Transmitter misc   1 each, Does not apply, Every 3 Months      epoetin hermes 06557 UNIT/ML injection  Commonly known as: EPOGEN,PROCRIT   20,000 Units, Subcutaneous, Weekly, LAST TIME 2-6-2017      Florencia-Sequels 65-25 MG CR tablet  Generic drug: Ferrous Fumarate-Vitamin C ER   65 mg, Oral      glucose monitor monitoring kit   1 each, Does not apply, As Needed      IMODIUM A-D PO   1 tablet, Oral, Every 4 Hours PRN      levothyroxine 50 MCG tablet  Commonly known as: SYNTHROID, LEVOTHROID   50 mcg, Oral, Daily      losartan 50 MG tablet  Commonly known as: COZAAR   50 mg, Oral, Every Night at Bedtime      montelukast 10 MG tablet  Commonly known as: SINGULAIR   10 mg, Oral, Nightly      multivitamin tablet tablet  Commonly known as: THERAGRAN   1 tablet, Oral, Every Morning      PROBIOTIC DAILY PO   1 capsule, Oral, Daily      pyridoxine 500 MG tablet  Commonly known as: VITAMIN B-6   500 mg, Oral, Daily       topiramate 100 MG tablet  Commonly known as: TOPAMAX   100 mg, Oral, Daily      Tradjenta 5 MG tablet tablet  Generic drug: linagliptin   Take 1 tablet by mouth once daily      Vitamin D-3 25 MCG (1000 UT) capsule   5,000 Units, Oral, Daily             Allergies   Allergen Reactions   • Prednisone Hallucinations         Discharge Disposition:  Home-Health Care Svc    Discharge Diet:  Diet Order   Procedures   • Diet Regular; Cardiac, Consistent Carbohydrate, Renal, Low Sodium; 2,000 mg Na       Discharge Activity:       CODE STATUS:    Code Status and Medical Interventions:   Ordered at: 05/07/21 0007     Code Status:    CPR     Medical Interventions (Level of Support Prior to Arrest):    Full       Future Appointments   Date Time Provider Department Center   5/14/2021  1:00 PM ROOM 2 [facility]   5/19/2021  3:45 PM Glory Perales MD [facility]   5/21/2021  1:00 PM ROOM 2 [facility]   5/28/2021  1:00 PM ROOM 2 [facility]   6/4/2021  1:00 PM ROOM 2 [facility]   8/24/2021  1:30 PM Brianda Dwyer APRN [facility]   1/10/2022  1:00 PM Jose Florentino MD [facility]     Additional Instructions for the Follow-ups that You Need to Schedule     Ambulatory Referral to Home Health   As directed      Face to Face Visit Date: 5/9/2021    Follow-up provider for Plan of Care?: I treated the patient in an acute care facility and will not continue treatment after discharge.    Follow-up provider: GLORY PERALES [[id number]]    Reason/Clinical Findings: heart failure and renal failure    Describe mobility limitations that make leaving home difficult: heart failure and renal failure    Nursing/Therapeutic Services Requested: Skilled Nursing (labs)    Skilled nursing orders: CHF management    Frequency: 1 Week 1         Discharge Follow-up with PCP   As directed       Currently Documented PCP:    Glory Perales MD    PCP Phone Number:     [phone]     Follow Up Details: 2-3 weeks         Discharge Follow-up with Specified Provider: cardiology; 1 Week   As directed      To: cardiology    Follow Up: 1 Week         Discharge Follow-up with Specified Provider: nephrology; 1 Month   As directed      To: nephrology    Follow Up: 1 Month           Follow-up Information     Glory Chaney MD .    Specialty: Internal Medicine  Why: 2-3 weeks  Contact information:  [address]  [facility] [address]  [phone]                   Additional Instructions for the Follow-ups that You Need to Schedule     Ambulatory Referral to Home Health   As directed      Face to Face Visit Date: 5/9/2021    Follow-up provider for Plan of Care?: I treated the patient in an acute care facility and will not continue treatment after discharge.    Follow-up provider: GLORY CHANEY [[id number]]    Reason/Clinical Findings: heart failure and renal failure    Describe mobility limitations that make leaving home difficult: heart failure and renal failure    Nursing/Therapeutic Services Requested: Skilled Nursing (labs)    Skilled nursing orders: CHF management    Frequency: 1 Week 1         Discharge Follow-up with PCP   As directed       Currently Documented PCP:    Glory Chaney MD    PCP Phone Number:    [phone]     Follow Up Details: 2-3 weeks         Discharge Follow-up with Specified Provider: cardiology; 1 Week   As directed      To: cardiology    Follow Up: 1 Week         Discharge Follow-up with Specified Provider: nephrology; 1 Month   As directed      To: nephrology    Follow Up: 1 Month           Time Spent on Discharge:  Greater than 30 minutes      Kal Powers MD  [facility]  05/09/21  11:08 EDT

## 2021-05-09 NOTE — PROGRESS NOTES
[facility]  Cardiology Progress Note    Patient Identification:  Name: Maribeth Rendon  Age: 70 y.o.  Sex: female  :  1950  MRN: [id number]                 Follow Up / Chief Complaint: Shortness of breath    Interval History: Shortness of breath has improved.  She denies any chest pain or palpitations.  She was discharged in stable condition.        Objective:    Past Medical History:  Past Medical History:   Diagnosis Date   • Achilles tendon tear     left    • Anemia    • Anxiety    • Depression    • Diabetes mellitus, type 2 (CMS/[facility])    • ESRD (end stage renal disease) (CMS/[facility])     HAS LEFT FOREARM FISTULA   • GERD (gastroesophageal reflux disease)    • History of MRSA infection 03/15/2016    ABDOMINAL WOUND,   INFECTION CONTROLL NOTIFIED 2017   • History of transfusion    • Hyperlipidemia    • Hypertension    • Hypokalemia    • Hypomagnesemia    • Hypoxic 2016    WHEN SHE HAD PNEUMONIA   • Intertrigo    • Leukocytosis    • Osteoarthritis    • Pneumonia 2016   • Psoriasis    • Psoriatic arthritis (CMS/[facility])    • Seizures (CMS/[facility])     one time   • Sepsis (CMS/[facility]) 2016   • SOB (shortness of breath)    • Stage 4 chronic renal impairment associated with type 2 diabetes mellitus (CMS/[facility])    • Staph infection     HX RIGHT FOOT AT [facility] 2010   • Streptococcal bacteremia    • Stroke (cerebrum) (CMS/[facility])    • Stroke (CMS/[facility])    • Swelling of hand 10/27/2016    LEFT HAND SWELLIMG SINCE LEFT FISTULA SURGERY   • Tremor, essential    • Wears glasses    • Wheelchair dependent     For mobility     Past Surgical History:  Past Surgical History:   Procedure Laterality Date   • ABDOMINAL WALL ABSCESS INCISION AND DRAINAGE     • ARTERIOVENOUS FISTULA/SHUNT SURGERY Left 10/27/2016    Procedure: LT FOREARM FISTULA;  Surgeon: Lorelei Haque Jr., MD;  Location: [facility];  Service:    • ARTERIOVENOUS FISTULA/SHUNT SURGERY Left 2017    Procedure: LT BRACHIAL CEPHALIC  WITH FISTULA LIGATION RADIAL CEPHALIC.;  Surgeon: Gurmeet Carver MD;  Location: [facility] MAIN OR;  Service:    • CARDIAC CATHETERIZATION N/A 2019    Procedure: Left Heart Cath;  Surgeon: Eddie Hodges MD;  Location: [facility] CATH INVASIVE LOCATION;  Service: Cardiology   • CARDIAC CATHETERIZATION N/A 2019    Procedure: Coronary angiography;  Surgeon: Eddie Hodges MD;  [facility];  Service: Cardiology   • CARDIAC SURGERY     • CATARACT EXTRACTION W/ INTRAOCULAR LENS IMPLANT Bilateral 2011   • CORONARY ARTERY BYPASS GRAFT N/A 2019    Procedure: INTRAOP ERNESTO; CORONARY ARTERY BYPASS GRAFTING X 4 WITH LEFT INTERNAL MAMMARY ARTERY GRAFT AND UTILIZING ENDOSCOPICALLY HARVESTED GREATER SAPHENOUS VEIN; PRP;  Surgeon: Gordo Ferreira MD;  Location: [facility] OR;  Service: Cardiothoracic   • CORONARY ARTERY BYPASS GRAFT     • DILATATION AND CURETTAGE     • EXCISION MASS TRUNK N/A 3/22/2016    Procedure: I&D LOWER ABDOMINAL  WOUND;  Surgeon: Tru Yi MD;  Location: [facility] OR;  Service:    • FOOT SURGERY Right     REMOVED BONE IN RIGHT GREAT TOE   • HYSTERECTOMY          Social History:   Social History     Tobacco Use   • Smoking status: Former Smoker     Packs/day: 2.00     Years: 26.00     Pack years: 52.00     Types: Cigarettes     Quit date: 10/21/1992     Years since quittin.5   • Smokeless tobacco: Never Used   • Tobacco comment: quit    Substance Use Topics   • Alcohol use: Not Currently     Alcohol/week: 0.0 standard drinks      Family History:  Family History   Problem Relation Age of Onset   • Heart attack Mother    • Heart disease Mother    • Kidney disease Mother    • Heart attack Father    • Heart disease Father    • Hypertension Father    • Stroke Father         ISCHEMIC   • Diabetes Father         TYPE 2   • Kidney disease Brother    • Diabetes Brother         TYPE 1   • Thyroid disease Daughter    • Anxiety  disorder Maternal Aunt    • Bipolar disorder Maternal Aunt    • Depression Maternal Aunt    • Lupus Maternal Aunt         LUPUS ANTICOAGLULANT   • Rheum arthritis Maternal Aunt    • Alcohol abuse Maternal Uncle    • Other Maternal Uncle         PULMONARY DISEASE          Allergies:  Allergies   Allergen Reactions   • Prednisone Hallucinations     Scheduled Meds:  amLODIPine, 5 mg, Daily  apixaban, 5 mg, BID  aspirin, 81 mg, Daily  bumetanide, 2 mg, Q12H  carvedilol, 12.5 mg, BID  cholestyramine light, 1 packet, Daily  gabapentin, 100 mg, Nightly  insulin lispro, 0-9 Units, TID AC  lactobacillus acidophilus, 1 capsule, Daily  levothyroxine, 50 mcg, Daily  losartan, 50 mg, Nightly  metOLazone, 5 mg, Daily  montelukast, 10 mg, Nightly  multivitamin, 1 tablet, Daily  topiramate, 100 mg, Daily  triamcinolone, , Daily            INTAKE AND OUTPUT:    Intake/Output Summary (Last 24 hours) at 5/9/2021 1137  Last data filed at 5/9/2021 0840  Gross per 24 hour   Intake 160 ml   Output 800 ml   Net -640 ml       Review of Systems:   GI: Denies nausea and vomiting  Cardiac: Denies chest pain or palpitations  Pulmonary: Denies shortness of breath    Constitutional:  Temp:  [98.3 °F (36.8 °C)-98.4 °F (36.9 °C)] 98.3 °F (36.8 °C)  Heart Rate:  [62-63] 63  Resp:  [16-18] 18  BP: (139-149)/(62-68) 149/68      Physical Exam:  General:  Appears in no acute distress, resting in bed  Eyes: EOM normal no conjunctival drainage  HEENT:  No JVD. Thyroid not visibly enlarged. No mucosal pallor or cyanosis  Respiratory: Respirations regular and unlabored at rest. BBS with good air entry in all fields. No crackles, rubs or wheezes auscultated  Cardiovascular: S1S2 Regular rate and rhythm. No murmur, rub or gallop. No carotid bruits. DP/PT pulses  +2   . No pretibial pitting edema  Gastrointestinal: Abdomen soft, flat, non tender. Bowel sounds present. No hepatosplenomegaly. No ascites  Musculoskeletal: PASCUAL x4. No abnormal movements   Neuro:  "AAO x3 CN II-XII grossly intact  Psych: Mood and affect normal, pleasant and cooperative        Cardiographics   Echocardiogram:   Interpretation Summary    · Left atrial volume is mildly increased.  · Mild aortic valve stenosis is present.  · Calculated left ventricular EF = 57% Estimated left ventricular EF was in agreement with the calculated left ventricular EF.  · Left ventricular diastolic function was normal.  · There is no evidence of pericardial effusion. .          Lab Review   Results from last 7 days   Lab Units 05/08/21  0629 05/06/21  1906   TROPONIN T ng/mL 0.091* 0.072*     Results from last 7 days   Lab Units 05/08/21  0629   MAGNESIUM mg/dL 1.7     Results from last 7 days   Lab Units 05/09/21  0629   SODIUM mmol/L 142   POTASSIUM mmol/L 4.9   BUN mg/dL 54*   CREATININE mg/dL 3.36*   CALCIUM mg/dL 7.9*        Results from last 7 days   Lab Units 05/09/21  0629 05/08/21  0629 05/07/21  0522   WBC 10*3/mm3 7.44 7.29 7.55   HEMOGLOBIN g/dL 9.6* 9.3* 9.6*   HEMATOCRIT % 30.9* 30.3* 30.5*   PLATELETS 10*3/mm3 179 188 192             Assessment:  Acute pulmonary edema  Elevated troponin in the setting of CKD  anemia in CKD  diabetes mellitus type 2  Hypertension   CAD with history of CABG  Anticoagulated with Eliquis: Sinus rhythm on the monitor.  Hypothyroidism  History of atrial fibrillation.    Plan:  Blood pressure and heart rate are stable.  Her echo revealed normal LV function.  Resume Bumex 2 mg every 12 hours.  She will need a CMP in 1 week.  She will follow-up with our office in 2 to 3 weeks.  I have placed a note with our staff to call and schedule her the lab work as well as an office appointment.  She is cardiovascular stable for discharge.          )5/9/2021  IRENA Kohler Dragon/Transcription:   \"Dictated utilizing Dragon dictation\".     25 min spent in total at bedside for teaching and in coordination of care with other team members.       "

## 2021-05-09 NOTE — PLAN OF CARE
Goal Outcome Evaluation:  Plan of Care Reviewed With: patient, spouse  Progress: improving  Outcome Summary: Patient discharged home with HH.

## 2021-05-10 ENCOUNTER — TRANSITIONAL CARE MANAGEMENT TELEPHONE ENCOUNTER (OUTPATIENT)
Dept: CALL CENTER | Facility: HOSPITAL | Age: 71
End: 2021-05-10

## 2021-05-10 DIAGNOSIS — I10 ESSENTIAL HYPERTENSION: Primary | ICD-10-CM

## 2021-05-10 DIAGNOSIS — I25.118 CORONARY ARTERY DISEASE OF NATIVE HEART WITH STABLE ANGINA PECTORIS, UNSPECIFIED VESSEL OR LESION TYPE (HCC): ICD-10-CM

## 2021-05-10 DIAGNOSIS — I48.91 ATRIAL FIBRILLATION, UNSPECIFIED TYPE (HCC): ICD-10-CM

## 2021-05-10 NOTE — OUTREACH NOTE
Call Center TCM Note      Responses   [facility] patient discharged from?  [facility]   Does the patient have one of the following disease processes/diagnoses(primary or secondary)?  CHF   TCM attempt successful?  Yes   Call end time  1503   Meds reviewed with patient/caregiver?  Yes   Is the patient having any side effects they believe may be caused by any medication additions or changes?  No   Does the patient have all medications ordered at discharge?  Yes   Is the patient taking all medications as directed (includes completed medication regime)?  Yes   Does the patient have a primary care provider?   Yes   Does the patient have an appointment with their PCP within 7 days of discharge?  Yes   Comments regarding PCP  PCP Dr Chaney. Patient has a previously scheduled appt on May 19 2021 and she would like to leave as scheduled.    Has the patient kept scheduled appointments due by today?  N/A   What is the Home health agency?   [facility]    Has home health visited the patient within 72 hours of discharge?  Call prior to 72 hours   Has all DME been delivered?  No   Psychosocial issues?  No   Did the patient receive a copy of their discharge instructions?  Yes   Nursing interventions  Reviewed instructions with patient   What is the patient's perception of their health status since discharge?  Improving   Nursing interventions  Nurse provided patient education   Is the patient able to teach back signs and symptoms of worsening condition? (i.e. weight gain, shortness of air, etc.)  Yes   If the patient is a current smoker, are they able to teach back resources for cessation?  Not a smoker   TCM call completed?  Yes           Vance Villanueva RN    5/10/2021, 15:04 EDT

## 2021-05-10 NOTE — OUTREACH NOTE
Prep Survey      Responses   [facility] facility patient discharged from?  [place]   Is LACE score < 7 ?  No   Emergency Room discharge w/ pulse ox?  No   Eligibility  [facility]   Date of Admission  05/06/21   Date of Discharge  05/09/21   Discharge Disposition  Home-Health Care Svc   Discharge diagnosis  acute pulmonary edema, Acute CKD, CKD, A-fib, T2DM   Does the patient have one of the following disease processes/diagnoses(primary or secondary)?  CHF   Does the patient have Home health ordered?  Yes   What is the Home health agency?   [facility]   Is there a DME ordered?  No   Prep survey completed?  Yes          Yesika Gonzalez RN

## 2021-05-10 NOTE — CASE MANAGEMENT/SOCIAL WORK
Case Management Discharge Note      Final Note: DC'd home with [facility]. Radha Vazquez RN    Provided Post Acute Provider List?: N/A  N/A Provider List Comment: Is current with [facility] and will continue    Selected Continued Care - Discharged on 5/9/2021 Admission date: 5/6/2021 - Discharge disposition: Home-Health Care Svc    Destination    No services have been selected for the patient.              Durable Medical Equipment    No services have been selected for the patient.              Dialysis/Infusion    No services have been selected for the patient.              Home Medical Care Coordination complete    Service Provider Selected Services Address Phone Fax Patient Preferred    [facility]  [facility] Services [address] [place] [address] [phone] --          Therapy    No services have been selected for the patient.              Community Resources    No services have been selected for the patient.                       Final Discharge Disposition Code: 06 - home with home health care

## 2021-05-14 ENCOUNTER — HOSPITAL ENCOUNTER (OUTPATIENT)
Dept: INFUSION THERAPY | Facility: HOSPITAL | Age: 71
Discharge: HOME | End: 2021-05-14
Admitting: INTERNAL MEDICINE

## 2021-05-14 VITALS
SYSTOLIC BLOOD PRESSURE: 112 MMHG | OXYGEN SATURATION: 99 % | DIASTOLIC BLOOD PRESSURE: 56 MMHG | TEMPERATURE: 97.1 F | HEART RATE: 71 BPM | RESPIRATION RATE: 20 BRPM

## 2021-05-14 DIAGNOSIS — N18.5 CKD (CHRONIC KIDNEY DISEASE), SYMPTOM MANAGEMENT ONLY, STAGE 5 (HCC): Primary | ICD-10-CM

## 2021-05-14 DIAGNOSIS — N18.5 ANEMIA IN STAGE 5 CHRONIC KIDNEY DISEASE, NOT ON CHRONIC DIALYSIS (HCC): ICD-10-CM

## 2021-05-14 DIAGNOSIS — N18.5 CKD (CHRONIC KIDNEY DISEASE) STAGE 5, GFR LESS THAN 15 ML/MIN (HCC): ICD-10-CM

## 2021-05-14 DIAGNOSIS — D63.1 ANEMIA IN STAGE 5 CHRONIC KIDNEY DISEASE, NOT ON CHRONIC DIALYSIS (HCC): ICD-10-CM

## 2021-05-14 LAB
HCT VFR BLD AUTO: 35.7 % (ref 34–46.6)
HGB BLD-MCNC: 10.9 G/DL (ref 12–15.9)

## 2021-05-14 PROCEDURE — 85018 HEMOGLOBIN: CPT | Performed by: INTERNAL MEDICINE

## 2021-05-14 PROCEDURE — 36415 COLL VENOUS BLD VENIPUNCTURE: CPT

## 2021-05-14 PROCEDURE — 85014 HEMATOCRIT: CPT | Performed by: INTERNAL MEDICINE

## 2021-05-14 PROCEDURE — 96372 THER/PROPH/DIAG INJ SC/IM: CPT

## 2021-05-14 PROCEDURE — 25010000002 EPOETIN ALFA PER 1000 UNITS: Performed by: INTERNAL MEDICINE

## 2021-05-14 RX ORDER — GABAPENTIN 300 MG/1
300 CAPSULE ORAL 2 TIMES DAILY
COMMUNITY
End: 2021-07-08 | Stop reason: HOSPADM

## 2021-05-14 RX ADMIN — ERYTHROPOIETIN 20000 UNITS: 20000 INJECTION, SOLUTION INTRAVENOUS; SUBCUTANEOUS at 13:51

## 2021-05-18 ENCOUNTER — READMISSION MANAGEMENT (OUTPATIENT)
Dept: CALL CENTER | Facility: HOSPITAL | Age: 71
End: 2021-05-18

## 2021-05-18 ENCOUNTER — APPOINTMENT (OUTPATIENT)
Dept: CARDIOLOGY | Facility: HOSPITAL | Age: 71
End: 2021-05-18

## 2021-05-18 NOTE — OUTREACH NOTE
CHF Week 2 Survey      Responses   [facility] patient discharged from?  [place]   Does the patient have one of the following disease processes/diagnoses(primary or secondary)?  CHF   Week 2 attempt successful?  Yes   Call start time  [phone]   Call end time  1359   Discharge diagnosis  acute pulmonary edema, Acute CKD, CKD, A-fib, T2DM   Is patient permission given to speak with other caregiver?  Yes   List who call center can speak with     Meds reviewed with patient/caregiver?  Yes   Does the patient have all medications ordered at discharge?  N/A   Is the patient taking all medications as directed (includes completed medication regime)?  Yes   Has the patient kept scheduled appointments due by today?  N/A   What is the Home health agency?   [facility]   Has home health visited the patient within 72 hours of discharge?  Yes   Has all DME been delivered?  No   Pulse Ox monitoring  None   Psychosocial issues?  No   Comments  Pt reports that she is without edema or SOA. She feels she is doing well.   What is the patient's perception of their health status since discharge?  Returned to baseline/stable   Nursing interventions  Nurse provided patient education   Is the patient weighing daily?  Yes   Does the patient have scales?  Yes   Daily weight interventions  Education provided on importance of daily weight   Is the patient able to teach back Heart Failure diet management?  Yes   Is the patient able to teach back Heart Failure Zones?  Yes   Is the patient able to teach back signs and symptoms of worsening condition? (i.e. weight gain, shortness of air, etc.)  Yes   If the patient is a current smoker, are they able to teach back resources for cessation?  Not a smoker   Is the patient/caregiver able to teach back the hierarchy of who to call/visit for symptoms/problems? PCP, Specialist, Home health nurse, Urgent Care, ED, 911  Yes   CHF Week 2 call completed?  Yes   Revoked  No further  contact(revokes)-requires comment   Is the patient interested in additional calls from an ambulatory ?  NOTE:  applies to high risk patients requiring additional follow-up.  No   Graduated/Revoked comments  baseline          Corine Herron RN

## 2021-05-19 ENCOUNTER — OFFICE VISIT (OUTPATIENT)
Dept: FAMILY MEDICINE CLINIC | Facility: CLINIC | Age: 71
End: 2021-05-19

## 2021-05-19 VITALS
SYSTOLIC BLOOD PRESSURE: 90 MMHG | RESPIRATION RATE: 20 BRPM | DIASTOLIC BLOOD PRESSURE: 50 MMHG | OXYGEN SATURATION: 95 % | WEIGHT: 180.5 LBS | HEIGHT: 65 IN | BODY MASS INDEX: 30.07 KG/M2 | HEART RATE: 61 BPM

## 2021-05-19 DIAGNOSIS — Z12.31 ENCOUNTER FOR SCREENING MAMMOGRAM FOR MALIGNANT NEOPLASM OF BREAST: ICD-10-CM

## 2021-05-19 DIAGNOSIS — N18.5 CKD (CHRONIC KIDNEY DISEASE) STAGE 5, GFR LESS THAN 15 ML/MIN (HCC): ICD-10-CM

## 2021-05-19 DIAGNOSIS — D64.9 ANEMIA, UNSPECIFIED TYPE: ICD-10-CM

## 2021-05-19 DIAGNOSIS — I50.31 ACUTE DIASTOLIC CHF (CONGESTIVE HEART FAILURE) (HCC): Primary | ICD-10-CM

## 2021-05-19 DIAGNOSIS — R19.5 HEME POSITIVE STOOL: ICD-10-CM

## 2021-05-19 PROCEDURE — 1111F DSCHRG MED/CURRENT MED MERGE: CPT | Performed by: INTERNAL MEDICINE

## 2021-05-19 PROCEDURE — 99495 TRANSJ CARE MGMT MOD F2F 14D: CPT | Performed by: INTERNAL MEDICINE

## 2021-05-19 NOTE — PROGRESS NOTES
Transitional Care Follow Up Visit  Subjective     Maribeth Rendon is a 70 y.o. female who presents for a transitional care management visit.    Within 48 business hours after discharge our office contacted her via telephone to coordinate her care and needs.      I reviewed and discussed the details of that call along with the discharge summary, hospital problems, inpatient lab results, inpatient diagnostic studies, and consultation reports with Maribeth.     Current outpatient and discharge medications have been reconciled for the patient.  Reviewed by: Robby Chaney MD      Date of TCM Phone Call 5/9/2021   [facility]   Date of Admission 5/6/2021   Date of Discharge 5/9/2021   [place] Dis[facility]     Risk for Readmission (LACE) Score: 12 (5/9/2021  6:01 AM)      History of Present Illness   Course During Hospital Stay: Maribeth is here today for follow-up after hospitalization.  She was admitted to [facility] on 6 May.  She was discharged on 9 May.  She was found to be in volume overload with acute congestive heart failure.  This was felt to be diastolic heart failure.  Her ejection fraction on her echocardiogram was okay.  She has a chronically elevated BNP and elevated troponins likely from her chronic kidney disease.  She does have some stage V chronic kidney disease.  She is not currently on any type of dialysis.  She was anemic.  She did have a heme positive stool.  She has never had a colonoscopy.  Her blood sugars seem to do fairly well while in the hospital.  Her chest x-ray showed some cardiac enlargement with some pulmonary congestion.     The following portions of the patient's history were reviewed and updated as appropriate: allergies, current medications, past family history, past medical history, past social history, past surgical history and problem list.    Review of Systems   Constitutional: Negative for chills and fever.   Respiratory: Negative for cough,  chest tightness and shortness of breath.    Cardiovascular: Positive for leg swelling.       Objective   Physical Exam  Vitals and nursing note reviewed.   Constitutional:       Appearance: Normal appearance.   Cardiovascular:      Rate and Rhythm: Normal rate and regular rhythm.   Pulmonary:      Effort: Pulmonary effort is normal.      Breath sounds: No wheezing, rhonchi or rales.   Musculoskeletal:      Right lower leg: Edema present.      Left lower leg: Edema present.   Neurological:      Mental Status: She is alert.         Assessment/Plan   Diagnoses and all orders for this visit:    1. Encounter for screening mammogram for malignant neoplasm of breast (Primary)  -     Mammo Screening Bilateral With CAD; Future    2. Acute diastolic CHF (congestive heart failure) (CMS/[facility])    3. CKD (chronic kidney disease) stage 5, GFR less than 15 ml/min (CMS/[facility])    4. Anemia, unspecified type  -     Ambulatory Referral to Gastroenterology    5. Heme positive stool  -     Ambulatory Referral to Gastroenterology      Maribeth is here today for follow-up after hospital admission.  She does have some anemia which is likely due to her chronic kidney disease.  However with her heme positive stool I am going to refer her to a gastroenterologist.  She has never had a colonoscopy.  She is also well overdue for mammogram and we will try to get that scheduled as well.

## 2021-05-20 RX ORDER — APIXABAN 5 MG/1
TABLET, FILM COATED ORAL
Qty: 180 TABLET | Refills: 0 | Status: ON HOLD | OUTPATIENT
Start: 2021-05-20 | End: 2021-07-08 | Stop reason: SDUPTHER

## 2021-05-21 ENCOUNTER — TELEPHONE (OUTPATIENT)
Dept: CARDIOLOGY | Facility: CLINIC | Age: 71
End: 2021-05-21

## 2021-05-21 ENCOUNTER — HOSPITAL ENCOUNTER (OUTPATIENT)
Dept: INFUSION THERAPY | Facility: HOSPITAL | Age: 71
Discharge: HOME | End: 2021-05-21
Admitting: INTERNAL MEDICINE

## 2021-05-21 VITALS
SYSTOLIC BLOOD PRESSURE: 97 MMHG | HEART RATE: 74 BPM | TEMPERATURE: 96.9 F | DIASTOLIC BLOOD PRESSURE: 43 MMHG | OXYGEN SATURATION: 97 % | RESPIRATION RATE: 20 BRPM

## 2021-05-21 DIAGNOSIS — I25.118 CORONARY ARTERY DISEASE OF NATIVE HEART WITH STABLE ANGINA PECTORIS, UNSPECIFIED VESSEL OR LESION TYPE (HCC): ICD-10-CM

## 2021-05-21 DIAGNOSIS — D63.1 ANEMIA IN STAGE 5 CHRONIC KIDNEY DISEASE, NOT ON CHRONIC DIALYSIS (HCC): ICD-10-CM

## 2021-05-21 DIAGNOSIS — N18.5 ANEMIA IN STAGE 5 CHRONIC KIDNEY DISEASE, NOT ON CHRONIC DIALYSIS (HCC): ICD-10-CM

## 2021-05-21 DIAGNOSIS — I48.91 ATRIAL FIBRILLATION, UNSPECIFIED TYPE (HCC): ICD-10-CM

## 2021-05-21 DIAGNOSIS — N18.5 CKD (CHRONIC KIDNEY DISEASE) STAGE 5, GFR LESS THAN 15 ML/MIN (HCC): ICD-10-CM

## 2021-05-21 DIAGNOSIS — I10 ESSENTIAL HYPERTENSION: Primary | ICD-10-CM

## 2021-05-21 LAB
ALBUMIN SERPL-MCNC: 3.3 G/DL (ref 3.5–5.2)
ALBUMIN/GLOB SERPL: 0.8 G/DL
ALP SERPL-CCNC: 72 U/L (ref 39–117)
ALT SERPL W P-5'-P-CCNC: 12 U/L (ref 1–33)
ANION GAP SERPL CALCULATED.3IONS-SCNC: 13 MMOL/L (ref 5–15)
AST SERPL-CCNC: 11 U/L (ref 1–32)
BILIRUB SERPL-MCNC: 0.2 MG/DL (ref 0–1.2)
BUN SERPL-MCNC: 76 MG/DL (ref 8–23)
BUN/CREAT SERPL: 15.2 (ref 7–25)
CALCIUM SPEC-SCNC: 7.7 MG/DL (ref 8.6–10.5)
CHLORIDE SERPL-SCNC: 109 MMOL/L (ref 98–107)
CO2 SERPL-SCNC: 21 MMOL/L (ref 22–29)
CREAT SERPL-MCNC: 4.99 MG/DL (ref 0.57–1)
GFR SERPL CREATININE-BSD FRML MDRD: 9 ML/MIN/1.73
GFR SERPL CREATININE-BSD FRML MDRD: ABNORMAL ML/MIN/{1.73_M2}
GLOBULIN UR ELPH-MCNC: 3.9 GM/DL
GLUCOSE SERPL-MCNC: 152 MG/DL (ref 65–99)
HCT VFR BLD AUTO: 33.7 % (ref 34–46.6)
HGB BLD-MCNC: 10.3 G/DL (ref 12–15.9)
MAGNESIUM SERPL-MCNC: 1.4 MG/DL (ref 1.6–2.4)
POTASSIUM SERPL-SCNC: 4.2 MMOL/L (ref 3.5–5.2)
PROT SERPL-MCNC: 7.2 G/DL (ref 6–8.5)
SODIUM SERPL-SCNC: 143 MMOL/L (ref 136–145)

## 2021-05-21 PROCEDURE — 80053 COMPREHEN METABOLIC PANEL: CPT | Performed by: NURSE PRACTITIONER

## 2021-05-21 PROCEDURE — 25010000002 EPOETIN ALFA PER 1000 UNITS: Performed by: INTERNAL MEDICINE

## 2021-05-21 PROCEDURE — 96372 THER/PROPH/DIAG INJ SC/IM: CPT

## 2021-05-21 PROCEDURE — 36415 COLL VENOUS BLD VENIPUNCTURE: CPT

## 2021-05-21 PROCEDURE — 85018 HEMOGLOBIN: CPT | Performed by: INTERNAL MEDICINE

## 2021-05-21 PROCEDURE — 83735 ASSAY OF MAGNESIUM: CPT | Performed by: INTERNAL MEDICINE

## 2021-05-21 PROCEDURE — 85014 HEMATOCRIT: CPT | Performed by: INTERNAL MEDICINE

## 2021-05-21 RX ADMIN — ERYTHROPOIETIN 20000 UNITS: 20000 INJECTION, SOLUTION INTRAVENOUS; SUBCUTANEOUS at 13:42

## 2021-05-21 NOTE — TELEPHONE ENCOUNTER
I discussed her labs with Dr. Hodges and Ms. Rendon. She will call her nephrologist for due to her creatinine increase and decrease of GFR. She verbalized understanding.    IRENA Kohler

## 2021-05-25 ENCOUNTER — OFFICE VISIT (OUTPATIENT)
Dept: CARDIOLOGY | Facility: CLINIC | Age: 71
End: 2021-05-25

## 2021-05-25 ENCOUNTER — TELEPHONE (OUTPATIENT)
Dept: GASTROENTEROLOGY | Facility: CLINIC | Age: 71
End: 2021-05-25

## 2021-05-25 VITALS
HEIGHT: 65 IN | DIASTOLIC BLOOD PRESSURE: 45 MMHG | SYSTOLIC BLOOD PRESSURE: 98 MMHG | HEART RATE: 67 BPM | WEIGHT: 172 LBS | BODY MASS INDEX: 28.66 KG/M2

## 2021-05-25 DIAGNOSIS — I10 ESSENTIAL HYPERTENSION: Primary | ICD-10-CM

## 2021-05-25 DIAGNOSIS — I25.118 CORONARY ARTERY DISEASE OF NATIVE HEART WITH STABLE ANGINA PECTORIS, UNSPECIFIED VESSEL OR LESION TYPE (HCC): ICD-10-CM

## 2021-05-25 DIAGNOSIS — I48.0 PAROXYSMAL ATRIAL FIBRILLATION (HCC): ICD-10-CM

## 2021-05-25 DIAGNOSIS — Z95.1 S/P CABG (CORONARY ARTERY BYPASS GRAFT): ICD-10-CM

## 2021-05-25 DIAGNOSIS — I50.32 CHRONIC DIASTOLIC CONGESTIVE HEART FAILURE (HCC): ICD-10-CM

## 2021-05-25 DIAGNOSIS — I25.118 CORONARY ARTERY DISEASE OF NATIVE ARTERY OF NATIVE HEART WITH STABLE ANGINA PECTORIS (HCC): ICD-10-CM

## 2021-05-25 DIAGNOSIS — I48.91 ATRIAL FIBRILLATION, UNSPECIFIED TYPE (HCC): ICD-10-CM

## 2021-05-25 DIAGNOSIS — E78.5 HYPERLIPIDEMIA, UNSPECIFIED HYPERLIPIDEMIA TYPE: ICD-10-CM

## 2021-05-25 PROCEDURE — 99214 OFFICE O/P EST MOD 30 MIN: CPT | Performed by: NURSE PRACTITIONER

## 2021-05-25 NOTE — PROGRESS NOTES
Subjective:        Maribeth Rendon is a 70 y.o. female who here for follow up    Chief Complaint   Patient presents with   • Follow-up     hospital        HPI   Maribeth Rendon is an 70-year-old female, who is current with this provider.  She has a history to include anxiety, depression, diabetes mellitus type 2, chronic diastolic congestive heart failure, CAD with history of CABG x4, ESRD, paraoxysmal atrial fibrillation on Eliquis, hyperlipidemia, as well as hypertension.  Is here today for hospital follow-up.  He was diuresed well and discharged in stable condition.  Her echo on 5/7/2021 revealed EF 57%, mild aortic valve stenosis, LAV is mildly increased, LV diastolic function was normal and no evidence of pericardial effusion.  He recently seen Dr. Mcclain  and he follows her for her lab work.          The following portions of the patient's history were reviewed and updated as appropriate: allergies, current medications, past family history, past medical history, past social history, past surgical history and problem list.    Past Medical History:   Diagnosis Date   • Achilles tendon tear     left    • Anemia    • Anxiety    • Depression    • Diabetes mellitus, type 2 (CMS/[facility])    • ESRD (end stage renal disease) (CMS/[facility])     HAS LEFT FOREARM FISTULA   • GERD (gastroesophageal reflux disease)    • History of MRSA infection 03/15/2016    ABDOMINAL WOUND,   INFECTION CONTROLL NOTIFIED 2-6-2017   • History of transfusion    • Hyperlipidemia    • Hypertension    • Hypokalemia    • Hypomagnesemia    • Hypoxic 01/2016    WHEN SHE HAD PNEUMONIA   • Intertrigo    • Leukocytosis    • Osteoarthritis    • Pneumonia 01/2016   • Psoriasis    • Psoriatic arthritis (CMS/[facility])    • Seizures (CMS/[facility])     one time   • Sepsis (CMS/[facility]) 01/2016   • SOB (shortness of breath)    • Stage 4 chronic renal impairment associated with type 2 diabetes mellitus (CMS/[facility])    • Staph infection     HX RIGHT FOOT AT [facility] 01/09/2010   • Streptococcal  bacteremia    • Stroke (cerebrum) (CMS/HCC)    • Stroke (CMS/HCC)    • Swelling of hand 10/27/2016    LEFT HAND SWELLIMG SINCE LEFT FISTULA SURGERY   • Tremor, essential    • Wears glasses    • Wheelchair dependent     For mobility         reports that she quit smoking about 28 years ago. Her smoking use included cigarettes. She has a 52.00 pack-year smoking history. She has never used smokeless tobacco. She reports previous alcohol use. She reports that she does not use drugs.     Family History   Problem Relation Age of Onset   • Heart attack Mother    • Heart disease Mother    • Kidney disease Mother    • Heart attack Father    • Heart disease Father    • Hypertension Father    • Stroke Father         ISCHEMIC   • Diabetes Father         TYPE 2   • Kidney disease Brother    • Diabetes Brother         TYPE 1   • Thyroid disease Daughter    • Anxiety disorder Maternal Aunt    • Bipolar disorder Maternal Aunt    • Depression Maternal Aunt    • Lupus Maternal Aunt         LUPUS ANTICOAGLULANT   • Rheum arthritis Maternal Aunt    • Alcohol abuse Maternal Uncle    • Other Maternal Uncle         PULMONARY DISEASE       ROS     Review of Systems  Constitutional: no weight loss, fever, or fatigue   gastrointestinal: no nausea, or abdominal pain   behavioral/Psych: no insomnia or anxiety   cardiovascular: no chest pain, shortness of breath and cough     Objective:           Physical Exam  Vitals and nursing note reviewed.   Constitutional:       Appearance: She is well-developed.   HENT:      Head: Normocephalic.      Right Ear: External ear normal.      Left Ear: External ear normal.   Neck:      Vascular: No JVD.   Cardiovascular:      Rate and Rhythm: Normal rate and regular rhythm.      Pulses: Intact distal pulses.      Heart sounds: Murmur heard.   No friction rub. No gallop.       Comments: BLE 1+ edema  Pulmonary:      Effort: Pulmonary effort is normal. No respiratory distress.      Breath sounds: Normal breath  sounds. No stridor. No rales.   Abdominal:      General: Bowel sounds are normal. There is no distension.      Palpations: Abdomen is soft.      Tenderness: There is no abdominal tenderness. There is no guarding.   Musculoskeletal:         General: No tenderness. Normal range of motion.      Cervical back: Normal range of motion.   Skin:     General: Skin is warm.   Neurological:      Mental Status: She is alert and oriented to person, place, and time.      Deep Tendon Reflexes: Reflexes are normal and symmetric.   Psychiatric:         Judgment: Judgment normal.        Procedures:   Interpretation Summary    · Left atrial volume is mildly increased.  · Mild aortic valve stenosis is present.  · Calculated left ventricular EF = 57% Estimated left ventricular EF was in agreement with the calculated left ventricular EF.  · Left ventricular diastolic function was normal.  · There is no evidence of pericardial effusion. .          Carotid duplex 7/26/2019  Interpretation Summary     · Proximal right internal carotid artery is normal.  · Proximal left internal carotid artery is normal.        Stress test 7/5/2019  Interpretation Summary        · Findings consistent with a normal ECG stress test.  · Findings consistent with a normal ECG stress test.  · Left ventricular ejection fraction is normal (Calculated EF = 60%).  · Myocardial perfusion imaging indicates a small-to-medium-sized, mild-to-moderately severe area of ischemia located in the anterior wall.  · Impressions are consistent with an intermediate risk study.     Echo 6/12/2019  Interpretation Summary     · Right ventricular cavity is borderline dilated.  · Left atrial cavity size is mildly dilated.  · Mild tricuspid valve regurgitation is present.  · Calculated EF = 57%.  · There is no evidence of pericardial effusion.        X-ray 7/31/2019    Study Result     XR CHEST 1 VW-     HISTORY: Female who is 68 years-old,  chest tube removal     TECHNIQUE: Frontal view  of the chest     COMPARISON: 07/31/2019     FINDINGS: Interval chest tube removal. Heart is enlarged. Sternotomy  wires, right IJ sheath noted. Mild pulmonary vascular congestion. Mild  left basilar atelectasis/infiltrate/effusion, continued follow-up  suggested. No pneumothorax. No acute osseous process.     IMPRESSION:  Chest tube removal. No pneumothorax.     This report was finalized on 7/31/2019 3:35 PM by Dr. Garland Garcia M.D.              Current Outpatient Medications:   •  ACCU-CHEK JESUS MANUEL PLUS test strip, TEST THREE TIMES DAILY, Disp: 300 each, Rfl: 2  •  Accu-Chek FastClix Lancets misc, Use to test blood sugar 4 times daily  Dispense what insurance will approve E11.8, Disp: 400 each, Rfl: 3  •  Accu-Chek Softclix Lancets lancets, Use to test blood sugar 3 times daily e11.8, Disp: 300 each, Rfl: 0  •  amLODIPine (NORVASC) 5 MG tablet, Take 1 tablet by mouth Daily., Disp: 30 tablet, Rfl: 6  •  aspirin 81 MG tablet, Take 81 mg by mouth Every Morning. TO STOP THE DAY BEFORE SURGERY, Disp: , Rfl:   •  Blood Glucose Monitoring Suppl (Accu-Chek Guide) w/Device kit, 1 Device 4 (Four) Times a Day. Use to test blood sugar 4 times daily  Dispense what insurance will approve E11.8, Disp: 1 kit, Rfl: 0  •  bumetanide (BUMEX) 2 MG tablet, Take 1 tablet by mouth 2 (Two) Times a Day., Disp: 60 tablet, Rfl: 0  •  carvedilol (COREG) 12.5 MG tablet, Take 1 tablet by mouth 2 (Two) Times a Day., Disp: 180 tablet, Rfl: 3  •  Cholecalciferol (VITAMIN D-3) 1000 UNITS capsule, Take 5,000 Units by mouth Daily., Disp: , Rfl:   •  Eliquis 5 MG tablet tablet, Take 1 tablet by mouth twice daily, Disp: 180 tablet, Rfl: 0  •  epoetin hermes (EPOGEN,PROCRIT) 70599 UNIT/ML injection, Inject 20,000 Units under the skin into the appropriate area as directed 1 (One) Time Per Week. LAST TIME 2-6-2017, Disp: , Rfl:   •  Ferrous Fumarate-Vitamin C ER (Florencia-Sequels) 65-25 MG CR tablet, Take 65 mg by mouth., Disp: , Rfl:   •  gabapentin  (NEURONTIN) 300 MG capsule, Take 300 mg by mouth 2 (two) times a day., Disp: , Rfl:   •  Glucagon (Baqsimi Two Pack) 3 MG/DOSE powder, 3 mg into the nostril(s) as directed by provider As Needed (hypoglycemia)., Disp: 1 each, Rfl: 1  •  glucose blood (Accu-Chek Guide) test strip, Use to test blood sugar 4 times daily  Dispense what insurance will approve E11.8, Disp: 400 each, Rfl: 3  •  glucose blood (Accu-Chek Guide) test strip, Test blood sugar 1 times daily e11.8, Disp: 100 each, Rfl: 3  •  glucose blood test strip, Use as instructed, Disp: 100 each, Rfl: 0  •  glucose monitor monitoring kit, 1 each As Needed (diabetes)., Disp: 1 each, Rfl: 1  •  levothyroxine (SYNTHROID, LEVOTHROID) 50 MCG tablet, Take 1 tablet by mouth Daily., Disp: 30 tablet, Rfl: 11  •  Loperamide HCl (IMODIUM A-D PO), Take 1 tablet by mouth Every 4 (Four) Hours As Needed., Disp: , Rfl:   •  losartan (COZAAR) 50 MG tablet, Take 50 mg by mouth every night at bedtime., Disp: , Rfl:   •  montelukast (SINGULAIR) 10 MG tablet, Take 1 tablet by mouth Every Night., Disp: 90 tablet, Rfl: 1  •  Multiple Vitamin (MULTI VITAMIN DAILY PO), Take 1 tablet by mouth Every Morning., Disp: , Rfl:   •  OneTouch Verio test strip, Use as instructed, Disp: 400 each, Rfl: 1  •  Probiotic Product (PROBIOTIC DAILY PO), Take 1 capsule by mouth Daily., Disp: , Rfl:   •  pyridoxine (VITAMIN B-6) 500 MG tablet, Take 500 mg by mouth Daily., Disp: , Rfl:   •  topiramate (TOPAMAX) 100 MG tablet, Take 100 mg by mouth Daily., Disp: , Rfl:   •  Tradjenta 5 MG tablet tablet, Take 1 tablet by mouth once daily, Disp: 90 tablet, Rfl: 1     Assessment:        Patient Active Problem List   Diagnosis   • Menstrual disorder   • Atopic rhinitis   • Anemia in CKD (chronic kidney disease)   • Spasm of cervical paraspinous muscle   • Cervical radiculopathy   • Chronic kidney disease, stage IV (severe) (CMS/HCC)   • Depression   • DM type 2 with diabetic peripheral neuropathy (CMS/HCC)   •  Essential hypertension   • Duplay's periarthritis syndrome   • Gastroesophageal reflux disease   • Hyperlipidemia   • Hypertension   • Arthritis   • Seizure disorder (CMS/[facility])   • Phlebitis   • Type 2 diabetes mellitus (CMS/[facility])   • Vitamin D deficiency   • Chest pain   • Abscess   • Generalized muscle weakness   • Abdominal wall cellulitis   • HTN (hypertension)   • CKD (chronic kidney disease) stage 4, GFR 15-29 ml/min (CMS/[facility])   • Diabetes mellitus with peripheral autonomic neuropathy (CMS/[facility])   • Hyponatremia   • Hypercalcemia   • Dehydration   • DACIA (acute kidney injury) (CMS/[facility])   • Abdominal wall abscess   • Cellulitis of toe of left foot   • Partial Achilles tendon tear, left, initial encounter   • Arthritis of foot   • Essential tremor   • Primary Parkinsonism (CMS/[facility])   • Abnormal cardiac function test   • 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 pectoris (CMS/[facility])   • Atrial fibrillation (CMS/[facility])   • Anticoagulated   • CKD (chronic kidney disease) stage 5, GFR less than 15 ml/min (CMS/[facility])   • Hypoglycemia   • Low vitamin B12 level   • Hemorrhagic stroke (CMS/[facility])   • S/P CABG (coronary artery bypass graft)   • Acute pulmonary edema (CMS/[facility])   • Hypothyroidism (acquired)               Plan:   1.  hospital follow-up for congestive heart failure: She states Dr. Mcclain manages her diuretics.  Reviewed echo results with her. No rales noted on exam.  She states her's BLE swelling is at her norm.  To new current management    Educated on low sodium diet and exercise 30 minutes a day for 2-3 times a week.     2.  CAD status post CABG: She denies any chest pain.  Continue current regimen.     Risk reduction for the coronary artery disease, controlling the blood pressure, blood sugar management, cholesterol management, exercise, stress management, and proper compliance with medications and follow-up has been discussed    3.  Atrial fibrillation on Eliquis.  Continue current management.    No  bleeding noted.    Pros and cons as well as indication of the anticoagulation has been explained to the patient in detail. There are no obvious complications at this stage. Risk of  the bleedings has been explained. Need for the regular blood workup and adjust the dose has been explained. Need for proper follow-up on anticoagulation also has been explained.     3.  hyperlipidemia: Lipid panel in 2020 revealed , HDL 42, LDL 59, and triglycerides 89.  Continue statin.       Risk of the hyperlipidemia, importance of the treatment has been explained. Pros and cons of the statins has been explained. Regular blood workup as well as side effects including the liver failure, myelopathy death has been explained.    4.  Hypertension: Stable in the office today.  Continue current management.       Educated patient on exercising for at least 30 minutes a day for 2 to 3 days a week. Importance of controlling hypertension and blood pressure checkup on the regular basis has been explained. Hypertension as a silent killer has been discussed. Risk reduction of the weight and regular exercises to control the hypertension has been explained.    5. DOMI?:  She is going to discuss with her primary care physician..     No diagnosis found.    There are no diagnoses linked to this encounter.    COUNSELING: clay [place] was given to patient for the following topics: diagnostic results, risk factor reductions, impressions, risks and benefits of treatment options and importance of treatment compliance .       SMOKING COUNSELING: denies    Counseling given: Not Answered  Comment: quit 1993    She will follow up with Dr. Hodges in 6 months, unless she needs to be seen sooner.    Sincerely,   IRENA Kohler  [facility]  05/25/21  12:23 EDT

## 2021-05-28 ENCOUNTER — HOSPITAL ENCOUNTER (OUTPATIENT)
Dept: INFUSION THERAPY | Facility: HOSPITAL | Age: 71
Discharge: HOME | End: 2021-05-28
Admitting: INTERNAL MEDICINE

## 2021-05-28 VITALS
DIASTOLIC BLOOD PRESSURE: 64 MMHG | OXYGEN SATURATION: 97 % | HEART RATE: 71 BPM | RESPIRATION RATE: 20 BRPM | SYSTOLIC BLOOD PRESSURE: 123 MMHG | TEMPERATURE: 96.8 F

## 2021-05-28 DIAGNOSIS — D63.1 ANEMIA IN STAGE 5 CHRONIC KIDNEY DISEASE, NOT ON CHRONIC DIALYSIS (HCC): ICD-10-CM

## 2021-05-28 DIAGNOSIS — N18.5 ANEMIA IN STAGE 5 CHRONIC KIDNEY DISEASE, NOT ON CHRONIC DIALYSIS (HCC): ICD-10-CM

## 2021-05-28 DIAGNOSIS — N18.5 CKD (CHRONIC KIDNEY DISEASE) STAGE 5, GFR LESS THAN 15 ML/MIN (HCC): Primary | ICD-10-CM

## 2021-05-28 LAB
HCT VFR BLD AUTO: 35.7 % (ref 34–46.6)
HGB BLD-MCNC: 10.9 G/DL (ref 12–15.9)

## 2021-05-28 PROCEDURE — 36415 COLL VENOUS BLD VENIPUNCTURE: CPT

## 2021-05-28 PROCEDURE — 25010000002 EPOETIN ALFA PER 1000 UNITS: Performed by: INTERNAL MEDICINE

## 2021-05-28 PROCEDURE — 85014 HEMATOCRIT: CPT | Performed by: INTERNAL MEDICINE

## 2021-05-28 PROCEDURE — 96372 THER/PROPH/DIAG INJ SC/IM: CPT

## 2021-05-28 PROCEDURE — 85018 HEMOGLOBIN: CPT | Performed by: INTERNAL MEDICINE

## 2021-05-28 RX ADMIN — ERYTHROPOIETIN 20000 UNITS: 20000 INJECTION, SOLUTION INTRAVENOUS; SUBCUTANEOUS at 13:25

## 2021-06-03 ENCOUNTER — TELEPHONE (OUTPATIENT)
Dept: FAMILY MEDICINE CLINIC | Facility: CLINIC | Age: 71
End: 2021-06-03

## 2021-06-03 NOTE — TELEPHONE ENCOUNTER
Called Lynette at [facility] and notified her.

## 2021-06-03 NOTE — TELEPHONE ENCOUNTER
MARSHAL WITH [facility] CALLED AND STATES THE PATIENT WAS PREVIOUSLY ON ZOLOFT AND STOPPED THE MEDICATION. SHE IS NOW EXPERIENCING SOME DEPRESSION, CRYING.    [facility] [address] [place] [id number] [facility] - [phone]  - [phone]   [phone]    MARSHAL'S CALL BACK NUMBER [phone]

## 2021-06-04 ENCOUNTER — HOSPITAL ENCOUNTER (OUTPATIENT)
Dept: INFUSION THERAPY | Facility: HOSPITAL | Age: 71
Discharge: HOME | End: 2021-06-04
Admitting: INTERNAL MEDICINE

## 2021-06-04 VITALS
OXYGEN SATURATION: 96 % | SYSTOLIC BLOOD PRESSURE: 90 MMHG | HEART RATE: 63 BPM | DIASTOLIC BLOOD PRESSURE: 44 MMHG | RESPIRATION RATE: 18 BRPM | TEMPERATURE: 96.9 F

## 2021-06-04 DIAGNOSIS — N18.5 ANEMIA IN STAGE 5 CHRONIC KIDNEY DISEASE, NOT ON CHRONIC DIALYSIS (HCC): ICD-10-CM

## 2021-06-04 DIAGNOSIS — N18.5 CKD (CHRONIC KIDNEY DISEASE) STAGE 5, GFR LESS THAN 15 ML/MIN (HCC): Primary | ICD-10-CM

## 2021-06-04 DIAGNOSIS — D63.1 ANEMIA IN STAGE 5 CHRONIC KIDNEY DISEASE, NOT ON CHRONIC DIALYSIS (HCC): ICD-10-CM

## 2021-06-04 LAB
HCT VFR BLD AUTO: 40.6 % (ref 34–46.6)
HGB BLD-MCNC: 12.7 G/DL (ref 12–15.9)

## 2021-06-04 PROCEDURE — G0463 HOSPITAL OUTPT CLINIC VISIT: HCPCS

## 2021-06-04 PROCEDURE — 85018 HEMOGLOBIN: CPT | Performed by: INTERNAL MEDICINE

## 2021-06-04 PROCEDURE — 36416 COLLJ CAPILLARY BLOOD SPEC: CPT

## 2021-06-04 PROCEDURE — 85014 HEMATOCRIT: CPT | Performed by: INTERNAL MEDICINE

## 2021-06-04 NOTE — PROGRESS NOTES
Procrit not indicated due to order parameters not met.  Pt given AVS and dc'ed per wc with spouse.

## 2021-06-11 ENCOUNTER — HOSPITAL ENCOUNTER (OUTPATIENT)
Dept: INFUSION THERAPY | Facility: HOSPITAL | Age: 71
Discharge: HOME | End: 2021-06-11
Admitting: INTERNAL MEDICINE

## 2021-06-11 VITALS
SYSTOLIC BLOOD PRESSURE: 105 MMHG | HEART RATE: 61 BPM | OXYGEN SATURATION: 98 % | TEMPERATURE: 96.9 F | RESPIRATION RATE: 20 BRPM | DIASTOLIC BLOOD PRESSURE: 49 MMHG

## 2021-06-11 DIAGNOSIS — D63.1 ANEMIA IN STAGE 5 CHRONIC KIDNEY DISEASE, NOT ON CHRONIC DIALYSIS (HCC): ICD-10-CM

## 2021-06-11 DIAGNOSIS — N18.5 CKD (CHRONIC KIDNEY DISEASE) STAGE 5, GFR LESS THAN 15 ML/MIN (HCC): Primary | ICD-10-CM

## 2021-06-11 DIAGNOSIS — N18.5 ANEMIA IN STAGE 5 CHRONIC KIDNEY DISEASE, NOT ON CHRONIC DIALYSIS (HCC): ICD-10-CM

## 2021-06-11 LAB
HCT VFR BLD AUTO: 38.7 % (ref 34–46.6)
HGB BLD-MCNC: 12.1 G/DL (ref 12–15.9)

## 2021-06-11 PROCEDURE — 85014 HEMATOCRIT: CPT | Performed by: INTERNAL MEDICINE

## 2021-06-11 PROCEDURE — 36416 COLLJ CAPILLARY BLOOD SPEC: CPT

## 2021-06-11 PROCEDURE — G0463 HOSPITAL OUTPT CLINIC VISIT: HCPCS

## 2021-06-11 PROCEDURE — 85018 HEMOGLOBIN: CPT | Performed by: INTERNAL MEDICINE

## 2021-06-18 ENCOUNTER — HOSPITAL ENCOUNTER (OUTPATIENT)
Dept: INFUSION THERAPY | Facility: HOSPITAL | Age: 71
Discharge: HOME | End: 2021-06-18
Admitting: INTERNAL MEDICINE

## 2021-06-18 VITALS
DIASTOLIC BLOOD PRESSURE: 53 MMHG | SYSTOLIC BLOOD PRESSURE: 100 MMHG | HEART RATE: 77 BPM | OXYGEN SATURATION: 96 % | RESPIRATION RATE: 20 BRPM | TEMPERATURE: 97.1 F

## 2021-06-18 DIAGNOSIS — D63.1 ANEMIA IN STAGE 5 CHRONIC KIDNEY DISEASE, NOT ON CHRONIC DIALYSIS (HCC): ICD-10-CM

## 2021-06-18 DIAGNOSIS — N18.5 CKD (CHRONIC KIDNEY DISEASE) STAGE 5, GFR LESS THAN 15 ML/MIN (HCC): Primary | ICD-10-CM

## 2021-06-18 DIAGNOSIS — N18.5 ANEMIA IN STAGE 5 CHRONIC KIDNEY DISEASE, NOT ON CHRONIC DIALYSIS (HCC): ICD-10-CM

## 2021-06-18 LAB
HCT VFR BLD AUTO: 32.5 % (ref 34–46.6)
HGB BLD-MCNC: 10.1 G/DL (ref 12–15.9)

## 2021-06-18 PROCEDURE — 36415 COLL VENOUS BLD VENIPUNCTURE: CPT

## 2021-06-18 PROCEDURE — 85014 HEMATOCRIT: CPT | Performed by: INTERNAL MEDICINE

## 2021-06-18 PROCEDURE — 25010000002 EPOETIN ALFA PER 1000 UNITS: Performed by: INTERNAL MEDICINE

## 2021-06-18 PROCEDURE — 96372 THER/PROPH/DIAG INJ SC/IM: CPT

## 2021-06-18 PROCEDURE — 85018 HEMOGLOBIN: CPT | Performed by: INTERNAL MEDICINE

## 2021-06-18 RX ADMIN — ERYTHROPOIETIN 20000 UNITS: 20000 INJECTION, SOLUTION INTRAVENOUS; SUBCUTANEOUS at 13:32

## 2021-06-18 NOTE — PROGRESS NOTES
HGB noted, Procrit indicated per physician order and given without difficulty.  AVS to patient per her request.  Discharged home per wheel chair with her  after appointment completed.

## 2021-06-25 ENCOUNTER — HOSPITAL ENCOUNTER (OUTPATIENT)
Dept: INFUSION THERAPY | Facility: HOSPITAL | Age: 71
Discharge: HOME | End: 2021-06-25
Admitting: INTERNAL MEDICINE

## 2021-06-25 VITALS
RESPIRATION RATE: 20 BRPM | HEART RATE: 77 BPM | DIASTOLIC BLOOD PRESSURE: 46 MMHG | OXYGEN SATURATION: 96 % | SYSTOLIC BLOOD PRESSURE: 100 MMHG | TEMPERATURE: 97.1 F

## 2021-06-25 DIAGNOSIS — N18.5 CKD (CHRONIC KIDNEY DISEASE) STAGE 5, GFR LESS THAN 15 ML/MIN (HCC): Primary | ICD-10-CM

## 2021-06-25 DIAGNOSIS — N18.5 ANEMIA IN STAGE 5 CHRONIC KIDNEY DISEASE, NOT ON CHRONIC DIALYSIS (HCC): ICD-10-CM

## 2021-06-25 DIAGNOSIS — D63.1 ANEMIA IN STAGE 5 CHRONIC KIDNEY DISEASE, NOT ON CHRONIC DIALYSIS (HCC): ICD-10-CM

## 2021-06-25 LAB
ANION GAP SERPL CALCULATED.3IONS-SCNC: 23 MMOL/L (ref 5–15)
BUN SERPL-MCNC: 124 MG/DL (ref 8–23)
BUN/CREAT SERPL: 11.8 (ref 7–25)
CALCIUM SPEC-SCNC: 6.5 MG/DL (ref 8.6–10.5)
CHLORIDE SERPL-SCNC: 106 MMOL/L (ref 98–107)
CO2 SERPL-SCNC: 15 MMOL/L (ref 22–29)
CREAT SERPL-MCNC: 10.55 MG/DL (ref 0.57–1)
GFR SERPL CREATININE-BSD FRML MDRD: 4 ML/MIN/1.73
GFR SERPL CREATININE-BSD FRML MDRD: ABNORMAL ML/MIN/{1.73_M2}
GLUCOSE SERPL-MCNC: 125 MG/DL (ref 65–99)
HCT VFR BLD AUTO: 32.9 % (ref 34–46.6)
HGB BLD-MCNC: 10.6 G/DL (ref 12–15.9)
MAGNESIUM SERPL-MCNC: 1.5 MG/DL (ref 1.6–2.4)
POTASSIUM SERPL-SCNC: 4.6 MMOL/L (ref 3.5–5.2)
SODIUM SERPL-SCNC: 144 MMOL/L (ref 136–145)

## 2021-06-25 PROCEDURE — 25010000002 EPOETIN ALFA PER 1000 UNITS: Performed by: INTERNAL MEDICINE

## 2021-06-25 PROCEDURE — 36415 COLL VENOUS BLD VENIPUNCTURE: CPT

## 2021-06-25 PROCEDURE — 85018 HEMOGLOBIN: CPT | Performed by: INTERNAL MEDICINE

## 2021-06-25 PROCEDURE — 83735 ASSAY OF MAGNESIUM: CPT | Performed by: INTERNAL MEDICINE

## 2021-06-25 PROCEDURE — 96372 THER/PROPH/DIAG INJ SC/IM: CPT

## 2021-06-25 PROCEDURE — 80048 BASIC METABOLIC PNL TOTAL CA: CPT | Performed by: INTERNAL MEDICINE

## 2021-06-25 PROCEDURE — 85014 HEMATOCRIT: CPT | Performed by: INTERNAL MEDICINE

## 2021-06-25 RX ADMIN — ERYTHROPOIETIN 20000 UNITS: 20000 INJECTION, SOLUTION INTRAVENOUS; SUBCUTANEOUS at 13:13

## 2021-06-29 ENCOUNTER — HOSPITAL ENCOUNTER (INPATIENT)
Facility: HOSPITAL | Age: 71
LOS: 9 days | Discharge: HOME HEALTH | End: 2021-07-08
Attending: EMERGENCY MEDICINE | Admitting: INTERNAL MEDICINE

## 2021-06-29 ENCOUNTER — APPOINTMENT (OUTPATIENT)
Dept: GENERAL RADIOLOGY | Facility: HOSPITAL | Age: 71
End: 2021-06-29

## 2021-06-29 ENCOUNTER — APPOINTMENT (OUTPATIENT)
Dept: CT IMAGING | Facility: HOSPITAL | Age: 71
End: 2021-06-29

## 2021-06-29 DIAGNOSIS — K92.1 GASTROINTESTINAL HEMORRHAGE WITH MELENA: ICD-10-CM

## 2021-06-29 DIAGNOSIS — Z86.73 HISTORY OF CVA (CEREBROVASCULAR ACCIDENT): ICD-10-CM

## 2021-06-29 DIAGNOSIS — D62 ANEMIA, POSTHEMORRHAGIC, ACUTE: ICD-10-CM

## 2021-06-29 DIAGNOSIS — I95.9 HYPOTENSION, UNSPECIFIED HYPOTENSION TYPE: Primary | ICD-10-CM

## 2021-06-29 DIAGNOSIS — K92.1 MELENA: ICD-10-CM

## 2021-06-29 DIAGNOSIS — N39.0 URINARY TRACT INFECTION WITHOUT HEMATURIA, SITE UNSPECIFIED: ICD-10-CM

## 2021-06-29 DIAGNOSIS — N18.9 CHRONIC KIDNEY DISEASE, UNSPECIFIED CKD STAGE: ICD-10-CM

## 2021-06-29 DIAGNOSIS — N18.6 ESRD (END STAGE RENAL DISEASE) (HCC): ICD-10-CM

## 2021-06-29 LAB
ALBUMIN SERPL-MCNC: 2.5 G/DL (ref 3.5–5.2)
ALBUMIN/GLOB SERPL: 0.7 G/DL
ALP SERPL-CCNC: 60 U/L (ref 39–117)
ALT SERPL W P-5'-P-CCNC: 11 U/L (ref 1–33)
ANION GAP SERPL CALCULATED.3IONS-SCNC: 19.1 MMOL/L (ref 5–15)
AST SERPL-CCNC: 10 U/L (ref 1–32)
BACTERIA UR QL AUTO: ABNORMAL /HPF
BASOPHILS # BLD AUTO: 0.02 10*3/MM3 (ref 0–0.2)
BASOPHILS NFR BLD AUTO: 0.2 % (ref 0–1.5)
BILIRUB SERPL-MCNC: 0.2 MG/DL (ref 0–1.2)
BILIRUB UR QL STRIP: NEGATIVE
BUN SERPL-MCNC: 129 MG/DL (ref 8–23)
BUN/CREAT SERPL: 13.9 (ref 7–25)
CALCIUM SPEC-SCNC: 6.4 MG/DL (ref 8.6–10.5)
CHLORIDE SERPL-SCNC: 103 MMOL/L (ref 98–107)
CK SERPL-CCNC: 63 U/L (ref 20–180)
CLARITY UR: ABNORMAL
CO2 SERPL-SCNC: 16.9 MMOL/L (ref 22–29)
COLOR UR: YELLOW
CREAT SERPL-MCNC: 9.28 MG/DL (ref 0.57–1)
D-LACTATE SERPL-SCNC: 0.6 MMOL/L (ref 0.5–2)
DEPRECATED RDW RBC AUTO: 50.4 FL (ref 37–54)
EOSINOPHIL # BLD AUTO: 0.19 10*3/MM3 (ref 0–0.4)
EOSINOPHIL NFR BLD AUTO: 2 % (ref 0.3–6.2)
ERYTHROCYTE [DISTWIDTH] IN BLOOD BY AUTOMATED COUNT: 15.2 % (ref 12.3–15.4)
GFR SERPL CREATININE-BSD FRML MDRD: 4 ML/MIN/1.73
GFR SERPL CREATININE-BSD FRML MDRD: ABNORMAL ML/MIN/{1.73_M2}
GLOBULIN UR ELPH-MCNC: 3.8 GM/DL
GLUCOSE BLDC GLUCOMTR-MCNC: 143 MG/DL (ref 70–130)
GLUCOSE BLDC GLUCOMTR-MCNC: 157 MG/DL (ref 70–130)
GLUCOSE BLDC GLUCOMTR-MCNC: 185 MG/DL (ref 70–130)
GLUCOSE BLDC GLUCOMTR-MCNC: 77 MG/DL (ref 70–130)
GLUCOSE BLDC GLUCOMTR-MCNC: 82 MG/DL (ref 70–130)
GLUCOSE BLDC GLUCOMTR-MCNC: 96 MG/DL (ref 70–130)
GLUCOSE SERPL-MCNC: 133 MG/DL (ref 65–99)
GLUCOSE UR STRIP-MCNC: NEGATIVE MG/DL
HBV SURFACE AG SERPL QL IA: NORMAL
HCT VFR BLD AUTO: 31.1 % (ref 34–46.6)
HGB BLD-MCNC: 9.8 G/DL (ref 12–15.9)
HGB UR QL STRIP.AUTO: ABNORMAL
HYALINE CASTS UR QL AUTO: ABNORMAL /LPF
IMM GRANULOCYTES # BLD AUTO: 0.32 10*3/MM3 (ref 0–0.05)
IMM GRANULOCYTES NFR BLD AUTO: 3.4 % (ref 0–0.5)
INR PPP: 2.27 (ref 0.9–1.1)
KETONES UR QL STRIP: NEGATIVE
LEUKOCYTE ESTERASE UR QL STRIP.AUTO: ABNORMAL
LYMPHOCYTES # BLD AUTO: 0.58 10*3/MM3 (ref 0.7–3.1)
LYMPHOCYTES NFR BLD AUTO: 6.1 % (ref 19.6–45.3)
MAGNESIUM SERPL-MCNC: 1.6 MG/DL (ref 1.6–2.4)
MCH RBC QN AUTO: 29 PG (ref 26.6–33)
MCHC RBC AUTO-ENTMCNC: 31.5 G/DL (ref 31.5–35.7)
MCV RBC AUTO: 92 FL (ref 79–97)
MONOCYTES # BLD AUTO: 0.48 10*3/MM3 (ref 0.1–0.9)
MONOCYTES NFR BLD AUTO: 5 % (ref 5–12)
NEUTROPHILS NFR BLD AUTO: 7.92 10*3/MM3 (ref 1.7–7)
NEUTROPHILS NFR BLD AUTO: 83.3 % (ref 42.7–76)
NITRITE UR QL STRIP: NEGATIVE
NRBC BLD AUTO-RTO: 0.9 /100 WBC (ref 0–0.2)
PH UR STRIP.AUTO: 7.5 [PH] (ref 5–8)
PLATELET # BLD AUTO: 177 10*3/MM3 (ref 140–450)
PMV BLD AUTO: 9.9 FL (ref 6–12)
POTASSIUM SERPL-SCNC: 5.4 MMOL/L (ref 3.5–5.2)
PROCALCITONIN SERPL-MCNC: 0.3 NG/ML (ref 0–0.25)
PROT SERPL-MCNC: 6.3 G/DL (ref 6–8.5)
PROT UR QL STRIP: ABNORMAL
PROTHROMBIN TIME: 24.8 SECONDS (ref 11.7–14.2)
QT INTERVAL: 476 MS
RBC # BLD AUTO: 3.38 10*6/MM3 (ref 3.77–5.28)
RBC # UR: ABNORMAL /HPF
REF LAB TEST METHOD: ABNORMAL
SARS-COV-2 RNA RESP QL NAA+PROBE: NOT DETECTED
SODIUM SERPL-SCNC: 139 MMOL/L (ref 136–145)
SP GR UR STRIP: 1.02 (ref 1–1.03)
SQUAMOUS #/AREA URNS HPF: ABNORMAL /HPF
TROPONIN T SERPL-MCNC: 0.08 NG/ML (ref 0–0.03)
TROPONIN T SERPL-MCNC: 0.16 NG/ML (ref 0–0.03)
UROBILINOGEN UR QL STRIP: ABNORMAL
WBC # BLD AUTO: 9.51 10*3/MM3 (ref 3.4–10.8)
WBC UR QL AUTO: ABNORMAL /HPF

## 2021-06-29 PROCEDURE — 99285 EMERGENCY DEPT VISIT HI MDM: CPT

## 2021-06-29 PROCEDURE — 83735 ASSAY OF MAGNESIUM: CPT | Performed by: EMERGENCY MEDICINE

## 2021-06-29 PROCEDURE — 87040 BLOOD CULTURE FOR BACTERIA: CPT | Performed by: EMERGENCY MEDICINE

## 2021-06-29 PROCEDURE — 84145 PROCALCITONIN (PCT): CPT | Performed by: EMERGENCY MEDICINE

## 2021-06-29 PROCEDURE — 81001 URINALYSIS AUTO W/SCOPE: CPT | Performed by: EMERGENCY MEDICINE

## 2021-06-29 PROCEDURE — 85025 COMPLETE CBC W/AUTO DIFF WBC: CPT | Performed by: EMERGENCY MEDICINE

## 2021-06-29 PROCEDURE — 25010000002 CEFTRIAXONE PER 250 MG: Performed by: EMERGENCY MEDICINE

## 2021-06-29 PROCEDURE — U0005 INFEC AGEN DETEC AMPLI PROBE: HCPCS | Performed by: EMERGENCY MEDICINE

## 2021-06-29 PROCEDURE — 82550 ASSAY OF CK (CPK): CPT | Performed by: EMERGENCY MEDICINE

## 2021-06-29 PROCEDURE — 93010 ELECTROCARDIOGRAM REPORT: CPT | Performed by: INTERNAL MEDICINE

## 2021-06-29 PROCEDURE — 87340 HEPATITIS B SURFACE AG IA: CPT | Performed by: INTERNAL MEDICINE

## 2021-06-29 PROCEDURE — 25010000002 HEPARIN (PORCINE) PER 1000 UNITS: Performed by: INTERNAL MEDICINE

## 2021-06-29 PROCEDURE — 83605 ASSAY OF LACTIC ACID: CPT | Performed by: EMERGENCY MEDICINE

## 2021-06-29 PROCEDURE — 93005 ELECTROCARDIOGRAM TRACING: CPT | Performed by: EMERGENCY MEDICINE

## 2021-06-29 PROCEDURE — U0003 INFECTIOUS AGENT DETECTION BY NUCLEIC ACID (DNA OR RNA); SEVERE ACUTE RESPIRATORY SYNDROME CORONAVIRUS 2 (SARS-COV-2) (CORONAVIRUS DISEASE [COVID-19]), AMPLIFIED PROBE TECHNIQUE, MAKING USE OF HIGH THROUGHPUT TECHNOLOGIES AS DESCRIBED BY CMS-2020-01-R: HCPCS | Performed by: EMERGENCY MEDICINE

## 2021-06-29 PROCEDURE — P9612 CATHETERIZE FOR URINE SPEC: HCPCS

## 2021-06-29 PROCEDURE — 84484 ASSAY OF TROPONIN QUANT: CPT | Performed by: EMERGENCY MEDICINE

## 2021-06-29 PROCEDURE — 85610 PROTHROMBIN TIME: CPT | Performed by: EMERGENCY MEDICINE

## 2021-06-29 PROCEDURE — 82962 GLUCOSE BLOOD TEST: CPT

## 2021-06-29 PROCEDURE — 71045 X-RAY EXAM CHEST 1 VIEW: CPT

## 2021-06-29 PROCEDURE — 80053 COMPREHEN METABOLIC PANEL: CPT | Performed by: EMERGENCY MEDICINE

## 2021-06-29 PROCEDURE — 70450 CT HEAD/BRAIN W/O DYE: CPT

## 2021-06-29 RX ORDER — ALUMINA, MAGNESIA, AND SIMETHICONE 2400; 2400; 240 MG/30ML; MG/30ML; MG/30ML
15 SUSPENSION ORAL EVERY 6 HOURS PRN
Status: DISCONTINUED | OUTPATIENT
Start: 2021-06-29 | End: 2021-07-08 | Stop reason: HOSPADM

## 2021-06-29 RX ORDER — CALCIUM CARBONATE 200(500)MG
4 TABLET,CHEWABLE ORAL 3 TIMES DAILY PRN
Status: DISCONTINUED | OUTPATIENT
Start: 2021-06-29 | End: 2021-07-08 | Stop reason: HOSPADM

## 2021-06-29 RX ORDER — LABETALOL HYDROCHLORIDE 5 MG/ML
20 INJECTION, SOLUTION INTRAVENOUS
Status: DISCONTINUED | OUTPATIENT
Start: 2021-06-29 | End: 2021-07-08 | Stop reason: HOSPADM

## 2021-06-29 RX ORDER — SODIUM CHLORIDE 0.9 % (FLUSH) 0.9 %
10 SYRINGE (ML) INJECTION AS NEEDED
Status: DISCONTINUED | OUTPATIENT
Start: 2021-06-29 | End: 2021-07-08 | Stop reason: HOSPADM

## 2021-06-29 RX ORDER — CEFTRIAXONE SODIUM 1 G/50ML
1 INJECTION, SOLUTION INTRAVENOUS EVERY 24 HOURS
Status: DISCONTINUED | OUTPATIENT
Start: 2021-06-30 | End: 2021-07-02

## 2021-06-29 RX ORDER — IPRATROPIUM BROMIDE AND ALBUTEROL SULFATE 2.5; .5 MG/3ML; MG/3ML
3 SOLUTION RESPIRATORY (INHALATION) EVERY 6 HOURS PRN
Status: DISCONTINUED | OUTPATIENT
Start: 2021-06-29 | End: 2021-07-08 | Stop reason: HOSPADM

## 2021-06-29 RX ORDER — NOREPINEPHRINE BIT/0.9 % NACL 8 MG/250ML
.02-.3 INFUSION BOTTLE (ML) INTRAVENOUS
Status: DISCONTINUED | OUTPATIENT
Start: 2021-06-29 | End: 2021-06-30

## 2021-06-29 RX ORDER — HEPARIN SODIUM 5000 [USP'U]/ML
5000 INJECTION, SOLUTION INTRAVENOUS; SUBCUTANEOUS EVERY 8 HOURS SCHEDULED
Status: DISCONTINUED | OUTPATIENT
Start: 2021-06-29 | End: 2021-06-30

## 2021-06-29 RX ORDER — ONDANSETRON 2 MG/ML
4 INJECTION INTRAMUSCULAR; INTRAVENOUS EVERY 6 HOURS PRN
Status: DISCONTINUED | OUTPATIENT
Start: 2021-06-29 | End: 2021-07-08 | Stop reason: HOSPADM

## 2021-06-29 RX ORDER — CEFTRIAXONE SODIUM 1 G/50ML
1 INJECTION, SOLUTION INTRAVENOUS ONCE
Status: COMPLETED | OUTPATIENT
Start: 2021-06-29 | End: 2021-06-29

## 2021-06-29 RX ORDER — SODIUM CHLORIDE 0.9 % (FLUSH) 0.9 %
10 SYRINGE (ML) INJECTION EVERY 12 HOURS SCHEDULED
Status: DISCONTINUED | OUTPATIENT
Start: 2021-06-29 | End: 2021-07-08 | Stop reason: HOSPADM

## 2021-06-29 RX ORDER — HEPARIN SODIUM 1000 [USP'U]/ML
2000 INJECTION, SOLUTION INTRAVENOUS; SUBCUTANEOUS AS NEEDED
Status: DISCONTINUED | OUTPATIENT
Start: 2021-06-29 | End: 2021-07-08 | Stop reason: HOSPADM

## 2021-06-29 RX ADMIN — Medication 0.02 MCG/KG/MIN: at 03:18

## 2021-06-29 RX ADMIN — HEPARIN SODIUM 5000 UNITS: 5000 INJECTION INTRAVENOUS; SUBCUTANEOUS at 09:18

## 2021-06-29 RX ADMIN — SODIUM CHLORIDE 500 ML: 9 INJECTION, SOLUTION INTRAVENOUS at 09:25

## 2021-06-29 RX ADMIN — SODIUM CHLORIDE, PRESERVATIVE FREE 10 ML: 5 INJECTION INTRAVENOUS at 20:52

## 2021-06-29 RX ADMIN — SODIUM CHLORIDE, PRESERVATIVE FREE 10 ML: 5 INJECTION INTRAVENOUS at 09:21

## 2021-06-29 RX ADMIN — SODIUM CHLORIDE 1000 ML: 9 INJECTION, SOLUTION INTRAVENOUS at 01:39

## 2021-06-29 RX ADMIN — CEFTRIAXONE SODIUM 1 G: 1 INJECTION, SOLUTION INTRAVENOUS at 02:31

## 2021-06-29 RX ADMIN — HEPARIN SODIUM 5000 UNITS: 5000 INJECTION INTRAVENOUS; SUBCUTANEOUS at 21:24

## 2021-06-29 RX ADMIN — HEPARIN SODIUM 5000 UNITS: 5000 INJECTION INTRAVENOUS; SUBCUTANEOUS at 14:33

## 2021-06-29 RX ADMIN — SODIUM CHLORIDE 1000 ML: 9 INJECTION, SOLUTION INTRAVENOUS at 06:33

## 2021-06-30 ENCOUNTER — APPOINTMENT (OUTPATIENT)
Dept: CT IMAGING | Facility: HOSPITAL | Age: 71
End: 2021-06-30

## 2021-06-30 LAB
ANION GAP SERPL CALCULATED.3IONS-SCNC: 18.1 MMOL/L (ref 5–15)
BASOPHILS # BLD AUTO: 0.03 10*3/MM3 (ref 0–0.2)
BASOPHILS NFR BLD AUTO: 0.3 % (ref 0–1.5)
BUN SERPL-MCNC: 79 MG/DL (ref 8–23)
BUN/CREAT SERPL: 11 (ref 7–25)
C DIFF TOX GENS STL QL NAA+PROBE: NEGATIVE
CALCIUM SPEC-SCNC: 6.5 MG/DL (ref 8.6–10.5)
CHLORIDE SERPL-SCNC: 105 MMOL/L (ref 98–107)
CO2 SERPL-SCNC: 17.9 MMOL/L (ref 22–29)
CREAT SERPL-MCNC: 7.19 MG/DL (ref 0.57–1)
DEPRECATED RDW RBC AUTO: 50.3 FL (ref 37–54)
EOSINOPHIL # BLD AUTO: 0.22 10*3/MM3 (ref 0–0.4)
EOSINOPHIL NFR BLD AUTO: 2.3 % (ref 0.3–6.2)
ERYTHROCYTE [DISTWIDTH] IN BLOOD BY AUTOMATED COUNT: 15.3 % (ref 12.3–15.4)
GFR SERPL CREATININE-BSD FRML MDRD: 6 ML/MIN/1.73
GFR SERPL CREATININE-BSD FRML MDRD: ABNORMAL ML/MIN/{1.73_M2}
GLUCOSE BLDC GLUCOMTR-MCNC: 102 MG/DL (ref 70–130)
GLUCOSE BLDC GLUCOMTR-MCNC: 148 MG/DL (ref 70–130)
GLUCOSE BLDC GLUCOMTR-MCNC: 70 MG/DL (ref 70–130)
GLUCOSE BLDC GLUCOMTR-MCNC: 75 MG/DL (ref 70–130)
GLUCOSE BLDC GLUCOMTR-MCNC: 83 MG/DL (ref 70–130)
GLUCOSE SERPL-MCNC: 51 MG/DL (ref 65–99)
HCT VFR BLD AUTO: 29.5 % (ref 34–46.6)
HCT VFR BLD AUTO: 29.6 % (ref 34–46.6)
HEMOCCULT STL QL: POSITIVE
HGB BLD-MCNC: 9.5 G/DL (ref 12–15.9)
HGB BLD-MCNC: 9.6 G/DL (ref 12–15.9)
IMM GRANULOCYTES # BLD AUTO: 0.16 10*3/MM3 (ref 0–0.05)
IMM GRANULOCYTES NFR BLD AUTO: 1.7 % (ref 0–0.5)
INR PPP: 1.81 (ref 0.9–1.1)
LYMPHOCYTES # BLD AUTO: 0.66 10*3/MM3 (ref 0.7–3.1)
LYMPHOCYTES NFR BLD AUTO: 7 % (ref 19.6–45.3)
MAGNESIUM SERPL-MCNC: 1.5 MG/DL (ref 1.6–2.4)
MCH RBC QN AUTO: 29.5 PG (ref 26.6–33)
MCHC RBC AUTO-ENTMCNC: 32.2 G/DL (ref 31.5–35.7)
MCV RBC AUTO: 91.6 FL (ref 79–97)
MONOCYTES # BLD AUTO: 0.77 10*3/MM3 (ref 0.1–0.9)
MONOCYTES NFR BLD AUTO: 8.2 % (ref 5–12)
NEUTROPHILS NFR BLD AUTO: 7.59 10*3/MM3 (ref 1.7–7)
NEUTROPHILS NFR BLD AUTO: 80.5 % (ref 42.7–76)
NRBC BLD AUTO-RTO: 1 /100 WBC (ref 0–0.2)
PHOSPHATE SERPL-MCNC: 6.9 MG/DL (ref 2.5–4.5)
PLATELET # BLD AUTO: 151 10*3/MM3 (ref 140–450)
PMV BLD AUTO: 9.7 FL (ref 6–12)
POTASSIUM SERPL-SCNC: 3.9 MMOL/L (ref 3.5–5.2)
PROTHROMBIN TIME: 20.8 SECONDS (ref 11.7–14.2)
RBC # BLD AUTO: 3.22 10*6/MM3 (ref 3.77–5.28)
SODIUM SERPL-SCNC: 141 MMOL/L (ref 136–145)
WBC # BLD AUTO: 9.43 10*3/MM3 (ref 3.4–10.8)

## 2021-06-30 PROCEDURE — 25010000002 ONDANSETRON PER 1 MG: Performed by: INTERNAL MEDICINE

## 2021-06-30 PROCEDURE — 83735 ASSAY OF MAGNESIUM: CPT | Performed by: INTERNAL MEDICINE

## 2021-06-30 PROCEDURE — 85014 HEMATOCRIT: CPT | Performed by: INTERNAL MEDICINE

## 2021-06-30 PROCEDURE — 99222 1ST HOSP IP/OBS MODERATE 55: CPT | Performed by: INTERNAL MEDICINE

## 2021-06-30 PROCEDURE — 25010000002 CEFTRIAXONE PER 250 MG: Performed by: INTERNAL MEDICINE

## 2021-06-30 PROCEDURE — 85025 COMPLETE CBC W/AUTO DIFF WBC: CPT | Performed by: INTERNAL MEDICINE

## 2021-06-30 PROCEDURE — 74176 CT ABD & PELVIS W/O CONTRAST: CPT

## 2021-06-30 PROCEDURE — 85018 HEMOGLOBIN: CPT | Performed by: INTERNAL MEDICINE

## 2021-06-30 PROCEDURE — 82962 GLUCOSE BLOOD TEST: CPT

## 2021-06-30 PROCEDURE — 85610 PROTHROMBIN TIME: CPT | Performed by: INTERNAL MEDICINE

## 2021-06-30 PROCEDURE — 25010000002 IRON SUCROSE PER 1 MG: Performed by: INTERNAL MEDICINE

## 2021-06-30 PROCEDURE — 25010000002 HYDROMORPHONE PER 4 MG: Performed by: INTERNAL MEDICINE

## 2021-06-30 PROCEDURE — 25010000002 EPOETIN ALFA-EPBX 3000 UNIT/ML SOLUTION: Performed by: INTERNAL MEDICINE

## 2021-06-30 PROCEDURE — 92610 EVALUATE SWALLOWING FUNCTION: CPT

## 2021-06-30 PROCEDURE — 80048 BASIC METABOLIC PNL TOTAL CA: CPT | Performed by: INTERNAL MEDICINE

## 2021-06-30 PROCEDURE — 82272 OCCULT BLD FECES 1-3 TESTS: CPT | Performed by: INTERNAL MEDICINE

## 2021-06-30 PROCEDURE — 87493 C DIFF AMPLIFIED PROBE: CPT | Performed by: INTERNAL MEDICINE

## 2021-06-30 PROCEDURE — 25010000002 HEPARIN (PORCINE) PER 1000 UNITS: Performed by: INTERNAL MEDICINE

## 2021-06-30 PROCEDURE — 84100 ASSAY OF PHOSPHORUS: CPT | Performed by: INTERNAL MEDICINE

## 2021-06-30 RX ORDER — HYDROMORPHONE HYDROCHLORIDE 1 MG/ML
0.5 INJECTION, SOLUTION INTRAMUSCULAR; INTRAVENOUS; SUBCUTANEOUS EVERY 4 HOURS PRN
Status: DISPENSED | OUTPATIENT
Start: 2021-06-30 | End: 2021-07-07

## 2021-06-30 RX ORDER — NICOTINE POLACRILEX 4 MG
15 LOZENGE BUCCAL
Status: DISCONTINUED | OUTPATIENT
Start: 2021-06-30 | End: 2021-07-08 | Stop reason: HOSPADM

## 2021-06-30 RX ORDER — ONDANSETRON 2 MG/ML
4 INJECTION INTRAMUSCULAR; INTRAVENOUS EVERY 6 HOURS PRN
Status: DISCONTINUED | OUTPATIENT
Start: 2021-06-30 | End: 2021-06-30 | Stop reason: SDUPTHER

## 2021-06-30 RX ORDER — DEXTROSE MONOHYDRATE 25 G/50ML
25 INJECTION, SOLUTION INTRAVENOUS
Status: DISCONTINUED | OUTPATIENT
Start: 2021-06-30 | End: 2021-07-08 | Stop reason: HOSPADM

## 2021-06-30 RX ORDER — LEVOTHYROXINE SODIUM 0.05 MG/1
50 TABLET ORAL DAILY
Status: DISCONTINUED | OUTPATIENT
Start: 2021-06-30 | End: 2021-07-05

## 2021-06-30 RX ORDER — CASTOR OIL AND BALSAM, PERU 788; 87 MG/G; MG/G
OINTMENT TOPICAL EVERY 12 HOURS SCHEDULED
Status: DISCONTINUED | OUTPATIENT
Start: 2021-06-30 | End: 2021-07-08 | Stop reason: HOSPADM

## 2021-06-30 RX ORDER — SODIUM CHLORIDE, SODIUM LACTATE, POTASSIUM CHLORIDE, CALCIUM CHLORIDE 600; 310; 30; 20 MG/100ML; MG/100ML; MG/100ML; MG/100ML
30 INJECTION, SOLUTION INTRAVENOUS CONTINUOUS
Status: DISCONTINUED | OUTPATIENT
Start: 2021-07-01 | End: 2021-07-01

## 2021-06-30 RX ADMIN — HYDROMORPHONE HYDROCHLORIDE 0.5 MG: 1 INJECTION, SOLUTION INTRAMUSCULAR; INTRAVENOUS; SUBCUTANEOUS at 23:08

## 2021-06-30 RX ADMIN — PANTOPRAZOLE SODIUM 8 MG/HR: 40 INJECTION, POWDER, FOR SOLUTION INTRAVENOUS at 23:21

## 2021-06-30 RX ADMIN — EPOETIN ALFA-EPBX 5000 UNITS: 3000 INJECTION, SOLUTION INTRAVENOUS; SUBCUTANEOUS at 14:39

## 2021-06-30 RX ADMIN — ONDANSETRON 4 MG: 2 INJECTION INTRAMUSCULAR; INTRAVENOUS at 08:19

## 2021-06-30 RX ADMIN — DEXTROSE MONOHYDRATE 25 G: 500 INJECTION PARENTERAL at 03:46

## 2021-06-30 RX ADMIN — LEVOTHYROXINE SODIUM 50 MCG: 0.05 TABLET ORAL at 23:07

## 2021-06-30 RX ADMIN — SERTRALINE HYDROCHLORIDE 50 MG: 50 TABLET, FILM COATED ORAL at 23:07

## 2021-06-30 RX ADMIN — CEFTRIAXONE SODIUM 1 G: 1 INJECTION, SOLUTION INTRAVENOUS at 02:15

## 2021-06-30 RX ADMIN — HEPARIN SODIUM 5000 UNITS: 5000 INJECTION INTRAVENOUS; SUBCUTANEOUS at 06:14

## 2021-06-30 RX ADMIN — SODIUM CHLORIDE, PRESERVATIVE FREE 10 ML: 5 INJECTION INTRAVENOUS at 23:08

## 2021-06-30 RX ADMIN — MICONAZOLE NITRATE 1 APPLICATION: 2 CREAM TOPICAL at 16:38

## 2021-06-30 RX ADMIN — IRON SUCROSE 100 MG: 20 INJECTION, SOLUTION INTRAVENOUS at 09:33

## 2021-06-30 RX ADMIN — CASTOR OIL AND BALSAM, PERU 5 G: 788; 87 OINTMENT TOPICAL at 16:37

## 2021-07-01 ENCOUNTER — ANESTHESIA EVENT (OUTPATIENT)
Dept: GASTROENTEROLOGY | Facility: HOSPITAL | Age: 71
End: 2021-07-01

## 2021-07-01 ENCOUNTER — ANESTHESIA (OUTPATIENT)
Dept: GASTROENTEROLOGY | Facility: HOSPITAL | Age: 71
End: 2021-07-01

## 2021-07-01 PROBLEM — I50.22 SYSTOLIC CHF, CHRONIC: Status: ACTIVE | Noted: 2021-07-01

## 2021-07-01 PROBLEM — K92.1 MELENA: Status: ACTIVE | Noted: 2021-06-29

## 2021-07-01 PROBLEM — Z86.73 HISTORY OF CVA (CEREBROVASCULAR ACCIDENT): Status: ACTIVE | Noted: 2021-07-01

## 2021-07-01 LAB
ANION GAP SERPL CALCULATED.3IONS-SCNC: 20 MMOL/L (ref 5–15)
BASOPHILS # BLD AUTO: 0.06 10*3/MM3 (ref 0–0.2)
BASOPHILS NFR BLD AUTO: 0.7 % (ref 0–1.5)
BUN SERPL-MCNC: 87 MG/DL (ref 8–23)
BUN/CREAT SERPL: 11.3 (ref 7–25)
CALCIUM SPEC-SCNC: 7 MG/DL (ref 8.6–10.5)
CHLORIDE SERPL-SCNC: 108 MMOL/L (ref 98–107)
CO2 SERPL-SCNC: 16 MMOL/L (ref 22–29)
CREAT SERPL-MCNC: 7.71 MG/DL (ref 0.57–1)
DEPRECATED RDW RBC AUTO: 51.4 FL (ref 37–54)
EOSINOPHIL # BLD AUTO: 0.3 10*3/MM3 (ref 0–0.4)
EOSINOPHIL NFR BLD AUTO: 3.3 % (ref 0.3–6.2)
ERYTHROCYTE [DISTWIDTH] IN BLOOD BY AUTOMATED COUNT: 15.3 % (ref 12.3–15.4)
GFR SERPL CREATININE-BSD FRML MDRD: 5 ML/MIN/1.73
GFR SERPL CREATININE-BSD FRML MDRD: ABNORMAL ML/MIN/{1.73_M2}
GLUCOSE BLDC GLUCOMTR-MCNC: 115 MG/DL (ref 70–130)
GLUCOSE BLDC GLUCOMTR-MCNC: 172 MG/DL (ref 70–130)
GLUCOSE BLDC GLUCOMTR-MCNC: 60 MG/DL (ref 70–130)
GLUCOSE BLDC GLUCOMTR-MCNC: 69 MG/DL (ref 70–130)
GLUCOSE BLDC GLUCOMTR-MCNC: 78 MG/DL (ref 70–130)
GLUCOSE BLDC GLUCOMTR-MCNC: 97 MG/DL (ref 70–130)
GLUCOSE SERPL-MCNC: 57 MG/DL (ref 65–99)
HCT VFR BLD AUTO: 28.4 % (ref 34–46.6)
HCT VFR BLD AUTO: 28.8 % (ref 34–46.6)
HCT VFR BLD AUTO: 30.6 % (ref 34–46.6)
HGB BLD-MCNC: 9 G/DL (ref 12–15.9)
HGB BLD-MCNC: 9.2 G/DL (ref 12–15.9)
HGB BLD-MCNC: 9.6 G/DL (ref 12–15.9)
IMM GRANULOCYTES # BLD AUTO: 0.17 10*3/MM3 (ref 0–0.05)
IMM GRANULOCYTES NFR BLD AUTO: 1.9 % (ref 0–0.5)
INR PPP: 1.52 (ref 0.9–1.1)
LYMPHOCYTES # BLD AUTO: 0.64 10*3/MM3 (ref 0.7–3.1)
LYMPHOCYTES NFR BLD AUTO: 7.1 % (ref 19.6–45.3)
MAGNESIUM SERPL-MCNC: 1.6 MG/DL (ref 1.6–2.4)
MCH RBC QN AUTO: 29.4 PG (ref 26.6–33)
MCHC RBC AUTO-ENTMCNC: 31.7 G/DL (ref 31.5–35.7)
MCV RBC AUTO: 92.8 FL (ref 79–97)
MONOCYTES # BLD AUTO: 0.71 10*3/MM3 (ref 0.1–0.9)
MONOCYTES NFR BLD AUTO: 7.9 % (ref 5–12)
NEUTROPHILS NFR BLD AUTO: 7.12 10*3/MM3 (ref 1.7–7)
NEUTROPHILS NFR BLD AUTO: 79.1 % (ref 42.7–76)
NRBC BLD AUTO-RTO: 0.9 /100 WBC (ref 0–0.2)
PHOSPHATE SERPL-MCNC: 8.1 MG/DL (ref 2.5–4.5)
PLATELET # BLD AUTO: 129 10*3/MM3 (ref 140–450)
PMV BLD AUTO: 9.9 FL (ref 6–12)
POTASSIUM SERPL-SCNC: 4 MMOL/L (ref 3.5–5.2)
PROTHROMBIN TIME: 18.1 SECONDS (ref 11.7–14.2)
PTH-INTACT SERPL-MCNC: 474 PG/ML (ref 15–65)
RBC # BLD AUTO: 3.06 10*6/MM3 (ref 3.77–5.28)
SODIUM SERPL-SCNC: 144 MMOL/L (ref 136–145)
TROPONIN T SERPL-MCNC: 0.2 NG/ML (ref 0–0.03)
WBC # BLD AUTO: 9 10*3/MM3 (ref 3.4–10.8)

## 2021-07-01 PROCEDURE — 85025 COMPLETE CBC W/AUTO DIFF WBC: CPT | Performed by: INTERNAL MEDICINE

## 2021-07-01 PROCEDURE — 80048 BASIC METABOLIC PNL TOTAL CA: CPT | Performed by: INTERNAL MEDICINE

## 2021-07-01 PROCEDURE — 82962 GLUCOSE BLOOD TEST: CPT

## 2021-07-01 PROCEDURE — 83970 ASSAY OF PARATHORMONE: CPT | Performed by: INTERNAL MEDICINE

## 2021-07-01 PROCEDURE — P9047 ALBUMIN (HUMAN), 25%, 50ML: HCPCS | Performed by: INTERNAL MEDICINE

## 2021-07-01 PROCEDURE — 0DB48ZX EXCISION OF ESOPHAGOGASTRIC JUNCTION, VIA NATURAL OR ARTIFICIAL OPENING ENDOSCOPIC, DIAGNOSTIC: ICD-10-PCS | Performed by: INTERNAL MEDICINE

## 2021-07-01 PROCEDURE — 88305 TISSUE EXAM BY PATHOLOGIST: CPT | Performed by: INTERNAL MEDICINE

## 2021-07-01 PROCEDURE — S0260 H&P FOR SURGERY: HCPCS | Performed by: INTERNAL MEDICINE

## 2021-07-01 PROCEDURE — 0DB68ZX EXCISION OF STOMACH, VIA NATURAL OR ARTIFICIAL OPENING ENDOSCOPIC, DIAGNOSTIC: ICD-10-PCS | Performed by: INTERNAL MEDICINE

## 2021-07-01 PROCEDURE — 83735 ASSAY OF MAGNESIUM: CPT | Performed by: INTERNAL MEDICINE

## 2021-07-01 PROCEDURE — 93010 ELECTROCARDIOGRAM REPORT: CPT | Performed by: INTERNAL MEDICINE

## 2021-07-01 PROCEDURE — 85610 PROTHROMBIN TIME: CPT | Performed by: INTERNAL MEDICINE

## 2021-07-01 PROCEDURE — 93005 ELECTROCARDIOGRAM TRACING: CPT | Performed by: NURSE PRACTITIONER

## 2021-07-01 PROCEDURE — 25010000002 PROPOFOL 10 MG/ML EMULSION: Performed by: NURSE ANESTHETIST, CERTIFIED REGISTERED

## 2021-07-01 PROCEDURE — 84484 ASSAY OF TROPONIN QUANT: CPT | Performed by: INTERNAL MEDICINE

## 2021-07-01 PROCEDURE — 25010000002 CEFTRIAXONE PER 250 MG: Performed by: INTERNAL MEDICINE

## 2021-07-01 PROCEDURE — 43239 EGD BIOPSY SINGLE/MULTIPLE: CPT | Performed by: INTERNAL MEDICINE

## 2021-07-01 PROCEDURE — 85018 HEMOGLOBIN: CPT | Performed by: INTERNAL MEDICINE

## 2021-07-01 PROCEDURE — 0DB98ZX EXCISION OF DUODENUM, VIA NATURAL OR ARTIFICIAL OPENING ENDOSCOPIC, DIAGNOSTIC: ICD-10-PCS | Performed by: INTERNAL MEDICINE

## 2021-07-01 PROCEDURE — 25010000002 ALBUMIN HUMAN 25% PER 50 ML: Performed by: INTERNAL MEDICINE

## 2021-07-01 PROCEDURE — 84100 ASSAY OF PHOSPHORUS: CPT | Performed by: INTERNAL MEDICINE

## 2021-07-01 PROCEDURE — 99222 1ST HOSP IP/OBS MODERATE 55: CPT | Performed by: INTERNAL MEDICINE

## 2021-07-01 PROCEDURE — 87081 CULTURE SCREEN ONLY: CPT | Performed by: INTERNAL MEDICINE

## 2021-07-01 PROCEDURE — 85014 HEMATOCRIT: CPT | Performed by: INTERNAL MEDICINE

## 2021-07-01 RX ORDER — PROPOFOL 10 MG/ML
VIAL (ML) INTRAVENOUS CONTINUOUS PRN
Status: DISCONTINUED | OUTPATIENT
Start: 2021-07-01 | End: 2021-07-01 | Stop reason: SURG

## 2021-07-01 RX ORDER — LIDOCAINE AND PRILOCAINE 25; 25 MG/G; MG/G
CREAM TOPICAL ONCE
Status: COMPLETED | OUTPATIENT
Start: 2021-07-01 | End: 2021-07-01

## 2021-07-01 RX ORDER — ALBUMIN (HUMAN) 12.5 G/50ML
12.5 SOLUTION INTRAVENOUS AS NEEDED
Status: DISPENSED | OUTPATIENT
Start: 2021-07-01 | End: 2021-07-02

## 2021-07-01 RX ORDER — SODIUM CHLORIDE 0.9 % (FLUSH) 0.9 %
10 SYRINGE (ML) INJECTION AS NEEDED
Status: DISCONTINUED | OUTPATIENT
Start: 2021-07-01 | End: 2021-07-01

## 2021-07-01 RX ORDER — SODIUM CHLORIDE 9 MG/ML
30 INJECTION, SOLUTION INTRAVENOUS CONTINUOUS PRN
Status: DISCONTINUED | OUTPATIENT
Start: 2021-07-01 | End: 2021-07-08 | Stop reason: HOSPADM

## 2021-07-01 RX ORDER — PROPOFOL 10 MG/ML
VIAL (ML) INTRAVENOUS AS NEEDED
Status: DISCONTINUED | OUTPATIENT
Start: 2021-07-01 | End: 2021-07-01 | Stop reason: SURG

## 2021-07-01 RX ADMIN — PANTOPRAZOLE SODIUM 8 MG/HR: 40 INJECTION, POWDER, FOR SOLUTION INTRAVENOUS at 23:20

## 2021-07-01 RX ADMIN — MICONAZOLE NITRATE 1 APPLICATION: 2 CREAM TOPICAL at 08:47

## 2021-07-01 RX ADMIN — LEVOTHYROXINE SODIUM 50 MCG: 0.05 TABLET ORAL at 08:46

## 2021-07-01 RX ADMIN — ALBUMIN HUMAN 12.5 G: 0.25 SOLUTION INTRAVENOUS at 07:37

## 2021-07-01 RX ADMIN — SODIUM CHLORIDE, PRESERVATIVE FREE 10 ML: 5 INJECTION INTRAVENOUS at 08:47

## 2021-07-01 RX ADMIN — CASTOR OIL AND BALSAM, PERU 5 G: 788; 87 OINTMENT TOPICAL at 08:46

## 2021-07-01 RX ADMIN — PANTOPRAZOLE SODIUM 8 MG/HR: 40 INJECTION, POWDER, FOR SOLUTION INTRAVENOUS at 08:46

## 2021-07-01 RX ADMIN — MICONAZOLE NITRATE 1 APPLICATION: 2 CREAM TOPICAL at 20:11

## 2021-07-01 RX ADMIN — DEXTROSE MONOHYDRATE 25 G: 500 INJECTION PARENTERAL at 03:29

## 2021-07-01 RX ADMIN — ALBUMIN HUMAN 12.5 G: 0.25 SOLUTION INTRAVENOUS at 06:29

## 2021-07-01 RX ADMIN — CEFTRIAXONE SODIUM 1 G: 1 INJECTION, SOLUTION INTRAVENOUS at 02:25

## 2021-07-01 RX ADMIN — SODIUM CHLORIDE 30 ML/HR: 9 INJECTION, SOLUTION INTRAVENOUS at 10:56

## 2021-07-01 RX ADMIN — SODIUM CHLORIDE, PRESERVATIVE FREE 10 ML: 5 INJECTION INTRAVENOUS at 20:11

## 2021-07-01 RX ADMIN — PANTOPRAZOLE SODIUM 8 MG/HR: 40 INJECTION, POWDER, FOR SOLUTION INTRAVENOUS at 03:29

## 2021-07-01 RX ADMIN — MICONAZOLE NITRATE 1 APPLICATION: 2 CREAM TOPICAL at 00:16

## 2021-07-01 RX ADMIN — SERTRALINE HYDROCHLORIDE 50 MG: 50 TABLET, FILM COATED ORAL at 08:46

## 2021-07-01 RX ADMIN — CASTOR OIL AND BALSAM, PERU 5 G: 788; 87 OINTMENT TOPICAL at 20:11

## 2021-07-01 RX ADMIN — PANTOPRAZOLE SODIUM 8 MG/HR: 40 INJECTION, POWDER, FOR SOLUTION INTRAVENOUS at 16:28

## 2021-07-01 RX ADMIN — PROPOFOL 50 MG: 10 INJECTION, EMULSION INTRAVENOUS at 11:20

## 2021-07-01 RX ADMIN — PROPOFOL 200 MCG/KG/MIN: 10 INJECTION, EMULSION INTRAVENOUS at 11:20

## 2021-07-01 RX ADMIN — LIDOCAINE AND PRILOCAINE: 25; 25 CREAM TOPICAL at 05:15

## 2021-07-01 NOTE — PLAN OF CARE
Goal Outcome Evaluation:         Pt resting in bed quietly. Medicated once for abdominal pain. Pt now denies pain. Turned. Incontinence care as needed. F/c care. Pt npo after midnight. Medicated for low blood sugar

## 2021-07-01 NOTE — PLAN OF CARE
Goal Outcome Evaluation:  Plan of Care Reviewed With: patient, spouse, daughter        Progress: improving  Outcome Summary: Patient alert and oriented x2. Disoriented to time and situation. No complaints of pain, SOA or nausea. Dialysis this AM, tolerated well. Upper GI complete, no bleeding discovered. Turned q2 hr. Continue protonix gtt. VSS. No acute distress. Nursing will continue to monitor.

## 2021-07-01 NOTE — CONSULTS
"Adult Nutrition  Assessment/PES    Patient Name:  Maribeth Rendon  YOB: 1950  MRN: [id number]  Admit Date:  6/29/2021    Assessment Date:  7/1/2021    Comments:  Nutrition assessment triggered by MST score of 2 per nurse admission screen.  Admitted with AMS, melena, anemia.  Lack of PO intake x 4 days prior to admission.  S/p HD today, may need additional treatment tomorrow.  S/p SLP evaluation yesterday.    Currently NPO for EGD today.  Possible weight loss noted.    RD to follow up to interview once diet has been advanced.    Reason for Assessment     Row Name 07/01/21 1153          Reason for Assessment    Reason For Assessment  nurse/nurse practitioner consult;identified at risk by screening criteria     Diagnosis  renal disease;neurologic conditions;cardiac disease;diabetes diagnosis/complications;endocrine conditions;hematological/related complications;gastrointestinal disease;psychosocial;other (see comments);infection/sepsis CKD4, seizure d/o, Afib, CAD, CABG, DM2, hypothyroid, Parkinson's disease, anemia, GERD, W/C dependent, anxiety, HTN, HLD, OA, CVA, sepsis; adm with AMS, melena     Identified At Risk by Screening Criteria  MST SCORE 2+;reduced oral intake over the last month         Nutrition/Diet History     Row Name 07/01/21 1157          Nutrition/Diet History    Typical Food/Fluid Intake  lack of PO intake x 4 days; NPO currently for EGD         Anthropometrics     Row Name 07/01/21 1157          Anthropometrics    Height  165.1 cm (65\")        Admit Weight    Admit Weight  -- 176# 6/30        Ideal Body Weight (IBW)    Ideal Body Weight (IBW) (kg)  57.29        Usual Body Weight (UBW)    Weight Loss Time Frame  possible weight loss noted        Body Mass Index (BMI)    BMI Assessment  BMI 25-29.9: overweight 29.28         Labs/Tests/Procedures/Meds     Row Name 07/01/21 1159          Labs/Procedures/Meds    Lab Results Reviewed  reviewed, pertinent     Lab Results Comments  Gluc, " "Creat, BUN, Ca, GFR, Phos, Hgb, Hct, Platelets        Diagnostic Tests/Procedures    Diagnostic Test/Procedure Reviewed  reviewed, pertinent     Diagnostic Test/Procedures Comments  s/p SLP yesterday; EGD today        Medications    Pertinent Medications Reviewed  reviewed, pertinent     Pertinent Medications Comments  synthroid, protonix drip         Physical Findings     Row Name 07/01/21 1159          Physical Findings    Overall Physical Appearance  overweight     Skin  other (see comments) B=14, bruised, excoriation, scab         Estimated/Assessed Needs     Row Name 07/01/21 1201 07/01/21 1157       Calculation Measurements    Weight Used For Calculations  80 kg (176 lb 5.9 oz)  --    Height  --  165.1 cm (65\")       Estimated/Assessed Needs       KCAL/KG    KCAL/KG  20 Kcal/Kg (kcal);25 Kcal/Kg (kcal)  --    20 Kcal/Kg (kcal)  1600  --    25 Kcal/Kg (kcal)  2000  --       Protein Requirements    Weight Used For Protein Calculations  80 kg (176 lb 5.9 oz)  --    Est Protein Requirement Amount (gms/kg)  0.8 gm protein  --    Estimated Protein Requirements (gms/day)  64  --       Fluid Requirements    Fluid Requirements (mL/day)  2000  --        Nutrition Prescription Ordered     Row Name 07/01/21 1201          Nutrition Prescription PO    Current PO Diet  NPO         Evaluation of Received Nutrient/Fluid Intake     Row Name 07/01/21 1201          PO Evaluation    Number of Meals  2     % PO Intake  25-50         Problem/Interventions:  Problem 1     [facility] Name 07/01/21 1202          Nutrition Diagnoses Problem 1    Problem 1  Inadequate Intake/Infusion     Inadequate Intake Type  Oral     Macronutrient  Kcal;Protein     Etiology (related to)  MNT for Treatment/Condition     Signs/Symptoms (evidenced by)  NPO         Intervention Goal     Row Name 07/01/21 1202          Intervention Goal    General  Maintain nutrition;Disease management/therapy;Meet nutritional needs for age/condition     PO  Initiate feeding     " Weight  No significant weight loss         Nutrition Intervention     Row Name 07/01/21 1202          Nutrition Intervention    RD/Tech Action  Follow Tx progress;Care plan reviewd;Await begin PO         Education/Evaluation     Row Name 07/01/21 1202          Education    Education  Will Instruct as appropriate        Monitor/Evaluation    Monitor  I&O;Per protocol;Pertinent labs;Weight;Skin status;Symptoms           Electronically signed by:  Leilani Preciado RD  07/01/21 12:03 EDT

## 2021-07-01 NOTE — NURSING NOTE
Pt had a 3hr HD tx with a net fluid removal of 1L with no complications. She is hemodynamically stable post HD.     Pre-HD vitals:  BP:94/71 HR:66 RR:18 Temp:98.4   Unable to obtain weight. Bed scale showing 0Kg.    BP:127/50 HR:78 Temp: 97.8

## 2021-07-01 NOTE — ANESTHESIA PREPROCEDURE EVALUATION
Anesthesia Evaluation     Patient summary reviewed and Nursing notes reviewed   NPO Solid Status: > 8 hours  NPO Liquid Status: > 8 hours           Airway   Mallampati: II  Neck ROM: full  Possible difficult intubation  Dental    (+) poor dentition    Pulmonary     breath sounds clear to auscultation  (+) pneumonia , a smoker (52 pk yrs) Former, shortness of breath,   Cardiovascular     Rhythm: regular    (+) hypertension, CAD, CABG, dysrhythmias, angina, hyperlipidemia,       Neuro/Psych  (+) seizures, CVA, numbness, psychiatric history Anxiety,     GI/Hepatic/Renal/Endo    (+)  GERD,  renal disease, diabetes mellitus,     Musculoskeletal     Abdominal    Substance History      OB/GYN          Other   arthritis,                      Anesthesia Plan    ASA 3     MAC     intravenous induction     Anesthetic plan, all risks, benefits, and alternatives have been provided, discussed and informed consent has been obtained with: patient.

## 2021-07-01 NOTE — CASE MANAGEMENT/SOCIAL WORK
Continued Stay Note  [facility]     Patient Name: Maribeth Rendon  MRN: [id number]  Today's Date: 7/1/2021    Admit Date: 6/29/2021    Discharge Plan     Row Name 07/01/21 0835       Plan    Plan  Plan home with [facility] and Outpatient HD.  JOHNATHAN Enriquez RN    Patient/Family in Agreement with Plan  yes    Plan Comments  Spoke with pt at bedside.  Pt requested CCP call her spouse (Tru Rendon [phone]).  Discussed with pt's spouse discharge options for outpatient HD.  Pt's spouse requesting HD Center at [address] [place] [address].  Information faxed to [facility] at [phone]. Flory is following for [facility].  Plan home with [facility] and Outpatient HD.  JOHNATHAN Enriquez RN        Discharge Codes    No documentation.             Maribeth Enriquez, RN

## 2021-07-01 NOTE — PROGRESS NOTES
Patient out of the ICU without any pulmonary critical care needs with care being assumed by our [facility] medicine service.  Discussed with Dr Owens, I appreciate his care for this patient.  [facility] will now sign off however any new pulmonary critical care issues arise please feel free to give us a a call.    Tone Palumbo MD  [facility]  07/01/21  15:56 EDT

## 2021-07-01 NOTE — H&P
[facility]  Pre Procedure History & Physical    Chief Complaint:   Anemia, melena    Subjective     HPI:   This 70-year-old female presents the endoscopy suite for upper endoscopic evaluation.  She has had issues with melena and known anemia.    Past Medical History:   Past Medical History:   Diagnosis Date   • Achilles tendon tear     left    • Anemia    • Anxiety    • Depression    • Diabetes mellitus, type 2 (CMS/[facility])    • ESRD (end stage renal disease) (CMS/[facility])     HAS LEFT FOREARM FISTULA   • GERD (gastroesophageal reflux disease)    • History of MRSA infection 03/15/2016    ABDOMINAL WOUND,   INFECTION CONTROLL NOTIFIED 2-6-2017   • History of transfusion    • Hyperlipidemia    • Hypertension    • Hypokalemia    • Hypomagnesemia    • Hypoxic 01/2016    WHEN SHE HAD PNEUMONIA   • Intertrigo    • Leukocytosis    • Osteoarthritis    • Pneumonia 01/2016   • Psoriasis    • Psoriatic arthritis (CMS/[facility])    • Seizures (CMS/[facility])     one time   • Sepsis (CMS/[facility]) 01/2016   • SOB (shortness of breath)    • Stage 4 chronic renal impairment associated with type 2 diabetes mellitus (CMS/[facility])    • Staph infection     HX RIGHT FOOT AT SUBURBAN 01/09/2010   • Streptococcal bacteremia    • Stroke (cerebrum) (CMS/[facility])    • Stroke (CMS/[facility])    • Swelling of hand 10/27/2016    LEFT HAND SWELLIMG SINCE LEFT FISTULA SURGERY   • Tremor, essential    • Wears glasses    • Wheelchair dependent     For mobility       Past Surgical History:  Past Surgical History:   Procedure Laterality Date   • ABDOMINAL WALL ABSCESS INCISION AND DRAINAGE  2016   • ARTERIOVENOUS FISTULA/SHUNT SURGERY Left 10/27/2016    Procedure: LT FOREARM FISTULA;  Surgeon: Lorelei Haque Jr., MD;  Location: [facility] OR;  Service:    • ARTERIOVENOUS FISTULA/SHUNT SURGERY Left 2/16/2017    Procedure: LT BRACHIAL CEPHALIC WITH FISTULA LIGATION RADIAL CEPHALIC.;  Surgeon: Gurmeet Carver MD;  Location: [facility] OR;  Service:    •  CARDIAC CATHETERIZATION N/A 7/26/2019    Procedure: Left Heart Cath;  Surgeon: Eddie Hodges MD;  Location:  [facility];  Service: Cardiology   • CARDIAC CATHETERIZATION N/A 7/26/2019    Procedure: Coronary angiography;  Surgeon: Eddie Hodges MD;  Location:  [facility];  Service: Cardiology   • CARDIAC SURGERY     • CATARACT EXTRACTION W/ INTRAOCULAR LENS IMPLANT Bilateral 2011   • CORONARY ARTERY BYPASS GRAFT N/A 7/29/2019    Procedure: INTRAOP ERNESTO; CORONARY ARTERY BYPASS GRAFTING X 4 WITH LEFT INTERNAL MAMMARY ARTERY GRAFT AND UTILIZING ENDOSCOPICALLY HARVESTED GREATER SAPHENOUS VEIN; PRP;  Surgeon: Gordo Ferreira MD;  Location: [facility] MAIN OR;  Service: Cardiothoracic   • CORONARY ARTERY BYPASS GRAFT     • DILATATION AND CURETTAGE  1991   • EXCISION MASS TRUNK N/A 3/22/2016    Procedure: I&D LOWER ABDOMINAL  WOUND;  Surgeon: Tru Yi MD;  Location: [facility] MAIN OR;  Service:    • FOOT SURGERY Right 2010    REMOVED BONE IN RIGHT GREAT TOE   • HYSTERECTOMY  1992       Family History:  Family History   Problem Relation Age of Onset   • Heart attack Mother    • Heart disease Mother    • Kidney disease Mother    • Heart attack Father    • Heart disease Father    • Hypertension Father    • Stroke Father         ISCHEMIC   • Diabetes Father         TYPE 2   • Kidney disease Brother    • Diabetes Brother         TYPE 1   • Thyroid disease Daughter    • Anxiety disorder Maternal Aunt    • Bipolar disorder Maternal Aunt    • Depression Maternal Aunt    • Lupus Maternal Aunt         LUPUS ANTICOAGLULANT   • Rheum arthritis Maternal Aunt    • Alcohol abuse Maternal Uncle    • Other Maternal Uncle         PULMONARY DISEASE       Social History:   reports that she quit smoking about 28 years ago. Her smoking use included cigarettes. She has a 52.00 pack-year smoking history. She has never used smokeless tobacco. She reports previous alcohol use. She  reports that she does not use drugs.    Medications:   Medications Prior to Admission   Medication Sig Dispense Refill Last Dose   • ACCU-CHEK JESUS MANUEL PLUS test strip TEST THREE TIMES DAILY 300 each 2    • Accu-Chek FastClix Lancets misc Use to test blood sugar 4 times daily  Dispense what insurance will approve E11.8 400 each 3    • Accu-Chek Softclix Lancets lancets Use to test blood sugar 3 times daily e11.8 300 each 0    • amLODIPine (NORVASC) 5 MG tablet Take 1 tablet by mouth Daily. 30 tablet 6    • aspirin 81 MG tablet Take 81 mg by mouth Every Morning. TO STOP THE DAY BEFORE SURGERY      • Blood Glucose Monitoring Suppl (Accu-Chek Guide) w/Device kit 1 Device 4 (Four) Times a Day. Use to test blood sugar 4 times daily  Dispense what insurance will approve E11.8 1 kit 0    • bumetanide (BUMEX) 2 MG tablet Take 1 tablet by mouth 2 (Two) Times a Day. 60 tablet 0    • carvedilol (COREG) 12.5 MG tablet Take 1 tablet by mouth 2 (Two) Times a Day. 180 tablet 3    • Cholecalciferol (VITAMIN D-3) 1000 UNITS capsule Take 5,000 Units by mouth Daily.      • Eliquis 5 MG tablet tablet Take 1 tablet by mouth twice daily 180 tablet 0    • epoetin hermes (EPOGEN,PROCRIT) 96394 UNIT/ML injection Inject 20,000 Units under the skin into the appropriate area as directed 1 (One) Time Per Week. LAST TIME 2-6-2017      • Ferrous Fumarate-Vitamin C ER (Florencia-Sequels) 65-25 MG CR tablet Take 65 mg by mouth.      • gabapentin (NEURONTIN) 300 MG capsule Take 300 mg by mouth 2 (two) times a day.      • Glucagon (Baqsimi Two Pack) 3 MG/DOSE powder 3 mg into the nostril(s) as directed by provider As Needed (hypoglycemia). 1 each 1    • glucose blood (Accu-Chek Guide) test strip Use to test blood sugar 4 times daily  Dispense what insurance will approve E11.8 400 each 3    • glucose blood (Accu-Chek Guide) test strip Test blood sugar 1 times daily e11.8 100 each 3    • glucose blood test strip Use as instructed 100 each 0    • glucose monitor  "monitoring kit 1 each As Needed (diabetes). 1 each 1    • levothyroxine (SYNTHROID, LEVOTHROID) 50 MCG tablet Take 1 tablet by mouth Daily. 30 tablet 11    • Loperamide HCl (IMODIUM A-D PO) Take 1 tablet by mouth Every 4 (Four) Hours As Needed.      • losartan (COZAAR) 50 MG tablet Take 50 mg by mouth every night at bedtime.      • montelukast (SINGULAIR) 10 MG tablet Take 1 tablet by mouth Every Night. 90 tablet 1    • Multiple Vitamin (MULTI VITAMIN DAILY PO) Take 1 tablet by mouth Every Morning.      • OneTouch Verio test strip Use as instructed 400 each 1    • Probiotic Product (PROBIOTIC DAILY PO) Take 1 capsule by mouth Daily.      • pyridoxine (VITAMIN B-6) 500 MG tablet Take 500 mg by mouth Daily.      • sertraline (ZOLOFT) 50 MG tablet Take 1 tablet by mouth Daily. 90 tablet 1    • topiramate (TOPAMAX) 100 MG tablet Take 100 mg by mouth Daily.      • Tradjenta 5 MG tablet tablet Take 1 tablet by mouth once daily 90 tablet 1        Allergies:  Prednisone    ROS:    Pertinent items are noted in HPI, all other systems reviewed and negative     Objective     Blood pressure 94/71, pulse (!) 1, temperature 98.4 °F (36.9 °C), temperature source Oral, resp. rate 18, height 165.1 cm (65\"), weight 80 kg (176 lb 5.9 oz), SpO2 96 %, not currently breastfeeding.    Physical Exam   Constitutional: Pt is oriented to person, place, and time and well-developed, well-nourished, and in no distress.   Mouth/Throat: Oropharynx is clear and moist.   Neck: Normal range of motion.   Cardiovascular: Normal rate, regular rhythm and normal heart sounds.    Pulmonary/Chest: Effort normal and breath sounds normal.   Abdominal: Soft. Nontender  Skin: Skin is warm and dry.   Psychiatric: Mood, memory, affect and judgment normal.     Assessment/Plan     Diagnosis:  Anemia  Melena    Anticipated Surgical Procedure:  EGD    The risks, benefits, and alternatives of this procedure have been discussed with the patient or the responsible party- " the patient understands and agrees to proceed.

## 2021-07-01 NOTE — NURSING NOTE
Pt's spouse was looking for pt's belongings that included a pink jacket that belonged to the patient's late mother and was unable to locate them in the room. He stated it was in a patient belongings bag in her room prior to transferring from CCU. I spoke to Monica in CCU and she advised that they were unable to locate patient's belongings. I spoke to Erik in security to see if her belongings were taken to security. He advised they did not have any belongings for Mrs. Rendon. Notified patient's daughter Deja Bui who advised that she could confirm that her dad did not take them home as well as check with her sister Daksha.

## 2021-07-01 NOTE — PROGRESS NOTES
[facility] Progress Note      Patient Name: Maribeth Rendon  : 1950  MRN: [id number]  Primary Care Physician:  Robby Chaney MD  Date of admission: 2021    Subjective     Interval History:   The patient is seen and examined while on dialysis, she is asleep but arousable, denies any chest pain or shortness of air, no orthopnea or PND, no nausea or vomiting, no abdominal pain, no diarrhea since transfer from the critical care unit  Ultrafiltration goal is 1 L    Review of Systems:   As noted above    Objective     Vitals:   Temp:  [97.7 °F (36.5 °C)-98.4 °F (36.9 °C)] 98.4 °F (36.9 °C)  Heart Rate:  [1-92] 1  Resp:  [16-18] 18  BP: ()/(25-75) 94/71  Flow (L/min):  [1-2] 1    Intake/Output Summary (Last 24 hours) at 2021 0756  Last data filed at 2021 1814  Gross per 24 hour   Intake 365 ml   Output 225 ml   Net 140 ml       Physical Exam:    Constitutional:  Asleep, arousable, no acute distress, morbidly obese, chronically ill  HEENT: Sclera anicteric, no conjunctival injection, oral mucosa is  Neck: Supple, no thyromegaly, no lymphadenopathy, trachea at midline, no JVD  Respiratory: Clear to auscultation bilaterally, nonlabored respiration  Cardiovascular: RRR, systolic ejection murmur, no rub  Gastrointestinal: Positive bowel sounds, abdomen is soft, nontender and nondistended  : No palpable bladder  Musculoskeletal: No edema, no clubbing or cyanosis.  Left upper extremity AV fistula in place  Psychiatric: Patient has a flat affect  Neurologic: pt alert  moving all extremities, normal speech and mental status  Skin: Warm and dry        Scheduled Meds:     castor oil-balsam peru, , Topical, Q12H  cefTRIAXone, 1 g, Intravenous, Q24H  epoetin hermes/hermes-epbx, 5,000 Units, Subcutaneous, Once per day on   levothyroxine, 50 mcg, Oral, Daily  miconazole, 1 application, Topical, Q12H  sertraline, 50 mg, Oral, Daily  sodium chloride, 10 mL,  Intravenous, Q12H      IV Meds:   lactated ringers, 30 mL/hr  pantoprazole, 8 mg/hr, Last Rate: 8 mg/hr (07/01/21 0329)        Results Reviewed:   I have personally reviewed the results from the time of local ice packs were placed admission to 7/1/2021 07:56 EDT     Results from last 7 days   Lab Units 07/01/21 0209 06/30/21 1810 06/30/21 0700 06/29/21  0121   WBC 10*3/mm3 9.00  --  9.43 9.51   HEMOGLOBIN g/dL 9.0* 9.6* 9.5* 9.8*   HEMATOCRIT % 28.4* 29.6* 29.5* 31.1*   PLATELETS 10*3/mm3 129*  --  151 177         Results from last 7 days   Lab Units 07/01/21 0209 06/30/21 0352 06/29/21  0121   SODIUM mmol/L 144 141 139   POTASSIUM mmol/L 4.0 3.9 5.4*   CHLORIDE mmol/L 108* 105 103   CO2 mmol/L 16.0* 17.9* 16.9*   BUN mg/dL 87* 79* 129*   CREATININE mg/dL 7.71* 7.19* 9.28*   CALCIUM mg/dL 7.0* 6.5* 6.4*   BILIRUBIN mg/dL  --   --  0.2   ALK PHOS U/L  --   --  60   ALT (SGPT) U/L  --   --  11   AST (SGOT) U/L  --   --  10   GLUCOSE mg/dL 57* 51* 133*       Estimated Creatinine Clearance: 7.1 mL/min (A) (by C-G formula based on SCr of 7.71 mg/dL (H)).    Results from last 7 days   Lab Units 07/01/21 0209 06/30/21  0352 06/29/21  0121   MAGNESIUM mg/dL 1.6 1.5* 1.6   PHOSPHORUS mg/dL 8.1* 6.9*  --              Results from last 7 days   Lab Units 07/01/21 0209 06/30/21 1810 06/30/21  0700 06/29/21  0121 06/25/21  1252   WBC 10*3/mm3 9.00  --  9.43 9.51  --    HEMOGLOBIN g/dL 9.0* 9.6* 9.5* 9.8* 10.6*   PLATELETS 10*3/mm3 129*  --  151 177  --        Results from last 7 days   Lab Units 07/01/21  0209 06/30/21  0352 06/29/21  0121   INR  1.52* 1.81* 2.27*       Assessment / Plan     ASSESSMENT:  1.  End-stage renal disease patient has progressive chronic kidney disease currently on dialysis tolerating the treatment without any difficulties, her electrolyte within acceptable range other than total CO2 16, her phosphorus is 8.1 and calcium is 7, when corrected to the albumin of 2.5 calcium within acceptable  range,  2.  Metabolic acidosis associated with chronic kidney disease currently dialyzed if it is not improved with the dialysis will add oral sodium bicarbonate  3.  Anemia of chronic kidney disease, hemoglobin is 9, receiving iron infusion and JANEEN 4.  Mild thrombocytopenia platelet 129,000 we will monitor closely  5.  Metabolic bone disease, with secondary hyperparathyroidism  within the goal of therapy patient probably will need aggressive management of the hyperphosphatemia, will add phosphate binders when the patient is able to have adequate oral intake  6.  Sepsis related to UTI and septic shock resolved  7.  Coronary artery disease, with prior CABG  8.  Hyperkalemia, resolved  9.  Hypothyroid, on replacement therapy  10.  Depression, treated     PLAN:  1.  Continue the same treatment  2.  Surveillance labs will reevaluate patient will need another dialysis treatment tomorrow      Discussed the case with the dialysis nurse and with the patient's nurse.    José Antonio Watson MD  07/01/21  07:56 EDT    [facility]  [phone]      Much of this encounter note is an electronic transcription/translation of spoken language to printed text. The electronic translation of spoken language may permit erroneous, or at times, nonsensical words or phrases to be inadvertently transcribed; Although I have reviewed the note for such errors, some may still exist.

## 2021-07-01 NOTE — CONSULTS
[facility]  Cardiology Consult Note    Patient Identification:  Name: Maribeth Rendon  Age: 70 y.o.  Sex: female  :  1950  MRN: [id number]             Requesting Physician: Dr. Palumbo    Reason for Consultation / Chief Complaint: Elevated troponin    History of Present Illness:   This is a 70-year-old female, who is well-known to our service.  She has a history to include atrial fibrillation anticoagulated with Eliquis, CAD with history of CABG, depression, history of stroke, DOMI, history of seizures, anxiety, diabetes mellitus type 2, ESRD but was not currently on dialysis, GERD, hypertension and hyperlipidemia.  Resented to [facility] via EMS.  EKG mental status changes.  She has had dark cloudy urine with a foul smell.  Nephrology has been consulted.  She was hypotensive in the ER.    On admission chest x-ray showed cardiomegaly with vascular congestion.  CT pelvis without contrast revealed left adrenal nodule slightly increased in size since 2016, see full report as below.  Labs revealed troponin  0.201, potassium 4, creatinine 7.71 kg revealed sinus rhythm with heart rate 66.  IVCD with nonspecific T wave changes.  Labs revealed creatinine 9.28, potassium 5.4, troponin 0 0.161 and WBC of urine were numerous to count.    Stress test in 2018 indicated a small to medium sized mildly to moderately severe area of ischemia located in the anterior wall.  In 2019 she had a cardiac cath which resulted an emergency CABG.    Comorbid cardiac risk factors: CAD, hyperlipidemia, diabetes mellitus, hypertension, age    Past Medical History:  Past Medical History:   Diagnosis Date   • Achilles tendon tear     left    • Anemia    • Anxiety    • Depression    • Diabetes mellitus, type 2 (CMS/[facility])    • ESRD (end stage renal disease) (CMS/[facility])     HAS LEFT FOREARM FISTULA   • GERD (gastroesophageal reflux disease)    • History of MRSA infection 03/15/2016    ABDOMINAL WOUND,   INFECTION  CONTROLL NOTIFIED 2-6-2017   • History of transfusion    • Hyperlipidemia    • Hypertension    • Hypokalemia    • Hypomagnesemia    • Hypoxic 01/2016    WHEN SHE HAD PNEUMONIA   • Intertrigo    • Leukocytosis    • Osteoarthritis    • Pneumonia 01/2016   • Psoriasis    • Psoriatic arthritis (CMS/[facility])    • Seizures (CMS/[facility])     one time   • Sepsis (CMS/[facility]) 01/2016   • SOB (shortness of breath)    • Stage 4 chronic renal impairment associated with type 2 diabetes mellitus (CMS/[facility])    • Staph infection     HX RIGHT FOOT AT [facility] 01/09/2010   • Streptococcal bacteremia    • Stroke (cerebrum) (CMS/[facility])    • Stroke (CMS/[facility])    • Swelling of hand 10/27/2016    LEFT HAND SWELLIMG SINCE LEFT FISTULA SURGERY   • Tremor, essential    • Wears glasses    • Wheelchair dependent     For mobility     Past Surgical History:  Past Surgical History:   Procedure Laterality Date   • ABDOMINAL WALL ABSCESS INCISION AND DRAINAGE  2016   • ARTERIOVENOUS FISTULA/SHUNT SURGERY Left 10/27/2016    Procedure: LT FOREARM FISTULA;  Surgeon: Lorelei Haque Jr., MD;  Location: [facility] OR;  Service:    • ARTERIOVENOUS FISTULA/SHUNT SURGERY Left 2/16/2017    Procedure: LT BRACHIAL CEPHALIC WITH FISTULA LIGATION RADIAL CEPHALIC.;  Surgeon: Gurmeet Carver MD;  Location: [facility] MAIN OR;  Service:    • CARDIAC CATHETERIZATION N/A 7/26/2019    Procedure: Left Heart Cath;  Surgeon: Eddie Hodges MD;  Location[facility] CATH INVASIVE LOCATION;  Service: Cardiology   • CARDIAC CATHETERIZATION N/A 7/26/2019    Procedure: Coronary angiography;  Surgeon: Eddie Hodges MD;  Location: [facility] CATH INVASIVE LOCATION;  Service: Cardiology   • CARDIAC SURGERY     • CATARACT EXTRACTION W/ INTRAOCULAR LENS IMPLANT Bilateral 2011   • CORONARY ARTERY BYPASS GRAFT N/A 7/29/2019    Procedure: INTRAOP ERNESTO; CORONARY ARTERY BYPASS GRAFTING X 4 WITH LEFT INTERNAL MAMMARY ARTERY GRAFT AND UTILIZING ENDOSCOPICALLY HARVESTED GREATER SAPHENOUS  VEIN; PRP;  Surgeon: Gordo Ferreira MD;  Location: [facility] OR;  Service: Cardiothoracic   • CORONARY ARTERY BYPASS GRAFT     • DILATATION AND CURETTAGE  1991   • EXCISION MASS TRUNK N/A 3/22/2016    Procedure: I&D LOWER ABDOMINAL  WOUND;  Surgeon: Tru Yi MD;  Location: [facility];  Service:    • FOOT SURGERY Right 2010    REMOVED BONE IN RIGHT GREAT TOE   • HYSTERECTOMY  1992      Allergies:  Allergies   Allergen Reactions   • Prednisone Hallucinations     Home Meds:  Medications Prior to Admission   Medication Sig Dispense Refill Last Dose   • ACCU-CHEK JESUS MANUEL PLUS test strip TEST THREE TIMES DAILY 300 each 2    • Accu-Chek FastClix Lancets misc Use to test blood sugar 4 times daily  Dispense what insurance will approve E11.8 400 each 3    • Accu-Chek Softclix Lancets lancets Use to test blood sugar 3 times daily e11.8 300 each 0    • amLODIPine (NORVASC) 5 MG tablet Take 1 tablet by mouth Daily. 30 tablet 6    • aspirin 81 MG tablet Take 81 mg by mouth Every Morning. TO STOP THE DAY BEFORE SURGERY      • Blood Glucose Monitoring Suppl (Accu-Chek Guide) w/Device kit 1 Device 4 (Four) Times a Day. Use to test blood sugar 4 times daily  Dispense what insurance will approve E11.8 1 kit 0    • bumetanide (BUMEX) 2 MG tablet Take 1 tablet by mouth 2 (Two) Times a Day. 60 tablet 0    • carvedilol (COREG) 12.5 MG tablet Take 1 tablet by mouth 2 (Two) Times a Day. 180 tablet 3    • Cholecalciferol (VITAMIN D-3) 1000 UNITS capsule Take 5,000 Units by mouth Daily.      • Eliquis 5 MG tablet tablet Take 1 tablet by mouth twice daily 180 tablet 0    • epoetin hermes (EPOGEN,PROCRIT) 38935 UNIT/ML injection Inject 20,000 Units under the skin into the appropriate area as directed 1 (One) Time Per Week. LAST TIME 2-6-2017      • Ferrous Fumarate-Vitamin C ER (Florencia-Sequels) 65-25 MG CR tablet Take 65 mg by mouth.      • gabapentin (NEURONTIN) 300 MG capsule Take 300 mg by mouth 2 (two) times a day.      •  Glucagon (Baqsimi Two Pack) 3 MG/DOSE powder 3 mg into the nostril(s) as directed by provider As Needed (hypoglycemia). 1 each 1    • glucose blood (Accu-Chek Guide) test strip Use to test blood sugar 4 times daily  Dispense what insurance will approve E11.8 400 each 3    • glucose blood (Accu-Chek Guide) test strip Test blood sugar 1 times daily e11.8 100 each 3    • glucose blood test strip Use as instructed 100 each 0    • glucose monitor monitoring kit 1 each As Needed (diabetes). 1 each 1    • levothyroxine (SYNTHROID, LEVOTHROID) 50 MCG tablet Take 1 tablet by mouth Daily. 30 tablet 11    • Loperamide HCl (IMODIUM A-D PO) Take 1 tablet by mouth Every 4 (Four) Hours As Needed.      • losartan (COZAAR) 50 MG tablet Take 50 mg by mouth every night at bedtime.      • montelukast (SINGULAIR) 10 MG tablet Take 1 tablet by mouth Every Night. 90 tablet 1    • Multiple Vitamin (MULTI VITAMIN DAILY PO) Take 1 tablet by mouth Every Morning.      • OneTouch Verio test strip Use as instructed 400 each 1    • Probiotic Product (PROBIOTIC DAILY PO) Take 1 capsule by mouth Daily.      • pyridoxine (VITAMIN B-6) 500 MG tablet Take 500 mg by mouth Daily.      • sertraline (ZOLOFT) 50 MG tablet Take 1 tablet by mouth Daily. 90 tablet 1    • topiramate (TOPAMAX) 100 MG tablet Take 100 mg by mouth Daily.      • Tradjenta 5 MG tablet tablet Take 1 tablet by mouth once daily 90 tablet 1      Current Meds:    Scheduled    Medication Ordered Dose/Rate, Route, Frequency Last Action   castor oil-balsam peru (VENELEX) ointment No Dose/Rate, TOP, Q12H Given, 5 g at 07/01 0846   cefTRIAXone (ROCEPHIN) IVPB 1 g 1 g, IV, Q24H New Bag, 1 g at 07/01 0225   epoetin hermes-epbx (RETACRIT) injection 5,000 Units 5,000 Units, SC, 3x Weekly Given, 5,000 Units at 06/30 1439   levothyroxine (SYNTHROID, LEVOTHROID) tablet 50 mcg 50 mcg, PO, Daily Given, 50 mcg at 07/01 0846   miconazole (MICOTIN) 2 % cream 1 application 1 application, TOP, Q12H Given, 1  application at  0847   sertraline (ZOLOFT) tablet 50 mg 50 mg, PO, Daily Given, 50 mg at  0846   sodium chloride 0.9 % flush 10 mL 10 mL, IV, Q12H Given, 10 mL at  0847   Continuous    Medication Ordered Dose/Rate, Route, Frequency Last Action   pantoprazole (PROTONIX) 40 mg in 100mL NS IVPB 8 mg/hr, IV, Continuous New Bag, 8 mg/hr at  0846   PRN    Medication Ordered Dose/Rate, Route, Frequency Last Action   albumin human 25 % IV SOLN 12.5 g 12.5 g, IV, PRN New Bag, 12.5 g at  0737   aluminum-magnesium hydroxide-simethicone (MAALOX MAX) 400-400-40 MG/5ML suspension 15 mL 15 mL, PO, Q6H PRN Ordered   calcium carbonate (TUMS) chewable tablet 500 mg (200 mg elemental) 4 tablet, PO, TID PRN Ordered   dextrose (D50W) 25 g/ 50mL Intravenous Solution 25 g 25 g, IV, Q15 Min PRN Given, 25 g at  0329   dextrose (GLUTOSE) oral gel 15 g 15 g, PO, Q15 Min PRN Ordered   glucagon (human recombinant) (GLUCAGEN DIAGNOSTIC) injection 1 mg 1 mg, SC, Q15 Min PRN Ordered   heparin (porcine) injection 2,000 Units 2,000 Units, IV, PRN Ordered   HYDROmorphone (DILAUDID) injection 0.5 mg 0.5 mg, IV, Q4H PRN Given, 0.5 mg at  2308   ipratropium-albuterol (DUO-NEB) nebulizer solution 3 mL 3 mL, NEBULIZATION, Q6H PRN Ordered   labetalol (NORMODYNE,TRANDATE) injection 20 mg 20 mg, IV, Q10 Min PRN Ordered   ondansetron (ZOFRAN) injection 4 mg 4 mg, IV, Q6H PRN Given, 4 mg at  0819   sodium chloride 0.9 % flush 10 mL 10 mL, IV, PRN Ordered   sodium chloride 0.9 % flush 10 mL (And Linked Group #1) 10 mL, IV, PRN Ordered   sodium chloride 0.9 % infusion 30 mL/hr, IV, Continuous PRN Rate/Dose Change, 30 mL/hr at  1124       Social History:   Social History     Tobacco Use   • Smoking status: Former Smoker     Packs/day: 2.00     Years: 26.00     Pack years: 52.00     Types: Cigarettes     Quit date: 10/21/1992     Years since quittin.7   • Smokeless tobacco: Never Used   • Tobacco comment: quit   "  Substance Use Topics   • Alcohol use: Not Currently     Alcohol/week: 0.0 standard drinks      Family History:  Family History   Problem Relation Age of Onset   • Heart attack Mother    • Heart disease Mother    • Kidney disease Mother    • Heart attack Father    • Heart disease Father    • Hypertension Father    • Stroke Father         ISCHEMIC   • Diabetes Father         TYPE 2   • Kidney disease Brother    • Diabetes Brother         TYPE 1   • Thyroid disease Daughter    • Anxiety disorder Maternal Aunt    • Bipolar disorder Maternal Aunt    • Depression Maternal Aunt    • Lupus Maternal Aunt         LUPUS ANTICOAGLULANT   • Rheum arthritis Maternal Aunt    • Alcohol abuse Maternal Uncle    • Other Maternal Uncle         PULMONARY DISEASE        Review of Systems  Constitutional: No wt loss, fever   Gastrointestinal: No nausea , abdominal pain  Behavioral/Psych: No insomnia or anxiety   Cardiovascular ----positive for sob. All other systems reviewed and are negative                     Physical Exam  /51 (BP Location: Right arm, Patient Position: Lying)   Pulse 76   Temp 97.6 °F (36.4 °C) (Oral)   Resp 18   Ht 165.1 cm (65\")   Wt 80 kg (176 lb 5.9 oz)   LMP  (LMP Unknown)   SpO2 92%   BMI 29.35 kg/m²     General appearance: No acute changes   Eyes: Sclera conjunctiva normal, pupils reactive   HENT: Atraumatic; oropharynx clear with moist mucous membranes and no mucosal ulcerations;  Neck: Trachea midline; NECK, supple, no thyromegaly or lymphadenopathy   Lungs: Normal size and shape, normal breath sounds, equal distribution of air, no rales and rhonchi   CV: S1-S2 regular, no murmurs, no rub, no gallop   Abdomen: Soft, non-tender; no masses , no abnormal abdominal sounds   Extremities: No deformity , normal color , no peripheral edema   Skin: Normal temperature, turgor and texture; no rash, ulcers  Psych: Appropriate affect, alert and oriented to person, place and time "                   Cardiographics  ECG:     Telemetry:    Echocardiogram:      Impression:      1. Ostial left main 70 to 80% stenosis  2. Left anterior descending proximal 70% stenosis with minimal medical artery into the diagonal and  branches  3. Circumflex artery nondominant with no significant stenosis  4. Right coronary artery dominant with 20 to 30% proximal stenosis  5. Normal LVDP     Recommendations:      1. Emergency surgery          Interpretation Summary    · Left atrial volume is mildly increased.  · Mild aortic valve stenosis is present.  · Calculated left ventricular EF = 57% Estimated left ventricular EF was in agreement with the calculated left ventricular EF.  · Left ventricular diastolic function was normal.  · There is no evidence of pericardial effusion.     Interpretation Summary       · Findings consistent with a normal ECG stress test.  · Findings consistent with a normal ECG stress test.  · Left ventricular ejection fraction is normal (Calculated EF = 60%).  · Myocardial perfusion imaging indicates a small-to-medium-sized, mild-to-moderately severe area of ischemia located in the anterior wall.  · Impressions are consistent with an intermediate risk study.     Asymptomatic for chest pain. ECG is negative for ischemia.   Ectopy: PAC's at baseline, episode of atrial tachycardia verses junctional tachycardia post Infusion.   B/P is appropriate Beta-blocker therapy  Pharmacologic study due to inability to tolerate increasing speed and grade of treadmill due to poor balance,  mobility  Issues and Beta-blocker therapy.  Unable to participate in low level exercise due to poor mobility and poor balance.           Imaging  Chest X-ray:   FINDINGS:  Cardiomegaly is present. There is vascular congestion. Patient is status  post median sternotomy with coronary artery bypass grafting. There are  calcifications of the aorta. No pneumothorax or definite pleural  effusion is seen.      IMPRESSION:  Cardiomegaly with vascular congestion.     This report was finalized on 6/29/2021 2:15 AM by Dr. Rebecca Camacho M.D.     CONCLUSION:  1. Mejía catheter within a collapsed urinary bladder, there is  thickening. There is perinephric fat induration, in part seen on the  previous examination, nonspecific finding which can be seen with  pyelonephritis.  2. Left adrenal nodule slightly increased in size since 2016. Currently  13 mm.  3. Porcelain gallbladder filled with gallstones, no overt evidence to  suggest cholecystitis.  4 diverticulosis of the colon.        This report was finalized on 6/30/2021 11:12 AM by Dr. Aidan Naranjo M.D.       Lab Review   Results from last 7 days   Lab Units 07/01/21  0209 06/29/21  0347 06/29/21  0121   CK TOTAL U/L  --   --  63   TROPONIN T ng/mL 0.201* 0.082* 0.161*     Results from last 7 days   Lab Units 07/01/21  0209   MAGNESIUM mg/dL 1.6     Results from last 7 days   Lab Units 07/01/21  0209   SODIUM mmol/L 144   POTASSIUM mmol/L 4.0   BUN mg/dL 87*   CREATININE mg/dL 7.71*   CALCIUM mg/dL 7.0*        Results from last 7 days   Lab Units 07/01/21  1008 07/01/21  0209 06/30/21  1810 06/30/21  0700 06/29/21  0121   WBC 10*3/mm3  --  9.00  --  9.43 9.51   HEMOGLOBIN g/dL 9.6* 9.0* 9.6* 9.5* 9.8*   HEMATOCRIT % 30.6* 28.4* 29.6* 29.5* 31.1*   PLATELETS 10*3/mm3  --  129*  --  151 177     Results from last 7 days   Lab Units 07/01/21  0209 06/30/21  0352 06/29/21  0121   INR  1.52* 1.81* 2.27*     CHADS-VASc Risk Assessment            6 Total Score    1 Hypertension    1 DM    2 PRIOR STROKE/TIA/THROMBO    1 Age 65-74    1 Sex: Female        Criteria that do not apply:    CHF    Age >/= 75    Vascular Disease          The following medical decision was discussed in detail with Dr. Hodges      Assessment:    Gastrointestinal hemorrhage with melena    DM type 2 with diabetic peripheral neuropathy (CMS/HCC)    Essential hypertension    Essential tremor     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 pectoris (CMS/[facility])    Atrial fibrillation (CMS/HCC)    CKD (chronic kidney disease) stage 5, GFR less than 15 ml/min (CMS/HCC)    Hemorrhagic stroke (CMS/[facility])    S/P CABG (coronary artery bypass graft)    Hypothyroidism (acquired)    Hypotension        Recommendations / Plan:   Hold off on anticoagulation  Repeat EKG and troponin in the morning    High troponin most likely due to renal failure      Shanique Ford, APRN  7/1/2021, 16:18 EDT    Patient personally interviewed and above subjective findings personally confirmed during a face to face contact with patient today  All findings of physical examination confirmed  All pertinent and performed labs, cardiac procedures ,  radiographs of the last 24 hours personally reviewed  Impression and plans discussed/elaborated and implemented jointly as described above     Eddie Hodges MD            EMR Dragon/Transcription:   Dictated utilizing Dragon dictation

## 2021-07-01 NOTE — PROGRESS NOTES
Name: Maribeth Rendon ADMIT: 2021   : 1950  PCP: Robby Chaney MD    MRN: [id number] LOS: 2 days   AGE/SEX: 70 y.o. female  ROOM: [facility]     Subjective   Subjective      Patient completed EGD this morning. She states she continues to have loose black stool.   Review of SystemsDenies abdominal pain, nausea, vomiting, fever, chills, chest pain, shortness of breath. She is tearful     Objective   Objective   Vital Signs  Temp:  [97.6 °F (36.4 °C)-98.4 °F (36.9 °C)] 97.6 °F (36.4 °C)  Heart Rate:  [1-92] 76  Resp:  [16-18] 18  BP: ()/() 128/51  SpO2:  [90 %-100 %] 92 %  on  Flow (L/min):  [1-6] 6;   Device (Oxygen Therapy): room air  Body mass index is 29.35 kg/m².  Physical Exam  Vitals reviewed.   Constitutional:       Appearance: She is well-developed. She is obese.   HENT:      Head: Normocephalic and atraumatic.   Cardiovascular:      Rate and Rhythm: Normal rate and regular rhythm.   Pulmonary:      Effort: Pulmonary effort is normal. No respiratory distress.      Breath sounds: Normal breath sounds.   Abdominal:      General: Bowel sounds are normal. There is no distension.      Palpations: Abdomen is soft. There is no mass.      Tenderness: There is no abdominal tenderness.      Hernia: No hernia is present.   Skin:     General: Skin is warm and dry.   Neurological:      Mental Status: She is alert and oriented to person, place, and time.   Psychiatric:         Behavior: Behavior normal.         Thought Content: Thought content normal.         Judgment: Judgment normal.         Results Review     I reviewed the patient's new clinical results.  Results from last 7 days   Lab Units 21  1008 21  0209 21  1810 21  0700 21  0121   WBC 10*3/mm3  --  9.00  --  9.43 9.51   HEMOGLOBIN g/dL 9.6* 9.0* 9.6* 9.5* 9.8*   PLATELETS 10*3/mm3  --  129*  --  151 177     Results from last 7 days   Lab Units 21  0209 21  0352 21  0121  06/25/21  1252   SODIUM mmol/L 144 141 139 144   POTASSIUM mmol/L 4.0 3.9 5.4* 4.6   CHLORIDE mmol/L 108* 105 103 106   CO2 mmol/L 16.0* 17.9* 16.9* 15.0*   BUN mg/dL 87* 79* 129* 124*   CREATININE mg/dL 7.71* 7.19* 9.28* 10.55*   GLUCOSE mg/dL 57* 51* 133* 125*   Estimated Creatinine Clearance: 7.1 mL/min (A) (by C-G formula based on SCr of 7.71 mg/dL (H)).  Results from last 7 days   Lab Units 06/29/21  0121   ALBUMIN g/dL 2.50*   BILIRUBIN mg/dL 0.2   ALK PHOS U/L 60   AST (SGOT) U/L 10   ALT (SGPT) U/L 11     Results from last 7 days   Lab Units 07/01/21  0209 06/30/21  0352 06/29/21  0121 06/25/21  1252   CALCIUM mg/dL 7.0* 6.5* 6.4* 6.5*   ALBUMIN g/dL  --   --  2.50*  --    MAGNESIUM mg/dL 1.6 1.5* 1.6 1.5*   PHOSPHORUS mg/dL 8.1* 6.9*  --   --      Results from last 7 days   Lab Units 06/29/21  0121   PROCALCITONIN ng/mL 0.30*   LACTATE mmol/L 0.6     COVID19   Date Value Ref Range Status   06/29/2021 Not Detected Not Detected - Ref. Range Final   05/06/2021 Not Detected Not Detected - Ref. Range Final     Glucose   Date/Time Value Ref Range Status   07/01/2021 1209 60 (L) 70 - 130 mg/dL Final     Comment:     Meter: [id number] : [id number] Wilian Restrepo CNA   07/01/2021 0621 97 70 - 130 mg/dL Final   07/01/2021 0357 172 (H) 70 - 130 mg/dL Final   06/30/2021 1921 83 70 - 130 mg/dL Final   06/30/2021 1133 75 70 - 130 mg/dL Final   06/30/2021 0715 102 70 - 130 mg/dL Final   06/30/2021 0407 148 (H) 70 - 130 mg/dL Final     Lab Results   Lab Value Date/Time    TROPONINT 0.201 (C) 07/01/2021 0209    TROPONINT 0.082 (C) 06/29/2021 0347    TROPONINT 0.161 (C) 06/29/2021 0121    TROPONINT 0.091 (C) 05/08/2021 0629    TROPONINT 0.072 (C) 05/06/2021 1906    TROPONINT 0.020 07/26/2019 0330    TROPONINT 0.017 07/25/2019 1433    TROPONINT 0.06 01/10/2016 0431    TROPONINT 0.04 01/06/2016 2225    TROPONINT 0.11 01/04/2016 0523    TROPONINT 0.12 01/03/2016 2320    TROPONINT 0.17 01/03/2016 0634    TROPONINI <0.012  01/25/2021 0558    TROPONINI <0.012 09/18/2020 2124     Results from last 7 days   Lab Units 07/01/21  1008   HEMOGLOBIN g/dL 9.6*     Results from last 7 days   Lab Units 07/01/21  1008 07/01/21  0209 06/30/21  1810   HEMOGLOBIN g/dL 9.6* 9.0* 9.6*           CT Abdomen Pelvis Without Contrast  CT ABDOMEN PELVIS WO CONTRAST-     Radiation dose reduction techniques were utilized, including automated  exposure control and exposure modulation based on body size.     Clinical: Sepsis, question UTI. Acute renal failure     COMPARISON CT scan of the abdomen and pelvis 3/18/2016     FINDINGS: There is a porcelain gallbladder which is filled with  gallstones. No gallbladder wall thickening or pericholecystic fluid  suggesting cholecystitis. No biliary duct dilatation. The pancreas is  normal.     Spleen is normal in size and shape, the right adrenal gland is normal.  There is a small, 13 mm indeterminate left adrenal nodule which measured  8 mm on 2016.     There is bilateral perinephric fat stranding and some of this was  present on the previous examination. There is a nonspecific finding that  can be seen with pyelonephritis. No renal calculus or obstructive  uropathy. Both ureters are normal in course and caliber to the urinary  bladder. There is a Mejía catheter within the lumen of the bladder,  there is bladder wall thickening. Vaginal cuff typical post  hysterectomy.     Trace free fluid in the pelvic cul-de-sac.     Diverticulosis of the sigmoid colon without overt diverticulitis. Small  bowel is normal. Diameter of the aorta is within normal limits. Heavy  vascular arterial calcification. Stomach is collapsed, contour within  normal limits.     CONCLUSION:  1. Mejía catheter within a collapsed urinary bladder, there is  thickening. There is perinephric fat induration, in part seen on the  previous examination, nonspecific finding which can be seen with  pyelonephritis.  2. Left adrenal nodule slightly increased in  size since 2016. Currently  13 mm.  3. Porcelain gallbladder filled with gallstones, no overt evidence to  suggest cholecystitis.  4 diverticulosis of the colon.        This report was finalized on 6/30/2021 11:12 AM by Dr. Aidan Naranjo M.D.       Scheduled Medications  castor oil-balsam peru, , Topical, Q12H  cefTRIAXone, 1 g, Intravenous, Q24H  epoetin hermes/hermes-epbx, 5,000 Units, Subcutaneous, Once per day on Mon Wed Fri  levothyroxine, 50 mcg, Oral, Daily  miconazole, 1 application, Topical, Q12H  sertraline, 50 mg, Oral, Daily  sodium chloride, 10 mL, Intravenous, Q12H    Infusions  pantoprazole, 8 mg/hr, Last Rate: 8 mg/hr (07/01/21 0846)  sodium chloride, 30 mL/hr, Last Rate: 30 mL/hr (07/01/21 1124)    Diet  Diet Clear Liquid       Assessment/Plan     Active Hospital Problems    Diagnosis  POA   • **Gastrointestinal hemorrhage with melena [K92.1]  Unknown   • Hypotension [I95.9]  Yes   • Hypothyroidism (acquired) [E03.9]  Yes   • S/P CABG (coronary artery bypass graft) [Z95.1]  Not Applicable   • Hemorrhagic stroke (CMS/[facility]) [I61.9]  Yes   • CKD (chronic kidney disease) stage 5, GFR less than 15 ml/min (CMS/[facility]) [N18.5]  Yes   • Atrial fibrillation (CMS/[facility]) [I48.91]  Yes   • 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 pectoris (CMS/[facility]) [I25.118]  Yes   • Essential tremor [G25.0]  Yes   • DM type 2 with diabetic peripheral neuropathy (CMS/[facility]) [E11.42]  Yes   • Essential hypertension [I10]  Yes      Resolved Hospital Problems   No resolved problems to display.       70 y.o. female admitted with Gastrointestinal hemorrhage with melena. He has a history of tobacco abuse, anemia, DM 2, CAD status post CABG and stents, CHF, CKD 5.  She presented with altered mental status and decreased p.o. intake. Patient admitted 6/29 with septic shock, dehydration metabolic encephalopathy, hypotension, UTI with worsening renal dysfunction. Transferred out of ICU yesterday.    Anemia, chronic disease and  iron deficiency/melena heme positive  · She is anticoagulated with history of atrial fibrillation  · PPI drip  · Trend H&H every 8 hours, stable but melena persists  · EGD 7/1 without source of bleed. Defer colonoscopy/small bowel evaluation to GI    Sepsis secondary to UTI and septic shock  · Resolved, continue IV Rocephin. Culture not obtained   · Blood cx NGTD  · Blood pressures labile, continue to hold home antihypertensive agents    ESRD, metabolic acidosis  · Status post prior aVF but not on dialysis. Nephrology following patient started on HD, status post 2 treatments with another HD tomorrow  · Daily weights     CAD/systolic CHF/ afib/ elevated troponin  · History of CABG, asymptomatic  · Holding eliquis   · Troponin elevated on admission but trending upwards even after dialysis  · Cardiology consult pending.     DM2  Glucose labile. Monitor and avoid hypoglycemia  Check A1c        · SCDs for DVT prophylaxis.  · Full code.  · Discussed with patient, family, Dr. Owens  Greater than 50% of time spent in counseling and/or coordination of care. Total face/floor time 35 minutes.      IRENA Sandy  [facility]  07/01/21  16:23 EDT

## 2021-07-02 ENCOUNTER — HOSPITAL ENCOUNTER (OUTPATIENT)
Dept: INFUSION THERAPY | Facility: HOSPITAL | Age: 71
Discharge: HOME | End: 2021-07-02

## 2021-07-02 PROBLEM — I95.9 HYPOTENSION: Status: RESOLVED | Noted: 2021-06-29 | Resolved: 2021-07-02

## 2021-07-02 PROBLEM — N18.6 ESRD (END STAGE RENAL DISEASE) (HCC): Status: ACTIVE | Noted: 2021-07-02

## 2021-07-02 PROBLEM — D63.8 ANEMIA, CHRONIC DISEASE: Status: ACTIVE | Noted: 2021-07-02

## 2021-07-02 PROBLEM — D62 ANEMIA, POSTHEMORRHAGIC, ACUTE: Status: ACTIVE | Noted: 2021-07-02

## 2021-07-02 LAB
ALBUMIN SERPL-MCNC: 2.7 G/DL (ref 3.5–5.2)
ANION GAP SERPL CALCULATED.3IONS-SCNC: 15.8 MMOL/L (ref 5–15)
BASOPHILS # BLD AUTO: 0.02 10*3/MM3 (ref 0–0.2)
BASOPHILS NFR BLD AUTO: 0.2 % (ref 0–1.5)
BUN SERPL-MCNC: 42 MG/DL (ref 8–23)
BUN/CREAT SERPL: 8.2 (ref 7–25)
CALCIUM SPEC-SCNC: 7.3 MG/DL (ref 8.6–10.5)
CHLORIDE SERPL-SCNC: 102 MMOL/L (ref 98–107)
CO2 SERPL-SCNC: 22.2 MMOL/L (ref 22–29)
CREAT SERPL-MCNC: 5.14 MG/DL (ref 0.57–1)
DEPRECATED RDW RBC AUTO: 51.2 FL (ref 37–54)
EOSINOPHIL # BLD AUTO: 0.39 10*3/MM3 (ref 0–0.4)
EOSINOPHIL NFR BLD AUTO: 4.1 % (ref 0.3–6.2)
ERYTHROCYTE [DISTWIDTH] IN BLOOD BY AUTOMATED COUNT: 15.2 % (ref 12.3–15.4)
GFR SERPL CREATININE-BSD FRML MDRD: 8 ML/MIN/1.73
GFR SERPL CREATININE-BSD FRML MDRD: ABNORMAL ML/MIN/{1.73_M2}
GLUCOSE BLDC GLUCOMTR-MCNC: 100 MG/DL (ref 70–130)
GLUCOSE BLDC GLUCOMTR-MCNC: 106 MG/DL (ref 70–130)
GLUCOSE BLDC GLUCOMTR-MCNC: 116 MG/DL (ref 70–130)
GLUCOSE BLDC GLUCOMTR-MCNC: 120 MG/DL (ref 70–130)
GLUCOSE SERPL-MCNC: 68 MG/DL (ref 65–99)
HCT VFR BLD AUTO: 30.8 % (ref 34–46.6)
HCT VFR BLD AUTO: 30.8 % (ref 34–46.6)
HGB BLD-MCNC: 9.5 G/DL (ref 12–15.9)
HGB BLD-MCNC: 9.5 G/DL (ref 12–15.9)
IMM GRANULOCYTES # BLD AUTO: 0.17 10*3/MM3 (ref 0–0.05)
IMM GRANULOCYTES NFR BLD AUTO: 1.8 % (ref 0–0.5)
INR PPP: 1.41 (ref 0.9–1.1)
LAB AP CASE REPORT: NORMAL
LYMPHOCYTES # BLD AUTO: 0.67 10*3/MM3 (ref 0.7–3.1)
LYMPHOCYTES NFR BLD AUTO: 7.1 % (ref 19.6–45.3)
MAGNESIUM SERPL-MCNC: 1.5 MG/DL (ref 1.6–2.4)
MCH RBC QN AUTO: 28.8 PG (ref 26.6–33)
MCHC RBC AUTO-ENTMCNC: 30.8 G/DL (ref 31.5–35.7)
MCV RBC AUTO: 93.3 FL (ref 79–97)
MONOCYTES # BLD AUTO: 0.81 10*3/MM3 (ref 0.1–0.9)
MONOCYTES NFR BLD AUTO: 8.6 % (ref 5–12)
NEUTROPHILS NFR BLD AUTO: 7.37 10*3/MM3 (ref 1.7–7)
NEUTROPHILS NFR BLD AUTO: 78.2 % (ref 42.7–76)
NRBC BLD AUTO-RTO: 0.7 /100 WBC (ref 0–0.2)
PATH REPORT.FINAL DX SPEC: NORMAL
PATH REPORT.GROSS SPEC: NORMAL
PHOSPHATE SERPL-MCNC: 4.7 MG/DL (ref 2.5–4.5)
PLATELET # BLD AUTO: 114 10*3/MM3 (ref 140–450)
PMV BLD AUTO: 10.7 FL (ref 6–12)
POTASSIUM SERPL-SCNC: 3.6 MMOL/L (ref 3.5–5.2)
PROTHROMBIN TIME: 17 SECONDS (ref 11.7–14.2)
QT INTERVAL: 412 MS
RBC # BLD AUTO: 3.3 10*6/MM3 (ref 3.77–5.28)
SODIUM SERPL-SCNC: 140 MMOL/L (ref 136–145)
TROPONIN T SERPL-MCNC: 0.15 NG/ML (ref 0–0.03)
UREASE TISS QL: NEGATIVE
WBC # BLD AUTO: 9.43 10*3/MM3 (ref 3.4–10.8)

## 2021-07-02 PROCEDURE — 80069 RENAL FUNCTION PANEL: CPT | Performed by: INTERNAL MEDICINE

## 2021-07-02 PROCEDURE — 82962 GLUCOSE BLOOD TEST: CPT

## 2021-07-02 PROCEDURE — 85610 PROTHROMBIN TIME: CPT | Performed by: INTERNAL MEDICINE

## 2021-07-02 PROCEDURE — 99232 SBSQ HOSP IP/OBS MODERATE 35: CPT | Performed by: INTERNAL MEDICINE

## 2021-07-02 PROCEDURE — 85025 COMPLETE CBC W/AUTO DIFF WBC: CPT | Performed by: INTERNAL MEDICINE

## 2021-07-02 PROCEDURE — 84484 ASSAY OF TROPONIN QUANT: CPT | Performed by: NURSE PRACTITIONER

## 2021-07-02 PROCEDURE — 83735 ASSAY OF MAGNESIUM: CPT | Performed by: INTERNAL MEDICINE

## 2021-07-02 PROCEDURE — 25010000002 CEFTRIAXONE PER 250 MG: Performed by: INTERNAL MEDICINE

## 2021-07-02 PROCEDURE — 85014 HEMATOCRIT: CPT | Performed by: INTERNAL MEDICINE

## 2021-07-02 PROCEDURE — 85018 HEMOGLOBIN: CPT | Performed by: INTERNAL MEDICINE

## 2021-07-02 RX ORDER — CARVEDILOL 12.5 MG/1
12.5 TABLET ORAL 2 TIMES DAILY WITH MEALS
Status: DISCONTINUED | OUTPATIENT
Start: 2021-07-02 | End: 2021-07-08 | Stop reason: HOSPADM

## 2021-07-02 RX ORDER — AMLODIPINE BESYLATE 5 MG/1
5 TABLET ORAL
Status: DISCONTINUED | OUTPATIENT
Start: 2021-07-02 | End: 2021-07-08 | Stop reason: HOSPADM

## 2021-07-02 RX ORDER — POLYETHYLENE GLYCOL 3350 17 G/17G
1 POWDER, FOR SOLUTION ORAL 2 TIMES DAILY
Status: DISPENSED | OUTPATIENT
Start: 2021-07-03 | End: 2021-07-04

## 2021-07-02 RX ADMIN — SODIUM CHLORIDE, PRESERVATIVE FREE 10 ML: 5 INJECTION INTRAVENOUS at 20:20

## 2021-07-02 RX ADMIN — CASTOR OIL AND BALSAM, PERU 5 G: 788; 87 OINTMENT TOPICAL at 08:39

## 2021-07-02 RX ADMIN — PANTOPRAZOLE SODIUM 8 MG/HR: 40 INJECTION, POWDER, FOR SOLUTION INTRAVENOUS at 22:26

## 2021-07-02 RX ADMIN — MICONAZOLE NITRATE 1 APPLICATION: 2 CREAM TOPICAL at 20:21

## 2021-07-02 RX ADMIN — MICONAZOLE NITRATE 1 APPLICATION: 2 CREAM TOPICAL at 08:39

## 2021-07-02 RX ADMIN — SERTRALINE HYDROCHLORIDE 50 MG: 50 TABLET, FILM COATED ORAL at 08:39

## 2021-07-02 RX ADMIN — CARVEDILOL 12.5 MG: 12.5 TABLET, FILM COATED ORAL at 11:21

## 2021-07-02 RX ADMIN — LEVOTHYROXINE SODIUM 50 MCG: 0.05 TABLET ORAL at 08:39

## 2021-07-02 RX ADMIN — PANTOPRAZOLE SODIUM 8 MG/HR: 40 INJECTION, POWDER, FOR SOLUTION INTRAVENOUS at 05:21

## 2021-07-02 RX ADMIN — CARVEDILOL 12.5 MG: 12.5 TABLET, FILM COATED ORAL at 17:31

## 2021-07-02 RX ADMIN — SODIUM CHLORIDE, PRESERVATIVE FREE 10 ML: 5 INJECTION INTRAVENOUS at 08:39

## 2021-07-02 RX ADMIN — CEFTRIAXONE SODIUM 1 G: 1 INJECTION, SOLUTION INTRAVENOUS at 02:43

## 2021-07-02 RX ADMIN — AMLODIPINE BESYLATE 5 MG: 5 TABLET ORAL at 11:21

## 2021-07-02 RX ADMIN — CASTOR OIL AND BALSAM, PERU 5 G: 788; 87 OINTMENT TOPICAL at 20:20

## 2021-07-02 RX ADMIN — PANTOPRAZOLE SODIUM 8 MG/HR: 40 INJECTION, POWDER, FOR SOLUTION INTRAVENOUS at 14:42

## 2021-07-02 NOTE — PLAN OF CARE
Goal Outcome Evaluation:               Pt resting in bed quietly. Denies pain. Turned and incontinence care as needed. F/c care done. 2 l while sleeping. No gross signs of bleeding this shift.

## 2021-07-02 NOTE — PROGRESS NOTES
"[facility]  Inpatient Progress Note    Reason for Follow Up: GI bleed    Subjective     Interval History:   H&H is stable.  Patient complains of being \"gassy\" but no other GI complaints.  Tolerating solid food without difficulty.  Discussed further evaluation to start with colonoscopic assessment.  After giving this some thought she is amenable.  We will plan on preparation tomorrow with colonoscopy the following day.    Current Facility-Administered Medications:   •  albumin human 25 % IV SOLN 12.5 g, 12.5 g, Intravenous, PRN, Brijesh Randolph MD, 12.5 g at 07/01/21 0737  •  aluminum-magnesium hydroxide-simethicone (MAALOX MAX) 400-400-40 MG/5ML suspension 15 mL, 15 mL, Oral, Q6H PRN, Tone Palumbo MD  •  amLODIPine (NORVASC) tablet 5 mg, 5 mg, Oral, Q24H, Silvino Owens MD, 5 mg at 07/02/21 1121  •  calcium carbonate (TUMS) chewable tablet 500 mg (200 mg elemental), 4 tablet, Oral, TID PRN, Tone Palumbo MD  •  carvedilol (COREG) tablet 12.5 mg, 12.5 mg, Oral, BID With Meals, Silvino Owens MD, 12.5 mg at 07/02/21 1121  •  castor oil-balsam peru (VENELEX) ointment, , Topical, Q12H, Tone Palumbo MD, 5 g at 07/02/21 0839  •  dextrose (D50W) 25 g/ 50mL Intravenous Solution 25 g, 25 g, Intravenous, Q15 Min PRN, Tone Palumbo MD, 25 g at 07/01/21 0329  •  dextrose (GLUTOSE) oral gel 15 g, 15 g, Oral, Q15 Min PRN, Tone Palumbo MD  •  epoetin hermes-epbx (RETACRIT) injection 5,000 Units, 5,000 Units, Subcutaneous, Once per day on Mon Wed Fri, Tone Palumbo MD, 5,000 Units at 06/30/21 1439  •  glucagon (human recombinant) (GLUCAGEN DIAGNOSTIC) injection 1 mg, 1 mg, Subcutaneous, Q15 Min PRN, Tone Palumbo MD  •  heparin (porcine) injection 2,000 Units, 2,000 Units, Intravenous, PRN, Tone Palumbo MD  •  HYDROmorphone (DILAUDID) injection 0.5 mg, 0.5 mg, Intravenous, Q4H PRN, Tone Palumbo MD, 0.5 mg at 06/30/21 " 2308  •  ipratropium-albuterol (DUO-NEB) nebulizer solution 3 mL, 3 mL, Nebulization, Q6H PRN, Tone Palumbo MD  •  labetalol (NORMODYNE,TRANDATE) injection 20 mg, 20 mg, Intravenous, Q10 Min PRN, Tone Palumbo MD  •  levothyroxine (SYNTHROID, LEVOTHROID) tablet 50 mcg, 50 mcg, Oral, Daily, Tone Palumbo MD, 50 mcg at 07/02/21 0839  •  miconazole (MICOTIN) 2 % cream 1 application, 1 application, Topical, Q12H, Tone Palumbo MD, 1 application at 07/02/21 0839  •  ondansetron (ZOFRAN) injection 4 mg, 4 mg, Intravenous, Q6H PRN, Tone Palumbo MD, 4 mg at 06/30/21 0819  •  pantoprazole (PROTONIX) 40 mg in 100mL NS IVPB, 8 mg/hr, Intravenous, Continuous, Tone Palumbo MD, Last Rate: 20 mL/hr at 07/02/21 0521, 8 mg/hr at 07/02/21 0521  •  sertraline (ZOLOFT) tablet 50 mg, 50 mg, Oral, Daily, Tone Palumbo MD, 50 mg at 07/02/21 0839  •  [COMPLETED] Insert peripheral IV, , , Once **AND** sodium chloride 0.9 % flush 10 mL, 10 mL, Intravenous, PRN, Tone Palumbo MD  •  sodium chloride 0.9 % flush 10 mL, 10 mL, Intravenous, Q12H, Tone Palumbo MD, 10 mL at 07/02/21 0839  •  sodium chloride 0.9 % flush 10 mL, 10 mL, Intravenous, PRN, Tone Palumbo MD  •  sodium chloride 0.9 % infusion, 30 mL/hr, Intravenous, Continuous PRN, Azam Kat MD, Last Rate: 30 mL/hr at 07/01/21 1124, Rate Change at 07/01/21 1124  Review of Systems:    All systems were reviewed and negative except for:  Gastrointestinal: positive for  See HPI    Objective     Vital Signs  Temp:  [97.6 °F (36.4 °C)-98.9 °F (37.2 °C)] 98.9 °F (37.2 °C)  Heart Rate:  [76-96] 96  Resp:  [16-18] 18  BP: (128-150)/(51-70) 146/68  Body mass index is 31.07 kg/m².    Intake/Output Summary (Last 24 hours) at 7/2/2021 1223  Last data filed at 7/2/2021 0521  Gross per 24 hour   Intake 450 ml   Output 425 ml   Net 25 ml     No intake/output data recorded.     Physical Exam:   General: patient  awake, alert and cooperative   Eyes: Normal lids and lashes, no scleral icterus   Neck: supple, normal ROM   Skin: warm and dry, not jaundiced   Cardiovascular: regular rhythm and rate, no murmurs auscultated   Pulm: clear to auscultation bilaterally, regular and unlabored   Abdomen: soft, nontender, nondistended; normal bowel sounds   Extremities: no rash or edema   Psychiatric: Normal mood and behavior; memory intact     Results Review:     I reviewed the patient's new clinical results.    Results from last 7 days   Lab Units 07/02/21  0243 07/01/21  1810 07/01/21  1008 07/01/21  0209 06/30/21  0700   WBC 10*3/mm3 9.43  --   --  9.00 9.43   HEMOGLOBIN g/dL 9.5*  9.5* 9.2* 9.6* 9.0* 9.5*   HEMATOCRIT % 30.8*  30.8* 28.8* 30.6* 28.4* 29.5*   PLATELETS 10*3/mm3 114*  --   --  129* 151     Results from last 7 days   Lab Units 07/02/21  0243 07/01/21  0209 06/30/21  0352 06/29/21  0121   SODIUM mmol/L 140 144 141 139   POTASSIUM mmol/L 3.6 4.0 3.9 5.4*   CHLORIDE mmol/L 102 108* 105 103   CO2 mmol/L 22.2 16.0* 17.9* 16.9*   BUN mg/dL 42* 87* 79* 129*   CREATININE mg/dL 5.14* 7.71* 7.19* 9.28*   CALCIUM mg/dL 7.3* 7.0* 6.5* 6.4*   BILIRUBIN mg/dL  --   --   --  0.2   ALK PHOS U/L  --   --   --  60   ALT (SGPT) U/L  --   --   --  11   AST (SGOT) U/L  --   --   --  10   GLUCOSE mg/dL 68 57* 51* 133*     Results from last 7 days   Lab Units 07/02/21  0243 07/01/21  0209 06/30/21  0352   INR  1.41* 1.52* 1.81*     Lab Results   Lab Value Date/Time    LIPASE 14 01/03/2016 0339       Radiology:  CT Abdomen Pelvis Without Contrast   Final Result      CT Head Without Contrast   Final Result   No acute intracranial findings.       Radiation dose reduction techniques were utilized, including automated   exposure control and exposure modulation based on body size.       This report was finalized on 6/29/2021 2:18 AM by Dr. Rebecca Camacho M.D.          XR Chest 1 View   Final Result   Cardiomegaly with vascular congestion.        This report was finalized on 6/29/2021 2:15 AM by Dr. Rebecca Camacho M.D.              Assessment/Plan     Patient Active Problem List   Diagnosis   • Menstrual disorder   • Atopic rhinitis   • Anemia in CKD (chronic kidney disease)   • Spasm of cervical paraspinous muscle   • Cervical radiculopathy   • Chronic kidney disease, stage IV (severe) (CMS/[facility])   • Depression   • DM type 2 with diabetic peripheral neuropathy (CMS/[facility])   • Essential hypertension   • Duplay's periarthritis syndrome   • Gastroesophageal reflux disease   • Hyperlipidemia   • Hypertension   • Arthritis   • Seizure disorder (CMS/[facility])   • Phlebitis   • Type 2 diabetes mellitus (CMS/[facility])   • Vitamin D deficiency   • Chest pain   • Abscess   • Generalized muscle weakness   • Abdominal wall cellulitis   • HTN (hypertension)   • CKD (chronic kidney disease) stage 4, GFR 15-29 ml/min (CMS/[facility])   • Diabetes mellitus with peripheral autonomic neuropathy (CMS/[facility])   • Hyponatremia   • Hypercalcemia   • Dehydration   • DACIA (acute kidney injury) (CMS/[facility])   • Abdominal wall abscess   • Cellulitis of toe of left foot   • Partial Achilles tendon tear, left, initial encounter   • Arthritis of foot   • Essential tremor   • Primary Parkinsonism (CMS/[facility])   • Abnormal cardiac function test   • 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 pectoris (CMS/[facility])   • Atrial fibrillation (CMS/[facility])   • Anticoagulated   • CKD (chronic kidney disease) stage 5, GFR less than 15 ml/min (CMS/[facility])   • Hypoglycemia   • Low vitamin B12 level   • Hemorrhagic stroke (CMS/[facility])   • S/P CABG (coronary artery bypass graft)   • Acute pulmonary edema (CMS/[facility])   • Hypothyroidism (acquired)   • Gastrointestinal hemorrhage with melena   • Systolic CHF, chronic (CMS/[facility])   • History of CVA (cerebrovascular accident)   • ESRD (end stage renal disease) (CMS/[facility])   • Anemia, chronic disease   • Anemia, posthemorrhagic, acute       Assessment:  #1 normocytic anemia:  Combination of chronic disease versus iron deficiency  #2 heme positive stool  #3 melena  #4 acute on chronic kidney disease  #5 anticoagulation therapy for A. Fib  #6 coronary artery disease: History of CABG        Plan:  · Discussed with patient and family at bedside will anticipate bowel preparation tomorrow with colonoscopy the following day.  I discussed the patients findings and my recommendations with patient and family.    Azam Kat MD

## 2021-07-02 NOTE — PROGRESS NOTES
"    DAILY PROGRESS NOTE  [facility]    Patient Identification:  Name: Maribeth Rendon  Age: 70 y.o.  Sex: female  :  1950  MRN: [id number]         Primary Care Physician: Robby Chaney MD    Subjective:  Interval History: Overall noting some clinical improvement.  She had some abdominal discomfort the other day but states that she ate more this morning and had no subsequent abdominal pain.  She denies any emesis but does have some nausea.  Denies any chest pain or palpitation.    Objective: Chronically ill-appearing elderly and frail.  Nontoxic pleasant conversational with fluent speech.  Animated.  No family at bedside and patient did not asked me to discuss her case with anyone additionally    Scheduled Meds:amLODIPine, 5 mg, Oral, Q24H  carvedilol, 12.5 mg, Oral, BID With Meals  castor oil-balsam peru, , Topical, Q12H  epoetin hermes/hermes-epbx, 5,000 Units, Subcutaneous, Once per day on   levothyroxine, 50 mcg, Oral, Daily  miconazole, 1 application, Topical, Q12H  sertraline, 50 mg, Oral, Daily  sodium chloride, 10 mL, Intravenous, Q12H      Continuous Infusions:pantoprazole, 8 mg/hr, Last Rate: 8 mg/hr (21 0521)  sodium chloride, 30 mL/hr, Last Rate: 30 mL/hr (21 1124)        Vital signs in last 24 hours:  Temp:  [97.6 °F (36.4 °C)-98.9 °F (37.2 °C)] 98.9 °F (37.2 °C)  Heart Rate:  [75-96] 96  Resp:  [16-18] 18  BP: (128-193)/() 146/68    Intake/Output:    Intake/Output Summary (Last 24 hours) at 2021 1027  Last data filed at 2021 0521  Gross per 24 hour   Intake 650 ml   Output 425 ml   Net 225 ml       Exam:  /68 (BP Location: Right arm, Patient Position: Lying)   Pulse 96   Temp 98.9 °F (37.2 °C) (Oral)   Resp 18   Ht 165.1 cm (65\")   Wt 84.7 kg (186 lb 11.2 oz)   LMP  (LMP Unknown)   SpO2 97%   BMI 31.07 kg/m²     General Appearance:    Alert, cooperative, AAOx3                          Head:    Normocephalic, without obvious " abnormality, atraumatic                           Eyes:    Conjunctivae/corneas clear, EOM's intact, both eyes                         Throat:   Poor dentition; oral mucosa pink and moist                           Neck:   No JVD                         Lungs:    Decreased bases otherwise clear to auscultation bilaterally, respirations unlabored                 Chest Wall:    No tenderness or deformity                          Heart:    Regular rate and rhythm, S1 and S2 normal                  Abdomen:     Soft, nontender, bowel sounds active                 Extremities: Chronic changes, no cyanosis with no/trace edema                        Pulses:   Pulses palpable in lower extremities                  Neurologic:   CNII-XII intact, no focal deficits noted     Data Review:  Labs in chart were reviewed.    Assessment:  Active Hospital Problems    Diagnosis  POA   • **Anemia, posthemorrhagic, acute [D62]  Unknown   • ESRD (end stage renal disease) (CMS/HCC) [N18.6]  Unknown   • Anemia, chronic disease [D63.8]  Unknown   • Systolic CHF, chronic (CMS/HCC) [I50.22]  Yes   • History of CVA (cerebrovascular accident) [Z86.73]  Not Applicable   • Gastrointestinal hemorrhage with melena [K92.1]  Unknown   • Hypothyroidism (acquired) [E03.9]  Yes   • S/P CABG (coronary artery bypass graft) [Z95.1]  Not Applicable   • Hemorrhagic stroke (CMS/HCC) [I61.9]  Yes   • Atrial fibrillation (CMS/HCC) [I48.91]  Yes   • 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 pectoris (CMS/HCC) [I25.118]  Yes   • Primary Parkinsonism (CMS/HCC) [G20]  Yes   • DM type 2 with diabetic peripheral neuropathy (CMS/HCC) [E11.42]  Yes   • Essential hypertension [I10]  Yes      Resolved Hospital Problems    Diagnosis Date Resolved POA   • Hypotension [I95.9] 07/02/2021 Yes       Plan:    70 y.o. female admitted with Gastrointestinal hemorrhage with melena. He has a history of tobacco abuse, anemia, DM 2, CAD status post CABG and stents, CHF,  CKD 5.  She presented with altered mental status and decreased p.o. intake. Patient admitted 6/29 with septic shock, dehydration metabolic encephalopathy, hypotension, UTI with worsening renal dysfunction. Transferred out of ICU yesterday.     Anemia, chronic disease and iron deficiency/melena heme positive  · She was formally on Eliquis/ASA with history of atrial fibrillation  · PPI drip  · Hgb stable and will DC every 8 checks and monitor daily  · EGD 7/1 without source of bleed. Defer colonoscopy/small bowel evaluation to GI was following in consultation     Sepsis secondary to UTI with resolved septic shock  · Resolved,  completed IV Rocephin. Culture not obtained   · Blood cx NGTD  · Blood pressures labile with multiple medications on hold and will reinstate Coreg and Norvasc today with further reinstatement of ARB/diuretics per renal     ESRD, metabolic acidosis  · Status post prior aVF but not on dialysis.   Appreciate nephrology management with HD     CAD/systolic CHF/ afib/ elevated troponin  · History of CABG, asymptomatic  · Cardiology input appreciated for abnormal troponins deemed secondary to renal disease     DM2 with kidney/circulatory disease/neuropathy   -BS stable    Hypothyroidism on levothyroxine         · SCDs for DVT prophylaxis.  · Full code    Silvino Owens MD  7/2/2021  10:27 EDT\

## 2021-07-02 NOTE — PROGRESS NOTES
[facility] Progress Note      Patient Name: Maribeth Rendon  : 1950  MRN: [id number]  Primary Care Physician:  Robby Chaney MD  Date of admission: 2021    Subjective     Interval History:   The patient is feeling much better today she had dialysis yesterday without any difficulties, denies any chest pain or shortness of air, no orthopnea or PND, no nausea or vomiting, no abdominal pain, no dysuria or gross hematuria    Review of Systems:   As noted above    Objective     Vitals:   Temp:  [97.6 °F (36.4 °C)-98.9 °F (37.2 °C)] 98.9 °F (37.2 °C)  Heart Rate:  [76-96] 96  Resp:  [16-18] 18  BP: (128-150)/(51-70) 146/68  Flow (L/min):  [2] 2    Intake/Output Summary (Last 24 hours) at 2021 1304  Last data filed at 2021 0521  Gross per 24 hour   Intake 450 ml   Output 425 ml   Net 25 ml       Physical Exam:    Constitutional:  Asleep, arousable, no acute distress, morbidly obese, chronically ill  HEENT: Sclera anicteric, no conjunctival injection, oral mucosa is  Neck: Supple, no thyromegaly, no lymphadenopathy, trachea at midline, no JVD  Respiratory: Clear to auscultation bilaterally, nonlabored respiration  Cardiovascular: RRR, systolic ejection murmur, no rub  Gastrointestinal: Positive bowel sounds, abdomen is soft, nontender and nondistended  : No palpable bladder  Musculoskeletal: No edema, no clubbing or cyanosis.  Left upper extremity AV fistula in place  Psychiatric: Patient has a flat affect  Neurologic:  She is alert  moving all extremities, normal speech and mental status  Skin: Warm and dry        Scheduled Meds:     amLODIPine, 5 mg, Oral, Q24H  carvedilol, 12.5 mg, Oral, BID With Meals  castor oil-balsam peru, , Topical, Q12H  epoetin hermes/hermes-epbx, 5,000 Units, Subcutaneous, Once per day on   levothyroxine, 50 mcg, Oral, Daily  miconazole, 1 application, Topical, Q12H  [START ON 7/3/2021] polyethylene glycol, 1 bottle, Oral,  BID  sertraline, 50 mg, Oral, Daily  sodium chloride, 10 mL, Intravenous, Q12H      IV Meds:   pantoprazole, 8 mg/hr, Last Rate: 8 mg/hr (07/02/21 0521)  sodium chloride, 30 mL/hr, Last Rate: 30 mL/hr (07/01/21 1124)        Results Reviewed:   I have personally reviewed the results from the time of local ice packs were placed admission to 7/2/2021 13:04 EDT     Results from last 7 days   Lab Units 07/02/21  0243 07/01/21  1810 07/01/21  1008 07/01/21  0209 06/30/21  0700   WBC 10*3/mm3 9.43  --   --  9.00 9.43   HEMOGLOBIN g/dL 9.5*  9.5* 9.2* 9.6* 9.0* 9.5*   HEMATOCRIT % 30.8*  30.8* 28.8* 30.6* 28.4* 29.5*   PLATELETS 10*3/mm3 114*  --   --  129* 151         Results from last 7 days   Lab Units 07/02/21  0243 07/01/21  0209 06/30/21  0352 06/29/21  0121   SODIUM mmol/L 140 144 141 139   POTASSIUM mmol/L 3.6 4.0 3.9 5.4*   CHLORIDE mmol/L 102 108* 105 103   CO2 mmol/L 22.2 16.0* 17.9* 16.9*   BUN mg/dL 42* 87* 79* 129*   CREATININE mg/dL 5.14* 7.71* 7.19* 9.28*   CALCIUM mg/dL 7.3* 7.0* 6.5* 6.4*   BILIRUBIN mg/dL  --   --   --  0.2   ALK PHOS U/L  --   --   --  60   ALT (SGPT) U/L  --   --   --  11   AST (SGOT) U/L  --   --   --  10   GLUCOSE mg/dL 68 57* 51* 133*       Estimated Creatinine Clearance: 10.9 mL/min (A) (by C-G formula based on SCr of 5.14 mg/dL (H)).    Results from last 7 days   Lab Units 07/02/21  0243 07/01/21  0209 06/30/21  0352   MAGNESIUM mg/dL 1.5* 1.6 1.5*   PHOSPHORUS mg/dL 4.7* 8.1* 6.9*             Results from last 7 days   Lab Units 07/02/21  0243 07/01/21  1810 07/01/21  1008 07/01/21  0209 06/30/21  1810 06/30/21  0700 06/29/21  0121   WBC 10*3/mm3 9.43  --   --  9.00  --  9.43 9.51   HEMOGLOBIN g/dL 9.5*  9.5* 9.2* 9.6* 9.0* 9.6* 9.5* 9.8*   PLATELETS 10*3/mm3 114*  --   --  129*  --  151 177       Results from last 7 days   Lab Units 07/02/21  0243 07/01/21  0209 06/30/21  0352 06/29/21  0121   INR  1.41* 1.52* 1.81* 2.27*       Assessment / Plan     ASSESSMENT:  1.  End-stage  renal disease patient has progressive chronic kidney disease currently on dialysis tolerating the treatment without any difficulties, her electrolyte within acceptable range other than total CO2 up to 22.2 and potassium 3.6, her phosphorus is down to 4.7 and calcium is 7.3, when corrected to the albumin of 2.7 calcium within acceptable range,  2.  Metabolic acidosis associated with chronic kidney disease improved  3.  Anemia of chronic kidney disease, hemoglobin is 9.5, receiving iron infusion and JANEEN 4.  Mild thrombocytopenia platelet 114,000 we will monitor closely  5.  Metabolic bone disease, with secondary hyperparathyroidism  within the goal of therapy patient probably will need aggressive management of the hyperphosphatemia, will add phosphate binders when the patient is able to have adequate oral intake  6.  Sepsis related to UTI and septic shock resolved  7.  Coronary artery disease, with prior CABG  8.  Hyperkalemia, resolved  9.  Hypothyroid, on replacement therapy  10.  Depression, treated     PLAN:  1.  Continue the same treatment  2.  Hemodialysis tomorrow  3.  Surveillance labs    .    José Antonio Watson MD  07/02/21  13:04 EDT    [facility]  [phone]      Much of this encounter note is an electronic transcription/translation of spoken language to printed text. The electronic translation of spoken language may permit erroneous, or at times, nonsensical words or phrases to be inadvertently transcribed; Although I have reviewed the note for such errors, some may still exist.

## 2021-07-02 NOTE — PROGRESS NOTES
"Adult Nutrition  Assessment/PES    Patient Name:  Maribeth Rendon  YOB: 1950  MRN: [id number]  Admit Date:  6/29/2021    Assessment Date:  7/2/2021    Comments:  Nutrition follow up.  S/p EGD yesterday.  Plans for colonoscopy tomorrow.  No source of bleeding found on EGD.  25% x 1 CLD meal.    Spoke with patient and  at bedside.  Reports of decreased appetite/PO intake prior to admission.   and patient report she is doing much better now.  Eating lunch at time of my visit.    RD to continue to follow.    Reason for Assessment     Row Name 07/02/21 1521          Reason for Assessment    Reason For Assessment  follow-up protocol         Nutrition/Diet History     Row Name 07/02/21 1521          Nutrition/Diet History    Typical Food/Fluid Intake  25% x 1 CLD meal; pt and  report pt eating better, philip improved         Anthropometrics     Row Name 07/02/21 1522          Anthropometrics    Height  165.1 cm (65\")        Admit Weight    Admit Weight  -- 186# 7/2        Ideal Body Weight (IBW)    Ideal Body Weight (IBW) (kg)  57.29        Body Mass Index (BMI)    BMI Assessment  BMI 30-34.9: obesity grade I 31.01         Labs/Tests/Procedures/Meds     Row Name 07/02/21 1522          Labs/Procedures/Meds    Lab Results Reviewed  reviewed, pertinent     Lab Results Comments  Creat, BUN, Ca, GFR, Phos, Mg, Alb, Hgb, Hct, Platelets        Diagnostic Tests/Procedures    Diagnostic Test/Procedure Reviewed  reviewed, pertinent     Diagnostic Test/Procedures Comments  s/p EGD yesterday; plans for colonoscopy tomorrow        Medications    Pertinent Medications Reviewed  reviewed, pertinent     Pertinent Medications Comments  synthroid, miralax, protonix drip         Physical Findings     Row Name 07/02/21 1523          Physical Findings    Overall Physical Appearance  obese     Skin  other (see comments) B=14, bruised, scab, excoriation         Estimated/Assessed Needs     Row Name 07/02/21 1522 " "         Calculation Measurements    Height  165.1 cm (65\")         Nutrition Prescription Ordered     Row Name 07/02/21 1525          Nutrition Prescription PO    Current PO Diet  Regular     Common Modifiers  Consistent Carbohydrate;Renal         Evaluation of Received Nutrient/Fluid Intake     Row Name 07/02/21 1526          PO Evaluation    Number of Meals  1     % PO Intake  25 CLD               Problem/Interventions:          Intervention Goal     Row Name 07/02/21 1526          Intervention Goal    General  Maintain nutrition;Disease management/therapy;Meet nutritional needs for age/condition     PO  Establish PO;Tolerate PO;PO intake (%)     PO Intake %  75 %     Weight  Appropriate weight loss         Nutrition Intervention     Row Name 07/02/21 1529          Nutrition Intervention    RD/Tech Action  Follow Tx progress;Care plan reviewd           Education/Evaluation     Row Name 07/02/21 1530          Education    Education  Will Instruct as appropriate        Monitor/Evaluation    Monitor  Per protocol;PO intake;Pertinent labs;Weight;Symptoms           Electronically signed by:  Leilani Preciado RD  07/02/21 15:30 EDT  "

## 2021-07-02 NOTE — CASE MANAGEMENT/SOCIAL WORK
Continued Stay Note  [facility]     Patient Name: Maribeth Rendon  MRN: [id number]  Today's Date: 7/2/2021    Admit Date: 6/29/2021    Discharge Plan     Row Name 07/02/21 1133       Plan    Plan  Plan home with VNA HH and Outpatient HD at [facility] on [facility].   JOHNATHAN Enriquez RN    Plan Comments  Per Anju Knox  ( [phone]) pt has been accepted at [facility].  Pt first appointment is July 6 at 11:55 am.   [facility] Patient Information Sheet given to pt and pt's spouse.   Plan home with VNA HH and Outpatient Dialysis at [facility].   JOHNATHAN Enriquez RN        Discharge Codes    No documentation.             Maribeth Enriquez RN

## 2021-07-02 NOTE — PROGRESS NOTES
[facility]  Cardiology Progress Note    Patient Identification:  Name: Maribeth Rendon  Age: 70 y.o.  Sex: female  :  1950  MRN: [id number]                 Follow Up / Chief Complaint: Follow-up for elevated troponin    Interval History: EKG showed sinus rhythm with PACs.  Troponin trending down.  Troponins elevated in the setting of CKD.       Subjective:  No chest pain    Objective:    Past Medical History:  Past Medical History:   Diagnosis Date    Achilles tendon tear     left     Anemia     Anxiety     Depression     Diabetes mellitus, type 2 (CMS/[facility])     ESRD (end stage renal disease) (CMS/[facility])     HAS LEFT FOREARM FISTULA    GERD (gastroesophageal reflux disease)     History of MRSA infection 03/15/2016    ABDOMINAL WOUND,   INFECTION CONTROLL NOTIFIED 2017    History of transfusion     Hyperlipidemia     Hypertension     Hypokalemia     Hypomagnesemia     Hypoxic 2016    WHEN SHE HAD PNEUMONIA    Intertrigo     Leukocytosis     Osteoarthritis     Pneumonia 2016    Psoriasis     Psoriatic arthritis (CMS/[facility])     Seizures (CMS/[facility])     one time    Sepsis (CMS/[facility]) 2016    SOB (shortness of breath)     Stage 4 chronic renal impairment associated with type 2 diabetes mellitus (CMS/[facility])     Staph infection     HX RIGHT FOOT AT [facility] 2010    Streptococcal bacteremia     Stroke (cerebrum) (CMS/[facility])     Stroke (CMS/[facility])     Swelling of hand 10/27/2016    LEFT HAND SWELLIMG SINCE LEFT FISTULA SURGERY    Tremor, essential     Wears glasses     Wheelchair dependent     For mobility     Past Surgical History:  Past Surgical History:   Procedure Laterality Date    ABDOMINAL WALL ABSCESS INCISION AND DRAINAGE      ARTERIOVENOUS FISTULA/SHUNT SURGERY Left 10/27/2016    Procedure: LT FOREARM FISTULA;  Surgeon: Lorelei Haque Jr., MD;  Location: [facility];  Service:     ARTERIOVENOUS FISTULA/SHUNT SURGERY Left 2017    Procedure: LT BRACHIAL CEPHALIC WITH  FISTULA LIGATION RADIAL CEPHALIC.;  Surgeon: Gurmeet Carver MD;  Location: [facility] MAIN OR;  Service:     CARDIAC CATHETERIZATION N/A 2019    Procedure: Left Heart Cath;  Surgeon: Eddie Hodges MD;  Location: [facility] CATH INVASIVE LOCATION;  Service: Cardiology    CARDIAC CATHETERIZATION N/A 2019    Procedure: Coronary angiography;  Surgeon: Eddie Hodges MD;  Location: [facility] CATH INVASIVE LOCATION;  Service: Cardiology    CARDIAC SURGERY      CATARACT EXTRACTION W/ INTRAOCULAR LENS IMPLANT Bilateral     CORONARY ARTERY BYPASS GRAFT N/A 2019    Procedure: INTRAOP ERNESTO; CORONARY ARTERY BYPASS GRAFTING X 4 WITH LEFT INTERNAL MAMMARY ARTERY GRAFT AND UTILIZING ENDOSCOPICALLY HARVESTED GREATER SAPHENOUS VEIN; PRP;  Surgeon: Gordo Ferreira MD;  Location: [facility] OR;  Service: Cardiothoracic    CORONARY ARTERY BYPASS GRAFT      DILATATION AND CURETTAGE      ENDOSCOPY N/A 2021    Procedure: ESOPHAGOGASTRODUODENOSCOPY WITH BX'S;  Surgeon: Azam Kat MD;  Location: [facility] ENDOSCOPY;  Service: Gastroenterology;  Laterality: N/A;  pre: ANEMIA, MELENA  post: ESOPHAGITIS, GASTRITIS, BILE REFLUX, BUT NO OBVIOUS SOURCE OF BLEEDING    EXCISION MASS TRUNK N/A 3/22/2016    Procedure: I&D LOWER ABDOMINAL  WOUND;  Surgeon: Tru Yi MD;  Location: [facility];  Service:     FOOT SURGERY Right     REMOVED BONE IN RIGHT GREAT TOE    HYSTERECTOMY          Social History:   Social History     Tobacco Use    Smoking status: Former Smoker     Packs/day: 2.00     Years: 26.00     Pack years: 52.00     Types: Cigarettes     Quit date: 10/21/1992     Years since quittin.7    Smokeless tobacco: Never Used    Tobacco comment: quit    Substance Use Topics    Alcohol use: Not Currently     Alcohol/week: 0.0 standard drinks      Family History:  Family History   Problem Relation Age of Onset    Heart attack Mother     Heart disease Mother     Kidney  "disease Mother     Heart attack Father     Heart disease Father     Hypertension Father     Stroke Father         ISCHEMIC    Diabetes Father         TYPE 2    Kidney disease Brother     Diabetes Brother         TYPE 1    Thyroid disease Daughter     Anxiety disorder Maternal Aunt     Bipolar disorder Maternal Aunt     Depression Maternal Aunt     Lupus Maternal Aunt         LUPUS ANTICOAGLULANT    Rheum arthritis Maternal Aunt     Alcohol abuse Maternal Uncle     Other Maternal Uncle         PULMONARY DISEASE          Allergies:  Allergies   Allergen Reactions    Prednisone Hallucinations     Scheduled Meds:  amLODIPine, 5 mg, Q24H  carvedilol, 12.5 mg, BID With Meals  castor oil-balsam peru, , Q12H  epoetin hermes/hermes-epbx, 5,000 Units, Once per day on Mon Wed Fri  levothyroxine, 50 mcg, Daily  miconazole, 1 application, Q12H  [START ON 7/3/2021] polyethylene glycol, 1 bottle, BID  sertraline, 50 mg, Daily  sodium chloride, 10 mL, Q12H            INTAKE AND OUTPUT:    Intake/Output Summary (Last 24 hours) at 7/2/2021 1420  Last data filed at 7/2/2021 0521  Gross per 24 hour   Intake 450 ml   Output 425 ml   Net 25 ml     ROS  Constitutional: Awake and alert, no fever. No nosebleeds  Abdomen           no abdominal pain   Cardiac              no chest pain  Pulmonary          no shortness of breath      /66 (BP Location: Right arm, Patient Position: Lying)   Pulse 96   Temp 98.4 °F (36.9 °C) (Oral)   Resp 18   Ht 165.1 cm (65\")   Wt 84.7 kg (186 lb 11.2 oz)   LMP  (LMP Unknown)   SpO2 97%   BMI 31.07 kg/m²   General appearance: No acute changes   Neck: Trachea midline; NECK, supple, no thyromegaly or lymphadenopathy   Lungs: Normal size and shape, normal breath sounds, equal distribution of air, no rales and rhonchi   CV: S1-S2 regular, no murmurs, no rub, no gallop   Abdomen: Soft, non-tender; no masses , no abnormal abdominal sounds   Extremities: No deformity , normal color , no peripheral edema "   Skin: Normal temperature, turgor and texture; no rash, ulcers                     Cardiographics  Telemetry:         ECG:     Echocardiogram:      Impression:      Ostial left main 70 to 80% stenosis  Left anterior descending proximal 70% stenosis with minimal medical artery into the diagonal and  branches  Circumflex artery nondominant with no significant stenosis  Right coronary artery dominant with 20 to 30% proximal stenosis  Normal LVDP     Recommendations:      Emergency surgery          Interpretation Summary     Left atrial volume is mildly increased.  Mild aortic valve stenosis is present.  Calculated left ventricular EF = 57% Estimated left ventricular EF was in agreement with the calculated left ventricular EF.  Left ventricular diastolic function was normal.  There is no evidence of pericardial effusion.      Interpretation Summary        Findings consistent with a normal ECG stress test.  Findings consistent with a normal ECG stress test.  Left ventricular ejection fraction is normal (Calculated EF = 60%).  Myocardial perfusion imaging indicates a small-to-medium-sized, mild-to-moderately severe area of ischemia located in the anterior wall.  Impressions are consistent with an intermediate risk study.     Asymptomatic for chest pain. ECG is negative for ischemia.   Ectopy: PAC's at baseline, episode of atrial tachycardia verses junctional tachycardia post Infusion.   B/P is appropriate Beta-blocker therapy  Pharmacologic study due to inability to tolerate increasing speed and grade of treadmill due to poor balance,  mobility  Issues and Beta-blocker therapy.  Unable to participate in low level exercise due to poor mobility and poor balance.             Imaging  Chest X-ray:   FINDINGS:  Cardiomegaly is present. There is vascular congestion. Patient is status  post median sternotomy with coronary artery bypass grafting. There are  calcifications of the aorta. No pneumothorax or definite  pleural  effusion is seen.     IMPRESSION:  Cardiomegaly with vascular congestion.     This report was finalized on 6/29/2021 2:15 AM by Dr. Rebecca Camacho M.D.     CONCLUSION:  1. Mejía catheter within a collapsed urinary bladder, there is  thickening. There is perinephric fat induration, in part seen on the  previous examination, nonspecific finding which can be seen with  pyelonephritis.  2. Left adrenal nodule slightly increased in size since 2016. Currently  13 mm.  3. Porcelain gallbladder filled with gallstones, no overt evidence to  suggest cholecystitis.  4 diverticulosis of the colon.        This report was finalized on 6/30/2021 11:12 AM by Dr. Aidan Naranjo M.D.       Lab Review   Results from last 7 days   Lab Units 07/02/21  0243 07/01/21  0209 06/29/21  0347 06/29/21  0121   CK TOTAL U/L  --   --   --  63   TROPONIN T ng/mL 0.147* 0.201* 0.082* 0.161*     Results from last 7 days   Lab Units 07/02/21  0243   MAGNESIUM mg/dL 1.5*     Results from last 7 days   Lab Units 07/02/21  0243   SODIUM mmol/L 140   POTASSIUM mmol/L 3.6   BUN mg/dL 42*   CREATININE mg/dL 5.14*   CALCIUM mg/dL 7.3*        Results from last 7 days   Lab Units 07/02/21  0243 07/01/21  1810 07/01/21  1008 07/01/21  0209 06/30/21  0700   WBC 10*3/mm3 9.43  --   --  9.00 9.43   HEMOGLOBIN g/dL 9.5*  9.5* 9.2* 9.6* 9.0* 9.5*   HEMATOCRIT % 30.8*  30.8* 28.8* 30.6* 28.4* 29.5*   PLATELETS 10*3/mm3 114*  --   --  129* 151     Results from last 7 days   Lab Units 07/02/21  0243 07/01/21  0209 06/30/21  0352   INR  1.41* 1.52* 1.81*     The following medical decision was discussed in detail with Dr. Hodges      Assessment:  Gastrointestinal hemorrhage with melena    DM type 2 with diabetic peripheral neuropathy (CMS/HCC)    Essential hypertension    Essential tremor    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 pectoris (CMS/HCC)    Atrial fibrillation (CMS/HCC)    CKD (chronic kidney disease) stage 5, GFR  "less than 15 ml/min (CMS/HCC)    Hemorrhagic stroke (CMS/HCC)    S/P CABG (coronary artery bypass graft)    Hypothyroidism (acquired)    Hypotension         Plan:  Blood pressure and heart rate are stable.  Sinus rhythm on the monitor.  Eliquis on hold for bowel prep for tomorrow.  Troponins have been elevated due to renal insufficiency.  Need to monitor.         )7/2/2021  IRENA Kohler    Patient personally interviewed and above subjective findings personally confirmed during a face to face contact with patient today  All findings of physical examination confirmed  All pertinent and performed labs, cardiac procedures ,  radiographs of the last 24 hours personally reviewed  Impression and plans discussed/elaborated and implemented jointly as described above     Eddie Hodges MD          EMR Dragon/Transcription:   \"Dictated utilizing Dragon dictation\".     "

## 2021-07-02 NOTE — NURSING NOTE
"Pts spouse came out to the desk very upset and raising his voice/cussing that we need to find his wife's belongings... calling us \"мария\" Call to CCU. She had the charge nurse check for the patients belongings. They were unable to locate the belongings. My US Julieth called [facility] service. They said they would check and send an e-mail out to all of their staff to check for this jacket. Family updated at bedside.   "

## 2021-07-02 NOTE — PLAN OF CARE
Goal Outcome Evaluation:  Plan of Care Reviewed With: patient        Progress: no change  Outcome Summary: Pt AOx3 confused at times. On RA. 2L at night. IV PPI ggt. Dialysis called in for tomorrow. AV fistula with bruit and thrill present. F/c in place and f/c care completed. No acute distress. Will continue to monitor.

## 2021-07-03 LAB
ALBUMIN SERPL-MCNC: 2.2 G/DL (ref 3.5–5.2)
ALBUMIN SERPL-MCNC: 2.3 G/DL (ref 3.5–5.2)
ALBUMIN/GLOB SERPL: 0.6 G/DL
ALP SERPL-CCNC: 54 U/L (ref 39–117)
ALT SERPL W P-5'-P-CCNC: 10 U/L (ref 1–33)
ANION GAP SERPL CALCULATED.3IONS-SCNC: 11.7 MMOL/L (ref 5–15)
ANION GAP SERPL CALCULATED.3IONS-SCNC: 16.1 MMOL/L (ref 5–15)
AST SERPL-CCNC: 20 U/L (ref 1–32)
BASOPHILS # BLD AUTO: 0.03 10*3/MM3 (ref 0–0.2)
BASOPHILS NFR BLD AUTO: 0.3 % (ref 0–1.5)
BILIRUB SERPL-MCNC: 0.3 MG/DL (ref 0–1.2)
BUN SERPL-MCNC: 46 MG/DL (ref 8–23)
BUN SERPL-MCNC: 47 MG/DL (ref 8–23)
BUN/CREAT SERPL: 7.5 (ref 7–25)
BUN/CREAT SERPL: 8.9 (ref 7–25)
CALCIUM SPEC-SCNC: 7.1 MG/DL (ref 8.6–10.5)
CALCIUM SPEC-SCNC: 7.1 MG/DL (ref 8.6–10.5)
CHLORIDE SERPL-SCNC: 103 MMOL/L (ref 98–107)
CHLORIDE SERPL-SCNC: 104 MMOL/L (ref 98–107)
CO2 SERPL-SCNC: 19.9 MMOL/L (ref 22–29)
CO2 SERPL-SCNC: 22.3 MMOL/L (ref 22–29)
CREAT SERPL-MCNC: 5.26 MG/DL (ref 0.57–1)
CREAT SERPL-MCNC: 6.14 MG/DL (ref 0.57–1)
DEPRECATED RDW RBC AUTO: 47.8 FL (ref 37–54)
EOSINOPHIL # BLD AUTO: 0.44 10*3/MM3 (ref 0–0.4)
EOSINOPHIL NFR BLD AUTO: 4.1 % (ref 0.3–6.2)
ERYTHROCYTE [DISTWIDTH] IN BLOOD BY AUTOMATED COUNT: 14.7 % (ref 12.3–15.4)
GFR SERPL CREATININE-BSD FRML MDRD: 7 ML/MIN/1.73
GFR SERPL CREATININE-BSD FRML MDRD: 8 ML/MIN/1.73
GFR SERPL CREATININE-BSD FRML MDRD: ABNORMAL ML/MIN/{1.73_M2}
GFR SERPL CREATININE-BSD FRML MDRD: ABNORMAL ML/MIN/{1.73_M2}
GLOBULIN UR ELPH-MCNC: 3.7 GM/DL
GLUCOSE BLDC GLUCOMTR-MCNC: 127 MG/DL (ref 70–130)
GLUCOSE BLDC GLUCOMTR-MCNC: 160 MG/DL (ref 70–130)
GLUCOSE BLDC GLUCOMTR-MCNC: 80 MG/DL (ref 70–130)
GLUCOSE SERPL-MCNC: 64 MG/DL (ref 65–99)
GLUCOSE SERPL-MCNC: 67 MG/DL (ref 65–99)
HCT VFR BLD AUTO: 29.3 % (ref 34–46.6)
HGB BLD-MCNC: 9.2 G/DL (ref 12–15.9)
IMM GRANULOCYTES # BLD AUTO: 0.2 10*3/MM3 (ref 0–0.05)
IMM GRANULOCYTES NFR BLD AUTO: 1.9 % (ref 0–0.5)
INR PPP: 1.2 (ref 0.9–1.1)
LYMPHOCYTES # BLD AUTO: 0.67 10*3/MM3 (ref 0.7–3.1)
LYMPHOCYTES NFR BLD AUTO: 6.2 % (ref 19.6–45.3)
MAGNESIUM SERPL-MCNC: 1.5 MG/DL (ref 1.6–2.4)
MCH RBC QN AUTO: 28.8 PG (ref 26.6–33)
MCHC RBC AUTO-ENTMCNC: 31.4 G/DL (ref 31.5–35.7)
MCV RBC AUTO: 91.8 FL (ref 79–97)
MONOCYTES # BLD AUTO: 0.82 10*3/MM3 (ref 0.1–0.9)
MONOCYTES NFR BLD AUTO: 7.6 % (ref 5–12)
NEUTROPHILS NFR BLD AUTO: 79.9 % (ref 42.7–76)
NEUTROPHILS NFR BLD AUTO: 8.63 10*3/MM3 (ref 1.7–7)
NRBC BLD AUTO-RTO: 0.4 /100 WBC (ref 0–0.2)
PHOSPHATE SERPL-MCNC: 4.7 MG/DL (ref 2.5–4.5)
PLATELET # BLD AUTO: 107 10*3/MM3 (ref 140–450)
PMV BLD AUTO: 10.7 FL (ref 6–12)
POTASSIUM SERPL-SCNC: 3.4 MMOL/L (ref 3.5–5.2)
POTASSIUM SERPL-SCNC: 3.6 MMOL/L (ref 3.5–5.2)
PROT SERPL-MCNC: 6 G/DL (ref 6–8.5)
PROTHROMBIN TIME: 15 SECONDS (ref 11.7–14.2)
RBC # BLD AUTO: 3.19 10*6/MM3 (ref 3.77–5.28)
SODIUM SERPL-SCNC: 137 MMOL/L (ref 136–145)
SODIUM SERPL-SCNC: 140 MMOL/L (ref 136–145)
WBC # BLD AUTO: 10.79 10*3/MM3 (ref 3.4–10.8)

## 2021-07-03 PROCEDURE — 99232 SBSQ HOSP IP/OBS MODERATE 35: CPT | Performed by: INTERNAL MEDICINE

## 2021-07-03 PROCEDURE — 82962 GLUCOSE BLOOD TEST: CPT

## 2021-07-03 PROCEDURE — 83735 ASSAY OF MAGNESIUM: CPT | Performed by: INTERNAL MEDICINE

## 2021-07-03 PROCEDURE — 85025 COMPLETE CBC W/AUTO DIFF WBC: CPT | Performed by: INTERNAL MEDICINE

## 2021-07-03 PROCEDURE — 80053 COMPREHEN METABOLIC PANEL: CPT | Performed by: INTERNAL MEDICINE

## 2021-07-03 PROCEDURE — 85610 PROTHROMBIN TIME: CPT | Performed by: INTERNAL MEDICINE

## 2021-07-03 PROCEDURE — 80069 RENAL FUNCTION PANEL: CPT | Performed by: INTERNAL MEDICINE

## 2021-07-03 RX ORDER — ALBUMIN (HUMAN) 12.5 G/50ML
12.5 SOLUTION INTRAVENOUS AS NEEDED
Status: ACTIVE | OUTPATIENT
Start: 2021-07-03 | End: 2021-07-03

## 2021-07-03 RX ORDER — LIDOCAINE AND PRILOCAINE 25; 25 MG/G; MG/G
CREAM TOPICAL ONCE
Status: DISCONTINUED | OUTPATIENT
Start: 2021-07-03 | End: 2021-07-08 | Stop reason: HOSPADM

## 2021-07-03 RX ORDER — POLYETHYLENE GLYCOL 3350 17 G/17G
1 POWDER, FOR SOLUTION ORAL ONCE
Status: DISCONTINUED | OUTPATIENT
Start: 2021-07-03 | End: 2021-07-08 | Stop reason: HOSPADM

## 2021-07-03 RX ORDER — BISACODYL 5 MG/1
20 TABLET, DELAYED RELEASE ORAL ONCE
Status: COMPLETED | OUTPATIENT
Start: 2021-07-03 | End: 2021-07-03

## 2021-07-03 RX ADMIN — CARVEDILOL 12.5 MG: 12.5 TABLET, FILM COATED ORAL at 17:45

## 2021-07-03 RX ADMIN — SERTRALINE HYDROCHLORIDE 50 MG: 50 TABLET, FILM COATED ORAL at 13:46

## 2021-07-03 RX ADMIN — PANTOPRAZOLE SODIUM 8 MG/HR: 40 INJECTION, POWDER, FOR SOLUTION INTRAVENOUS at 23:30

## 2021-07-03 RX ADMIN — PANTOPRAZOLE SODIUM 8 MG/HR: 40 INJECTION, POWDER, FOR SOLUTION INTRAVENOUS at 07:19

## 2021-07-03 RX ADMIN — CARVEDILOL 12.5 MG: 12.5 TABLET, FILM COATED ORAL at 13:46

## 2021-07-03 RX ADMIN — PANTOPRAZOLE SODIUM 8 MG/HR: 40 INJECTION, POWDER, FOR SOLUTION INTRAVENOUS at 02:39

## 2021-07-03 RX ADMIN — SODIUM CHLORIDE, PRESERVATIVE FREE 10 ML: 5 INJECTION INTRAVENOUS at 13:47

## 2021-07-03 RX ADMIN — MAGNESIUM HYDROXIDE 10 ML: 2400 SUSPENSION ORAL at 13:46

## 2021-07-03 RX ADMIN — SODIUM CHLORIDE, PRESERVATIVE FREE 10 ML: 5 INJECTION INTRAVENOUS at 22:54

## 2021-07-03 RX ADMIN — PANTOPRAZOLE SODIUM 8 MG/HR: 40 INJECTION, POWDER, FOR SOLUTION INTRAVENOUS at 18:39

## 2021-07-03 RX ADMIN — AMLODIPINE BESYLATE 5 MG: 5 TABLET ORAL at 13:46

## 2021-07-03 RX ADMIN — POLYETHYLENE GLYCOL 3350 1 BOTTLE: 17 POWDER, FOR SOLUTION ORAL at 13:45

## 2021-07-03 RX ADMIN — PANTOPRAZOLE SODIUM 8 MG/HR: 40 INJECTION, POWDER, FOR SOLUTION INTRAVENOUS at 13:45

## 2021-07-03 RX ADMIN — BISACODYL 20 MG: 5 TABLET ORAL at 13:56

## 2021-07-03 RX ADMIN — MICONAZOLE NITRATE 1 APPLICATION: 2 CREAM TOPICAL at 20:43

## 2021-07-03 RX ADMIN — CASTOR OIL AND BALSAM, PERU 5 G: 788; 87 OINTMENT TOPICAL at 13:46

## 2021-07-03 RX ADMIN — LEVOTHYROXINE SODIUM 50 MCG: 0.05 TABLET ORAL at 13:46

## 2021-07-03 RX ADMIN — MICONAZOLE NITRATE 1 APPLICATION: 2 CREAM TOPICAL at 13:47

## 2021-07-03 RX ADMIN — CASTOR OIL AND BALSAM, PERU 5 G: 788; 87 OINTMENT TOPICAL at 20:43

## 2021-07-03 NOTE — PLAN OF CARE
Goal Outcome Evaluation:  Plan of Care Reviewed With: patient        Progress: improving  Outcome Summary: Pt AOx3. Confused at times. ON RA. IV PPI gtt. F/c in place and f/c care completed. Pt started bowel prep around 1400 and to finish second dose around 1800. Pt having multiple BMs. Up to BSC and incontient episodes. Consent is signed and in the chart. Cscope for tomorrow 0900. Dialsysis today with 2 L off. No acute distress. Will continue to monitor.

## 2021-07-03 NOTE — PROGRESS NOTES
Name: Maribeth Rendon ADMIT: 2021   : 1950  PCP: Robby Chaney MD    MRN: [id number] LOS: 4 days   AGE/SEX: 70 y.o. female  ROOM: [facility]     Subjective   Subjective   Patient seen at bedside.       Objective   Objective   Vital Signs  Temp:  [97.1 °F (36.2 °C)-98.5 °F (36.9 °C)] 98.2 °F (36.8 °C)  Heart Rate:  [64-97] 67  Resp:  [16-18] 18  BP: (119-150)/(58-70) 146/58  SpO2:  [97 %-99 %] 99 %  on   ;   Device (Oxygen Therapy): room air  Body mass index is 30.6 kg/m².  Physical Exam  General, awake and alert.  Appears sick on chronic basis.  Head and ENT, normocephalic and atraumatic.  Lungs, symmetric expansion, equal air entry bilaterally.  Heart, regular rate and rhythm.  Abdomen, soft and nontender.  Extremities, no clubbing or cyanosis.  Neuro, no focal deficits.  Skin: Warm and no rash.  Psych, normal mood and affect.  Musculoskeletal, joint examination is grossly normal.    Results Review     I reviewed the patient's new clinical results.  Results from last 7 days   Lab Units 21  0650 21  0243 21  1810 21  1008 21  0209 21  0700   WBC 10*3/mm3 10.79 9.43  --   --  9.00 9.43   HEMOGLOBIN g/dL 9.2* 9.5*  9.5* 9.2* 9.6* 9.0* 9.5*   PLATELETS 10*3/mm3 107* 114*  --   --  129* 151     Results from last 7 days   Lab Units 21  0650 21  0243 21  0209 21  0352   SODIUM mmol/L 140  137 140 144 141   POTASSIUM mmol/L 3.6  3.4* 3.6 4.0 3.9   CHLORIDE mmol/L 104  103 102 108* 105   CO2 mmol/L 19.9*  22.3 22.2 16.0* 17.9*   BUN mg/dL 46*  47* 42* 87* 79*   CREATININE mg/dL 6.14*  5.26* 5.14* 7.71* 7.19*   GLUCOSE mg/dL 64*  67 68 57* 51*   Estimated Creatinine Clearance: 10.6 mL/min (A) (by C-G formula based on SCr of 5.26 mg/dL (H)).  Results from last 7 days   Lab Units 21  0650 21  0243 21  0121   ALBUMIN g/dL 2.30*  2.20* 2.70* 2.50*   BILIRUBIN mg/dL 0.3  --  0.2   ALK PHOS U/L 54  --  60   AST (SGOT) U/L  20  --  10   ALT (SGPT) U/L 10  --  11     Results from last 7 days   Lab Units 07/03/21  0650 07/02/21  0243 07/01/21  0209 06/30/21  0352 06/29/21  0121   CALCIUM mg/dL 7.1*  7.1* 7.3* 7.0* 6.5* 6.4*   ALBUMIN g/dL 2.30*  2.20* 2.70*  --   --  2.50*   MAGNESIUM mg/dL 1.5* 1.5* 1.6 1.5* 1.6   PHOSPHORUS mg/dL 4.7* 4.7* 8.1* 6.9*  --      Results from last 7 days   Lab Units 06/29/21  0121   PROCALCITONIN ng/mL 0.30*   LACTATE mmol/L 0.6     COVID19   Date Value Ref Range Status   06/29/2021 Not Detected Not Detected - Ref. Range Final   05/06/2021 Not Detected Not Detected - Ref. Range Final     Glucose   Date/Time Value Ref Range Status   07/03/2021 0555 80 70 - 130 mg/dL Final     Comment:     Meter: [id number] : [id number] Carolyn Lovelace S NA   07/02/2021 2102 120 70 - 130 mg/dL Final     Comment:     Meter: [id number] : [id number] Carolyn Lovelace S NA   07/02/2021 1646 100 70 - 130 mg/dL Final     Comment:     Meter: [id number] : [id number] Lelo Neelam NA   07/02/2021 1140 116 70 - 130 mg/dL Final     Comment:     Meter: [id number] : [id number] Lelo Richter NA   07/02/2021 0618 106 70 - 130 mg/dL Final     Comment:     Meter: [id number] : [id number] Wilian Gonzales ALVERTO   07/01/2021 2207 115 70 - 130 mg/dL Final     Comment:     Meter: [id number] : [id number] Katelyn Avilez PCA   07/01/2021 2111 69 (L) 70 - 130 mg/dL Final     Comment:     Meter: [id number] : [id number] Wilian Gonzales ALVERTO       CT Abdomen Pelvis Without Contrast  CT ABDOMEN PELVIS WO CONTRAST-     Radiation dose reduction techniques were utilized, including automated  exposure control and exposure modulation based on body size.     Clinical: Sepsis, question UTI. Acute renal failure     COMPARISON CT scan of the abdomen and pelvis 3/18/2016     FINDINGS: There is a porcelain gallbladder which is filled with  gallstones. No gallbladder wall thickening or pericholecystic fluid  suggesting cholecystitis.  No biliary duct dilatation. The pancreas is  normal.     Spleen is normal in size and shape, the right adrenal gland is normal.  There is a small, 13 mm indeterminate left adrenal nodule which measured  8 mm on 2016.     There is bilateral perinephric fat stranding and some of this was  present on the previous examination. There is a nonspecific finding that  can be seen with pyelonephritis. No renal calculus or obstructive  uropathy. Both ureters are normal in course and caliber to the urinary  bladder. There is a Mejía catheter within the lumen of the bladder,  there is bladder wall thickening. Vaginal cuff typical post  hysterectomy.     Trace free fluid in the pelvic cul-de-sac.     Diverticulosis of the sigmoid colon without overt diverticulitis. Small  bowel is normal. Diameter of the aorta is within normal limits. Heavy  vascular arterial calcification. Stomach is collapsed, contour within  normal limits.     CONCLUSION:  1. Mejía catheter within a collapsed urinary bladder, there is  thickening. There is perinephric fat induration, in part seen on the  previous examination, nonspecific finding which can be seen with  pyelonephritis.  2. Left adrenal nodule slightly increased in size since 2016. Currently  13 mm.  3. Porcelain gallbladder filled with gallstones, no overt evidence to  suggest cholecystitis.  4 diverticulosis of the colon.        This report was finalized on 6/30/2021 11:12 AM by Dr. Aidan Naranjo M.D.       Scheduled Medications  amLODIPine, 5 mg, Oral, Q24H  carvedilol, 12.5 mg, Oral, BID With Meals  castor oil-balsam peru, , Topical, Q12H  epoetin hermes/hermes-epbx, 5,000 Units, Subcutaneous, Once per day on Mon Wed Fri  levothyroxine, 50 mcg, Oral, Daily  lidocaine-prilocaine, , Topical, Once  miconazole, 1 application, Topical, Q12H  polyethylene glycol, 1 bottle, Oral, BID  polyethylene glycol, 1 bottle, Oral, Once  sertraline, 50 mg, Oral, Daily  sodium chloride, 10 mL, Intravenous,  Q12H    Infusions  pantoprazole, 8 mg/hr, Last Rate: 8 mg/hr (07/03/21 1345)  sodium chloride, 30 mL/hr, Last Rate: 30 mL/hr (07/01/21 1124)    Diet  NPO Diet  Diet Clear Liquid       Assessment/Plan     Active Hospital Problems    Diagnosis  POA   • **Anemia, posthemorrhagic, acute [D62]  Unknown   • ESRD (end stage renal disease) (CMS/[facility]) [N18.6]  Unknown   • Anemia, chronic disease [D63.8]  Unknown   • Systolic CHF, chronic (CMS/[facility]) [I50.22]  Yes   • History of CVA (cerebrovascular accident) [Z86.73]  Not Applicable   • Gastrointestinal hemorrhage with melena [K92.1]  Unknown   • Hypothyroidism (acquired) [E03.9]  Yes   • S/P CABG (coronary artery bypass graft) [Z95.1]  Not Applicable   • Hemorrhagic stroke (CMS/[facility]) [I61.9]  Yes   • Atrial fibrillation (CMS/[facility]) [I48.91]  Yes   • 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 pectoris (CMS/[facility]) [I25.118]  Yes   • Primary Parkinsonism (CMS/[facility]) [G20]  Yes   • DM type 2 with diabetic peripheral neuropathy (CMS/[facility]) [E11.42]  Yes   • Essential hypertension [I10]  Yes      Resolved Hospital Problems    Diagnosis Date Resolved POA   • Hypotension [I95.9] 07/02/2021 Yes       70 y.o. female admitted with Anemia, posthemorrhagic, acute.    Assessment and plan:  1.  Anemia, hemoglobin noted to be stable.  Continue to recheck labs.  Currently not on anticoagulation.  Plans for colonoscopy noted.  GI on board.    2.  End-stage renal disease: Patient has associated metabolic acidosis.  Continue hemodialysis per nephrology recommendations.    3.  Sepsis due to UTI with septic shock.  Patient's blood pressure currently stable.    4.  Hyperglycemia noted, blood glucose noted to be less than 160.  Monitor blood glucose closely.    5.  Coronary artery disease, patient also has associated atrial fibrillation and systolic CHF.  Ongoing management per cardiology.    6.  CODE STATUS is full code.  Further plans based on hospital course.      Christiano Rubalcava  MD [facility]  07/03/21  15:17 EDT

## 2021-07-03 NOTE — PROGRESS NOTES
[facility], [facility]  Inpatient Progress Note    Reason for Follow Up: GI bleed with melena    Subjective     Interval History:   No new issues.  She reports her abdomen is diffusely sore.  No nausea or vomiting.    Current Facility-Administered Medications:   •  albumin human 25 % IV SOLN 12.5 g, 12.5 g, Intravenous, PRN, José Antonio Watson MD  •  aluminum-magnesium hydroxide-simethicone (MAALOX MAX) 400-400-40 MG/5ML suspension 15 mL, 15 mL, Oral, Q6H PRN, Tone Palumbo MD  •  amLODIPine (NORVASC) tablet 5 mg, 5 mg, Oral, Q24H, Silvino Owens MD, 5 mg at 07/02/21 1121  •  calcium carbonate (TUMS) chewable tablet 500 mg (200 mg elemental), 4 tablet, Oral, TID PRN, Tone Palumbo MD  •  carvedilol (COREG) tablet 12.5 mg, 12.5 mg, Oral, BID With Meals, Silvino Owens MD, 12.5 mg at 07/02/21 1731  •  castor oil-balsam peru (VENELEX) ointment, , Topical, Q12H, Tone Palumbo MD, 5 g at 07/02/21 2020  •  dextrose (D50W) 25 g/ 50mL Intravenous Solution 25 g, 25 g, Intravenous, Q15 Min PRN, Tone Palumbo MD, 25 g at 07/01/21 0329  •  dextrose (GLUTOSE) oral gel 15 g, 15 g, Oral, Q15 Min PRN, Tone Palumbo MD  •  epoetin hermes-epbx (RETACRIT) injection 5,000 Units, 5,000 Units, Subcutaneous, Once per day on Mon Wed Fri, Tone Palumbo MD, 5,000 Units at 06/30/21 1439  •  glucagon (human recombinant) (GLUCAGEN DIAGNOSTIC) injection 1 mg, 1 mg, Subcutaneous, Q15 Min PRN, Tone Palumbo MD  •  heparin (porcine) injection 2,000 Units, 2,000 Units, Intravenous, PRN, Tone Palumbo MD  •  HYDROmorphone (DILAUDID) injection 0.5 mg, 0.5 mg, Intravenous, Q4H PRN, Tone Palumbo MD, 0.5 mg at 06/30/21 2308  •  ipratropium-albuterol (DUO-NEB) nebulizer solution 3 mL, 3 mL, Nebulization, Q6H PRN, Tone Palumbo MD  •  labetalol (NORMODYNE,TRANDATE) injection 20 mg, 20 mg, Intravenous, Q10 Min PRN, Tone Palumbo MD  •   levothyroxine (SYNTHROID, LEVOTHROID) tablet 50 mcg, 50 mcg, Oral, Daily, Tone Palumbo MD, 50 mcg at 07/02/21 0839  •  lidocaine-prilocaine (EMLA) 2.5-2.5 % cream, , Topical, Once, José Antonio Watson MD  •  miconazole (MICOTIN) 2 % cream 1 application, 1 application, Topical, Q12H, Tone Palumbo MD, 1 application at 07/02/21 2021  •  ondansetron (ZOFRAN) injection 4 mg, 4 mg, Intravenous, Q6H PRN, Tone Palumbo MD, 4 mg at 06/30/21 0819  •  pantoprazole (PROTONIX) 40 mg in 100mL NS IVPB, 8 mg/hr, Intravenous, Continuous, Tone Palumbo MD, Last Rate: 20 mL/hr at 07/03/21 0719, 8 mg/hr at 07/03/21 0719  •  polyethylene glycol (MIRALAX) powder 1 bottle, 1 bottle, Oral, BID, Azam Kat MD  •  sertraline (ZOLOFT) tablet 50 mg, 50 mg, Oral, Daily, Tone Palumbo MD, 50 mg at 07/02/21 0839  •  sodium chloride 0.9 % bolus 1,000 mL, 1,000 mL, Intravenous, PRN, José Antonio Watson MD  •  [COMPLETED] Insert peripheral IV, , , Once **AND** sodium chloride 0.9 % flush 10 mL, 10 mL, Intravenous, PRN, Tone Palumbo MD  •  sodium chloride 0.9 % flush 10 mL, 10 mL, Intravenous, Q12H, Tone Palumbo MD, 10 mL at 07/02/21 2020  •  sodium chloride 0.9 % flush 10 mL, 10 mL, Intravenous, PRN, Tone Palumbo MD  •  sodium chloride 0.9 % infusion, 30 mL/hr, Intravenous, Continuous PRN, Azam Kat MD, Last Rate: 30 mL/hr at 07/01/21 1124, Rate Change at 07/01/21 1124  Review of Systems:    Positive for abdominal pain, negative for nausea, negative for fevers or chills    Objective     Vital Signs  Temp:  [98.3 °F (36.8 °C)-98.5 °F (36.9 °C)] 98.3 °F (36.8 °C)  Heart Rate:  [82-91] 91  Resp:  [18] 18  BP: (119-148)/(66-70) 148/70  Body mass index is 30.6 kg/m².    Intake/Output Summary (Last 24 hours) at 7/3/2021 0812  Last data filed at 7/3/2021 0719  Gross per 24 hour   Intake 285 ml   Output 700 ml   Net -415 ml     I/O this shift:  In: 95 [IV  Piggyback:95]  Out: -      Physical Exam:   General: patient awake, alert and cooperative   Eyes: Normal lids and lashes, no scleral icterus   Neck: supple, normal ROM   Skin: warm and dry, not jaundiced   Cardiovascular: regular rhythm and rate, no murmurs auscultated   Pulm: clear to auscultation bilaterally, regular and unlabored   Abdomen: soft, nontender, nondistended; normal bowel sounds   Extremities: no rash or edema   Psychiatric: Normal mood and behavior; memory intact     Results Review:     I reviewed the patient's new clinical results.    Results from last 7 days   Lab Units 07/03/21  0650 07/02/21  0243 07/01/21  1810 07/01/21  0209   WBC 10*3/mm3 10.79 9.43  --  9.00   HEMOGLOBIN g/dL 9.2* 9.5*  9.5* 9.2* 9.0*   HEMATOCRIT % 29.3* 30.8*  30.8* 28.8* 28.4*   PLATELETS 10*3/mm3 107* 114*  --  129*     Results from last 7 days   Lab Units 07/02/21  0243 07/01/21  0209 06/30/21  0352 06/29/21  0121   SODIUM mmol/L 140 144 141 139   POTASSIUM mmol/L 3.6 4.0 3.9 5.4*   CHLORIDE mmol/L 102 108* 105 103   CO2 mmol/L 22.2 16.0* 17.9* 16.9*   BUN mg/dL 42* 87* 79* 129*   CREATININE mg/dL 5.14* 7.71* 7.19* 9.28*   CALCIUM mg/dL 7.3* 7.0* 6.5* 6.4*   BILIRUBIN mg/dL  --   --   --  0.2   ALK PHOS U/L  --   --   --  60   ALT (SGPT) U/L  --   --   --  11   AST (SGOT) U/L  --   --   --  10   GLUCOSE mg/dL 68 57* 51* 133*     Results from last 7 days   Lab Units 07/02/21  0243 07/01/21  0209 06/30/21  0352   INR  1.41* 1.52* 1.81*     Lab Results   Lab Value Date/Time    LIPASE 14 01/03/2016 0339       Radiology:  CT Abdomen Pelvis Without Contrast   Final Result      CT Head Without Contrast   Final Result   No acute intracranial findings.       Radiation dose reduction techniques were utilized, including automated   exposure control and exposure modulation based on body size.       This report was finalized on 6/29/2021 2:18 AM by Dr. Rebecca Camacho M.D.          XR Chest 1 View   Final Result   Cardiomegaly  with vascular congestion.       This report was finalized on 6/29/2021 2:15 AM by Dr. Rebecca Camacho M.D.              Assessment/Plan     Patient Active Problem List   Diagnosis   • Menstrual disorder   • Atopic rhinitis   • Anemia in CKD (chronic kidney disease)   • Spasm of cervical paraspinous muscle   • Cervical radiculopathy   • Chronic kidney disease, stage IV (severe) (CMS/[facility])   • Depression   • DM type 2 with diabetic peripheral neuropathy (CMS/[facility])   • Essential hypertension   • Duplay's periarthritis syndrome   • Gastroesophageal reflux disease   • Hyperlipidemia   • Hypertension   • Arthritis   • Seizure disorder (CMS/[facility])   • Phlebitis   • Type 2 diabetes mellitus (CMS/[facility])   • Vitamin D deficiency   • Chest pain   • Abscess   • Generalized muscle weakness   • Abdominal wall cellulitis   • HTN (hypertension)   • CKD (chronic kidney disease) stage 4, GFR 15-29 ml/min (CMS/[facility])   • Diabetes mellitus with peripheral autonomic neuropathy (CMS/[facility])   • Hyponatremia   • Hypercalcemia   • Dehydration   • DACIA (acute kidney injury) (CMS/[facility])   • Abdominal wall abscess   • Cellulitis of toe of left foot   • Partial Achilles tendon tear, left, initial encounter   • Arthritis of foot   • Essential tremor   • Primary Parkinsonism (CMS/[facility])   • Abnormal cardiac function test   • 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 pectoris (CMS/[facility])   • Atrial fibrillation (CMS/[facility])   • Anticoagulated   • CKD (chronic kidney disease) stage 5, GFR less than 15 ml/min (CMS/[facility])   • Hypoglycemia   • Low vitamin B12 level   • Hemorrhagic stroke (CMS/[facility])   • S/P CABG (coronary artery bypass graft)   • Acute pulmonary edema (CMS/[facility])   • Hypothyroidism (acquired)   • Gastrointestinal hemorrhage with melena   • Systolic CHF, chronic (CMS/[facility])   • History of CVA (cerebrovascular accident)   • ESRD (end stage renal disease) (CMS/[facility])   • Anemia, chronic disease   • Anemia, posthemorrhagic, acute     All problems  are new to me today  Assessment:  1. GI bleed with melena-EGD negative for source  2. Normocytic anemia with heme positive stool  3. A. fib on anticoagulation, currently on hold for endoscopic eval  4. Acute on chronic kidney disease  5. History of coronary artery disease status post CABG      Plan:  · Plan for colonoscopy tomorrow for further evaluation of her melena and abdominal pain-procedure and risks discussed with the patient she is agreeable with proceeding.  · Continue to hold anticoagulation  · Further recommendations based upon the results of colonoscopy  · Hemoglobin stable continue to follow    I discussed the patients findings and my recommendations with patient.    Purvi Grant MD

## 2021-07-03 NOTE — PLAN OF CARE
Problem: Adult Inpatient Plan of Care  Goal: Plan of Care Review  Outcome: Ongoing, Progressing  Flowsheets (Taken 7/3/2021 4100)  Progress: improving  Plan of Care Reviewed With: patient  Outcome Summary: meds per order. bm x1, f/c in place, no falls, skin care per protocol, see labs and vs   Goal Outcome Evaluation:  Plan of Care Reviewed With: patient        Progress: improving  Outcome Summary: meds per order. bm x1, f/c in place, no falls, skin care per protocol, see labs and vs

## 2021-07-03 NOTE — PROGRESS NOTES
[facility] Progress Note      Patient Name: Maribeth Rendon  : 1950  MRN: [id number]  Primary Care Physician:  Robby Chaney MD  Date of admission: 2021    Subjective     Interval History:   Seen on hemodialysis.  Tolerating dialysis well.  Blood and half hour remaining.  Plan for colonoscopy tomorrow    Review of Systems:   As noted above    Objective     Vitals:   Temp:  [97.3 °F (36.3 °C)-98.5 °F (36.9 °C)] 97.3 °F (36.3 °C)  Heart Rate:  [82-97] 97  Resp:  [16-18] 16  BP: (119-150)/(64-70) 142/64    Intake/Output Summary (Last 24 hours) at 7/3/2021 1220  Last data filed at 7/3/2021 0719  Gross per 24 hour   Intake 285 ml   Output 700 ml   Net -415 ml       Physical Exam:    Constitutional:  Asleep, arousable, no acute distress, morbidly obese, chronically ill  HEENT: Sclera anicteric, no conjunctival injection, oral mucosa is  Neck: Supple, no thyromegaly, no lymphadenopathy, trachea at midline, no JVD  Respiratory: Clear to auscultation bilaterally, nonlabored respiration  Cardiovascular: RRR, systolic ejection murmur, no rub  Gastrointestinal: Positive bowel sounds, abdomen is soft, nontender and nondistended  : No palpable bladder  Musculoskeletal: No edema, no clubbing or cyanosis.  Left upper extremity AV fistula in place  Psychiatric: Patient has a flat affect  Neurologic:  She is alert  moving all extremities, normal speech and mental status  Skin: Warm and dry        Scheduled Meds:     amLODIPine, 5 mg, Oral, Q24H  bisacodyl, 20 mg, Oral, Once  carvedilol, 12.5 mg, Oral, BID With Meals  castor oil-balsam peru, , Topical, Q12H  epoetin hermes/hermes-epbx, 5,000 Units, Subcutaneous, Once per day on   levothyroxine, 50 mcg, Oral, Daily  lidocaine-prilocaine, , Topical, Once  magnesium hydroxide, 10 mL, Oral, Once  miconazole, 1 application, Topical, Q12H  polyethylene glycol, 1 bottle, Oral, BID  polyethylene glycol, 1 bottle, Oral,  Once  sertraline, 50 mg, Oral, Daily  sodium chloride, 10 mL, Intravenous, Q12H      IV Meds:   pantoprazole, 8 mg/hr, Last Rate: 8 mg/hr (07/03/21 0719)  sodium chloride, 30 mL/hr, Last Rate: 30 mL/hr (07/01/21 1124)        Results Reviewed:   I have personally reviewed the results from the time of local ice packs were placed admission to 7/3/2021 12:20 EDT     Results from last 7 days   Lab Units 07/03/21  0650 07/02/21  0243 07/01/21  1810 07/01/21  0209   WBC 10*3/mm3 10.79 9.43  --  9.00   HEMOGLOBIN g/dL 9.2* 9.5*  9.5* 9.2* 9.0*   HEMATOCRIT % 29.3* 30.8*  30.8* 28.8* 28.4*   PLATELETS 10*3/mm3 107* 114*  --  129*         Results from last 7 days   Lab Units 07/03/21  0650 07/02/21  0243 07/01/21  0209 06/29/21  0121   SODIUM mmol/L 140  137 140 144 139   POTASSIUM mmol/L 3.6  3.4* 3.6 4.0 5.4*   CHLORIDE mmol/L 104  103 102 108* 103   CO2 mmol/L 19.9*  22.3 22.2 16.0* 16.9*   BUN mg/dL 46*  47* 42* 87* 129*   CREATININE mg/dL 6.14*  5.26* 5.14* 7.71* 9.28*   CALCIUM mg/dL 7.1*  7.1* 7.3* 7.0* 6.4*   BILIRUBIN mg/dL 0.3  --   --  0.2   ALK PHOS U/L 54  --   --  60   ALT (SGPT) U/L 10  --   --  11   AST (SGOT) U/L 20  --   --  10   GLUCOSE mg/dL 64*  67 68 57* 133*       Estimated Creatinine Clearance: 10.6 mL/min (A) (by C-G formula based on SCr of 5.26 mg/dL (H)).    Results from last 7 days   Lab Units 07/03/21  0650 07/02/21  0243 07/01/21  0209   MAGNESIUM mg/dL 1.5* 1.5* 1.6   PHOSPHORUS mg/dL 4.7* 4.7* 8.1*             Results from last 7 days   Lab Units 07/03/21  0650 07/02/21  0243 07/01/21  1810 07/01/21  1008 07/01/21  0209 06/30/21  0700 06/29/21  0121   WBC 10*3/mm3 10.79 9.43  --   --  9.00 9.43 9.51   HEMOGLOBIN g/dL 9.2* 9.5*  9.5* 9.2* 9.6* 9.0* 9.5* 9.8*   PLATELETS 10*3/mm3 107* 114*  --   --  129* 151 177       Results from last 7 days   Lab Units 07/02/21  0243 07/01/21  0209 06/30/21  0352 06/29/21  0121   INR  1.41* 1.52* 1.81* 2.27*       Assessment / Plan      ASSESSMENT:  1.  End-stage renal disease patient has progressive chronic kidney disease currently on dialysis tolerating the treatment without any difficulties, her electrolyte within acceptable range,  2.  Metabolic acidosis associated with chronic kidney disease improved  3.  Anemia of chronic kidney disease: Plan for colonoscopy tomorrow  5.  Metabolic bone disease, with secondary hyperparathyroidism  within the goal of therapy patient probably will need aggressive management of the hyperphosphatemia  6.  Sepsis related to UTI and septic shock resolved  7.  Coronary artery disease, with prior CABG  8.  Hyperkalemia, resolved  9.  Hypothyroid, on replacement therapy  10.  Depression, treated     PLAN:  1.  Seen on hemodialysis and discussed with dialysis nurse.  Plan for colonoscopy tomorrow  2.  Surveillance labs    .    Escobar Vargas MD  07/03/21  12:20 EDT    [facility]  [phone]      Much of this encounter note is an electronic transcription/translation of spoken language to printed text. The electronic translation of spoken language may permit erroneous, or at times, nonsensical words or phrases to be inadvertently transcribed; Although I have reviewed the note for such errors, some may still exist.

## 2021-07-04 ENCOUNTER — ANESTHESIA (OUTPATIENT)
Dept: GASTROENTEROLOGY | Facility: HOSPITAL | Age: 71
End: 2021-07-04

## 2021-07-04 ENCOUNTER — ANESTHESIA EVENT (OUTPATIENT)
Dept: GASTROENTEROLOGY | Facility: HOSPITAL | Age: 71
End: 2021-07-04

## 2021-07-04 LAB
ANION GAP SERPL CALCULATED.3IONS-SCNC: 12.6 MMOL/L (ref 5–15)
BACTERIA SPEC AEROBE CULT: NORMAL
BACTERIA SPEC AEROBE CULT: NORMAL
BASOPHILS # BLD AUTO: 0.03 10*3/MM3 (ref 0–0.2)
BASOPHILS NFR BLD AUTO: 0.3 % (ref 0–1.5)
BUN SERPL-MCNC: 23 MG/DL (ref 8–23)
BUN/CREAT SERPL: 6.3 (ref 7–25)
CALCIUM SPEC-SCNC: 7.8 MG/DL (ref 8.6–10.5)
CHLORIDE SERPL-SCNC: 104 MMOL/L (ref 98–107)
CO2 SERPL-SCNC: 22.4 MMOL/L (ref 22–29)
CREAT SERPL-MCNC: 3.68 MG/DL (ref 0.57–1)
DEPRECATED RDW RBC AUTO: 48.6 FL (ref 37–54)
EOSINOPHIL # BLD AUTO: 0.46 10*3/MM3 (ref 0–0.4)
EOSINOPHIL NFR BLD AUTO: 3.9 % (ref 0.3–6.2)
ERYTHROCYTE [DISTWIDTH] IN BLOOD BY AUTOMATED COUNT: 14.7 % (ref 12.3–15.4)
GFR SERPL CREATININE-BSD FRML MDRD: 12 ML/MIN/1.73
GFR SERPL CREATININE-BSD FRML MDRD: ABNORMAL ML/MIN/{1.73_M2}
GLUCOSE BLDC GLUCOMTR-MCNC: 175 MG/DL (ref 70–130)
GLUCOSE BLDC GLUCOMTR-MCNC: 63 MG/DL (ref 70–130)
GLUCOSE BLDC GLUCOMTR-MCNC: 73 MG/DL (ref 70–130)
GLUCOSE BLDC GLUCOMTR-MCNC: 74 MG/DL (ref 70–130)
GLUCOSE SERPL-MCNC: 91 MG/DL (ref 65–99)
HCT VFR BLD AUTO: 33.6 % (ref 34–46.6)
HGB BLD-MCNC: 10.4 G/DL (ref 12–15.9)
IMM GRANULOCYTES # BLD AUTO: 0.14 10*3/MM3 (ref 0–0.05)
IMM GRANULOCYTES NFR BLD AUTO: 1.2 % (ref 0–0.5)
INR PPP: 1.35 (ref 0.9–1.1)
LYMPHOCYTES # BLD AUTO: 0.78 10*3/MM3 (ref 0.7–3.1)
LYMPHOCYTES NFR BLD AUTO: 6.6 % (ref 19.6–45.3)
MCH RBC QN AUTO: 28.5 PG (ref 26.6–33)
MCHC RBC AUTO-ENTMCNC: 31 G/DL (ref 31.5–35.7)
MCV RBC AUTO: 92.1 FL (ref 79–97)
MONOCYTES # BLD AUTO: 0.9 10*3/MM3 (ref 0.1–0.9)
MONOCYTES NFR BLD AUTO: 7.6 % (ref 5–12)
NEUTROPHILS NFR BLD AUTO: 80.4 % (ref 42.7–76)
NEUTROPHILS NFR BLD AUTO: 9.48 10*3/MM3 (ref 1.7–7)
NRBC BLD AUTO-RTO: 0.3 /100 WBC (ref 0–0.2)
PLATELET # BLD AUTO: 96 10*3/MM3 (ref 140–450)
PMV BLD AUTO: 11.1 FL (ref 6–12)
POTASSIUM SERPL-SCNC: 2.9 MMOL/L (ref 3.5–5.2)
PROTHROMBIN TIME: 16.5 SECONDS (ref 11.7–14.2)
RBC # BLD AUTO: 3.65 10*6/MM3 (ref 3.77–5.28)
SODIUM SERPL-SCNC: 139 MMOL/L (ref 136–145)
WBC # BLD AUTO: 11.79 10*3/MM3 (ref 3.4–10.8)

## 2021-07-04 PROCEDURE — 82962 GLUCOSE BLOOD TEST: CPT

## 2021-07-04 PROCEDURE — 85610 PROTHROMBIN TIME: CPT | Performed by: INTERNAL MEDICINE

## 2021-07-04 PROCEDURE — 85025 COMPLETE CBC W/AUTO DIFF WBC: CPT | Performed by: INTERNAL MEDICINE

## 2021-07-04 PROCEDURE — 25010000002 GLUCAGON (HUMAN RECOMBINANT) 1 MG RECONSTITUTED SOLUTION: Performed by: INTERNAL MEDICINE

## 2021-07-04 PROCEDURE — 25010000002 PROPOFOL 10 MG/ML EMULSION: Performed by: ANESTHESIOLOGY

## 2021-07-04 PROCEDURE — 80048 BASIC METABOLIC PNL TOTAL CA: CPT | Performed by: INTERNAL MEDICINE

## 2021-07-04 PROCEDURE — 45378 DIAGNOSTIC COLONOSCOPY: CPT | Performed by: INTERNAL MEDICINE

## 2021-07-04 PROCEDURE — 0DJD8ZZ INSPECTION OF LOWER INTESTINAL TRACT, VIA NATURAL OR ARTIFICIAL OPENING ENDOSCOPIC: ICD-10-PCS | Performed by: INTERNAL MEDICINE

## 2021-07-04 RX ORDER — PANTOPRAZOLE SODIUM 40 MG/1
40 TABLET, DELAYED RELEASE ORAL
Status: DISCONTINUED | OUTPATIENT
Start: 2021-07-04 | End: 2021-07-08 | Stop reason: HOSPADM

## 2021-07-04 RX ORDER — LIDOCAINE HYDROCHLORIDE 20 MG/ML
INJECTION, SOLUTION INFILTRATION; PERINEURAL AS NEEDED
Status: DISCONTINUED | OUTPATIENT
Start: 2021-07-04 | End: 2021-07-04 | Stop reason: SURG

## 2021-07-04 RX ORDER — SODIUM CHLORIDE 9 MG/ML
30 INJECTION, SOLUTION INTRAVENOUS CONTINUOUS PRN
Status: DISCONTINUED | OUTPATIENT
Start: 2021-07-04 | End: 2021-07-08 | Stop reason: HOSPADM

## 2021-07-04 RX ORDER — POTASSIUM CHLORIDE 1.5 G/1.77G
40 POWDER, FOR SOLUTION ORAL AS NEEDED
Status: DISCONTINUED | OUTPATIENT
Start: 2021-07-04 | End: 2021-07-08 | Stop reason: HOSPADM

## 2021-07-04 RX ORDER — PROPOFOL 10 MG/ML
VIAL (ML) INTRAVENOUS AS NEEDED
Status: DISCONTINUED | OUTPATIENT
Start: 2021-07-04 | End: 2021-07-04 | Stop reason: SURG

## 2021-07-04 RX ORDER — POTASSIUM CHLORIDE 750 MG/1
40 TABLET, FILM COATED, EXTENDED RELEASE ORAL AS NEEDED
Status: DISCONTINUED | OUTPATIENT
Start: 2021-07-04 | End: 2021-07-08 | Stop reason: HOSPADM

## 2021-07-04 RX ADMIN — PANTOPRAZOLE SODIUM 40 MG: 40 TABLET, DELAYED RELEASE ORAL at 17:25

## 2021-07-04 RX ADMIN — AMLODIPINE BESYLATE 5 MG: 5 TABLET ORAL at 11:40

## 2021-07-04 RX ADMIN — POTASSIUM CHLORIDE 40 MEQ: 750 TABLET, EXTENDED RELEASE ORAL at 20:11

## 2021-07-04 RX ADMIN — CASTOR OIL AND BALSAM, PERU 5 G: 788; 87 OINTMENT TOPICAL at 11:40

## 2021-07-04 RX ADMIN — CASTOR OIL AND BALSAM, PERU 5 G: 788; 87 OINTMENT TOPICAL at 20:19

## 2021-07-04 RX ADMIN — PANTOPRAZOLE SODIUM 8 MG/HR: 40 INJECTION, POWDER, FOR SOLUTION INTRAVENOUS at 04:14

## 2021-07-04 RX ADMIN — SODIUM CHLORIDE, PRESERVATIVE FREE 10 ML: 5 INJECTION INTRAVENOUS at 20:19

## 2021-07-04 RX ADMIN — SODIUM CHLORIDE, PRESERVATIVE FREE 10 ML: 5 INJECTION INTRAVENOUS at 11:42

## 2021-07-04 RX ADMIN — CARVEDILOL 12.5 MG: 12.5 TABLET, FILM COATED ORAL at 17:25

## 2021-07-04 RX ADMIN — PROPOFOL 200 MG: 10 INJECTION, EMULSION INTRAVENOUS at 09:59

## 2021-07-04 RX ADMIN — POTASSIUM CHLORIDE 40 MEQ: 750 TABLET, EXTENDED RELEASE ORAL at 12:45

## 2021-07-04 RX ADMIN — LIDOCAINE HYDROCHLORIDE 100 MG: 20 INJECTION, SOLUTION INFILTRATION; PERINEURAL at 09:57

## 2021-07-04 RX ADMIN — MICONAZOLE NITRATE 1 APPLICATION: 2 CREAM TOPICAL at 14:47

## 2021-07-04 RX ADMIN — MICONAZOLE NITRATE 1 APPLICATION: 2 CREAM TOPICAL at 20:19

## 2021-07-04 RX ADMIN — SERTRALINE HYDROCHLORIDE 50 MG: 50 TABLET, FILM COATED ORAL at 11:40

## 2021-07-04 RX ADMIN — CARVEDILOL 12.5 MG: 12.5 TABLET, FILM COATED ORAL at 11:39

## 2021-07-04 RX ADMIN — GLUCAGON HYDROCHLORIDE 1 MG: KIT at 06:27

## 2021-07-04 RX ADMIN — LEVOTHYROXINE SODIUM 50 MCG: 0.05 TABLET ORAL at 11:39

## 2021-07-04 RX ADMIN — SODIUM CHLORIDE 30 ML/HR: 9 INJECTION, SOLUTION INTRAVENOUS at 08:38

## 2021-07-04 NOTE — ANESTHESIA POSTPROCEDURE EVALUATION
"Patient: Maribeth Rendon    Procedure Summary     Date: 07/04/21 Room / Location:  [facility]    Anesthesia Start: 0954 Anesthesia Stop: 1019    Procedure: COLONOSCOPY into cecum/terminal ileum (N/A ) Diagnosis:       Anemia, posthemorrhagic, acute      Gastrointestinal hemorrhage with melena      (Anemia, posthemorrhagic, acute [D62])      (Gastrointestinal hemorrhage with melena [K92.1])    Surgeons: Purvi Grant MD Provider: Giovanny Sosa MD    Anesthesia Type: MAC ASA Status: 4          Anesthesia Type: MAC    Vitals  Vitals Value Taken Time   /35 07/04/21 1057   Temp     Pulse 68 07/04/21 1106   Resp 15 07/04/21 1057   SpO2 98 % 07/04/21 1106   Vitals shown include unvalidated device data.        Post Anesthesia Care and Evaluation    Patient location during evaluation: bedside  Patient participation: complete - patient participated  Level of consciousness: awake and alert  Pain management: adequate  Airway patency: patent  Anesthetic complications: No anesthetic complications    Cardiovascular status: acceptable  Respiratory status: acceptable  Hydration status: acceptable    Comments: BP (!) 118/35   Pulse 62   Temp 37.1 °C (98.8 °F) (Oral)   Resp 15   Ht 165.1 cm (65\")   Wt 83.2 kg (183 lb 6.8 oz)   LMP  (LMP Unknown)   SpO2 97%   BMI 30.52 kg/m²       "

## 2021-07-04 NOTE — PLAN OF CARE
Goal Outcome Evaluation:      Pt. Alert, oriented x4, verbally responsive, with skin rash to groin, perineal area , and lower back area, cream applied as ordered, protonix continuously dripping per IV  access of with forearm, no voiced c/o pain, noted bowels movements x2,  clear light green color, Pt. NPO status for diagnostic procedure at this morning time, Pt. V/s stable, sleeping in bed quietly at this time, no s/s acute distress noted

## 2021-07-04 NOTE — PROGRESS NOTES
Attempt to see patient today.  She is in the endoscopy.  No acute events noted on chart review.  We will continue to follow.

## 2021-07-04 NOTE — PROGRESS NOTES
[facility] Progress Note      Patient Name: Maribeth Rendon  : 1950  MRN: [id number]  Primary Care Physician:  Robby Chaney MD  Date of admission: 2021    Subjective     Interval History:   Seen on hemodialysis.  Had colonoscopy earlier today.  Family is on bedside.  No shortness of air or chest pain    Review of Systems:   As noted above    Objective     Vitals:   Temp:  [97.5 °F (36.4 °C)-98.8 °F (37.1 °C)] 97.5 °F (36.4 °C)  Heart Rate:  [55-96] 92  Resp:  [12-18] 16  BP: ()/(26-81) 146/60  Flow (L/min):  [2] 2    Intake/Output Summary (Last 24 hours) at 2021 1315  Last data filed at 2021 1015  Gross per 24 hour   Intake 200 ml   Output 200 ml   Net 0 ml       Physical Exam:    Constitutional: no acute distress, morbidly obese, chronically ill  HEENT: Sclera anicteric, no conjunctival injection, oral mucosa is  Neck: Supple, no thyromegaly, no lymphadenopathy, trachea at midline, no JVD  Respiratory: Clear to auscultation bilaterally, nonlabored respiration  Cardiovascular: RRR, systolic ejection murmur, no rub  Gastrointestinal: Positive bowel sounds, abdomen is soft, nontender and nondistended  : No palpable bladder  Musculoskeletal: No edema, no clubbing or cyanosis.  Left upper extremity AV fistula in place  Psychiatric: Patient has a flat affect  Neurologic:  She is alert  moving all extremities, normal speech and mental status  Skin: Warm and dry        Scheduled Meds:     amLODIPine, 5 mg, Oral, Q24H  carvedilol, 12.5 mg, Oral, BID With Meals  castor oil-balsam peru, , Topical, Q12H  epoetin hermes/hermes-epbx, 5,000 Units, Subcutaneous, Once per day on   levothyroxine, 50 mcg, Oral, Daily  lidocaine-prilocaine, , Topical, Once  miconazole, 1 application, Topical, Q12H  pantoprazole, 40 mg, Oral, Q AM  polyethylene glycol, 1 bottle, Oral, Once  sertraline, 50 mg, Oral, Daily  sodium chloride, 10 mL, Intravenous, Q12H      IV  Meds:   sodium chloride, 30 mL/hr, Last Rate: 30 mL/hr (07/01/21 1124)  sodium chloride, 30 mL/hr, Last Rate: 30 mL/hr (07/04/21 0838)        Results Reviewed:   I have personally reviewed the results from the time of local ice packs were placed admission to 7/4/2021 13:15 EDT     Results from last 7 days   Lab Units 07/04/21 0658 07/03/21  0650 07/02/21  0243   WBC 10*3/mm3 11.79* 10.79 9.43   HEMOGLOBIN g/dL 10.4* 9.2* 9.5*  9.5*   HEMATOCRIT % 33.6* 29.3* 30.8*  30.8*   PLATELETS 10*3/mm3 96* 107* 114*         Results from last 7 days   Lab Units 07/04/21 0658 07/03/21  0650 07/02/21  0243 06/29/21  0121   SODIUM mmol/L 139 140  137 140 139   POTASSIUM mmol/L 2.9* 3.6  3.4* 3.6 5.4*   CHLORIDE mmol/L 104 104  103 102 103   CO2 mmol/L 22.4 19.9*  22.3 22.2 16.9*   BUN mg/dL 23 46*  47* 42* 129*   CREATININE mg/dL 3.68* 6.14*  5.26* 5.14* 9.28*   CALCIUM mg/dL 7.8* 7.1*  7.1* 7.3* 6.4*   BILIRUBIN mg/dL  --  0.3  --  0.2   ALK PHOS U/L  --  54  --  60   ALT (SGPT) U/L  --  10  --  11   AST (SGOT) U/L  --  20  --  10   GLUCOSE mg/dL 91 64*  67 68 133*       Estimated Creatinine Clearance: 15.2 mL/min (A) (by C-G formula based on SCr of 3.68 mg/dL (H)).    Results from last 7 days   Lab Units 07/03/21 0650 07/02/21 0243 07/01/21  0209   MAGNESIUM mg/dL 1.5* 1.5* 1.6   PHOSPHORUS mg/dL 4.7* 4.7* 8.1*             Results from last 7 days   Lab Units 07/04/21 0658 07/03/21  0650 07/02/21  0243 07/01/21  1810 07/01/21  1008 07/01/21  0209 06/30/21  0700   WBC 10*3/mm3 11.79* 10.79 9.43  --   --  9.00 9.43   HEMOGLOBIN g/dL 10.4* 9.2* 9.5*  9.5* 9.2* 9.6* 9.0* 9.5*   PLATELETS 10*3/mm3 96* 107* 114*  --   --  129* 151       Results from last 7 days   Lab Units 07/04/21  0658 07/03/21  1826 07/02/21  0243 07/01/21  0209 06/30/21  0352   INR  1.35* 1.20* 1.41* 1.52* 1.81*       Assessment / Plan     ASSESSMENT:  1.  End-stage renal disease patient has progressive chronic kidney disease currently on dialysis  tolerating the treatment without any difficulties, her electrolyte within acceptable range,  2.  Metabolic acidosis associated with chronic kidney disease improved  3.  Anemia of chronic kidney disease: Plan for colonoscopy tomorrow  5.  Metabolic bone disease, with secondary hyperparathyroidism  within the goal of therapy patient probably will need aggressive management of the hyperphosphatemia  6.  Sepsis related to UTI and septic shock resolved  7.  Coronary artery disease, with prior CABG  8.  Hyperkalemia, resolved  9.  Hypothyroid, on replacement therapy  10.  Depression, treated     PLAN:  1.  Hemodialysis tomorrow to put her back on her schedule  2.  Surveillance labs    .    Escobar Vargas MD  07/04/21  13:15 EDT    [facility]  [phone]      Much of this encounter note is an electronic transcription/translation of spoken language to printed text. The electronic translation of spoken language may permit erroneous, or at times, nonsensical words or phrases to be inadvertently transcribed; Although I have reviewed the note for such errors, some may still exist.

## 2021-07-04 NOTE — ANESTHESIA PREPROCEDURE EVALUATION
Anesthesia Evaluation     Patient summary reviewed and Nursing notes reviewed   NPO Solid Status: > 8 hours  NPO Liquid Status: > 4 hours           Airway   Mallampati: II  Neck ROM: full  Possible difficult intubation  Dental    (+) poor dentition    Pulmonary     breath sounds clear to auscultation  (+) pneumonia , a smoker (52 pk yrs) Former, shortness of breath,   Cardiovascular     Rhythm: regular    (+) hypertension, CAD, CABG, dysrhythmias Atrial Fib, angina, CHF , hyperlipidemia,       Neuro/Psych  (+) seizures, CVA, numbness, psychiatric history Depression,       ROS Comment: Wheel chair dependent  GI/Hepatic/Renal/Endo    (+) obesity,  GERD,  renal disease dialysis, diabetes mellitus,     Musculoskeletal     Abdominal   (+) obese,    Substance History      OB/GYN          Other   arthritis,                    Anesthesia Plan    ASA 4     MAC     intravenous induction     Anesthetic plan, all risks, benefits, and alternatives have been provided, discussed and informed consent has been obtained with: patient.

## 2021-07-04 NOTE — PLAN OF CARE
Goal Outcome Evaluation:           Progress: improving  Outcome Summary: Patient alert and oriented. Forgetful at times. Colonoscopy done today. BP slightly elevated. Meds given. Encouraged patient to turn while in the bed. Educated patient on the importance of turning. Accumax pump in place. Patient denies pain. No s/s of acute distress. Will continue to monitor.

## 2021-07-05 LAB
ANION GAP SERPL CALCULATED.3IONS-SCNC: 10.6 MMOL/L (ref 5–15)
BASOPHILS # BLD AUTO: 0.02 10*3/MM3 (ref 0–0.2)
BASOPHILS NFR BLD AUTO: 0.2 % (ref 0–1.5)
BUN SERPL-MCNC: 28 MG/DL (ref 8–23)
BUN/CREAT SERPL: 6 (ref 7–25)
CALCIUM SPEC-SCNC: 7.6 MG/DL (ref 8.6–10.5)
CHLORIDE SERPL-SCNC: 106 MMOL/L (ref 98–107)
CO2 SERPL-SCNC: 19.4 MMOL/L (ref 22–29)
CREAT SERPL-MCNC: 4.63 MG/DL (ref 0.57–1)
DEPRECATED RDW RBC AUTO: 47.9 FL (ref 37–54)
EOSINOPHIL # BLD AUTO: 0.45 10*3/MM3 (ref 0–0.4)
EOSINOPHIL NFR BLD AUTO: 4.3 % (ref 0.3–6.2)
ERYTHROCYTE [DISTWIDTH] IN BLOOD BY AUTOMATED COUNT: 14.9 % (ref 12.3–15.4)
GFR SERPL CREATININE-BSD FRML MDRD: 9 ML/MIN/1.73
GFR SERPL CREATININE-BSD FRML MDRD: ABNORMAL ML/MIN/{1.73_M2}
GLUCOSE BLDC GLUCOMTR-MCNC: 105 MG/DL (ref 70–130)
GLUCOSE BLDC GLUCOMTR-MCNC: 110 MG/DL (ref 70–130)
GLUCOSE BLDC GLUCOMTR-MCNC: 139 MG/DL (ref 70–130)
GLUCOSE BLDC GLUCOMTR-MCNC: 217 MG/DL (ref 70–130)
GLUCOSE BLDC GLUCOMTR-MCNC: 71 MG/DL (ref 70–130)
GLUCOSE SERPL-MCNC: 66 MG/DL (ref 65–99)
HCT VFR BLD AUTO: 30.7 % (ref 34–46.6)
HGB BLD-MCNC: 9.9 G/DL (ref 12–15.9)
IMM GRANULOCYTES # BLD AUTO: 0.13 10*3/MM3 (ref 0–0.05)
IMM GRANULOCYTES NFR BLD AUTO: 1.2 % (ref 0–0.5)
LYMPHOCYTES # BLD AUTO: 1.05 10*3/MM3 (ref 0.7–3.1)
LYMPHOCYTES NFR BLD AUTO: 10 % (ref 19.6–45.3)
MCH RBC QN AUTO: 29.2 PG (ref 26.6–33)
MCHC RBC AUTO-ENTMCNC: 32.2 G/DL (ref 31.5–35.7)
MCV RBC AUTO: 90.6 FL (ref 79–97)
MONOCYTES # BLD AUTO: 0.92 10*3/MM3 (ref 0.1–0.9)
MONOCYTES NFR BLD AUTO: 8.8 % (ref 5–12)
NEUTROPHILS NFR BLD AUTO: 7.88 10*3/MM3 (ref 1.7–7)
NEUTROPHILS NFR BLD AUTO: 75.5 % (ref 42.7–76)
NRBC BLD AUTO-RTO: 0.2 /100 WBC (ref 0–0.2)
PLATELET # BLD AUTO: 140 10*3/MM3 (ref 140–450)
PMV BLD AUTO: 12 FL (ref 6–12)
POTASSIUM SERPL-SCNC: 4.9 MMOL/L (ref 3.5–5.2)
RBC # BLD AUTO: 3.39 10*6/MM3 (ref 3.77–5.28)
SODIUM SERPL-SCNC: 136 MMOL/L (ref 136–145)
WBC # BLD AUTO: 10.45 10*3/MM3 (ref 3.4–10.8)

## 2021-07-05 PROCEDURE — 80048 BASIC METABOLIC PNL TOTAL CA: CPT | Performed by: INTERNAL MEDICINE

## 2021-07-05 PROCEDURE — 99232 SBSQ HOSP IP/OBS MODERATE 35: CPT | Performed by: NURSE PRACTITIONER

## 2021-07-05 PROCEDURE — 97110 THERAPEUTIC EXERCISES: CPT

## 2021-07-05 PROCEDURE — 99232 SBSQ HOSP IP/OBS MODERATE 35: CPT | Performed by: INTERNAL MEDICINE

## 2021-07-05 PROCEDURE — 82962 GLUCOSE BLOOD TEST: CPT

## 2021-07-05 PROCEDURE — 85025 COMPLETE CBC W/AUTO DIFF WBC: CPT | Performed by: INTERNAL MEDICINE

## 2021-07-05 PROCEDURE — 25010000002 EPOETIN ALFA-EPBX 3000 UNIT/ML SOLUTION: Performed by: INTERNAL MEDICINE

## 2021-07-05 PROCEDURE — 97162 PT EVAL MOD COMPLEX 30 MIN: CPT

## 2021-07-05 RX ORDER — LEVOTHYROXINE SODIUM 0.05 MG/1
50 TABLET ORAL
Status: DISCONTINUED | OUTPATIENT
Start: 2021-07-06 | End: 2021-07-08 | Stop reason: HOSPADM

## 2021-07-05 RX ORDER — CALCITRIOL 0.25 UG/1
0.25 CAPSULE, LIQUID FILLED ORAL 2 TIMES WEEKLY
Status: DISCONTINUED | OUTPATIENT
Start: 2021-07-05 | End: 2021-07-08 | Stop reason: HOSPADM

## 2021-07-05 RX ADMIN — SODIUM CHLORIDE, PRESERVATIVE FREE 10 ML: 5 INJECTION INTRAVENOUS at 21:48

## 2021-07-05 RX ADMIN — POTASSIUM CHLORIDE 40 MEQ: 750 TABLET, EXTENDED RELEASE ORAL at 00:00

## 2021-07-05 RX ADMIN — CARVEDILOL 12.5 MG: 12.5 TABLET, FILM COATED ORAL at 18:13

## 2021-07-05 RX ADMIN — AMLODIPINE BESYLATE 5 MG: 5 TABLET ORAL at 18:13

## 2021-07-05 RX ADMIN — EPOETIN ALFA-EPBX 5000 UNITS: 3000 INJECTION, SOLUTION INTRAVENOUS; SUBCUTANEOUS at 10:18

## 2021-07-05 RX ADMIN — CARVEDILOL 12.5 MG: 12.5 TABLET, FILM COATED ORAL at 23:17

## 2021-07-05 RX ADMIN — PANTOPRAZOLE SODIUM 40 MG: 40 TABLET, DELAYED RELEASE ORAL at 06:05

## 2021-07-05 RX ADMIN — SODIUM CHLORIDE, PRESERVATIVE FREE 10 ML: 5 INJECTION INTRAVENOUS at 09:15

## 2021-07-05 RX ADMIN — CASTOR OIL AND BALSAM, PERU 5 G: 788; 87 OINTMENT TOPICAL at 23:17

## 2021-07-05 RX ADMIN — SERTRALINE HYDROCHLORIDE 50 MG: 50 TABLET, FILM COATED ORAL at 18:14

## 2021-07-05 RX ADMIN — CALCITRIOL 0.25 MCG: 0.25 CAPSULE ORAL at 18:14

## 2021-07-05 RX ADMIN — CASTOR OIL AND BALSAM, PERU 5 G: 788; 87 OINTMENT TOPICAL at 09:14

## 2021-07-05 RX ADMIN — MICONAZOLE NITRATE 1 APPLICATION: 2 CREAM TOPICAL at 21:48

## 2021-07-05 RX ADMIN — MICONAZOLE NITRATE 1 APPLICATION: 2 CREAM TOPICAL at 09:15

## 2021-07-05 NOTE — PROGRESS NOTES
[facility] Progress Note      Patient Name: Maribeth Rendon  : 1950  MRN: [id number]  Primary Care Physician:  Robby Chaney MD  Date of admission: 2021    Subjective     Interval History:     Had her colonoscopy showing multiple diverticuli in the sigmoid colon.  Non bleeding internal hemorrhoids grade 1 were found.    Denies any chest pain, palpitation diaphoresis.  Tolerating oral intake  Having regular bowel movements    Review of Systems:   As noted above    Objective     Vitals:   Temp:  [97.5 °F (36.4 °C)-99 °F (37.2 °C)] 99 °F (37.2 °C)  Heart Rate:  [55-93] 93  Resp:  [12-16] 16  BP: ()/(26-96) 115/96  Flow (L/min):  [2] 2    Intake/Output Summary (Last 24 hours) at 2021 0755  Last data filed at 2021 0500  Gross per 24 hour   Intake 200 ml   Output 150 ml   Net 50 ml       Physical Exam:      Constitutional: Pt alert  Overweight the BMI of 29  HEENT: Sclera anicteric, no conjunctival injection  Neck: Supple, no thyromegaly, no lymphadenopathy, trachea at midline, no JVD  Chest external wound in place, healed   Respiratory: Clear to auscultation bilaterally, nonlabored respiration  Cardiovascular: RRR, systolic ejection murmur  Gastrointestinal: Positive bowel sounds, abdomen is soft, nontender and nondistended  : No palpable bladder  Musculoskeletal: No edema, no clubbing or cyanosis.  Left upper extremity AV fistula in place  Psychiatric: Appropriate affect, cooperative  Neurologic: pt alert  moving all extremities, normal speech and mental status  Skin: Warm and dry        Scheduled Meds:     amLODIPine, 5 mg, Oral, Q24H  carvedilol, 12.5 mg, Oral, BID With Meals  castor oil-balsam peru, , Topical, Q12H  epoetin hermes/hermes-epbx, 5,000 Units, Subcutaneous, Once per day on   levothyroxine, 50 mcg, Oral, Daily  lidocaine-prilocaine, , Topical, Once  miconazole, 1 application, Topical, Q12H  pantoprazole, 40 mg, Oral, Q  AM  polyethylene glycol, 1 bottle, Oral, Once  sertraline, 50 mg, Oral, Daily  sodium chloride, 10 mL, Intravenous, Q12H      IV Meds:   sodium chloride, 30 mL/hr, Last Rate: 30 mL/hr (07/01/21 1124)  sodium chloride, 30 mL/hr, Last Rate: 30 mL/hr (07/04/21 0838)        Results Reviewed:   I have personally reviewed the results from the time of local ice packs were placed admission to 7/5/2021 07:55 EDT     Results from last 7 days   Lab Units 07/05/21  0524 07/04/21  0658 07/03/21  0650   WBC 10*3/mm3 10.45 11.79* 10.79   HEMOGLOBIN g/dL 9.9* 10.4* 9.2*   HEMATOCRIT % 30.7* 33.6* 29.3*   PLATELETS 10*3/mm3 140 96* 107*         Results from last 7 days   Lab Units 07/04/21  0658 07/03/21  0650 07/02/21 0243 06/29/21  0121   SODIUM mmol/L 139 140  137 140 139   POTASSIUM mmol/L 2.9* 3.6  3.4* 3.6 5.4*   CHLORIDE mmol/L 104 104  103 102 103   CO2 mmol/L 22.4 19.9*  22.3 22.2 16.9*   BUN mg/dL 23 46*  47* 42* 129*   CREATININE mg/dL 3.68* 6.14*  5.26* 5.14* 9.28*   CALCIUM mg/dL 7.8* 7.1*  7.1* 7.3* 6.4*   BILIRUBIN mg/dL  --  0.3  --  0.2   ALK PHOS U/L  --  54  --  60   ALT (SGPT) U/L  --  10  --  11   AST (SGOT) U/L  --  20  --  10   GLUCOSE mg/dL 91 64*  67 68 133*       Estimated Creatinine Clearance: 14.9 mL/min (A) (by C-G formula based on SCr of 3.68 mg/dL (H)).    Results from last 7 days   Lab Units 07/03/21  0650 07/02/21  0243 07/01/21  0209   MAGNESIUM mg/dL 1.5* 1.5* 1.6   PHOSPHORUS mg/dL 4.7* 4.7* 8.1*             Results from last 7 days   Lab Units 07/05/21  0524 07/04/21  0658 07/03/21  0650 07/02/21  0243 07/01/21  1810 07/01/21  0209   WBC 10*3/mm3 10.45 11.79* 10.79 9.43  --  9.00   HEMOGLOBIN g/dL 9.9* 10.4* 9.2* 9.5*  9.5* 9.2* 9.0*   PLATELETS 10*3/mm3 140 96* 107* 114*  --  129*       Results from last 7 days   Lab Units 07/04/21  0658 07/03/21  1826 07/02/21  0243 07/01/21  0209 06/30/21  0352   INR  1.35* 1.20* 1.41* 1.52* 1.81*       Assessment / Plan     Maribeth Rendon is a 70 y.o.  female with past medical history heavy tobacco abuse quit decades ago, chronic anemia, anxiety disorder, depression disorder, diabetes mellitus type 2, coronary artery disease status post cardiac cath status post coronary artery bypass grafting times 4/2019, chronic systolic congestive heart failure with EF of 57% (5/2021) chronic kidney disease stage V status post left upper extremity AV fistula placement, 2016, gastroesophageal reflux disease, history of MRSA abdominal wound infection 2017, dyslipidemia, hypertension, osteoarthritis, psoriatic arthritis, history of cerebrovascular accident, overweight with a BMI of 28,  disabled requiring a wheelchair to ambulate.     Patient  is well-known to nephrology and has been previously admitted for volume overload     Patient presents to the emergency department brought by EMS with onset of altered mental status associated with decreased oral intake.  Patient was found to be hypotensive and required ICU admission, under the diagnosis of septic shock from urinary tract infection and worsening renal dysfunction     Neurology consultation of been requested for the management       ASSESSMENT:    -Acute on chronic kidney disease 5 to ESRD  ( likely progression from underlying condition)  baseline creatinine around 3.4-3.8 , peaked at  9, associated with altered mental status and metabolic acidosis and hyperkalemia. Pt with clear uremic symptoms , fulfilling criteria to start HD .Currently on HD      -Metabolic bone disease with secondary hyperparathyroidism  . Her PTH is 474 . Will start calcitriol 0.25 BIW . Will follow trend     - Chronic normocytic anemia ; on  EPOGEN 5000 units SC ,followed by gastroenterology s/p colonoscopy     -UTI : on ceftriaxone renally adjusted , completed treatment course     -CAD tatus post coronary artery bypass grafting times 4/2019, chronic systolic congestive heart failure with EF of 57% (5/2021). As per primary team      -Hyperkalemia ;  resolved after initiation of HD , will follow trend     -HAGMA ; from DACIA on CKD , requiring initiation of HD , resolved     PLAN:    -Continue HD during admission , schedule today  .   - Consulted  placement in local [facility] dialysis unit, to continue outpatient hemodialysis  - On  EPOGEN protocol   - Started on calcitriol   -Avoid nephrotoxins  -Adjust medications to GFR    Thank you for involving us in the care of Maribeth Rendon.  Please feel free to call with any questions.    Brijesh Randolph MD  07/05/21  07:55 EDT    [facility]  [phone]      Much of this encounter note is an electronic transcription/translation of spoken language to printed text. The electronic translation of spoken language may permit erroneous, or at times, nonsensical words or phrases to be inadvertently transcribed; Although I have reviewed the note for such errors, some may still exist.

## 2021-07-05 NOTE — PROGRESS NOTES
"   LOS: 6 days   Patient Care Team:  Robby Chaney MD as PCP - General  Robby Chaney MD as PCP - Family Medicine  Eddie Hodges MD as Consulting Physician (Cardiology)    Chief Complaint: GIB     Interval History: Weak.  No CP/SOA.       Objective   Vital Signs  Temp:  [97.5 °F (36.4 °C)-99 °F (37.2 °C)] 99 °F (37.2 °C)  Heart Rate:  [56-93] 93  Resp:  [14-16] 16  BP: ()/(33-96) 115/96    Intake/Output Summary (Last 24 hours) at 7/5/2021 1028  Last data filed at 7/5/2021 0500  Gross per 24 hour   Intake --   Output 150 ml   Net -150 ml       Last Weight and Admission Weight        07/05/21  0603   Weight: 80 kg (176 lb 5.9 oz)     Flowsheet Rows      First Filed Value   Admission Height  165.1 cm (65\") Documented at 06/29/2021 0157   Admission Weight  78 kg (172 lb) Documented at 06/29/2021 0157                  Physical Exam  Constitutional:       General: She is awake.      Comments: Sickly appearing   HENT:      Head: Normocephalic.      Mouth/Throat:      Mouth: Mucous membranes are dry.      Comments: Poor dentition  Eyes:      Conjunctiva/sclera: Conjunctivae normal.   Cardiovascular:      Rate and Rhythm: Normal rate and regular rhythm.      Pulses: Normal pulses.      Heart sounds: Murmur heard.   Systolic murmur is present with a grade of 2/6.     Pulmonary:      Effort: Pulmonary effort is normal.      Breath sounds: Normal breath sounds.   Abdominal:      Palpations: Abdomen is soft.      Tenderness: There is no abdominal tenderness.   Musculoskeletal:      Right lower leg: No edema.      Left lower leg: No edema.   Skin:     Findings: Bruising present.   Neurological:      General: No focal deficit present.      Mental Status: She is alert.   Psychiatric:         Mood and Affect: Mood normal.         Results Review:      Results from last 7 days   Lab Units 07/05/21  0524 07/04/21  0658 07/03/21  0650   SODIUM mmol/L 136 139 140  137   POTASSIUM mmol/L 4.9 2.9* 3.6 "  3.4*   CHLORIDE mmol/L 106 104 104  103   CO2 mmol/L 19.4* 22.4 19.9*  22.3   BUN mg/dL 28* 23 46*  47*   CREATININE mg/dL 4.63* 3.68* 6.14*  5.26*   GLUCOSE mg/dL 66 91 64*  67   CALCIUM mg/dL 7.6* 7.8* 7.1*  7.1*     Results from last 7 days   Lab Units 07/02/21  0243 07/01/21  0209 06/29/21  0347 06/29/21  0121   CK TOTAL U/L  --   --   --  63   TROPONIN T ng/mL 0.147* 0.201* 0.082* 0.161*     Results from last 7 days   Lab Units 07/05/21  0524 07/04/21  0658 07/03/21  0650   WBC 10*3/mm3 10.45 11.79* 10.79   HEMOGLOBIN g/dL 9.9* 10.4* 9.2*   HEMATOCRIT % 30.7* 33.6* 29.3*   PLATELETS 10*3/mm3 140 96* 107*     Results from last 7 days   Lab Units 07/04/21  0658 07/03/21  1826 07/02/21  0243   INR  1.35* 1.20* 1.41*         Results from last 7 days   Lab Units 07/03/21  0650   MAGNESIUM mg/dL 1.5*           I reviewed the patient's new clinical results.  I personally viewed and interpreted the patient's EKG/Telemetry data        Medication Review:   amLODIPine, 5 mg, Oral, Q24H  calcitriol, 0.25 mcg, Oral, Once per day on Mon Thu  carvedilol, 12.5 mg, Oral, BID With Meals  castor oil-balsam peru, , Topical, Q12H  epoetin hermes/hermes-epbx, 5,000 Units, Subcutaneous, Once per day on Mon Wed Fri  [START ON 7/6/2021] levothyroxine, 50 mcg, Oral, Q AM  lidocaine-prilocaine, , Topical, Once  miconazole, 1 application, Topical, Q12H  pantoprazole, 40 mg, Oral, Q AM  polyethylene glycol, 1 bottle, Oral, Once  sertraline, 50 mg, Oral, Daily  sodium chloride, 10 mL, Intravenous, Q12H        sodium chloride, 30 mL/hr, Last Rate: 30 mL/hr (07/01/21 1124)  sodium chloride, 30 mL/hr, Last Rate: 30 mL/hr (07/04/21 0838)        Assessment/Plan     1. Anemia, posthemorrhagic, acute  2. Melena  3. CAD s/p CABG  4. PAF, in SR, history of stroke  5. ESRD on HD  6. Parkinsonism  7. DM  8. HTN    Her Hgb is relatively stable/minimally decreased. No obvious bleeding source was noted on endoscopy.  GI has okayed restarting AC and  seeing how she does.    I discussed this with Mrs Rendon, and she is presently on the fence about restarting AC.  We talked about the pros and cons, and she understands both sides. She wants to continue to hold AC today and decide tomorrow after repeat H/H.     Jay Bennett MD  07/05/21  10:28 EDT

## 2021-07-05 NOTE — PROGRESS NOTES
Name: Maribeth Rendon ADMIT: 2021   : 1950  PCP: Robby Chaney MD    MRN: [id number] LOS: 6 days   AGE/SEX: 70 y.o. female  ROOM: [facility]     Subjective   Subjective   Patient is seen at bedside.       Objective   Objective   Vital Signs  Temp:  [97.5 °F (36.4 °C)-99 °F (37.2 °C)] 99 °F (37.2 °C)  Heart Rate:  [56-93] 93  Resp:  [14-16] 16  BP: ()/(33-96) 115/96  SpO2:  [97 %-100 %] 98 %  on  Flow (L/min):  [2] 2;   Device (Oxygen Therapy): room air  Body mass index is 29.35 kg/m².  Physical Exam  General, awake and alert.  Appears sick on chronic basis.  Head and ENT, normocephalic and atraumatic.  Lungs, symmetric expansion, equal air entry bilaterally.  Heart, regular rate and rhythm.  Abdomen, soft and nontender.  Extremities, no clubbing or cyanosis.  Neuro, no focal deficits.  Skin: Warm and no rash.  Psych, normal mood and affect.  Musculoskeletal, joint examination is grossly normal.    Results Review     I reviewed the patient's new clinical results.  Results from last 7 days   Lab Units 21  0650 21  0243   WBC 10*3/mm3 10.45 11.79* 10.79 9.43   HEMOGLOBIN g/dL 9.9* 10.4* 9.2* 9.5*  9.5*   PLATELETS 10*3/mm3 140 96* 107* 114*     Results from last 7 days   Lab Units 21  0658 21  0650 21  0243   SODIUM mmol/L 136 139 140  137 140   POTASSIUM mmol/L 4.9 2.9* 3.6  3.4* 3.6   CHLORIDE mmol/L 106 104 104  103 102   CO2 mmol/L 19.4* 22.4 19.9*  22.3 22.2   BUN mg/dL 28* 23 46*  47* 42*   CREATININE mg/dL 4.63* 3.68* 6.14*  5.26* 5.14*   GLUCOSE mg/dL 66 91 64*  67 68   Estimated Creatinine Clearance: 11.8 mL/min (A) (by C-G formula based on SCr of 4.63 mg/dL (H)).  Results from last 7 days   Lab Units 21  0650 21  0243 21  0121   ALBUMIN g/dL 2.30*  2.20* 2.70* 2.50*   BILIRUBIN mg/dL 0.3  --  0.2   ALK PHOS U/L 54  --  60   AST (SGOT) U/L 20  --  10   ALT (SGPT) U/L 10  --  11      Results from last 7 days   Lab Units 07/05/21  0524 07/04/21  0658 07/03/21  0650 07/02/21  0243 07/01/21  0209 06/30/21  0352 06/29/21  0121   CALCIUM mg/dL 7.6* 7.8* 7.1*  7.1* 7.3* 7.0* 6.5* 6.4*   ALBUMIN g/dL  --   --  2.30*  2.20* 2.70*  --   --  2.50*   MAGNESIUM mg/dL  --   --  1.5* 1.5* 1.6 1.5* 1.6   PHOSPHORUS mg/dL  --   --  4.7* 4.7* 8.1* 6.9*  --      Results from last 7 days   Lab Units 06/29/21  0121   PROCALCITONIN ng/mL 0.30*   LACTATE mmol/L 0.6     COVID19   Date Value Ref Range Status   06/29/2021 Not Detected Not Detected - Ref. Range Final   05/06/2021 Not Detected Not Detected - Ref. Range Final     Glucose   Date/Time Value Ref Range Status   07/05/2021 0735 110 70 - 130 mg/dL Final     Comment:     Meter: [id number] : [id number] Ying Ochoa    07/05/2021 0600 71 70 - 130 mg/dL Final     Comment:     Meter: [id number] : [id number] Carolyn Lovelace S NA   07/04/2021 1912 175 (H) 70 - 130 mg/dL Final     Comment:     Meter: [id number] : [id number] Carolyn Lovelace S NA   07/04/2021 1141 73 70 - 130 mg/dL Final     Comment:     Meter: [id number] : [id number] Gee Hdz NA   07/04/2021 0640 74 70 - 130 mg/dL Final     Comment:     Meter: [id number] : [id number] Nick Kong RN   07/04/2021 0604 63 (L) 70 - 130 mg/dL Final     Comment:     Meter: [id number] : [id number] Wilian DEL TORO   07/03/2021 2056 160 (H) 70 - 130 mg/dL Final     Comment:     Meter: [id number] : [id number] Wilian Gonzales ALVERTO       CT Abdomen Pelvis Without Contrast  CT ABDOMEN PELVIS WO CONTRAST-     Radiation dose reduction techniques were utilized, including automated  exposure control and exposure modulation based on body size.     Clinical: Sepsis, question UTI. Acute renal failure     COMPARISON CT scan of the abdomen and pelvis 3/18/2016     FINDINGS: There is a porcelain gallbladder which is filled with  gallstones. No gallbladder wall thickening or  pericholecystic fluid  suggesting cholecystitis. No biliary duct dilatation. The pancreas is  normal.     Spleen is normal in size and shape, the right adrenal gland is normal.  There is a small, 13 mm indeterminate left adrenal nodule which measured  8 mm on 2016.     There is bilateral perinephric fat stranding and some of this was  present on the previous examination. There is a nonspecific finding that  can be seen with pyelonephritis. No renal calculus or obstructive  uropathy. Both ureters are normal in course and caliber to the urinary  bladder. There is a Mejía catheter within the lumen of the bladder,  there is bladder wall thickening. Vaginal cuff typical post  hysterectomy.     Trace free fluid in the pelvic cul-de-sac.     Diverticulosis of the sigmoid colon without overt diverticulitis. Small  bowel is normal. Diameter of the aorta is within normal limits. Heavy  vascular arterial calcification. Stomach is collapsed, contour within  normal limits.     CONCLUSION:  1. Mejía catheter within a collapsed urinary bladder, there is  thickening. There is perinephric fat induration, in part seen on the  previous examination, nonspecific finding which can be seen with  pyelonephritis.  2. Left adrenal nodule slightly increased in size since 2016. Currently  13 mm.  3. Porcelain gallbladder filled with gallstones, no overt evidence to  suggest cholecystitis.  4 diverticulosis of the colon.        This report was finalized on 6/30/2021 11:12 AM by Dr. Aidan Naranjo M.D.       Scheduled Medications  amLODIPine, 5 mg, Oral, Q24H  calcitriol, 0.25 mcg, Oral, Once per day on Mon Thu  carvedilol, 12.5 mg, Oral, BID With Meals  castor oil-balsam peru, , Topical, Q12H  epoetin hermes/hermes-epbx, 5,000 Units, Subcutaneous, Once per day on Mon Wed Fri  [START ON 7/6/2021] levothyroxine, 50 mcg, Oral, Q AM  lidocaine-prilocaine, , Topical, Once  miconazole, 1 application, Topical, Q12H  pantoprazole, 40 mg, Oral, Q  AM  polyethylene glycol, 1 bottle, Oral, Once  sertraline, 50 mg, Oral, Daily  sodium chloride, 10 mL, Intravenous, Q12H    Infusions  sodium chloride, 30 mL/hr, Last Rate: 30 mL/hr (07/01/21 1124)  sodium chloride, 30 mL/hr, Last Rate: 30 mL/hr (07/04/21 0838)    Diet  Diet Regular; Renal       Assessment/Plan     Active Hospital Problems    Diagnosis  POA   • **Anemia, posthemorrhagic, acute [D62]  Unknown   • ESRD (end stage renal disease) (CMS/[facility]) [N18.6]  Unknown   • Anemia, chronic disease [D63.8]  Unknown   • Systolic CHF, chronic (CMS/[facility]) [I50.22]  Yes   • History of CVA (cerebrovascular accident) [Z86.73]  Not Applicable   • Gastrointestinal hemorrhage with melena [K92.1]  Unknown   • Hypothyroidism (acquired) [E03.9]  Yes   • S/P CABG (coronary artery bypass graft) [Z95.1]  Not Applicable   • Hemorrhagic stroke (CMS/[facility]) [I61.9]  Yes   • Atrial fibrillation (CMS/[facility]) [I48.91]  Yes   • 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 pectoris (CMS/[facility]) [I25.118]  Yes   • Primary Parkinsonism (CMS/[facility]) [G20]  Yes   • DM type 2 with diabetic peripheral neuropathy (CMS/[facility]) [E11.42]  Yes   • Essential hypertension [I10]  Yes      Resolved Hospital Problems    Diagnosis Date Resolved POA   • Hypotension [I95.9] 07/02/2021 Yes       70 y.o. female admitted with Anemia, posthemorrhagic, acute.    Assessment and plan:  1.  Anemia, drop in hemoglobin noted.  Continue to recheck labs.  Currently not on anticoagulation.  GI on board.  Patient is status post colonoscopy.     2.  End-stage renal disease: Patient has associated metabolic acidosis.  Continue hemodialysis per nephrology recommendations.     3.  Sepsis due to UTI with septic shock.  Patient's blood pressure currently stable.     4.  Hyperglycemia noted, blood glucose noted to be less than 160.  Monitor blood glucose closely.     5.  Coronary artery disease, patient also has associated atrial fibrillation and systolic CHF.  Ongoing management  per cardiology.     6.  CODE STATUS is full code.  Further plans based on hospital course.      Christiano Rubalcava MD  [facility]  07/05/21  10:34 EDT

## 2021-07-05 NOTE — PLAN OF CARE
Goal Outcome Evaluation:  Plan of Care Reviewed With: patient           Outcome Summary: Pt is a 70 y.o. female admitted on 6/29 with septic shock, dehydration, metabolic encephalopathy, and UTI. PMH includes CAD, DM, CKD5. Pt presents to PT with impaired mobility and reduced activity tolerance. Pt performed bed mobility with Minx1, transfers with Minx1 and RWx, and took some sidesteps towards the HOB with Modx1 and use of RWx. Needs verbal/tactile cuing throughout for hand placement and to facilitate proper mechanics for transfers and stepping while using the RWx. In standing she demonstrates a strong posterior lean, which improves with cuing and as she stands for longer periods of time. Overall she was able to stand three total times, for about 30-60 seconds each time. Pt may benefit from continuing skilled PT to improve her activity tolerance, strength, and independence with functional mobility, especially since she has not been doing much for the past several days. PT anticipates SNF upon d/c. Patient was wearing a face mask during this therapy encounter. Therapist used appropriate personal protective equipment including eye protection, mask, and gloves.  Mask used was standard procedure mask. Appropriate PPE was worn during the entire therapy session. Hand hygiene was completed before and after therapy session. Patient is not in enhanced droplet precautions.

## 2021-07-05 NOTE — PLAN OF CARE
Goal Outcome Evaluation:           Progress: improving  Outcome Summary: Patient alert and oriented. Forgetful at times. Dialysis done today. Vital signs are stable. Patient denies pain. On room air. No s/s of acute distress. Will continue to monitor.

## 2021-07-05 NOTE — PLAN OF CARE
Goal Outcome Evaluation:               Pt. Alert, oriented x4, no voiced c/o pain, with rash to perineal area and groin area remained, cream applied as ordered at this shift, sleeping in bed quietly during the night time, no s/s acute distress noted

## 2021-07-05 NOTE — PROGRESS NOTES
Gastroenterology   Inpatient Progress Note    Reason for Follow Up: GI bleed with melena    Subjective     Interval History:   Patient working with PT this morning. Denies any melena or hematochezia. EGD and colonoscopy negative for GI bleeding source. Patient denies any nausea or vomiting. Tolerating diet.       Current Facility-Administered Medications:   •  aluminum-magnesium hydroxide-simethicone (MAALOX MAX) 400-400-40 MG/5ML suspension 15 mL, 15 mL, Oral, Q6H PRN, Purvi Grant MD  •  amLODIPine (NORVASC) tablet 5 mg, 5 mg, Oral, Q24H, Purvi Grant MD, 5 mg at 07/04/21 1140  •  calcitriol (ROCALTROL) capsule 0.25 mcg, 0.25 mcg, Oral, Once per day on Mon Thu, Brijesh Randolph MD  •  calcium carbonate (TUMS) chewable tablet 500 mg (200 mg elemental), 4 tablet, Oral, TID PRN, Purvi Grant MD  •  carvedilol (COREG) tablet 12.5 mg, 12.5 mg, Oral, BID With Meals, Purvi Grant MD, 12.5 mg at 07/04/21 1725  •  castor oil-balsam peru (VENELEX) ointment, , Topical, Q12H, Purvi Grant MD, 5 g at 07/05/21 0914  •  dextrose (D50W) 25 g/ 50mL Intravenous Solution 25 g, 25 g, Intravenous, Q15 Min PRN, Purvi Grant MD, 25 g at 07/01/21 0329  •  dextrose (GLUTOSE) oral gel 15 g, 15 g, Oral, Q15 Min PRN, Purvi Grant MD  •  epoetin hermes-epbx (RETACRIT) injection 5,000 Units, 5,000 Units, Subcutaneous, Once per day on Mon Wed Fri, Purvi Grant MD, 5,000 Units at 06/30/21 1439  •  glucagon (human recombinant) (GLUCAGEN DIAGNOSTIC) injection 1 mg, 1 mg, Subcutaneous, Q15 Min PRN, Purvi Grant MD, 1 mg at 07/04/21 0627  •  heparin (porcine) injection 2,000 Units, 2,000 Units, Intravenous, PRN, Purvi Grant MD  •  HYDROmorphone (DILAUDID) injection 0.5 mg, 0.5 mg, Intravenous, Q4H PRN, Purvi Grant MD, 0.5 mg at 06/30/21 4561  •  ipratropium-albuterol (DUO-NEB) nebulizer solution 3 mL, 3 mL, Nebulization, Q6H PRN, Purvi Grant MD  •  labetalol  (NORMODYNE,TRANDATE) injection 20 mg, 20 mg, Intravenous, Q10 Min PRN, Purvi Grant MD  •  levothyroxine (SYNTHROID, LEVOTHROID) tablet 50 mcg, 50 mcg, Oral, Daily, Purvi Grant MD, 50 mcg at 07/04/21 1139  •  lidocaine-prilocaine (EMLA) 2.5-2.5 % cream, , Topical, Once, Purvi Grant MD  •  miconazole (MICOTIN) 2 % cream 1 application, 1 application, Topical, Q12H, Purvi Grant MD, 1 application at 07/05/21 0915  •  ondansetron (ZOFRAN) injection 4 mg, 4 mg, Intravenous, Q6H PRN, Purvi Grant MD, 4 mg at 06/30/21 0819  •  pantoprazole (PROTONIX) EC tablet 40 mg, 40 mg, Oral, Q AM, Purvi Grant MD, 40 mg at 07/05/21 0605  •  polyethylene glycol (MIRALAX) powder 1 bottle, 1 bottle, Oral, Once, Purvi Grant MD  •  potassium chloride (K-DUR,KLOR-CON) ER tablet 40 mEq, 40 mEq, Oral, PRN, Christiano Rubalcava MD, 40 mEq at 07/05/21 0000  •  potassium chloride (KLOR-CON) packet 40 mEq, 40 mEq, Oral, PRN, Christiano Rubalcava MD  •  sertraline (ZOLOFT) tablet 50 mg, 50 mg, Oral, Daily, Purvi Grant MD, 50 mg at 07/04/21 1140  •  [COMPLETED] Insert peripheral IV, , , Once **AND** sodium chloride 0.9 % flush 10 mL, 10 mL, Intravenous, PRN, Purvi Grant MD  •  sodium chloride 0.9 % flush 10 mL, 10 mL, Intravenous, Q12H, Purvi Grant MD, 10 mL at 07/05/21 0915  •  sodium chloride 0.9 % flush 10 mL, 10 mL, Intravenous, PRN, Purvi Grant MD  •  sodium chloride 0.9 % infusion, 30 mL/hr, Intravenous, Continuous PRN, Purvi Grant MD, Last Rate: 30 mL/hr at 07/01/21 1124, Restarted at 07/04/21 0939  •  sodium chloride 0.9 % infusion, 30 mL/hr, Intravenous, Continuous PRN, Purvi Grant MD, Last Rate: 30 mL/hr at 07/04/21 0838, 30 mL/hr at 07/04/21 0838  Review of Systems:    The following systems were reviewed and negative;  gastrointestinal    Objective     Vital Signs  Temp:  [97.5 °F (36.4 °C)-99 °F (37.2 °C)] 99 °F (37.2 °C)  Heart Rate:  [55-93] 93  Resp:   [13-16] 16  BP: ()/(26-96) 115/96  Body mass index is 29.35 kg/m².    Intake/Output Summary (Last 24 hours) at 7/5/2021 0922  Last data filed at 7/5/2021 0500  Gross per 24 hour   Intake 200 ml   Output 150 ml   Net 50 ml     No intake/output data recorded.     Physical Exam:   General: patient awake, alert and cooperative   Eyes: no scleral icterus   Skin: warm and dry, not jaundiced   Abdomen: soft, very mild diffuse abdominal tenderness, nondistended; normal bowel sounds, no masses palpated, no periumbical lymphadenopathy   Psychiatric: Appropriate affect and behavior     Results Review:     I reviewed the patient's new clinical results.  I reviewed the patient's new imaging results and agree with the interpretation.  I reviewed the patient's other test results and agree with the interpretation    Results from last 7 days   Lab Units 07/05/21 0524 07/04/21 0658 07/03/21  0650   WBC 10*3/mm3 10.45 11.79* 10.79   HEMOGLOBIN g/dL 9.9* 10.4* 9.2*   HEMATOCRIT % 30.7* 33.6* 29.3*   PLATELETS 10*3/mm3 140 96* 107*     Results from last 7 days   Lab Units 07/05/21 0524 07/04/21  0658 07/03/21  0650 06/29/21  0121   SODIUM mmol/L 136 139 140  137 139   POTASSIUM mmol/L 4.9 2.9* 3.6  3.4* 5.4*   CHLORIDE mmol/L 106 104 104  103 103   CO2 mmol/L 19.4* 22.4 19.9*  22.3 16.9*   BUN mg/dL 28* 23 46*  47* 129*   CREATININE mg/dL 4.63* 3.68* 6.14*  5.26* 9.28*   CALCIUM mg/dL 7.6* 7.8* 7.1*  7.1* 6.4*   BILIRUBIN mg/dL  --   --  0.3 0.2   ALK PHOS U/L  --   --  54 60   ALT (SGPT) U/L  --   --  10 11   AST (SGOT) U/L  --   --  20 10   GLUCOSE mg/dL 66 91 64*  67 133*     Results from last 7 days   Lab Units 07/04/21  0658 07/03/21  1826 07/02/21  0243   INR  1.35* 1.20* 1.41*     Lab Results   Lab Value Date/Time    LIPASE 14 01/03/2016 0339       Radiology:  CT Abdomen Pelvis Without Contrast   Final Result      CT Head Without Contrast   Final Result   No acute intracranial findings.       Radiation dose  reduction techniques were utilized, including automated   exposure control and exposure modulation based on body size.       This report was finalized on 6/29/2021 2:18 AM by Dr. Rebecca Camacho M.D.          XR Chest 1 View   Final Result   Cardiomegaly with vascular congestion.       This report was finalized on 6/29/2021 2:15 AM by Dr. Rebecca Camacho M.D.              Assessment/Plan     Patient Active Problem List   Diagnosis   • Menstrual disorder   • Atopic rhinitis   • Anemia in CKD (chronic kidney disease)   • Spasm of cervical paraspinous muscle   • Cervical radiculopathy   • Chronic kidney disease, stage IV (severe) (CMS/[facility])   • Depression   • DM type 2 with diabetic peripheral neuropathy (CMS/[facility])   • Essential hypertension   • Duplay's periarthritis syndrome   • Gastroesophageal reflux disease   • Hyperlipidemia   • Hypertension   • Arthritis   • Seizure disorder (CMS/[facility])   • Phlebitis   • Type 2 diabetes mellitus (CMS/[facility])   • Vitamin D deficiency   • Chest pain   • Abscess   • Generalized muscle weakness   • Abdominal wall cellulitis   • HTN (hypertension)   • CKD (chronic kidney disease) stage 4, GFR 15-29 ml/min (CMS/[facility])   • Diabetes mellitus with peripheral autonomic neuropathy (CMS/[facility])   • Hyponatremia   • Hypercalcemia   • Dehydration   • DACIA (acute kidney injury) (CMS/[facility])   • Abdominal wall abscess   • Cellulitis of toe of left foot   • Partial Achilles tendon tear, left, initial encounter   • Arthritis of foot   • Essential tremor   • Primary Parkinsonism (CMS/[facility])   • Abnormal cardiac function test   • 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 pectoris (CMS/[facility])   • Atrial fibrillation (CMS/[facility])   • Anticoagulated   • CKD (chronic kidney disease) stage 5, GFR less than 15 ml/min (CMS/[facility])   • Hypoglycemia   • Low vitamin B12 level   • Hemorrhagic stroke (CMS/[facility])   • S/P CABG (coronary artery bypass graft)   • Acute pulmonary edema (CMS/[facility])   • Hypothyroidism  (acquired)   • Gastrointestinal hemorrhage with melena   • Systolic CHF, chronic (CMS/[facility])   • History of CVA (cerebrovascular accident)   • ESRD (end stage renal disease) (CMS/[facility])   • Anemia, chronic disease   • Anemia, posthemorrhagic, acute       Assessment:  1. GI bleed with melena-EGD and colonoscopy negative for GI bleeding source  2. Normocytic anemia with heme positive stool  3. Acute on chronic kidney disease  4. History of CAD status post CABG  5. Atrial fibrillation, may restart anticoagulation therapy      Plan:  · Patient's hemoglobin dropped from 10.4-9.9 overnight. No overt bleeding noted. We will plan to repeat CBC in a.m. for ongoing monitoring. If patient is noted to have further decrease in her hemoglobin, we'll plan for tagged red scan for further evaluation.      I discussed the patients findings and my recommendations with patient.    IRENA Vides APRN  [facility] [place] [id number]  Office: [phone]

## 2021-07-05 NOTE — THERAPY EVALUATION
Patient Name: Maribeth Rendon  : 1950    MRN: [id number]                              Today's Date: 2021       Admit Date: 2021    Visit Dx:     ICD-10-CM ICD-9-CM   1. Hypotension, unspecified hypotension type  I95.9 458.9   2. Urinary tract infection without hematuria, site unspecified  N39.0 599.0   3. Chronic kidney disease, unspecified CKD stage  N18.9 585.9   4. Melena  K92.1 578.1   5. Anemia, posthemorrhagic, acute  D62 285.1   6. Gastrointestinal hemorrhage with melena  K92.1 578.1     Patient Active Problem List   Diagnosis   • Menstrual disorder   • Atopic rhinitis   • Anemia in CKD (chronic kidney disease)   • Spasm of cervical paraspinous muscle   • Cervical radiculopathy   • Chronic kidney disease, stage IV (severe) (CMS/[facility])   • Depression   • DM type 2 with diabetic peripheral neuropathy (CMS/[facility])   • Essential hypertension   • Duplay's periarthritis syndrome   • Gastroesophageal reflux disease   • Hyperlipidemia   • Hypertension   • Arthritis   • Seizure disorder (CMS/[facility])   • Phlebitis   • Type 2 diabetes mellitus (CMS/[facility])   • Vitamin D deficiency   • Chest pain   • Abscess   • Generalized muscle weakness   • Abdominal wall cellulitis   • HTN (hypertension)   • CKD (chronic kidney disease) stage 4, GFR 15-29 ml/min (CMS/[facility])   • Diabetes mellitus with peripheral autonomic neuropathy (CMS/[facility])   • Hyponatremia   • Hypercalcemia   • Dehydration   • DACIA (acute kidney injury) (CMS/[facility])   • Abdominal wall abscess   • Cellulitis of toe of left foot   • Partial Achilles tendon tear, left, initial encounter   • Arthritis of foot   • Essential tremor   • Primary Parkinsonism (CMS/[facility])   • Abnormal cardiac function test   • 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 pectoris (CMS/[facility])   • Atrial fibrillation (CMS/[facility])   • Anticoagulated   • CKD (chronic kidney disease) stage 5, GFR less than 15 ml/min (CMS/[facility])   • Hypoglycemia   • Low vitamin B12 level   • Hemorrhagic stroke  (CMS/[facility])   • S/P CABG (coronary artery bypass graft)   • Acute pulmonary edema (CMS/[facility])   • Hypothyroidism (acquired)   • Gastrointestinal hemorrhage with melena   • Systolic CHF, chronic (CMS/[facility])   • History of CVA (cerebrovascular accident)   • ESRD (end stage renal disease) (CMS/[facility])   • Anemia, chronic disease   • Anemia, posthemorrhagic, acute     Past Medical History:   Diagnosis Date   • Achilles tendon tear     left    • Anemia    • Anxiety    • Depression    • Diabetes mellitus, type 2 (CMS/[facility])    • ESRD (end stage renal disease) (CMS/[facility])     HAS LEFT FOREARM FISTULA   • GERD (gastroesophageal reflux disease)    • History of MRSA infection 03/15/2016    ABDOMINAL WOUND,   INFECTION CONTROLL NOTIFIED 2-6-2017   • History of transfusion    • Hyperlipidemia    • Hypertension    • Hypokalemia    • Hypomagnesemia    • Hypoxic 01/2016    WHEN SHE HAD PNEUMONIA   • Intertrigo    • Leukocytosis    • Osteoarthritis    • Pneumonia 01/2016   • Psoriasis    • Psoriatic arthritis (CMS/[facility])    • Seizures (CMS/HCC)     one time   • Sepsis (CMS/HCC) 01/2016   • SOB (shortness of breath)    • Stage 4 chronic renal impairment associated with type 2 diabetes mellitus (CMS/[facility])    • Staph infection     HX RIGHT FOOT AT [facility] 01/09/2010   • Streptococcal bacteremia    • Stroke (cerebrum) (CMS/[facility])    • Stroke (CMS/[facility])    • Swelling of hand 10/27/2016    LEFT HAND SWELLIMG SINCE LEFT FISTULA SURGERY   • Tremor, essential    • Wears glasses    • Wheelchair dependent     For mobility     Past Surgical History:   Procedure Laterality Date   • ABDOMINAL WALL ABSCESS INCISION AND DRAINAGE  2016   • ARTERIOVENOUS FISTULA/SHUNT SURGERY Left 10/27/2016    Procedure: LT FOREARM FISTULA;  Surgeon: Lorelei Haque Jr., MD;  Location: [facility];  Service:    • ARTERIOVENOUS FISTULA/SHUNT SURGERY Left 2/16/2017    Procedure: LT BRACHIAL CEPHALIC WITH FISTULA LIGATION RADIAL CEPHALIC.;  Surgeon: Gurmeet Carver MD;   Location: [facility] MAIN OR;  Service:    • CARDIAC CATHETERIZATION N/A 7/26/2019    Procedure: Left Heart Cath;  Surgeon: Eddie Hodges MD;  Location: [facility] CATH INVASIVE LOCATION;  Service: Cardiology   • CARDIAC CATHETERIZATION N/A 7/26/2019    Procedure: Coronary angiography;  Surgeon: Eddie Hodges MD;  Location: [facility] CATH INVASIVE LOCATION;  Service: Cardiology   • CARDIAC SURGERY     • CATARACT EXTRACTION W/ INTRAOCULAR LENS IMPLANT Bilateral 2011   • CORONARY ARTERY BYPASS GRAFT N/A 7/29/2019    Procedure: INTRAOP ERNESTO; CORONARY ARTERY BYPASS GRAFTING X 4 WITH LEFT INTERNAL MAMMARY ARTERY GRAFT AND UTILIZING ENDOSCOPICALLY HARVESTED GREATER SAPHENOUS VEIN; PRP;  Surgeon: Gordo Ferreira MD;  Location: [facility] OR;  Service: Cardiothoracic   • CORONARY ARTERY BYPASS GRAFT     • DILATATION AND CURETTAGE  1991   • ENDOSCOPY N/A 7/1/2021    Procedure: ESOPHAGOGASTRODUODENOSCOPY WITH BX'S;  Surgeon: Azam Kat MD;  Location: [facility] ENDOSCOPY;  Service: Gastroenterology;  Laterality: N/A;  pre: ANEMIA, MELENA  post: ESOPHAGITIS, GASTRITIS, BILE REFLUX, BUT NO OBVIOUS SOURCE OF BLEEDING   • EXCISION MASS TRUNK N/A 3/22/2016    Procedure: I&D LOWER ABDOMINAL  WOUND;  Surgeon: Tru Yi MD;  Location: [facility];  Service:    • FOOT SURGERY Right 2010    REMOVED BONE IN RIGHT GREAT TOE   • HYSTERECTOMY  1992     General Information     Row Name 07/05/21 0912          Physical Therapy Time and Intention    Document Type  evaluation  -DO     Mode of Treatment  physical therapy  -DO     Row Name 07/05/21 0912          General Information    Patient Profile Reviewed  yes  -DO     Prior Level of Function  independent:;all household mobility with Rwx.  -DO     Existing Precautions/Restrictions  fall residual R-sided weakness from previous CVA  -DO     Barriers to Rehab  none identified  -DO     Row Name 07/05/21 0912          Living Environment    Lives With  spouse  -DO      Row Name 07/05/21 0912          Home Main Entrance    Number of Stairs, Main Entrance  none has ramp to enter home  -DO     Row Name 07/05/21 0912          Stairs Within Home, Primary    Number of Stairs, Within Home, Primary  none  -DO     [facility] Name 07/05/21 0912          Cognition    Orientation Status (Cognition)  oriented x 3  -DO     Row Name 07/05/21 0912          Safety Issues, Functional Mobility    Impairments Affecting Function (Mobility)  balance;endurance/activity tolerance;strength  -DO       User Key  (r) = Recorded By, (t) = Taken By, (c) = Cosigned By    Initials Name Provider Type    DO Oliverio Neff PT Physical Therapist        Mobility     Row Name 07/05/21 0913          Bed Mobility    Bed Mobility  supine-sit;sit-supine  -DO     Supine-Sit [place] (Bed Mobility)  minimum assist (75% patient effort);verbal cues;nonverbal cues (demo/gesture)  -DO     Sit-Supine [place] (Bed Mobility)  moderate assist (50% patient effort)  -DO     Assistive Device (Bed Mobility)  draw sheet  -DO     [facility] Name 07/05/21 0913          Sit-Stand Transfer    Sit-Stand [place] (Transfers)  minimum assist (75% patient effort);verbal cues;nonverbal cues (demo/gesture)  -DO     Assistive Device (Sit-Stand Transfers)  walker, front-wheeled  -DO     [facility] Name 07/05/21 0913          Gait/Stairs (Locomotion)    [place] Level (Gait)  moderate assist (50% patient effort);nonverbal cues (demo/gesture);verbal cues  -DO     Assistive Device (Gait)  walker, front-wheeled  -DO     Distance in Feet (Gait)  2 lateral steps towards HOB  -DO     [place] Level (Stairs)  not tested  -DO       User Key  (r) = Recorded By, (t) = Taken By, (c) = Cosigned By    Initials Name Provider Type    DO Oliverio Neff PT Physical Therapist        Obj/Interventions     Row Name 07/05/21 0914          Range of Motion Comprehensive    General Range of Motion  no range of motion deficits identified  -DO     Row Name 07/05/21  0914          Strength Comprehensive (MMT)    General Manual Muscle Testing (MMT) Assessment  -- strength deficits RUE/RLE secondary to previous CVA. Grossly 4/5  -DO     Row Name 07/05/21 0914          Motor Skills    Therapeutic Exercise  hip;knee;ankle seated marching, LAQs, and ankle pumps x10 reps BLE  -DO     Row Name 07/05/21 0914          Sensory Assessment (Somatosensory)    Sensory Assessment (Somatosensory)  sensation intact  -DO       User Key  (r) = Recorded By, (t) = Taken By, (c) = Cosigned By    Initials Name Provider Type    DO Oliverio Neff, PT Physical Therapist        Goals/Plan     Row Name 07/05/21 0918          Bed Mobility Goal 1 (PT)    Activity/Assistive Device (Bed Mobility Goal 1, PT)  bed mobility activities, all  -DO     [place] Level/Cues Needed (Bed Mobility Goal 1, PT)  standby assist  -DO     Time Frame (Bed Mobility Goal 1, PT)  long term goal (LTG);1 week  -DO     Progress/Outcomes (Bed Mobility Goal 1, PT)  goal ongoing  -DO     Row Name 07/05/21 0918          Transfer Goal 1 (PT)    Activity/Assistive Device (Transfer Goal 1, PT)  transfers, all  -DO     [place] Level/Cues Needed (Transfer Goal 1, PT)  standby assist  -DO     Time Frame (Transfer Goal 1, PT)  long term goal (LTG);1 week  -DO     Progress/Outcome (Transfer Goal 1, PT)  goal ongoing  -DO     Row Name 07/05/21 0918          Gait Training Goal 1 (PT)    Activity/Assistive Device (Gait Training Goal 1, PT)  gait (walking locomotion)  -DO     [place] Level (Gait Training Goal 1, PT)  contact guard assist  -DO     Distance (Gait Training Goal 1, PT)  50  -DO     Time Frame (Gait Training Goal 1, PT)  long term goal (LTG);1 week  -DO     Progress/Outcome (Gait Training Goal 1, PT)  goal ongoing  -DO       User Key  (r) = Recorded By, (t) = Taken By, (c) = Cosigned By    Initials Name Provider Type    DO Oliverio Neff, PT Physical Therapist        Clinical Impression     Row Name 07/05/21 0915           Pain Scale: Numbers Pre/Post-Treatment    Pretreatment Pain Rating  0/10 - no pain  -DO     Posttreatment Pain Rating  0/10 - no pain  -DO     Row Name 07/05/21 0915          Plan of Care Review    Plan of Care Reviewed With  patient  -DO     Outcome Summary  Pt is a 70 y.o. female admitted on 6/29 with septic shock, dehydration, metabolic encephalopathy, and UTI. PMH includes CAD, DM, CKD5. Pt presents to PT with impaired mobility and reduced activity tolerance. Pt performed bed mobility with Minx1, transfers with Minx1 and RWx, and took some sidesteps towards the HOB with Modx1 and use of RWx. Needs verbal/tactile cuing throughout for hand placement and to facilitate proper mechanics for transfers and stepping while using the RWx. In standing she demonstrates a strong posterior lean, which improves with cuing and as she stands for longer periods of time. Overall she was able to stand three total times, for about 30-60 seconds each time. Pt may benefit from continuing skilled PT to improve her activity tolerance, strength, and independence with functional mobility, especially since she has not been doing much for the past several days. PT anticipates SNF upon d/c.  -DO     Row Name 07/05/21 0915          Therapy Assessment/Plan (PT)    Rehab Potential (PT)  good, to achieve stated therapy goals  -DO     Criteria for Skilled Interventions Met (PT)  yes  -DO     Row Name 07/05/21 0915          Positioning and Restraints    Pre-Treatment Position  in bed  -DO     Post Treatment Position  bed  -DO     In Bed  supine;call light within reach;encouraged to call for assist;exit alarm on;with nsg;RUE elevated  -DO       User Key  (r) = Recorded By, (t) = Taken By, (c) = Cosigned By    Initials Name Provider Type    DO Oliverio Neff, PT Physical Therapist        Outcome Measures     Row Name 07/05/21 0919          How much help from another person do you currently need...    Turning from your back to your side while in  flat bed without using bedrails?  3  -DO     Moving from lying on back to sitting on the side of a flat bed without bedrails?  3  -DO     Moving to and from a bed to a chair (including a wheelchair)?  2  -DO     Standing up from a chair using your arms (e.g., wheelchair, bedside chair)?  3  -DO     Climbing 3-5 steps with a railing?  1  -DO     To walk in hospital room?  2  -DO     AM-PAC 6 Clicks Score (PT)  14  -DO     Row Name 07/05/21 0919          Functional Assessment    Outcome Measure Options  AM-PAC 6 Clicks Basic Mobility (PT)  -DO       User Key  (r) = Recorded By, (t) = Taken By, (c) = Cosigned By    Initials Name Provider Type    DO Oliverio Neff PT Physical Therapist        Physical Therapy Education                 Title: PT OT SLP Therapies (Done)     Topic: Physical Therapy (Done)     Point: Mobility training (Done)     Learning Progress Summary           Patient Acceptance, E, VU,DU by DO at 7/5/2021 0919                   Point: Home exercise program (Done)     Learning Progress Summary           Patient Acceptance, E, VU,DU by DO at 7/5/2021 0919                   Point: Body mechanics (Done)     Learning Progress Summary           Patient Acceptance, E, VU,DU by DO at 7/5/2021 0919                   Point: Precautions (Done)     Learning Progress Summary           Patient Acceptance, E, VU,DU by DO at 7/5/2021 0919                               User Key     Initials Effective Dates Name Provider Type Discipline    DO 03/07/18 -  Oliverio Neff, PT Physical Therapist PT              PT Recommendation and Plan  Planned Therapy Interventions (PT): balance training, bed mobility training, gait training, strengthening, transfer training  Plan of Care Reviewed With: patient  Outcome Summary: Pt is a 70 y.o. female admitted on 6/29 with septic shock, dehydration, metabolic encephalopathy, and UTI. PMH includes CAD, DM, CKD5. Pt presents to PT with impaired mobility and reduced activity tolerance.  Pt performed bed mobility with Minx1, transfers with Minx1 and RWx, and took some sidesteps towards the HOB with Modx1 and use of RWx. Needs verbal/tactile cuing throughout for hand placement and to facilitate proper mechanics for transfers and stepping while using the RWx. In standing she demonstrates a strong posterior lean, which improves with cuing and as she stands for longer periods of time. Overall she was able to stand three total times, for about 30-60 seconds each time. Pt may benefit from continuing skilled PT to improve her activity tolerance, strength, and independence with functional mobility, especially since she has not been doing much for the past several days. PT anticipates SNF upon d/c.     Time Calculation:   PT Charges     Row Name 07/05/21 0920             Time Calculation    Start Time  0849  -DO      Stop Time  0909  -DO      Time Calculation (min)  20 min  -DO      PT Received On  07/05/21  -DO      PT - Next Appointment  07/06/21  -DO      PT Goal Re-Cert Due Date  07/12/21  -DO        User Key  (r) = Recorded By, (t) = Taken By, (c) = Cosigned By    Initials Name Provider Type    DO Oliverio Neff, PT Physical Therapist        Therapy Charges for Today     Code Description Service Date Service Provider Modifiers Qty    49092838046 HC PT EVAL MOD COMPLEXITY 2 7/5/2021 Oliverio Neff, PT GP 1    23986261065 HC PT THER PROC EA 15 MIN 7/5/2021 Oliverio Neff, PT GP 1          PT G-Codes  Outcome Measure Options: AM-PAC 6 Clicks Basic Mobility (PT)  AM-PAC 6 Clicks Score (PT): 14    Oliverio Neff PT  7/5/2021

## 2021-07-06 LAB
ALBUMIN SERPL-MCNC: 2.5 G/DL (ref 3.5–5.2)
ANION GAP SERPL CALCULATED.3IONS-SCNC: 7.7 MMOL/L (ref 5–15)
BASOPHILS # BLD AUTO: 0.05 10*3/MM3 (ref 0–0.2)
BASOPHILS NFR BLD AUTO: 0.5 % (ref 0–1.5)
BUN SERPL-MCNC: 15 MG/DL (ref 8–23)
BUN/CREAT SERPL: 4.8 (ref 7–25)
CALCIUM SPEC-SCNC: 7.9 MG/DL (ref 8.6–10.5)
CHLORIDE SERPL-SCNC: 100 MMOL/L (ref 98–107)
CO2 SERPL-SCNC: 26.3 MMOL/L (ref 22–29)
CREAT SERPL-MCNC: 3.14 MG/DL (ref 0.57–1)
DEPRECATED RDW RBC AUTO: 49 FL (ref 37–54)
EOSINOPHIL # BLD AUTO: 0.4 10*3/MM3 (ref 0–0.4)
EOSINOPHIL NFR BLD AUTO: 3.9 % (ref 0.3–6.2)
ERYTHROCYTE [DISTWIDTH] IN BLOOD BY AUTOMATED COUNT: 15 % (ref 12.3–15.4)
GFR SERPL CREATININE-BSD FRML MDRD: 15 ML/MIN/1.73
GLUCOSE BLDC GLUCOMTR-MCNC: 178 MG/DL (ref 70–130)
GLUCOSE BLDC GLUCOMTR-MCNC: 182 MG/DL (ref 70–130)
GLUCOSE BLDC GLUCOMTR-MCNC: 233 MG/DL (ref 70–130)
GLUCOSE BLDC GLUCOMTR-MCNC: 89 MG/DL (ref 70–130)
GLUCOSE SERPL-MCNC: 79 MG/DL (ref 65–99)
HBV SURFACE AG SERPL QL IA: NORMAL
HCT VFR BLD AUTO: 30.4 % (ref 34–46.6)
HGB BLD-MCNC: 9.7 G/DL (ref 12–15.9)
LYMPHOCYTES # BLD AUTO: 1.2 10*3/MM3 (ref 0.7–3.1)
LYMPHOCYTES NFR BLD AUTO: 11.7 % (ref 19.6–45.3)
MCH RBC QN AUTO: 29 PG (ref 26.6–33)
MCHC RBC AUTO-ENTMCNC: 31.9 G/DL (ref 31.5–35.7)
MCV RBC AUTO: 91 FL (ref 79–97)
MONOCYTES # BLD AUTO: 1.07 10*3/MM3 (ref 0.1–0.9)
MONOCYTES NFR BLD AUTO: 10.4 % (ref 5–12)
NEUTROPHILS NFR BLD AUTO: 7.42 10*3/MM3 (ref 1.7–7)
NEUTROPHILS NFR BLD AUTO: 72.5 % (ref 42.7–76)
PHOSPHATE SERPL-MCNC: 2.2 MG/DL (ref 2.5–4.5)
PHOSPHATE SERPL-MCNC: 2.3 MG/DL (ref 2.5–4.5)
PLATELET # BLD AUTO: 133 10*3/MM3 (ref 140–450)
PMV BLD AUTO: 10.3 FL (ref 6–12)
POTASSIUM SERPL-SCNC: 4.1 MMOL/L (ref 3.5–5.2)
RBC # BLD AUTO: 3.34 10*6/MM3 (ref 3.77–5.28)
SODIUM SERPL-SCNC: 134 MMOL/L (ref 136–145)
WBC # BLD AUTO: 10.24 10*3/MM3 (ref 3.4–10.8)

## 2021-07-06 PROCEDURE — 99221 1ST HOSP IP/OBS SF/LOW 40: CPT | Performed by: NURSE PRACTITIONER

## 2021-07-06 PROCEDURE — 82962 GLUCOSE BLOOD TEST: CPT

## 2021-07-06 PROCEDURE — 87340 HEPATITIS B SURFACE AG IA: CPT | Performed by: INTERNAL MEDICINE

## 2021-07-06 PROCEDURE — 97110 THERAPEUTIC EXERCISES: CPT

## 2021-07-06 PROCEDURE — 85025 COMPLETE CBC W/AUTO DIFF WBC: CPT | Performed by: NURSE PRACTITIONER

## 2021-07-06 PROCEDURE — 99232 SBSQ HOSP IP/OBS MODERATE 35: CPT | Performed by: INTERNAL MEDICINE

## 2021-07-06 PROCEDURE — 84100 ASSAY OF PHOSPHORUS: CPT | Performed by: INTERNAL MEDICINE

## 2021-07-06 PROCEDURE — 80069 RENAL FUNCTION PANEL: CPT | Performed by: INTERNAL MEDICINE

## 2021-07-06 PROCEDURE — 99232 SBSQ HOSP IP/OBS MODERATE 35: CPT | Performed by: NURSE PRACTITIONER

## 2021-07-06 RX ORDER — NITROGLYCERIN 0.4 MG/1
0.4 TABLET SUBLINGUAL
Status: DISCONTINUED | OUTPATIENT
Start: 2021-07-06 | End: 2021-07-08 | Stop reason: HOSPADM

## 2021-07-06 RX ADMIN — CASTOR OIL AND BALSAM, PERU 5 G: 788; 87 OINTMENT TOPICAL at 22:28

## 2021-07-06 RX ADMIN — MICONAZOLE NITRATE 1 APPLICATION: 2 CREAM TOPICAL at 08:37

## 2021-07-06 RX ADMIN — SODIUM CHLORIDE, PRESERVATIVE FREE 10 ML: 5 INJECTION INTRAVENOUS at 08:37

## 2021-07-06 RX ADMIN — LEVOTHYROXINE SODIUM 50 MCG: 0.05 TABLET ORAL at 06:27

## 2021-07-06 RX ADMIN — SODIUM CHLORIDE, PRESERVATIVE FREE 10 ML: 5 INJECTION INTRAVENOUS at 22:28

## 2021-07-06 RX ADMIN — CARVEDILOL 12.5 MG: 12.5 TABLET, FILM COATED ORAL at 08:37

## 2021-07-06 RX ADMIN — MICONAZOLE NITRATE 1 APPLICATION: 2 CREAM TOPICAL at 22:28

## 2021-07-06 RX ADMIN — CARVEDILOL 12.5 MG: 12.5 TABLET, FILM COATED ORAL at 16:53

## 2021-07-06 RX ADMIN — SERTRALINE HYDROCHLORIDE 50 MG: 50 TABLET, FILM COATED ORAL at 08:37

## 2021-07-06 RX ADMIN — CASTOR OIL AND BALSAM, PERU 5 G: 788; 87 OINTMENT TOPICAL at 08:37

## 2021-07-06 RX ADMIN — PANTOPRAZOLE SODIUM 40 MG: 40 TABLET, DELAYED RELEASE ORAL at 06:27

## 2021-07-06 RX ADMIN — AMLODIPINE BESYLATE 5 MG: 5 TABLET ORAL at 08:37

## 2021-07-06 NOTE — CASE MANAGEMENT/SOCIAL WORK
Continued Stay Note  [facility]     Patient Name: Maribeth Rendon  MRN: [id number]  Today's Date: 7/6/2021    Admit Date: 6/29/2021    Discharge Plan     Row Name 07/06/21 1049       Plan    Plan  Plan home with VNA HH and Outpatient HD at [facility] [place] on [facility].   JOHNATHAN Enriquez RN    Patient/Family in Agreement with Plan  yes    Plan Comments  Spoke to pt at bedside.  Pt states she is unsure if she wants to continue dialysis.  Informed pt she speak with her nephrologist regarding benefits and risks of discontinuing dialysis.   Informed pt CCP would return when spouse was present.  Spoke with pt and pt's (Tru Rendon [phone]) at bedside with recommendation of SNF.  Pt's spouse states pt will not go to a SNF.  Pt and spouse state plan is home with VNA HH.   Plan home with VNA HH and Outpatient HD at [facility] [place] on [facility].   JOHNATHAN Enriquez RN        Discharge Codes    No documentation.             Maribeth Enriquez, RN

## 2021-07-06 NOTE — PROGRESS NOTES
Gastroenterology   Inpatient Progress Note    Reason for Follow Up:  GI bleed with melena    Subjective  Interval History:    at bedside.    Hemoglobin stable.    Recent EGD and colonoscopy with no evidence of bleeding source.  Patient denies nausea or vomiting.  Tolerating diet.  Appetite remains less than normal but has improved during hospitalization    Current Facility-Administered Medications:   •  aluminum-magnesium hydroxide-simethicone (MAALOX MAX) 400-400-40 MG/5ML suspension 15 mL, 15 mL, Oral, Q6H PRN, Purvi Grant MD  •  amLODIPine (NORVASC) tablet 5 mg, 5 mg, Oral, Q24H, Purvi Grant MD, 5 mg at 07/06/21 0837  •  calcitriol (ROCALTROL) capsule 0.25 mcg, 0.25 mcg, Oral, Once per day on Mon Thu, Brijesh Randolph MD, 0.25 mcg at 07/05/21 1814  •  calcium carbonate (TUMS) chewable tablet 500 mg (200 mg elemental), 4 tablet, Oral, TID PRN, Purvi Grant MD  •  carvedilol (COREG) tablet 12.5 mg, 12.5 mg, Oral, BID With Meals, Purvi Grant MD, 12.5 mg at 07/06/21 0837  •  castor oil-balsam peru (VENELEX) ointment, , Topical, Q12H, Purvi Grant MD, 5 g at 07/06/21 0837  •  dextrose (D50W) 25 g/ 50mL Intravenous Solution 25 g, 25 g, Intravenous, Q15 Min PRN, Purvi Grant MD, 25 g at 07/01/21 0329  •  dextrose (GLUTOSE) oral gel 15 g, 15 g, Oral, Q15 Min PRN, Purvi Grant MD  •  epoetin hermes-epbx (RETACRIT) injection 5,000 Units, 5,000 Units, Subcutaneous, Once per day on Mon Wed Fri, Purvi Grant MD, 5,000 Units at 07/05/21 1018  •  glucagon (human recombinant) (GLUCAGEN DIAGNOSTIC) injection 1 mg, 1 mg, Subcutaneous, Q15 Min PRN, Purvi Grant MD, 1 mg at 07/04/21 0627  •  heparin (porcine) injection 2,000 Units, 2,000 Units, Intravenous, PRN, Purvi Grant MD  •  HYDROmorphone (DILAUDID) injection 0.5 mg, 0.5 mg, Intravenous, Q4H PRN, Purvi Grant MD, 0.5 mg at 06/30/21 2633  •  ipratropium-albuterol (DUO-NEB) nebulizer solution 3 mL, 3  mL, Nebulization, Q6H PRN, Purvi Grant MD  •  labetalol (NORMODYNE,TRANDATE) injection 20 mg, 20 mg, Intravenous, Q10 Min PRN, Purvi Grant MD  •  levothyroxine (SYNTHROID, LEVOTHROID) tablet 50 mcg, 50 mcg, Oral, Q AM, Christiano Rubalcava MD, 50 mcg at 07/06/21 0627  •  lidocaine-prilocaine (EMLA) 2.5-2.5 % cream, , Topical, Once, Purvi Grant MD  •  miconazole (MICOTIN) 2 % cream 1 application, 1 application, Topical, Q12H, Purvi Grant MD, 1 application at 07/06/21 0837  •  ondansetron (ZOFRAN) injection 4 mg, 4 mg, Intravenous, Q6H PRN, Purvi Grant MD, 4 mg at 06/30/21 0819  •  pantoprazole (PROTONIX) EC tablet 40 mg, 40 mg, Oral, Q AM, Purvi Grant MD, 40 mg at 07/06/21 0627  •  polyethylene glycol (MIRALAX) powder 1 bottle, 1 bottle, Oral, Once, Purvi Grant MD  •  potassium chloride (K-DUR,KLOR-CON) ER tablet 40 mEq, 40 mEq, Oral, PRN, Christiano Rubalcava MD, 40 mEq at 07/05/21 0000  •  potassium chloride (KLOR-CON) packet 40 mEq, 40 mEq, Oral, PRN, Christiano Rubalcava MD  •  sertraline (ZOLOFT) tablet 50 mg, 50 mg, Oral, Daily, Purvi Grant MD, 50 mg at 07/06/21 0837  •  [COMPLETED] Insert peripheral IV, , , Once **AND** sodium chloride 0.9 % flush 10 mL, 10 mL, Intravenous, PRN, Purvi Grant MD  •  sodium chloride 0.9 % flush 10 mL, 10 mL, Intravenous, Q12H, Purvi Grant MD, 10 mL at 07/06/21 0837  •  sodium chloride 0.9 % flush 10 mL, 10 mL, Intravenous, PRJuanita GUILLORY Lauren C., MD  •  sodium chloride 0.9 % infusion, 30 mL/hr, Intravenous, Continuous PRJuanita GUILLORY Lauren C., MD, Last Rate: 30 mL/hr at 07/01/21 1124, Restarted at 07/04/21 0939  •  sodium chloride 0.9 % infusion, 30 mL/hr, Intravenous, Continuous PRJuanita GUILLORY Lauren C., MD, Last Rate: 30 mL/hr at 07/04/21 0838, 30 mL/hr at 07/04/21 0838  Review of Systems:               The following systems were reviewed and negative;  gastrointestinal    Objective     Vital Signs  Temp:  [97.7 °F (36.5  °C)-99.7 °F (37.6 °C)] 98.5 °F (36.9 °C)  Heart Rate:  [] 89  Resp:  [16] 16  BP: ()/(43-66) 138/61  Body mass index is 29.5 kg/m².                  Physical Exam:              General: patient awake, alert and cooperative              Eyes: no scleral icterus              Skin: warm and dry, not jaundiced              Abdomen: soft, nontender, nondistended; normal bowel sounds              Psychiatric: Appropriate affect and behavior                Results Review:                I reviewed the patient's new clinical results.    Results from last 7 days   Lab Units 07/06/21 0652 07/05/21 0524 07/04/21  0658   WBC 10*3/mm3 10.24 10.45 11.79*   HEMOGLOBIN g/dL 9.7* 9.9* 10.4*   HEMATOCRIT % 30.4* 30.7* 33.6*   PLATELETS 10*3/mm3 133* 140 96*     Results from last 7 days   Lab Units 07/06/21 0652 07/05/21  0524 07/04/21  0658 07/03/21  0650   SODIUM mmol/L 134* 136 139 140  137   POTASSIUM mmol/L 4.1 4.9 2.9* 3.6  3.4*   CHLORIDE mmol/L 100 106 104 104  103   CO2 mmol/L 26.3 19.4* 22.4 19.9*  22.3   BUN mg/dL 15 28* 23 46*  47*   CREATININE mg/dL 3.14* 4.63* 3.68* 6.14*  5.26*   CALCIUM mg/dL 7.9* 7.6* 7.8* 7.1*  7.1*   BILIRUBIN mg/dL  --   --   --  0.3   ALK PHOS U/L  --   --   --  54   ALT (SGPT) U/L  --   --   --  10   AST (SGOT) U/L  --   --   --  20   GLUCOSE mg/dL 79 66 91 64*  67     Results from last 7 days   Lab Units 07/04/21 0658 07/03/21  1826 07/02/21  0243   INR  1.35* 1.20* 1.41*       Assessment/Plan     Patient Active Problem List   Diagnosis   • Menstrual disorder   • Atopic rhinitis   • Anemia in CKD (chronic kidney disease)   • Spasm of cervical paraspinous muscle   • Cervical radiculopathy   • Chronic kidney disease, stage IV (severe) (CMS/HCC)   • Depression   • DM type 2 with diabetic peripheral neuropathy (CMS/HCC)   • Essential hypertension   • Duplay's periarthritis syndrome   • Gastroesophageal reflux disease   • Hyperlipidemia   • Hypertension   • Arthritis   •  Seizure disorder (CMS/[facility])   • Phlebitis   • Type 2 diabetes mellitus (CMS/[facility])   • Vitamin D deficiency   • Chest pain   • Abscess   • Generalized muscle weakness   • Abdominal wall cellulitis   • HTN (hypertension)   • CKD (chronic kidney disease) stage 4, GFR 15-29 ml/min (CMS/HCC)   • Diabetes mellitus with peripheral autonomic neuropathy (CMS/[facility])   • Hyponatremia   • Hypercalcemia   • Dehydration   • DACIA (acute kidney injury) (CMS/[facility])   • Abdominal wall abscess   • Cellulitis of toe of left foot   • Partial Achilles tendon tear, left, initial encounter   • Arthritis of foot   • Essential tremor   • Primary Parkinsonism (CMS/[facility])   • Abnormal cardiac function test   • 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 pectoris (CMS/[facility])   • Atrial fibrillation (CMS/[facility])   • Anticoagulated   • CKD (chronic kidney disease) stage 5, GFR less than 15 ml/min (CMS/[facility])   • Hypoglycemia   • Low vitamin B12 level   • Hemorrhagic stroke (CMS/[facility])   • S/P CABG (coronary artery bypass graft)   • Acute pulmonary edema (CMS/[facility])   • Hypothyroidism (acquired)   • Gastrointestinal hemorrhage with melena   • Systolic CHF, chronic (CMS/[facility])   • History of CVA (cerebrovascular accident)   • ESRD (end stage renal disease) (CMS/[facility])   • Anemia, chronic disease   • Anemia, posthemorrhagic, acute       Assessment:  1. Bleed with melena, EGD and colonoscopy negative with no evidence of source noted  2. Normocytic anemia with heme positive stool  3. Acute on chronic kidney disease  4. Atrial fibrillation  5. History of coronary artery disease status post CABG    These problems are new to me  Plan:  · Hemoglobin stable, no overt bleeding noted.  Given stability, no plans for tagged red scan at this time.  Discussed with patient and .  ·   · GI will sign off at this time but will be available if patient has change in clinical status or may be of any further assistance.    I discussed the patients findings and my  recommendations with patient, family and nursing staff.           Alejandra OSBORN  [facility] [place] [address]  Office: [phone]

## 2021-07-06 NOTE — CONSULTS
Palliative Care Initial Consult   Attending Physician: Omega Richards,*  Referring Provider: Perry Alvarado    Reason for Referral: assistance with clarification of goals of care  Family/Support: Spouse and daughters (4)     Code Status and Medical Interventions:   Ordered at: 06/29/21 0645     Code Status:    CPR     Medical Interventions (Level of Support Prior to Arrest):    Full     Goals of Care: TBD.    HPI:   70 y.o. female with past medical history significant for end stage renal disease, congestive systolic heart failure, atrial fibrillation, siezure disorder, coronary artery disease s/p CABG, diabetes mellitus type II, hypertension,  parkinson's disease, hypothyroidism, h/o CVA, chronic anemia secondary to renal disease and depression.   Patient presented to [facility] on 6/29/2021 related to altered mental status and reduced oral intake via EMS. She was initially admitted to ICU for septic shock secondary to UTI, acute metabolic encephalopathy, dehydration, acute kidney injury on chronic kidney disease stage 4, and hypothermia. Since admission her encephalopathy has cleared. She has required dialysis  (M-W-F). BUN/creatinine this am 15/3.14. eGFR 15. Hgb/Hct 9.7/30.4; WBC 10.24.   Palliative Care Spoke With: patient and family  Quality of life declining  Functional Status declining  Due to the Palliative Care Topics Discussed: palliative care, goals of care, care options, resuscitation status, [facility] and discharge options we will establish an advance care plan.   Advance Care Planning   Advance Care Planning Discussion: Patient has a living will and I have requested a copy for viewing.       Review of Systems   Constitutional: Positive for decreased appetite (improved since admission). Negative for malaise/fatigue.   Cardiovascular: Negative for chest pain, leg swelling and palpitations.   Respiratory: Negative for cough and shortness of breath.    Gastrointestinal: Negative  for abdominal pain and nausea.   Psychiatric/Behavioral: Positive for depression. The patient is nervous/anxious.        1- Pain Assessment  Nonverbal Indicators of Pain: nonverbal indicators absent  Pain Location: abdomen    Past Medical History:   Diagnosis Date   • Achilles tendon tear     left    • Anemia    • Anxiety    • Depression    • Diabetes mellitus, type 2 (CMS/[facility])    • ESRD (end stage renal disease) (CMS/[facility])     HAS LEFT FOREARM FISTULA   • GERD (gastroesophageal reflux disease)    • History of MRSA infection 03/15/2016    ABDOMINAL WOUND,   INFECTION CONTROLL NOTIFIED 2-6-2017   • History of transfusion    • Hyperlipidemia    • Hypertension    • Hypokalemia    • Hypomagnesemia    • Hypoxic 01/2016    WHEN SHE HAD PNEUMONIA   • Intertrigo    • Leukocytosis    • Osteoarthritis    • Pneumonia 01/2016   • Psoriasis    • Psoriatic arthritis (CMS/[facility])    • Seizures (CMS/[facility])     one time   • Sepsis (CMS/[facility]) 01/2016   • SOB (shortness of breath)    • Stage 4 chronic renal impairment associated with type 2 diabetes mellitus (CMS/[facility])    • Staph infection     HX RIGHT FOOT AT [facility] 01/09/2010   • Streptococcal bacteremia    • Stroke (cerebrum) (CMS/[facility])    • Stroke (CMS/[facility])    • Swelling of hand 10/27/2016    LEFT HAND SWELLIMG SINCE LEFT FISTULA SURGERY   • Tremor, essential    • Wears glasses    • Wheelchair dependent     For mobility     Past Surgical History:   Procedure Laterality Date   • ABDOMINAL WALL ABSCESS INCISION AND DRAINAGE  2016   • ARTERIOVENOUS FISTULA/SHUNT SURGERY Left 10/27/2016    Procedure: LT FOREARM FISTULA;  Surgeon: Lorelei Haque Jr., MD;  Location: [facility]  Service:    • ARTERIOVENOUS FISTULA/SHUNT SURGERY Left 2/16/2017    Procedure: LT BRACHIAL CEPHALIC WITH FISTULA LIGATION RADIAL CEPHALIC.;  Surgeon: Gurmeet Carver MD;  Location: [facility] OR;  Service:    • CARDIAC CATHETERIZATION N/A 7/26/2019    Procedure: Left Heart Cath;  Surgeon: Carroll  MD Eddie;  Location: [facility] CATH INVASIVE LOCATION;  Service: Cardiology   • CARDIAC CATHETERIZATION N/A 2019    Procedure: Coronary angiography;  Surgeon: Eddei Hodges MD;  Location: [facility] CATH INVASIVE LOCATION;  Service: Cardiology   • CARDIAC SURGERY     • CATARACT EXTRACTION W/ INTRAOCULAR LENS IMPLANT Bilateral    • COLONOSCOPY N/A 2021    Procedure: COLONOSCOPY into cecum/terminal ileum;  Surgeon: Purvi Grant MD;  Location: [facility] ENDOSCOPY;  Service: Gastroenterology;  Laterality: N/A;  hemorrhoids, diverticulosis   • CORONARY ARTERY BYPASS GRAFT N/A 2019    Procedure: INTRAOP RENESTO; CORONARY ARTERY BYPASS GRAFTING X 4 WITH LEFT INTERNAL MAMMARY ARTERY GRAFT AND UTILIZING ENDOSCOPICALLY HARVESTED GREATER SAPHENOUS VEIN; PRP;  Surgeon: Gordo Ferreira MD;  Location: [facility] OR;  Service: Cardiothoracic   • CORONARY ARTERY BYPASS GRAFT     • DILATATION AND CURETTAGE     • ENDOSCOPY N/A 2021    Procedure: ESOPHAGOGASTRODUODENOSCOPY WITH BX'S;  Surgeon: Azam Kat MD;  Location: [facility] ENDOSCOPY;  Service: Gastroenterology;  Laterality: N/A;  pre: ANEMIA, MELENA  post: ESOPHAGITIS, GASTRITIS, BILE REFLUX, BUT NO OBVIOUS SOURCE OF BLEEDING   • EXCISION MASS TRUNK N/A 3/22/2016    Procedure: I&D LOWER ABDOMINAL  WOUND;  Surgeon: Tru Yi MD;  Location: [facility] OR;  Service:    • FOOT SURGERY Right     REMOVED BONE IN RIGHT GREAT TOE   • HYSTERECTOMY       Social History     Socioeconomic History   • Marital status:      Spouse name: Tru   • Number of children: 4   • Years of education: 11   • Highest education level: 11th grade   Tobacco Use   • Smoking status: Former Smoker     Packs/day: 2.00     Years: 26.00     Pack years: 52.00     Types: Cigarettes     Quit date: 10/21/1992     Years since quittin.7   • Smokeless tobacco: Never Used   • Tobacco comment: quit    Vaping Use   • Vaping Use: Never used    Substance and Sexual Activity   • Alcohol use: Not Currently     Alcohol/week: 0.0 standard drinks   • Drug use: No   • Sexual activity: Defer     Birth control/protection: Surgical       Current Facility-Administered Medications   Medication Dose Route Frequency Provider Last Rate Last Admin   • aluminum-magnesium hydroxide-simethicone (MAALOX MAX) 400-400-40 MG/5ML suspension 15 mL  15 mL Oral Q6H PRN Purvi Grant MD       • amLODIPine (NORVASC) tablet 5 mg  5 mg Oral Q24H Purvi Grant MD   5 mg at 07/06/21 0837   • calcitriol (ROCALTROL) capsule 0.25 mcg  0.25 mcg Oral Once per day on Mon Thu Brijesh Randolph MD   0.25 mcg at 07/05/21 1814   • calcium carbonate (TUMS) chewable tablet 500 mg (200 mg elemental)  4 tablet Oral TID PRN Purvi Grant MD       • carvedilol (COREG) tablet 12.5 mg  12.5 mg Oral BID With Meals Purvi Grant MD   12.5 mg at 07/06/21 0837   • castor oil-balsam peru (VENELEX) ointment   Topical Q12H Purvi Grant MD   5 g at 07/06/21 0837   • dextrose (D50W) 25 g/ 50mL Intravenous Solution 25 g  25 g Intravenous Q15 Min PRN Purvi Grant MD   25 g at 07/01/21 0329   • dextrose (GLUTOSE) oral gel 15 g  15 g Oral Q15 Min PRN Purvi Grant MD       • epoetin hermes-epbx (RETACRIT) injection 5,000 Units  5,000 Units Subcutaneous Once per day on Mon Wed Fri Purvi Grant MD   5,000 Units at 07/05/21 1018   • glucagon (human recombinant) (GLUCAGEN DIAGNOSTIC) injection 1 mg  1 mg Subcutaneous Q15 Min PRN Purvi Grant MD   1 mg at 07/04/21 0627   • heparin (porcine) injection 2,000 Units  2,000 Units Intravenous PRN Purvi Grant MD       • HYDROmorphone (DILAUDID) injection 0.5 mg  0.5 mg Intravenous Q4H PRN Purvi Grant MD   0.5 mg at 06/30/21 6843   • ipratropium-albuterol (DUO-NEB) nebulizer solution 3 mL  3 mL Nebulization Q6H PRN Purvi Grant MD       • labetalol (NORMODYNE,TRANDATE) injection 20 mg  20 mg Intravenous Q10  Min PRN Purvi Grant MD       • levothyroxine (SYNTHROID, LEVOTHROID) tablet 50 mcg  50 mcg Oral Q AM Christiano Rubalcava MD   50 mcg at 07/06/21 0627   • lidocaine-prilocaine (EMLA) 2.5-2.5 % cream   Topical Once Purvi Grant MD       • miconazole (MICOTIN) 2 % cream 1 application  1 application Topical Q12H Purvi Grant MD   1 application at 07/06/21 0837   • ondansetron (ZOFRAN) injection 4 mg  4 mg Intravenous Q6H PRN Purvi Grant MD   4 mg at 06/30/21 0819   • pantoprazole (PROTONIX) EC tablet 40 mg  40 mg Oral Q AM Purvi Grant MD   40 mg at 07/06/21 0627   • polyethylene glycol (MIRALAX) powder 1 bottle  1 bottle Oral Once Purvi Grant MD       • potassium chloride (K-DUR,KLOR-CON) ER tablet 40 mEq  40 mEq Oral PRN Christiano Rubalcava MD   40 mEq at 07/05/21 0000   • potassium chloride (KLOR-CON) packet 40 mEq  40 mEq Oral PRN Christiano Rubalcava MD       • sertraline (ZOLOFT) tablet 50 mg  50 mg Oral Daily Purvi Grant MD   50 mg at 07/06/21 0837   • sodium chloride 0.9 % flush 10 mL  10 mL Intravenous PRN Purvi Grant MD       • sodium chloride 0.9 % flush 10 mL  10 mL Intravenous Q12H Purvi Grant MD   10 mL at 07/06/21 0837   • sodium chloride 0.9 % flush 10 mL  10 mL Intravenous PRN Purvi Grant MD       • sodium chloride 0.9 % infusion  30 mL/hr Intravenous Continuous PRN Purvi Grant MD 30 mL/hr at 07/01/21 1124 Restarted at 07/04/21 0939   • sodium chloride 0.9 % infusion  30 mL/hr Intravenous Continuous PRN Purvi Grant MD 30 mL/hr at 07/04/21 0838 30 mL/hr at 07/04/21 0838     sodium chloride, 30 mL/hr, Last Rate: 30 mL/hr (07/01/21 1124)  sodium chloride, 30 mL/hr, Last Rate: 30 mL/hr (07/04/21 0838)      •  aluminum-magnesium hydroxide-simethicone  •  calcium carbonate  •  dextrose  •  dextrose  •  glucagon (human recombinant)  •  heparin (porcine)  •  HYDROmorphone  •  ipratropium-albuterol  •  labetalol  •  ondansetron  •   "potassium chloride  •  potassium chloride  •  [COMPLETED] Insert peripheral IV **AND** sodium chloride  •  sodium chloride  •  sodium chloride  •  sodium chloride    Allergies   Allergen Reactions   • Prednisone Hallucinations     Current medication reviewed for route, type, dose and frequency and are current per MAR at time of dictation.      Intake/Output Summary (Last 24 hours) at 7/6/2021 1427  Last data filed at 7/6/2021 0100  Gross per 24 hour   Intake --   Output 2100 ml   Net -2100 ml       Physical Exam:    Diagnostics: Reviewed  /61 (BP Location: Right arm, Patient Position: Lying)   Pulse 89   Temp 98.5 °F (36.9 °C) (Oral)   Resp 16   Ht 165.1 cm (65\")   Wt 80.4 kg (177 lb 4 oz)   LMP  (LMP Unknown)   SpO2 97%   BMI 29.50 kg/m²     Constitutional:       General: Awake.      Appearance: Overweight. Chronically ill-appearing.      Comments: Mejía catheter with serena urine    Neck:      Vascular: No carotid bruit.   Cardiovascular:      PMI at left midclavicular line. Normal rate. Regular rhythm.      Comments: Fistula to left arm  Edema:     Peripheral edema absent.   Abdominal:      General: Bowel sounds are normal.      Palpations: Abdomen is soft.      Tenderness: There is no abdominal tenderness.   Skin:     Comments: Callus to left heel    Neurological:      Mental Status: Alert and oriented to person, place, and time.   Psychiatric:         Mood and Affect: Affect is tearful.         Behavior: Behavior is cooperative.         Thought Content: Thought content normal.         Cognition and Memory: Cognition and memory normal.         Judgment: Judgment normal.       Patient status: Disease state: Controlled with current treatments.  Functional status: Palliative Performance Scale Score: Performance 50% based on the following measures: Ambulation: Mainly sit or lie down, Activity and Evidence of Disease: Unable to do any work, extensive evidence of disease, Self-Care: Considerable " "assistance required,  Intake: Normal or reduced, LOC: Full or confusion   Nutritional status: albumin <= 3.2 g/dL. Body mass index is 29.5 kg/m².         Family support: The patient receives support from her  and children..  Advance Directives: Advance Directive Status: Patient has advance directive, copy requested   Advance Care Planning   ACP discussion was held with the patient during this visit. Patient has an advance directive (not in EMR), copy requested.   POA/Healthcare surrogate-spouse (Tru).    Impression/Problem List:    1. Septic Shock  2. UTI  3. End stage renal disease stage 5, current with dialysis  4. CAD s/p CABG  5. Acute on chronic anemia   6. Chronic systolic congestive heart failure  7. Seizure disorder.  8. Hypertension   9. Diabetes Mellitus type II  10. Parkinson's disease    Recommendations/Plan:  1. Provide support with goals of care.    2.  Palliative care encounter  - I met with both patient and spouse. They have a fair understanding of overall health. The patient is trying to decide whether or not she wants to continue with dialysis. She informed me that about two years ago when she had her CABG she required 2 sessions of dialysis and then her kidneys \"bounced back.\" Since then she has been getting labs monthly with procrit shots based on findings. She states to me \"I just want to go back to that.\" She also shares that it hurts and she doesn't want to live her life on a machine if she doesn't have too. She has talked with nephrologist today regarding pros and cons. We talked about dialysis and the importance of it and also the consequence of not proceeding with it-ultimately death. We discussed [facility] services and palliative care as well. They decline [facility] consult at this time. They have asked to have some time to talk things over. They want to talk to their four daughters as well. We did discuss CODE status and that this time no decisions were made. The patient does have " a living will which I have requested for review. Will continue to follow for support. I spoke with RN (Natalie) and Dr Randolph.        Thank you for this consult and allowing us to participate in patient's plan of care. Palliative Care Team will continue to follow patient.     Time spent:60 minutes spent reviewing medical and medication records, assessing and examining patient, discussing with family, answering questions, providing some guidance about a plan and documentation of care, and coordinating care with other healthcare members, with > 50% time spent face to face.       Anne Davidson, APRN  7/6/2021

## 2021-07-06 NOTE — CASE MANAGEMENT/SOCIAL WORK
Continued Stay Note  [facility]     Patient Name: Maribeth Rendon  MRN: [id number]  Today's Date: 7/6/2021    Admit Date: 6/29/2021    Discharge Plan     Row Name 07/06/21 1049       Plan    Plan  Plan home with [facility] HH and Outpatient HD at [facility] [place] on [facility].   JOHNATHAN Enriquez RN    Patient/Family in Agreement with Plan  yes    Plan Comments  Spoke to pt at bedside.  Pt states she is unsure if she wants to continue dialysis.  Informed pt she speak with her nephrologist regarding benefits and risks of discontinuing dialysis.   Informed pt CCP would return when spouse was present.  Spoke with pt and pt's (Tru Rendon [phone]) at bedside with recommendation of SNF.  Pt's spouse states pt will not go to a SNF.  Pt and spouse state plan is home with VNA HH.   Plan home with [facility] HH and Outpatient HD at [facility] [place] on [facility].   JOHNATHAN Enriquez RN        Discharge Codes    No documentation.             Maribeth Enriquez, RN

## 2021-07-06 NOTE — PROGRESS NOTES
"Adult Nutrition  Assessment/PES    Patient Name:  Maribeth Rendon  YOB: 1950  MRN: [id number]  Admit Date:  6/29/2021    Assessment Date:  7/6/2021    Comments:  Nutrition follow up.  S/p colonoscopy 7/4, negative for GI bleeding source.  No further melena.  Having BMs.  S/p HD yesterday.  No PO intake available.    Patient reports good appetite, says she is eating better.    RD to continue to follow.    Reason for Assessment     Row Name 07/06/21 1419          Reason for Assessment    Reason For Assessment  follow-up protocol         Nutrition/Diet History     Row Name 07/06/21 1419          Nutrition/Diet History    Typical Food/Fluid Intake  no PO intake available, pt reports philip better, eating well         Anthropometrics     Row Name 07/06/21 1420          Anthropometrics    Height  165.1 cm (65\")        Admit Weight    Admit Weight  -- 177# 7/6        Ideal Body Weight (IBW)    Ideal Body Weight (IBW) (kg)  57.29        Body Mass Index (BMI)    BMI Assessment  BMI 25-29.9: overweight 29.43         Labs/Tests/Procedures/Meds     Row Name 07/06/21 1420          Labs/Procedures/Meds    Lab Results Reviewed  reviewed, pertinent     Lab Results Comments  Gluc, Na, Creat, Ca, GFR, Alb, Phos, Hgb, Hct, Platelets        Diagnostic Tests/Procedures    Diagnostic Test/Procedure Reviewed  reviewed, pertinent     Diagnostic Test/Procedures Comments  s/p colonoscopy 7/4        Medications    Pertinent Medications Reviewed  reviewed, pertinent     Pertinent Medications Comments  synthroid, protonix, miralax         Physical Findings     Row Name 07/06/21 1421          Physical Findings    Overall Physical Appearance  overweight     Skin  other (see comments) B=16, bruised, excoriation, scab         Estimated/Assessed Needs     Row Name 07/06/21 1420          Calculation Measurements    Height  165.1 cm (65\")         Nutrition Prescription Ordered     Row Name 07/06/21 1422          Nutrition Prescription PO    " Current PO Diet  Regular     Common Modifiers  Renal         Evaluation of Received Nutrient/Fluid Intake     Row Name 07/06/21 1422          PO Evaluation    Number of Days PO Intake Evaluated  Insufficient Data         Problem/Interventions:    Problem 2     Row Name 07/06/21 1422          Nutrition Diagnoses Problem 2    Problem 2  Nutrition Appropriate for Condition at this Time     Etiology (related to)  Factors Affecting Nutrition     Appetite  Good;Fair;Improved     Signs/Symptoms (evidenced by)  Report/Observation     Reported/Observed By  Patient         Intervention Goal     Row Name 07/06/21 1422          Intervention Goal    General  Maintain nutrition;Disease management/therapy;Meet nutritional needs for age/condition     PO  Establish PO;Tolerate PO;PO intake (%)     PO Intake %  75 %     Weight  Maintain weight         Nutrition Intervention     Row Name 07/06/21 1422          Nutrition Intervention    RD/Tech Action  Follow Tx progress;Care plan reviewd;Encourage intake         Education/Evaluation     Row Name 07/06/21 1422          Education    Education  Will Instruct as appropriate        Monitor/Evaluation    Monitor  Per protocol;PO intake;Pertinent labs;Weight;Skin status;Symptoms           Electronically signed by:  Leilani Preciado RD  07/06/21 14:23 EDT

## 2021-07-06 NOTE — THERAPY TREATMENT NOTE
Patient Name: Maribeth Rendon  : 1950    MRN: [id number]                              Today's Date: 2021       Admit Date: 2021    Visit Dx:     ICD-10-CM ICD-9-CM   1. Hypotension, unspecified hypotension type  I95.9 458.9   2. Urinary tract infection without hematuria, site unspecified  N39.0 599.0   3. Chronic kidney disease, unspecified CKD stage  N18.9 585.9   4. Melena  K92.1 578.1   5. Anemia, posthemorrhagic, acute  D62 285.1   6. Gastrointestinal hemorrhage with melena  K92.1 578.1     Patient Active Problem List   Diagnosis   • Menstrual disorder   • Atopic rhinitis   • Anemia in CKD (chronic kidney disease)   • Spasm of cervical paraspinous muscle   • Cervical radiculopathy   • Chronic kidney disease, stage IV (severe) (CMS/[facility])   • Depression   • DM type 2 with diabetic peripheral neuropathy (CMS/[facility])   • Essential hypertension   • Duplay's periarthritis syndrome   • Gastroesophageal reflux disease   • Hyperlipidemia   • Hypertension   • Arthritis   • Seizure disorder (CMS/[facility])   • Phlebitis   • Type 2 diabetes mellitus (CMS/[facility])   • Vitamin D deficiency   • Chest pain   • Abscess   • Generalized muscle weakness   • Abdominal wall cellulitis   • HTN (hypertension)   • CKD (chronic kidney disease) stage 4, GFR 15-29 ml/min (CMS/[facility])   • Diabetes mellitus with peripheral autonomic neuropathy (CMS/[facility])   • Hyponatremia   • Hypercalcemia   • Dehydration   • DACIA (acute kidney injury) (CMS/[facility])   • Abdominal wall abscess   • Cellulitis of toe of left foot   • Partial Achilles tendon tear, left, initial encounter   • Arthritis of foot   • Essential tremor   • Primary Parkinsonism (CMS/[facility])   • Abnormal cardiac function test   • 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 pectoris (CMS/[facility])   • Atrial fibrillation (CMS/[facility])   • Anticoagulated   • CKD (chronic kidney disease) stage 5, GFR less than 15 ml/min (CMS/[facility])   • Hypoglycemia   • Low vitamin B12 level   • Hemorrhagic stroke  (CMS/[facility])   • S/P CABG (coronary artery bypass graft)   • Acute pulmonary edema (CMS/[facility])   • Hypothyroidism (acquired)   • Gastrointestinal hemorrhage with melena   • Systolic CHF, chronic (CMS/[facility])   • History of CVA (cerebrovascular accident)   • ESRD (end stage renal disease) (CMS/[facility])   • Anemia, chronic disease   • Anemia, posthemorrhagic, acute     Past Medical History:   Diagnosis Date   • Achilles tendon tear     left    • Anemia    • Anxiety    • Depression    • Diabetes mellitus, type 2 (CMS/[facility])    • ESRD (end stage renal disease) (CMS/[facility])     HAS LEFT FOREARM FISTULA   • GERD (gastroesophageal reflux disease)    • History of MRSA infection 03/15/2016    ABDOMINAL WOUND,   INFECTION CONTROLL NOTIFIED 2-6-2017   • History of transfusion    • Hyperlipidemia    • Hypertension    • Hypokalemia    • Hypomagnesemia    • Hypoxic 01/2016    WHEN SHE HAD PNEUMONIA   • Intertrigo    • Leukocytosis    • Osteoarthritis    • Pneumonia 01/2016   • Psoriasis    • Psoriatic arthritis (CMS/[facility])    • Seizures (CMS/HCC)     one time   • Sepsis (CMS/HCC) 01/2016   • SOB (shortness of breath)    • Stage 4 chronic renal impairment associated with type 2 diabetes mellitus (CMS/[facility])    • Staph infection     HX RIGHT FOOT AT [facility] 01/09/2010   • Streptococcal bacteremia    • Stroke (cerebrum) (CMS/[facility])    • Stroke (CMS/[facility])    • Swelling of hand 10/27/2016    LEFT HAND SWELLIMG SINCE LEFT FISTULA SURGERY   • Tremor, essential    • Wears glasses    • Wheelchair dependent     For mobility     Past Surgical History:   Procedure Laterality Date   • ABDOMINAL WALL ABSCESS INCISION AND DRAINAGE  2016   • ARTERIOVENOUS FISTULA/SHUNT SURGERY Left 10/27/2016    Procedure: LT FOREARM FISTULA;  Surgeon: Lorelei Haque Jr., MD;  Location: [facility];  Service:    • ARTERIOVENOUS FISTULA/SHUNT SURGERY Left 2/16/2017    Procedure: LT BRACHIAL CEPHALIC WITH FISTULA LIGATION RADIAL CEPHALIC.;  Surgeon: Gurmeet Carver MD;   Location: [facility] MAIN OR;  Service:    • CARDIAC CATHETERIZATION N/A 7/26/2019    Procedure: Left Heart Cath;  Surgeon: Eddie Hodges MD;  Location: [facility] CATH INVASIVE LOCATION;  Service: Cardiology   • CARDIAC CATHETERIZATION N/A 7/26/2019    Procedure: Coronary angiography;  Surgeon: Eddie Hodges MD;  Location: [facility] CATH INVASIVE LOCATION;  Service: Cardiology   • CARDIAC SURGERY     • CATARACT EXTRACTION W/ INTRAOCULAR LENS IMPLANT Bilateral 2011   • COLONOSCOPY N/A 7/4/2021    Procedure: COLONOSCOPY into cecum/terminal ileum;  Surgeon: Purvi Grant MD;  Location: [facility] ENDOSCOPY;  Service: Gastroenterology;  Laterality: N/A;  hemorrhoids, diverticulosis   • CORONARY ARTERY BYPASS GRAFT N/A 7/29/2019    Procedure: INTRAOP ERNESTO; CORONARY ARTERY BYPASS GRAFTING X 4 WITH LEFT INTERNAL MAMMARY ARTERY GRAFT AND UTILIZING ENDOSCOPICALLY HARVESTED GREATER SAPHENOUS VEIN; PRP;  Surgeon: Gordo Ferreira MD;  Location: [facility] OR;  Service: Cardiothoracic   • CORONARY ARTERY BYPASS GRAFT     • DILATATION AND CURETTAGE  1991   • ENDOSCOPY N/A 7/1/2021    Procedure: ESOPHAGOGASTRODUODENOSCOPY WITH BX'S;  Surgeon: Azam Kat MD;  Location: [facility] ENDOSCOPY;  Service: Gastroenterology;  Laterality: N/A;  pre: ANEMIA, MELENA  post: ESOPHAGITIS, GASTRITIS, BILE REFLUX, BUT NO OBVIOUS SOURCE OF BLEEDING   • EXCISION MASS TRUNK N/A 3/22/2016    Procedure: I&D LOWER ABDOMINAL  WOUND;  Surgeon: Tru Yi MD;  Location: [facility] MAIN OR;  Service:    • FOOT SURGERY Right 2010    REMOVED BONE IN RIGHT GREAT TOE   • HYSTERECTOMY  1992     General Information     Row Name 07/06/21 1439          Physical Therapy Time and Intention    Document Type  therapy note (daily note)  -MD     Mode of Treatment  physical therapy  -MD     Row Name 07/06/21 1439          General Information    Existing Precautions/Restrictions  fall  -MD     Row Name 07/06/21 1439          Cognition     Orientation Status (Cognition)  oriented x 3  -MD       User Key  (r) = Recorded By, (t) = Taken By, (c) = Cosigned By    Initials Name Provider Type    Risa Dumont, PT Physical Therapist        Mobility     Row Name 07/06/21 1534          Bed Mobility    Bed Mobility  supine-sit;sit-supine  -MD     Supine-Sit [place] (Bed Mobility)  verbal cues;moderate assist (50% patient effort)  -MD     Sit-Supine [place] (Bed Mobility)  verbal cues;minimum assist (75% patient effort);moderate assist (50% patient effort);2 person assist  -MD     Assistive Device (Bed Mobility)  bed rails;draw sheet;head of bed elevated  -MD     Row Name 07/06/21 1534          Sit-Stand Transfer    Sit-Stand [place] (Transfers)  verbal cues;minimum assist (75% patient effort);moderate assist (50% patient effort);2 person assist  -MD     Assistive Device (Sit-Stand Transfers)  walker, front-wheeled  -MD     Row Name 07/06/21 1534          Gait/Stairs (Locomotion)    [place] Level (Gait)  moderate assist (50% patient effort);nonverbal cues (demo/gesture);verbal cues  -MD     Assistive Device (Gait)  walker, front-wheeled  -MD     Distance in Feet (Gait)  5' laterally to HOB  -MD     Deviations/Abnormal Patterns (Gait)  festinating/shuffling;gait speed decreased  -MD     Bilateral Gait Deviations  knee buckling, right side;forward flexed posture  -MD       User Key  (r) = Recorded By, (t) = Taken By, (c) = Cosigned By    Initials Name Provider Type    Risa Dumont, PT Physical Therapist        Obj/Interventions    No documentation.       Goals/Plan    No documentation.       Clinical Impression     Row Name 07/06/21 1535          Pain Scale: Numbers Pre/Post-Treatment    Pretreatment Pain Rating  0/10 - no pain  -MD     Row Name 07/06/21 1535          Plan of Care Review    Outcome Summary  Pt able to take more steps this pm as compared to yesterday.  Pt continues to be limited w gait due to R LE weakness.  R knee tessa at  times w lateral weight shifting.  -MD     Row Name 07/06/21 1535          Positioning and Restraints    Pre-Treatment Position  in bed  -MD     Post Treatment Position  bed  -MD     In Bed  supine;call light within reach;exit alarm on;with family/caregiver  -MD       User Key  (r) = Recorded By, (t) = Taken By, (c) = Cosigned By    Initials Name Provider Type    Risa Dumont, PT Physical Therapist        Outcome Measures     Row Name 07/06/21 1537 07/06/21 1500       How much help from another person do you currently need...    Turning from your back to your side while in flat bed without using bedrails?  3  -MD  3  -LT    Moving from lying on back to sitting on the side of a flat bed without bedrails?  3  -MD  3  -LT    Moving to and from a bed to a chair (including a wheelchair)?  2  -MD  2  -LT    Standing up from a chair using your arms (e.g., wheelchair, bedside chair)?  2  -MD  3  -LT    Climbing 3-5 steps with a railing?  1  -MD  1  -LT    To walk in hospital room?  2  -MD  2  -LT    AM-PAC 6 Clicks Score (PT)  13  -MD  14  -LT    Row Name 07/06/21 1537          Functional Assessment    Outcome Measure Options  AM-PAC 6 Clicks Basic Mobility (PT)  -MD       User Key  (r) = Recorded By, (t) = Taken By, (c) = Cosigned By    Initials Name Provider Type    Risa Dumont, PT Physical Therapist    LT Natalie Connor RN Registered Nurse        Physical Therapy Education                 Title: PT OT SLP Therapies (Done)     Topic: Physical Therapy (Done)     Point: Mobility training (Done)     Learning Progress Summary           Patient Acceptance, E, VU,DU by DO at 7/5/2021 0919                   Point: Home exercise program (Done)     Learning Progress Summary           Patient Acceptance, E, VU,DU by DO at 7/5/2021 0919                   Point: Body mechanics (Done)     Learning Progress Summary           Patient Acceptance, E, VU,DU by DO at 7/5/2021 0919                   Point: Precautions (Done)     Learning  Progress Summary           Patient Acceptance, E, VU by MD at 7/6/2021 1537    Acceptance, E, VU,DU by DO at 7/5/2021 0919                               User Key     Initials Effective Dates Name Provider Type Discipline    MD 06/16/21 -  Risa Rangel, PT Physical Therapist PT    DO 03/07/18 -  Oliverio Neff PT Physical Therapist PT              PT Recommendation and Plan     Outcome Summary: Pt able to take more steps this pm as compared to yesterday.  Pt continues to be limited w gait due to R LE weakness.  R knee tessa at times w lateral weight shifting.     Time Calculation:   PT Charges     Row Name 07/06/21 1439             Time Calculation    Start Time  1439  -MD      Stop Time  1456  -MD      Time Calculation (min)  17 min  -MD      PT Received On  07/06/21  -MD      PT - Next Appointment  07/07/21  -MD        User Key  (r) = Recorded By, (t) = Taken By, (c) = Cosigned By    Initials Name Provider Type    Risa Dumont, PT Physical Therapist        Therapy Charges for Today     Code Description Service Date Service Provider Modifiers Qty    38020947303 HC PT THER PROC EA 15 MIN 7/6/2021 Risa Rangel, PT GP 1    59404345802 HC PT THER SUPP EA 15 MIN 7/6/2021 Risa Rangel, PT GP 1        Patient was not wearing a face mask during this therapy encounter. Therapist used appropriate personal protective equipment including mask and gloves.  Mask used was standard procedure mask. Appropriate PPE was worn during the entire therapy session. Hand hygiene was completed before and after therapy session. Patient is not in enhanced droplet precautions. Shiva Weaver was present throughout treatment.      PT G-Codes  Outcome Measure Options: AM-PAC 6 Clicks Basic Mobility (PT)  AM-PAC 6 Clicks Score (PT): 13    Risa Rangel PT  7/6/2021

## 2021-07-06 NOTE — PROGRESS NOTES
Name: Maribeth Rendon ADMIT: 2021   : 1950  PCP: Robby Chaney MD    MRN: [id number] LOS: 7 days   AGE/SEX: 70 y.o. female  ROOM: [facility]     Subjective   Subjective    Patient seen at bedside.        Objective   Objective   Vital Signs  Temp:  [97.7 °F (36.5 °C)-99.7 °F (37.6 °C)] 98.4 °F (36.9 °C)  Heart Rate:  [] 93  Resp:  [16] 16  BP: ()/(43-66) 121/64  SpO2:  [96 %-100 %] 96 %  on   ;   Device (Oxygen Therapy): room air  Body mass index is 29.5 kg/m².  Physical Exam  General, awake and alert.  Appears sick on chronic basis.  Head and ENT, normocephalic and atraumatic.  Lungs, symmetric expansion, equal air entry bilaterally.  Heart, regular rate and rhythm.  Abdomen, soft and nontender.  Extremities, no clubbing or cyanosis.  Neuro, no focal deficits.  Skin: Warm and no rash.  Psych, normal mood and affect.  Musculoskeletal, joint examination is grossly normal.    Results Review     I reviewed the patient's new clinical results.  Results from last 7 days   Lab Units 21  0621  0650   WBC 10*3/mm3 10.24 10.45 11.79* 10.79   HEMOGLOBIN g/dL 9.7* 9.9* 10.4* 9.2*   PLATELETS 10*3/mm3 133* 140 96* 107*     Results from last 7 days   Lab Units 21  0652 21  0658 21  0650   SODIUM mmol/L 134* 136 139 140  137   POTASSIUM mmol/L 4.1 4.9 2.9* 3.6  3.4*   CHLORIDE mmol/L 100 106 104 104  103   CO2 mmol/L 26.3 19.4* 22.4 19.9*  22.3   BUN mg/dL 15 28* 23 46*  47*   CREATININE mg/dL 3.14* 4.63* 3.68* 6.14*  5.26*   GLUCOSE mg/dL 79 66 91 64*  67   Estimated Creatinine Clearance: 17.5 mL/min (A) (by C-G formula based on SCr of 3.14 mg/dL (H)).  Results from last 7 days   Lab Units 21  0652 21  0650 21  0243   ALBUMIN g/dL 2.50* 2.30*  2.20* 2.70*   BILIRUBIN mg/dL  --  0.3  --    ALK PHOS U/L  --  54  --    AST (SGOT) U/L  --  20  --    ALT (SGPT) U/L  --  10  --      Results from last  7 days   Lab Units 07/06/21  0909 07/06/21  0652 07/05/21  0524 07/04/21  0658 07/03/21  0650 07/02/21  0243 07/01/21  0209 06/30/21  0352   CALCIUM mg/dL  --  7.9* 7.6* 7.8* 7.1*  7.1* 7.3* 7.0* 6.5*   ALBUMIN g/dL  --  2.50*  --   --  2.30*  2.20* 2.70*  --   --    MAGNESIUM mg/dL  --   --   --   --  1.5* 1.5* 1.6 1.5*   PHOSPHORUS mg/dL 2.3* 2.2*  --   --  4.7* 4.7* 8.1* 6.9*       COVID19   Date Value Ref Range Status   06/29/2021 Not Detected Not Detected - Ref. Range Final   05/06/2021 Not Detected Not Detected - Ref. Range Final     Glucose   Date/Time Value Ref Range Status   07/06/2021 1129 182 (H) 70 - 130 mg/dL Final     Comment:     Meter: [id number] : [id number] Sabine Wallace NA   07/06/2021 0633 89 70 - 130 mg/dL Final     Comment:     Meter: [id number] : [id number] Daviderodgernolan Lovelace S NA   07/05/2021 2103 139 (H) 70 - 130 mg/dL Final     Comment:     Meter: [id number] : [id number] Daviderodgernolan Lovelace S NA   07/05/2021 1812 105 70 - 130 mg/dL Final     Comment:     Meter: [id number] : [id number] Sabas RM [facility]   07/05/2021 1128 217 (H) 70 - 130 mg/dL Final     Comment:     Meter: [id number] : [id number] Gee Hdz NA   07/05/2021 0735 110 70 - 130 mg/dL Final     Comment:     Meter: [id number] : [id number] Ying Ochoa NA   07/05/2021 0600 71 70 - 130 mg/dL Final     Comment:     Meter: [id number] : [id number] Carolyn Larsona S NA       CT Abdomen Pelvis Without Contrast  CT ABDOMEN PELVIS WO CONTRAST-     Radiation dose reduction techniques were utilized, including automated  exposure control and exposure modulation based on body size.     Clinical: Sepsis, question UTI. Acute renal failure     COMPARISON CT scan of the abdomen and pelvis 3/18/2016     FINDINGS: There is a porcelain gallbladder which is filled with  gallstones. No gallbladder wall thickening or pericholecystic fluid  suggesting cholecystitis. No biliary duct dilatation.  The pancreas is  normal.     Spleen is normal in size and shape, the right adrenal gland is normal.  There is a small, 13 mm indeterminate left adrenal nodule which measured  8 mm on 2016.     There is bilateral perinephric fat stranding and some of this was  present on the previous examination. There is a nonspecific finding that  can be seen with pyelonephritis. No renal calculus or obstructive  uropathy. Both ureters are normal in course and caliber to the urinary  bladder. There is a Mejía catheter within the lumen of the bladder,  there is bladder wall thickening. Vaginal cuff typical post  hysterectomy.     Trace free fluid in the pelvic cul-de-sac.     Diverticulosis of the sigmoid colon without overt diverticulitis. Small  bowel is normal. Diameter of the aorta is within normal limits. Heavy  vascular arterial calcification. Stomach is collapsed, contour within  normal limits.     CONCLUSION:  1. Mejía catheter within a collapsed urinary bladder, there is  thickening. There is perinephric fat induration, in part seen on the  previous examination, nonspecific finding which can be seen with  pyelonephritis.  2. Left adrenal nodule slightly increased in size since 2016. Currently  13 mm.  3. Porcelain gallbladder filled with gallstones, no overt evidence to  suggest cholecystitis.  4 diverticulosis of the colon.        This report was finalized on 6/30/2021 11:12 AM by Dr. Aidan Naranjo M.D.       Scheduled Medications  amLODIPine, 5 mg, Oral, Q24H  calcitriol, 0.25 mcg, Oral, Once per day on Mon Thu  carvedilol, 12.5 mg, Oral, BID With Meals  castor oil-balsam peru, , Topical, Q12H  epoetin hermes/hermes-epbx, 5,000 Units, Subcutaneous, Once per day on Mon Wed Fri  levothyroxine, 50 mcg, Oral, Q AM  lidocaine-prilocaine, , Topical, Once  miconazole, 1 application, Topical, Q12H  pantoprazole, 40 mg, Oral, Q AM  polyethylene glycol, 1 bottle, Oral, Once  sertraline, 50 mg, Oral, Daily  sodium chloride, 10 mL,  Intravenous, Q12H    Infusions  sodium chloride, 30 mL/hr, Last Rate: 30 mL/hr (07/01/21 1124)  sodium chloride, 30 mL/hr, Last Rate: 30 mL/hr (07/04/21 0838)    Diet  Diet Regular; Renal       Assessment/Plan     Active Hospital Problems    Diagnosis  POA   • **Anemia, posthemorrhagic, acute [D62]  Unknown   • ESRD (end stage renal disease) (CMS/[facility]) [N18.6]  Unknown   • Anemia, chronic disease [D63.8]  Unknown   • Systolic CHF, chronic (CMS/[facility]) [I50.22]  Yes   • History of CVA (cerebrovascular accident) [Z86.73]  Not Applicable   • Gastrointestinal hemorrhage with melena [K92.1]  Unknown   • Hypothyroidism (acquired) [E03.9]  Yes   • S/P CABG (coronary artery bypass graft) [Z95.1]  Not Applicable   • Hemorrhagic stroke (CMS/[facility]) [I61.9]  Yes   • Atrial fibrillation (CMS/[facility]) [I48.91]  Yes   • 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 pectoris (CMS/[facility]) [I25.118]  Yes   • Primary Parkinsonism (CMS/[facility]) [G20]  Yes   • DM type 2 with diabetic peripheral neuropathy (CMS/[facility]) [E11.42]  Yes   • Essential hypertension [I10]  Yes      Resolved Hospital Problems    Diagnosis Date Resolved POA   • Hypotension [I95.9] 07/02/2021 Yes       70 y.o. female admitted with Anemia, posthemorrhagic, acute.    Assessment and plan:  1.  Anemia, drop in hemoglobin noted.  Continue to recheck labs.  Currently not on anticoagulation.  GI on board.  Patient is status post colonoscopy.     2.  End-stage renal disease: Patient has associated metabolic acidosis.  Continue hemodialysis per nephrology recommendations.     3.  Sepsis due to UTI with septic shock.  Patient's blood pressure currently stable.     4.  Hyperglycemia,  Monitor blood glucose closely.     5.  Coronary artery disease, patient also has associated atrial fibrillation and systolic CHF.  Ongoing management per cardiology.     6.  CODE STATUS is full code.  Further plans based on hospital course.  Palliative care evaluation to discuss goals of  care.    Christiano Rubalcava MD  [facility]  07/06/21  16:08 EDT

## 2021-07-06 NOTE — PLAN OF CARE
Goal Outcome Evaluation:              Outcome Summary: Pt able to take more steps this pm as compared to yesterday.  Pt continues to be limited w gait due to R LE weakness.  R knee tessa at times w lateral weight shifting.

## 2021-07-06 NOTE — PLAN OF CARE
Goal Outcome Evaluation:  Plan of Care Reviewed With: patient           Outcome Summary: pt alert and oriented, NSR, no temp this shift, scheduled for dialysis tomorrow, consulted palliative just for education . d/c'd F/C at 1500 DTV by 2100.  GI has signed off. pt deciding on if she wants to continue dialysis or not. will cont to monitor  Problem: Skin Injury Risk Increased  Goal: Skin Health and Integrity  Outcome: Ongoing, Progressing  Intervention: Optimize Skin Protection  Recent Flowsheet Documentation  Taken 7/6/2021 1415 by Natalie Connor, RN  Pressure Reduction Techniques: frequent weight shift encouraged  Pressure Reduction Devices: alternating pressure pump (ADD)  Skin Protection: adhesive use limited  Taken 7/6/2021 0838 by Natalie Connor, RN  Pressure Reduction Techniques: frequent weight shift encouraged  Pressure Reduction Devices: alternating pressure pump (ADD)  Skin Protection: adhesive use limited

## 2021-07-06 NOTE — PLAN OF CARE
Goal Outcome Evaluation:  Plan of Care Reviewed With: patient        Progress: no change  Outcome Summary: VSS with max temp 99.7, , NSR, no c/o pain, A&O but forgetful at times, rested well

## 2021-07-06 NOTE — PROGRESS NOTES
[facility] Progress Note      Patient Name: Maribeth Rendon  : 1950  MRN: [id number]  Primary Care Physician:  Robby Chaney MD  Date of admission: 2021    Subjective     Interval History:     Patient underwent hemodialysis yesterday without any complications with removal of 2 L of fluid remaining hemodynamically stable  Tolerating oral intake  Having regular bowel movements    Review of Systems:   As noted above    Objective     Vitals:   Temp:  [97.7 °F (36.5 °C)-99.7 °F (37.6 °C)] 98.5 °F (36.9 °C)  Heart Rate:  [] 89  Resp:  [16] 16  BP: ()/(43-66) 138/61    Intake/Output Summary (Last 24 hours) at 2021 0748  Last data filed at 2021 0100  Gross per 24 hour   Intake --   Output 2100 ml   Net -2100 ml       Physical Exam:      Constitutional: Pt alert  Overweight the BMI of 29  HEENT: Sclera anicteric, no conjunctival injection  Neck: Supple, no thyromegaly, no lymphadenopathy, trachea at midline, no JVD  Chest external wound in place, healed   Respiratory: Clear to auscultation bilaterally, nonlabored respiration  Cardiovascular: RRR, systolic ejection murmur  Gastrointestinal: Positive bowel sounds, abdomen is soft, nontender and nondistended  : No palpable bladder  Musculoskeletal: No edema, no clubbing or cyanosis.  Left upper extremity AV fistula in place  Psychiatric: Appropriate affect, cooperative  Neurologic: pt alert  moving all extremities, normal speech and mental status  Skin: Warm and dry        Scheduled Meds:     amLODIPine, 5 mg, Oral, Q24H  calcitriol, 0.25 mcg, Oral, Once per day on   carvedilol, 12.5 mg, Oral, BID With Meals  castor oil-balsam peru, , Topical, Q12H  epoetin hermes/hermes-epbx, 5,000 Units, Subcutaneous, Once per day on   levothyroxine, 50 mcg, Oral, Q AM  lidocaine-prilocaine, , Topical, Once  miconazole, 1 application, Topical, Q12H  pantoprazole, 40 mg, Oral, Q AM  polyethylene glycol, 1  bottle, Oral, Once  sertraline, 50 mg, Oral, Daily  sodium chloride, 10 mL, Intravenous, Q12H      IV Meds:   sodium chloride, 30 mL/hr, Last Rate: 30 mL/hr (07/01/21 1124)  sodium chloride, 30 mL/hr, Last Rate: 30 mL/hr (07/04/21 0838)        Results Reviewed:   I have personally reviewed the results from the time of local ice packs were placed admission to 7/6/2021 07:48 EDT     Results from last 7 days   Lab Units 07/06/21  0652 07/05/21 0524 07/04/21  0658   WBC 10*3/mm3 10.24 10.45 11.79*   HEMOGLOBIN g/dL 9.7* 9.9* 10.4*   HEMATOCRIT % 30.4* 30.7* 33.6*   PLATELETS 10*3/mm3 133* 140 96*         Results from last 7 days   Lab Units 07/05/21  0524 07/04/21  0658 07/03/21  0650   SODIUM mmol/L 136 139 140  137   POTASSIUM mmol/L 4.9 2.9* 3.6  3.4*   CHLORIDE mmol/L 106 104 104  103   CO2 mmol/L 19.4* 22.4 19.9*  22.3   BUN mg/dL 28* 23 46*  47*   CREATININE mg/dL 4.63* 3.68* 6.14*  5.26*   CALCIUM mg/dL 7.6* 7.8* 7.1*  7.1*   BILIRUBIN mg/dL  --   --  0.3   ALK PHOS U/L  --   --  54   ALT (SGPT) U/L  --   --  10   AST (SGOT) U/L  --   --  20   GLUCOSE mg/dL 66 91 64*  67       Estimated Creatinine Clearance: 11.9 mL/min (A) (by C-G formula based on SCr of 4.63 mg/dL (H)).    Results from last 7 days   Lab Units 07/03/21  0650 07/02/21  0243 07/01/21  0209   MAGNESIUM mg/dL 1.5* 1.5* 1.6   PHOSPHORUS mg/dL 4.7* 4.7* 8.1*             Results from last 7 days   Lab Units 07/06/21  0652 07/05/21 0524 07/04/21 0658 07/03/21  0650 07/02/21  0243   WBC 10*3/mm3 10.24 10.45 11.79* 10.79 9.43   HEMOGLOBIN g/dL 9.7* 9.9* 10.4* 9.2* 9.5*  9.5*   PLATELETS 10*3/mm3 133* 140 96* 107* 114*       Results from last 7 days   Lab Units 07/04/21  0658 07/03/21  1826 07/02/21  0243 07/01/21  0209 06/30/21  0352   INR  1.35* 1.20* 1.41* 1.52* 1.81*       Assessment / Plan     Maribeth Rendon is a 70 y.o. female with past medical history heavy tobacco abuse quit decades ago, chronic anemia, anxiety disorder, depression  "disorder, diabetes mellitus type 2, coronary artery disease status post cardiac cath status post coronary artery bypass grafting times 4/2019, chronic systolic congestive heart failure with EF of 57% (5/2021) chronic kidney disease stage V status post left upper extremity AV fistula placement, 2016, gastroesophageal reflux disease, history of MRSA abdominal wound infection 2017, dyslipidemia, hypertension, osteoarthritis, psoriatic arthritis, history of cerebrovascular accident, overweight with a BMI of 28,  disabled requiring a wheelchair to ambulate.     Patient  is well-known to nephrology and has been previously admitted for volume overload     Patient presents to the emergency department brought by EMS with onset of altered mental status associated with decreased oral intake.  Patient was found to be hypotensive and required ICU admission, under the diagnosis of septic shock from urinary tract infection and worsening renal dysfunction     Neurology consultation of been requested for the management       ASSESSMENT:    -Acute on chronic kidney disease 5 to ESRD  ( likely progression from underlying condition)  baseline creatinine around 3.4-3.8 , peaked at  9, associated with altered mental status and metabolic acidosis and hyperkalemia. Pt with clear uremic symptoms , fulfilling criteria to start HD .Currently on HD .  Currently undergoing hemodialysis 3 times a week, access functional but will require fistulogram  as an outpatient     -Metabolic bone disease with secondary hyperparathyroidism  . Her PTH is 474 . On  calcitriol 0.25 BIW . Will follow trend     -Acute on Chronic normocytic anemia ; on  EPOGEN 5000 units SC TIW  ,followed by gastroenterology s/p EGD and colonoscopy \"  showing multiple diverticuli in the sigmoid colon.  Non bleeding internal hemorrhoids grade 1 were found.\"  Following H&H closely    -UTI : on ceftriaxone renally adjusted , completed treatment course     -CAD tatus post coronary " artery bypass grafting times 4/2019, chronic systolic congestive heart failure with EF of 57% (5/2021). 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     PLAN:  -We had a long discussion regarding risk and benefits of hemodialysis. Patient does feel better but still having difficulty committing to 3 times a day hemodialysis.  We discussed in length the possibility of conservative treatment and declined hemodialysis versus continued hemodialysis.  She will discuss with  and family to make a final decision  -Continue HD during admission , currently on a Monday, Wednesday and Friday schedule.   -Consulted  placement in local [facility] dialysis unit, to continue outpatient hemodialysis  -On  EPOGEN protocol   -On calcitriol   -Adjust medications to GFR    Thank you for involving us in the care of Maribeth Rendon.  Please feel free to call with any questions.    Brijesh Randolph MD  07/06/21  07:48 EDT    [facility]  [phone]      Much of this encounter note is an electronic transcription/translation of spoken language to printed text. The electronic translation of spoken language may permit erroneous, or at times, nonsensical words or phrases to be inadvertently transcribed; Although I have reviewed the note for such errors, some may still exist.

## 2021-07-06 NOTE — PROGRESS NOTES
[facility]  Cardiology Progress Note    Patient Identification:  Name: Maribeth Rendon  Age: 70 y.o.  Sex: female  :  1950  MRN: [id number]                 Follow Up / Chief Complaint: Follow-up for elevated troponin    Interval History: EKG showed sinus rhythm with PACs.  Does have been elevated in the setting of CKD.  HgB stable today at 9.7, decrease from yesterday.    Subjective:  No chest pain    Objective:    Past Medical History:  Past Medical History:   Diagnosis Date   • Achilles tendon tear     left    • Anemia    • Anxiety    • Depression    • Diabetes mellitus, type 2 (CMS/[facility])    • ESRD (end stage renal disease) (CMS/[facility])     HAS LEFT FOREARM FISTULA   • GERD (gastroesophageal reflux disease)    • History of MRSA infection 03/15/2016    ABDOMINAL WOUND,   INFECTION CONTROLL NOTIFIED 2017   • History of transfusion    • Hyperlipidemia    • Hypertension    • Hypokalemia    • Hypomagnesemia    • Hypoxic 2016    WHEN SHE HAD PNEUMONIA   • Intertrigo    • Leukocytosis    • Osteoarthritis    • Pneumonia 2016   • Psoriasis    • Psoriatic arthritis (CMS/[facility])    • Seizures (CMS/[facility])     one time   • Sepsis (CMS/[facility]) 2016   • SOB (shortness of breath)    • Stage 4 chronic renal impairment associated with type 2 diabetes mellitus (CMS/[facility])    • Staph infection     HX RIGHT FOOT AT SUBURBAN 2010   • Streptococcal bacteremia    • Stroke (cerebrum) (CMS/[facility])    • Stroke (CMS/[facility])    • Swelling of hand 10/27/2016    LEFT HAND SWELLIMG SINCE LEFT FISTULA SURGERY   • Tremor, essential    • Wears glasses    • Wheelchair dependent     For mobility     Past Surgical History:  Past Surgical History:   Procedure Laterality Date   • ABDOMINAL WALL ABSCESS INCISION AND DRAINAGE     • ARTERIOVENOUS FISTULA/SHUNT SURGERY Left 10/27/2016    Procedure: LT FOREARM FISTULA;  Surgeon: Lorelei Haque Jr., MD;  Location: [facility];  Service:    • ARTERIOVENOUS FISTULA/SHUNT  SURGERY Left 2/16/2017    Procedure: LT BRACHIAL CEPHALIC WITH FISTULA LIGATION RADIAL CEPHALIC.;  Surgeon: Gurmeet Carver MD;  Location: [facility] MAIN OR;  Service:    • CARDIAC CATHETERIZATION N/A 7/26/2019    Procedure: Left Heart Cath;  Surgeon: Eddie Hodges MD;  Location: [facility] CATH INVASIVE LOCATION;  Service: Cardiology   • CARDIAC CATHETERIZATION N/A 7/26/2019    Procedure: Coronary angiography;  Surgeon: Eddie Hodges MD;  Location: [facility] CATH INVASIVE LOCATION;  Service: Cardiology   • CARDIAC SURGERY     • CATARACT EXTRACTION W/ INTRAOCULAR LENS IMPLANT Bilateral 2011   • COLONOSCOPY N/A 7/4/2021    Procedure: COLONOSCOPY into cecum/terminal ileum;  Surgeon: Purvi Grant MD;  Location: [facility] ENDOSCOPY;  Service: Gastroenterology;  Laterality: N/A;  hemorrhoids, diverticulosis   • CORONARY ARTERY BYPASS GRAFT N/A 7/29/2019    Procedure: INTRAOP ERNESTO; CORONARY ARTERY BYPASS GRAFTING X 4 WITH LEFT INTERNAL MAMMARY ARTERY GRAFT AND UTILIZING ENDOSCOPICALLY HARVESTED GREATER SAPHENOUS VEIN; PRP;  Surgeon: Gordo Ferreira MD;  Location: [facility] OR;  Service: Cardiothoracic   • CORONARY ARTERY BYPASS GRAFT     • DILATATION AND CURETTAGE  1991   • ENDOSCOPY N/A 7/1/2021    Procedure: ESOPHAGOGASTRODUODENOSCOPY WITH BX'S;  Surgeon: Azam Kat MD;  Location: [facility] ENDOSCOPY;  Service: Gastroenterology;  Laterality: N/A;  pre: ANEMIA, MELENA  post: ESOPHAGITIS, GASTRITIS, BILE REFLUX, BUT NO OBVIOUS SOURCE OF BLEEDING   • EXCISION MASS TRUNK N/A 3/22/2016    Procedure: I&D LOWER ABDOMINAL  WOUND;  Surgeon: Tru Yi MD;  Location: [facility] OR;  Service:    • FOOT SURGERY Right 2010    REMOVED BONE IN RIGHT GREAT TOE   • HYSTERECTOMY  1992        Social History:   Social History     Tobacco Use   • Smoking status: Former Smoker     Packs/day: 2.00     Years: 26.00     Pack years: 52.00     Types: Cigarettes     Quit date: 10/21/1992     Years since  "quittin.7   • Smokeless tobacco: Never Used   • Tobacco comment: quit    Substance Use Topics   • Alcohol use: Not Currently     Alcohol/week: 0.0 standard drinks      Family History:  Family History   Problem Relation Age of Onset   • Heart attack Mother    • Heart disease Mother    • Kidney disease Mother    • Heart attack Father    • Heart disease Father    • Hypertension Father    • Stroke Father         ISCHEMIC   • Diabetes Father         TYPE 2   • Kidney disease Brother    • Diabetes Brother         TYPE 1   • Thyroid disease Daughter    • Anxiety disorder Maternal Aunt    • Bipolar disorder Maternal Aunt    • Depression Maternal Aunt    • Lupus Maternal Aunt         LUPUS ANTICOAGLULANT   • Rheum arthritis Maternal Aunt    • Alcohol abuse Maternal Uncle    • Other Maternal Uncle         PULMONARY DISEASE          Allergies:  Allergies   Allergen Reactions   • Prednisone Hallucinations     Scheduled Meds:  amLODIPine, 5 mg, Q24H  calcitriol, 0.25 mcg, Once per day on   carvedilol, 12.5 mg, BID With Meals  castor oil-balsam peru, , Q12H  epoetin hermes/hermes-epbx, 5,000 Units, Once per day on   levothyroxine, 50 mcg, Q AM  lidocaine-prilocaine, , Once  miconazole, 1 application, Q12H  pantoprazole, 40 mg, Q AM  polyethylene glycol, 1 bottle, Once  sertraline, 50 mg, Daily  sodium chloride, 10 mL, Q12H            INTAKE AND OUTPUT:    Intake/Output Summary (Last 24 hours) at 2021 1616  Last data filed at 2021 0100  Gross per 24 hour   Intake --   Output 2100 ml   Net -2100 ml     ROS  Constitutional: Awake and alert, no fever. No nosebleeds  Abdomen           no abdominal pain   Cardiac              no chest pain  Pulmonary          no shortness of breath      /64 (BP Location: Right arm, Patient Position: Lying)   Pulse 93   Temp 98.4 °F (36.9 °C) (Oral)   Resp 16   Ht 165.1 cm (65\")   Wt 80.4 kg (177 lb 4 oz)   LMP  (LMP Unknown)   SpO2 96%   BMI 29.50 kg/m² "   General appearance: No acute changes   Neck: Trachea midline; NECK, supple, no thyromegaly or lymphadenopathy   Lungs: Normal size and shape, normal breath sounds, equal distribution of air, no rales and rhonchi   CV: S1-S2 regular, no murmurs, no rub, no gallop   Abdomen: Soft, non-tender; no masses , no abnormal abdominal sounds   Extremities: No deformity , normal color , no peripheral edema   Skin: Normal temperature, turgor and texture; no rash, ulcers                       Cardiographics  Telemetry:         ECG:     Echocardiogram:      Impression:      1. Ostial left main 70 to 80% stenosis  2. Left anterior descending proximal 70% stenosis with minimal medical artery into the diagonal and  branches  3. Circumflex artery nondominant with no significant stenosis  4. Right coronary artery dominant with 20 to 30% proximal stenosis  5. Normal LVDP     Recommendations:      1. Emergency surgery          Interpretation Summary     · Left atrial volume is mildly increased.  · Mild aortic valve stenosis is present.  · Calculated left ventricular EF = 57% Estimated left ventricular EF was in agreement with the calculated left ventricular EF.  · Left ventricular diastolic function was normal.  · There is no evidence of pericardial effusion.      Interpretation Summary        · Findings consistent with a normal ECG stress test.  · Findings consistent with a normal ECG stress test.  · Left ventricular ejection fraction is normal (Calculated EF = 60%).  · Myocardial perfusion imaging indicates a small-to-medium-sized, mild-to-moderately severe area of ischemia located in the anterior wall.  · Impressions are consistent with an intermediate risk study.     Asymptomatic for chest pain. ECG is negative for ischemia.   Ectopy: PAC's at baseline, episode of atrial tachycardia verses junctional tachycardia post Infusion.   B/P is appropriate Beta-blocker therapy  Pharmacologic study due to inability to tolerate  increasing speed and grade of treadmill due to poor balance,  mobility  Issues and Beta-blocker therapy.  Unable to participate in low level exercise due to poor mobility and poor balance.             Imaging  Chest X-ray:   FINDINGS:  Cardiomegaly is present. There is vascular congestion. Patient is status  post median sternotomy with coronary artery bypass grafting. There are  calcifications of the aorta. No pneumothorax or definite pleural  effusion is seen.     IMPRESSION:  Cardiomegaly with vascular congestion.     This report was finalized on 6/29/2021 2:15 AM by Dr. Rebecca Camacho M.D.     CONCLUSION:  1. Mejía catheter within a collapsed urinary bladder, there is  thickening. There is perinephric fat induration, in part seen on the  previous examination, nonspecific finding which can be seen with  pyelonephritis.  2. Left adrenal nodule slightly increased in size since 2016. Currently  13 mm.  3. Porcelain gallbladder filled with gallstones, no overt evidence to  suggest cholecystitis.  4 diverticulosis of the colon.        This report was finalized on 6/30/2021 11:12 AM by Dr. Aidan Naranjo M.D.       Lab Review   Results from last 7 days   Lab Units 07/02/21  0243 07/01/21  0209   TROPONIN T ng/mL 0.147* 0.201*     Results from last 7 days   Lab Units 07/03/21  0650   MAGNESIUM mg/dL 1.5*     Results from last 7 days   Lab Units 07/06/21  0652   SODIUM mmol/L 134*   POTASSIUM mmol/L 4.1   BUN mg/dL 15   CREATININE mg/dL 3.14*   CALCIUM mg/dL 7.9*        Results from last 7 days   Lab Units 07/06/21  0652 07/05/21  0524 07/04/21  0658   WBC 10*3/mm3 10.24 10.45 11.79*   HEMOGLOBIN g/dL 9.7* 9.9* 10.4*   HEMATOCRIT % 30.4* 30.7* 33.6*   PLATELETS 10*3/mm3 133* 140 96*     Results from last 7 days   Lab Units 07/04/21  0658 07/03/21  1826 07/02/21  0243   INR  1.35* 1.20* 1.41*     The following medical decision was discussed in detail with Dr. Hodges      Assessment:  Gastrointestinal hemorrhage  "with melena    DM type 2 with diabetic peripheral neuropathy (CMS/HCC)    Essential hypertension    Essential tremor    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 pectoris (CMS/HCC)    Atrial fibrillation (CMS/HCC)    CKD (chronic kidney disease) stage 5, GFR less than 15 ml/min (CMS/HCC)    Hemorrhagic stroke (CMS/HCC)    S/P CABG (coronary artery bypass graft)    Hypothyroidism (acquired)    Hypotension         Plan:    SR with rate controlled.  Blood pressure and HR controlled.  Continue to hold anticoagulation will reevaluate tomorrow.      CBC in am.    Eddie Hodges MD      )7/6/2021             EMR Dragon/Transcription:   \"Dictated utilizing Dragon dictation\".     "

## 2021-07-07 LAB
ANION GAP SERPL CALCULATED.3IONS-SCNC: 8.3 MMOL/L (ref 5–15)
BASOPHILS # BLD AUTO: 0.07 10*3/MM3 (ref 0–0.2)
BASOPHILS NFR BLD AUTO: 0.7 % (ref 0–1.5)
BUN SERPL-MCNC: 28 MG/DL (ref 8–23)
BUN/CREAT SERPL: 6.5 (ref 7–25)
CALCIUM SPEC-SCNC: 7.6 MG/DL (ref 8.6–10.5)
CHLORIDE SERPL-SCNC: 100 MMOL/L (ref 98–107)
CO2 SERPL-SCNC: 26.7 MMOL/L (ref 22–29)
CREAT SERPL-MCNC: 4.29 MG/DL (ref 0.57–1)
DEPRECATED RDW RBC AUTO: 48.2 FL (ref 37–54)
EOSINOPHIL # BLD AUTO: 0.47 10*3/MM3 (ref 0–0.4)
EOSINOPHIL NFR BLD AUTO: 4.4 % (ref 0.3–6.2)
ERYTHROCYTE [DISTWIDTH] IN BLOOD BY AUTOMATED COUNT: 14.5 % (ref 12.3–15.4)
GFR SERPL CREATININE-BSD FRML MDRD: 10 ML/MIN/1.73
GFR SERPL CREATININE-BSD FRML MDRD: ABNORMAL ML/MIN/{1.73_M2}
GLUCOSE BLDC GLUCOMTR-MCNC: 108 MG/DL (ref 70–130)
GLUCOSE BLDC GLUCOMTR-MCNC: 186 MG/DL (ref 70–130)
GLUCOSE BLDC GLUCOMTR-MCNC: 188 MG/DL (ref 70–130)
GLUCOSE SERPL-MCNC: 92 MG/DL (ref 65–99)
HCT VFR BLD AUTO: 30.2 % (ref 34–46.6)
HGB BLD-MCNC: 9.6 G/DL (ref 12–15.9)
IMM GRANULOCYTES # BLD AUTO: 0.09 10*3/MM3 (ref 0–0.05)
IMM GRANULOCYTES NFR BLD AUTO: 0.8 % (ref 0–0.5)
LYMPHOCYTES # BLD AUTO: 1.28 10*3/MM3 (ref 0.7–3.1)
LYMPHOCYTES NFR BLD AUTO: 12 % (ref 19.6–45.3)
MCH RBC QN AUTO: 29.2 PG (ref 26.6–33)
MCHC RBC AUTO-ENTMCNC: 31.8 G/DL (ref 31.5–35.7)
MCV RBC AUTO: 91.8 FL (ref 79–97)
MONOCYTES # BLD AUTO: 1.11 10*3/MM3 (ref 0.1–0.9)
MONOCYTES NFR BLD AUTO: 10.4 % (ref 5–12)
NEUTROPHILS NFR BLD AUTO: 7.67 10*3/MM3 (ref 1.7–7)
NEUTROPHILS NFR BLD AUTO: 71.7 % (ref 42.7–76)
NRBC BLD AUTO-RTO: 0 /100 WBC (ref 0–0.2)
PLATELET # BLD AUTO: 165 10*3/MM3 (ref 140–450)
PMV BLD AUTO: 10.2 FL (ref 6–12)
POTASSIUM SERPL-SCNC: 4.2 MMOL/L (ref 3.5–5.2)
RBC # BLD AUTO: 3.29 10*6/MM3 (ref 3.77–5.28)
SODIUM SERPL-SCNC: 135 MMOL/L (ref 136–145)
WBC # BLD AUTO: 10.69 10*3/MM3 (ref 3.4–10.8)

## 2021-07-07 PROCEDURE — 99232 SBSQ HOSP IP/OBS MODERATE 35: CPT | Performed by: NURSE PRACTITIONER

## 2021-07-07 PROCEDURE — 82962 GLUCOSE BLOOD TEST: CPT

## 2021-07-07 PROCEDURE — 85025 COMPLETE CBC W/AUTO DIFF WBC: CPT | Performed by: INTERNAL MEDICINE

## 2021-07-07 PROCEDURE — 80048 BASIC METABOLIC PNL TOTAL CA: CPT | Performed by: INTERNAL MEDICINE

## 2021-07-07 PROCEDURE — 25010000002 EPOETIN ALFA-EPBX 3000 UNIT/ML SOLUTION: Performed by: INTERNAL MEDICINE

## 2021-07-07 RX ADMIN — SERTRALINE HYDROCHLORIDE 50 MG: 50 TABLET, FILM COATED ORAL at 08:00

## 2021-07-07 RX ADMIN — MICONAZOLE NITRATE 1 APPLICATION: 2 CREAM TOPICAL at 08:00

## 2021-07-07 RX ADMIN — CASTOR OIL AND BALSAM, PERU 5 G: 788; 87 OINTMENT TOPICAL at 20:35

## 2021-07-07 RX ADMIN — EPOETIN ALFA-EPBX 5000 UNITS: 3000 INJECTION, SOLUTION INTRAVENOUS; SUBCUTANEOUS at 08:12

## 2021-07-07 RX ADMIN — AMLODIPINE BESYLATE 5 MG: 5 TABLET ORAL at 15:25

## 2021-07-07 RX ADMIN — CARVEDILOL 12.5 MG: 12.5 TABLET, FILM COATED ORAL at 15:25

## 2021-07-07 RX ADMIN — MICONAZOLE NITRATE 1 APPLICATION: 2 CREAM TOPICAL at 20:35

## 2021-07-07 RX ADMIN — CASTOR OIL AND BALSAM, PERU 5 G: 788; 87 OINTMENT TOPICAL at 08:00

## 2021-07-07 RX ADMIN — LEVOTHYROXINE SODIUM 50 MCG: 0.05 TABLET ORAL at 06:18

## 2021-07-07 RX ADMIN — SODIUM CHLORIDE, PRESERVATIVE FREE 10 ML: 5 INJECTION INTRAVENOUS at 20:36

## 2021-07-07 RX ADMIN — SODIUM CHLORIDE, PRESERVATIVE FREE 10 ML: 5 INJECTION INTRAVENOUS at 08:00

## 2021-07-07 RX ADMIN — PANTOPRAZOLE SODIUM 40 MG: 40 TABLET, DELAYED RELEASE ORAL at 06:18

## 2021-07-07 NOTE — SIGNIFICANT NOTE
07/07/21 1438   OTHER   Discipline physical therapy assistant   Rehab Time/Intention   Session Not Performed other (see comments)  (Pt just returned from dialysis and has not eaten lunch or had it brought to her yet this late PM. PT will follow up tomorrow.)

## 2021-07-07 NOTE — CASE MANAGEMENT/SOCIAL WORK
Continued Stay Note  [facility]     Patient Name: Maribeth Rendon  MRN: [id number]  Today's Date: 7/7/2021    Admit Date: 6/29/2021    Discharge Plan     Row Name 07/07/21 1435       Plan    Plan  Plan home with [facility] HH and Outpatient HD at [facility].  JOHNATHAN Enriquez RN    Patient/Family in Agreement with Plan  yes    Plan Comments  Spoke to pt and pt's spouse   (Tru Rendon [phone]) at bedside.   Pt had inquired about SNF.   Pt continues to refuse any referrals.  Pt states she wants to go home.  Plan home with [facility] HH and Outpatient HD at [facility].  JOHNATHAN Enriquez RN        Discharge Codes    No documentation.             Maribeth Enriquez RN

## 2021-07-07 NOTE — PROGRESS NOTES
Palliative Care Daily Progress Note   goals of care/advanced care planning and support for patient/family    Code Status:   Code Status and Medical Interventions:   Ordered at: 06/29/21 0645     Code Status:    CPR     Medical Interventions (Level of Support Prior to Arrest):    Full      Advanced Directives: Advance Directive Status: Patient has advance directive, copy requested   Goals of Care: Ongoing.     S: Medical record reviewed. Events noted.  Overall patient states she is doing well. She was able to tolerate dialysis today. VSS. Review of labs this am eGFR 10, BUN/creatinine 25/4.29. Hgb/Hct 9.6/30.2.        Review of Systems   Constitutional: Negative for decreased appetite and fever.   Cardiovascular: Negative for chest pain and leg swelling.   Respiratory: Negative for cough and shortness of breath.    Gastrointestinal: Negative for abdominal pain and nausea.     Pain Assessment  Preferred Pain Scale: number (Numeric Rating Pain Scale) (0/10)  Nonverbal Indicators of Pain: nonverbal indicators absent  Pain Location: abdomen    O:     Intake/Output Summary (Last 24 hours) at 7/7/2021 1507  Last data filed at 7/7/2021 1200  Gross per 24 hour   Intake 0 ml   Output 2000 ml   Net -2000 ml       Diagnostics: Reviewed    Current Facility-Administered Medications   Medication Dose Route Frequency Provider Last Rate Last Admin   • aluminum-magnesium hydroxide-simethicone (MAALOX MAX) 400-400-40 MG/5ML suspension 15 mL  15 mL Oral Q6H PRN Purvi Grant MD       • amLODIPine (NORVASC) tablet 5 mg  5 mg Oral Q24H Purvi Grant MD   5 mg at 07/06/21 0837   • calcitriol (ROCALTROL) capsule 0.25 mcg  0.25 mcg Oral Once per day on Mon Thu Brijesh Randolph MD   0.25 mcg at 07/05/21 1814   • calcium carbonate (TUMS) chewable tablet 500 mg (200 mg elemental)  4 tablet Oral TID PRN Purvi Grant MD       • carvedilol (COREG) tablet 12.5 mg  12.5 mg Oral BID With Meals Purvi Grant MD   12.5 mg at  07/06/21 1653   • castor oil-balsam peru (VENELEX) ointment   Topical Q12H Purvi Grant MD   5 g at 07/07/21 0800   • dextrose (D50W) 25 g/ 50mL Intravenous Solution 25 g  25 g Intravenous Q15 Min PRN Purvi Grant MD   25 g at 07/01/21 0329   • dextrose (GLUTOSE) oral gel 15 g  15 g Oral Q15 Min PRN Purvi Grant MD       • epoetin hermes-epbx (RETACRIT) injection 5,000 Units  5,000 Units Subcutaneous Once per day on Mon Wed Fri Purvi Grant MD   5,000 Units at 07/07/21 0812   • glucagon (human recombinant) (GLUCAGEN DIAGNOSTIC) injection 1 mg  1 mg Subcutaneous Q15 Min PRN Purvi Grant MD   1 mg at 07/04/21 0627   • heparin (porcine) injection 2,000 Units  2,000 Units Intravenous PRN Purvi Grant MD       • ipratropium-albuterol (DUO-NEB) nebulizer solution 3 mL  3 mL Nebulization Q6H PRN Purvi Grant MD       • labetalol (NORMODYNE,TRANDATE) injection 20 mg  20 mg Intravenous Q10 Min PRN Purvi Grant MD       • levothyroxine (SYNTHROID, LEVOTHROID) tablet 50 mcg  50 mcg Oral Q AM Christiano Rubalcava MD   50 mcg at 07/07/21 0618   • lidocaine-prilocaine (EMLA) 2.5-2.5 % cream   Topical Once Purvi Grant MD       • miconazole (MICOTIN) 2 % cream 1 application  1 application Topical Q12H Purvi Grant MD   1 application at 07/07/21 0800   • nitroglycerin (NITROSTAT) SL tablet 0.4 mg  0.4 mg Sublingual Q5 Min PRN Christiano Rubalcava MD       • ondansetron (ZOFRAN) injection 4 mg  4 mg Intravenous Q6H PRN Purvi Grant MD   4 mg at 06/30/21 0819   • pantoprazole (PROTONIX) EC tablet 40 mg  40 mg Oral Q AM Purvi Grant MD   40 mg at 07/07/21 0618   • polyethylene glycol (MIRALAX) powder 1 bottle  1 bottle Oral Once Purvi Grant MD       • potassium chloride (K-DUR,KLOR-CON) ER tablet 40 mEq  40 mEq Oral PRN Christiano Rubalcava MD   40 mEq at 07/05/21 0000   • potassium chloride (KLOR-CON) packet 40 mEq  40 mEq Oral PRN Christiano Rubalcava MD       •  "sertraline (ZOLOFT) tablet 50 mg  50 mg Oral Daily Purvi Grant MD   50 mg at 07/07/21 0800   • sodium chloride 0.9 % flush 10 mL  10 mL Intravenous PRN Purvi Grant MD       • sodium chloride 0.9 % flush 10 mL  10 mL Intravenous Q12H Purvi Grant MD   10 mL at 07/07/21 0800   • sodium chloride 0.9 % flush 10 mL  10 mL Intravenous PRN Purvi Grant MD       • sodium chloride 0.9 % infusion  30 mL/hr Intravenous Continuous PRN Purvi Grant MD 30 mL/hr at 07/01/21 1124 Restarted at 07/04/21 0939   • sodium chloride 0.9 % infusion  30 mL/hr Intravenous Continuous PRN Purvi Grant MD 30 mL/hr at 07/04/21 0838 30 mL/hr at 07/04/21 0838     sodium chloride, 30 mL/hr, Last Rate: 30 mL/hr (07/01/21 1124)  sodium chloride, 30 mL/hr, Last Rate: 30 mL/hr (07/04/21 0838)      •  aluminum-magnesium hydroxide-simethicone  •  calcium carbonate  •  dextrose  •  dextrose  •  glucagon (human recombinant)  •  heparin (porcine)  •  ipratropium-albuterol  •  labetalol  •  nitroglycerin  •  ondansetron  •  potassium chloride  •  potassium chloride  •  [COMPLETED] Insert peripheral IV **AND** sodium chloride  •  sodium chloride  •  sodium chloride  •  sodium chloride    A:    /40 (BP Location: Right arm, Patient Position: Lying)   Pulse 89   Temp 97.1 °F (36.2 °C) (Temporal)   Resp 16   Ht 165.1 cm (65\")   Wt 81.7 kg (180 lb 1.9 oz)   LMP  (LMP Unknown)   SpO2 95%   BMI 29.97 kg/m²     Constitutional:       General: Awake.      Appearance: Chronically ill-appearing.   Cardiovascular:      PMI at left midclavicular line. Normal rate.   Edema:     Peripheral edema absent.   Skin:     Findings: Bruising (bilateral upper arms) present.   Neurological:      Mental Status: Alert and oriented to person, place, and time.   Psychiatric:         Mood and Affect: Mood and affect normal.         Speech: Speech normal.         Cognition and Memory: Cognition and memory normal.           Patient status: " "Disease state: Controlled with current treatments.  Functional status: Palliative Performance Scale Score: Performance 50% based on the following measures: Ambulation: Mainly sit or lie down, Activity and Evidence of Disease: Unable to do any work, extensive evidence of disease, Self-Care: Considerable assistance required,  Intake: Normal or reduced, LOC: Full or confusion   Nutritional status: albumin <= 3.2 g/dL. Body mass index is 29.5 kg/m².      Family support: The patient receives support from her  and children..  Advance Directives: Advance Directive Status: Patient has advance directive, copy requested      Advance Care Planning     ACP discussion was held with the patient during this visit. Patient has an advance directive (not in EMR), copy requested.   POA/Healthcare surrogate-spouse (Tru).     Impression/Problem List:     1. Septic Shock  2. UTI  3. End stage renal disease stage 5, current with dialysis  4. CAD s/p CABG  5. Acute on chronic anemia   6. Chronic systolic congestive heart failure  7. Seizure disorder.  8. Hypertension   9. Diabetes Mellitus type II  10. Parkinson's disease     Recommendations/Plan:  1. Provide support with goals of care.     2.  Palliative care encounter  7/6/2021- I met with both patient and spouse. They have a fair understanding of overall health. The patient is trying to decide whether or not she wants to continue with dialysis. She informed me that about two years ago when she had her CABG she required 2 sessions of dialysis and then her kidneys \"bounced back.\" Since then she has been getting labs monthly with procrit shots based on findings. She states to me \"I just want to go back to that.\" She also shares that it hurts and she doesn't want to live her life on a machine if she doesn't have too. She has talked with nephrologist today regarding pros and cons. We talked about dialysis and the importance of it and also the consequence of not proceeding with " it-ultimately death. We discussed [facility] services and palliative care as well. They decline [facility] consult at this time. They have asked to have some time to talk things over. They want to talk to their four daughters as well. We did discuss CODE status and that this time no decisions were made. The patient does have a living will which I have requested for review. Will continue to follow for support. I spoke with RN (Natalie) and Dr Randolph.    7/7/202- I had follow up discussion with patient, spouse, youngest daughter and son in law at bedside regarding goals of care. She has decided to proceed with dialysis for now. She is anxious to get back home. Her goal is to return home with home health rehab and attend dialysis outpatient. Her  is very supportive of her decision either way. I did leave them a handout for [facility] and encouraged them to contact them when they are ready for their support. They have not brought in her advance directive. I will sign off and follow PRN for support. Spoke with RN (Natalie)          Thank you for this consult and allowing us to participate in patient's plan of care.      Time spent: 30 minutes spent reviewing medical and medication records, assessing and examining patient, discussing with family, answering questions, providing some guidance about a plan and documentation of care, and coordinating care with other healthcare members, with > 50% time spent face to face.        Anne Davidson, APRN  7/7/2021

## 2021-07-07 NOTE — PLAN OF CARE
Goal Outcome Evaluation:  Plan of Care Reviewed With: patient        Progress: no change  Outcome Summary: VSS, , NSR with PAC's, antifungal to betito area, Venelex to heels, no c/o pain, dialysis scheduled for today, rested well

## 2021-07-07 NOTE — PLAN OF CARE
Goal Outcome Evaluation:  Plan of Care Reviewed With: patient           Outcome Summary: pt had dialysis today, took off 2L, pt still upset over her housecoat missing, Pt has decided that she wants to go home with HH, will cont to monitor  Problem: Skin Injury Risk Increased  Goal: Skin Health and Integrity  Outcome: Ongoing, Progressing  Intervention: Optimize Skin Protection  Recent Flowsheet Documentation  Taken 7/7/2021 0754 by Natalie Connor, RN  Pressure Reduction Techniques: frequent weight shift encouraged  Pressure Reduction Devices: alternating pressure pump (ADD)  Skin Protection: adhesive use limited

## 2021-07-07 NOTE — PROGRESS NOTES
[facility]  Cardiology Progress Note    Patient Identification:  Name: Maribeth Rendon  Age: 70 y.o.  Sex: female  :  1950  MRN: [id number]                 Follow Up / Chief Complaint: Follow-up for elevated troponin    Interval History: EKG showed sinus rhythm with PACs.  Does have been elevated in the setting of CKD.  HgB stable today at 9.6, slightly decreased from yesterday.    Subjective: Denies chest pain, shortness of breath.       Objective:    Past Medical History:  Past Medical History:   Diagnosis Date   • Achilles tendon tear     left    • Anemia    • Anxiety    • Depression    • Diabetes mellitus, type 2 (CMS/[facility])    • ESRD (end stage renal disease) (CMS/[facility])     HAS LEFT FOREARM FISTULA   • GERD (gastroesophageal reflux disease)    • History of MRSA infection 03/15/2016    ABDOMINAL WOUND,   INFECTION CONTROLL NOTIFIED 2017   • History of transfusion    • Hyperlipidemia    • Hypertension    • Hypokalemia    • Hypomagnesemia    • Hypoxic 2016    WHEN SHE HAD PNEUMONIA   • Intertrigo    • Leukocytosis    • Osteoarthritis    • Pneumonia 2016   • Psoriasis    • Psoriatic arthritis (CMS/[facility])    • Seizures (CMS/[facility])     one time   • Sepsis (CMS/[facility]) 2016   • SOB (shortness of breath)    • Stage 4 chronic renal impairment associated with type 2 diabetes mellitus (CMS/[facility])    • Staph infection     HX RIGHT FOOT AT [facility] 2010   • Streptococcal bacteremia    • Stroke (cerebrum) (CMS/[facility])    • Stroke (CMS/[facility])    • Swelling of hand 10/27/2016    LEFT HAND SWELLIMG SINCE LEFT FISTULA SURGERY   • Tremor, essential    • Wears glasses    • Wheelchair dependent     For mobility     Past Surgical History:  Past Surgical History:   Procedure Laterality Date   • ABDOMINAL WALL ABSCESS INCISION AND DRAINAGE     • ARTERIOVENOUS FISTULA/SHUNT SURGERY Left 10/27/2016    Procedure: LT FOREARM FISTULA;  Surgeon: Lorelei Haque Jr., MD;  Location: [facility] OR;  Service:     • ARTERIOVENOUS FISTULA/SHUNT SURGERY Left 2/16/2017    Procedure: LT BRACHIAL CEPHALIC WITH FISTULA LIGATION RADIAL CEPHALIC.;  Surgeon: Gurmeet Carver MD;  Location: [facility] MAIN OR;  Service:    • CARDIAC CATHETERIZATION N/A 7/26/2019    Procedure: Left Heart Cath;  Surgeon: Eddie Hodges MD;  Location: [facility] CATH INVASIVE LOCATION;  Service: Cardiology   • CARDIAC CATHETERIZATION N/A 7/26/2019    Procedure: Coronary angiography;  Surgeon: Eddie Hodges MD;  Location: [facility] CATH INVASIVE LOCATION;  Service: Cardiology   • CARDIAC SURGERY     • CATARACT EXTRACTION W/ INTRAOCULAR LENS IMPLANT Bilateral 2011   • COLONOSCOPY N/A 7/4/2021    Procedure: COLONOSCOPY into cecum/terminal ileum;  Surgeon: Purvi Grant MD;  Location: [facility] ENDOSCOPY;  Service: Gastroenterology;  Laterality: N/A;  hemorrhoids, diverticulosis   • CORONARY ARTERY BYPASS GRAFT N/A 7/29/2019    Procedure: INTRAOP ERNESTO; CORONARY ARTERY BYPASS GRAFTING X 4 WITH LEFT INTERNAL MAMMARY ARTERY GRAFT AND UTILIZING ENDOSCOPICALLY HARVESTED GREATER SAPHENOUS VEIN; PRP;  Surgeon: Gordo Ferreira MD;  Location: [facility] OR;  Service: Cardiothoracic   • CORONARY ARTERY BYPASS GRAFT     • DILATATION AND CURETTAGE  1991   • ENDOSCOPY N/A 7/1/2021    Procedure: ESOPHAGOGASTRODUODENOSCOPY WITH BX'S;  Surgeon: Azam Kat MD;  Location: [facility] ENDOSCOPY;  Service: Gastroenterology;  Laterality: N/A;  pre: ANEMIA, MELENA  post: ESOPHAGITIS, GASTRITIS, BILE REFLUX, BUT NO OBVIOUS SOURCE OF BLEEDING   • EXCISION MASS TRUNK N/A 3/22/2016    Procedure: I&D LOWER ABDOMINAL  WOUND;  Surgeon: Tru Yi MD;  Location: [facility] OR;  Service:    • FOOT SURGERY Right 2010    REMOVED BONE IN RIGHT GREAT TOE   • HYSTERECTOMY  1992        Social History:   Social History     Tobacco Use   • Smoking status: Former Smoker     Packs/day: 2.00     Years: 26.00     Pack years: 52.00     Types: Cigarettes     Quit date:  "10/21/1992     Years since quittin.7   • Smokeless tobacco: Never Used   • Tobacco comment: quit    Substance Use Topics   • Alcohol use: Not Currently     Alcohol/week: 0.0 standard drinks      Family History:  Family History   Problem Relation Age of Onset   • Heart attack Mother    • Heart disease Mother    • Kidney disease Mother    • Heart attack Father    • Heart disease Father    • Hypertension Father    • Stroke Father         ISCHEMIC   • Diabetes Father         TYPE 2   • Kidney disease Brother    • Diabetes Brother         TYPE 1   • Thyroid disease Daughter    • Anxiety disorder Maternal Aunt    • Bipolar disorder Maternal Aunt    • Depression Maternal Aunt    • Lupus Maternal Aunt         LUPUS ANTICOAGLULANT   • Rheum arthritis Maternal Aunt    • Alcohol abuse Maternal Uncle    • Other Maternal Uncle         PULMONARY DISEASE          Allergies:  Allergies   Allergen Reactions   • Prednisone Hallucinations     Scheduled Meds:  amLODIPine, 5 mg, Q24H  calcitriol, 0.25 mcg, Once per day on   carvedilol, 12.5 mg, BID With Meals  castor oil-balsam peru, , Q12H  epoetin hermes/hermes-epbx, 5,000 Units, Once per day on   levothyroxine, 50 mcg, Q AM  lidocaine-prilocaine, , Once  miconazole, 1 application, Q12H  pantoprazole, 40 mg, Q AM  polyethylene glycol, 1 bottle, Once  sertraline, 50 mg, Daily  sodium chloride, 10 mL, Q12H            INTAKE AND OUTPUT:    Intake/Output Summary (Last 24 hours) at 2021 1621  Last data filed at 2021 1200  Gross per 24 hour   Intake 0 ml   Output 2000 ml   Net -2000 ml     ROS  Constitutional: Awake and alert, no fever.  No nosebleeds nosebleeds  Abdomen          no abdominal pain   Cardiac              no chest pain  Pulmonary          no shortness of breath      /40 (BP Location: Right arm, Patient Position: Lying)   Pulse 89   Temp 97.1 °F (36.2 °C) (Temporal)   Resp 16   Ht 165.1 cm (65\")   Wt 81.7 kg (180 lb 1.9 oz)   LMP  " (LMP Unknown)   SpO2 95%   BMI 29.97 kg/m²        Physical Exam:  General:  Appears in no acute distress resting in bed  Eyes: EOM Normal no conjunctival drainage  HEENT:  No JVD. Thyroid not visibly enlarged. No mucosal pallor or cyanosis  Respiratory: Respirations regular and unlabored at rest. BBS with good air entry in all fields. No crackles, rubs or wheezes auscultated  Cardiovascular: S1S2 Regular rate and rhythm. No murmur, rub or gallop. No carotid bruits. DP/PT pulses  +2   . No pretibial pitting edema  Gastrointestinal: Abdomen soft, flat, non tender. Bowel sounds present. No hepatosplenomegaly. No ascites  Musculoskeletal: PASCUAL x4. No abnormal movements  Neuro: AAO x3 CN II-XII grossly intact  Psych: Mood and affect normal, pleasant and cooperative                      Cardiographics  Telemetry:         ECG:     Echocardiogram:      Impression:      1. Ostial left main 70 to 80% stenosis  2. Left anterior descending proximal 70% stenosis with minimal medical artery into the diagonal and  branches  3. Circumflex artery nondominant with no significant stenosis  4. Right coronary artery dominant with 20 to 30% proximal stenosis  5. Normal LVDP     Recommendations:      1. Emergency surgery          Interpretation Summary     · Left atrial volume is mildly increased.  · Mild aortic valve stenosis is present.  · Calculated left ventricular EF = 57% Estimated left ventricular EF was in agreement with the calculated left ventricular EF.  · Left ventricular diastolic function was normal.  · There is no evidence of pericardial effusion.      Interpretation Summary        · Findings consistent with a normal ECG stress test.  · Findings consistent with a normal ECG stress test.  · Left ventricular ejection fraction is normal (Calculated EF = 60%).  · Myocardial perfusion imaging indicates a small-to-medium-sized, mild-to-moderately severe area of ischemia located in the anterior wall.  · Impressions are  consistent with an intermediate risk study.     Asymptomatic for chest pain. ECG is negative for ischemia.   Ectopy: PAC's at baseline, episode of atrial tachycardia verses junctional tachycardia post Infusion.   B/P is appropriate Beta-blocker therapy  Pharmacologic study due to inability to tolerate increasing speed and grade of treadmill due to poor balance,  mobility  Issues and Beta-blocker therapy.  Unable to participate in low level exercise due to poor mobility and poor balance.             Imaging  Chest X-ray:   FINDINGS:  Cardiomegaly is present. There is vascular congestion. Patient is status  post median sternotomy with coronary artery bypass grafting. There are  calcifications of the aorta. No pneumothorax or definite pleural  effusion is seen.     IMPRESSION:  Cardiomegaly with vascular congestion.     This report was finalized on 6/29/2021 2:15 AM by Dr. Rebecca Camacho M.D.     CONCLUSION:  1. Mejía catheter within a collapsed urinary bladder, there is  thickening. There is perinephric fat induration, in part seen on the  previous examination, nonspecific finding which can be seen with  pyelonephritis.  2. Left adrenal nodule slightly increased in size since 2016. Currently  13 mm.  3. Porcelain gallbladder filled with gallstones, no overt evidence to  suggest cholecystitis.  4 diverticulosis of the colon.        This report was finalized on 6/30/2021 11:12 AM by Dr. Aidan Naranjo M.D.       Lab Review   Results from last 7 days   Lab Units 07/02/21  0243 07/01/21  0209   TROPONIN T ng/mL 0.147* 0.201*     Results from last 7 days   Lab Units 07/03/21  0650   MAGNESIUM mg/dL 1.5*     Results from last 7 days   Lab Units 07/07/21  0523   SODIUM mmol/L 135*   POTASSIUM mmol/L 4.2   BUN mg/dL 28*   CREATININE mg/dL 4.29*   CALCIUM mg/dL 7.6*        Results from last 7 days   Lab Units 07/07/21  0523 07/06/21  0652 07/05/21  0524   WBC 10*3/mm3 10.69 10.24 10.45   HEMOGLOBIN g/dL 9.6* 9.7* 9.9*  "  HEMATOCRIT % 30.2* 30.4* 30.7*   PLATELETS 10*3/mm3 165 133* 140     Results from last 7 days   Lab Units 07/04/21  0658 07/03/21  1826 07/02/21  0243   INR  1.35* 1.20* 1.41*     The following medical decision was discussed in detail with Dr. Hodges      Assessment:  Gastrointestinal hemorrhage with melena    DM type 2 with diabetic peripheral neuropathy (CMS/[facility])    Essential hypertension    Essential tremor    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 pectoris (CMS/[facility])    Atrial fibrillation (CMS/[facility])    CKD (chronic kidney disease) stage 5, GFR less than 15 ml/min (CMS/[facility])    Hemorrhagic stroke (CMS/[facility])    S/P CABG (coronary artery bypass graft)    Hypothyroidism (acquired)    Hypotension         Plan:  They have planned not to be palliative at this time.  Blood pressure low normal but stable.  I have discussed continuing anticoagulation with her.  She states at this time she no longer wants to continue Eliquis.  She wants to remain on aspirin only.  Risk and benefits of not being on any other anticoagulation other than aspirin have been discussed with patient.  They verbalized understanding.    Please add asa 81 mg once ok with all. Discussed with nurse.    Shanique Ford, APRN      )7/7/2021             EMR Dragon/Transcription:   \"Dictated utilizing Dragon dictation\".     "

## 2021-07-07 NOTE — PROGRESS NOTES
[facility] Progress Note      Patient Name: Maribeth Rendon  : 1950  MRN: [id number]  Primary Care Physician:  Robby Chaney MD  Date of admission: 2021    Subjective     Interval History:     Patient planning to undergo hemodialysis today she is agreeable    Patient still have not made her decision regarding continue hemodialysis we will continue to comfort measures and declined hemodialysis, she continues to have undergoing conversation with  and  to make final decision      Review of Systems:   As noted above    Objective     Vitals:   Temp:  [98.4 °F (36.9 °C)-98.8 °F (37.1 °C)] 98.4 °F (36.9 °C)  Heart Rate:  [] 101  Resp:  [16-18] 18  BP: (117-136)/(52-67) 117/52    Intake/Output Summary (Last 24 hours) at 2021 0842  Last data filed at 2021 0500  Gross per 24 hour   Intake 0 ml   Output --   Net 0 ml       Physical Exam:      Constitutional: Pt alert  Overweight the BMI of 29  HEENT: Sclera anicteric, no conjunctival injection  Neck: Supple, no thyromegaly, no lymphadenopathy, trachea at midline, no JVD  Chest external wound in place, healed   Respiratory: Clear to auscultation bilaterally, nonlabored respiration  Cardiovascular: RRR, systolic ejection murmur  Gastrointestinal: Positive bowel sounds, abdomen is soft, nontender and nondistended  : No palpable bladder  Musculoskeletal: No edema, no clubbing or cyanosis.  Left upper extremity AV fistula in place  Psychiatric: Appropriate affect, cooperative  Neurologic: pt alert  moving all extremities, normal speech and mental status  Skin: Warm and dry        Scheduled Meds:     amLODIPine, 5 mg, Oral, Q24H  calcitriol, 0.25 mcg, Oral, Once per day on   carvedilol, 12.5 mg, Oral, BID With Meals  castor oil-balsam peru, , Topical, Q12H  epoetin hermes/hermes-epbx, 5,000 Units, Subcutaneous, Once per day on   levothyroxine, 50 mcg, Oral, Q  AM  lidocaine-prilocaine, , Topical, Once  miconazole, 1 application, Topical, Q12H  pantoprazole, 40 mg, Oral, Q AM  polyethylene glycol, 1 bottle, Oral, Once  sertraline, 50 mg, Oral, Daily  sodium chloride, 10 mL, Intravenous, Q12H      IV Meds:   sodium chloride, 30 mL/hr, Last Rate: 30 mL/hr (07/01/21 1124)  sodium chloride, 30 mL/hr, Last Rate: 30 mL/hr (07/04/21 0838)        Results Reviewed:   I have personally reviewed the results from the time of local ice packs were placed admission to 7/7/2021 08:42 EDT     Results from last 7 days   Lab Units 07/07/21 0523 07/06/21 0652 07/05/21  0524   WBC 10*3/mm3 10.69 10.24 10.45   HEMOGLOBIN g/dL 9.6* 9.7* 9.9*   HEMATOCRIT % 30.2* 30.4* 30.7*   PLATELETS 10*3/mm3 165 133* 140         Results from last 7 days   Lab Units 07/07/21 0523 07/06/21 0652 07/05/21  0524 07/03/21  0650   SODIUM mmol/L 135* 134* 136 140  137   POTASSIUM mmol/L 4.2 4.1 4.9 3.6  3.4*   CHLORIDE mmol/L 100 100 106 104  103   CO2 mmol/L 26.7 26.3 19.4* 19.9*  22.3   BUN mg/dL 28* 15 28* 46*  47*   CREATININE mg/dL 4.29* 3.14* 4.63* 6.14*  5.26*   CALCIUM mg/dL 7.6* 7.9* 7.6* 7.1*  7.1*   BILIRUBIN mg/dL  --   --   --  0.3   ALK PHOS U/L  --   --   --  54   ALT (SGPT) U/L  --   --   --  10   AST (SGOT) U/L  --   --   --  20   GLUCOSE mg/dL 92 79 66 64*  67       Estimated Creatinine Clearance: 12.9 mL/min (A) (by C-G formula based on SCr of 4.29 mg/dL (H)).    Results from last 7 days   Lab Units 07/06/21  0909 07/06/21  0652 07/03/21  0650 07/02/21  0243 07/01/21  0209   MAGNESIUM mg/dL  --   --  1.5* 1.5* 1.6   PHOSPHORUS mg/dL 2.3* 2.2* 4.7* 4.7* 8.1*             Results from last 7 days   Lab Units 07/07/21  0523 07/06/21  0652 07/05/21  0524 07/04/21  0658 07/03/21  0650   WBC 10*3/mm3 10.69 10.24 10.45 11.79* 10.79   HEMOGLOBIN g/dL 9.6* 9.7* 9.9* 10.4* 9.2*   PLATELETS 10*3/mm3 165 133* 140 96* 107*       Results from last 7 days   Lab Units 07/04/21  0658 07/03/21  1828  "07/02/21  0243 07/01/21  0209   INR  1.35* 1.20* 1.41* 1.52*       Assessment / Plan     Maribeth Rendon is a 70 y.o. female with past medical history heavy tobacco abuse quit decades ago, chronic anemia, anxiety disorder, depression disorder, diabetes mellitus type 2, coronary artery disease status post cardiac cath status post coronary artery bypass grafting times 4/2019, chronic systolic congestive heart failure with EF of 57% (5/2021) chronic kidney disease stage V status post left upper extremity AV fistula placement, 2016, gastroesophageal reflux disease, history of MRSA abdominal wound infection 2017, dyslipidemia, hypertension, osteoarthritis, psoriatic arthritis, history of cerebrovascular accident, overweight with a BMI of 28,  disabled requiring a wheelchair to ambulate.     Patient  is well-known to nephrology and has been previously admitted for volume overload     Patient presents to the emergency department brought by EMS with onset of altered mental status associated with decreased oral intake.  Patient was found to be hypotensive and required ICU admission, under the diagnosis of septic shock from urinary tract infection and worsening renal dysfunction     Neurology consultation of been requested for the management       ASSESSMENT:    -Acute on chronic kidney disease 5 to ESRD  ( likely progression from underlying condition)  baseline creatinine around 3.4-3.8 , peaked at  9, associated with altered mental status and metabolic acidosis and hyperkalemia. Pt with clear uremic symptoms , fulfilling criteria to start HD .Currently on HD .  Currently undergoing hemodialysis 3 times a week, access functional but will require fistulogram  as an outpatient     -Metabolic bone disease with secondary hyperparathyroidism  . Her PTH is 474 . On  calcitriol 0.25 BIW . Will follow trend     -Acute on Chronic normocytic anemia ; on  EPOGEN 5000 units SC TIW  ,followed by gastroenterology s/p EGD and colonoscopy \"  " "showing multiple diverticuli in the sigmoid colon.  Non bleeding internal hemorrhoids grade 1 were found.\"  Following H&H closely    -UTI : on ceftriaxone renally adjusted , completed treatment course     -CAD tatus post coronary artery bypass grafting times 4/2019, chronic systolic congestive heart failure with EF of 57% (5/2021). 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     PLAN:  -Patient still have not made her decision regarding continue hemodialysis as an outpatient , or transition to conservative treatment and declined hemodialysis, she continues to have undergoing conversation with  and  to make final decision.  Discussed again with patient the importance of making final decision to coordinate the plan of care.  Risk and benefits of hemodialysis and conservatory treatment was  discussed including irreversible fatal consequences including death.  Patient verbalized understanding  -Continue HD during admission , currently on a Monday, Wednesday and Friday schedule.   -Patient is agreeable to undergo hemodialysis today  -On  EPOGEN protocol   -On calcitriol   -Adjust medications to GFR    Thank you for involving us in the care of Maribeth Rendon.  Please feel free to call with any questions.    Brijesh Randolph MD  07/07/21  08:42 EDT    [facility]  [phone]      Much of this encounter note is an electronic transcription/translation of spoken language to printed text. The electronic translation of spoken language may permit erroneous, or at times, nonsensical words or phrases to be inadvertently transcribed; Although I have reviewed the note for such errors, some may still exist.    "

## 2021-07-07 NOTE — PROGRESS NOTES
" LOS: 8 days     Name: Maribeth Rendon  Age: 70 y.o.  Sex: female  :  1950  MRN: [id number]         Primary Care Physician: Robby Chaney MD    Subjective   Subjective  No new complaints or overnight events.  At present she states she is leaning towards continuing on hemodialysis.    Objective   Vital Signs  Temp:  [98.4 °F (36.9 °C)-98.8 °F (37.1 °C)] 98.4 °F (36.9 °C)  Heart Rate:  [] 91  Resp:  [16-18] 16  BP: (113-136)/(52-67) 113/59  Body mass index is 29.97 kg/m².    Objective:  General Appearance:  Comfortable and in no acute distress (Chronically ill-appearing).    Vital signs: (most recent): Blood pressure 113/59, pulse 91, temperature 98.4 °F (36.9 °C), temperature source Temporal, resp. rate 16, height 165.1 cm (65\"), weight 81.7 kg (180 lb 1.9 oz), SpO2 95 %, not currently breastfeeding.    Lungs:  Normal effort and normal respiratory rate.    Heart: Normal rate.  Regular rhythm.    Abdomen: Abdomen is soft.  Bowel sounds are normal.   There is no abdominal tenderness.     Extremities: There is no dependent edema or local swelling.    Neurological: Patient is alert and oriented to person, place and time.    Skin:  Warm and dry.              Results Review:       I reviewed the patient's new clinical results.    Results from last 7 days   Lab Units 21  0521  0652 21  0524 21  0658 21  0650 21  0243 21  1810 21  0209   WBC 10*3/mm3 10.69 10.24 10.45 11.79* 10.79 9.43  --  9.00   HEMOGLOBIN g/dL 9.6* 9.7* 9.9* 10.4* 9.2* 9.5*  9.5* 9.2* 9.0*   PLATELETS 10*3/mm3 165 133* 140 96* 107* 114*  --  129*     Results from last 7 days   Lab Units 21  0521  0652 21  0524 21  0658 21  0650 21  0243 21  0209   SODIUM mmol/L 135* 134* 136 139 140  137 140 144   POTASSIUM mmol/L 4.2 4.1 4.9 2.9* 3.6  3.4* 3.6 4.0   CHLORIDE mmol/L 100 100 106 104 104  103 102 108*   CO2 mmol/L 26.7 26.3 19.4* 22.4 " 19.9*  22.3 22.2 16.0*   BUN mg/dL 28* 15 28* 23 46*  47* 42* 87*   CREATININE mg/dL 4.29* 3.14* 4.63* 3.68* 6.14*  5.26* 5.14* 7.71*   CALCIUM mg/dL 7.6* 7.9* 7.6* 7.8* 7.1*  7.1* 7.3* 7.0*   GLUCOSE mg/dL 92 79 66 91 64*  67 68 57*     Results from last 7 days   Lab Units 07/04/21  0658 07/03/21  1826 07/02/21  0243 07/01/21  0209   INR  1.35* 1.20* 1.41* 1.52*             Scheduled Meds:   amLODIPine, 5 mg, Oral, Q24H  calcitriol, 0.25 mcg, Oral, Once per day on Mon Thu  carvedilol, 12.5 mg, Oral, BID With Meals  castor oil-balsam peru, , Topical, Q12H  epoetin hermes/hermes-epbx, 5,000 Units, Subcutaneous, Once per day on Mon Wed Fri  levothyroxine, 50 mcg, Oral, Q AM  lidocaine-prilocaine, , Topical, Once  miconazole, 1 application, Topical, Q12H  pantoprazole, 40 mg, Oral, Q AM  polyethylene glycol, 1 bottle, Oral, Once  sertraline, 50 mg, Oral, Daily  sodium chloride, 10 mL, Intravenous, Q12H      PRN Meds:   •  aluminum-magnesium hydroxide-simethicone  •  calcium carbonate  •  dextrose  •  dextrose  •  glucagon (human recombinant)  •  heparin (porcine)  •  ipratropium-albuterol  •  labetalol  •  nitroglycerin  •  ondansetron  •  potassium chloride  •  potassium chloride  •  [COMPLETED] Insert peripheral IV **AND** sodium chloride  •  sodium chloride  •  sodium chloride  •  sodium chloride  Continuous Infusions:  sodium chloride, 30 mL/hr, Last Rate: 30 mL/hr (07/01/21 1124)  sodium chloride, 30 mL/hr, Last Rate: 30 mL/hr (07/04/21 0838)        Assessment/Plan   Active Hospital Problems    Diagnosis  POA   • **Anemia, posthemorrhagic, acute [D62]  Unknown   • ESRD (end stage renal disease) (CMS/HCC) [N18.6]  Unknown   • Anemia, chronic disease [D63.8]  Unknown   • Systolic CHF, chronic (CMS/HCC) [I50.22]  Yes   • History of CVA (cerebrovascular accident) [Z86.73]  Not Applicable   • Gastrointestinal hemorrhage with melena [K92.1]  Unknown   • Hypothyroidism (acquired) [E03.9]  Yes   • S/P CABG (coronary  artery bypass graft) [Z95.1]  Not Applicable   • Hemorrhagic stroke (CMS/HCC) [I61.9]  Yes   • Atrial fibrillation (CMS/HCC) [I48.91]  Yes   • 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 pectoris (CMS/HCC) [I25.118]  Yes   • Primary Parkinsonism (CMS/HCC) [G20]  Yes   • DM type 2 with diabetic peripheral neuropathy (CMS/HCC) [E11.42]  Yes   • Essential hypertension [I10]  Yes      Resolved Hospital Problems    Diagnosis Date Resolved POA   • Hypotension [I95.9] 07/02/2021 Yes       Assessment & Plan    1.  Anemia.  Hemoglobin stable.  Currently not on anticoagulation.  Has signed off.  Patient is status post colonoscopy.     2.  End-stage renal disease: Nephrology having daily discussions regarding ongoing dialysis versus palliation.  At present the patient tells me she wishes to proceed with HD.     3.  Sepsis due to UTI with septic shock.  Resolved.  Completed antibiotics.  Patient's blood pressure currently stable.     4.  Hyperglycemia,  Monitor blood glucose.  Currently stable.     5.  Coronary artery disease, patient also has associated atrial fibrillation and systolic CHF.  Ongoing management per cardiology.     6.  CODE STATUS is full code.  Palliative care discussion noted.    At present patient tells me she wishes to continue dialysis.  If this remains the case then anticipate she can be discharged tomorrow.  She is going home with home health and outpatient dialysis has been arranged.      I wore full protective equipment throughout the patient encounter including eye protection and facemask.  Hand hygiene was performed before donning protective equipment and after removal when leaving the room.    Marcus Dodd MD  [facility]  07/07/21  12:38 EDT

## 2021-07-07 NOTE — NURSING NOTE
HD WITHOUT INCIDENT OR C/O. TOLERATED WELL. REMOVED 2 L AS ORDERED. NO MEDS ORDERED. NO MEDS GIVEN. AVF NEEDLES REMOVED X 2.  HEMOSTASIS ACHIEVED. PT. STABLE WITHOUT C/O. UPON COMPLETION AND RETURN TO ROOM.

## 2021-07-08 VITALS
BODY MASS INDEX: 27.47 KG/M2 | HEART RATE: 76 BPM | RESPIRATION RATE: 16 BRPM | TEMPERATURE: 97.5 F | DIASTOLIC BLOOD PRESSURE: 54 MMHG | HEIGHT: 65 IN | WEIGHT: 164.9 LBS | SYSTOLIC BLOOD PRESSURE: 127 MMHG | OXYGEN SATURATION: 99 %

## 2021-07-08 LAB
GLUCOSE BLDC GLUCOMTR-MCNC: 112 MG/DL (ref 70–130)
GLUCOSE BLDC GLUCOMTR-MCNC: 329 MG/DL (ref 70–130)

## 2021-07-08 PROCEDURE — 82962 GLUCOSE BLOOD TEST: CPT

## 2021-07-08 RX ORDER — ASPIRIN 81 MG/1
81 TABLET ORAL DAILY
Status: DISCONTINUED | OUTPATIENT
Start: 2021-07-08 | End: 2021-07-08 | Stop reason: HOSPADM

## 2021-07-08 RX ORDER — LIDOCAINE 40 MG/G
CREAM TOPICAL AS NEEDED
Status: DISCONTINUED | OUTPATIENT
Start: 2021-07-08 | End: 2021-07-08 | Stop reason: HOSPADM

## 2021-07-08 RX ORDER — PANTOPRAZOLE SODIUM 40 MG/1
40 TABLET, DELAYED RELEASE ORAL DAILY
Start: 2021-07-08 | End: 2021-07-08

## 2021-07-08 RX ORDER — LOPERAMIDE HYDROCHLORIDE 2 MG/1
2 CAPSULE ORAL 3 TIMES DAILY PRN
Status: DISCONTINUED | OUTPATIENT
Start: 2021-07-08 | End: 2021-07-08 | Stop reason: HOSPADM

## 2021-07-08 RX ORDER — CALCITRIOL 0.25 UG/1
0.25 CAPSULE, LIQUID FILLED ORAL 2 TIMES WEEKLY
Qty: 8 CAPSULE | Refills: 0 | Status: SHIPPED | OUTPATIENT
Start: 2021-07-12 | End: 2021-07-28

## 2021-07-08 RX ORDER — CALCITRIOL 0.25 UG/1
0.25 CAPSULE, LIQUID FILLED ORAL 2 TIMES WEEKLY
Start: 2021-07-12 | End: 2021-07-08

## 2021-07-08 RX ORDER — PANTOPRAZOLE SODIUM 40 MG/1
40 TABLET, DELAYED RELEASE ORAL DAILY
Qty: 30 TABLET | Refills: 0 | Status: SHIPPED | OUTPATIENT
Start: 2021-07-08 | End: 2021-08-07

## 2021-07-08 RX ADMIN — CASTOR OIL AND BALSAM, PERU 5 G: 788; 87 OINTMENT TOPICAL at 09:38

## 2021-07-08 RX ADMIN — CARVEDILOL 12.5 MG: 12.5 TABLET, FILM COATED ORAL at 09:34

## 2021-07-08 RX ADMIN — AMLODIPINE BESYLATE 5 MG: 5 TABLET ORAL at 09:28

## 2021-07-08 RX ADMIN — PANTOPRAZOLE SODIUM 40 MG: 40 TABLET, DELAYED RELEASE ORAL at 06:32

## 2021-07-08 RX ADMIN — CALCITRIOL 0.25 MCG: 0.25 CAPSULE ORAL at 09:28

## 2021-07-08 RX ADMIN — LEVOTHYROXINE SODIUM 50 MCG: 0.05 TABLET ORAL at 06:32

## 2021-07-08 RX ADMIN — SERTRALINE HYDROCHLORIDE 50 MG: 50 TABLET, FILM COATED ORAL at 09:28

## 2021-07-08 NOTE — DISCHARGE SUMMARY
Date of Admission: 6/29/2021  Date of Discharge:  7/8/2021  Primary Care Physician: Robby Chaney MD     Discharge Diagnosis:  Active Hospital Problems    Diagnosis  POA   • **Anemia, posthemorrhagic, acute [D62]  Unknown   • ESRD (end stage renal disease) (CMS/[facility]) [N18.6]  Unknown   • Anemia, chronic disease [D63.8]  Unknown   • Systolic CHF, chronic (CMS/[facility]) [I50.22]  Yes   • History of CVA (cerebrovascular accident) [Z86.73]  Not Applicable   • Gastrointestinal hemorrhage with melena [K92.1]  Unknown   • Hypothyroidism (acquired) [E03.9]  Yes   • S/P CABG (coronary artery bypass graft) [Z95.1]  Not Applicable   • Hemorrhagic stroke (CMS/[facility]) [I61.9]  Yes   • Atrial fibrillation (CMS/[facility]) [I48.91]  Yes   • 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 pectoris (CMS/[facility]) [I25.118]  Yes   • Primary Parkinsonism (CMS/[facility]) [G20]  Yes   • DM type 2 with diabetic peripheral neuropathy (CMS/[facility]) [E11.42]  Yes   • Essential hypertension [I10]  Yes      Resolved Hospital Problems    Diagnosis Date Resolved POA   • Hypotension [I95.9] 07/02/2021 Yes       DETAILS OF HOSPITAL STAY     Pertinent Test Results and Procedures Performed    CXR:  Cardiomegaly with vascular congestion.     Head CT showed no acute intracranial abnormality    CT scan of the abdomen and pelvis:  1. Mejía catheter within a collapsed urinary bladder, there is   thickening. There is perinephric fat induration, in part seen on the   previous examination, nonspecific finding which can be seen with   pyelonephritis.   2. Left adrenal nodule slightly increased in size since 2016. Currently   13 mm.   3. Porcelain gallbladder filled with gallstones, no overt evidence to   suggest cholecystitis.   4 diverticulosis of the colon.     Hospital Course  This is a 70-year-old female with anemia, end-stage renal disease, coronary artery disease who was admitted to the hospital with altered mental status, lack of oral intake and dark  foul-smelling urine. Please see H&P for full details admission. She was initially admitted to the intensive care unit and treated with antibiotics for UTI and septic shock. She was found to have melena as well. GI was consulted along with nephrology. She tolerated dialysis. Mental status improved. She was stabilized and transitioned out to the telemetry floor. For the melena and anemia she was placed on a PPI drip and GI ultimately performed EGD and colonoscopy that were negative for any bleeding. Hemoglobin stabilized and no further work-up was required from a GI perspective and they have signed off. She has been restarted on blood thinners.  Discussions were had along the way with the patient and family regarding palliation and whether or not she would want to continue along with dialysis as an outpatient. Ultimately she has decided to continue. She has been fully treated for the UTI with septic shock, hemoglobin is stable, and she is tolerating her routine dialysis. Therefore, she can be discharged home with home health and will go to outpatient dialysis. She is medically stable and will be released today.      Physical Exam at Discharge:  General: No acute distress, AAOx3, chronically ill-appearing  HEENT: EOMI, PERRL  Cardiovascular: +s1 and s2, RRR  Lungs: No rhonchi or wheezing  Abdomen: soft, nontender    Consults:   Consults     Date and Time Order Name Status Description    7/1/2021  3:06 PM Inpatient Cardiology Consult Completed     7/1/2021  8:33 AM Inpatient Internal Medicine Consult      6/30/2021  3:37 PM Inpatient Gastroenterology Consult Completed     6/29/2021  6:39 AM Inpatient Nephrology Consult Completed     6/29/2021  4:26 AM Inpatient Nephrology Consult Completed     6/29/2021  3:11 AM Pulmonology (on-call MD unless specified) Completed     6/29/2021  2:54 AM [facility] (on-call MD unless specified) Details Completed             Condition on Discharge: Stable, improved    Discharge  Disposition  Skilled Nursing Facility (DC - External)    Discharge Medications     Discharge Medications      New Medications      Instructions Start Date   calcitriol 0.25 MCG capsule  Commonly known as: ROCALTROL   0.25 mcg, Oral, 2 Times Weekly   Start Date: July 12, 2021     epoetin hermes-epbx 3000 UNIT/ML injection  Commonly known as: RETACRIT   5,000 Units, Subcutaneous, 3 Times Weekly   Start Date: July 9, 2021     pantoprazole 40 MG EC tablet  Commonly known as: PROTONIX   40 mg, Oral, Daily         Changes to Medications      Instructions Start Date   Accu-Chek Delia Plus test strip  Generic drug: glucose blood  What changed: Another medication with the same name was removed. Continue taking this medication, and follow the directions you see here.   TEST THREE TIMES DAILY      apixaban 5 MG tablet tablet  Commonly known as: Eliquis  What changed: how much to take   5 mg, Oral, 2 Times Daily         Continue These Medications      Instructions Start Date   Accu-Chek FastClix Lancets misc   Use to test blood sugar 4 times daily  Dispense what insurance will approve E11.8      Accu-Chek Softclix Lancets lancets   Use to test blood sugar 3 times daily e11.8      Accu-Chek Guide w/Device kit   1 Device, Does not apply, 4 Times Daily, Use to test blood sugar 4 times daily  Dispense what insurance will approve E11.8      amLODIPine 5 MG tablet  Commonly known as: NORVASC   5 mg, Oral, Daily      aspirin 81 MG tablet   81 mg, Oral, Every Morning, TO STOP THE DAY BEFORE SURGERY      carvedilol 12.5 MG tablet  Commonly known as: COREG   12.5 mg, Oral, 2 Times Daily      glucose monitor monitoring kit   1 each, Does not apply, As Needed      IMODIUM A-D PO   1 tablet, Oral, Every 4 Hours PRN      levothyroxine 50 MCG tablet  Commonly known as: SYNTHROID, LEVOTHROID   50 mcg, Oral, Daily      montelukast 10 MG tablet  Commonly known as: SINGULAIR   10 mg, Oral, Nightly      multivitamin tablet tablet  Commonly known as:  THERAGRAN   1 tablet, Oral, Every Morning      PROBIOTIC DAILY PO   1 capsule, Oral, Daily      sertraline 50 MG tablet  Commonly known as: ZOLOFT   50 mg, Oral, Daily         Stop These Medications    Baqsimi Two Pack 3 MG/DOSE powder  Generic drug: Glucagon     bumetanide 2 MG tablet  Commonly known as: BUMEX     epoetin hermes 85437 UNIT/ML injection  Commonly known as: EPOGEN,PROCRIT     Florencia-Sequels 65-25 MG CR tablet  Generic drug: Ferrous Fumarate-Vitamin C ER     gabapentin 300 MG capsule  Commonly known as: NEURONTIN     losartan 50 MG tablet  Commonly known as: COZAAR     pyridoxine 500 MG tablet  Commonly known as: VITAMIN B-6     topiramate 100 MG tablet  Commonly known as: TOPAMAX     Tradjenta 5 MG tablet tablet  Generic drug: linagliptin     Vitamin D-3 25 MCG (1000 UT) capsule            Discharge Diet:   Diet Instructions     Diet: Regular, Renal      Discharge Diet:  Regular  Renal             Activity at Discharge:   Activity Instructions     Activity as Tolerated            Follow-up Appointments  Future Appointments   Date Time Provider Department Center   7/9/2021  1:00 PM ROOM 2 [facility]   7/16/2021  1:00 PM ROOM 2 [facility]   7/23/2021  1:00 PM ROOM 2 [facility]   7/30/2021  1:00 PM ROOM 2 [facility]   8/19/2021  3:45 PM Glory Perales MD [facility]   8/24/2021  1:30 PM Brianda Dwyer APRN [facility]   9/22/2021  2:15 PM Purvi Grant MD [facility]   11/15/2021  1:45 PM Eddie Hodges MD [facility]   1/10/2022  1:00 PM Jose Florentino MD [facility]     Additional Instructions for the Follow-ups that You Need to Schedule     Ambulatory Referral to Home Health   As directed      Face to Face Visit Date: 7/8/2021    Follow-up provider for Plan of Care?: I treated the patient in an acute care facility and will not continue treatment after discharge.    Follow-up provider: GLORY PERALES  EDU [[id number]]    Reason/Clinical Findings: weakness and deconditioning    Describe mobility limitations that make leaving home difficult: weakness and deconditioning    Nursing/Therapeutic Services Requested: Physical Therapy Occupational Therapy Skilled Nursing    Skilled nursing orders: Medication education    Frequency: 1 Week 1         Discharge Follow-up with PCP   As directed       Currently Documented PCP:    Robby Chaney MD    PCP Phone Number:    [phone]     Follow Up Details: 1 week         Discharge Follow-up with Specialty: Nephrology per dialysis schedule   As directed      Specialty: Nephrology per dialysis schedule         Discharge Follow-up with Specified Provider: Dr. Hodges in 1 month   As directed      To: Dr. Hodges in 1 month               I have examined and discussed discharge planning with the patient today.    I wore full protective equipment throughout the patient encounter including eye protection and facemask.  Hand hygiene was performed before donning protective equipment and after removal when leaving the room.     Marcus Dodd MD  07/08/21  12:14 EDT    Time: Discharge greater than 30 min

## 2021-07-08 NOTE — CASE MANAGEMENT/SOCIAL WORK
Case Management Discharge Note      Final Note: Pt discharged home with [facility] and Outpatient HD arranged at [facility].   JOHNATHAN Enriquez RN         Selected Continued Care - Admitted Since 6/29/2021     Destination    No services have been selected for the patient.              Durable Medical Equipment    No services have been selected for the patient.              Dialysis/Infusion Coordination complete    Service Provider Selected Services Address Phone Fax Patient Preferred    [facility] [address]  [facility] [address] [facility] [address] [phone] --          Home Medical Care Coordination complete    Service Provider Selected Services Address Phone Fax Patient Preferred    [facility]  Home Health Services [address] [address] [phone] --          Therapy    No services have been selected for the patient.              Community Resources    No services have been selected for the patient.              Community & DME    No services have been selected for the patient.                Selected Continued Care - Prior Encounters Includes selections from prior encounters from 3/31/2021 to 7/8/2021    Discharged on 5/9/2021 Admission date: 5/6/2021 - Discharge disposition: Home-Health Care Svc    Home Medical Care     Service Provider Selected Services Address Phone Fax Patient Preferred    [facility]  Home Health Services [address] [place] [address] [phone] --                         Final Discharge Disposition Code: 06 - home with home health care

## 2021-07-08 NOTE — PROGRESS NOTES
[facility] Progress Note      Patient Name: Maribeth Rendon  : 1950  MRN: [id number]  Primary Care Physician:  Robby Chaney MD  Date of admission: 2021    Subjective     Interval History:     Patient underwent hemodialysis today without any complications    Today patient is surrounded by family members including 2 daughters    Patient is more interactive and enjoying breakfast      Review of Systems:   As noted above    Objective     Vitals:   Temp:  [97.1 °F (36.2 °C)-99.1 °F (37.3 °C)] 97.5 °F (36.4 °C)  Heart Rate:  [] 76  Resp:  [16] 16  BP: (107-143)/(40-73) 127/54    Intake/Output Summary (Last 24 hours) at 2021 0923  Last data filed at 2021 1200  Gross per 24 hour   Intake --   Output 2000 ml   Net -2000 ml       Physical Exam:      Constitutional: Pt alert  Overweight the BMI of 29  HEENT: Sclera anicteric, no conjunctival injection  Neck: Supple, no thyromegaly, no lymphadenopathy, trachea at midline, no JVD  Chest external wound in place, healed   Respiratory: Clear to auscultation bilaterally, nonlabored respiration  Cardiovascular: RRR, systolic ejection murmur  Gastrointestinal: Positive bowel sounds, abdomen is soft, nontender and nondistended  : No palpable bladder  Musculoskeletal: No edema, no clubbing or cyanosis.  Left upper extremity AV fistula in place  Psychiatric: Appropriate affect, cooperative  Neurologic: pt alert  moving all extremities, normal speech and mental status  Skin: Warm and dry        Scheduled Meds:     amLODIPine, 5 mg, Oral, Q24H  calcitriol, 0.25 mcg, Oral, Once per day on   carvedilol, 12.5 mg, Oral, BID With Meals  castor oil-balsam peru, , Topical, Q12H  epoetin hermes/hermes-epbx, 5,000 Units, Subcutaneous, Once per day on   levothyroxine, 50 mcg, Oral, Q AM  lidocaine-prilocaine, , Topical, Once  miconazole, 1 application, Topical, Q12H  pantoprazole, 40 mg, Oral, Q AM  polyethylene  glycol, 1 bottle, Oral, Once  sertraline, 50 mg, Oral, Daily  sodium chloride, 10 mL, Intravenous, Q12H      IV Meds:   sodium chloride, 30 mL/hr, Last Rate: 30 mL/hr (07/01/21 1124)  sodium chloride, 30 mL/hr, Last Rate: 30 mL/hr (07/04/21 0838)        Results Reviewed:   I have personally reviewed the results from the time of local ice packs were placed admission to 7/8/2021 09:23 EDT     Results from last 7 days   Lab Units 07/07/21 0523 07/06/21 0652 07/05/21  0524   WBC 10*3/mm3 10.69 10.24 10.45   HEMOGLOBIN g/dL 9.6* 9.7* 9.9*   HEMATOCRIT % 30.2* 30.4* 30.7*   PLATELETS 10*3/mm3 165 133* 140         Results from last 7 days   Lab Units 07/07/21 0523 07/06/21 0652 07/05/21  0524 07/03/21  0650   SODIUM mmol/L 135* 134* 136 140  137   POTASSIUM mmol/L 4.2 4.1 4.9 3.6  3.4*   CHLORIDE mmol/L 100 100 106 104  103   CO2 mmol/L 26.7 26.3 19.4* 19.9*  22.3   BUN mg/dL 28* 15 28* 46*  47*   CREATININE mg/dL 4.29* 3.14* 4.63* 6.14*  5.26*   CALCIUM mg/dL 7.6* 7.9* 7.6* 7.1*  7.1*   BILIRUBIN mg/dL  --   --   --  0.3   ALK PHOS U/L  --   --   --  54   ALT (SGPT) U/L  --   --   --  10   AST (SGOT) U/L  --   --   --  20   GLUCOSE mg/dL 92 79 66 64*  67       Estimated Creatinine Clearance: 12.3 mL/min (A) (by C-G formula based on SCr of 4.29 mg/dL (H)).    Results from last 7 days   Lab Units 07/06/21  0909 07/06/21  0652 07/03/21  0650 07/02/21  0243   MAGNESIUM mg/dL  --   --  1.5* 1.5*   PHOSPHORUS mg/dL 2.3* 2.2* 4.7* 4.7*             Results from last 7 days   Lab Units 07/07/21  0523 07/06/21  0652 07/05/21  0524 07/04/21  0658 07/03/21  0650   WBC 10*3/mm3 10.69 10.24 10.45 11.79* 10.79   HEMOGLOBIN g/dL 9.6* 9.7* 9.9* 10.4* 9.2*   PLATELETS 10*3/mm3 165 133* 140 96* 107*       Results from last 7 days   Lab Units 07/04/21  0658 07/03/21  1826 07/02/21  0243   INR  1.35* 1.20* 1.41*       Assessment / Plan         ASSESSMENT:    -Acute on chronic kidney disease 5 to ESRD  ( likely progression from  "underlying condition)  baseline creatinine around 3.4-3.8 , peaked at  9, associated with altered mental status and metabolic acidosis and hyperkalemia. Pt with clear uremic symptoms , fulfilling criteria to start HD .Currently on HD .  Currently undergoing hemodialysis 3 times a week, access functional but will require fistulogram  as an outpatient since initiation of hemodialysis dramatic improvement for electrolyte derangements and mentation     -Metabolic bone disease with secondary hyperparathyroidism  . Her PTH is 474 . On  calcitriol 0.25 BIW . Will follow trend     -Acute on Chronic normocytic anemia ; on  EPOGEN 5000 units SC TIW  ,followed by gastroenterology s/p EGD and colonoscopy \"  showing multiple diverticuli in the sigmoid colon.  Non bleeding internal hemorrhoids grade 1 were found.\"  Following H&H closely    -UTI : on ceftriaxone renally adjusted , completed treatment course     -CAD status post coronary artery bypass grafting times 4/2019, chronic systolic congestive heart failure with EF of 57% (5/2021). 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     PLAN:  -Patient  and family agreeable to continue hemodialysis on outpatient  -Family interested in considering home hemodialysis ,further education  will be provided in the dialysis unit as an outpatient.  -Continue HD during admission , currently on a Monday, Wednesday and Friday schedule.   -Patient  will be discharged to [facility] dialysis unit to continue her treatment as an outpatient  -On  EPOGEN protocol   -On calcitriol   -Adjust medications to GFR    Thank you for involving us in the care of Maribeth Rendon.  Please feel free to call with any questions.    Brijesh Randolph MD  07/08/21  09:23 EDT    [facility]  [phone]      Much of this encounter note is an electronic transcription/translation of spoken language to printed text. The electronic " translation of spoken language may permit erroneous, or at times, nonsensical words or phrases to be inadvertently transcribed; Although I have reviewed the note for such errors, some may still exist.

## 2021-07-08 NOTE — CASE MANAGEMENT/SOCIAL WORK
Continued Stay Note  [facility]     Patient Name: Maribeth Rendon  MRN: [id number]  Today's Date: 7/8/2021    Admit Date: 6/29/2021    Discharge Plan     Row Name 07/08/21 1242       Plan    Plan  Plan home with [facility] HH and Outpatient HD at [facility].  JOHNATHAN Enriquez RN    Plan Comments  Spoke to pt and several family members (daughters) at bedside.  Pt with discharge order.  Pt  has outpatient HD arranged at [facility].  Dialysis Center called ([phone]) and spoke with Kylah who states pt dialysis is arranged and first appointment is for Dahlia Khan  ( [phone]) with [facility] called and informed of pt's discharge.  Pt denies any other discharge needs.  Plan home with [facility] HH and Outpatient HD at [facility].  JOHNATHAN Enriquez RN        Discharge Codes    No documentation.       Expected Discharge Date and Time     Expected Discharge Date Expected Discharge Time    Jul 8, 2021             Maribeth Enriquez, RN

## 2021-07-08 NOTE — PLAN OF CARE
Goal Outcome Evaluation:  Patient is resting abed, with HOB raised 20-30 degrees and nursing assists with turns and repositioning. Treatment of antifungal cream applied to bilateral inner groins for moderate areas of yeast. Patient up to BSC with assist x1 and did void x1 this shift, dark yellow urine. Her VSS, she is on room air with lungs that are clear to diminished bilaterally.

## 2021-07-09 ENCOUNTER — APPOINTMENT (OUTPATIENT)
Dept: INFUSION THERAPY | Facility: HOSPITAL | Age: 71
End: 2021-07-09

## 2021-07-14 ENCOUNTER — TELEPHONE (OUTPATIENT)
Dept: FAMILY MEDICINE CLINIC | Facility: CLINIC | Age: 71
End: 2021-07-14

## 2021-07-14 NOTE — TELEPHONE ENCOUNTER
Maribeth Rendon (Self) [phone] (H)           Caller: Maribeth Rendon    Relationship to patient: Self    Best call back number:  [phone] (H)    New or established patient?  [] New  [x] Established    Date of discharge: 7-9-21     Facility discharged from:  [facility]     Diagnosis/Symptoms  KIDNEY FAILURE     Length of stay (If applicable):      Specialty Only: Did you see a [facility] provider?    [] Yes  [x] No    THE SOONEST APPT AVAIL WITH , WAS ON THE 28TH    SHE HAS DIALYSIS ON TUES, THURS, AND SAT.     IF YOU NEED HER TO COME IN EARLIER, PLEASE CALL PATIENT TO RESCHEDULE

## 2021-07-15 NOTE — PROGRESS NOTES
7/15/2021 1400-Contacted [facility] regarding whether Maribeth Rendon would still need to come to [facility] for Procrit injections weekly.  Order noted.

## 2021-07-16 ENCOUNTER — HOSPITAL ENCOUNTER (OUTPATIENT)
Dept: INFUSION THERAPY | Facility: HOSPITAL | Age: 71
Discharge: HOME | End: 2021-07-16

## 2021-07-20 ENCOUNTER — TELEPHONE (OUTPATIENT)
Dept: FAMILY MEDICINE CLINIC | Facility: CLINIC | Age: 71
End: 2021-07-20

## 2021-07-20 NOTE — TELEPHONE ENCOUNTER
Hub please inform patient    LEFT MESSAGE FOR RACHEL (DAUGHTER).  NEED TO KNOW IF SHE IS NEEDING THE FMLA FORMS COMPLETED FOR: CONTINUOUS LEAVE, INTERMITTENT LEAVE, OR REDUCED HOURS.    WE NEED THIS INFORMATION IN ORDER TO COMPLETE THE FMLA FORM.

## 2021-07-23 ENCOUNTER — APPOINTMENT (OUTPATIENT)
Dept: INFUSION THERAPY | Facility: HOSPITAL | Age: 71
End: 2021-07-23

## 2021-07-28 ENCOUNTER — OFFICE VISIT (OUTPATIENT)
Dept: FAMILY MEDICINE CLINIC | Facility: CLINIC | Age: 71
End: 2021-07-28

## 2021-07-28 DIAGNOSIS — I50.22 SYSTOLIC CHF, CHRONIC (HCC): ICD-10-CM

## 2021-07-28 DIAGNOSIS — E11.22 TYPE 2 DIABETES MELLITUS WITH STAGE 4 CHRONIC KIDNEY DISEASE, WITHOUT LONG-TERM CURRENT USE OF INSULIN (HCC): ICD-10-CM

## 2021-07-28 DIAGNOSIS — N18.6 ESRD (END STAGE RENAL DISEASE) (HCC): Primary | ICD-10-CM

## 2021-07-28 DIAGNOSIS — I48.91 ATRIAL FIBRILLATION, UNSPECIFIED TYPE (HCC): ICD-10-CM

## 2021-07-28 DIAGNOSIS — I25.118 CORONARY ARTERY DISEASE OF NATIVE ARTERY OF NATIVE HEART WITH STABLE ANGINA PECTORIS (HCC): ICD-10-CM

## 2021-07-28 DIAGNOSIS — N18.4 TYPE 2 DIABETES MELLITUS WITH STAGE 4 CHRONIC KIDNEY DISEASE, WITHOUT LONG-TERM CURRENT USE OF INSULIN (HCC): ICD-10-CM

## 2021-07-28 PROBLEM — I61.9 HEMORRHAGIC STROKE (HCC): Status: RESOLVED | Noted: 2020-09-18 | Resolved: 2021-07-28

## 2021-07-28 PROBLEM — Z86.73 HISTORY OF CVA (CEREBROVASCULAR ACCIDENT): Status: RESOLVED | Noted: 2021-07-01 | Resolved: 2021-07-28

## 2021-07-28 PROBLEM — J81.0 ACUTE PULMONARY EDEMA (HCC): Status: RESOLVED | Noted: 2021-05-06 | Resolved: 2021-07-28

## 2021-07-28 PROBLEM — R41.82 ALTERED MENTAL STATE: Status: ACTIVE | Noted: 2021-01-25

## 2021-07-28 PROCEDURE — 99214 OFFICE O/P EST MOD 30 MIN: CPT | Performed by: INTERNAL MEDICINE

## 2021-07-28 RX ORDER — CARVEDILOL 12.5 MG/1
6.25 TABLET ORAL 2 TIMES DAILY
Qty: 90 TABLET | Refills: 3
Start: 2021-07-28 | End: 2022-01-01

## 2021-07-28 RX ORDER — TOPIRAMATE 100 MG/1
TABLET, FILM COATED ORAL
COMMUNITY
Start: 2021-06-21 | End: 2021-07-28

## 2021-07-28 RX ORDER — LOSARTAN POTASSIUM 50 MG/1
TABLET ORAL
COMMUNITY
Start: 2021-07-21 | End: 2021-07-28

## 2021-07-28 NOTE — PROGRESS NOTES
Subjective Chief complaint is follow-up after discharge from the hospital  Maribeth Rendon is a 70 y.o. female.     History of Present Illness Maribeth is here today for follow-up.  She was admitted to [facility] on 29 June with altered mental status and was ultimately found to have sepsis due to urinary tract infection.  Her CT of the abdomen did show some perinephric stranding.  She also had an elevated creatinine of greater than 9.  She began dialysis while in hospital and has continued with this as an outpatient.  She is having low blood pressures at dialysis and at home.  A lot of her medicines have been stopped.  Her blood pressure was difficult to hear today.  I am going to have her cut back on her carvedilol to half a tablet twice daily.  We may eventually have to have her leave it off on the mornings of dialysis and only take an afternoon dose if her pressure holds.  Her diabetes looks like it stayed fairly well controlled while in the hospital.  She is now off of her Tradjenta.  Her A1c at dialysis a few days ago was 6.5.  I think we can have her remain off of that.  She is getting A1c tests every month and if it gets above 7 we may reinitiated.  On admission her chest x-ray showed bilateral vascular congestion likely due to her systolic heart failure and atrial fibrillation.  Dialysis seems to have resolved a lot of her fluid problems.  She does have coronary artery disease.  She is on blood thinner.  She had what appeared like some melena during the hospitalization.  Upper and lower endoscopies did not show any source.  Her Eliquis was initially held but she has now been restarted on it.  Current outpatient and discharge medications have been reconciled for the patient.  Reviewed by: Robby Chaney MD    The following portions of the patient's history were reviewed and updated as appropriate: allergies, current medications, past family history, past medical history, past social history, past  surgical history and problem list.    Review of Systems   Constitutional: Negative for chills and fever.   Respiratory: Negative for shortness of breath.    Gastrointestinal: Negative for abdominal pain.       Objective   Physical Exam  Constitutional:       Appearance: Normal appearance.   Cardiovascular:      Rate and Rhythm: Normal rate.   Pulmonary:      Effort: Pulmonary effort is normal.      Breath sounds: No wheezing, rhonchi or rales.   Neurological:      Mental Status: She is alert.           Assessment/Plan   Diagnoses and all orders for this visit:    1. ESRD (end stage renal disease) (CMS/[facility]) (Primary)    2. Type 2 diabetes mellitus with stage 4 chronic kidney disease, without long-term current use of insulin (CMS/[facility])    3. Systolic CHF, chronic (CMS/[facility])    4. Atrial fibrillation, unspecified type (CMS/[facility])    5. Coronary artery disease of native artery of native heart with stable angina pectoris (CMS/[facility])    Other orders  -     carvedilol (COREG) 12.5 MG tablet; Take 0.5 tablets by mouth 2 (Two) Times a Day.  Dispense: 90 tablet; Refill: 3    Maribeth is here today for follow-up.  She is having low blood pressures and it was difficult to obtain here today.  I am going to have her back off to 6-1/4 mg of carvedilol twice daily.  I am going to see her back in 3 months but they will call me if her pressures continue to be low and we will make a other adjustments.

## 2021-08-06 ENCOUNTER — TRANSCRIBE ORDERS (OUTPATIENT)
Dept: WOUND CARE | Facility: HOSPITAL | Age: 71
End: 2021-08-06

## 2021-08-06 ENCOUNTER — OFFICE VISIT (OUTPATIENT)
Dept: WOUND CARE | Facility: HOSPITAL | Age: 71
End: 2021-08-06

## 2021-08-06 DIAGNOSIS — E11.59 TYPE 2 DIABETES MELLITUS WITH OTHER CIRCULATORY COMPLICATIONS (HCC): Primary | ICD-10-CM

## 2021-08-06 PROCEDURE — G0463 HOSPITAL OUTPT CLINIC VISIT: HCPCS

## 2021-08-13 ENCOUNTER — HOSPITAL ENCOUNTER (OUTPATIENT)
Dept: CARDIOLOGY | Facility: HOSPITAL | Age: 71
End: 2021-08-13

## 2021-08-25 NOTE — TELEPHONE ENCOUNTER
Rx Refill Note  Requested Prescriptions     Pending Prescriptions Disp Refills   • gabapentin (NEURONTIN) 300 MG capsule [Pharmacy Med Name: Gabapentin 300 MG Oral Capsule] 180 capsule 0     Sig: Take 1 capsule by mouth twice daily      Last office visit with prescribing clinician: Visit date not found      Next office visit with prescribing clinician: Visit date not found            Pete Gonzalez MA  08/25/21, 07:45 EDT

## 2021-10-06 NOTE — TELEPHONE ENCOUNTER
Caller: Maribeth Rendon    Relationship: Self    Best call back number: [phone] (M)    What is the best time to reach you: ANYTIME BUT ASAP  Who are you requesting to speak with: DR. PERALES    Do you know the name of the person who called: PATIENT    What was the call regarding: PATIENT WOULD NOT DISCLOSE, STATED SHE WOULD TALK TO HER PCP ABOUT IT.     Do you require a callback: YES     I attempted to call the patient.  The mailbox is full and I could not leave a message

## 2021-11-04 NOTE — PROGRESS NOTES
Subjective Complaint is checkup on blood pressure  Maribeth Rendon is a 70 y.o. female.     History of Present Illness Maribeth is here today for checkup on her blood pressure.  She has end-stage renal disease for which she is on hemodialysis her blood pressure drops at dialysis and they have basically removed her from a lot of her blood pressure medicines.  She is still on low-dose carvedilol for her atrial fibrillation and blood pressure.  She is on Eliquis as her anticoagulant.  She does have type 2 diabetes.  This is followed by an endocrinologist.  She has got an appointment later this month for that.  She gets her A1c checked at dialysis and she reports it is doing okay.  I do not have access to those records.  She does report that she had an eye exam for her diabetes in July.  We will attempt to obtain those records    The following portions of the patient's history were reviewed and updated as appropriate: allergies, current medications, past family history, past medical history, past social history, past surgical history and problem list.    Review of Systems   Constitutional: Negative for chills and fever.   Respiratory: Negative for chest tightness and shortness of breath.    Cardiovascular: Negative for leg swelling.   Neurological: Positive for light-headedness.       Objective   Physical Exam  Constitutional:       Appearance: Normal appearance.   Cardiovascular:      Rate and Rhythm: Normal rate. Rhythm irregular.   Pulmonary:      Effort: Pulmonary effort is normal.      Breath sounds: No wheezing, rhonchi or rales.   Musculoskeletal:      Right lower leg: No edema.      Left lower leg: No edema.   Neurological:      Mental Status: She is alert.           Assessment/Plan   Diagnoses and all orders for this visit:    1. Essential hypertension (Primary)    2. Atrial fibrillation, unspecified type (HCC)    3. DM type 2 with diabetic peripheral neuropathy (HCC)    4. ESRD (end stage renal disease)  ([facility])      Maribeth is here today for follow-up.  Her blood pressure today on a nondialysis day looks okay.  Her atrial fibrillation rate is controlled.  We will let her endocrinologist follow her diabetes.

## 2021-11-15 NOTE — PROGRESS NOTES
6 MO FOLLOW UP   Subjective:        Maribeth Rendon is a 70 y.o. female who here for follow up    CC  Follow-up hypertension hyperlipidemia atrial fibrillation  HPI  70-year-old female with benign essential arterial hypertension, hyperlipidemia atrial fibrillation here for the follow-up with no complaints of chest pains tightness heaviness or the pressure sensation     Problems Addressed this Visit        Cardiac and Vasculature    Hyperlipidemia    Hypertension    Atrial fibrillation (HCC) - Primary    Relevant Orders    Adult Transthoracic Echo Complete W/ Cont if Necessary Per Protocol    Systolic CHF, chronic (HCC)    Relevant Orders    Adult Transthoracic Echo Complete W/ Cont if Necessary Per Protocol      Diagnoses       Codes Comments    Atrial fibrillation, unspecified type (HCC)    -  Primary ICD-10-CM: I48.91  ICD-9-CM: 427.31     Systolic CHF, chronic (HCC)     ICD-10-CM: I50.22  ICD-9-CM: 428.22, 428.0     Primary hypertension     ICD-10-CM: I10  ICD-9-CM: 401.9     Hyperlipidemia, unspecified hyperlipidemia type     ICD-10-CM: E78.5  ICD-9-CM: 272.4         .    The following portions of the patient's history were reviewed and updated as appropriate: allergies, current medications, past family history, past medical history, past social history, past surgical history and problem list.    Past Medical History:   Diagnosis Date   • Achilles tendon tear     left    • Acute pulmonary edema ([facility]) 5/6/2021   • Anemia    • Anxiety    • CKD (chronic kidney disease) stage 5, GFR less than 15 ml/min ([facility]) 1/27/2020   • Depression    • Diabetes mellitus, type 2 ([facility])    • ESRD (end stage renal disease) ([facility])     HAS LEFT FOREARM FISTULA   • GERD (gastroesophageal reflux disease)    • Hemorrhagic stroke ([facility]) 9/18/2020   • History of CVA (cerebrovascular accident) 7/1/2021   • History of MRSA infection 03/15/2016    ABDOMINAL WOUND,   INFECTION CONTROLL NOTIFIED 2-6-2017   • History of transfusion    • Hyperlipidemia   "  • Hypertension    • Hypokalemia    • Hypomagnesemia    • Hypoxic 01/2016    WHEN SHE HAD PNEUMONIA   • Intertrigo    • Leukocytosis    • Osteoarthritis    • Pneumonia 01/2016   • Psoriasis    • Psoriatic arthritis (HCC)    • Seizures (HCC)     one time   • Sepsis (HCC) 01/2016   • SOB (shortness of breath)    • Stage 4 chronic renal impairment associated with type 2 diabetes mellitus (HCC)    • Staph infection     HX RIGHT FOOT AT [facility] 01/09/2010   • Streptococcal bacteremia    • Stroke (cerebrum) (HCC)    • Stroke (HCC)    • Swelling of hand 10/27/2016    LEFT HAND SWELLIMG SINCE LEFT FISTULA SURGERY   • Tremor, essential    • Wears glasses    • Wheelchair dependent     For mobility     reports that she quit smoking about 29 years ago. Her smoking use included cigarettes. She has a 52.00 pack-year smoking history. She has never used smokeless tobacco. She reports previous alcohol use. She reports that she does not use drugs.   Family History   Problem Relation Age of Onset   • Heart attack Mother    • Heart disease Mother    • Kidney disease Mother    • Heart attack Father    • Heart disease Father    • Hypertension Father    • Stroke Father         ISCHEMIC   • Diabetes Father         TYPE 2   • Kidney disease Brother    • Diabetes Brother         TYPE 1   • Thyroid disease Daughter    • Anxiety disorder Maternal Aunt    • Bipolar disorder Maternal Aunt    • Depression Maternal Aunt    • Lupus Maternal Aunt         LUPUS ANTICOAGLULANT   • Rheum arthritis Maternal Aunt    • Alcohol abuse Maternal Uncle    • Other Maternal Uncle         PULMONARY DISEASE       Review of Systems  Constitutional: No wt loss, fever, fatigue  Gastrointestinal: No nausea, abdominal pain  Behavioral/Psych: No insomnia or anxiety   Cardiovascular no chest pains or tightness in the chest  Objective:       Physical Exam  /72   Pulse 98   Ht 160 cm (63\")   LMP  (LMP Unknown)   BMI 29.21 kg/m²   General appearance: No acute " changes   Neck: Trachea midline; NECK, supple, no thyromegaly or lymphadenopathy   Lungs: Normal size and shape, normal breath sounds, equal distribution of air, no rales and rhonchi   CV: S1-S2 regular, no murmurs, no rub, no gallop   Abdomen: Soft, nontender; no masses , no abnormal abdominal sounds   Extremities: No deformity , normal color , no peripheral edema   Skin: Normal temperature, turgor and texture; no rash, ulcers            ECG 12 Lead    Date/Time: 11/15/2021 2:55 PM  Performed by: Eddie Hodges MD  Authorized by: Eddie Hodges MD   Comparison: compared with previous ECG   Similar to previous ECG  Rhythm: sinus rhythm  ST Flattening: all    Clinical impression: non-specific ECG              Echocardiogram:        Current Outpatient Medications:   •  aspirin 81 MG tablet, Take 81 mg by mouth Every Morning. TO STOP THE DAY BEFORE SURGERY, Disp: , Rfl:   •  Calcium Acetate 667 MG tablet, , Disp: , Rfl:   •  carvedilol (COREG) 12.5 MG tablet, Take 0.5 tablets by mouth 2 (Two) Times a Day., Disp: 90 tablet, Rfl: 3  •  Eliquis 5 MG tablet tablet, , Disp: , Rfl:   •  gabapentin (NEURONTIN) 300 MG capsule, Take 1 capsule by mouth 2 (two) times a day., Disp: 180 capsule, Rfl: 1  •  levothyroxine (SYNTHROID, LEVOTHROID) 50 MCG tablet, Take 1 tablet by mouth Daily., Disp: 30 tablet, Rfl: 11  •  lidocaine-prilocaine (EMLA) 2.5-2.5 % cream, , Disp: , Rfl:   •  montelukast (SINGULAIR) 10 MG tablet, Take 1 tablet by mouth Every Night., Disp: 90 tablet, Rfl: 1  •  Multiple Vitamin (MULTI VITAMIN DAILY PO), Take 1 tablet by mouth Every Morning., Disp: , Rfl:   •  Probiotic Product (PROBIOTIC DAILY PO), Take 1 capsule by mouth Daily., Disp: , Rfl:   •  sertraline (ZOLOFT) 50 MG tablet, , Disp: , Rfl:   •  ACCU-CHEK JESUS MANUEL PLUS test strip, TEST THREE TIMES DAILY, Disp: 300 each, Rfl: 2  •  Accu-Chek FastClix Lancets misc, Use to test blood sugar 4 times daily  Dispense what insurance will approve  E11.8, Disp: 400 each, Rfl: 3  •  Accu-Chek Softclix Lancets lancets, Use to test blood sugar 3 times daily e11.8, Disp: 300 each, Rfl: 0  •  Blood Glucose Monitoring Suppl (Accu-Chek Guide) w/Device kit, 1 Device 4 (Four) Times a Day. Use to test blood sugar 4 times daily  Dispense what insurance will approve E11.8, Disp: 1 kit, Rfl: 0  •  glucose monitor monitoring kit, 1 each As Needed (diabetes)., Disp: 1 each, Rfl: 1   Assessment:        Patient Active Problem List   Diagnosis   • Menstrual disorder   • Atopic rhinitis   • Anemia in CKD (chronic kidney disease)   • Spasm of cervical paraspinous muscle   • Cervical radiculopathy   • Depression   • DM type 2 with diabetic peripheral neuropathy ([facility])   • Essential hypertension   • Duplay's periarthritis syndrome   • Gastroesophageal reflux disease   • Hyperlipidemia   • Hypertension   • Arthritis   • Seizure disorder ([facility])   • Phlebitis   • Type 2 diabetes mellitus ([facility])   • Vitamin D deficiency   • Chest pain   • Abscess   • Generalized muscle weakness   • Abdominal wall cellulitis   • HTN (hypertension)   • Diabetes mellitus with peripheral autonomic neuropathy ([facility])   • Hyponatremia   • Hypercalcemia   • Dehydration   • Abdominal wall abscess   • Cellulitis of toe of left foot   • Partial Achilles tendon tear, left, initial encounter   • Arthritis of foot   • Essential tremor   • Primary parkinsonism ([facility])   • Abnormal cardiac function test   • 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 pectoris ([facility])   • Atrial fibrillation ([facility])   • Anticoagulated   • CKD (chronic kidney disease) stage 5, GFR less than 15 ml/min ([facility])   • Hypoglycemia   • Low vitamin B12 level   • S/P CABG (coronary artery bypass graft)   • Hypothyroidism (acquired)   • Gastrointestinal hemorrhage with melena   • Systolic CHF, chronic ([facility])   • ESRD (end stage renal disease) ([facility])   • Anemia, chronic disease   • Anemia, posthemorrhagic, acute   • Altered mental state                Plan:            ICD-10-CM ICD-9-CM   1. Atrial fibrillation, unspecified type (HCC)  I48.91 427.31   2. Systolic CHF, chronic (HCC)  I50.22 428.22     428.0   3. Primary hypertension  I10 401.9   4. Hyperlipidemia, unspecified hyperlipidemia type  E78.5 272.4     1. Atrial fibrillation, unspecified type (HCC)  Under control  - Adult Transthoracic Echo Complete W/ Cont if Necessary Per Protocol    2. Systolic CHF, chronic (HCC)  Considering patient's medical condition as well as the risk factors, patient will require echocardiogram for further evaluation for the LV function, four-chamber evaluation, including the pressures, valvular function and  pericardial disease and pericardial effusion    - Adult Transthoracic Echo Complete W/ Cont if Necessary Per Protocol    3. Primary hypertension  Under control    4. Hyperlipidemia, unspecified hyperlipidemia type  Continue current treatment       6 month with echo  COUNSELING:    Maribeth Eagle was given to patient for the following topics: diagnostic results, risk factor reductions, impressions, risks and benefits of treatment options and importance of treatment compliance .       SMOKING COUNSELING:    [unfilled]    Dictated using Dragon dictation

## 2021-11-16 NOTE — TELEPHONE ENCOUNTER
Pt stated she called [facility] several times and never recvd a call back in regards to medication.the patient very upset that no1 ever return calls..the patient stated she waited 2 weeks and she knew the msg was passed on because she spk to  area..advised pt I would address issue and apologize in delay of getting a call from medical assitant..VRR

## 2021-11-16 NOTE — TELEPHONE ENCOUNTER
Spoke to the patient this morning about her diabetic medications.  Patient was on Tradjenta at one point, however with her recent initiation of dialysis Tradjenta has been on hold by the dialysis group.  Explained to the patient that there is no medical literature on on Tradjenta in dialysis group.  Hence we will start the patient on Tresiba 80 units daily.  Patient will take the Tresiba after her dialysis on the dialysis days.    We will look into the insurance if Tresiba is covered for her or not and we will give her a call with an update.

## 2021-11-29 NOTE — PROGRESS NOTES
"Chief Complaint  Diabetes Mellitus (type 2)    Subjective          Maribeth Rendon presents to [facility] ENDOCRINOLOGY  History of Present Illness  I have reviewed PMH, allergies and medications UTD at this visit     Off tradjenta and cant afford tresiba    Type 2 dm    Diagnosed about 10 - 15 years ago.   Today in clinic pt reports being on no medication. Should be on tresiba 8u daily    not on truliicty as they felt that caused the stroke.   Avg Bg - 120-170  Checks BG - a couple times a week   Dm retinopathy - x, Last eye exam - 8/2021  Dm nephropathy - yes, CKD stage 4, sees dr luciano, on dialysis in June 2021 MWF  Dm neuropathy - yes, sees wound care Friday- \" healing\" diabetic ulcer for several months ,Dm neuropathy meds - on gabapentin. Right boot in place   CAD -s/p CABG  CVA - July 2020  Episodes of hypoglycemia - no  Pt is not physically active and in a wheel chair . weight has been stable.   Pt tries to follow DM diet for most part.   On Ace inb.       Objective   Vital Signs:   /86   Pulse 91   Ht 160 cm (63\")   Wt 71.7 kg (158 lb)   SpO2 90%   BMI 27.99 kg/m²     Physical Exam  Vitals reviewed.   Constitutional:       General: She is not in acute distress.  HENT:      Head: Normocephalic and atraumatic.   Cardiovascular:      Rate and Rhythm: Normal rate and regular rhythm.   Pulmonary:      Effort: Pulmonary effort is normal. No respiratory distress.   Musculoskeletal:         General: No signs of injury. Normal range of motion.      Cervical back: Normal range of motion and neck supple.   Skin:     General: Skin is warm and dry.   Neurological:      Mental Status: She is alert and oriented to person, place, and time. Mental status is at baseline.   Psychiatric:         Mood and Affect: Mood normal.         Behavior: Behavior normal.         Thought Content: Thought content normal.         Judgment: Judgment normal.          Result Review :   The following data was reviewed by: " IRENA Tovar on 11/29/2021:  Common labs    Common Labsle 7/5/21 7/5/21 7/6/21 7/6/21 7/7/21 7/7/21    0524 0524 [id number] [id number] 0523 0523   Glucose  66  79  92   BUN  28 (A)  15  28 (A)   Creatinine  4.63 (A)  3.14 (A)  4.29 (A)   eGFR Non African Am  9 (A)  15 (A)  10 (A)   eGFR  Am         Sodium  136  134 (A)  135 (A)   Potassium  4.9  4.1  4.2   Chloride  106  100  100   Calcium  7.6 (A)  7.9 (A)  7.6 (A)   Albumin    2.50 (A)     WBC 10.45  10.24  10.69    Hemoglobin 9.9 (A)  9.7 (A)  9.6 (A)    Hematocrit 30.7 (A)  30.4 (A)  30.2 (A)    Platelets 140  133 (A)  165    (A) Abnormal value       Comments are available for some flowsheets but are not being displayed.                     Assessment and Plan    Diagnoses and all orders for this visit:    1. Type 2 diabetes mellitus with hyperglycemia, without long-term current use of insulin (HCC) (Primary)    2. Stage 3a chronic kidney disease (HCC)        Follow Up   No follow-ups on file.     Labs at dialysis   Higher risk of complication with worsening hyperglycemia  Will see dr conroy in [place]  Start tresiba 8u daily  Samples given   will give her her shot- he is also on insulin himself    Patient was given instructions and counseling regarding her condition or for health maintenance advice. Please see specific information pulled into the AVS if appropriate.     IRENA Tovar

## 2022-01-01 ENCOUNTER — APPOINTMENT (OUTPATIENT)
Dept: CT IMAGING | Facility: HOSPITAL | Age: 72
End: 2022-01-01

## 2022-01-01 ENCOUNTER — HOSPITAL ENCOUNTER (INPATIENT)
Facility: HOSPITAL | Age: 72
LOS: 6 days | Discharge: HOME HEALTH | End: 2022-06-27
Attending: EMERGENCY MEDICINE | Admitting: INTERNAL MEDICINE

## 2022-01-01 ENCOUNTER — READMISSION MANAGEMENT (OUTPATIENT)
Dept: CALL CENTER | Facility: HOSPITAL | Age: 72
End: 2022-01-01

## 2022-01-01 ENCOUNTER — TRANSITIONAL CARE MANAGEMENT TELEPHONE ENCOUNTER (OUTPATIENT)
Dept: CALL CENTER | Facility: HOSPITAL | Age: 72
End: 2022-01-01

## 2022-01-01 ENCOUNTER — APPOINTMENT (OUTPATIENT)
Dept: GENERAL RADIOLOGY | Facility: HOSPITAL | Age: 72
End: 2022-01-01

## 2022-01-01 ENCOUNTER — OFFICE VISIT (OUTPATIENT)
Dept: WOUND CARE | Facility: HOSPITAL | Age: 72
End: 2022-01-01

## 2022-01-01 ENCOUNTER — OFFICE VISIT (OUTPATIENT)
Dept: CARDIOLOGY | Facility: CLINIC | Age: 72
End: 2022-01-01

## 2022-01-01 ENCOUNTER — APPOINTMENT (OUTPATIENT)
Dept: WOUND CARE | Facility: HOSPITAL | Age: 72
End: 2022-01-01

## 2022-01-01 ENCOUNTER — TELEPHONE (OUTPATIENT)
Dept: FAMILY MEDICINE CLINIC | Facility: CLINIC | Age: 72
End: 2022-01-01

## 2022-01-01 ENCOUNTER — HOSPITAL ENCOUNTER (OUTPATIENT)
Dept: CARDIOLOGY | Facility: HOSPITAL | Age: 72
Discharge: HOME | End: 2022-05-19
Admitting: INTERNAL MEDICINE

## 2022-01-01 ENCOUNTER — TELEPHONE (OUTPATIENT)
Dept: NEUROSURGERY | Facility: CLINIC | Age: 72
End: 2022-01-01

## 2022-01-01 ENCOUNTER — TELEPHONE (OUTPATIENT)
Dept: ENDOCRINOLOGY | Age: 72
End: 2022-01-01

## 2022-01-01 ENCOUNTER — OFFICE VISIT (OUTPATIENT)
Dept: FAMILY MEDICINE CLINIC | Facility: CLINIC | Age: 72
End: 2022-01-01

## 2022-01-01 ENCOUNTER — HOSPITAL ENCOUNTER (INPATIENT)
Facility: HOSPITAL | Age: 72
LOS: 2 days | Discharge: HOME | End: 2022-01-23
Attending: EMERGENCY MEDICINE | Admitting: INTERNAL MEDICINE

## 2022-01-01 ENCOUNTER — OFFICE VISIT (OUTPATIENT)
Dept: ENDOCRINOLOGY | Age: 72
End: 2022-01-01

## 2022-01-01 ENCOUNTER — OFFICE VISIT (OUTPATIENT)
Dept: NEUROSURGERY | Facility: CLINIC | Age: 72
End: 2022-01-01

## 2022-01-01 ENCOUNTER — HOSPITAL ENCOUNTER (OUTPATIENT)
Dept: MRI IMAGING | Facility: HOSPITAL | Age: 72
End: 2022-01-01

## 2022-01-01 ENCOUNTER — HOSPITAL ENCOUNTER (OUTPATIENT)
Dept: CT IMAGING | Facility: HOSPITAL | Age: 72
Discharge: HOME | End: 2022-02-15
Admitting: NEUROLOGICAL SURGERY

## 2022-01-01 VITALS
HEART RATE: 78 BPM | HEIGHT: 64 IN | DIASTOLIC BLOOD PRESSURE: 83 MMHG | WEIGHT: 170.42 LBS | BODY MASS INDEX: 29.09 KG/M2 | OXYGEN SATURATION: 95 % | RESPIRATION RATE: 16 BRPM | SYSTOLIC BLOOD PRESSURE: 148 MMHG | TEMPERATURE: 97.3 F

## 2022-01-01 VITALS
SYSTOLIC BLOOD PRESSURE: 128 MMHG | TEMPERATURE: 97.6 F | HEART RATE: 87 BPM | DIASTOLIC BLOOD PRESSURE: 77 MMHG | BODY MASS INDEX: 28.32 KG/M2 | OXYGEN SATURATION: 96 % | WEIGHT: 170 LBS | RESPIRATION RATE: 16 BRPM | HEIGHT: 65 IN

## 2022-01-01 VITALS
OXYGEN SATURATION: 98 % | HEART RATE: 101 BPM | HEIGHT: 64 IN | DIASTOLIC BLOOD PRESSURE: 82 MMHG | SYSTOLIC BLOOD PRESSURE: 104 MMHG | BODY MASS INDEX: 29.17 KG/M2

## 2022-01-01 VITALS
WEIGHT: 170 LBS | HEIGHT: 64 IN | OXYGEN SATURATION: 100 % | BODY MASS INDEX: 29.02 KG/M2 | SYSTOLIC BLOOD PRESSURE: 128 MMHG | RESPIRATION RATE: 20 BRPM | DIASTOLIC BLOOD PRESSURE: 85 MMHG | HEART RATE: 89 BPM

## 2022-01-01 VITALS
BODY MASS INDEX: 29.02 KG/M2 | HEIGHT: 64 IN | DIASTOLIC BLOOD PRESSURE: 67 MMHG | HEART RATE: 85 BPM | SYSTOLIC BLOOD PRESSURE: 148 MMHG | WEIGHT: 170 LBS

## 2022-01-01 VITALS
HEART RATE: 45 BPM | HEIGHT: 64 IN | DIASTOLIC BLOOD PRESSURE: 72 MMHG | SYSTOLIC BLOOD PRESSURE: 162 MMHG | OXYGEN SATURATION: 100 % | BODY MASS INDEX: 29.17 KG/M2

## 2022-01-01 VITALS
HEIGHT: 64 IN | SYSTOLIC BLOOD PRESSURE: 148 MMHG | HEART RATE: 85 BPM | DIASTOLIC BLOOD PRESSURE: 67 MMHG | BODY MASS INDEX: 29.18 KG/M2

## 2022-01-01 VITALS
HEIGHT: 63 IN | DIASTOLIC BLOOD PRESSURE: 68 MMHG | BODY MASS INDEX: 28 KG/M2 | HEART RATE: 121 BPM | SYSTOLIC BLOOD PRESSURE: 112 MMHG | OXYGEN SATURATION: 93 %

## 2022-01-01 DIAGNOSIS — K80.20 CALCULUS OF GALLBLADDER WITHOUT CHOLECYSTITIS WITHOUT OBSTRUCTION: Primary | ICD-10-CM

## 2022-01-01 DIAGNOSIS — Z99.3 DEPENDENCE ON WHEELCHAIR: ICD-10-CM

## 2022-01-01 DIAGNOSIS — R19.7 DIARRHEA, UNSPECIFIED TYPE: Primary | ICD-10-CM

## 2022-01-01 DIAGNOSIS — R19.7 DIARRHEA, UNSPECIFIED TYPE: ICD-10-CM

## 2022-01-01 DIAGNOSIS — Z99.2 HEMODIALYSIS STATUS: ICD-10-CM

## 2022-01-01 DIAGNOSIS — K80.20 CALCULUS OF GALLBLADDER WITHOUT CHOLECYSTITIS WITHOUT OBSTRUCTION: ICD-10-CM

## 2022-01-01 DIAGNOSIS — R46.89 AGGRESSIVE BEHAVIOR: ICD-10-CM

## 2022-01-01 DIAGNOSIS — I62.9 INTRACRANIAL HEMORRHAGE: Primary | ICD-10-CM

## 2022-01-01 DIAGNOSIS — E11.42 DM TYPE 2 WITH DIABETIC PERIPHERAL NEUROPATHY: ICD-10-CM

## 2022-01-01 DIAGNOSIS — G93.41 METABOLIC ENCEPHALOPATHY: Primary | ICD-10-CM

## 2022-01-01 DIAGNOSIS — N30.00 ACUTE CYSTITIS WITHOUT HEMATURIA: Primary | ICD-10-CM

## 2022-01-01 DIAGNOSIS — M86.9 OSTEOMYELITIS OF FINGER OF LEFT HAND: Primary | ICD-10-CM

## 2022-01-01 DIAGNOSIS — E11.65 TYPE 2 DIABETES MELLITUS WITH HYPERGLYCEMIA, WITH LONG-TERM CURRENT USE OF INSULIN: Primary | ICD-10-CM

## 2022-01-01 DIAGNOSIS — N18.6 ESRD (END STAGE RENAL DISEASE): ICD-10-CM

## 2022-01-01 DIAGNOSIS — I62.9 INTRACRANIAL HEMORRHAGE: ICD-10-CM

## 2022-01-01 DIAGNOSIS — E03.9 ACQUIRED HYPOTHYROIDISM: ICD-10-CM
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
## 2022-01-01 DIAGNOSIS — I35.0 AORTIC STENOSIS, MODERATE: ICD-10-CM

## 2022-01-01 DIAGNOSIS — Z99.2 DIALYSIS PATIENT: ICD-10-CM

## 2022-01-01 DIAGNOSIS — I48.91 ATRIAL FIBRILLATION, UNSPECIFIED TYPE: Primary | ICD-10-CM

## 2022-01-01 DIAGNOSIS — I50.22 SYSTOLIC CHF, CHRONIC: ICD-10-CM

## 2022-01-01 DIAGNOSIS — Z86.59 MENTAL STATUS CHANGE RESOLVED: ICD-10-CM

## 2022-01-01 DIAGNOSIS — Z79.01 CURRENT USE OF LONG TERM ANTICOAGULATION: ICD-10-CM

## 2022-01-01 DIAGNOSIS — S68.111A AMPUTATION OF LEFT INDEX FINGER: ICD-10-CM

## 2022-01-01 DIAGNOSIS — R53.81 DEBILITY: Primary | ICD-10-CM

## 2022-01-01 DIAGNOSIS — Z79.4 TYPE 2 DIABETES MELLITUS WITH HYPERGLYCEMIA, WITH LONG-TERM CURRENT USE OF INSULIN: Primary | ICD-10-CM

## 2022-01-01 DIAGNOSIS — L97.415 DIABETIC ULCER OF RIGHT HEEL ASSOCIATED WITH TYPE 2 DIABETES MELLITUS, WITH MUSCLE INVOLVEMENT WITHOUT EVIDENCE OF NECROSIS: ICD-10-CM

## 2022-01-01 DIAGNOSIS — R77.8 ELEVATED TROPONIN: ICD-10-CM

## 2022-01-01 DIAGNOSIS — I99.8 ISCHEMIA OF HAND: ICD-10-CM

## 2022-01-01 DIAGNOSIS — R94.31 ABNORMAL ELECTROCARDIOGRAM (ECG) (EKG): ICD-10-CM

## 2022-01-01 DIAGNOSIS — R54 AGE-RELATED PHYSICAL DEBILITY: ICD-10-CM

## 2022-01-01 DIAGNOSIS — E11.621 DIABETIC ULCER OF RIGHT HEEL ASSOCIATED WITH TYPE 2 DIABETES MELLITUS, WITH MUSCLE INVOLVEMENT WITHOUT EVIDENCE OF NECROSIS: ICD-10-CM

## 2022-01-01 LAB
ALBUMIN SERPL-MCNC: 2.7 G/DL (ref 3.5–5.2)
ALBUMIN SERPL-MCNC: 2.7 G/DL (ref 3.5–5.2)
ALBUMIN SERPL-MCNC: 2.8 G/DL (ref 3.5–5.2)
ALBUMIN SERPL-MCNC: 2.8 G/DL (ref 3.5–5.2)
ALBUMIN SERPL-MCNC: 2.9 G/DL (ref 3.5–5.2)
ALBUMIN SERPL-MCNC: 3 G/DL (ref 3.5–5.2)
ALBUMIN SERPL-MCNC: 3 G/DL (ref 3.5–5.2)
ALBUMIN SERPL-MCNC: 3.4 G/DL (ref 3.5–5.2)
ALBUMIN/GLOB SERPL: 0.7 G/DL
ALBUMIN/GLOB SERPL: 0.8 G/DL
ALBUMIN/GLOB SERPL: 0.8 G/DL
ALP SERPL-CCNC: 112 U/L (ref 39–117)
ALP SERPL-CCNC: 76 U/L (ref 39–117)
ALP SERPL-CCNC: 91 U/L (ref 39–117)
ALT SERPL W P-5'-P-CCNC: 10 U/L (ref 1–33)
ALT SERPL W P-5'-P-CCNC: 8 U/L (ref 1–33)
ALT SERPL W P-5'-P-CCNC: 9 U/L (ref 1–33)
ANION GAP SERPL CALCULATED.3IONS-SCNC: 10 MMOL/L (ref 5–15)
ANION GAP SERPL CALCULATED.3IONS-SCNC: 11.3 MMOL/L (ref 5–15)
ANION GAP SERPL CALCULATED.3IONS-SCNC: 12 MMOL/L (ref 5–15)
ANION GAP SERPL CALCULATED.3IONS-SCNC: 15.8 MMOL/L (ref 5–15)
ANION GAP SERPL CALCULATED.3IONS-SCNC: 15.9 MMOL/L (ref 5–15)
ANION GAP SERPL CALCULATED.3IONS-SCNC: 9.1 MMOL/L (ref 5–15)
ANION GAP SERPL CALCULATED.3IONS-SCNC: 9.6 MMOL/L (ref 5–15)
ANION GAP SERPL CALCULATED.3IONS-SCNC: 9.9 MMOL/L (ref 5–15)
APTT PPP: 29.7 SECONDS (ref 22.7–35.4)
AST SERPL-CCNC: 13 U/L (ref 1–32)
AST SERPL-CCNC: 15 U/L (ref 1–32)
AST SERPL-CCNC: 21 U/L (ref 1–32)
BACTERIA SPEC AEROBE CULT: NORMAL
BACTERIA SPEC AEROBE CULT: NORMAL
BACTERIA UR QL AUTO: ABNORMAL /HPF
BASOPHILS # BLD AUTO: 0.06 10*3/MM3 (ref 0–0.2)
BASOPHILS # BLD AUTO: 0.06 10*3/MM3 (ref 0–0.2)
BASOPHILS # BLD AUTO: 0.08 10*3/MM3 (ref 0–0.2)
BASOPHILS # BLD AUTO: 0.09 10*3/MM3 (ref 0–0.2)
BASOPHILS NFR BLD AUTO: 0.7 % (ref 0–1.5)
BASOPHILS NFR BLD AUTO: 0.8 % (ref 0–1.5)
BASOPHILS NFR BLD AUTO: 0.9 % (ref 0–1.5)
BASOPHILS NFR BLD AUTO: 1.1 % (ref 0–1.5)
BH CV ECHO MEAS - ACS: 1.48 CM
BH CV ECHO MEAS - AO MAX PG: 17.7 MMHG
BH CV ECHO MEAS - AO MEAN PG: 9.9 MMHG
BH CV ECHO MEAS - AO ROOT DIAM: 3.1 CM
BH CV ECHO MEAS - AO V2 MAX: 210.4 CM/SEC
BH CV ECHO MEAS - AO V2 VTI: 42.1 CM
BH CV ECHO MEAS - AVA(I,D): 1.33 CM2
BH CV ECHO MEAS - EDV(CUBED): 55.7 ML
BH CV ECHO MEAS - EDV(MOD-SP2): 36 ML
BH CV ECHO MEAS - EDV(MOD-SP4): 50 ML
BH CV ECHO MEAS - EF(MOD-BP): 57.4 %
BH CV ECHO MEAS - EF(MOD-SP2): 52.8 %
BH CV ECHO MEAS - EF(MOD-SP4): 58 %
BH CV ECHO MEAS - ESV(CUBED): 19.2 ML
BH CV ECHO MEAS - ESV(MOD-SP2): 17 ML
BH CV ECHO MEAS - ESV(MOD-SP4): 21 ML
BH CV ECHO MEAS - FS: 29.9 %
BH CV ECHO MEAS - IVS/LVPW: 1.3 CM
BH CV ECHO MEAS - IVSD: 1.35 CM
BH CV ECHO MEAS - LA DIMENSION: 3.8 CM
BH CV ECHO MEAS - LAT PEAK E' VEL: 8.2 CM/SEC
BH CV ECHO MEAS - LV DIASTOLIC VOL/BSA (35-75): 27.4 CM2
BH CV ECHO MEAS - LV MASS(C)D: 153.5 GRAMS
BH CV ECHO MEAS - LV MAX PG: 3.1 MMHG
BH CV ECHO MEAS - LV MEAN PG: 1.7 MMHG
BH CV ECHO MEAS - LV SYSTOLIC VOL/BSA (12-30): 11.5 CM2
BH CV ECHO MEAS - LV V1 MAX: 88.3 CM/SEC
BH CV ECHO MEAS - LV V1 VTI: 19.8 CM
BH CV ECHO MEAS - LVIDD: 3.8 CM
BH CV ECHO MEAS - LVIDS: 2.7 CM
BH CV ECHO MEAS - LVOT AREA: 2.8 CM2
BH CV ECHO MEAS - LVOT DIAM: 1.9 CM
BH CV ECHO MEAS - LVPWD: 1.04 CM
BH CV ECHO MEAS - MED PEAK E' VEL: 4.8 CM/SEC
BH CV ECHO MEAS - MV A MAX VEL: 99.4 CM/SEC
BH CV ECHO MEAS - MV DEC SLOPE: 332.9 CM/SEC2
BH CV ECHO MEAS - MV DEC TIME: 234 MSEC
BH CV ECHO MEAS - MV E MAX VEL: 66.9 CM/SEC
BH CV ECHO MEAS - MV E/A: 0.67
BH CV ECHO MEAS - MV MAX PG: 5.1 MMHG
BH CV ECHO MEAS - MV MEAN PG: 2.7 MMHG
BH CV ECHO MEAS - MV P1/2T: 77.6 MSEC
BH CV ECHO MEAS - MV V2 VTI: 25.3 CM
BH CV ECHO MEAS - MVA(P1/2T): 2.8 CM2
BH CV ECHO MEAS - MVA(VTI): 2.22 CM2
BH CV ECHO MEAS - PA ACC TIME: 0.13 SEC
BH CV ECHO MEAS - PA PR(ACCEL): 20.8 MMHG
BH CV ECHO MEAS - PA V2 MAX: 101.8 CM/SEC
BH CV ECHO MEAS - QP/QS: 1.11
BH CV ECHO MEAS - RV MAX PG: 2.6 MMHG
BH CV ECHO MEAS - RV V1 MAX: 81 CM/SEC
BH CV ECHO MEAS - RV V1 VTI: 15.8 CM
BH CV ECHO MEAS - RVDD: 2.6 CM
BH CV ECHO MEAS - RVOT DIAM: 2.24 CM
BH CV ECHO MEAS - SI(MOD-SP2): 10.4 ML/M2
BH CV ECHO MEAS - SI(MOD-SP4): 15.9 ML/M2
BH CV ECHO MEAS - SV(LVOT): 56 ML
BH CV ECHO MEAS - SV(MOD-SP2): 19 ML
BH CV ECHO MEAS - SV(MOD-SP4): 29 ML
BH CV ECHO MEAS - SV(RVOT): 62.3 ML
BH CV ECHO MEAS - TAPSE (>1.6): 1.8 CM
BH CV ECHO MEASUREMENTS AVERAGE E/E' RATIO: 10.29
BH CV XLRA - RV BASE: 2.05 CM
BH CV XLRA - RV LENGTH: 5.3 CM
BH CV XLRA - RV MID: 1.88 CM
BH CV XLRA - TDI S': 6.5 CM/SEC
BILIRUB SERPL-MCNC: 0.2 MG/DL (ref 0–1.2)
BILIRUB SERPL-MCNC: 0.3 MG/DL (ref 0–1.2)
BILIRUB SERPL-MCNC: 0.3 MG/DL (ref 0–1.2)
BILIRUB UR QL STRIP: NEGATIVE
BUN SERPL-MCNC: 13 MG/DL (ref 8–23)
BUN SERPL-MCNC: 16 MG/DL (ref 8–23)
BUN SERPL-MCNC: 17 MG/DL (ref 8–23)
BUN SERPL-MCNC: 17 MG/DL (ref 8–23)
BUN SERPL-MCNC: 19 MG/DL (ref 8–23)
BUN SERPL-MCNC: 22 MG/DL (ref 8–23)
BUN SERPL-MCNC: 31 MG/DL (ref 8–23)
BUN SERPL-MCNC: 6 MG/DL (ref 8–23)
BUN/CREAT SERPL: 1.7 (ref 7–25)
BUN/CREAT SERPL: 2.2 (ref 7–25)
BUN/CREAT SERPL: 2.5 (ref 7–25)
BUN/CREAT SERPL: 3 (ref 7–25)
BUN/CREAT SERPL: 3.2 (ref 7–25)
BUN/CREAT SERPL: 4.8 (ref 7–25)
BUN/CREAT SERPL: 4.8 (ref 7–25)
BUN/CREAT SERPL: 4.9 (ref 7–25)
CALCIUM SPEC-SCNC: 7.8 MG/DL (ref 8.6–10.5)
CALCIUM SPEC-SCNC: 7.9 MG/DL (ref 8.6–10.5)
CALCIUM SPEC-SCNC: 8.5 MG/DL (ref 8.6–10.5)
CALCIUM SPEC-SCNC: 8.5 MG/DL (ref 8.6–10.5)
CALCIUM SPEC-SCNC: 8.6 MG/DL (ref 8.6–10.5)
CALCIUM SPEC-SCNC: 8.7 MG/DL (ref 8.6–10.5)
CALCIUM SPEC-SCNC: 8.8 MG/DL (ref 8.6–10.5)
CALCIUM SPEC-SCNC: 9.1 MG/DL (ref 8.6–10.5)
CHLORIDE SERPL-SCNC: 102 MMOL/L (ref 98–107)
CHLORIDE SERPL-SCNC: 102 MMOL/L (ref 98–107)
CHLORIDE SERPL-SCNC: 104 MMOL/L (ref 98–107)
CHLORIDE SERPL-SCNC: 104 MMOL/L (ref 98–107)
CHLORIDE SERPL-SCNC: 95 MMOL/L (ref 98–107)
CHLORIDE SERPL-SCNC: 97 MMOL/L (ref 98–107)
CHLORIDE SERPL-SCNC: 98 MMOL/L (ref 98–107)
CHLORIDE SERPL-SCNC: 99 MMOL/L (ref 98–107)
CHOLEST SERPL-MCNC: 112 MG/DL (ref 0–200)
CLARITY UR: CLEAR
CO2 SERPL-SCNC: 21 MMOL/L (ref 22–29)
CO2 SERPL-SCNC: 23 MMOL/L (ref 22–29)
CO2 SERPL-SCNC: 24.1 MMOL/L (ref 22–29)
CO2 SERPL-SCNC: 24.1 MMOL/L (ref 22–29)
CO2 SERPL-SCNC: 24.2 MMOL/L (ref 22–29)
CO2 SERPL-SCNC: 25.9 MMOL/L (ref 22–29)
CO2 SERPL-SCNC: 27.4 MMOL/L (ref 22–29)
CO2 SERPL-SCNC: 30.7 MMOL/L (ref 22–29)
COLOR UR: YELLOW
CREAT SERPL-MCNC: 3.44 MG/DL (ref 0.57–1)
CREAT SERPL-MCNC: 3.86 MG/DL (ref 0.57–1)
CREAT SERPL-MCNC: 4.57 MG/DL (ref 0.57–1)
CREAT SERPL-MCNC: 5.33 MG/DL (ref 0.57–1)
CREAT SERPL-MCNC: 5.37 MG/DL (ref 0.57–1)
CREAT SERPL-MCNC: 5.92 MG/DL (ref 0.57–1)
CREAT SERPL-MCNC: 6.47 MG/DL (ref 0.57–1)
CREAT SERPL-MCNC: 6.68 MG/DL (ref 0.57–1)
CRP SERPL-MCNC: 5.29 MG/DL (ref 0–0.5)
DEPRECATED RDW RBC AUTO: 44.3 FL (ref 37–54)
DEPRECATED RDW RBC AUTO: 44.3 FL (ref 37–54)
DEPRECATED RDW RBC AUTO: 45.6 FL (ref 37–54)
DEPRECATED RDW RBC AUTO: 50.8 FL (ref 37–54)
DEPRECATED RDW RBC AUTO: 54.2 FL (ref 37–54)
EGFRCR SERPLBLD CKD-EPI 2021: 13.7 ML/MIN/1.73
EGFRCR SERPLBLD CKD-EPI 2021: 6.2 ML/MIN/1.73
EGFRCR SERPLBLD CKD-EPI 2021: 7.1 ML/MIN/1.73
EGFRCR SERPLBLD CKD-EPI 2021: 8 ML/MIN/1.73
EGFRCR SERPLBLD CKD-EPI 2021: 8.1 ML/MIN/1.73
EOSINOPHIL # BLD AUTO: 0.26 10*3/MM3 (ref 0–0.4)
EOSINOPHIL # BLD AUTO: 0.26 10*3/MM3 (ref 0–0.4)
EOSINOPHIL # BLD AUTO: 0.34 10*3/MM3 (ref 0–0.4)
EOSINOPHIL # BLD AUTO: 0.54 10*3/MM3 (ref 0–0.4)
EOSINOPHIL NFR BLD AUTO: 3.1 % (ref 0.3–6.2)
EOSINOPHIL NFR BLD AUTO: 3.3 % (ref 0.3–6.2)
EOSINOPHIL NFR BLD AUTO: 3.9 % (ref 0.3–6.2)
EOSINOPHIL NFR BLD AUTO: 6.4 % (ref 0.3–6.2)
ERYTHROCYTE [DISTWIDTH] IN BLOOD BY AUTOMATED COUNT: 14.4 % (ref 12.3–15.4)
ERYTHROCYTE [DISTWIDTH] IN BLOOD BY AUTOMATED COUNT: 14.4 % (ref 12.3–15.4)
ERYTHROCYTE [DISTWIDTH] IN BLOOD BY AUTOMATED COUNT: 14.5 % (ref 12.3–15.4)
ERYTHROCYTE [DISTWIDTH] IN BLOOD BY AUTOMATED COUNT: 14.8 % (ref 12.3–15.4)
ERYTHROCYTE [DISTWIDTH] IN BLOOD BY AUTOMATED COUNT: 15.1 % (ref 12.3–15.4)
ERYTHROCYTE [SEDIMENTATION RATE] IN BLOOD: 45 MM/HR (ref 0–30)
GFR SERPL CREATININE-BSD FRML MDRD: 12 ML/MIN/1.73
GFR SERPL CREATININE-BSD FRML MDRD: 6 ML/MIN/1.73
GFR SERPL CREATININE-BSD FRML MDRD: 9 ML/MIN/1.73
GFR SERPL CREATININE-BSD FRML MDRD: ABNORMAL ML/MIN/{1.73_M2}
GLOBULIN UR ELPH-MCNC: 3.7 GM/DL
GLOBULIN UR ELPH-MCNC: 3.9 GM/DL
GLOBULIN UR ELPH-MCNC: 4.6 GM/DL
GLUCOSE BLDC GLUCOMTR-MCNC: 106 MG/DL (ref 70–130)
GLUCOSE BLDC GLUCOMTR-MCNC: 114 MG/DL (ref 70–130)
GLUCOSE BLDC GLUCOMTR-MCNC: 125 MG/DL (ref 70–130)
GLUCOSE BLDC GLUCOMTR-MCNC: 128 MG/DL (ref 70–130)
GLUCOSE BLDC GLUCOMTR-MCNC: 134 MG/DL (ref 70–130)
GLUCOSE BLDC GLUCOMTR-MCNC: 139 MG/DL (ref 70–130)
GLUCOSE BLDC GLUCOMTR-MCNC: 143 MG/DL (ref 70–130)
GLUCOSE BLDC GLUCOMTR-MCNC: 175 MG/DL (ref 70–130)
GLUCOSE BLDC GLUCOMTR-MCNC: 179 MG/DL (ref 70–130)
GLUCOSE BLDC GLUCOMTR-MCNC: 183 MG/DL (ref 70–130)
GLUCOSE BLDC GLUCOMTR-MCNC: 183 MG/DL (ref 70–130)
GLUCOSE BLDC GLUCOMTR-MCNC: 191 MG/DL (ref 70–130)
GLUCOSE BLDC GLUCOMTR-MCNC: 193 MG/DL (ref 70–130)
GLUCOSE BLDC GLUCOMTR-MCNC: 200 MG/DL (ref 70–130)
GLUCOSE BLDC GLUCOMTR-MCNC: 206 MG/DL (ref 70–130)
GLUCOSE BLDC GLUCOMTR-MCNC: 208 MG/DL (ref 70–130)
GLUCOSE BLDC GLUCOMTR-MCNC: 210 MG/DL (ref 70–130)
GLUCOSE BLDC GLUCOMTR-MCNC: 234 MG/DL (ref 70–130)
GLUCOSE BLDC GLUCOMTR-MCNC: 237 MG/DL (ref 70–130)
GLUCOSE BLDC GLUCOMTR-MCNC: 326 MG/DL (ref 70–130)
GLUCOSE BLDC GLUCOMTR-MCNC: 61 MG/DL (ref 70–130)
GLUCOSE BLDC GLUCOMTR-MCNC: 64 MG/DL (ref 70–130)
GLUCOSE BLDC GLUCOMTR-MCNC: 71 MG/DL (ref 70–130)
GLUCOSE BLDC GLUCOMTR-MCNC: 76 MG/DL (ref 70–130)
GLUCOSE BLDC GLUCOMTR-MCNC: 80 MG/DL (ref 70–130)
GLUCOSE BLDC GLUCOMTR-MCNC: 82 MG/DL (ref 70–130)
GLUCOSE BLDC GLUCOMTR-MCNC: 83 MG/DL (ref 70–130)
GLUCOSE BLDC GLUCOMTR-MCNC: 84 MG/DL (ref 70–130)
GLUCOSE BLDC GLUCOMTR-MCNC: 85 MG/DL (ref 70–130)
GLUCOSE BLDC GLUCOMTR-MCNC: 89 MG/DL (ref 70–130)
GLUCOSE BLDC GLUCOMTR-MCNC: 89 MG/DL (ref 70–130)
GLUCOSE BLDC GLUCOMTR-MCNC: 95 MG/DL (ref 70–130)
GLUCOSE BLDC GLUCOMTR-MCNC: 97 MG/DL (ref 70–130)
GLUCOSE SERPL-MCNC: 118 MG/DL (ref 65–99)
GLUCOSE SERPL-MCNC: 159 MG/DL (ref 65–99)
GLUCOSE SERPL-MCNC: 201 MG/DL (ref 65–99)
GLUCOSE SERPL-MCNC: 203 MG/DL (ref 65–99)
GLUCOSE SERPL-MCNC: 325 MG/DL (ref 65–99)
GLUCOSE SERPL-MCNC: 73 MG/DL (ref 65–99)
GLUCOSE SERPL-MCNC: 94 MG/DL (ref 65–99)
GLUCOSE SERPL-MCNC: 97 MG/DL (ref 65–99)
GLUCOSE UR STRIP-MCNC: ABNORMAL MG/DL
HBA1C MFR BLD: 8.1 % (ref 4.8–5.6)
HBA1C MFR BLD: 8.21 % (ref 4.8–5.6)
HCT VFR BLD AUTO: 30.5 % (ref 34–46.6)
HCT VFR BLD AUTO: 33.1 % (ref 34–46.6)
HCT VFR BLD AUTO: 33.4 % (ref 34–46.6)
HCT VFR BLD AUTO: 34.2 % (ref 34–46.6)
HCT VFR BLD AUTO: 36.6 % (ref 34–46.6)
HDLC SERPL-MCNC: 33 MG/DL (ref 40–60)
HGB BLD-MCNC: 10.4 G/DL (ref 12–15.9)
HGB BLD-MCNC: 10.6 G/DL (ref 12–15.9)
HGB BLD-MCNC: 10.6 G/DL (ref 12–15.9)
HGB BLD-MCNC: 11.7 G/DL (ref 12–15.9)
HGB BLD-MCNC: 9.7 G/DL (ref 12–15.9)
HGB UR QL STRIP.AUTO: ABNORMAL
HOLD SPECIMEN: NORMAL
HYALINE CASTS UR QL AUTO: ABNORMAL /LPF
IMM GRANULOCYTES # BLD AUTO: 0.03 10*3/MM3 (ref 0–0.05)
IMM GRANULOCYTES # BLD AUTO: 0.04 10*3/MM3 (ref 0–0.05)
IMM GRANULOCYTES # BLD AUTO: 0.04 10*3/MM3 (ref 0–0.05)
IMM GRANULOCYTES # BLD AUTO: 0.05 10*3/MM3 (ref 0–0.05)
IMM GRANULOCYTES NFR BLD AUTO: 0.4 % (ref 0–0.5)
IMM GRANULOCYTES NFR BLD AUTO: 0.5 % (ref 0–0.5)
IMM GRANULOCYTES NFR BLD AUTO: 0.5 % (ref 0–0.5)
IMM GRANULOCYTES NFR BLD AUTO: 0.6 % (ref 0–0.5)
INR PPP: 1.06 (ref 0.9–1.1)
INR PPP: 1.07 (ref 0.9–1.1)
KETONES UR QL STRIP: NEGATIVE
LDLC SERPL CALC-MCNC: 49 MG/DL (ref 0–100)
LDLC/HDLC SERPL: 1.32 {RATIO}
LEFT ATRIUM VOLUME INDEX: 21.6 ML/M2
LEUKOCYTE ESTERASE UR QL STRIP.AUTO: ABNORMAL
LYMPHOCYTES # BLD AUTO: 1.45 10*3/MM3 (ref 0.7–3.1)
LYMPHOCYTES # BLD AUTO: 1.5 10*3/MM3 (ref 0.7–3.1)
LYMPHOCYTES # BLD AUTO: 1.57 10*3/MM3 (ref 0.7–3.1)
LYMPHOCYTES # BLD AUTO: 1.59 10*3/MM3 (ref 0.7–3.1)
LYMPHOCYTES NFR BLD AUTO: 16.5 % (ref 19.6–45.3)
LYMPHOCYTES NFR BLD AUTO: 17.8 % (ref 19.6–45.3)
LYMPHOCYTES NFR BLD AUTO: 18.5 % (ref 19.6–45.3)
LYMPHOCYTES NFR BLD AUTO: 20.2 % (ref 19.6–45.3)
MAGNESIUM SERPL-MCNC: 1.6 MG/DL (ref 1.6–2.4)
MAXIMAL PREDICTED HEART RATE: 149 BPM
MCH RBC QN AUTO: 27.4 PG (ref 26.6–33)
MCH RBC QN AUTO: 27.6 PG (ref 26.6–33)
MCH RBC QN AUTO: 27.6 PG (ref 26.6–33)
MCH RBC QN AUTO: 30.5 PG (ref 26.6–33)
MCH RBC QN AUTO: 30.9 PG (ref 26.6–33)
MCHC RBC AUTO-ENTMCNC: 31 G/DL (ref 31.5–35.7)
MCHC RBC AUTO-ENTMCNC: 31.4 G/DL (ref 31.5–35.7)
MCHC RBC AUTO-ENTMCNC: 31.7 G/DL (ref 31.5–35.7)
MCHC RBC AUTO-ENTMCNC: 31.8 G/DL (ref 31.5–35.7)
MCHC RBC AUTO-ENTMCNC: 32 G/DL (ref 31.5–35.7)
MCV RBC AUTO: 86.3 FL (ref 79–97)
MCV RBC AUTO: 87 FL (ref 79–97)
MCV RBC AUTO: 87.1 FL (ref 79–97)
MCV RBC AUTO: 95.9 FL (ref 79–97)
MCV RBC AUTO: 99.7 FL (ref 79–97)
MONOCYTES # BLD AUTO: 0.77 10*3/MM3 (ref 0.1–0.9)
MONOCYTES # BLD AUTO: 0.8 10*3/MM3 (ref 0.1–0.9)
MONOCYTES # BLD AUTO: 0.9 10*3/MM3 (ref 0.1–0.9)
MONOCYTES # BLD AUTO: 1 10*3/MM3 (ref 0.1–0.9)
MONOCYTES NFR BLD AUTO: 11.4 % (ref 5–12)
MONOCYTES NFR BLD AUTO: 11.8 % (ref 5–12)
MONOCYTES NFR BLD AUTO: 8.7 % (ref 5–12)
MONOCYTES NFR BLD AUTO: 9.4 % (ref 5–12)
NEUTROPHILS NFR BLD AUTO: 5.03 10*3/MM3 (ref 1.7–7)
NEUTROPHILS NFR BLD AUTO: 5.45 10*3/MM3 (ref 1.7–7)
NEUTROPHILS NFR BLD AUTO: 5.56 10*3/MM3 (ref 1.7–7)
NEUTROPHILS NFR BLD AUTO: 6.15 10*3/MM3 (ref 1.7–7)
NEUTROPHILS NFR BLD AUTO: 63.8 % (ref 42.7–76)
NEUTROPHILS NFR BLD AUTO: 64.2 % (ref 42.7–76)
NEUTROPHILS NFR BLD AUTO: 65.8 % (ref 42.7–76)
NEUTROPHILS NFR BLD AUTO: 69.7 % (ref 42.7–76)
NITRITE UR QL STRIP: NEGATIVE
NRBC BLD AUTO-RTO: 0 /100 WBC (ref 0–0.2)
NRBC BLD AUTO-RTO: 0 /100 WBC (ref 0–0.2)
NRBC BLD AUTO-RTO: 0.1 /100 WBC (ref 0–0.2)
NRBC BLD AUTO-RTO: 0.1 /100 WBC (ref 0–0.2)
PH UR STRIP.AUTO: >=9 [PH] (ref 5–8)
PHOSPHATE SERPL-MCNC: 2.3 MG/DL (ref 2.5–4.5)
PHOSPHATE SERPL-MCNC: 2.8 MG/DL (ref 2.5–4.5)
PHOSPHATE SERPL-MCNC: 3 MG/DL (ref 2.5–4.5)
PHOSPHATE SERPL-MCNC: 3.8 MG/DL (ref 2.5–4.5)
PHOSPHATE SERPL-MCNC: 5.1 MG/DL (ref 2.5–4.5)
PLATELET # BLD AUTO: 184 10*3/MM3 (ref 140–450)
PLATELET # BLD AUTO: 192 10*3/MM3 (ref 140–450)
PLATELET # BLD AUTO: 205 10*3/MM3 (ref 140–450)
PLATELET # BLD AUTO: 206 10*3/MM3 (ref 140–450)
PLATELET # BLD AUTO: 231 10*3/MM3 (ref 140–450)
PMV BLD AUTO: 10.9 FL (ref 6–12)
PMV BLD AUTO: 11 FL (ref 6–12)
PMV BLD AUTO: 11.1 FL (ref 6–12)
PMV BLD AUTO: 11.2 FL (ref 6–12)
PMV BLD AUTO: 11.4 FL (ref 6–12)
POTASSIUM SERPL-SCNC: 3.4 MMOL/L (ref 3.5–5.2)
POTASSIUM SERPL-SCNC: 3.5 MMOL/L (ref 3.5–5.2)
POTASSIUM SERPL-SCNC: 3.5 MMOL/L (ref 3.5–5.2)
POTASSIUM SERPL-SCNC: 3.8 MMOL/L (ref 3.5–5.2)
POTASSIUM SERPL-SCNC: 3.9 MMOL/L (ref 3.5–5.2)
POTASSIUM SERPL-SCNC: 4 MMOL/L (ref 3.5–5.2)
POTASSIUM SERPL-SCNC: 4.4 MMOL/L (ref 3.5–5.2)
POTASSIUM SERPL-SCNC: 4.8 MMOL/L (ref 3.5–5.2)
PROT SERPL-MCNC: 6.5 G/DL (ref 6–8.5)
PROT SERPL-MCNC: 6.9 G/DL (ref 6–8.5)
PROT SERPL-MCNC: 8 G/DL (ref 6–8.5)
PROT UR QL STRIP: ABNORMAL
PROTHROMBIN TIME: 13.6 SECONDS (ref 11.7–14.2)
PROTHROMBIN TIME: 13.8 SECONDS (ref 11.7–14.2)
QT INTERVAL: 343 MS
RBC # BLD AUTO: 3.18 10*6/MM3 (ref 3.77–5.28)
RBC # BLD AUTO: 3.43 10*6/MM3 (ref 3.77–5.28)
RBC # BLD AUTO: 3.8 10*6/MM3 (ref 3.77–5.28)
RBC # BLD AUTO: 3.84 10*6/MM3 (ref 3.77–5.28)
RBC # BLD AUTO: 4.24 10*6/MM3 (ref 3.77–5.28)
RBC # UR STRIP: ABNORMAL /HPF
REF LAB TEST METHOD: ABNORMAL
SARS-COV-2 ORF1AB RESP QL NAA+PROBE: NOT DETECTED
SARS-COV-2 ORF1AB RESP QL NAA+PROBE: NOT DETECTED
SINUS: 2.28 CM
SODIUM SERPL-SCNC: 135 MMOL/L (ref 136–145)
SODIUM SERPL-SCNC: 136 MMOL/L (ref 136–145)
SODIUM SERPL-SCNC: 136 MMOL/L (ref 136–145)
SODIUM SERPL-SCNC: 137 MMOL/L (ref 136–145)
SODIUM SERPL-SCNC: 138 MMOL/L (ref 136–145)
SODIUM SERPL-SCNC: 139 MMOL/L (ref 136–145)
SP GR UR STRIP: 1.01 (ref 1–1.03)
SQUAMOUS #/AREA URNS HPF: ABNORMAL /HPF
STRESS TARGET HR: 127 BPM
TRIGL SERPL-MCNC: 178 MG/DL (ref 0–150)
TROPONIN T SERPL-MCNC: 0.23 NG/ML (ref 0–0.03)
UROBILINOGEN UR QL STRIP: ABNORMAL
VLDLC SERPL-MCNC: 30 MG/DL (ref 5–40)
WBC # UR STRIP: ABNORMAL /HPF
WBC NRBC COR # BLD: 7.88 10*3/MM3 (ref 3.4–10.8)
WBC NRBC COR # BLD: 7.95 10*3/MM3 (ref 3.4–10.8)
WBC NRBC COR # BLD: 8.45 10*3/MM3 (ref 3.4–10.8)
WBC NRBC COR # BLD: 8.48 10*3/MM3 (ref 3.4–10.8)
WBC NRBC COR # BLD: 8.81 10*3/MM3 (ref 3.4–10.8)
WHOLE BLOOD HOLD COAG: NORMAL
WHOLE BLOOD HOLD SPECIMEN: NORMAL

## 2022-01-01 PROCEDURE — 99213 OFFICE O/P EST LOW 20 MIN: CPT | Performed by: NEUROLOGICAL SURGERY

## 2022-01-01 PROCEDURE — 97161 PT EVAL LOW COMPLEX 20 MIN: CPT

## 2022-01-01 PROCEDURE — 82962 GLUCOSE BLOOD TEST: CPT

## 2022-01-01 PROCEDURE — 71045 X-RAY EXAM CHEST 1 VIEW: CPT

## 2022-01-01 PROCEDURE — 85025 COMPLETE CBC W/AUTO DIFF WBC: CPT | Performed by: NURSE PRACTITIONER

## 2022-01-01 PROCEDURE — 97535 SELF CARE MNGMENT TRAINING: CPT

## 2022-01-01 PROCEDURE — 70450 CT HEAD/BRAIN W/O DYE: CPT

## 2022-01-01 PROCEDURE — 84484 ASSAY OF TROPONIN QUANT: CPT | Performed by: NURSE PRACTITIONER

## 2022-01-01 PROCEDURE — 99232 SBSQ HOSP IP/OBS MODERATE 35: CPT | Performed by: NURSE PRACTITIONER

## 2022-01-01 PROCEDURE — 85652 RBC SED RATE AUTOMATED: CPT | Performed by: HOSPITALIST

## 2022-01-01 PROCEDURE — 99214 OFFICE O/P EST MOD 30 MIN: CPT | Performed by: INTERNAL MEDICINE

## 2022-01-01 PROCEDURE — 80069 RENAL FUNCTION PANEL: CPT | Performed by: HOSPITALIST

## 2022-01-01 PROCEDURE — 80053 COMPREHEN METABOLIC PANEL: CPT | Performed by: NURSE PRACTITIONER

## 2022-01-01 PROCEDURE — 25010000002 ONDANSETRON PER 1 MG: Performed by: INTERNAL MEDICINE

## 2022-01-01 PROCEDURE — 25010000002 PROTHROMBIN COMPLEX CONC HUMAN 1000 UNITS KIT: Performed by: NURSE PRACTITIONER

## 2022-01-01 PROCEDURE — 99285 EMERGENCY DEPT VISIT HI MDM: CPT

## 2022-01-01 PROCEDURE — 85027 COMPLETE CBC AUTOMATED: CPT | Performed by: HOSPITALIST

## 2022-01-01 PROCEDURE — U0005 INFEC AGEN DETEC AMPLI PROBE: HCPCS | Performed by: EMERGENCY MEDICINE

## 2022-01-01 PROCEDURE — 63710000001 INSULIN REGULAR HUMAN PER 5 UNITS: Performed by: INTERNAL MEDICINE

## 2022-01-01 PROCEDURE — U0005 INFEC AGEN DETEC AMPLI PROBE: HCPCS | Performed by: NURSE PRACTITIONER

## 2022-01-01 PROCEDURE — 85025 COMPLETE CBC W/AUTO DIFF WBC: CPT | Performed by: INTERNAL MEDICINE

## 2022-01-01 PROCEDURE — 85730 THROMBOPLASTIN TIME PARTIAL: CPT | Performed by: NURSE PRACTITIONER

## 2022-01-01 PROCEDURE — 99231 SBSQ HOSP IP/OBS SF/LOW 25: CPT | Performed by: NURSE PRACTITIONER

## 2022-01-01 PROCEDURE — 25010000002 HEPARIN (PORCINE) PER 1000 UNITS: Performed by: HOSPITALIST

## 2022-01-01 PROCEDURE — 99213 OFFICE O/P EST LOW 20 MIN: CPT | Performed by: INTERNAL MEDICINE

## 2022-01-01 PROCEDURE — 83735 ASSAY OF MAGNESIUM: CPT | Performed by: NURSE PRACTITIONER

## 2022-01-01 PROCEDURE — 99222 1ST HOSP IP/OBS MODERATE 55: CPT | Performed by: PSYCHIATRY & NEUROLOGY

## 2022-01-01 PROCEDURE — 92610 EVALUATE SWALLOWING FUNCTION: CPT

## 2022-01-01 PROCEDURE — 87040 BLOOD CULTURE FOR BACTERIA: CPT | Performed by: HOSPITALIST

## 2022-01-01 PROCEDURE — 85610 PROTHROMBIN TIME: CPT | Performed by: NURSE PRACTITIONER

## 2022-01-01 PROCEDURE — U0004 COV-19 TEST NON-CDC HGH THRU: HCPCS | Performed by: NURSE PRACTITIONER

## 2022-01-01 PROCEDURE — 80069 RENAL FUNCTION PANEL: CPT | Performed by: INTERNAL MEDICINE

## 2022-01-01 PROCEDURE — 93306 TTE W/DOPPLER COMPLETE: CPT

## 2022-01-01 PROCEDURE — 93306 TTE W/DOPPLER COMPLETE: CPT | Performed by: INTERNAL MEDICINE

## 2022-01-01 PROCEDURE — U0004 COV-19 TEST NON-CDC HGH THRU: HCPCS | Performed by: EMERGENCY MEDICINE

## 2022-01-01 PROCEDURE — 72125 CT NECK SPINE W/O DYE: CPT

## 2022-01-01 PROCEDURE — 25010000002 PROTHROMBIN COMPLEX CONC HUMAN 500 UNITS KIT: Performed by: NURSE PRACTITIONER

## 2022-01-01 PROCEDURE — 86140 C-REACTIVE PROTEIN: CPT | Performed by: HOSPITALIST

## 2022-01-01 PROCEDURE — 97166 OT EVAL MOD COMPLEX 45 MIN: CPT

## 2022-01-01 PROCEDURE — 63710000001 INSULIN LISPRO (HUMAN) PER 5 UNITS: Performed by: INTERNAL MEDICINE

## 2022-01-01 PROCEDURE — 80061 LIPID PANEL: CPT | Performed by: INTERNAL MEDICINE

## 2022-01-01 PROCEDURE — 25010000002 MIDAZOLAM PER 1 MG: Performed by: EMERGENCY MEDICINE

## 2022-01-01 PROCEDURE — 99222 1ST HOSP IP/OBS MODERATE 55: CPT | Performed by: NURSE PRACTITIONER

## 2022-01-01 PROCEDURE — 5A1D70Z PERFORMANCE OF URINARY FILTRATION, INTERMITTENT, LESS THAN 6 HOURS PER DAY: ICD-10-PCS | Performed by: HOSPITALIST

## 2022-01-01 PROCEDURE — 83036 HEMOGLOBIN GLYCOSYLATED A1C: CPT | Performed by: INTERNAL MEDICINE

## 2022-01-01 PROCEDURE — 80053 COMPREHEN METABOLIC PANEL: CPT | Performed by: INTERNAL MEDICINE

## 2022-01-01 PROCEDURE — 97530 THERAPEUTIC ACTIVITIES: CPT | Performed by: PHYSICAL THERAPIST

## 2022-01-01 PROCEDURE — 97162 PT EVAL MOD COMPLEX 30 MIN: CPT

## 2022-01-01 PROCEDURE — 93005 ELECTROCARDIOGRAM TRACING: CPT | Performed by: NURSE PRACTITIONER

## 2022-01-01 PROCEDURE — 99284 EMERGENCY DEPT VISIT MOD MDM: CPT

## 2022-01-01 PROCEDURE — 97110 THERAPEUTIC EXERCISES: CPT

## 2022-01-01 PROCEDURE — 25010000002 DROPERIDOL PER 5 MG: Performed by: NURSE PRACTITIONER

## 2022-01-01 PROCEDURE — 93010 ELECTROCARDIOGRAM REPORT: CPT | Performed by: INTERNAL MEDICINE

## 2022-01-01 PROCEDURE — 81001 URINALYSIS AUTO W/SCOPE: CPT

## 2022-01-01 PROCEDURE — 93000 ELECTROCARDIOGRAM COMPLETE: CPT | Performed by: INTERNAL MEDICINE

## 2022-01-01 PROCEDURE — 25010000002 ZIPRASIDONE MESYLATE PER 10 MG: Performed by: NURSE PRACTITIONER

## 2022-01-01 RX ORDER — ONDANSETRON 4 MG/1
4 TABLET, ORALLY DISINTEGRATING ORAL EVERY 8 HOURS PRN
COMMUNITY
Start: 2022-01-01 | End: 2022-01-01

## 2022-01-01 RX ORDER — INSULIN DEGLUDEC INJECTION 100 U/ML
10 INJECTION, SOLUTION SUBCUTANEOUS DAILY
Qty: 15 ML | Refills: 3 | Status: ON HOLD | OUTPATIENT
Start: 2022-01-01 | End: 2022-01-01 | Stop reason: SDUPTHER

## 2022-01-01 RX ORDER — ZIPRASIDONE MESYLATE 20 MG/ML
10 INJECTION, POWDER, LYOPHILIZED, FOR SOLUTION INTRAMUSCULAR ONCE
Status: COMPLETED | OUTPATIENT
Start: 2022-01-01 | End: 2022-01-01

## 2022-01-01 RX ORDER — ONDANSETRON 4 MG/1
4 TABLET, FILM COATED ORAL EVERY 8 HOURS PRN
Qty: 12 TABLET | Refills: 1 | Status: SHIPPED | OUTPATIENT
Start: 2022-01-01

## 2022-01-01 RX ORDER — ALBUMIN (HUMAN) 12.5 G/50ML
12.5 SOLUTION INTRAVENOUS AS NEEDED
Status: ACTIVE | OUTPATIENT
Start: 2022-01-01 | End: 2022-01-01

## 2022-01-01 RX ORDER — LANOLIN ALCOHOL/MO/W.PET/CERES
1000 CREAM (GRAM) TOPICAL DAILY
COMMUNITY

## 2022-01-01 RX ORDER — DEXTROSE MONOHYDRATE 25 G/50ML
25 INJECTION, SOLUTION INTRAVENOUS
Status: DISCONTINUED | OUTPATIENT
Start: 2022-01-01 | End: 2022-01-01 | Stop reason: HOSPADM

## 2022-01-01 RX ORDER — LEVOTHYROXINE SODIUM 0.05 MG/1
50 TABLET ORAL DAILY
Status: DISCONTINUED | OUTPATIENT
Start: 2022-01-01 | End: 2022-01-01 | Stop reason: HOSPADM

## 2022-01-01 RX ORDER — MIDAZOLAM HYDROCHLORIDE 1 MG/ML
2 INJECTION INTRAMUSCULAR; INTRAVENOUS ONCE
Status: COMPLETED | OUTPATIENT
Start: 2022-01-01 | End: 2022-01-01

## 2022-01-01 RX ORDER — PANTOPRAZOLE SODIUM 40 MG/1
40 TABLET, DELAYED RELEASE ORAL DAILY
COMMUNITY
Start: 2022-01-01 | End: 2022-01-01

## 2022-01-01 RX ORDER — SODIUM CHLORIDE 0.9 % (FLUSH) 0.9 %
10 SYRINGE (ML) INJECTION AS NEEDED
Status: DISCONTINUED | OUTPATIENT
Start: 2022-01-01 | End: 2022-01-01 | Stop reason: HOSPADM

## 2022-01-01 RX ORDER — TOPIRAMATE 100 MG/1
100 TABLET, FILM COATED ORAL DAILY
COMMUNITY

## 2022-01-01 RX ORDER — DEXTROSE AND SODIUM CHLORIDE 5; .45 G/100ML; G/100ML
50 INJECTION, SOLUTION INTRAVENOUS CONTINUOUS
Status: DISCONTINUED | OUTPATIENT
Start: 2022-01-01 | End: 2022-01-01

## 2022-01-01 RX ORDER — TOPIRAMATE 100 MG/1
100 TABLET, FILM COATED ORAL DAILY
Status: DISCONTINUED | OUTPATIENT
Start: 2022-01-01 | End: 2022-01-01

## 2022-01-01 RX ORDER — INSULIN DEGLUDEC INJECTION 100 U/ML
5 INJECTION, SOLUTION SUBCUTANEOUS DAILY
Qty: 15 ML | Refills: 3
Start: 2022-01-01

## 2022-01-01 RX ORDER — PETROLATUM 420 MG/G
1 OINTMENT TOPICAL EVERY 12 HOURS SCHEDULED
Start: 2022-01-01

## 2022-01-01 RX ORDER — DROPERIDOL 2.5 MG/ML
5 INJECTION, SOLUTION INTRAMUSCULAR; INTRAVENOUS ONCE
Status: COMPLETED | OUTPATIENT
Start: 2022-01-01 | End: 2022-01-01

## 2022-01-01 RX ORDER — GABAPENTIN 300 MG/1
300 CAPSULE ORAL EVERY 12 HOURS SCHEDULED
Status: DISCONTINUED | OUTPATIENT
Start: 2022-01-01 | End: 2022-01-01 | Stop reason: HOSPADM

## 2022-01-01 RX ORDER — ACETAMINOPHEN 650 MG/1
650 SUPPOSITORY RECTAL EVERY 4 HOURS PRN
Status: DISCONTINUED | OUTPATIENT
Start: 2022-01-01 | End: 2022-01-01 | Stop reason: HOSPADM

## 2022-01-01 RX ORDER — MONTELUKAST SODIUM 10 MG/1
10 TABLET ORAL NIGHTLY
Status: DISCONTINUED | OUTPATIENT
Start: 2022-01-01 | End: 2022-01-01

## 2022-01-01 RX ORDER — NICOTINE POLACRILEX 4 MG
15 LOZENGE BUCCAL
Status: DISCONTINUED | OUTPATIENT
Start: 2022-01-01 | End: 2022-01-01 | Stop reason: HOSPADM

## 2022-01-01 RX ORDER — HYDROCODONE BITARTRATE AND ACETAMINOPHEN 5; 325 MG/1; MG/1
1 TABLET ORAL EVERY 8 HOURS PRN
Qty: 12 TABLET | Refills: 0 | Status: SHIPPED | OUTPATIENT
Start: 2022-01-01

## 2022-01-01 RX ORDER — SODIUM CHLORIDE 0.9 % (FLUSH) 0.9 %
10 SYRINGE (ML) INJECTION EVERY 12 HOURS SCHEDULED
Status: DISCONTINUED | OUTPATIENT
Start: 2022-01-01 | End: 2022-01-01 | Stop reason: HOSPADM

## 2022-01-01 RX ORDER — ACETAMINOPHEN 160 MG/5ML
650 SOLUTION ORAL EVERY 4 HOURS PRN
Status: DISCONTINUED | OUTPATIENT
Start: 2022-01-01 | End: 2022-01-01 | Stop reason: HOSPADM

## 2022-01-01 RX ORDER — ONDANSETRON 2 MG/ML
4 INJECTION INTRAMUSCULAR; INTRAVENOUS EVERY 6 HOURS PRN
Status: DISCONTINUED | OUTPATIENT
Start: 2022-01-01 | End: 2022-01-01 | Stop reason: HOSPADM

## 2022-01-01 RX ORDER — GABAPENTIN 300 MG/1
300 CAPSULE ORAL EVERY 12 HOURS SCHEDULED
Status: DISCONTINUED | OUTPATIENT
Start: 2022-01-01 | End: 2022-01-01

## 2022-01-01 RX ORDER — HYDROCODONE BITARTRATE AND ACETAMINOPHEN 5; 325 MG/1; MG/1
1 TABLET ORAL EVERY 8 HOURS PRN
Status: DISCONTINUED | OUTPATIENT
Start: 2022-01-01 | End: 2022-01-01 | Stop reason: HOSPADM

## 2022-01-01 RX ORDER — HEPARIN SODIUM 1000 [USP'U]/ML
4000 INJECTION, SOLUTION INTRAVENOUS; SUBCUTANEOUS AS NEEDED
Status: DISCONTINUED | OUTPATIENT
Start: 2022-01-01 | End: 2022-01-01 | Stop reason: HOSPADM

## 2022-01-01 RX ORDER — ACETAMINOPHEN 325 MG/1
650 TABLET ORAL EVERY 4 HOURS PRN
Start: 2022-01-01

## 2022-01-01 RX ORDER — ALBUMIN (HUMAN) 12.5 G/50ML
12.5 SOLUTION INTRAVENOUS AS NEEDED
Status: DISCONTINUED | OUTPATIENT
Start: 2022-01-01 | End: 2022-01-01 | Stop reason: HOSPADM

## 2022-01-01 RX ORDER — SODIUM CHLORIDE 9 MG/ML
50 INJECTION, SOLUTION INTRAVENOUS CONTINUOUS
Status: DISCONTINUED | OUTPATIENT
Start: 2022-01-01 | End: 2022-01-01

## 2022-01-01 RX ORDER — PETROLATUM 420 MG/G
1 OINTMENT TOPICAL EVERY 12 HOURS SCHEDULED
Status: DISCONTINUED | OUTPATIENT
Start: 2022-01-01 | End: 2022-01-01 | Stop reason: HOSPADM

## 2022-01-01 RX ORDER — INSULIN LISPRO 100 [IU]/ML
0-9 INJECTION, SOLUTION INTRAVENOUS; SUBCUTANEOUS
Status: DISCONTINUED | OUTPATIENT
Start: 2022-01-01 | End: 2022-01-01 | Stop reason: HOSPADM

## 2022-01-01 RX ORDER — NITROGLYCERIN 0.4 MG/1
0.4 TABLET SUBLINGUAL
Status: DISCONTINUED | OUTPATIENT
Start: 2022-01-01 | End: 2022-01-01 | Stop reason: HOSPADM

## 2022-01-01 RX ORDER — CEPHALEXIN 500 MG/1
500 CAPSULE ORAL DAILY
Qty: 5 CAPSULE | Refills: 0 | Status: SHIPPED | OUTPATIENT
Start: 2022-01-01 | End: 2022-01-01

## 2022-01-01 RX ORDER — ACETAMINOPHEN 325 MG/1
650 TABLET ORAL EVERY 4 HOURS PRN
Status: DISCONTINUED | OUTPATIENT
Start: 2022-01-01 | End: 2022-01-01 | Stop reason: HOSPADM

## 2022-01-01 RX ORDER — PANTOPRAZOLE SODIUM 40 MG/1
40 TABLET, DELAYED RELEASE ORAL DAILY
Status: DISCONTINUED | OUTPATIENT
Start: 2022-01-01 | End: 2022-01-01 | Stop reason: HOSPADM

## 2022-01-01 RX ORDER — MONTELUKAST SODIUM 10 MG/1
10 TABLET ORAL NIGHTLY
Status: DISCONTINUED | OUTPATIENT
Start: 2022-01-01 | End: 2022-01-01 | Stop reason: HOSPADM

## 2022-01-01 RX ORDER — CARVEDILOL 3.12 MG/1
3.12 TABLET ORAL 2 TIMES DAILY WITH MEALS
COMMUNITY

## 2022-01-01 RX ORDER — DIPHENOXYLATE HYDROCHLORIDE AND ATROPINE SULFATE 2.5; .025 MG/1; MG/1
1 TABLET ORAL EVERY MORNING
Status: DISCONTINUED | OUTPATIENT
Start: 2022-01-01 | End: 2022-01-01 | Stop reason: HOSPADM

## 2022-01-01 RX ORDER — CARVEDILOL 6.25 MG/1
6.25 TABLET ORAL 2 TIMES DAILY
Status: DISCONTINUED | OUTPATIENT
Start: 2022-01-01 | End: 2022-01-01 | Stop reason: HOSPADM

## 2022-01-01 RX ORDER — GABAPENTIN 300 MG/1
300 CAPSULE ORAL 2 TIMES DAILY
Status: DISCONTINUED | OUTPATIENT
Start: 2022-01-01 | End: 2022-01-01 | Stop reason: HOSPADM

## 2022-01-01 RX ORDER — DEXTROSE, SODIUM CHLORIDE, AND POTASSIUM CHLORIDE 5; .45; .15 G/100ML; G/100ML; G/100ML
50 INJECTION INTRAVENOUS CONTINUOUS
Status: DISCONTINUED | OUTPATIENT
Start: 2022-01-01 | End: 2022-01-01

## 2022-01-01 RX ADMIN — INSULIN LISPRO 2 UNITS: 100 INJECTION, SOLUTION INTRAVENOUS; SUBCUTANEOUS at 08:51

## 2022-01-01 RX ADMIN — INSULIN LISPRO 4 UNITS: 100 INJECTION, SOLUTION INTRAVENOUS; SUBCUTANEOUS at 12:18

## 2022-01-01 RX ADMIN — PETROLATUM 1 APPLICATION: 420 OINTMENT TOPICAL at 15:48

## 2022-01-01 RX ADMIN — SODIUM CHLORIDE 50 ML/HR: 9 INJECTION, SOLUTION INTRAVENOUS at 22:10

## 2022-01-01 RX ADMIN — SERTRALINE HYDROCHLORIDE 50 MG: 50 TABLET, FILM COATED ORAL at 18:43

## 2022-01-01 RX ADMIN — TOPIRAMATE 100 MG: 100 TABLET, FILM COATED ORAL at 22:14

## 2022-01-01 RX ADMIN — GABAPENTIN 300 MG: 300 CAPSULE ORAL at 20:10

## 2022-01-01 RX ADMIN — HEPARIN SODIUM 4000 UNITS: 1000 INJECTION INTRAVENOUS; SUBCUTANEOUS at 16:19

## 2022-01-01 RX ADMIN — Medication 10 ML: at 22:10

## 2022-01-01 RX ADMIN — CARVEDILOL 6.25 MG: 6.25 TABLET, FILM COATED ORAL at 09:50

## 2022-01-01 RX ADMIN — INSULIN HUMAN 5 UNITS: 100 INJECTION, SOLUTION PARENTERAL at 11:04

## 2022-01-01 RX ADMIN — CARVEDILOL 6.25 MG: 6.25 TABLET, FILM COATED ORAL at 21:30

## 2022-01-01 RX ADMIN — MIDAZOLAM 2 MG: 1 INJECTION INTRAMUSCULAR; INTRAVENOUS at 12:37

## 2022-01-01 RX ADMIN — INSULIN LISPRO 4 UNITS: 100 INJECTION, SOLUTION INTRAVENOUS; SUBCUTANEOUS at 11:21

## 2022-01-01 RX ADMIN — SERTRALINE HYDROCHLORIDE 50 MG: 50 TABLET, FILM COATED ORAL at 08:28

## 2022-01-01 RX ADMIN — MONTELUKAST SODIUM 10 MG: 10 TABLET, FILM COATED ORAL at 22:13

## 2022-01-01 RX ADMIN — PETROLATUM 1 APPLICATION: 420 OINTMENT TOPICAL at 08:54

## 2022-01-01 RX ADMIN — Medication 10 ML: at 20:39

## 2022-01-01 RX ADMIN — DEXTROSE MONOHYDRATE 25 G: 500 INJECTION PARENTERAL at 04:04

## 2022-01-01 RX ADMIN — Medication 10 ML: at 09:00

## 2022-01-01 RX ADMIN — Medication 1 TABLET: at 09:50

## 2022-01-01 RX ADMIN — APIXABAN 5 MG: 5 TABLET, FILM COATED ORAL at 21:39

## 2022-01-01 RX ADMIN — INSULIN HUMAN 3 UNITS: 100 INJECTION, SOLUTION PARENTERAL at 15:03

## 2022-01-01 RX ADMIN — MONTELUKAST SODIUM 10 MG: 10 TABLET, FILM COATED ORAL at 21:44

## 2022-01-01 RX ADMIN — SODIUM CHLORIDE 50 ML/HR: 9 INJECTION, SOLUTION INTRAVENOUS at 23:14

## 2022-01-01 RX ADMIN — NICARDIPINE HYDROCHLORIDE 5 MG/HR: 25 INJECTION, SOLUTION INTRAVENOUS at 15:54

## 2022-01-01 RX ADMIN — Medication 10 ML: at 20:03

## 2022-01-01 RX ADMIN — LEVOTHYROXINE SODIUM 50 MCG: 0.05 TABLET ORAL at 09:50

## 2022-01-01 RX ADMIN — PETROLATUM 1 APPLICATION: 420 OINTMENT TOPICAL at 20:39

## 2022-01-01 RX ADMIN — GABAPENTIN 300 MG: 300 CAPSULE ORAL at 21:30

## 2022-01-01 RX ADMIN — PANTOPRAZOLE SODIUM 40 MG: 40 TABLET, DELAYED RELEASE ORAL at 09:50

## 2022-01-01 RX ADMIN — APIXABAN 5 MG: 5 TABLET, FILM COATED ORAL at 20:01

## 2022-01-01 RX ADMIN — PETROLATUM 1 APPLICATION: 420 OINTMENT TOPICAL at 20:01

## 2022-01-01 RX ADMIN — Medication 10 ML: at 21:30

## 2022-01-01 RX ADMIN — SERTRALINE HYDROCHLORIDE 50 MG: 50 TABLET, FILM COATED ORAL at 08:53

## 2022-01-01 RX ADMIN — INSULIN HUMAN 3 UNITS: 100 INJECTION, SOLUTION PARENTERAL at 00:13

## 2022-01-01 RX ADMIN — CARVEDILOL 6.25 MG: 6.25 TABLET, FILM COATED ORAL at 20:10

## 2022-01-01 RX ADMIN — GABAPENTIN 300 MG: 300 CAPSULE ORAL at 08:50

## 2022-01-01 RX ADMIN — PETROLATUM 1 APPLICATION: 420 OINTMENT TOPICAL at 19:48

## 2022-01-01 RX ADMIN — GABAPENTIN 300 MG: 300 CAPSULE ORAL at 22:14

## 2022-01-01 RX ADMIN — Medication 10 ML: at 08:51

## 2022-01-01 RX ADMIN — DEXTROSE AND SODIUM CHLORIDE 50 ML/HR: 5; .45 INJECTION, SOLUTION INTRAVENOUS at 15:56

## 2022-01-01 RX ADMIN — DROPERIDOL 5 MG: 2.5 INJECTION, SOLUTION INTRAMUSCULAR; INTRAVENOUS at 14:43

## 2022-01-01 RX ADMIN — Medication 10 ML: at 10:10

## 2022-01-01 RX ADMIN — SERTRALINE HYDROCHLORIDE 50 MG: 50 TABLET, FILM COATED ORAL at 22:13

## 2022-01-01 RX ADMIN — APIXABAN 5 MG: 5 TABLET, FILM COATED ORAL at 20:44

## 2022-01-01 RX ADMIN — LEVOTHYROXINE SODIUM 50 MCG: 0.05 TABLET ORAL at 18:43

## 2022-01-01 RX ADMIN — APIXABAN 5 MG: 5 TABLET, FILM COATED ORAL at 08:28

## 2022-01-01 RX ADMIN — APIXABAN 5 MG: 5 TABLET, FILM COATED ORAL at 22:14

## 2022-01-01 RX ADMIN — APIXABAN 5 MG: 5 TABLET, FILM COATED ORAL at 20:39

## 2022-01-01 RX ADMIN — MONTELUKAST SODIUM 10 MG: 10 TABLET, FILM COATED ORAL at 20:10

## 2022-01-01 RX ADMIN — Medication 10 ML: at 22:13

## 2022-01-01 RX ADMIN — SERTRALINE HYDROCHLORIDE 50 MG: 50 TABLET, FILM COATED ORAL at 08:50

## 2022-01-01 RX ADMIN — ZIPRASIDONE MESYLATE 10 MG: 20 INJECTION, POWDER, LYOPHILIZED, FOR SOLUTION INTRAMUSCULAR at 16:20

## 2022-01-01 RX ADMIN — LEVOTHYROXINE SODIUM 50 MCG: 0.05 TABLET ORAL at 08:27

## 2022-01-01 RX ADMIN — HEPARIN SODIUM 4000 UNITS: 1000 INJECTION INTRAVENOUS; SUBCUTANEOUS at 15:10

## 2022-01-01 RX ADMIN — SERTRALINE HYDROCHLORIDE 50 MG: 50 TABLET, FILM COATED ORAL at 08:27

## 2022-01-01 RX ADMIN — PETROLATUM 1 APPLICATION: 420 OINTMENT TOPICAL at 20:45

## 2022-01-01 RX ADMIN — CARVEDILOL 6.25 MG: 6.25 TABLET, FILM COATED ORAL at 08:28

## 2022-01-01 RX ADMIN — PANTOPRAZOLE SODIUM 40 MG: 40 TABLET, DELAYED RELEASE ORAL at 08:28

## 2022-01-01 RX ADMIN — Medication 10 ML: at 09:50

## 2022-01-01 RX ADMIN — APIXABAN 5 MG: 5 TABLET, FILM COATED ORAL at 08:50

## 2022-01-01 RX ADMIN — PETROLATUM 1 APPLICATION: 420 OINTMENT TOPICAL at 10:10

## 2022-01-01 RX ADMIN — GABAPENTIN 300 MG: 300 CAPSULE ORAL at 08:28

## 2022-01-01 RX ADMIN — APIXABAN 5 MG: 5 TABLET, FILM COATED ORAL at 21:43

## 2022-01-01 RX ADMIN — PETROLATUM 1 APPLICATION: 420 OINTMENT TOPICAL at 11:16

## 2022-01-01 RX ADMIN — PROTHROMBIN, COAGULATION FACTOR VII HUMAN, COAGULATION FACTOR IX HUMAN, COAGULATION FACTOR X HUMAN, PROTEIN C, PROTEIN S HUMAN, AND WATER 3831 UNITS: KIT at 12:01

## 2022-01-01 RX ADMIN — PETROLATUM 1 APPLICATION: 420 OINTMENT TOPICAL at 21:39

## 2022-01-01 RX ADMIN — INSULIN LISPRO 2 UNITS: 100 INJECTION, SOLUTION INTRAVENOUS; SUBCUTANEOUS at 17:50

## 2022-01-01 RX ADMIN — Medication 10 ML: at 08:28

## 2022-01-01 RX ADMIN — GABAPENTIN 300 MG: 300 CAPSULE ORAL at 20:39

## 2022-01-01 RX ADMIN — GABAPENTIN 300 MG: 300 CAPSULE ORAL at 20:44

## 2022-01-01 RX ADMIN — Medication 10 ML: at 20:45

## 2022-01-01 RX ADMIN — INSULIN HUMAN 3 UNITS: 100 INJECTION, SOLUTION PARENTERAL at 12:18

## 2022-01-01 RX ADMIN — GABAPENTIN 300 MG: 300 CAPSULE ORAL at 09:50

## 2022-01-01 RX ADMIN — GABAPENTIN 300 MG: 300 CAPSULE ORAL at 08:53

## 2022-01-01 RX ADMIN — Medication 10 ML: at 20:10

## 2022-01-01 RX ADMIN — GABAPENTIN 300 MG: 300 CAPSULE ORAL at 20:01

## 2022-01-01 RX ADMIN — GABAPENTIN 300 MG: 300 CAPSULE ORAL at 21:48

## 2022-01-01 RX ADMIN — Medication 1 TABLET: at 06:51

## 2022-01-01 RX ADMIN — Medication 10 ML: at 21:40

## 2022-01-01 RX ADMIN — INSULIN LISPRO 4 UNITS: 100 INJECTION, SOLUTION INTRAVENOUS; SUBCUTANEOUS at 12:17

## 2022-01-01 RX ADMIN — ONDANSETRON 4 MG: 2 INJECTION INTRAMUSCULAR; INTRAVENOUS at 01:15

## 2022-01-01 RX ADMIN — MONTELUKAST SODIUM 10 MG: 10 TABLET, FILM COATED ORAL at 21:30

## 2022-01-01 RX ADMIN — APIXABAN 5 MG: 5 TABLET, FILM COATED ORAL at 10:09

## 2022-01-01 RX ADMIN — ZIPRASIDONE MESYLATE 10 MG: 20 INJECTION, POWDER, LYOPHILIZED, FOR SOLUTION INTRAMUSCULAR at 13:20

## 2022-01-01 RX ADMIN — APIXABAN 5 MG: 5 TABLET, FILM COATED ORAL at 08:53

## 2022-01-10 NOTE — TELEPHONE ENCOUNTER
Caller: (Daughter)Daksha    Relationship to patient: Emergency Contact    Best call back number: [phone]    Chief complaint: VOMITING, DIARRHEA FOR ONE WEEK. NOW SHE IS CONSTIPATED AND IS NOT GOING TO HER DIALYSIS DUE TO THESE ISSUES.    Type of visit: SAME DAY    Requested date: TODAY 1/10    Additional notes: PATIENT'S DAUGHTER WANTS MOM TO BE SEEN TODAY IF POSSIBLE, THEY ONLY WANTED TO SEE DR PERALES DUE TO PATIENT'S MEDICAL HISTORY. PLEASE CALL TO ADVISE IF SHE CAN BE SEEN TODAY. THEY ARE WORRIED BECAUSE SHE HAS NOT BEEN GOING TO DIALYSIS SINCE SHE HAS BEEN SICK.   Patient's daughter.  I advise she needs to go to the emergency room if she has been having this for weakness missed dialysis.

## 2022-01-14 NOTE — PROGRESS NOTES
Subjective Chief complaint is hospital follow-up  Maribeth Rendon is a 71 y.o. female.     History of Present Illness Maribeth is here today for hospital follow-up.  She was admitted to [facility] for an overnight stay on the 10th of this month.  She had been having some vomiting and diarrhea.  She had missed some days of dialysis.  She was admitted for an overnight stay and dialyzed.  She was treated for the nausea with some antacids and that seemed to help.  Her CAT scan of the abdomen did show multiple gallstones in the gallbladder.  She did have an EGD performed by Dr. Grant in July 2021.  She also had a colonoscopy at that time.  Her EGD just showed bile acid in the stomach.  She continues to have some intermittent diarrhea they are trying to stop her calcium acetate and see if that will help.  She does have diabetes.  Generally poorly controlled.    The following portions of the patient's history were reviewed and updated as appropriate: allergies, current medications, past family history, past medical history, past social history, past surgical history and problem list.    Review of Systems   Constitutional: Negative for chills and fever.   Gastrointestinal: Positive for diarrhea. Negative for abdominal pain.       Objective   Physical Exam  Vitals and nursing note reviewed.   Cardiovascular:      Rate and Rhythm: Regular rhythm. Tachycardia present.   Pulmonary:      Effort: Pulmonary effort is normal.      Breath sounds: No wheezing, rhonchi or rales.   Abdominal:      General: Bowel sounds are normal.      Palpations: Abdomen is soft.      Tenderness: There is no abdominal tenderness. There is no guarding or rebound.   Neurological:      Mental Status: She is alert.           Assessment/Plan   Diagnoses and all orders for this visit:    1. Diarrhea, unspecified type (Primary)    2. Calculus of gallbladder without cholecystitis without obstruction  -     Ambulatory Referral to General Surgery    3. ESRD  (end stage renal disease) (HCC)    4. DM type 2 with diabetic peripheral neuropathy (HCC)  -     Hemoglobin A1c    Maribeth is here today for follow-up.  I am going to refer her to a general surgeon.  I am not convinced that her gallstones are her problem but she does have a number of them in the gallbladder.  Certainly with all her other medical problems I could see just watching this.  I am going to check on the status of her diabetes today.

## 2022-01-21 PROBLEM — I62.9 INTRACRANIAL HEMORRHAGE (HCC): Status: ACTIVE | Noted: 2022-01-01

## 2022-01-21 NOTE — ED PROVIDER NOTES
EMERGENCY DEPARTMENT ENCOUNTER    Room Number:  01/01  Date of encounter:  1/21/2022  PCP: Robby Chaney MD  Historian: patient   Full history not obtainable due to: none     HPI:  Chief Complaint: Headache, fall     Context: Maribeth Rendon is a 71 y.o. female who presents to the ED c/o headache onset after fall this am. Reports she wears an orthotic boot for her diabetic foot ulcer and when she stepped onto the ground, it caused her to lose her balance and fall. She hit her head but did not pass out.  Now complains of mild posterior headache. It is constant. Non radiating. Does endorse some neck soreness and skin tears of her BUE. She is anticoagulated on Eliquis due to hx of atrial fibrillation.    Tdap UTD in last 5 years.         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/2016   • Gastroesophageal reflux disease 01/27/2016   • Hyperlipidemia 01/27/2016   • Hypertension 01/27/2016   • Arthritis 01/27/2016   • Seizure disorder (HCC) 01/27/2016   • Phlebitis 01/27/2016   • Type 2 diabetes mellitus (HCC) 01/27/2016   • Vitamin D deficiency 01/27/2016   • Chest pain 01/27/2016   • Abscess 03/17/2016   • Generalized muscle weakness 03/17/2016   • Abdominal wall cellulitis 03/18/2016   • HTN (hypertension) 03/18/2016   • Diabetes mellitus with peripheral autonomic neuropathy (HCC) 03/18/2016   • Hyponatremia 03/18/2016   • Hypercalcemia 03/18/2016   • Dehydration 03/18/2016   • Abdominal wall abscess 03/23/2016   • Cellulitis of toe of left foot 09/22/2017   • Partial Achilles tendon tear, left,  initial encounter 11/08/2017   • Arthritis of foot 01/18/2018   • Essential tremor 06/30/2016   • Primary parkinsonism ([facility]) 03/10/2017   • Abnormal cardiac function test 07/15/2019   • 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 pectoris ([facility]) 07/15/2019   • Atrial fibrillation ([facility]) 11/20/2019   • Anticoagulated 11/20/2019   • CKD (chronic kidney disease) stage 5, GFR less than 15 ml/min ([facility]) 01/27/2020   • Hypoglycemia 05/30/2020   • Low vitamin B12 level 05/23/2019   • S/P CABG (coronary artery bypass graft) 12/23/2020   • Hypothyroidism (acquired) 05/07/2021   • Gastrointestinal hemorrhage with melena 06/29/2021   • Systolic CHF, chronic ([facility]) 07/01/2021   • ESRD (end stage renal disease) ([facility]) 07/02/2021   • Anemia, chronic disease 07/02/2021   • Anemia, posthemorrhagic, acute 07/02/2021   • Altered mental state 01/25/2021     Resolved Ambulatory Problems     Diagnosis Date Noted   • Community acquired pneumonia 01/27/2016   • Diabetes mellitus ([facility]) 03/18/2016   • CKD (chronic kidney disease) stage 4, GFR 15-29 ml/min ([facility]) 03/18/2016   • DACIA (acute kidney injury) ([facility]) 03/19/2016   • Hemorrhagic stroke ([facility]) 09/18/2020   • Acute pulmonary edema ([facility]) 05/06/2021   • Hypotension 06/29/2021   • History of CVA (cerebrovascular accident) 07/01/2021     Past Medical History:   Diagnosis Date   • Achilles tendon tear    • Anemia    • Anxiety    • Diabetes mellitus, type 2 ([facility])    • GERD (gastroesophageal reflux disease)    • History of MRSA infection 03/15/2016   • History of transfusion    • Hypokalemia    • Hypomagnesemia    • Hypoxic 01/2016   • Intertrigo    • Leukocytosis    • Osteoarthritis    • Pneumonia 01/2016   • Psoriasis    • Psoriatic arthritis ([facility])    • Seizures ([facility])    • Sepsis ([facility]) 01/2016   • SOB (shortness of breath)    • Stage 4 chronic renal impairment associated with type 2 diabetes mellitus ([facility])    • Staph infection    • Streptococcal bacteremia    • Stroke (cerebrum)  (HCC)    • Stroke (HCC)    • Swelling of hand 10/27/2016   • Tremor, essential    • Wears glasses    • Wheelchair dependent          PAST SURGICAL HISTORY  Past Surgical History:   Procedure Laterality Date   • ABDOMINAL WALL ABSCESS INCISION AND DRAINAGE  2016   • ARTERIOVENOUS FISTULA/SHUNT SURGERY Left 10/27/2016    Procedure: LT FOREARM FISTULA;  Surgeon: Lorelei Haque Jr., MD;  Location: [facility] MAIN OR;  Service:    • ARTERIOVENOUS FISTULA/SHUNT SURGERY Left 2/16/2017    Procedure: LT BRACHIAL CEPHALIC WITH FISTULA LIGATION RADIAL CEPHALIC.;  Surgeon: Gurmeet Carver MD;  Location[facility];  Service:    • CARDIAC CATHETERIZATION N/A 7/26/2019    Procedure: Left Heart Cath;  Surgeon: Eddie Hodges MD;  Location: [facility] CATH [facility]VASIVE LOC[facility];  Service: Cardiology   • CARDIAC CATHETERIZATION N/A 7/26/2019    Procedure: Coronary angiography;  Surgeon: Eddie Hodges MD;  Location: [facility] CATH [facility]ASIVE LOC[facility];  Service: Cardiology   • CARDIAC SURGERY     • CATARACT EXTRACTION W/ INTRAOCULAR LENS IMPLANT Bilateral 2011   • COLONOSCOPY N/A 7/4/2021    Procedure: COLONOSCOPY into cecum/terminal ileum;  Surgeon: Purvi Grant MD;  Location: [facility] ENDOSCOPY;  Service: Gastroenterology;  Laterality: N/A;  hemorrhoids, diverticulosis   • CORONARY ARTERY BYPASS GRAFT N/A 7/29/2019    Procedure: INTRAOP ERNESTO; CORONARY ARTERY BYPASS GRAFTING X 4 WITH LEFT INTERNAL MAMMARY ARTERY GRAFT AND UTILIZING ENDOSCOPICALLY HARVESTED GREATER SAPHENOUS VEIN; PRP;  Surgeon: Gordo Ferreira MD;  Location: [facility] MAIN OR;  Service: Cardiothoracic   • CORONARY ARTERY BYPASS GRAFT     • DILATATION AND CURETTAGE  1991   • ENDOSCOPY N/A 7/1/2021    Procedure: ESOPHAGOGASTRODUODENOSCOPY WITH BX'S;  Surgeon: Azam Kat MD;  Location: [facility];  Service: Gastroenterology;  Laterality: N/A;  pre: ANEMIA, MELENA  post: ESOPHAGITIS, GASTRITIS, BILE REFLUX, BUT NO OBVIOUS SOURCE OF  BLEEDING   • EXCISION MASS TRUNK N/A 3/22/2016    Procedure: I&D LOWER ABDOMINAL  WOUND;  Surgeon: Tru Yi MD;  Location: [facility] OR;  Service:    • FOOT SURGERY Right     REMOVED BONE IN RIGHT GREAT TOE   • HYSTERECTOMY           FAMILY HISTORY  Family History   Problem Relation Age of Onset   • Heart attack Mother    • Heart disease Mother    • Kidney disease Mother    • Heart attack Father    • Heart disease Father    • Hypertension Father    • Stroke Father         ISCHEMIC   • Diabetes Father         TYPE 2   • Kidney disease Brother    • Diabetes Brother         TYPE 1   • Thyroid disease Daughter    • Anxiety disorder Maternal Aunt    • Bipolar disorder Maternal Aunt    • Depression Maternal Aunt    • Lupus Maternal Aunt         LUPUS ANTICOAGLULANT   • Rheum arthritis Maternal Aunt    • Alcohol abuse Maternal Uncle    • Other Maternal Uncle         PULMONARY DISEASE         SOCIAL HISTORY  Social History     Socioeconomic History   • Marital status:      Spouse name: Tru   • Number of children: 4   • Years of education: 11   • Highest education level: 11th grade   Tobacco Use   • Smoking status: Former Smoker     Packs/day: 2.00     Years: 26.00     Pack years: 52.00     Types: Cigarettes     Quit date: 10/21/1992     Years since quittin.2   • Smokeless tobacco: Never Used   • Tobacco comment: quit    Vaping Use   • Vaping Use: Never used   Substance and Sexual Activity   • Alcohol use: Not Currently     Alcohol/week: 0.0 standard drinks   • Drug use: No   • Sexual activity: Defer     Birth control/protection: Surgical         ALLERGIES  Prednisone        REVIEW OF SYSTEMS  Review of Systems   All systems reviewed and marked as negative except as listed in HPI       PHYSICAL EXAM    I have reviewed the triage vital signs and nursing notes.    ED Triage Vitals   Temp Heart Rate Resp BP SpO2   22 0902 22 0901 22 0901 22 0901 22 0901   98.1  °F (36.7 °C) 80 18 106/62 99 %      Temp src Heart Rate Source Patient Position BP Location FiO2 (%)   -- -- -- -- --              GENERAL: alert well developed, well nourished in no distress  HENT: NC, neck supple, trachea midline.  EYES: no scleral icterus, PERRL, normal conjunctivae  CV: regular rhythm, regular rate, no murmur  RESPIRATORY: unlabored effort, CTAB  ABDOMEN: soft, nontender, nondistended, bowel sounds present  MUSCULOSKELETAL: no gross deformity. PASCUAL. No tenderness of the BLE or BUE. Orthotic boot in place, right foot. No focal vertebral tenderness or step off.   NEURO: alert,  sensory and motor function of extremities grossly intact, speech clear, mental status normal/baseline. Tongue protrudes midline. Smile is symmetric. No focal weakness.   SKIN: warm, dry, no rash. Skin tears of distal BUE noted. No active bleeding.   PSYCH:  Appropriate mood and affect    Vital signs and nursing notes reviewed.          LAB RESULTS  Recent Results (from the past 24 hour(s))   Comprehensive Metabolic Panel    Collection Time: 01/21/22 10:12 AM    Specimen: Blood   Result Value Ref Range    Glucose 325 (H) 65 - 99 mg/dL    BUN 31 (H) 8 - 23 mg/dL    Creatinine 6.47 (H) 0.57 - 1.00 mg/dL    Sodium 139 136 - 145 mmol/L    Potassium 4.0 3.5 - 5.2 mmol/L    Chloride 97 (L) 98 - 107 mmol/L    CO2 30.7 (H) 22.0 - 29.0 mmol/L    Calcium 8.7 8.6 - 10.5 mg/dL    Total Protein 6.9 6.0 - 8.5 g/dL    Albumin 3.00 (L) 3.50 - 5.20 g/dL    ALT (SGPT) 10 1 - 33 U/L    AST (SGOT) 13 1 - 32 U/L    Alkaline Phosphatase 112 39 - 117 U/L    Total Bilirubin 0.2 0.0 - 1.2 mg/dL    eGFR Non African Amer 6 (L) >60 mL/min/1.73    eGFR  African Amer      Globulin 3.9 gm/dL    A/G Ratio 0.8 g/dL    BUN/Creatinine Ratio 4.8 (L) 7.0 - 25.0    Anion Gap 11.3 5.0 - 15.0 mmol/L   aPTT    Collection Time: 01/21/22 10:12 AM    Specimen: Blood   Result Value Ref Range    PTT 29.7 22.7 - 35.4 seconds   Protime-INR    Collection Time: 01/21/22  10:12 AM    Specimen: Blood   Result Value Ref Range    Protime 13.6 11.7 - 14.2 Seconds    INR 1.06 0.90 - 1.10   CBC Auto Differential    Collection Time: 01/21/22 10:12 AM    Specimen: Blood   Result Value Ref Range    WBC 8.81 3.40 - 10.80 10*3/mm3    RBC 3.43 (L) 3.77 - 5.28 10*6/mm3    Hemoglobin 10.6 (L) 12.0 - 15.9 g/dL    Hematocrit 34.2 34.0 - 46.6 %    MCV 99.7 (H) 79.0 - 97.0 fL    MCH 30.9 26.6 - 33.0 pg    MCHC 31.0 (L) 31.5 - 35.7 g/dL    RDW 15.1 12.3 - 15.4 %    RDW-SD 54.2 (H) 37.0 - 54.0 fl    MPV 11.0 6.0 - 12.0 fL    Platelets 184 140 - 450 10*3/mm3    Neutrophil % 69.7 42.7 - 76.0 %    Lymphocyte % 16.5 (L) 19.6 - 45.3 %    Monocyte % 8.7 5.0 - 12.0 %    Eosinophil % 3.9 0.3 - 6.2 %    Basophil % 0.7 0.0 - 1.5 %    Immature Grans % 0.5 0.0 - 0.5 %    Neutrophils, Absolute 6.15 1.70 - 7.00 10*3/mm3    Lymphocytes, Absolute 1.45 0.70 - 3.10 10*3/mm3    Monocytes, Absolute 0.77 0.10 - 0.90 10*3/mm3    Eosinophils, Absolute 0.34 0.00 - 0.40 10*3/mm3    Basophils, Absolute 0.06 0.00 - 0.20 10*3/mm3    Immature Grans, Absolute 0.04 0.00 - 0.05 10*3/mm3    nRBC 0.0 0.0 - 0.2 /100 WBC   Green Top (Gel)    Collection Time: 01/21/22 10:12 AM   Result Value Ref Range    Extra Tube Hold for add-ons.    Lavender Top    Collection Time: 01/21/22 10:12 AM   Result Value Ref Range    Extra Tube hold for add-on    Gold Top - SST    Collection Time: 01/21/22 10:12 AM   Result Value Ref Range    Extra Tube Hold for add-ons.    Light Blue Top    Collection Time: 01/21/22 10:12 AM   Result Value Ref Range    Extra Tube hold for add-on        Ordered the above labs and independently reviewed the results.        RADIOLOGY  CT Head Without Contrast    Result Date: 1/21/2022  CT SCAN OF THE BRAIN WITHOUT CONTRAST  HISTORY: Left-sided head trauma. Pain.  The CT scan was performed as an emergency procedure through the brain without contrast. There is mild diffuse atrophy and chronic small vessel ischemic change. There is  a very small focus of probable acute hemorrhage in the region of the putamen and measuring 4 x 4 mm and 11 mm in vertical extent as seen on the coronal reconstructions. This is new since the CT scans dated 06/29/2021 and 02/20/2020, and therefore is highly suspicious for acute hemorrhage. There is no significant mass effect but continued follow-up evaluation is recommended. The remainder of the CT scan is unremarkable. The visualized sinuses and mastoid air cells are clear.      Radiation dose reduction techniques were utilized, including automated exposure control and exposure modulation based on body size.  This report was finalized on 1/21/2022 12:08 PM by Dr. Kirill Lindsay M.D.      CT Cervical Spine Without Contrast    Result Date: 1/21/2022  CT CERVICAL SPINE WO CONTRAST-  CLINICAL HISTORY: Patient fell. Neck pain.  TECHNIQUE: Multiple axial 1 mm thick images were obtained through the cervical spine. Coronal and sagittal reconstructions were produced.  Radiation dose reduction techniques were utilized, including automated exposure control and exposure modulation based on body size.  COMPARISON: MRI of the cervical spine dated 11/25/2015.  FINDINGS: There is marked disc space narrowing at the C5-C6 and C6-C7 levels with endplate bony spurring. There is mild disc space narrowing at C4-C5 The remainder the cervical disc spaces are normal in height. All of the vertebral bodies are normal in height. The facet joints are intact. There is straightening of normal cervical lordosis due to the degenerative disc changes. There is no evidence of acute fracture or subluxation.  At C2-C3 there is no significant disc bulging or spinal canal stenosis. There is moderate narrowing of the left neural foramen due to bony spurring.  At C3-C4 there is no significant disc bulging. There is moderately severe narrowing of the right neural foramen due to bony spurring. There is no central canal stenosis.  At C4-C5 there is facet joint  arthropathy and extensive endplate bony spurring and mild disc bulging that produces moderate central canal stenosis and also moderate bilateral foraminal narrowing. Similar findings are present at C5-C6 and C6-C7.      Multilevel degenerative disc changes as described. There is no evidence of acute fracture or subluxation.  This report was finalized on 1/21/2022 12:42 PM by Dr. Nehemiah Veliz M.D.        I ordered the above noted radiological studies. Independently reviewed by me and discussed with radiologist.  See dictation above for official radiology interpretation.      PROCEDURES    Critical Care  Performed by: Natividad Gonzalez APRN  Authorized by: Clotilde Borja MD     Critical care provider statement:     Critical care time (minutes):  35    Critical care time was exclusive of:  Separately billable procedures and treating other patients and teaching time    Critical care was necessary to treat or prevent imminent or life-threatening deterioration of the following conditions:  CNS failure or compromise    Critical care was time spent personally by me on the following activities:  Blood draw for specimens, ordering and performing treatments and interventions, ordering and review of laboratory studies, ordering and review of radiographic studies, re-evaluation of patient's condition, examination of patient, discussions with consultants, development of treatment plan with patient or surrogate and obtaining history from patient or surrogate            MEDICATIONS GIVEN IN ER    Medications   sodium chloride 0.9 % flush 10 mL (has no administration in time range)   prothrombin complex conc human (KCentra) IV solution 3,831 Units (3,831 Units Intravenous Given 1/21/22 1201)         PROGRESS, DATA ANALYSIS, CONSULTS, AND MEDICAL DECISION MAKING    All labs have been independently reviewed by me.  All radiology studies have been reviewed by me.   EKG's independently reviewed by me.  Discussion below  represents my analysis of pertinent findings related to patient's condition, differential diagnosis, treatment plan and final disposition.    DIFFERENTIAL DIAGNOSIS INCLUDE BUT NOT LIMITED TO: Tension headache, migraine headache, cluster headache, posttraumatic headache,  rebound headache, meningitis, temporal arteritis, sinus venous thrombosis, subarachnoid hemorrhage, subdural hematoma, brain tumor, withdrawal, dehydration      ED Course as of 01/21/22 1348   Fri Jan 21, 2022   0948 Pt roomed to bed 1 and I was notified of abnormal head ct. Upon review of CT there is a SAH which is acute in appearance.  [JS]   1010 Discussed pt with Neena neurosurgery NP who is on call for Dr Santos. Requests to hold Eliquis and admit to ICU [JS]   1048 Neurosurgery requests reversal of Eliquis with K centra. Also request to keep SBP < 140   [JS]   1123 Creatinine(!): 6.47 [JS]   1138 Discussed pt with Dr Borja who agrees to admit the pt to the ICU. [JS]   1348 WBC: 8.81 [JS]   1348 INR: 1.06 [JS]      ED Course User Index  [JS] Natividad Gonzalez, APRN       AS OF 13:48 EST VITALS:        BP - 142/78  HR - 86  TEMP - 98.1 °F (36.7 °C)  O2 SATS - 93%    DIAGNOSIS  Final diagnoses:   Intracranial hemorrhage (HCC)   Fall from standing, initial encounter   Dialysis patient (HCC)   Current use of long term anticoagulation         DISPOSITION  Admission     Pt masked in first look. I wore appropriate PPE throughout my encounters with the pt. I performed hand hygiene on entry into the pt room and upon exit.     Dictated utilizing Dragon dictation:  Much of this encounter note is an electronic transcription/translation of spoken language to printed text. The electronic translation of spoken language may permit erroneous, or at times, nonsensical words or phrases to be inadvertently transcribed; Although I have reviewed the note for such errors, some may still exist.     Natividad Gonzalez, APRN  01/21/22 1350

## 2022-01-21 NOTE — ED NOTES
Placed page out to Admitting MD for notification of blood pressure parameters     Gina Sheikh RN  01/21/22 8886

## 2022-01-21 NOTE — ED NOTES
PPE per protocol utilized for each encounter.   Blood pressure parameters met without the cardene . will continue to monitor     Gina Sheikh RN  01/21/22 4235

## 2022-01-21 NOTE — ED NOTES
Nursing report ED to floor  Maribeth Rendon  71 y.o.  female    HPI :   Chief Complaint   Patient presents with   • Fall   • Head Injury       Admitting doctor:   Clotilde Borja MD    Admitting diagnosis:   The primary encounter diagnosis was Intracranial hemorrhage (HCC). Diagnoses of Fall from standing, initial encounter, Dialysis patient (HCC), and Current use of long term anticoagulation were also pertinent to this visit.    Code status:   Current Code Status     Date Active Code Status Order ID Comments User Context       1/21/2022 1416 CPR (Attempt to Resuscitate) [id number]  Clotilde Borja MD ED     Advance Care Planning Activity      Questions for Current Code Status     Question Answer    Code Status (Patient has no pulse and is not breathing) CPR (Attempt to Resuscitate)    Medical Interventions (Patient has pulse or is breathing) Full Support          Allergies:   Prednisone    Intake and Output  No intake or output data in the 24 hours ending 01/21/22 1701    Weight:       01/21/22  1004   Weight: 77.1 kg (170 lb)       Most recent vitals:   Vitals:    01/21/22 1531 01/21/22 1614 01/21/22 1621 01/21/22 1700   BP: 172/83 159/85  135/79   BP Location:    Right arm   Patient Position:    Lying   Pulse: 86 84 85    Resp:       Temp:       SpO2: 98% 99% 100%    Weight:       Height:           Active LDAs/IV Access:   Lines, Drains & Airways     Active LDAs     Name Placement date Placement time Site Days    Peripheral IV 01/21/22 1013 Right Forearm 01/21/22  1013  Forearm  less than 1                Labs (abnormal labs have a star):   Labs Reviewed   COMPREHENSIVE METABOLIC PANEL - Abnormal; Notable for the following components:       Result Value    Glucose 325 (*)     BUN 31 (*)     Creatinine 6.47 (*)     Chloride 97 (*)     CO2 30.7 (*)     Albumin 3.00 (*)     eGFR Non African Amer 6 (*)     BUN/Creatinine Ratio 4.8 (*)     All other components within normal limits    Narrative:     GFR Normal >60  Chronic  Kidney Disease <60  Kidney Failure <15     CBC WITH AUTO DIFFERENTIAL - Abnormal; Notable for the following components:    RBC 3.43 (*)     Hemoglobin 10.6 (*)     MCV 99.7 (*)     MCHC 31.0 (*)     RDW-SD 54.2 (*)     Lymphocyte % 16.5 (*)     All other components within normal limits   POCT GLUCOSE FINGERSTICK - Abnormal; Notable for the following components:    Glucose 234 (*)     All other components within normal limits   COVID-19,APTIMA PANTHER (DIONNA) ERICK/ ALEXI, NP/OP SWAB IN UTM/VTM/SALINE TRANSPORT MEDIA,24 HR TAT - Normal    Narrative:     Fact sheet for providers: https://www.fda.gov/media/676405/download     Fact sheet for patients: https://www.fda.gov/media/596896/download    Test performed by RT PCR.   APTT - Normal   PROTIME-INR - Normal   COVID PRE-OP / PRE-PROCEDURE SCREENING ORDER (NO ISOLATION)    Narrative:     The following orders were created for panel order COVID PRE-OP / PRE-PROCEDURE SCREENING ORDER (NO ISOLATION) - Swab, Nasopharynx.  Procedure                               Abnormality         Status                     ---------                               -----------         ------                     COVID-19,APTIMA PANTHER(...[[id number]]  Normal              Final result                 Please view results for these tests on the individual orders.   RAINBOW DRAW    Narrative:     The following orders were created for panel order [place] Draw.  Procedure                               Abnormality         Status                     ---------                               -----------         ------                     Green Top (Gel)[[id number]]                                  Final result               Lavender Top[[id number]]                                     Final result               Gold Top - SST[[id number]]                                   Final result               Light Blue Top[[id number]]                                   Final result                 Please view results for  these tests on the individual orders.   POCT GLUCOSE FINGERSTICK   POCT GLUCOSE FINGERSTICK   POCT GLUCOSE FINGERSTICK   POCT GLUCOSE FINGERSTICK   POCT GLUCOSE FINGERSTICK   POCT GLUCOSE FINGERSTICK   POCT GLUCOSE FINGERSTICK   POCT GLUCOSE FINGERSTICK   CBC AND DIFFERENTIAL    Narrative:     The following orders were created for panel order CBC & Differential.  Procedure                               Abnormality         Status                     ---------                               -----------         ------                     CBC Auto Differential[[id number]]        Abnormal            Final result                 Please view results for these tests on the individual orders.   GREEN TOP   LAVENDER TOP   GOLD TOP - SST   LIGHT BLUE TOP       EKG:   No orders to display       Meds given in ED:   Medications   sodium chloride 0.9 % flush 10 mL (has no administration in time range)   carvedilol (COREG) tablet 6.25 mg (has no administration in time range)   gabapentin (NEURONTIN) capsule 300 mg (has no administration in time range)   levothyroxine (SYNTHROID, LEVOTHROID) tablet 50 mcg (has no administration in time range)   montelukast (SINGULAIR) tablet 10 mg (has no administration in time range)   multivitamin (THERAGRAN) tablet 1 tablet (has no administration in time range)   pantoprazole (PROTONIX) EC tablet 40 mg (has no administration in time range)   sertraline (ZOLOFT) tablet 50 mg (has no administration in time range)   sodium chloride 0.9 % flush 10 mL (has no administration in time range)   sodium chloride 0.9 % flush 10 mL (has no administration in time range)   acetaminophen (TYLENOL) tablet 650 mg (has no administration in time range)     Or   acetaminophen (TYLENOL) suppository 650 mg (has no administration in time range)   niCARdipine (CARDENE) 25 mg in 250 mL NS infusion kit (7.5 mg/hr Intravenous Rate/Dose Change 1/21/22 1622)   dextrose (GLUTOSE) oral gel 15 g (has no administration in time  range)   dextrose (D50W) (25 g/50 mL) IV injection 25 g (has no administration in time range)   glucagon (human recombinant) (GLUCAGEN DIAGNOSTIC) injection 1 mg (has no administration in time range)   insulin regular (humuLIN R,novoLIN R) injection 0-7 Units (3 Units Subcutaneous Given 22 1503)   prothrombin complex conc human (KCentra) IV solution 3,831 Units (3,831 Units Intravenous Given 22 1201)       Imaging results:  CT Cervical Spine Without Contrast    Result Date: 2022  Multilevel degenerative disc changes as described. There is no evidence of acute fracture or subluxation.  This report was finalized on 2022 12:42 PM by Dr. Nehemiah Veliz M.D.        Ambulatory status:   - bedrest      Social issues:   Social History     Socioeconomic History   • Marital status:      Spouse name: Tru   • Number of children: 4   • Years of education: 11   • Highest education level: 11th grade   Tobacco Use   • Smoking status: Former Smoker     Packs/day: 2.00     Years: 26.00     Pack years: 52.00     Types: Cigarettes     Quit date: 10/21/1992     Years since quittin.2   • Smokeless tobacco: Never Used   • Tobacco comment: quit    Vaping Use   • Vaping Use: Never used   Substance and Sexual Activity   • Alcohol use: Not Currently     Alcohol/week: 0.0 standard drinks   • Drug use: No   • Sexual activity: Defer     Birth control/protection: Surgical       NIH Stroke Scale:  Interval: baseline     Nursing report ED to floor:       Gina Sheikh RN  [id number]

## 2022-01-21 NOTE — ED TRIAGE NOTES
Pt from home, wheelchair fell over with her in it this morning, pt hit her head on the wall, pt is on Eliquis, no LOC. Pt has skin tear to left forearm and right elbow. Pt was supposed to have dialysis today and has rescheduled it for tomorrow.     Pt arrives in triage with mask on. Triage staff wearing N95 masks and goggles.

## 2022-01-21 NOTE — H&P
History and Physical    Patient Name: Maribeth Rendon  Age/Sex: 71 y.o. female  : 1950  MRN: [id number]    Date of Admission: 2022  Date of Encounter Visit: 22  Encounter Provider: Clotilde Borja MD  Place of Service: [facility]   Patient Care Team:  Robby Chaney MD as PCP - General  Robby Chaney MD as PCP - Family Medicine  Eddie Hodges MD as Consulting Physician (Cardiology)      Subjective:     Chief Complaint: Traumatic intracranial hemorrhage    History of Present Illness:  Maribeth Rendon is a 71 y.o. female who has end-stage renal disease on hemodialysis, history of coronary artery disease with previous CABG and history of atrial fibrillation on chronic anticoagulation with Eliquis.Other comorbidities include history of diabetes mellitus, obesity, history of prior CVA which is another indication for her chronic anticoagulation.  She was getting ready for her hemodialysis session, she was trying to transfer to the wheelchair where she stumbled and landed on the wheelchair causing it to swing to the side and she fell along with the wheelchair to the ground. She did hit her head and even though she denies any altered mental status changes or loss of consciousness she was brought into the ER for evaluation given the concern of being on the blood thinner.  There was no uncontrolled muscular contraction or any suspected seizure activities, there was no evidence of any external bleed.  In the ER the patient had a CT of the head that showed small area of bleed, being on anticoagulation and given the location of the bleed this was discussed with the neurosurgical team who wanted the patient to be reversed on her anticoagulation.  The last dose of the Eliquis was taken on the night of 2022.  There is no fever or chills  There is no cough  There is no altered mental status, patient able to answer question and follow commands.    Pulmonary Functions Testing  Results:    No results found for: FEV1, FVC, YZD6QPA, TLC, DLCO    Review of Systems:   Review of Systems   Constitutional: Negative.    HENT: Negative.    Eyes: Negative.    Respiratory: Negative.    Cardiovascular: Positive for leg swelling.   Gastrointestinal: Negative.    Endocrine: Negative.    Genitourinary: Negative.    Musculoskeletal: Negative.    Skin: Negative.    Allergic/Immunologic: Negative.    Neurological: Negative.    Hematological: Negative.    Psychiatric/Behavioral: Negative.        Past Medical History:  Past Medical History:   Diagnosis Date   • Achilles tendon tear     left    • Acute pulmonary edema ([facility]) 5/6/2021   • Anemia    • Anxiety    • CKD (chronic kidney disease) stage 5, GFR less than 15 ml/min ([facility]) 1/27/2020   • Depression    • Diabetes mellitus, type 2 ([facility])    • ESRD (end stage renal disease) ([facility])     HAS LEFT FOREARM FISTULA   • GERD (gastroesophageal reflux disease)    • Hemorrhagic stroke ([facility]) 9/18/2020   • History of CVA (cerebrovascular accident) 7/1/2021   • History of MRSA infection 03/15/2016    ABDOMINAL WOUND,   INFECTION CONTROLL NOTIFIED 2-6-2017   • History of transfusion    • Hyperlipidemia    • Hypertension    • Hypokalemia    • Hypomagnesemia    • Hypoxic 01/2016    WHEN SHE HAD PNEUMONIA   • Intertrigo    • Leukocytosis    • Osteoarthritis    • Pneumonia 01/2016   • Psoriasis    • Psoriatic arthritis ([facility])    • Seizures ([facility])     one time   • Sepsis ([facility]) 01/2016   • SOB (shortness of breath)    • Stage 4 chronic renal impairment associated with type 2 diabetes mellitus ([facility])    • Staph infection     HX RIGHT FOOT AT SUBURBAN 01/09/2010   • Streptococcal bacteremia    • Stroke (cerebrum) ([facility])    • Stroke ([facility])    • Swelling of hand 10/27/2016    LEFT HAND SWELLIMG SINCE LEFT FISTULA SURGERY   • Tremor, essential    • Wears glasses    • Wheelchair dependent     For mobility       Past Surgical History:   Procedure Laterality Date   • ABDOMINAL WALL ABSCESS  INCISION AND DRAINAGE  2016   • ARTERIOVENOUS FISTULA/SHUNT SURGERY Left 10/27/2016    Procedure: LT FOREARM FISTULA;  Surgeon: Lorelei Haque Jr., MD;  Location: [facility] MAIN OR;  Service:    • ARTERIOVENOUS FISTULA/SHUNT SURGERY Left 2/16/2017    Procedure: LT BRACHIAL CEPHALIC WITH FISTULA LIGATION RADIAL CEPHALIC.;  Surgeon: Gurmeet Carver MD;  Location: [facility] MAIN OR;  Service:    • CARDIAC CATHETERIZATION N/A 7/26/2019    Procedure: Left Heart Cath;  Surgeon: Eddie Hodges MD;  Location: [facility] CATH INVASIVE LOCATION;  Service: Cardiology   • CARDIAC CATHETERIZATION N/A 7/26/2019    Procedure: Coronary angiography;  Surgeon: Eddie Hodges MD;  Location: [facility] CATH INVASIVE LOCATION;  Service: Cardiology   • CARDIAC SURGERY     • CATARACT EXTRACTION W/ INTRAOCULAR LENS IMPLANT Bilateral 2011   • COLONOSCOPY N/A 7/4/2021    Procedure: COLONOSCOPY into cecum/terminal ileum;  Surgeon: Purvi Grant MD;  Location: [facility] ENDOSCOPY;  Service: Gastroenterology;  Laterality: N/A;  hemorrhoids, diverticulosis   • CORONARY ARTERY BYPASS GRAFT N/A 7/29/2019    Procedure: INTRAOP ERNESTO; CORONARY ARTERY BYPASS GRAFTING X 4 WITH LEFT INTERNAL MAMMARY ARTERY GRAFT AND UTILIZING ENDOSCOPICALLY HARVESTED GREATER SAPHENOUS VEIN; PRP;  Surgeon: Gordo Ferreira MD;  Location: [facility] OR;  Service: Cardiothoracic   • CORONARY ARTERY BYPASS GRAFT     • DILATATION AND CURETTAGE  1991   • ENDOSCOPY N/A 7/1/2021    Procedure: ESOPHAGOGASTRODUODENOSCOPY WITH BX'S;  Surgeon: Azam Kat MD;  Location: [facility] ENDOSCOPY;  Service: Gastroenterology;  Laterality: N/A;  pre: ANEMIA, MELENA  post: ESOPHAGITIS, GASTRITIS, BILE REFLUX, BUT NO OBVIOUS SOURCE OF BLEEDING   • EXCISION MASS TRUNK N/A 3/22/2016    Procedure: I&D LOWER ABDOMINAL  WOUND;  Surgeon: Tru Yi MD;  Location: [facility] MAIN OR;  Service:    • FOOT SURGERY Right 2010    REMOVED BONE IN RIGHT GREAT TOE   •  HYSTERECTOMY         Home Medications:   (Not in a hospital admission)      Inpatient Medications:  Scheduled Meds:   Continuous Infusions:   PRN Meds:.•  [COMPLETED] Insert peripheral IV **AND** sodium chloride    Allergies:  Allergies   Allergen Reactions   • Prednisone Hallucinations       Past Social History:  Social History     Socioeconomic History   • Marital status:      Spouse name: Tru   • Number of children: 4   • Years of education: 11   • Highest education level: 11th grade   Tobacco Use   • Smoking status: Former Smoker     Packs/day: 2.00     Years: 26.00     Pack years: 52.00     Types: Cigarettes     Quit date: 10/21/1992     Years since quittin.2   • Smokeless tobacco: Never Used   • Tobacco comment: quit    Vaping Use   • Vaping Use: Never used   Substance and Sexual Activity   • Alcohol use: Not Currently     Alcohol/week: 0.0 standard drinks   • Drug use: No   • Sexual activity: Defer     Birth control/protection: Surgical       Past Family History:  Family History   Problem Relation Age of Onset   • Heart attack Mother    • Heart disease Mother    • Kidney disease Mother    • Heart attack Father    • Heart disease Father    • Hypertension Father    • Stroke Father         ISCHEMIC   • Diabetes Father         TYPE 2   • Kidney disease Brother    • Diabetes Brother         TYPE 1   • Thyroid disease Daughter    • Anxiety disorder Maternal Aunt    • Bipolar disorder Maternal Aunt    • Depression Maternal Aunt    • Lupus Maternal Aunt         LUPUS ANTICOAGLULANT   • Rheum arthritis Maternal Aunt    • Alcohol abuse Maternal Uncle    • Other Maternal Uncle         PULMONARY DISEASE           Objective:   Temp:  [98.1 °F (36.7 °C)] 98.1 °F (36.7 °C)  Heart Rate:  [80-93] 86  Resp:  [17-18] 17  BP: (106-149)/(62-78) 142/78   SpO2:  [93 %-99 %] 93 %  on    Device (Oxygen Therapy): room air  No intake or output data in the 24 hours ending 22 1406  Body mass index is 28.29  kg/m².      01/21/22  1004   Weight: 77.1 kg (170 lb)     Weight change:     Physical Exam:   Physical Exam   General:    No acute distress, alert and oriented x4, pleasant                   Head:    Normocephalic, atraumatic.   Eyes:          Conjunctivae and sclerae normal, no icterus, PERRLA   Throat:   No oral lesions, no thrush, oral mucosa moist.    Neck:   Supple, trachea midline.   Lungs:     Normal chest on inspection, CTAB, no wheezes. No rhonchi. No crackles. Respirations regular, even and unlabored.     Heart:    Regular rhythm and normal rate.  No murmurs, gallops, or rubs noted.   Abdomen:     Soft, non-tender, non-distended, positive bowel sounds.    Extremities:   No clubbing, cyanosis, or edema.     Pulses:   Pulses palpable and equal bilaterally.    Skin:   No bleeding or rash.   Neuro:   Non-focal.  Moves all extremities well.    Psychiatric:  Neuro:   Normal mood and affect.    No focal deficit, motor strength 5/5, sensory was intact, awake and alert, oriented x4, visual field was intact     Lab Review:   Results from last 7 days   Lab Units 01/21/22  1012   SODIUM mmol/L 139   POTASSIUM mmol/L 4.0   CHLORIDE mmol/L 97*   CO2 mmol/L 30.7*   BUN mg/dL 31*   CREATININE mg/dL 6.47*   GLUCOSE mg/dL 325*   CALCIUM mg/dL 8.7   AST (SGOT) U/L 13   ALT (SGPT) U/L 10   ALBUMIN g/dL 3.00*         Results from last 7 days   Lab Units 01/21/22  1012   WBC 10*3/mm3 8.81   HEMOGLOBIN g/dL 10.6*   HEMATOCRIT % 34.2   PLATELETS 10*3/mm3 184   MCV fL 99.7*   MCH pg 30.9   MCHC g/dL 31.0*   RDW % 15.1   RDW-SD fl 54.2*   MPV fL 11.0   NEUTROPHIL % % 69.7   LYMPHOCYTE % % 16.5*   MONOCYTES % % 8.7   EOSINOPHIL % % 3.9   BASOPHIL % % 0.7   IMM GRAN % % 0.5   NEUTROS ABS 10*3/mm3 6.15   LYMPHS ABS 10*3/mm3 1.45   MONOS ABS 10*3/mm3 0.77   EOS ABS 10*3/mm3 0.34   BASOS ABS 10*3/mm3 0.06   IMMATURE GRANS (ABS) 10*3/mm3 0.04   NRBC /100 WBC 0.0     Results from last 7 days   Lab Units 01/21/22  1012   INR  1.06   APTT  seconds 29.7               Invalid input(s): LDLCALC                                            Imaging:  Imaging Results (Most Recent)     Procedure Component Value Units Date/Time    CT Cervical Spine Without Contrast [[id number]] Collected: 01/21/22 1224     Updated: 01/21/22 1245    Narrative:      CT CERVICAL SPINE WO CONTRAST-     CLINICAL HISTORY: Patient fell. Neck pain.     TECHNIQUE: Multiple axial 1 mm thick images were obtained through the  cervical spine. Coronal and sagittal reconstructions were produced.     Radiation dose reduction techniques were utilized, including automated  exposure control and exposure modulation based on body size.     COMPARISON: MRI of the cervical spine dated 11/25/2015.     FINDINGS: There is marked disc space narrowing at the C5-C6 and C6-C7  levels with endplate bony spurring. There is mild disc space narrowing  at C4-C5 The remainder the cervical disc spaces are normal in height.  All of the vertebral bodies are normal in height. The facet joints are  intact. There is straightening of normal cervical lordosis due to the  degenerative disc changes. There is no evidence of acute fracture or  subluxation.     At C2-C3 there is no significant disc bulging or spinal canal stenosis.  There is moderate narrowing of the left neural foramen due to bony  spurring.     At C3-C4 there is no significant disc bulging. There is moderately  severe narrowing of the right neural foramen due to bony spurring. There  is no central canal stenosis.     At C4-C5 there is facet joint arthropathy and extensive endplate bony  spurring and mild disc bulging that produces moderate central canal  stenosis and also moderate bilateral foraminal narrowing. Similar  findings are present at C5-C6 and C6-C7.       Impression:      Multilevel degenerative disc changes as described. There is  no evidence of acute fracture or subluxation.     This report was finalized on 1/21/2022 12:42 PM by Dr. Cerna  DARBY Veliz.       CT Head Without Contrast [[id number]] Collected: 01/21/22 0948     Updated: 01/21/22 1211    Narrative:      CT SCAN OF THE BRAIN WITHOUT CONTRAST     HISTORY: Left-sided head trauma. Pain.     The CT scan was performed as an emergency procedure through the brain  without contrast. There is mild diffuse atrophy and chronic small vessel  ischemic change. There is a very small focus of probable acute  hemorrhage in the region of the putamen and measuring 4 x 4 mm and 11 mm  in vertical extent as seen on the coronal reconstructions. This is new  since the CT scans dated 06/29/2021 and 02/20/2020, and therefore is  highly suspicious for acute hemorrhage. There is no significant mass  effect but continued follow-up evaluation is recommended. The remainder  of the CT scan is unremarkable. The visualized sinuses and mastoid air  cells are clear.                 Radiation dose reduction techniques were utilized, including automated  exposure control and exposure modulation based on body size.     This report was finalized on 1/21/2022 12:08 PM by Dr. Kirill Lindsay M.D.             I personally viewed and interpreted the patient's imaging studies.    Assessment:     Intracranial hemorrhage, traumatic  Chronic anticoagulation with Eliquis  Incisional disease on hemodialysis  Coronary artery disease  History of stroke  Beatties mellitus  Essential hypertension      Plan:     Patient will be admitted to the ICU, neurochecks every hour, her Eliquis has been reversed with Kcentra  Hemodialysis per nephrology, she was already seen by her renal group who will arrange for that while in the hospital  Monitor blood pressure and resume home medication regimen, try to keep systolic 120-160 range unless specified otherwise by neurosurgery  Repeat CT scan in the morning or sooner in case of any new onset neuro changes    Discussed with the patient and with the spouse at the bedside  Discussed with the ER  attending  Labs and films and notes reviewed  Appreciate nephrology input  Clotilde Borja MD  [facility]   01/21/22  14:06 EST    Dictated utilizing Dragon dictation

## 2022-01-21 NOTE — ED PROVIDER NOTES
"MD ATTESTATION NOTE    The CHELSEY and I have discussed this patient's history, physical exam, and treatment plan.  I have reviewed the documentation and personally had a face to face interaction with the patient. I affirm the documentation and agree with the treatment and plan.  The attached note describes my personal findings.    \"I provided a substantive portion of the care of this patient. I personally performed the physical exam, in its entirety.    Maribeth Rendon is a 71 y.o. female who presents to the ED c/o having a fall this morning.  She hit her head.  She does not believe she lost consciousness.  She is on Eliquis.  She reports no syncope.  Nothing makes her symptoms worse or better.      On exam:  GENERAL: Awake, alert, no acute distress  SKIN: Warm, dry  HENT: Normocephalic, atraumatic  EYES: no scleral icterus  CV: regular rhythm, regular rate  RESPIRATORY: normal effort, lungs clear  ABDOMEN: soft, nontender, nondistended  MUSCULOSKELETAL: no deformity.  Old appearing open wound to the left forearm.  Hemostatic.  NEURO: alert, moves all extremities, follows commands    Labs  Recent Results (from the past 24 hour(s))   Comprehensive Metabolic Panel    Collection Time: 01/21/22 10:12 AM    Specimen: Blood   Result Value Ref Range    Glucose 325 (H) 65 - 99 mg/dL    BUN 31 (H) 8 - 23 mg/dL    Creatinine 6.47 (H) 0.57 - 1.00 mg/dL    Sodium 139 136 - 145 mmol/L    Potassium 4.0 3.5 - 5.2 mmol/L    Chloride 97 (L) 98 - 107 mmol/L    CO2 30.7 (H) 22.0 - 29.0 mmol/L    Calcium 8.7 8.6 - 10.5 mg/dL    Total Protein 6.9 6.0 - 8.5 g/dL    Albumin 3.00 (L) 3.50 - 5.20 g/dL    ALT (SGPT) 10 1 - 33 U/L    AST (SGOT) 13 1 - 32 U/L    Alkaline Phosphatase 112 39 - 117 U/L    Total Bilirubin 0.2 0.0 - 1.2 mg/dL    eGFR Non African Amer 6 (L) >60 mL/min/1.73    eGFR  African Amer      Globulin 3.9 gm/dL    A/G Ratio 0.8 g/dL    BUN/Creatinine Ratio 4.8 (L) 7.0 - 25.0    Anion Gap 11.3 5.0 - 15.0 mmol/L   aPTT    Collection " Time: 01/21/22 10:12 AM    Specimen: Blood   Result Value Ref Range    PTT 29.7 22.7 - 35.4 seconds   Protime-INR    Collection Time: 01/21/22 10:12 AM    Specimen: Blood   Result Value Ref Range    Protime 13.6 11.7 - 14.2 Seconds    INR 1.06 0.90 - 1.10   CBC Auto Differential    Collection Time: 01/21/22 10:12 AM    Specimen: Blood   Result Value Ref Range    WBC 8.81 3.40 - 10.80 10*3/mm3    RBC 3.43 (L) 3.77 - 5.28 10*6/mm3    Hemoglobin 10.6 (L) 12.0 - 15.9 g/dL    Hematocrit 34.2 34.0 - 46.6 %    MCV 99.7 (H) 79.0 - 97.0 fL    MCH 30.9 26.6 - 33.0 pg    MCHC 31.0 (L) 31.5 - 35.7 g/dL    RDW 15.1 12.3 - 15.4 %    RDW-SD 54.2 (H) 37.0 - 54.0 fl    MPV 11.0 6.0 - 12.0 fL    Platelets 184 140 - 450 10*3/mm3    Neutrophil % 69.7 42.7 - 76.0 %    Lymphocyte % 16.5 (L) 19.6 - 45.3 %    Monocyte % 8.7 5.0 - 12.0 %    Eosinophil % 3.9 0.3 - 6.2 %    Basophil % 0.7 0.0 - 1.5 %    Immature Grans % 0.5 0.0 - 0.5 %    Neutrophils, Absolute 6.15 1.70 - 7.00 10*3/mm3    Lymphocytes, Absolute 1.45 0.70 - 3.10 10*3/mm3    Monocytes, Absolute 0.77 0.10 - 0.90 10*3/mm3    Eosinophils, Absolute 0.34 0.00 - 0.40 10*3/mm3    Basophils, Absolute 0.06 0.00 - 0.20 10*3/mm3    Immature Grans, Absolute 0.04 0.00 - 0.05 10*3/mm3    nRBC 0.0 0.0 - 0.2 /100 WBC   Green Top (Gel)    Collection Time: 01/21/22 10:12 AM   Result Value Ref Range    Extra Tube Hold for add-ons.    Lavender Top    Collection Time: 01/21/22 10:12 AM   Result Value Ref Range    Extra Tube hold for add-on    Gold Top - SST    Collection Time: 01/21/22 10:12 AM   Result Value Ref Range    Extra Tube Hold for add-ons.    Light Blue Top    Collection Time: 01/21/22 10:12 AM   Result Value Ref Range    Extra Tube hold for add-on    POC Glucose Once    Collection Time: 01/21/22  3:02 PM    Specimen: Blood   Result Value Ref Range    Glucose 234 (H) 70 - 130 mg/dL       Radiology  CT Head Without Contrast    Result Date: 1/21/2022  CT SCAN OF THE BRAIN WITHOUT CONTRAST   HISTORY: Left-sided head trauma. Pain.  The CT scan was performed as an emergency procedure through the brain without contrast. There is mild diffuse atrophy and chronic small vessel ischemic change. There is a very small focus of probable acute hemorrhage in the region of the putamen and measuring 4 x 4 mm and 11 mm in vertical extent as seen on the coronal reconstructions. This is new since the CT scans dated 06/29/2021 and 02/20/2020, and therefore is highly suspicious for acute hemorrhage. There is no significant mass effect but continued follow-up evaluation is recommended. The remainder of the CT scan is unremarkable. The visualized sinuses and mastoid air cells are clear.      Radiation dose reduction techniques were utilized, including automated exposure control and exposure modulation based on body size.  This report was finalized on 1/21/2022 12:08 PM by Dr. Kirill Lindsay M.D.      CT Cervical Spine Without Contrast    Result Date: 1/21/2022  CT CERVICAL SPINE WO CONTRAST-  CLINICAL HISTORY: Patient fell. Neck pain.  TECHNIQUE: Multiple axial 1 mm thick images were obtained through the cervical spine. Coronal and sagittal reconstructions were produced.  Radiation dose reduction techniques were utilized, including automated exposure control and exposure modulation based on body size.  COMPARISON: MRI of the cervical spine dated 11/25/2015.  FINDINGS: There is marked disc space narrowing at the C5-C6 and C6-C7 levels with endplate bony spurring. There is mild disc space narrowing at C4-C5 The remainder the cervical disc spaces are normal in height. All of the vertebral bodies are normal in height. The facet joints are intact. There is straightening of normal cervical lordosis due to the degenerative disc changes. There is no evidence of acute fracture or subluxation.  At C2-C3 there is no significant disc bulging or spinal canal stenosis. There is moderate narrowing of the left neural foramen due to bony  spurring.  At C3-C4 there is no significant disc bulging. There is moderately severe narrowing of the right neural foramen due to bony spurring. There is no central canal stenosis.  At C4-C5 there is facet joint arthropathy and extensive endplate bony spurring and mild disc bulging that produces moderate central canal stenosis and also moderate bilateral foraminal narrowing. Similar findings are present at C5-C6 and C6-C7.      Multilevel degenerative disc changes as described. There is no evidence of acute fracture or subluxation.  This report was finalized on 1/21/2022 12:42 PM by Dr. Nehemiah Veliz M.D.        Medical Decision Making:  ED Course as of 01/21/22 1524   Fri Jan 21, 2022   0948 Pt roomed to bed 1 and I was notified of abnormal head ct. Upon review of CT there is a SAH which is acute in appearance.  [JS]   1010 Discussed pt with Neena neurosurgery NP who is on call for Dr Santos. Requests to hold Eliquis and admit to ICU [JS]   1048 Neurosurgery requests reversal of Eliquis with K centra. Also request to keep SBP < 140   [JS]   1123 Creatinine(!): 6.47 [JS]   1138 Discussed pt with Dr Borja who agrees to admit the pt to the ICU. [JS]   1348 WBC: 8.81 [JS]   1348 INR: 1.06 [JS]      ED Course User Index  [JS] Natividad Gonzalez Ravi, IRENA       Her head CT from triage is abnormal with any cranial hemorrhage.  Plan to CT her neck as she has some cervical tenderness and she had her fall today.  Plan discussed with neurosurgery.  She is on Eliquis.  We will seek their recommendations on whether they want this reversed.  She is currently neurologically intact.    Critical Care  Performed by: Lamonte Murguia MD  Authorized by: Lamonte Murguia MD     Critical care provider statement:     Critical care time (minutes): 30-74.    Critical care time was exclusive of:  Separately billable procedures and treating other patients    Critical care was necessary to treat or prevent imminent or life-threatening  deterioration of the following conditions:  CNS failure or compromise    Critical care was time spent personally by me on the following activities:  Development of treatment plan with patient or surrogate, discussions with consultants, examination of patient, ordering and review of radiographic studies, ordering and review of laboratory studies, re-evaluation of patient's condition and review of old charts      I provided a substantial portion of the care of this patient.  I personally provided more than half the total time dedicated to the care of this patient.    PPE: The patient wore a mask and I wore an N95 mask throughout the entire patient encounter.      The patient qualifies to receive the vaccine, but they have not yet received it.    Diagnosis  Final diagnoses:   Intracranial hemorrhage (HCC)   Fall from standing, initial encounter   Dialysis patient (HCC)   Current use of long term anticoagulation        Lamonte Murguia MD  01/21/22 1550

## 2022-01-21 NOTE — PROGRESS NOTES
Clinical Pharmacy Services: Medication History    Maribeth Rendon is a 71 y.o. female presenting to [facility] for   Chief Complaint   Patient presents with   • Fall   • Head Injury       She  has a past medical history of Achilles tendon tear, Acute pulmonary edema ([facility]) (5/6/2021), Anemia, Anxiety, CKD (chronic kidney disease) stage 5, GFR less than 15 ml/min ([facility]) (1/27/2020), Depression, Diabetes mellitus, type 2 ([facility]), ESRD (end stage renal disease) ([facility]), GERD (gastroesophageal reflux disease), Hemorrhagic stroke ([facility]) (9/18/2020), History of CVA (cerebrovascular accident) (7/1/2021), History of MRSA infection (03/15/2016), History of transfusion, Hyperlipidemia, Hypertension, Hypokalemia, Hypomagnesemia, Hypoxic (01/2016), Intertrigo, Leukocytosis, Osteoarthritis, Pneumonia (01/2016), Psoriasis, Psoriatic arthritis ([facility]), Seizures ([facility]), Sepsis ([facility]) (01/2016), SOB (shortness of breath), Stage 4 chronic renal impairment associated with type 2 diabetes mellitus ([facility]), Staph infection, Streptococcal bacteremia, Stroke (cerebrum) ([facility]), Stroke ([facility]), Swelling of hand (10/27/2016), Tremor, essential, Wears glasses, and Wheelchair dependent.    Allergies as of 01/21/2022 - Reviewed 01/21/2022   Allergen Reaction Noted   • Prednisone Hallucinations 10/21/2016       Medication information was obtained from: Patient, Spouse and Pharmacy  Pharmacy and Phone Number:   [facility] [address] [place] [id number] [facility] - [phone]  - [phone]   [address] [facility] [address]  Phone: [phone] Fax: [phone]        Prior to Admission Medications     Prescriptions Last Dose Informant Patient Reported? Taking?    aspirin 81 MG tablet 1/20/2022 Spouse/Significant Other Yes Yes    Take 81 mg by mouth Every Morning. TO STOP THE DAY BEFORE SURGERY    carvedilol (COREG) 3.125 MG tablet 1/20/2022 Spouse/Significant Other Yes Yes    Take 3.125 mg by mouth 2 (Two)  Times a Day With Meals.    Eliquis 5 MG tablet tablet 1/20/2022 Spouse/Significant Other Yes Yes    Take 5 mg by mouth 2 (Two) Times a Day.    gabapentin (NEURONTIN) 300 MG capsule 1/20/2022 Spouse/Significant Other No Yes    Take 1 capsule by mouth 2 (two) times a day.    levothyroxine (SYNTHROID, LEVOTHROID) 50 MCG tablet 1/20/2022 Spouse/Significant Other No Yes    Take 1 tablet by mouth Daily.    lidocaine-prilocaine (EMLA) 2.5-2.5 % cream  Spouse/Significant Other Yes Yes    montelukast (SINGULAIR) 10 MG tablet 1/20/2022 Spouse/Significant Other No Yes    Take 1 tablet by mouth Every Night.    Multiple Vitamin (MULTI VITAMIN DAILY PO) 1/20/2022 Self Yes Yes    Take 3 tablets by mouth Every 7 (Seven) Days.    ondansetron ODT (ZOFRAN-ODT) 4 MG disintegrating tablet  Self Yes Yes    Take 4 mg by mouth Every 8 (Eight) Hours As Needed.    pantoprazole (PROTONIX) 40 MG EC tablet 1/20/2022 Spouse/Significant Other Yes Yes    Take 40 mg by mouth Daily.    Probiotic Product (PROBIOTIC DAILY PO) 1/20/2022 Spouse/Significant Other Yes Yes    Take 1 capsule by mouth Daily.    sertraline (ZOLOFT) 50 MG tablet 1/20/2022 Spouse/Significant Other Yes Yes    Take 50 mg by mouth Daily.    topiramate (TOPAMAX) 100 MG tablet  Pharmacy Yes Yes    Take 100 mg by mouth Daily.    insulin degludec (Tresiba FlexTouch) 100 UNIT/ML solution pen-injector injection   No No    Inject 8 Units under the skin into the appropriate area as directed Daily.            Medication notes: The patient's  stated the patient had taken all of her medications yesterday, including the Tresiba 100 unit/mL. The patient and the spouse verified [facility] as the only pharmacy the patient uses. Upon confirming medication list with [facility], the pharmacist verified Topiramate 100 mg QD and Sertraline 50 mg QD is the only current medication for a 90-day supply picked up on 10/12. [facility] stated 30-day supplies of Levothyroxine 50 mcg QD on 10/31 and Eliquis 5  mg BID on 10/12; and 90-day supplies of Gabapentin 300 mg BID in September and Carvedilol 3.125 mg BID in August. [facility] hasn't filled Tresiba 100 unit/mL.    This medication list is complete to the best of my knowledge as of 1/21/2022    Please call if questions.    Lora Velez  Medication History Technician  [phone]    1/21/2022 16:01 EST

## 2022-01-21 NOTE — CONSULTS
[facility] Consult Note      Patient Name: Maribeth Rendon  : 1950  MRN: [id number]  Primary Care Physician:  Robby Chaney MD  Referring Physician: Clotilde Borja MD  Date of admission: 2022    Subjective     Reason for Consult:  ESRD    HPI:   Maribeth Rendon is a 71 y.o. female with past medical history of diabetes mellitus type 2, coronary artery disease status post  coronary artery bypass grafting 2019, congestive heart failure with EF of 57% (2021), ESRD on HD MWF , dyslipidemia, hypertension and current right foot nonhealing diabetic foot ulcer who came into [facility] emergency department after mechanical fall earlier this morning.  In the emergency department patient had CT of the head showed small left sided acute pertinent hemorrhage.  Patient did not receive her dialysis today.  We were consulted to help with management of her volume status, electrolyte disturbances and dialysis need.  Patient laying down in bed.   is on bedside.  Has already been seen by neurosurgery.    Review of Systems:   14 point review of systems is otherwise negative except for mentioned above on HPI    Personal History     Past Medical History:   Diagnosis Date   • Achilles tendon tear     left    • Acute pulmonary edema (HCC) 2021   • Anemia    • Anxiety    • CKD (chronic kidney disease) stage 5, GFR less than 15 ml/min ([facility]) 2020   • Depression    • Diabetes mellitus, type 2 ([facility])    • ESRD (end stage renal disease) ([facility])     HAS LEFT FOREARM FISTULA   • GERD (gastroesophageal reflux disease)    • Hemorrhagic stroke ([facility]) 2020   • History of CVA (cerebrovascular accident) 2021   • History of MRSA infection 03/15/2016    ABDOMINAL WOUND,   INFECTION CONTROLL NOTIFIED 2017   • History of transfusion    • Hyperlipidemia    • Hypertension    • Hypokalemia    • Hypomagnesemia    • Hypoxic 2016    WHEN SHE HAD PNEUMONIA   • Intertrigo    • Leukocytosis     • Osteoarthritis    • Pneumonia 01/2016   • Psoriasis    • Psoriatic arthritis (HCC)    • Seizures (HCC)     one time   • Sepsis (HCC) 01/2016   • SOB (shortness of breath)    • Stage 4 chronic renal impairment associated with type 2 diabetes mellitus (HCC)    • Staph infection     HX RIGHT FOOT AT [facility] 01/09/2010   • Streptococcal bacteremia    • Stroke (cerebrum) ([facility])    • Stroke (HCC)    • Swelling of hand 10/27/2016    LEFT HAND SWELLIMG SINCE LEFT FISTULA SURGERY   • Tremor, essential    • Wears glasses    • Wheelchair dependent     For mobility       Past Surgical History:   Procedure Laterality Date   • ABDOMINAL WALL ABSCESS INCISION AND DRAINAGE  2016   • ARTERIOVENOUS FISTULA/SHUNT SURGERY Left 10/27/2016    Procedure: LT FOREARM FISTULA;  Surgeon: Lorelei Haque Jr., MD;  Location: [facility] MAIN OR;  Service:    • ARTERIOVENOUS FISTULA/SHUNT SURGERY Left 2/16/2017    Procedure: LT BRACHIAL CEPHALIC WITH FISTULA LIGATION RADIAL CEPHALIC.;  Surgeon: Gurmeet Carver MD;  Location: [facility] MAIN OR;  Service:    • CARDIAC CATHETERIZATION N/A 7/26/2019    Procedure: Left Heart Cath;  Surgeon: Eddie Hodges MD;  Location: [facility] CATH INVASIVE LOCATION;  Service: Cardiology   • CARDIAC CATHETERIZATION N/A 7/26/2019    Procedure: Coronary angiography;  Surgeon: Eddie Hodges MD;  Location: [facility] CATH INVASIVE LOCATION;  Service: Cardiology   • CARDIAC SURGERY     • CATARACT EXTRACTION W/ INTRAOCULAR LENS IMPLANT Bilateral 2011   • COLONOSCOPY N/A 7/4/2021    Procedure: COLONOSCOPY into cecum/terminal ileum;  Surgeon: Purvi Grant MD;  Location: [facility] ENDOSCOPY;  Service: Gastroenterology;  Laterality: N/A;  hemorrhoids, diverticulosis   • CORONARY ARTERY BYPASS GRAFT N/A 7/29/2019    Procedure: INTRAOP ERNESTO; CORONARY ARTERY BYPASS GRAFTING X 4 WITH LEFT INTERNAL MAMMARY ARTERY GRAFT AND UTILIZING ENDOSCOPICALLY HARVESTED GREATER SAPHENOUS VEIN; PRP;  Surgeon: Jhon  Gordo GUILLORY MD;  Location: [facility] OR;  Service: Cardiothoracic   • CORONARY ARTERY BYPASS GRAFT     • DILATATION AND CURETTAGE  1991   • ENDOSCOPY N/A 7/1/2021    Procedure: ESOPHAGOGASTRODUODENOSCOPY WITH BX'S;  Surgeon: Azam Kat MD;  Location: [facility] ENDOSCOPY;  Service: Gastroenterology;  Laterality: N/A;  pre: ANEMIA, MELENA  post: ESOPHAGITIS, GASTRITIS, BILE REFLUX, BUT NO OBVIOUS SOURCE OF BLEEDING   • EXCISION MASS TRUNK N/A 3/22/2016    Procedure: I&D LOWER ABDOMINAL  WOUND;  Surgeon: Tru Yi MD;  Location: [facility] OR;  Service:    • FOOT SURGERY Right 2010    REMOVED BONE IN RIGHT GREAT TOE   • HYSTERECTOMY  1992       Family History: family history includes Alcohol abuse in her maternal uncle; Anxiety disorder in her maternal aunt; Bipolar disorder in her maternal aunt; Depression in her maternal aunt; Diabetes in her brother and father; Heart attack in her father and mother; Heart disease in her father and mother; Hypertension in her father; Kidney disease in her brother and mother; Lupus in her maternal aunt; Other in her maternal uncle; Rheum arthritis in her maternal aunt; Stroke in her father; Thyroid disease in her daughter.    Social History:  reports that she quit smoking about 29 years ago. Her smoking use included cigarettes. She has a 52.00 pack-year smoking history. She has never used smokeless tobacco. She reports previous alcohol use. She reports that she does not use drugs.    Home Medications:  Prior to Admission medications    Medication Sig Start Date End Date Taking? Authorizing Provider   ACCU-CHEK JESUS MANUEL PLUS test strip TEST THREE TIMES DAILY 10/5/17   Robby Chaney MD   Accu-Chek FastClix Lancets misc Use to test blood sugar 4 times daily  Dispense what insurance will approve E11.8 2/18/21   Brianda Dwyer APRN   Accu-Chek Softclix Lancets lancets Use to test blood sugar 3 times daily e11.8 3/18/21 3/18/22  Jose Florentino MD   aspirin 81  MG tablet Take 81 mg by mouth Every Morning. TO STOP THE DAY BEFORE SURGERY    Alejandro Mcbride MD   Blood Glucose Monitoring Suppl (Accu-Chek Guide) w/Device kit 1 Device 4 (Four) Times a Day. Use to test blood sugar 4 times daily  Dispense what insurance will approve E11.8 2/18/21   Brianda Dwyer APRN   carvedilol (COREG) 12.5 MG tablet Take 0.5 tablets by mouth 2 (Two) Times a Day. 7/28/21   Robby Chaney MD   Eliquis 5 MG tablet tablet  10/12/21   Alejandro Mcbride MD   gabapentin (NEURONTIN) 300 MG capsule Take 1 capsule by mouth 2 (two) times a day. 8/26/21   Robby Chaney MD   glucose monitor monitoring kit 1 each As Needed (diabetes). 2/3/21   Brianda Dwyer APRN   insulin degludec (Tresiba FlexTouch) 100 UNIT/ML solution pen-injector injection Inject 8 Units under the skin into the appropriate area as directed Daily. 11/16/21   Jose Florentino MD   levothyroxine (SYNTHROID, LEVOTHROID) 50 MCG tablet Take 1 tablet by mouth Daily. 4/26/21 4/26/22  Jose Florentino MD   lidocaine-prilocaine (EMLA) 2.5-2.5 % cream  10/8/21   Alejandro Mcbride MD   montelukast (SINGULAIR) 10 MG tablet Take 1 tablet by mouth Every Night. 2/19/21   Robby Chaney MD   Multiple Vitamin (MULTI VITAMIN DAILY PO) Take 1 tablet by mouth Every Morning.    Alejandro Mcbride MD   ondansetron ODT (ZOFRAN-ODT) 4 MG disintegrating tablet Take 4 mg by mouth. 1/11/22   Alejandro Mcbride MD   pantoprazole (PROTONIX) 40 MG EC tablet Take 40 mg by mouth Daily. 1/11/22 2/10/22  Alejandro Mcbride MD   Probiotic Product (PROBIOTIC DAILY PO) Take 1 capsule by mouth Daily.    Alejandro Mcbride MD   sertraline (ZOLOFT) 50 MG tablet  10/12/21   Alejandro Mcbride MD       Allergies:  Allergies   Allergen Reactions   • Prednisone Hallucinations       Objective     Vitals:   Temp:  [98.1 °F (36.7 °C)] 98.1 °F (36.7 °C)  Heart Rate:  [80-93] 86  Resp:  [17-18] 17  BP: (106-149)/(62-78)  142/78  No intake or output data in the 24 hours ending 01/21/22 1343    Physical Exam:    General Appearance: alert, oriented x 3, no acute distress   Skin: warm and dry  HEENT: oral mucosa normal, nonicteric sclera  Neck: supple, no JVD  Lungs: CTA  Heart: RRR, normal S1 and S2  Abdomen: soft, nontender, nondistended  : no palpable bladder  Extremities: no edema, cyanosis or clubbing      Scheduled Meds:        IV Meds:        Results Reviewed:   I have personally reviewed the results from the time of this admission to 1/21/2022 13:43 EST     Lab Results   Component Value Date    GLUCOSE 325 (H) 01/21/2022    CALCIUM 8.7 01/21/2022     01/21/2022    K 4.0 01/21/2022    CO2 30.7 (H) 01/21/2022    CL 97 (L) 01/21/2022    BUN 31 (H) 01/21/2022    CREATININE 6.47 (H) 01/21/2022    EGFRIFAFRI  01/21/2022      Comment:      <15 Indicative of kidney failure.    EGFRIFNONA 6 (L) 01/21/2022    BCR 4.8 (L) 01/21/2022    ANIONGAP 11.3 01/21/2022      Lab Results   Component Value Date    MG 1.5 (L) 07/03/2021    PHOS 2.3 (L) 07/06/2021    ALBUMIN 3.00 (L) 01/21/2022           Assessment / Plan     ASSESSMENT:  1.  End-stage renal disease on dialysis on HD MWF since 06/2021. Volume and electrolyte within acceptable range,  2.  Anemia of chronic kidney disease  3.  Intracranial hemorrhage   4.  Secondary hyperparathyroidism   5.  Coronary artery disease, with prior CABG  6.  Hypothyroid, on replacement therapy       PLAN:    Plan for HD tomorrow with 4 K and no heparin and minimal   Patient will receive Kcentra for reversal of Eliquis.  Monitor blood pressure and keep systolic less than than 140 per neurosurgery recs  Surveillance labs      Thank you for involving us in the care of Maribeth Rendon.  Please feel free to call with any questions.    Escobar Vargas MD  01/21/22  13:43 EST    [facility]  [phone]

## 2022-01-21 NOTE — CONSULTS
[facility] NEUROSURGERY CONSULT NOTE    Patient name: Maribeth Rendon  Referring Provider: IRENA Ernst  Reason for Consultation: Small left-sided acute putamen hemorrhage    Patient Care Team:  Robby Chaney MD as PCP - General  Robby Chaney MD as PCP - Family Medicine  Eddie Hodges MD as Consulting Physician (Cardiology)    Chief complaint: Small left-sided acute putamen hemorrhage after mechanical fall at home    Subjective .     History of present illness:    Patient is a 71 y.o. right handed, wheelchair dependent female with a PMH significant for pulmonary edema, anxiety, depression, stage V ESRD, left hemorrhagic stroke September 2020 with right-sided weakness residual, A-fib on Eliquis, GERD, HLD, HTN, T2DM and current right foot nonhealing diabetic ulcer who presented to [facility] ED with complaints of headache after mechanical fall earlier this morning. Patient reports that she was standing while dressing with the assistance of her walker with her wheelchair behind her when she lost her balance causing her to fall backward into the wheelchair which then fell over backwards, causing her to hit the left side of her head on the wall. She denies any LOC, but is currently on Eliquis for A-fib. CTH in ED showed a small acute left-sided putamen hemorrhage with no mass-effect.  She denies any headache or visual difficulty at this time.    Review of Systems  Review of Systems   Constitutional: Negative for activity change and fatigue.   HENT: Negative.    Eyes: Negative.    Respiratory: Negative for chest tightness and shortness of breath.    Cardiovascular: Positive for leg swelling. Negative for chest pain.   Gastrointestinal: Negative for constipation, diarrhea, nausea and vomiting.   Endocrine: Negative.    Genitourinary: Negative.    Musculoskeletal: Positive for gait problem ( Right-sided weakness from hemorrhagic stroke from 9/2020 ).   Skin: Positive for  wound ( Left forearm skin tear and bruising).   Allergic/Immunologic: Negative.    Neurological: Negative for dizziness, syncope, weakness, light-headedness and numbness.   Hematological: Bruises/bleeds easily.   Psychiatric/Behavioral: Negative.        History  PAST MEDICAL HISTORY  Past Medical History:   Diagnosis Date   • Achilles tendon tear     left    • Acute pulmonary edema ([facility]) 5/6/2021   • Anemia    • Anxiety    • CKD (chronic kidney disease) stage 5, GFR less than 15 ml/min ([facility]) 1/27/2020   • Depression    • Diabetes mellitus, type 2 ([facility])    • ESRD (end stage renal disease) ([facility])     HAS LEFT FOREARM FISTULA   • GERD (gastroesophageal reflux disease)    • Hemorrhagic stroke ([facility]) 9/18/2020   • History of CVA (cerebrovascular accident) 7/1/2021   • History of MRSA infection 03/15/2016    ABDOMINAL WOUND,   INFECTION CONTROLL NOTIFIED 2-6-2017   • History of transfusion    • Hyperlipidemia    • Hypertension    • Hypokalemia    • Hypomagnesemia    • Hypoxic 01/2016    WHEN SHE HAD PNEUMONIA   • Intertrigo    • Leukocytosis    • Osteoarthritis    • Pneumonia 01/2016   • Psoriasis    • Psoriatic arthritis ([facility])    • Seizures ([facility])     one time   • Sepsis ([facility]) 01/2016   • SOB (shortness of breath)    • Stage 4 chronic renal impairment associated with type 2 diabetes mellitus ([facility])    • Staph infection     HX RIGHT FOOT AT [facility] 01/09/2010   • Streptococcal bacteremia    • Stroke (cerebrum) ([facility])    • Stroke ([facility])    • Swelling of hand 10/27/2016    LEFT HAND SWELLIMG SINCE LEFT FISTULA SURGERY   • Tremor, essential    • Wears glasses    • Wheelchair dependent     For mobility       PAST SURGICAL HISTORY  Past Surgical History:   Procedure Laterality Date   • ABDOMINAL WALL ABSCESS INCISION AND DRAINAGE  2016   • ARTERIOVENOUS FISTULA/SHUNT SURGERY Left 10/27/2016    Procedure: LT FOREARM FISTULA;  Surgeon: Lorelei Haque Jr., MD;  Location: [facility];  Service:    • ARTERIOVENOUS FISTULA/SHUNT  SURGERY Left 2/16/2017    Procedure: LT BRACHIAL CEPHALIC WITH FISTULA LIGATION RADIAL CEPHALIC.;  Surgeon: Gurmeet Carver MD;  Location: [facility] MAIN OR;  Service:    • CARDIAC CATHETERIZATION N/A 7/26/2019    Procedure: Left Heart Cath;  Surgeon: Eddie Hodges MD;  Location: [facility] CATH INVASIVE LOCATION;  Service: Cardiology   • CARDIAC CATHETERIZATION N/A 7/26/2019    Procedure: Coronary angiography;  Surgeon: Eddie Hodges MD;  Location: [facility] CATH INVASIVE LOCATION;  Service: Cardiology   • CARDIAC SURGERY     • CATARACT EXTRACTION W/ INTRAOCULAR LENS IMPLANT Bilateral 2011   • COLONOSCOPY N/A 7/4/2021    Procedure: COLONOSCOPY into cecum/terminal ileum;  Surgeon: Purvi Grant MD;  Location: [facility] ENDOSCOPY;  Service: Gastroenterology;  Laterality: N/A;  hemorrhoids, diverticulosis   • CORONARY ARTERY BYPASS GRAFT N/A 7/29/2019    Procedure: INTRAOP ERNESTO; CORONARY ARTERY BYPASS GRAFTING X 4 WITH LEFT INTERNAL MAMMARY ARTERY GRAFT AND UTILIZING ENDOSCOPICALLY HARVESTED GREATER SAPHENOUS VEIN; PRP;  Surgeon: Gordo Ferreira MD;  Location: [facility] OR;  Service: Cardiothoracic   • CORONARY ARTERY BYPASS GRAFT     • DILATATION AND CURETTAGE  1991   • ENDOSCOPY N/A 7/1/2021    Procedure: ESOPHAGOGASTRODUODENOSCOPY WITH BX'S;  Surgeon: Azam Kat MD;  Location: [facility] ENDOSCOPY;  Service: Gastroenterology;  Laterality: N/A;  pre: ANEMIA, MELENA  post: ESOPHAGITIS, GASTRITIS, BILE REFLUX, BUT NO OBVIOUS SOURCE OF BLEEDING   • EXCISION MASS TRUNK N/A 3/22/2016    Procedure: I&D LOWER ABDOMINAL  WOUND;  Surgeon: Tru Yi MD;  Location: [facility] OR;  Service:    • FOOT SURGERY Right 2010    REMOVED BONE IN RIGHT GREAT TOE   • HYSTERECTOMY  1992       FAMILY HISTORY  Family History   Problem Relation Age of Onset   • Heart attack Mother    • Heart disease Mother    • Kidney disease Mother    • Heart attack Father    • Heart disease Father    • Hypertension  Father    • Stroke Father         ISCHEMIC   • Diabetes Father         TYPE 2   • Kidney disease Brother    • Diabetes Brother         TYPE 1   • Thyroid disease Daughter    • Anxiety disorder Maternal Aunt    • Bipolar disorder Maternal Aunt    • Depression Maternal Aunt    • Lupus Maternal Aunt         LUPUS ANTICOAGLULANT   • Rheum arthritis Maternal Aunt    • Alcohol abuse Maternal Uncle    • Other Maternal Uncle         PULMONARY DISEASE       SOCIAL HISTORY  Social History     Tobacco Use   • Smoking status: Former Smoker     Packs/day: 2.00     Years: 26.00     Pack years: 52.00     Types: Cigarettes     Quit date: 10/21/1992     Years since quittin.2   • Smokeless tobacco: Never Used   • Tobacco comment: quit    Vaping Use   • Vaping Use: Never used   Substance Use Topics   • Alcohol use: Not Currently     Alcohol/week: 0.0 standard drinks   • Drug use: No       retired  Lives at home with her     Allergies:  Prednisone    MEDICATIONS:  Current Outpatient Medications   Medication Instructions   • ACCU-CHEK JESUS MANUEL PLUS test strip TEST THREE TIMES DAILY   • Accu-Chek FastClix Lancets misc Use to test blood sugar 4 times daily  Dispense what insurance will approve E11.8   • Accu-Chek Softclix Lancets lancets Use to test blood sugar 3 times daily e11.8   • aspirin 81 mg, Oral, Every Morning, TO STOP THE DAY BEFORE SURGERY   • Blood Glucose Monitoring Suppl (Accu-Chek Guide) w/Device kit 1 Device, Does not apply, 4 Times Daily, Use to test blood sugar 4 times daily  Dispense what insurance will approve E11.8   • carvedilol (COREG) 6.25 mg, Oral, 2 Times Daily   • Eliquis 5 MG tablet tablet No dose, route, or frequency recorded.   • gabapentin (NEURONTIN) 300 mg, Oral, 2 times daily   • glucose monitor monitoring kit 1 each, Does not apply, As Needed   • levothyroxine (SYNTHROID, LEVOTHROID) 50 mcg, Oral, Daily   • lidocaine-prilocaine (EMLA) 2.5-2.5 % cream No dose, route, or frequency  recorded.   • montelukast (SINGULAIR) 10 mg, Oral, Nightly   • Multiple Vitamin (MULTI VITAMIN DAILY PO) 1 tablet, Oral, Every Morning   • ondansetron ODT (ZOFRAN-ODT) 4 mg, Oral   • pantoprazole (PROTONIX) 40 mg, Oral, Daily   • Probiotic Product (PROBIOTIC DAILY PO) 1 capsule, Oral, Daily   • sertraline (ZOLOFT) 50 MG tablet No dose, route, or frequency recorded.   • Tresiba FlexTouch 8 Units, Subcutaneous, Daily       Objective     Results Review:  LABS:    Admission on 01/21/2022   Component Date Value Ref Range Status   • Glucose 01/21/2022 325* 65 - 99 mg/dL Final   • BUN 01/21/2022 31* 8 - 23 mg/dL Final   • Creatinine 01/21/2022 6.47* 0.57 - 1.00 mg/dL Final   • Sodium 01/21/2022 139  136 - 145 mmol/L Final   • Potassium 01/21/2022 4.0  3.5 - 5.2 mmol/L Final   • Chloride 01/21/2022 97* 98 - 107 mmol/L Final   • CO2 01/21/2022 30.7* 22.0 - 29.0 mmol/L Final   • Calcium 01/21/2022 8.7  8.6 - 10.5 mg/dL Final   • Total Protein 01/21/2022 6.9  6.0 - 8.5 g/dL Final   • Albumin 01/21/2022 3.00* 3.50 - 5.20 g/dL Final   • ALT (SGPT) 01/21/2022 10  1 - 33 U/L Final   • AST (SGOT) 01/21/2022 13  1 - 32 U/L Final   • Alkaline Phosphatase 01/21/2022 112  39 - 117 U/L Final   • Total Bilirubin 01/21/2022 0.2  0.0 - 1.2 mg/dL Final   • eGFR Non African Amer 01/21/2022 6* >60 mL/min/1.73 Final    <15 Indicative of kidney failure.   • eGFR   Amer 01/21/2022    Final    <15 Indicative of kidney failure.   • Globulin 01/21/2022 3.9  gm/dL Final   • A/G Ratio 01/21/2022 0.8  g/dL Final   • BUN/Creatinine Ratio 01/21/2022 4.8* 7.0 - 25.0 Final   • Anion Gap 01/21/2022 11.3  5.0 - 15.0 mmol/L Final   • PTT 01/21/2022 29.7  22.7 - 35.4 seconds Final   • Protime 01/21/2022 13.6  11.7 - 14.2 Seconds Final   • INR 01/21/2022 1.06  0.90 - 1.10 Final   • WBC 01/21/2022 8.81  3.40 - 10.80 10*3/mm3 Final   • RBC 01/21/2022 3.43* 3.77 - 5.28 10*6/mm3 Final   • Hemoglobin 01/21/2022 10.6* 12.0 - 15.9 g/dL Final   • Hematocrit  01/21/2022 34.2  34.0 - 46.6 % Final   • MCV 01/21/2022 99.7* 79.0 - 97.0 fL Final   • MCH 01/21/2022 30.9  26.6 - 33.0 pg Final   • MCHC 01/21/2022 31.0* 31.5 - 35.7 g/dL Final   • RDW 01/21/2022 15.1  12.3 - 15.4 % Final   • RDW-SD 01/21/2022 54.2* 37.0 - 54.0 fl Final   • MPV 01/21/2022 11.0  6.0 - 12.0 fL Final   • Platelets 01/21/2022 184  140 - 450 10*3/mm3 Final   • Neutrophil % 01/21/2022 69.7  42.7 - 76.0 % Final   • Lymphocyte % 01/21/2022 16.5* 19.6 - 45.3 % Final   • Monocyte % 01/21/2022 8.7  5.0 - 12.0 % Final   • Eosinophil % 01/21/2022 3.9  0.3 - 6.2 % Final   • Basophil % 01/21/2022 0.7  0.0 - 1.5 % Final   • Immature Grans % 01/21/2022 0.5  0.0 - 0.5 % Final   • Neutrophils, Absolute 01/21/2022 6.15  1.70 - 7.00 10*3/mm3 Final   • Lymphocytes, Absolute 01/21/2022 1.45  0.70 - 3.10 10*3/mm3 Final   • Monocytes, Absolute 01/21/2022 0.77  0.10 - 0.90 10*3/mm3 Final   • Eosinophils, Absolute 01/21/2022 0.34  0.00 - 0.40 10*3/mm3 Final   • Basophils, Absolute 01/21/2022 0.06  0.00 - 0.20 10*3/mm3 Final   • Immature Grans, Absolute 01/21/2022 0.04  0.00 - 0.05 10*3/mm3 Final   • nRBC 01/21/2022 0.0  0.0 - 0.2 /100 WBC Final   • Extra Tube 01/21/2022 Hold for add-ons.   Final    Auto resulted.   • Extra Tube 01/21/2022 hold for add-on   Final    Auto resulted   • Extra Tube 01/21/2022 Hold for add-ons.   Final    Auto resulted.   • Extra Tube 01/21/2022 hold for add-on   Final    Auto resulted       DIAGNOSTICS:  CT HEAD WITHOUT CONTRAST 1/21/22-  There is mild diffuse atrophy and chronic small vessel ischemic change. There is a very small focus of probable acute hemorrhage in the region of the putamen and measuring 4 x 4 mm and 11 mm in vertical extent as seen on the coronal  reconstructions. This is new since the CT scans dated 06/29/2021 and 02/20/2020, and therefore is highly suspicious for acute hemorrhage. There is no significant mass effect but continued follow-up evaluation is recommended. The  remainder of the CT scan is unremarkable.    Results Review:   I reviewed the patient's new clinical results.  I personally viewed and interpreted the patient's clinical data and CT head imaging and discussed the results with Dr. Santos.    Vital Signs   Temp:  [98.1 °F (36.7 °C)] 98.1 °F (36.7 °C)  Heart Rate:  [80-93] 88  Resp:  [17-18] 17  BP: (106-149)/(62-75) 132/67    Physical Exam:  Physical Exam  Vitals reviewed.   Constitutional:       General: She is awake. She is not in acute distress.     Appearance: She is obese. She is ill-appearing ( Chronically). She is not toxic-appearing or diaphoretic.   HENT:      Head: Normocephalic and atraumatic.      Comments: No evidence of left-sided hematoma     Nose: Nose normal.      Mouth/Throat:      Mouth: Mucous membranes are moist.   Eyes:      Extraocular Movements: Extraocular movements intact.      Conjunctiva/sclera: Conjunctivae normal.      Pupils: Pupils are equal, round, and reactive to light.   Pulmonary:      Effort: Pulmonary effort is normal.   Abdominal:      Palpations: Abdomen is soft.   Musculoskeletal:         General: Normal range of motion.      Cervical back: Normal range of motion and neck supple.   Skin:     General: Skin is warm and dry.      Coloration: Skin is pale.      Findings: Abrasion and wound present.      Comments: Nonhealing diabetic ulcer on the right foot.  The patient is currently wearing a orthopedic boot to assist with healing.  Left forearm skin tear and bruising secondary to the fall.   Neurological:      General: No focal deficit present.      Mental Status: She is alert and oriented to person, place, and time. Mental status is at baseline.      GCS: GCS eye subscore is 4. GCS verbal subscore is 5. GCS motor subscore is 6.   Psychiatric:         Mood and Affect: Mood normal.         Speech: Speech normal.         Behavior: Behavior normal. Behavior is cooperative.         Thought Content: Thought content normal.          Judgment: Judgment normal.       Neurologic Exam     Mental Status   Oriented to person, place, and time.   Attention: normal. Concentration: normal.   Speech: speech is normal   Level of consciousness: alert  Knowledge: good.   Normal comprehension.     Cranial Nerves   Cranial nerves II through XII intact.     CN III, IV, VI   Pupils are equal, round, and reactive to light.    Motor Exam   Muscle bulk: normal  Overall muscle tone: normal    Strength   Right deltoid: 4/5  Left deltoid: 5/5  Right biceps: 4/5  Left biceps: 5/5  Right triceps: 4/5  Left triceps: 5/5  Right quadriceps: 4/5  Left quadriceps: 5/5  Right hamstrin/5  Left hamstrin/5  Right anterior tibial: 4/5  Left anterior tibial: 5/5  Right gastroc: 4/5  Left gastroc: 5/5  Patient has minimal decrease in strength on the right side-she is s/p left-sided stroke in     Patient also has some right-sided drift noted in both upper and lower extremities     Sensory Exam   Light touch normal.     Gait, Coordination, and Reflexes   Gait deferred       Assessment/Plan       Intracranial hemorrhage (HCC)    This is a 71-year-old female patient who presented to [facility] ED with complaints of headache after a mechanical fall at home hitting the left side of her head on the wall.  CT head imaging in the ED revealed a small left sided acute putamen hemorrhage.  There are no red flags on exam as she is currently at her neurologic baseline.    Because she is currently on Eliquis, will order reversal with Kcentra and admission to ICU for close observation and evaluation.  We will repeat head CT in a.m. or sooner if she develops neuro changes or decline.  Keep SBP <140.  Please call with any acute neurological changes.    PLAN:   Admit to ICU  SBP <140  No AC or AP medications  Repeat head CT in a.m. or sooner for neuro changes or decline    I discussed the patient's findings and my recommendations with patient, nursing staff and Dr. Murguia  "and IRENA Ernst and Dr. Tom Lennon, APRPASTORA  01/21/22  12:32 EST    \"Dictated utilizing Dragon dictation\".    "

## 2022-01-22 NOTE — PROGRESS NOTES
[facility] Progress Note      Patient Name: Maribeth Rendon  : 1950  MRN: [id number]  Primary Care Physician:  Robby Chaney MD  Date of admission: 2022    Subjective     Interval History:   Doing well.  No events overnight.    Review of Systems:   As noted above    Objective     Vitals:   Temp:  [97.2 °F (36.2 °C)-98.4 °F (36.9 °C)] 97.9 °F (36.6 °C)  Heart Rate:  [] 87  Resp:  [15] 15  BP: ()/(49-85) 124/53  Flow (L/min):  [2] 2    Intake/Output Summary (Last 24 hours) at 2022 1129  Last data filed at 2022 0000  Gross per 24 hour   Intake --   Output 150 ml   Net -150 ml       Physical Exam:    General Appearance: alert, oriented x 3, no acute distress   Skin: warm and dry  HEENT: oral mucosa normal, nonicteric sclera  Neck: supple, no JVD  Lungs: CTA  Heart: RRR, normal S1 and S2  Abdomen: soft, nontender, nondistended  : no palpable bladder  Extremities: no edema, cyanosis or clubbing  Neuro: normal speech and mental status     Scheduled Meds:     carvedilol, 6.25 mg, Oral, BID  gabapentin, 300 mg, Oral, Q12H  insulin regular, 0-7 Units, Subcutaneous, Q6H  levothyroxine, 50 mcg, Oral, Daily  montelukast, 10 mg, Oral, Nightly  multivitamin, 1 tablet, Oral, QAM  pantoprazole, 40 mg, Oral, Daily  sertraline, 50 mg, Oral, Daily  sodium chloride, 10 mL, Intravenous, Q12H      IV Meds:   niCARdipine, 5-15 mg/hr, Last Rate: Stopped (22)        Results Reviewed:   I have personally reviewed the results from the time of this admission to 2022 11:29 EST     Results from last 7 days   Lab Units 22  1012   SODIUM mmol/L 139   POTASSIUM mmol/L 4.0   CHLORIDE mmol/L 97*   CO2 mmol/L 30.7*   BUN mg/dL 31*   CREATININE mg/dL 6.47*   CALCIUM mg/dL 8.7   BILIRUBIN mg/dL 0.2   ALK PHOS U/L 112   ALT (SGPT) U/L 10   AST (SGOT) U/L 13   GLUCOSE mg/dL 325*       Estimated Creatinine Clearance: 8.2 mL/min (A) (by C-G formula based on SCr  of 6.47 mg/dL (H)).                Results from last 7 days   Lab Units 01/21/22  1012   WBC 10*3/mm3 8.81   HEMOGLOBIN g/dL 10.6*   PLATELETS 10*3/mm3 184       Results from last 7 days   Lab Units 01/21/22  1012   INR  1.06       Assessment / Plan     ASSESSMENT:  1.  End-stage renal disease on dialysis on HD MWF since 06/2021. Volume and electrolyte within acceptable range as of yesterday.  No labs today.  2.  Anemia of chronic kidney disease  3.  Intracranial hemorrhage   4.  Secondary hyperparathyroidism   5.  Coronary artery disease, with prior CABG  6.  Hypothyroid, on replacement therapy        PLAN:     Plan for HD today with 4 K and no heparin and minimal UF   Patient will receive Kcentra for reversal of Eliquis.  Monitor blood pressure and keep systolic less than than 140 per neurosurgery recs  Surveillance labs      Thank you for involving us in the care of Maribeth Rendon.  Please feel free to call with any questions.    Wild Faust MD  01/22/22  11:29 [facility]    [facility]  [phone]      Much of this encounter note is an electronic transcription/translation of spoken language to printed text. The electronic translation of spoken language may permit erroneous, or at times, nonsensical words or phrases to be inadvertently transcribed; Although I have reviewed the note for such errors, some may still exist.

## 2022-01-22 NOTE — THERAPY EVALUATION
Patient Name: Maribeth Rendon  : 1950    MRN: [id number]                              Today's Date: 2022       Admit Date: 2022    Visit Dx:     ICD-10-CM ICD-9-CM   1. Intracranial hemorrhage (HCC)  I62.9 432.9   2. Fall from standing, initial encounter  W19.XXXA E888.9   3. Dialysis patient (HCC)  Z99.2 V45.11   4. Current use of long term anticoagulation  Z79.01 V58.61     Patient Active Problem List   Diagnosis   • Menstrual disorder   • Atopic rhinitis   • Anemia in CKD (chronic kidney disease)   • Spasm of cervical paraspinous muscle   • Cervical radiculopathy   • Depression   • DM type 2 with diabetic peripheral neuropathy (HCC)   • Essential hypertension   • Duplay's periarthritis syndrome   • Gastroesophageal reflux disease   • Hyperlipidemia   • Hypertension   • Arthritis   • Seizure disorder (HCC)   • Phlebitis   • Type 2 diabetes mellitus (HCC)   • Vitamin D deficiency   • Chest pain   • Abscess   • Generalized muscle weakness   • Abdominal wall cellulitis   • HTN (hypertension)   • Diabetes mellitus with peripheral autonomic neuropathy (HCC)   • Hyponatremia   • Hypercalcemia   • Dehydration   • Abdominal wall abscess   • Cellulitis of toe of left foot   • Partial Achilles tendon tear, left, initial encounter   • Arthritis of foot   • Essential tremor   • Primary parkinsonism ([facility])   • Abnormal cardiac function test   • 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 pectoris (HCC)   • Atrial fibrillation (HCC)   • Anticoagulated   • CKD (chronic kidney disease) stage 5, GFR less than 15 ml/min (HCC)   • Hypoglycemia   • Low vitamin B12 level   • S/P CABG (coronary artery bypass graft)   • Hypothyroidism (acquired)   • Gastrointestinal hemorrhage with melena   • Systolic CHF, chronic (HCC)   • ESRD (end stage renal disease) (HCC)   • Anemia, chronic disease   • Anemia, posthemorrhagic, acute   • Altered mental state   • Intracranial hemorrhage (HCC)     Past Medical History:    Diagnosis Date   • Achilles tendon tear     left    • Acute pulmonary edema ([facility]) 5/6/2021   • Anemia    • Anxiety    • CKD (chronic kidney disease) stage 5, GFR less than 15 ml/min ([facility]) 1/27/2020   • Depression    • Diabetes mellitus, type 2 ([facility])    • ESRD (end stage renal disease) ([facility])     HAS LEFT FOREARM FISTULA   • GERD (gastroesophageal reflux disease)    • Hemorrhagic stroke ([facility]) 9/18/2020   • History of CVA (cerebrovascular accident) 7/1/2021   • History of MRSA infection 03/15/2016    ABDOMINAL WOUND,   INFECTION CONTROLL NOTIFIED 2-6-2017   • History of transfusion    • Hyperlipidemia    • Hypertension    • Hypokalemia    • Hypomagnesemia    • Hypoxic 01/2016    WHEN SHE HAD PNEUMONIA   • Intertrigo    • Leukocytosis    • Osteoarthritis    • Pneumonia 01/2016   • Psoriasis    • Psoriatic arthritis ([facility])    • Seizures ([facility])     one time   • Sepsis ([facility]) 01/2016   • SOB (shortness of breath)    • Stage 4 chronic renal impairment associated with type 2 diabetes mellitus ([facility])    • Staph infection     HX RIGHT FOOT AT [facility] 01/09/2010   • Streptococcal bacteremia    • Stroke (cerebrum) ([facility])    • Stroke ([facility])    • Swelling of hand 10/27/2016    LEFT HAND SWELLIMG SINCE LEFT FISTULA SURGERY   • Tremor, essential    • Wears glasses    • Wheelchair dependent     For mobility     Past Surgical History:   Procedure Laterality Date   • ABDOMINAL WALL ABSCESS INCISION AND DRAINAGE  2016   • ARTERIOVENOUS FISTULA/SHUNT SURGERY Left 10/27/2016    Procedure: LT FOREARM FISTULA;  Surgeon: Lorelei Haque Jr., MD;  Location: [facility] OR;  Service:    • ARTERIOVENOUS FISTULA/SHUNT SURGERY Left 2/16/2017    Procedure: LT BRACHIAL CEPHALIC WITH FISTULA LIGATION RADIAL CEPHALIC.;  Surgeon: Gurmeet Carver MD;  Location: [facility] OR;  Service:    • CARDIAC CATHETERIZATION N/A 7/26/2019    Procedure: Left Heart Cath;  Surgeon: Eddie Hodges MD;  Location: [facility] CATH INVASIVE LOCATION;  Service:  Cardiology   • CARDIAC CATHETERIZATION N/A 7/26/2019    Procedure: Coronary angiography;  Surgeon: Eddie Hodges MD;  Location: [facility] CATH INVASIVE LOCATION;  Service: Cardiology   • CARDIAC SURGERY     • CATARACT EXTRACTION W/ INTRAOCULAR LENS IMPLANT Bilateral 2011   • COLONOSCOPY N/A 7/4/2021    Procedure: COLONOSCOPY into cecum/terminal ileum;  Surgeon: Purvi Grant MD;  Location: [facility] ENDOSCOPY;  Service: Gastroenterology;  Laterality: N/A;  hemorrhoids, diverticulosis   • CORONARY ARTERY BYPASS GRAFT N/A 7/29/2019    Procedure: INTRAOP ERNESTO; CORONARY ARTERY BYPASS GRAFTING X 4 WITH LEFT INTERNAL MAMMARY ARTERY GRAFT AND UTILIZING ENDOSCOPICALLY HARVESTED GREATER SAPHENOUS VEIN; PRP;  Surgeon: Gordo Ferreira MD;  Location: [facility] OR;  Service: Cardiothoracic   • CORONARY ARTERY BYPASS GRAFT     • DILATATION AND CURETTAGE  1991   • ENDOSCOPY N/A 7/1/2021    Procedure: ESOPHAGOGASTRODUODENOSCOPY WITH BX'S;  Surgeon: Azam Kat MD;  Location: [facility] ENDOSCOPY;  Service: Gastroenterology;  Laterality: N/A;  pre: ANEMIA, MELENA  post: ESOPHAGITIS, GASTRITIS, BILE REFLUX, BUT NO OBVIOUS SOURCE OF BLEEDING   • EXCISION MASS TRUNK N/A 3/22/2016    Procedure: I&D LOWER ABDOMINAL  WOUND;  Surgeon: Tru Yi MD;  Location: [facility] OR;  Service:    • FOOT SURGERY Right 2010    REMOVED BONE IN RIGHT GREAT TOE   • HYSTERECTOMY  1992      General Information     Row Name 01/22/22 1143          Physical Therapy Time and Intention    Document Type evaluation  -MS     Mode of Treatment physical therapy; individual therapy  -MS     Row Name 01/22/22 1143          General Information    Patient Profile Reviewed yes  -MS     Prior Level of Function --  use of Rwx for short distance ambulation and W/C for community mobility.  -MS     Existing Precautions/Restrictions fall   Right foot Orthotic boot due to diabetic foot ulcer  -MS     Barriers to Rehab none identified  -MS     Row  Name 01/22/22 1143          Cognition    Orientation Status (Cognition) oriented x 3  -MS     Row Name 01/22/22 1143          Safety Issues, Functional Mobility    Comment, Safety Issues/Impairments (Mobility) Gait belt used for safety.  -MS           User Key  (r) = Recorded By, (t) = Taken By, (c) = Cosigned By    Initials Name Provider Type    Shiva Avila OLEGARIO, PT Physical Therapist               Mobility     Row Name 01/22/22 1145          Bed Mobility    Bed Mobility supine-sit; sit-supine  -MS     Supine-Sit [place] (Bed Mobility) minimum assist (75% patient effort)  -MS     Row Name 01/22/22 1145          Sit-Stand Transfer    Sit-Stand [place] (Transfers) minimum assist (75% patient effort)  -MS     Assistive Device (Sit-Stand Transfers) walker, front-wheeled  -MS     Row Name 01/22/22 1145          Gait/Stairs (Locomotion)    [place] Level (Gait) contact guard; 2 person assist  -MS     Assistive Device (Gait) walker, front-wheeled  -MS     Distance in Feet (Gait) 20 feet  -MS     Deviations/Abnormal Patterns (Gait) marlene decreased; stride length decreased  -MS     Bilateral Gait Deviations forward flexed posture  -MS     Comment (Gait/Stairs) Verbal/tactile cues for posture correction and Rwx guidance.  -MS           User Key  (r) = Recorded By, (t) = Taken By, (c) = Cosigned By    Initials Name Provider Type    MS Carmen Shiva MELVIN, PT Physical Therapist               Obj/Interventions     Row Name 01/22/22 1146          Range of Motion Comprehensive    Comment, General Range of Motion B Shld (Imp. 25%); Otherwise (WFL's) x 4 extremities  -MS     Row Name 01/22/22 1146          Strength Comprehensive (MMT)    Comment, General Manual Muscle Testing (MMT) Assessment B Shld (3-/5); Otherwise (3+/5) x 4 extremities;  No MMT to Right ankle/foot due to foot ulcer  -MS     Row Name 01/22/22 1146          Motor Skills    Therapeutic Exercise --  BUE/LE ther. ex. program x 10 reps completed  (Ankle pumps, Hip Flexion, LAQ's, Shld Shrugs, Shld Flexion)  -MS           User Key  (r) = Recorded By, (t) = Taken By, (c) = Cosigned By    Initials Name Provider Type    MS Carmen, Shiva MELVIN, PT Physical Therapist               Goals/Plan     [facility] Name 01/22/22 1149          Bed Mobility Goal 1 (PT)    Activity/Assistive Device (Bed Mobility Goal 1, PT) bed mobility activities, all  -MS     Ellis Level/Cues Needed (Bed Mobility Goal 1, PT) standby assist  -MS     Time Frame (Bed Mobility Goal 1, PT) long term goal (LTG); 1 week  -MS     [facility] Name 01/22/22 1149          Transfer Goal 1 (PT)    Activity/Assistive Device (Transfer Goal 1, PT) transfers, all; walker, rolling  -MS     [place] Level/Cues Needed (Transfer Goal 1, PT) standby assist  -MS     Time Frame (Transfer Goal 1, PT) long term goal (LTG); 1 week  -MS     [facility] Name 01/22/22 1149          Gait Training Goal 1 (PT)    Activity/Assistive Device (Gait Training Goal 1, PT) gait (walking locomotion); walker, rolling  -MS     Ellis Level (Gait Training Goal 1, PT) standby assist  -MS     Distance (Gait Training Goal 1, PT) 50 feet  -MS     Time Frame (Gait Training Goal 1, PT) long term goal (LTG); 1 week  -MS           User Key  (r) = Recorded By, (t) = Taken By, (c) = Cosigned By    Initials Name Provider Type    MS CarmenShiva, PT Physical Therapist               Clinical Impression     Row Name 01/22/22 1148          Pain    Additional Documentation Pain Scale: Numbers Pre/Post-Treatment (Group)  -MS     [facility] Name 01/22/22 1148          Pain Scale: Numbers Pre/Post-Treatment    Pretreatment Pain Rating 0/10 - no pain  -MS     Posttreatment Pain Rating 0/10 - no pain  -MS     Row Name 01/22/22 1148          Plan of Care Review    Plan of Care Reviewed With patient  -MS     [facility] Name 01/22/22 1148          Therapy Assessment/Plan (PT)    Rehab Potential (PT) good, to achieve stated therapy goals  -MS     Criteria for Skilled  Interventions Met (PT) skilled treatment is necessary  -MS     Row Name 01/22/22 1148          Positioning and Restraints    Pre-Treatment Position in bed  -MS     Post Treatment Position chair  -MS     In Chair notified nsg; reclined; sitting; call light within reach; encouraged to call for assist  All lines intact.  -MS           User Key  (r) = Recorded By, (t) = Taken By, (c) = Cosigned By    Initials Name Provider Type    Shiva Avila, PT Physical Therapist               Outcome Measures     Row Name 01/22/22 1150          How much help from another person do you currently need...    Turning from your back to your side while in flat bed without using bedrails? 3  -MS     Moving from lying on back to sitting on the side of a flat bed without bedrails? 3  -MS     Moving to and from a bed to a chair (including a wheelchair)? 3  -MS     Standing up from a chair using your arms (e.g., wheelchair, bedside chair)? 3  -MS     Climbing 3-5 steps with a railing? 2  -MS     To walk in hospital room? 3  -MS     AM-PAC 6 Clicks Score (PT) 17  -MS     Row Name 01/22/22 1150          Functional Assessment    Outcome Measure Options AM-PAC 6 Clicks Basic Mobility (PT)  -MS           User Key  (r) = Recorded By, (t) = Taken By, (c) = Cosigned By    Initials Name Provider Type    Shiva Avila, PT Physical Therapist                             Physical Therapy Education                 Title: PT OT SLP Therapies (In Progress)     Topic: Physical Therapy (Done)     Point: Mobility training (Done)     Learning Progress Summary           Patient Acceptance, D,E, VU,NR by MS at 1/22/2022 1150                   Point: Home exercise program (Done)     Learning Progress Summary           Patient Acceptance, D,E, VU,NR by MS at 1/22/2022 1150                   Point: Body mechanics (Done)     Learning Progress Summary           Patient Acceptance, D,E, VU,NR by MS at 1/22/2022 1150                   Point: Precautions  (Done)     Learning Progress Summary           Patient Acceptance, D,E, VU,NR by MS at 1/22/2022 1150                               User Key     Initials Effective Dates Name Provider Type Discipline    MS 06/16/21 -  Shiva Carmen PT Physical Therapist PT              PT Recommendation and Plan  Planned Therapy Interventions (PT): balance training, bed mobility training, gait training, home exercise program, patient/family education, postural re-education, transfer training, strengthening  Plan of Care Reviewed With: patient  Outcome Summary: Pt. is a 71 year old Female admitted to the hospital after falling trying to get to her W/C.  Pt. diagnosed with a Left sided Acute Putamen Hemorrhage.  Pt. reports that prior to admission she used a Rwx for short distances and a W/C for community mobility.  Pt. presents with decreased strength, decreased balance, and decreased tolerance to functional activity. This AM, pt. able to ambulate 20 feet, CGA x 2, with use of Rwx.  Pt. requires Min. assist x 1 for bed mobility and Min. assist x 1 for sit <-> stand transfers.  Pt. will benefit from skilled inpt. P.T. to address her functional deficits and to assist pt. in regaining her maximum level of independence with functional mobility.     Time Calculation:    PT Charges     Row Name 01/22/22 1156             Time Calculation    Start Time 1000  -MS      Stop Time 1018  -MS      Time Calculation (min) 18 min  -MS      PT Received On 01/22/22  -MS      PT - Next Appointment 01/24/22  -MS      PT Goal Re-Cert Due Date 01/29/22  -MS              Time Calculation- PT    Total Timed Code Minutes- PT 17 minute(s)  -MS            User Key  (r) = Recorded By, (t) = Taken By, (c) = Cosigned By    Initials Name Provider Type    MS Shiva Carmen, PT Physical Therapist              Therapy Charges for Today     Code Description Service Date Service Provider Modifiers Qty    79736550153 HC PT EVAL MOD COMPLEXITY 2 1/22/2022 Nella  Shiva MELVIN, PT GP 1    56089256236 HC PT THER PROC EA 15 MIN 1/22/2022 Shiva Carmen, PT GP 1    66243223173 HC PT THER SUPP EA 15 MIN 1/22/2022 Shiva Carmen, PT GP 1          PT G-Codes  Outcome Measure Options: AM-PAC 6 Clicks Basic Mobility (PT)  AM-PAC 6 Clicks Score (PT): 17    Shiva Carmen, PT  1/22/2022

## 2022-01-22 NOTE — PLAN OF CARE
Goal Outcome Evaluation:  Plan of Care Reviewed With: patient           Outcome Summary: Pt. is a 71 year old Female admitted to the hospital after falling trying to get to her W/C.  Pt. diagnosed with a Left sided Acute Putamen Hemorrhage.  Pt. reports that prior to admission she used a Rwx for short distances and a W/C for community mobility.  Pt. presents with decreased strength, decreased balance, and decreased tolerance to functional activity. This AM, pt. able to ambulate 20 feet, CGA x 2, with use of Rwx.  Pt. requires Min. assist x 1 for bed mobility and Min. assist x 1 for sit <-> stand transfers.  Pt. will benefit from skilled inpt. P.T. to address her functional deficits and to assist pt. in regaining her maximum level of independence with functional mobility.    Patient was wearing a face mask during this therapy encounter. Therapist used appropriate personal protective equipment including eye protection, mask, and gloves.  Mask used was standard procedure mask. Appropriate PPE was worn during the entire therapy session. Hand hygiene was completed before and after therapy session. Patient is not in enhanced droplet precautions.

## 2022-01-22 NOTE — PLAN OF CARE
Goal Outcome Evaluation:  Plan of Care Reviewed With: patient           Outcome Summary: Pt is a 72 y/o F admit s/p fall from w/c while in dialysis. CT HEAD: L ICH. Note: Right foot Orthotic boot due to diabetic foot ulcer. PMH includes ESRD on HD, CAD s/p CABG, Afib, DM, CVA Sept 2021, obesity. Pt resides with her spouse in a house with ramp access. Pt reports indep with ambulation using RW, but receives assistance with dressing/bathing since her stroke. Bathroom equipped with walk in shower with seat available. Pt found at [facility] upon entry. Pt requires max A for betito hygiene in standing with min A for balance. Pt transferred back to chair with min A. Pt able to complete grooming with s/u. Pt appears below ADL baseline and would benefit from skilled OT services at 5x/week. OT recommends d/c to inpatient acute rehab vs SNF pending progress and medical stability.    Patient was not wearing a face mask during this therapy encounter. Therapist used appropriate personal protective equipment including mask, gloves, and eye protection.  Mask used was standard procedure mask. Appropriate PPE was worn during the entire therapy session. Hand hygiene was completed before and after therapy session. Patient is not in enhanced droplet precautions.

## 2022-01-22 NOTE — PROGRESS NOTES
[facility] NEUROSURGERY PROGRESS NOTE    PATIENT IDENTIFICATION:   Name:  Marbieth Rendon         MRN:  [id number]     71 y.o.  female               CC: Small left-sided acute putamen hemorrhage after mechanical fall at home      Subjective     Interval History: Awake and alert, sitting up in bed getting ready to work with PT.  Denies headache, dizziness, or vision difficulty.  Cardene drip has been titrated off.  No acute events reported overnight.  Patient reports she is ready to get out of the bed and go home.    ROS:  Constitutional: No fever, chills  HEENT: No headache, no vision changes, no tinnitus, no hearing difficulties  GI: No nausea, vomiting, no swallow difficulties  Neuro: No numbness, tingling, or weakness, no speech difficulties, no balance difficulties    Objective     Vital signs in last 24 hours:  Temp:  [97.2 °F (36.2 °C)-98.4 °F (36.9 °C)] 97.9 °F (36.6 °C)  Heart Rate:  [] 89  Resp:  [15-17] 15  BP: ()/(49-85) 114/59      Intake/Output this shift:  No intake/output data recorded.      Intake/Output last 3 shifts:  I/O last 3 completed shifts:  In: -   Out: 150 [Urine:150]    LABS:  Results from last 7 days   Lab Units 01/21/22  1012   WBC 10*3/mm3 8.81   HEMOGLOBIN g/dL 10.6*   HEMATOCRIT % 34.2   PLATELETS 10*3/mm3 184     Results from last 7 days   Lab Units 01/21/22  1012   SODIUM mmol/L 139   POTASSIUM mmol/L 4.0   CHLORIDE mmol/L 97*   CO2 mmol/L 30.7*   BUN mg/dL 31*   CREATININE mg/dL 6.47*   CALCIUM mg/dL 8.7   BILIRUBIN mg/dL 0.2   ALK PHOS U/L 112   ALT (SGPT) U/L 10   AST (SGOT) U/L 13   GLUCOSE mg/dL 325*          IMAGING STUDIES:  CT HEAD W/O CONTRAST 1/22/22-  Tiny focus of hemorrhage within the left basal ganglia, which is unchanged from prior exam.  No new areas of hemorrhage seen.  There is no midline shift or mass-effect.    I personally viewed and interpreted the patient's clinical data and CT head imaging discussed results with Dr. Santos who also viewed the CT  imaging.    Meds reviewed/changed: Yes    Scheduled Meds:carvedilol, 6.25 mg, Oral, BID  gabapentin, 300 mg, Oral, Q12H  insulin regular, 0-7 Units, Subcutaneous, Q6H  levothyroxine, 50 mcg, Oral, Daily  montelukast, 10 mg, Oral, Nightly  multivitamin, 1 tablet, Oral, QAM  pantoprazole, 40 mg, Oral, Daily  sertraline, 50 mg, Oral, Daily  sodium chloride, 10 mL, Intravenous, Q12H      Continuous Infusions:niCARdipine, 5-15 mg/hr, Last Rate: Stopped (01/21/22 2010)      PRN Meds:.•  acetaminophen **OR** acetaminophen  •  dextrose  •  dextrose  •  glucagon (human recombinant)  •  [COMPLETED] Insert peripheral IV **AND** sodium chloride  •  sodium chloride      Physical Exam:    General:   Awake, alert, oriented x3. Speech clear with no aphasia  Neck:    Supple    CN III IV VI: Extraocular movements are full , PERRL   CN V: Normal facial sensation and strength of muscles of mastication.  CN VII: Facial movements are symmetric. No weakness.      Motor: Normal muscle strength, bulk and tone in upper and lower extremities.  Able to move all extremities equally throughout   Sensation: Normal to light touch; no extinction  Station and Gait: Normal gait and station.    Coordination: Finger-to-nose and heel-to-shin test shows no dysmetria.    Extremities:   Wearing SCDs    Assessment/Plan     ASSESSMENT:      Intracranial hemorrhage (HCC)    This is a 71-year-old female patient who presented to [facility] ED with complaints of headache after a mechanical fall at home hitting the left side of her head on the wall.  CT head imaging in the ED revealed a small left sided acute putamen hemorrhage.     Patient is awake, alert, and oriented x3.  Denies headache, dizziness, visual changes.  CT head this morning was stable.  From our standpoint, the patient is okay to transfer to the floor.  Would like to continue to keep her SBP less than 150-we will defer to the intensivist team for this.  We will order repeat head CT  "in a.m.  If this head CT remains stable, patient would be okay for discharge.    PLAN:   CT head in a.m.  No AC or AP medications  Okay for transfer to the floor  SBP<150  Every 4 neurochecks    I discussed the patient's findings and my recommendations with patient and Dr. Santos       LOS: 1 day       Neena Lennon, APRN  1/22/2022  09:46 EST    \"Dictated utilizing Dragon dictation\".    "

## 2022-01-22 NOTE — PLAN OF CARE
Goal Outcome Evaluation:  Plan of Care Reviewed With: patient, family           Outcome Summary: bedside swallow eval completed. no overt s/s of aspiration with thins, puree, mech soft, reg solids. mastication WFL. pt/fam deny any difficulty with swallow/speech/lang/cog. REC: regular diet/thins, meds with thin or puree, small bites/drinks, up at 90'. SLP to s/o.

## 2022-01-22 NOTE — THERAPY EVALUATION
Acute Care - Speech Language Pathology   Swallow Initial Evaluation [facility]     Patient Name: Maribeth Rendon  : 1950  MRN: [id number]  Today's Date: 2022               Admit Date: 2022    Visit Dx:     ICD-10-CM ICD-9-CM   1. Intracranial hemorrhage ([facility])  I62.9 432.9   2. Fall from standing, initial encounter  W19.XXXA E888.9   3. Dialysis patient ([facility])  Z99.2 V45.11   4. Current use of long term anticoagulation  Z79.01 V58.61     Patient Active Problem List   Diagnosis   • Menstrual disorder   • Atopic rhinitis   • Anemia in CKD (chronic kidney disease)   • Spasm of cervical paraspinous muscle   • Cervical radiculopathy   • Depression   • DM type 2 with diabetic peripheral neuropathy ([facility])   • Essential hypertension   • Duplay's periarthritis syndrome   • Gastroesophageal reflux disease   • Hyperlipidemia   • Hypertension   • Arthritis   • Seizure disorder ([facility])   • Phlebitis   • Type 2 diabetes mellitus ([facility])   • Vitamin D deficiency   • Chest pain   • Abscess   • Generalized muscle weakness   • Abdominal wall cellulitis   • HTN (hypertension)   • Diabetes mellitus with peripheral autonomic neuropathy ([facility])   • Hyponatremia   • Hypercalcemia   • Dehydration   • Abdominal wall abscess   • Cellulitis of toe of left foot   • Partial Achilles tendon tear, left, initial encounter   • Arthritis of foot   • Essential tremor   • Primary parkinsonism ([facility])   • Abnormal cardiac function test   • 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 pectoris ([facility])   • Atrial fibrillation ([facility])   • Anticoagulated   • CKD (chronic kidney disease) stage 5, GFR less than 15 ml/min ([facility])   • Hypoglycemia   • Low vitamin B12 level   • S/P CABG (coronary artery bypass graft)   • Hypothyroidism (acquired)   • Gastrointestinal hemorrhage with melena   • Systolic CHF, chronic ([facility])   • ESRD (end stage renal disease) ([facility])   • Anemia, chronic disease   • Anemia, posthemorrhagic, acute   • Altered mental  state   • Intracranial hemorrhage ([facility])     Past Medical History:   Diagnosis Date   • Achilles tendon tear     left    • Acute pulmonary edema ([facility]) 5/6/2021   • Anemia    • Anxiety    • CKD (chronic kidney disease) stage 5, GFR less than 15 ml/min ([facility]) 1/27/2020   • Depression    • Diabetes mellitus, type 2 ([facility])    • ESRD (end stage renal disease) ([facility])     HAS LEFT FOREARM FISTULA   • GERD (gastroesophageal reflux disease)    • Hemorrhagic stroke ([facility]) 9/18/2020   • History of CVA (cerebrovascular accident) 7/1/2021   • History of MRSA infection 03/15/2016    ABDOMINAL WOUND,   INFECTION CONTROLL NOTIFIED 2-6-2017   • History of transfusion    • Hyperlipidemia    • Hypertension    • Hypokalemia    • Hypomagnesemia    • Hypoxic 01/2016    WHEN SHE HAD PNEUMONIA   • Intertrigo    • Leukocytosis    • Osteoarthritis    • Pneumonia 01/2016   • Psoriasis    • Psoriatic arthritis ([facility])    • Seizures ([facility])     one time   • Sepsis ([facility]) 01/2016   • SOB (shortness of breath)    • Stage 4 chronic renal impairment associated with type 2 diabetes mellitus ([facility])    • Staph infection     HX RIGHT FOOT AT [facility] 01/09/2010   • Streptococcal bacteremia    • Stroke (cerebrum) ([facility])    • Stroke ([facility])    • Swelling of hand 10/27/2016    LEFT HAND SWELLIMG SINCE LEFT FISTULA SURGERY   • Tremor, essential    • Wears glasses    • Wheelchair dependent     For mobility     Past Surgical History:   Procedure Laterality Date   • ABDOMINAL WALL ABSCESS INCISION AND DRAINAGE  2016   • ARTERIOVENOUS FISTULA/SHUNT SURGERY Left 10/27/2016    Procedure: LT FOREARM FISTULA;  Surgeon: Lorelei Haque Jr., MD;  Location: [facility] OR;  Service:    • ARTERIOVENOUS FISTULA/SHUNT SURGERY Left 2/16/2017    Procedure: LT BRACHIAL CEPHALIC WITH FISTULA LIGATION RADIAL CEPHALIC.;  Surgeon: Gurmeet Carver MD;  Location: [facility] OR;  Service:    • CARDIAC CATHETERIZATION N/A 7/26/2019    Procedure: Left Heart Cath;  Surgeon: Carroll  MD Eddie;  Location: [facility] CATH INVASIVE LOCATION;  Service: Cardiology   • CARDIAC CATHETERIZATION N/A 7/26/2019    Procedure: Coronary angiography;  Surgeon: Eddie Hodges MD;  Location: [facility] CATH INVASIVE LOCATION;  Service: Cardiology   • CARDIAC SURGERY     • CATARACT EXTRACTION W/ INTRAOCULAR LENS IMPLANT Bilateral 2011   • COLONOSCOPY N/A 7/4/2021    Procedure: COLONOSCOPY into cecum/terminal ileum;  Surgeon: Purvi Grant MD;  Location: [facility] ENDOSCOPY;  Service: Gastroenterology;  Laterality: N/A;  hemorrhoids, diverticulosis   • CORONARY ARTERY BYPASS GRAFT N/A 7/29/2019    Procedure: INTRAOP ERNESTO; CORONARY ARTERY BYPASS GRAFTING X 4 WITH LEFT INTERNAL MAMMARY ARTERY GRAFT AND UTILIZING ENDOSCOPICALLY HARVESTED GREATER SAPHENOUS VEIN; PRP;  Surgeon: Gordo Ferreira MD;  Location: [facility] OR;  Service: Cardiothoracic   • CORONARY ARTERY BYPASS GRAFT     • DILATATION AND CURETTAGE  1991   • ENDOSCOPY N/A 7/1/2021    Procedure: ESOPHAGOGASTRODUODENOSCOPY WITH BX'S;  Surgeon: Azam Kat MD;  Location: [facility] ENDOSCOPY;  Service: Gastroenterology;  Laterality: N/A;  pre: ANEMIA, MELENA  post: ESOPHAGITIS, GASTRITIS, BILE REFLUX, BUT NO OBVIOUS SOURCE OF BLEEDING   • EXCISION MASS TRUNK N/A 3/22/2016    Procedure: I&D LOWER ABDOMINAL  WOUND;  Surgeon: Tru Yi MD;  Location: [facility] OR;  Service:    • FOOT SURGERY Right 2010    REMOVED BONE IN RIGHT GREAT TOE   • HYSTERECTOMY  1992       SLP Recommendation and Plan  SLP Swallowing Diagnosis: functional pharyngeal phase (01/22/22 1148)  SLP Diet Recommendation: regular textures, thin liquids (01/22/22 1148)  Recommended Precautions and Strategies: upright posture during/after eating, small bites of food and sips of liquid (01/22/22 1148)  SLP Rec. for Method of Medication Administration: meds whole, with thin liquids, as tolerated (01/22/22 1148)     Monitor for Signs of Aspiration: yes, notify SLP if any  concerns (01/22/22 1148)  Recommended Diagnostics: No further SLP services recommended (01/22/22 1148)  Swallow Criteria for Skilled Therapeutic Interventions Met: no problems identified which require skilled intervention (01/22/22 1148)  Anticipated Discharge Disposition (SLP): home (01/22/22 1148)  Rehab Potential/Prognosis, Swallowing: good, to achieve stated therapy goals (01/22/22 1148)  Therapy Frequency (Swallow): evaluation only (01/22/22 1148)                                     Plan of Care Reviewed With: patient, family  Outcome Summary: bedside swallow eval completed. no overt s/s of aspiration with thins, puree, mech soft, reg solids. mastication WFL. pt/fam deny any difficulty with swallow/speech/lang/cog. REC: regular diet/thins, meds with thin or puree, small bites/drinks, up at 90'. SLP to s/o.      SWALLOW EVALUATION (last 72 hours)     SLP Adult Swallow Evaluation     Row Name 01/22/22 1148                   Rehab Evaluation    Document Type evaluation  -LT        Subjective Information no complaints  -LT        Patient Observations alert; cooperative; agree to therapy  -LT        Patient Effort excellent  -LT        Symptoms Noted During/After Treatment none  -LT                  General Information    Patient Profile Reviewed yes  -LT        Pertinent History Of Current Problem 70 yo f w/ICH s/p fall.  -LT        Current Method of Nutrition regular textures; thin liquids  -LT        Prior Level of Function-Swallowing no diet consistency restrictions  -LT        Plans/Goals Discussed with patient and family; agreed upon  -LT        Barriers to Rehab none identified  -LT        Patient's Goals for Discharge return home  -LT                  Oral Motor Structure and Function    Dentition Assessment missing teeth; natural, present and adequate  -LT        Secretion Management WNL/WFL  -LT        Volitional Swallow WFL  -LT        Volitional Cough WFL  -LT                  Oral Musculature and  Cranial Nerve Assessment    Oral Motor General Assessment WFL  -LT                  Clinical Swallow Eval    Clinical Swallow Evaluation Summary bedside swallow eval completed. no overt s/s of aspiration with thins, puree, mech soft, reg solids. mastication WFL. pt/fam deny any difficulty with swallow/speech/lang/cog. REC: regular diet/thins, meds with thin or puree, small bites/drinks, up at 90'. SLP to s/o.  -LT                  SLP Evaluation Clinical Impression    SLP Swallowing Diagnosis functional pharyngeal phase  -LT        Functional Impact no impact on function  -LT        Rehab Potential/Prognosis, Swallowing good, to achieve stated therapy goals  -LT        Swallow Criteria for Skilled Therapeutic Interventions Met no problems identified which require skilled intervention  -LT                  SLP Treatment Clinical Impressions    Care Plan Review evaluation/treatment results reviewed; care plan/treatment goals reviewed; risks/benefits reviewed; current/potential barriers reviewed; patient/other agree to care plan  -LT        Care Plan Review, Other Participant(s) family  -LT                  Recommendations    Therapy Frequency (Swallow) evaluation only  -LT        SLP Diet Recommendation regular textures; thin liquids  -LT        Recommended Diagnostics No further SLP services recommended  -LT        Recommended Precautions and Strategies upright posture during/after eating; small bites of food and sips of liquid  -LT        Oral Care Recommendations Oral Care BID/PRN  -LT        SLP Rec. for Method of Medication Administration meds whole; with thin liquids; as tolerated  -LT        Monitor for Signs of Aspiration yes; notify SLP if any concerns  -LT        Anticipated Discharge Disposition (SLP) home  -LT              User Key  (r) = Recorded By, (t) = Taken By, (c) = Cosigned By    Initials Name Effective Dates    LT Purvi Funes MS CCC-SLP 06/16/21 -                 EDUCATION  The patient has  been educated in the following areas:   Dysphagia (Swallowing Impairment).         SLP Outcome Measures (last 72 hours)     SLP Outcome Measures     Row Name 01/22/22 1100             SLP Outcome Measures    Outcome Measure Used? Adult NOMS  -LT              Adult FCM Scores    FCM Chosen Swallowing  -LT      Swallowing FCM Score 7  -LT            User Key  (r) = Recorded By, (t) = Taken By, (c) = Cosigned By    Initials Name Effective Dates    LT Purvi Funes MS CCC-SLP 06/16/21 -                  Time Calculation:    Time Calculation- SLP     Row Name 01/22/22 1156             Time Calculation- SLP    SLP Received On 01/22/22  -LT            User Key  (r) = Recorded By, (t) = Taken By, (c) = Cosigned By    Initials Name Provider Type    LT Purvi Funes MS CCC-SLP Speech and Language Pathologist                Therapy Charges for Today     Code Description Service Date Service Provider Modifiers Qty    [id number] HC ST EVAL ORAL PHARYNG SWALLOW 2 1/22/2022 Purvi Funes MS CCC-SLP GN 1               Purvi Funes MS CCC-JAZMYN  1/22/2022

## 2022-01-22 NOTE — PLAN OF CARE
Problem: Fall Injury Risk  Goal: Absence of Fall and Fall-Related Injury  Outcome: Ongoing, Progressing  Intervention: Identify and Manage Contributors to Fall Injury Risk  Recent Flowsheet Documentation  Taken 1/22/2022 1200 by Brianda Hawkins RN  Medication Review/Management:   medications reviewed   high-risk medications identified  Taken 1/22/2022 0800 by Brianda Hawkins RN  Medication Review/Management:   medications reviewed   high-risk medications identified  Intervention: Promote Injury-Free Environment  Recent Flowsheet Documentation  Taken 1/22/2022 1800 by Brianda Hawkins RN  Safety Promotion/Fall Prevention: safety round/check completed  Taken 1/22/2022 1600 by Brianda Hawkins RN  Safety Promotion/Fall Prevention: safety round/check completed  Taken 1/22/2022 1400 by Brianda Hawkins RN  Safety Promotion/Fall Prevention: safety round/check completed  Taken 1/22/2022 1200 by Brianda Hawkins RN  Safety Promotion/Fall Prevention:   assistive device/personal items within reach   clutter free environment maintained   fall prevention program maintained   nonskid shoes/slippers when out of bed   room organization consistent   safety round/check completed  Taken 1/22/2022 1000 by Brianda Hawkins RN  Safety Promotion/Fall Prevention:   assistive device/personal items within reach   clutter free environment maintained   fall prevention program maintained   nonskid shoes/slippers when out of bed   room organization consistent   safety round/check completed  Taken 1/22/2022 0800 by Brianda Hawkins RN  Safety Promotion/Fall Prevention:   assistive device/personal items within reach   clutter free environment maintained   fall prevention program maintained   nonskid shoes/slippers when out of bed   room organization consistent   safety round/check completed     Problem: Skin Injury Risk Increased  Goal: Skin Health and Integrity  Outcome: Ongoing, Progressing  Intervention: Optimize Skin  Protection  Recent Flowsheet Documentation  Taken 1/22/2022 1200 by Brianda Hawkins RN  Pressure Reduction Techniques:   weight shift assistance provided   frequent weight shift encouraged  Taken 1/22/2022 0800 by Brianda Hawkins RN  Pressure Reduction Techniques: weight shift assistance provided  Head of Bed (HOB): HOB elevated  Pressure Reduction Devices: specialty bed utilized  Skin Protection:   electrode sites changed   pulse oximeter probe site changed   tubing/devices free from skin contact     Problem: Adult Inpatient Plan of Care  Goal: Plan of Care Review  Outcome: Ongoing, Progressing  Flowsheets (Taken 1/22/2022 1833)  Progress: no change  Plan of Care Reviewed With: patient  Outcome Summary: NIH remains 0.  No c/o pain today.  Up to BSC and chair this a.m.  Completing dialysis now and then patient with transfer to .  Report called.  Will CTM  Goal: Patient-Specific Goal (Individualized)  Outcome: Ongoing, Progressing  Goal: Absence of Hospital-Acquired Illness or Injury  Outcome: Ongoing, Progressing  Intervention: Identify and Manage Fall Risk  Recent Flowsheet Documentation  Taken 1/22/2022 1800 by Brianda Hawkins RN  Safety Promotion/Fall Prevention: safety round/check completed  Taken 1/22/2022 1600 by Brianda Hawkins RN  Safety Promotion/Fall Prevention: safety round/check completed  Taken 1/22/2022 1400 by Brianda Hawkins RN  Safety Promotion/Fall Prevention: safety round/check completed  Taken 1/22/2022 1200 by Brianda Hawkins RN  Safety Promotion/Fall Prevention:   assistive device/personal items within reach   clutter free environment maintained   fall prevention program maintained   nonskid shoes/slippers when out of bed   room organization consistent   safety round/check completed  Taken 1/22/2022 1000 by Brianda Hawkins RN  Safety Promotion/Fall Prevention:   assistive device/personal items within reach   clutter free environment maintained   fall prevention program  maintained   nonskid shoes/slippers when out of bed   room organization consistent   safety round/check completed  Taken 1/22/2022 0800 by Brianda Hawkins RN  Safety Promotion/Fall Prevention:   assistive device/personal items within reach   clutter free environment maintained   fall prevention program maintained   nonskid shoes/slippers when out of bed   room organization consistent   safety round/check completed  Intervention: Prevent Skin Injury  Recent Flowsheet Documentation  Taken 1/22/2022 1200 by Brianda Hawkins RN  Body Position: position changed independently  Taken 1/22/2022 0800 by Brianda Hawkins RN  Body Position:   position changed independently   weight shift assistance provided  Skin Protection:   electrode sites changed   pulse oximeter probe site changed   tubing/devices free from skin contact  Intervention: Prevent and Manage VTE (venous thromboembolism) Risk  Recent Flowsheet Documentation  Taken 1/22/2022 0800 by Brianda Hawkins RN  VTE Prevention/Management:   bilateral   sequential compression devices on  Goal: Optimal Comfort and Wellbeing  Outcome: Ongoing, Progressing  Intervention: Provide Person-Centered Care  Recent Flowsheet Documentation  Taken 1/22/2022 1200 by Brianda Hawkins RN  Trust Relationship/Rapport:   care explained   choices provided   questions answered   questions encouraged   reassurance provided   thoughts/feelings acknowledged  Taken 1/22/2022 0800 by Brianda Hawkins RN  Trust Relationship/Rapport:   care explained   choices provided   questions answered   questions encouraged   reassurance provided  Goal: Readiness for Transition of Care  Outcome: Ongoing, Progressing   Goal Outcome Evaluation:  Plan of Care Reviewed With: patient        Progress: no change  Outcome Summary: NIH remains 0.  No c/o pain today.  Up to BSC and chair this a.m.  Completing dialysis now and then patient with transfer to .  Report called.  Will CTM

## 2022-01-22 NOTE — THERAPY EVALUATION
Patient Name: Maribeth Rendon  : 1950    MRN: [id number]                              Today's Date: 2022       Admit Date: 2022    Visit Dx:     ICD-10-CM ICD-9-CM   1. Intracranial hemorrhage (HCC)  I62.9 432.9   2. Fall from standing, initial encounter  W19.XXXA E888.9   3. Dialysis patient (HCC)  Z99.2 V45.11   4. Current use of long term anticoagulation  Z79.01 V58.61     Patient Active Problem List   Diagnosis   • Menstrual disorder   • Atopic rhinitis   • Anemia in CKD (chronic kidney disease)   • Spasm of cervical paraspinous muscle   • Cervical radiculopathy   • Depression   • DM type 2 with diabetic peripheral neuropathy (HCC)   • Essential hypertension   • Duplay's periarthritis syndrome   • Gastroesophageal reflux disease   • Hyperlipidemia   • Hypertension   • Arthritis   • Seizure disorder (HCC)   • Phlebitis   • Type 2 diabetes mellitus (HCC)   • Vitamin D deficiency   • Chest pain   • Abscess   • Generalized muscle weakness   • Abdominal wall cellulitis   • HTN (hypertension)   • Diabetes mellitus with peripheral autonomic neuropathy (HCC)   • Hyponatremia   • Hypercalcemia   • Dehydration   • Abdominal wall abscess   • Cellulitis of toe of left foot   • Partial Achilles tendon tear, left, initial encounter   • Arthritis of foot   • Essential tremor   • Primary parkinsonism (formerly [facility])   • Abnormal cardiac function test   • 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 pectoris (HCC)   • Atrial fibrillation (HCC)   • Anticoagulated   • CKD (chronic kidney disease) stage 5, GFR less than 15 ml/min (HCC)   • Hypoglycemia   • Low vitamin B12 level   • S/P CABG (coronary artery bypass graft)   • Hypothyroidism (acquired)   • Gastrointestinal hemorrhage with melena   • Systolic CHF, chronic (HCC)   • ESRD (end stage renal disease) (HCC)   • Anemia, chronic disease   • Anemia, posthemorrhagic, acute   • Altered mental state   • Intracranial hemorrhage (HCC)     Past Medical History:    Diagnosis Date   • Achilles tendon tear     left    • Acute pulmonary edema ([facility]) 5/6/2021   • Anemia    • Anxiety    • CKD (chronic kidney disease) stage 5, GFR less than 15 ml/min ([facility]) 1/27/2020   • Depression    • Diabetes mellitus, type 2 ([facility])    • ESRD (end stage renal disease) ([facility])     HAS LEFT FOREARM FISTULA   • GERD (gastroesophageal reflux disease)    • Hemorrhagic stroke ([facility]) 9/18/2020   • History of CVA (cerebrovascular accident) 7/1/2021   • History of MRSA infection 03/15/2016    ABDOMINAL WOUND,   INFECTION CONTROLL NOTIFIED 2-6-2017   • History of transfusion    • Hyperlipidemia    • Hypertension    • Hypokalemia    • Hypomagnesemia    • Hypoxic 01/2016    WHEN SHE HAD PNEUMONIA   • Intertrigo    • Leukocytosis    • Osteoarthritis    • Pneumonia 01/2016   • Psoriasis    • Psoriatic arthritis ([facility])    • Seizures ([facility])     one time   • Sepsis ([facility]) 01/2016   • SOB (shortness of breath)    • Stage 4 chronic renal impairment associated with type 2 diabetes mellitus ([facility])    • Staph infection     HX RIGHT FOOT AT [facility] 01/09/2010   • Streptococcal bacteremia    • Stroke (cerebrum) ([facility])    • Stroke ([facility])    • Swelling of hand 10/27/2016    LEFT HAND SWELLIMG SINCE LEFT FISTULA SURGERY   • Tremor, essential    • Wears glasses    • Wheelchair dependent     For mobility     Past Surgical History:   Procedure Laterality Date   • ABDOMINAL WALL ABSCESS INCISION AND DRAINAGE  2016   • ARTERIOVENOUS FISTULA/SHUNT SURGERY Left 10/27/2016    Procedure: LT FOREARM FISTULA;  Surgeon: Lorelei Haque Jr., MD;  Location: [facility] OR;  Service:    • ARTERIOVENOUS FISTULA/SHUNT SURGERY Left 2/16/2017    Procedure: LT BRACHIAL CEPHALIC WITH FISTULA LIGATION RADIAL CEPHALIC.;  Surgeon: Gurmeet Carver MD;  Location: [facility] OR;  Service:    • CARDIAC CATHETERIZATION N/A 7/26/2019    Procedure: Left Heart Cath;  Surgeon: Eddie Hodges MD;  Location: [facility] CATH INVASIVE LOCATION;  Service:  Cardiology   • CARDIAC CATHETERIZATION N/A 7/26/2019    Procedure: Coronary angiography;  Surgeon: Eddie Hodges MD;  Location: [facility] CATH INVASIVE LOCATION;  Service: Cardiology   • CARDIAC SURGERY     • CATARACT EXTRACTION W/ INTRAOCULAR LENS IMPLANT Bilateral 2011   • COLONOSCOPY N/A 7/4/2021    Procedure: COLONOSCOPY into cecum/terminal ileum;  Surgeon: Purvi Grant MD;  Location: [facility] ENDOSCOPY;  Service: Gastroenterology;  Laterality: N/A;  hemorrhoids, diverticulosis   • CORONARY ARTERY BYPASS GRAFT N/A 7/29/2019    Procedure: INTRAOP ERNESTO; CORONARY ARTERY BYPASS GRAFTING X 4 WITH LEFT INTERNAL MAMMARY ARTERY GRAFT AND UTILIZING ENDOSCOPICALLY HARVESTED GREATER SAPHENOUS VEIN; PRP;  Surgeon: Gordo Ferreira MD;  Location: [facility] OR;  Service: Cardiothoracic   • CORONARY ARTERY BYPASS GRAFT     • DILATATION AND CURETTAGE  1991   • ENDOSCOPY N/A 7/1/2021    Procedure: ESOPHAGOGASTRODUODENOSCOPY WITH BX'S;  Surgeon: Azam Kat MD;  Location: [facility] ENDOSCOPY;  Service: Gastroenterology;  Laterality: N/A;  pre: ANEMIA, MELENA  post: ESOPHAGITIS, GASTRITIS, BILE REFLUX, BUT NO OBVIOUS SOURCE OF BLEEDING   • EXCISION MASS TRUNK N/A 3/22/2016    Procedure: I&D LOWER ABDOMINAL  WOUND;  Surgeon: Tru Yi MD;  Location: [facility];  Service:    • FOOT SURGERY Right 2010    REMOVED BONE IN RIGHT GREAT TOE   • HYSTERECTOMY  1992      General Information     Row Name 01/22/22 1348          OT Time and Intention    Document Type evaluation  -JAIME     Mode of Treatment occupational therapy; individual therapy  -JAIME     Row Name 01/22/22 1348          General Information    Patient Profile Reviewed yes  -JAIME     Prior Level of Function min assist:; ADL's; independent:; all household mobility  -JAIME     Existing Precautions/Restrictions fall; other (see comments)  Right foot Orthotic boot due to diabetic foot ulcer  -JAIME     Barriers to Rehab previous functional deficit;  medically complex  -     Row Name 01/22/22 1348          Occupational Profile    Reason for Services/Referral (Occupational Profile) Pt is a 72 y/o F admit s/p fall from w/c while in dialysis. CT HEAD: L ICH. Note: Right foot Orthotic boot due to diabetic foot ulcer. PMH includes ESRD on HD, CAD s/p CABG, Afib, DM, CVA Sept 2021, obesity. Pt resides with her spouse in a house with ramp access. Pt reports indep with ambulation using RW, but receives assistance with dressing/bathing since her stroke.  -     Row Name 01/22/22 1348          Living Environment    Lives With spouse  -     Row Name 01/22/22 1348          Home Main Entrance    Number of Stairs, Main Entrance other (see comments)  ramp access  -     Row Name 01/22/22 1348          Cognition    Orientation Status (Cognition) oriented x 4  -[facility] Name 01/22/22 1348          Safety Issues, Functional Mobility    Impairments Affecting Function (Mobility) balance; endurance/activity tolerance; strength  -     Comment, Safety Issues/Impairments (Mobility) gait belt and non skid socks used for safety  -           User Key  (r) = Recorded By, (t) = Taken By, (c) = Cosigned By    Initials Name Provider Type    Brianda Freed OT Occupational Therapist                 Mobility/ADL's     Row Name 01/22/22 1351          Bed Mobility    Supine-Sit [place] (Bed Mobility) not tested  -     Comment (Bed Mobility) up at [facility]  -     Row Name 01/22/22 1351          Transfers    Transfers toilet transfer  -     [place] Level (Toilet Transfer) minimum assist (75% patient effort); 1 person assist  -     Row Name 01/22/22 1351          Toilet Transfer    Type (Toilet Transfer) sit-stand  -     Row Name 01/22/22 1351          Functional Mobility    Functional Mobility- Ind. Level not tested  -     Row Name 01/22/22 1351          Activities of Daily Living    BADL Assessment/Intervention toileting; grooming  -[facility] Name 01/22/22  1351          Toileting Assessment/Training    [place] Level (Toileting) adjust/manage clothing; perform perineal hygiene; maximum assist (25% patient effort)  -     Assistive Devices (Toileting) commode, 3-in-1  -JAIME     Position (Toileting) supported standing  -JAIME     Row Name 01/22/22 1351          Grooming Assessment/Training    [place] Level (Grooming) hair care, combing/brushing; independent; set up  -JAIME     Position (Grooming) supported sitting  -           User Key  (r) = Recorded By, (t) = Taken By, (c) = Cosigned By    Initials Name Provider Type    JAIME Brianda Nash, OT Occupational Therapist               Obj/Interventions     Row Name 01/22/22 1352          Sensory Assessment (Somatosensory)    Sensory Assessment (Somatosensory) bilateral UE; bilateral LE  -JAIME     Bilateral UE Sensory Assessment impaired  -     Bilateral LE Sensory Assessment impaired  -[facility] Name 01/22/22 1352          Sensory Interventions    Comment, Sensory Intervention BUEs (reports moderate sensation loss)/ BLEs (reports severe sensation loss)  -     Row Name 01/22/22 1352          Vision Assessment/Intervention    Visual Impairment/Limitations WFL  -[facility] Name 01/22/22 1352          Range of Motion Comprehensive    Comment, General Range of Motion BUEs AROM WFL  -[facility] Name 01/22/22 1352          Strength Comprehensive (MMT)    Comment, General Manual Muscle Testing (MMT) Assessment BUEs Shoulder 3-/5; Elbow/ 3+/5. Eval considering her stroke affected the R side per her report.  -     Row Name 01/22/22 1352          Balance    Balance Assessment sitting static balance; sitting dynamic balance; standing static balance; standing dynamic balance  -     Static Sitting Balance WFL; unsupported; sitting in chair  -     Dynamic Sitting Balance WFL; unsupported; sitting in chair  -JAIME     Static Standing Balance mild impairment; supported; standing  -JAIME     Dynamic Standing Balance mild  impairment; moderate impairment; supported; standing  -JAIME           User Key  (r) = Recorded By, (t) = Taken By, (c) = Cosigned By    Initials Name Provider Type    Brianda Freed OT Occupational Therapist               Goals/Plan     [facility] Name 01/22/22 1357          Transfer Goal 1 (OT)    Activity/Assistive Device (Transfer Goal 1, OT) transfers, all  -JAIME     [place] Level/Cues Needed (Transfer Goal 1, OT) standby assist  -JAIME     Time Frame (Transfer Goal 1, OT) short term goal (STG); 2 weeks  -[facility] Name 01/22/22 1357          Bathing Goal 1 (OT)    Activity/Device (Bathing Goal 1, OT) bathing skills, all  -JAIME     [place] Level/Cues Needed (Bathing Goal 1, OT) standby assist  -JAIME     Time Frame (Bathing Goal 1, OT) short term goal (STG); 2 weeks  -JAIME     Row Name 01/22/22 1357          Dressing Goal 1 (OT)    Activity/Device (Dressing Goal 1, OT) dressing skills, all  -JAIME     [place]/Cues Needed (Dressing Goal 1, OT) standby assist  -JAIME     Time Frame (Dressing Goal 1, OT) short term goal (STG); 2 weeks  -[facility] Name 01/22/22 1357          Therapy Assessment/Plan (OT)    Planned Therapy Interventions (OT) activity tolerance training; functional balance retraining; occupation/activity based interventions; ROM/therapeutic exercise; transfer/mobility retraining; strengthening exercise; patient/caregiver education/training; neuromuscular control/coordination retraining; edema control/reduction; cognitive/visual perception retraining; BADL retraining; adaptive equipment training  -           User Key  (r) = Recorded By, (t) = Taken By, (c) = Cosigned By    Initials Name Provider Type    Brianda Freed OT Occupational Therapist               Clinical Impression     Row Name 01/22/22 7710          Pain Scale: Numbers Pre/Post-Treatment    Pretreatment Pain Rating 0/10 - no pain  -JAIME     Posttreatment Pain Rating 0/10 - no pain  -[facility] Name 01/22/22 2049          Plan of  Care Review    Plan of Care Reviewed With patient  -JAIME     Outcome Summary Pt is a 70 y/o F admit s/p fall from w/c while in dialysis. CT HEAD: L ICH. Note: Right foot Orthotic boot due to diabetic foot ulcer. PMH includes ESRD on HD, CAD s/p CABG, Afib, DM, CVA Sept 2021, obesity. Pt resides with her spouse in a house with ramp access. Pt reports indep with ambulation using RW, but receives assistance with dressing/bathing since her stroke. Bathroom equipped with walk in shower with seat available. Pt found at [facility] [place] [facility] upon entry. Pt requires max A for betito hygiene in standing with min A for balance. Pt transferred back to chair with min A. Pt able to complete grooming with s/u. Pt appears below ADL baseline and would benefit from skilled OT services at 5x/week. OT recommends d/c to inpatient acute rehab vs SNF pending progress and medical stability.  -JAIME     Row Name 01/22/22 5973          Therapy Assessment/Plan (OT)    Rehab Potential (OT) good, to achieve stated therapy goals  -JAIME     Criteria for Skilled Therapeutic Interventions Met (OT) yes; skilled treatment is necessary  -JAIME     Therapy Frequency (OT) 5 times/wk  -JAIME     Row Name 01/22/22 5925          Therapy Plan Review/Discharge Plan (OT)    Anticipated Discharge Disposition (OT) inpatient rehabilitation facility; skilled nursing facility  -JAIME     Row Name 01/22/22 4056          Vital Signs    Recovery Time VSS  -JAIME     Row Name 01/22/22 1358          Positioning and Restraints    Pre-Treatment Position bedside commode  -JAIME     Post Treatment Position chair  -JAIME     In Chair notified nsg; reclined; call light within reach; encouraged to call for assist  -JAIME           User Key  (r) = Recorded By, (t) = Taken By, (c) = Cosigned By    Initials Name Provider Type    Brianda Freed, OT Occupational Therapist               Outcome Measures     Row Name 01/22/22 1542          How much help from another is currently needed...    Putting on and taking off  regular lower body clothing? 2  -JAIME     Bathing (including washing, rinsing, and drying) 2  -JAIME     Toileting (which includes using toilet bed pan or urinal) 2  -JAIME     Putting on and taking off regular upper body clothing 3  -JAIME     Taking care of personal grooming (such as brushing teeth) 4  -JAIME     Eating meals 4  -JAIME     AM-PAC 6 Clicks Score (OT) 17  -JAIME     Row Name 01/22/22 1150          How much help from another person do you currently need...    Turning from your back to your side while in flat bed without using bedrails? 3  -MS     Moving from lying on back to sitting on the side of a flat bed without bedrails? 3  -MS     Moving to and from a bed to a chair (including a wheelchair)? 3  -MS     Standing up from a chair using your arms (e.g., wheelchair, bedside chair)? 3  -MS     Climbing 3-5 steps with a railing? 2  -MS     To walk in hospital room? 3  -MS     AM-PAC 6 Clicks Score (PT) 17  -MS     Row Name 01/22/22 1357 01/22/22 1150       Functional Assessment    Outcome Measure Options AM-PAC 6 Clicks Daily Activity (OT)  -JAIME AM-PAC 6 Clicks Basic Mobility (PT)  -MS          User Key  (r) = Recorded By, (t) = Taken By, (c) = Cosigned By    Initials Name Provider Type    Shiva Avila, PT Physical Therapist    Brianda Freed, OT Occupational Therapist                Occupational Therapy Education                 Title: PT OT SLP Therapies (Done)     Topic: Occupational Therapy (Done)     Point: ADL training (Done)     Description:   Instruct learner(s) on proper safety adaptation and remediation techniques during self care or transfers.   Instruct in proper use of assistive devices.              Learning Progress Summary           Patient Acceptance, E,TB, VU,NR by JAIME at 1/22/2022 1356                   Point: Home exercise program (Done)     Description:   Instruct learner(s) on appropriate technique for monitoring, assisting and/or progressing therapeutic exercises/activities.               Learning Progress Summary           Patient Acceptance, E,TB, VU,NR by JAIME at 1/22/2022 1358                   Point: Precautions (Done)     Description:   Instruct learner(s) on prescribed precautions during self-care and functional transfers.              Learning Progress Summary           Patient Acceptance, E,TB, VU,NR by JAIME at 1/22/2022 1358                   Point: Body mechanics (Done)     Description:   Instruct learner(s) on proper positioning and spine alignment during self-care, functional mobility activities and/or exercises.              Learning Progress Summary           Patient Acceptance, E,TB, VU,NR by JAIME at 1/22/2022 1358                               User Key     Initials Effective Dates Name Provider Type Discipline    JAIME 06/16/21 -  Brianda Nash OT Occupational Therapist OT              OT Recommendation and Plan  Planned Therapy Interventions (OT): activity tolerance training, functional balance retraining, occupation/activity based interventions, ROM/therapeutic exercise, transfer/mobility retraining, strengthening exercise, patient/caregiver education/training, neuromuscular control/coordination retraining, edema control/reduction, cognitive/visual perception retraining, BADL retraining, adaptive equipment training  Therapy Frequency (OT): 5 times/wk  Plan of Care Review  Plan of Care Reviewed With: patient  Outcome Summary: Pt is a 70 y/o F admit s/p fall from w/c while in dialysis. CT HEAD: L ICH. Note: Right foot Orthotic boot due to diabetic foot ulcer. PMH includes ESRD on HD, CAD s/p CABG, Afib, DM, CVA Sept 2021, obesity. Pt resides with her spouse in a house with ramp access. Pt reports indep with ambulation using RW, but receives assistance with dressing/bathing since her stroke. Bathroom equipped with walk in shower with seat available. Pt found at [facility] [place] upon entry. Pt requires max A for betito hygiene in standing with min A for balance. Pt transferred back to chair with  min A. Pt able to complete grooming with s/u. Pt appears below ADL baseline and would benefit from skilled OT services at 5x/week. OT recommends d/c to inpatient acute rehab vs SNF pending progress and medical stability.     Time Calculation:    Time Calculation- OT     Row Name 01/22/22 1358             Time Calculation- OT    OT Start Time 1038  -JAIME      OT Stop Time 1055  -JAIME      OT Time Calculation (min) 17 min  -JAIME      Total Timed Code Minutes- OT 9 minute(s)  -JAIME      OT Received On 01/22/22  -JAIME      OT - Next Appointment 01/24/22  -JAIME      OT Goal Re-Cert Due Date 02/05/22  -JAIME              Timed Charges    83604 - OT Self Care/Mgmt Minutes 9  -JAIME              Untimed Charges    OT Eval/Re-eval Minutes 8  -JAIME              Total Minutes    Timed Charges Total Minutes 9  -JAIME      Untimed Charges Total Minutes 8  -JAIME       Total Minutes 17  -JAIME            User Key  (r) = Recorded By, (t) = Taken By, (c) = Cosigned By    Initials Name Provider Type    Brianda Freed OT Occupational Therapist              Therapy Charges for Today     Code Description Service Date Service Provider Modifiers Qty    14211187902 HC OT SELF CARE/MGMT/TRAIN EA 15 MIN 1/22/2022 Brianda Nash OT GO 1    [id number] HC OT EVAL MOD COMPLEXITY 2 1/22/2022 Brianda Nash OT GO 1               Brianda Nash OT  1/22/2022

## 2022-01-22 NOTE — PROGRESS NOTES
"      [facility]         Dr Hansen Sayied   LOS: 1 day   Patient Care Team:  Robby Chaney MD as PCP - General  Robby Chaney MD as PCP - Family Medicine  Eddie Hodges MD as Consulting Physician (Cardiology)    Chief Complaint: ICH traumatic chronic anticoagulation with Eliquis end-stage renal disease on dialysis    Interval History: Sitting up in a chair resting comfortably.  Denies any headache or any complaints.  No overnight issues reported    REVIEW OF SYSTEMS:   CARDIOVASCULAR: No chest pain, chest pressure or chest discomfort. No palpitations or edema.   RESPIRATORY: No shortness of breath, cough or sputum.   GASTROINTESTINAL: No anorexia, nausea, vomiting or diarrhea. No abdominal pain or blood.   HEMATOLOGIC: No bleeding or bruising.     Ventilator/Non-Invasive Ventilation Settings (From admission, onward)            None            Vital Signs  Temp:  [97.2 °F (36.2 °C)-98.4 °F (36.9 °C)] 97.9 °F (36.6 °C)  Heart Rate:  [] 87  Resp:  [15] 15  BP: ()/(49-85) 124/53    Intake/Output Summary (Last 24 hours) at 1/22/2022 1203  Last data filed at 1/22/2022 0000  Gross per 24 hour   Intake --   Output 150 ml   Net -150 ml     Flowsheet Rows      First Filed Value   Admission Height 165.1 cm (65\") Documented at 01/21/2022 1004   Admission Weight 77.1 kg (170 lb) Documented at 01/21/2022 1004          Physical Exam:  Patient is examined using the personal protective equipment as per guidelines from infection control for this particular patient as enacted.  Hand hygiene was performed before and after patient interaction.   General Appearance:    Alert, cooperative, in no acute distress.  Following simple commands  Neck midline trachea, no thyromegaly   Lungs:     Clear to auscultation, respirations regular, even and                  unlabored    Heart:    Regular rhythm and normal rate, normal S1 and S2, no            murmur, no gallop, no rub, no click "   Chest Wall:    No abnormalities observed   Abdomen:     Normal bowel sounds, no masses, no organomegaly, soft        nontender, nondistended, no guarding, no rebound                tenderness   Extremities:   Moves all extremities well, no edema, no cyanosis, no             redness  CNS no focal neurological deficits normal sensory exam  Skin no rashes no nodules  Musculoskeletal no cyanosis no clubbing normal range of motion     Results Review:        Results from last 7 days   Lab Units 01/21/22  1012   SODIUM mmol/L 139   POTASSIUM mmol/L 4.0   CHLORIDE mmol/L 97*   CO2 mmol/L 30.7*   BUN mg/dL 31*   CREATININE mg/dL 6.47*   GLUCOSE mg/dL 325*   CALCIUM mg/dL 8.7         Results from last 7 days   Lab Units 01/21/22  1012   WBC 10*3/mm3 8.81   HEMOGLOBIN g/dL 10.6*   HEMATOCRIT % 34.2   PLATELETS 10*3/mm3 184     Results from last 7 days   Lab Units 01/21/22  1012   INR  1.06   APTT seconds 29.7     Results from last 7 days   Lab Units 01/22/22  0347   CHOLESTEROL mg/dL 112         Results from last 7 days   Lab Units 01/22/22  0347   CHOLESTEROL mg/dL 112   TRIGLYCERIDES mg/dL 178*   HDL CHOL mg/dL 33*   LDL CHOL mg/dL 49           I reviewed the patient's new clinical results.  I personally viewed and interpreted the patient's chest x-ray.        Medication Review:   carvedilol, 6.25 mg, Oral, BID  gabapentin, 300 mg, Oral, Q12H  insulin regular, 0-7 Units, Subcutaneous, Q6H  levothyroxine, 50 mcg, Oral, Daily  montelukast, 10 mg, Oral, Nightly  multivitamin, 1 tablet, Oral, QAM  pantoprazole, 40 mg, Oral, Daily  sertraline, 50 mg, Oral, Daily  sodium chloride, 10 mL, Intravenous, Q12H        niCARdipine, 5-15 mg/hr, Last Rate: Stopped (01/21/22 2010)        ASSESSMENT:   Intracranial hemorrhage, traumatic  Chronic anticoagulation with Eliquis  ESRD on hemodialysis  Coronary artery disease  History of stroke  Diabetes mellitus  Essential hypertension    PLAN:  Events noted chart reviewed  Neuro wise she  remains stable.  CT head in the morning per neurosurgery  Blood pressure management keep systolic less than 150  Neurochecks every 4 hours  Previously Eliquis had been reversed with Kcentra  Hemodialysis per nephrology  Patient has been cleared by neurosurgery to be transferred out of the ICU  Hospitalist management on the floor for medical management      Jade Storm MD  01/22/22  12:03 EST

## 2022-01-22 NOTE — PROGRESS NOTES
[facility] acute inpatient rehab note:  Referral received per stroke order set.  Please note this is a screening only.  Rehab Admissions will not actively be evaluating this patient.  If it is felt once therapies begin that the patient is appropriate for inpatient acute rehab services, please call our office at [phone] to initiate a full referral.  Thank you--Riya Pizarro,  Rehab Admission Coordinator

## 2022-01-23 NOTE — PROGRESS NOTES
[facility] NEUROSURGERY PROGRESS NOTE    PATIENT IDENTIFICATION:   Name:  Maribeth Rendon         MRN:  [id number]     71 y.o.  female               CC: Small left-sided acute putamen hemorrhage after mechanical fall at home      Subjective     Interval History: Awake and alert, sitting up in bed.  at bedside. Denies headache, dizziness, gait instability, or vision difficulty.  No acute events reported overnight.  Patient reports she is ready to go home.    ROS:  Constitutional: No fever, chills  HEENT: No headache, no vision changes, no tinnitus, no hearing difficulties  GI: No nausea, vomiting, no swallow difficulties  Neuro: No numbness, tingling, or weakness, no speech difficulties, no balance difficulties    Objective     Vital signs in last 24 hours:  Temp:  [97.1 °F (36.2 °C)-98.4 °F (36.9 °C)] 97.8 °F (36.6 °C)  Heart Rate:  [73-98] 86  Resp:  [16-18] 16  BP: (109-149)/(41-84) 132/72      Intake/Output this shift:  I/O this shift:  In: -   Out: 100 [Urine:100]      Intake/Output last 3 shifts:  I/O last 3 completed shifts:  In: -   Out: 1300 [Urine:150; Other:1150]    LABS:  Results from last 7 days   Lab Units 01/23/22  0754 01/21/22  1012   WBC 10*3/mm3 7.88 8.81   HEMOGLOBIN g/dL 9.7* 10.6*   HEMATOCRIT % 30.5* 34.2   PLATELETS 10*3/mm3 192 184     Results from last 7 days   Lab Units 01/23/22  0754 01/22/22  2205 01/21/22  1012   SODIUM mmol/L 137 136 139   POTASSIUM mmol/L 4.8 4.4 4.0   CHLORIDE mmol/L 102 99 97*   CO2 mmol/L 25.9 27.4 30.7*   BUN mg/dL 22 19 31*   CREATININE mg/dL 4.57* 3.86* 6.47*   CALCIUM mg/dL 7.9* 7.8* 8.7   BILIRUBIN mg/dL  --   --  0.2   ALK PHOS U/L  --   --  112   ALT (SGPT) U/L  --   --  10   AST (SGOT) U/L  --   --  13   GLUCOSE mg/dL 118* 201* 325*          IMAGING STUDIES:  CT HEAD W/O CONTRAST 1/23/22-  Previously identified hemorrhage within the left basal ganglia is stable  when compared to prior exam. No new areas of hemorrhage are seen. There  is mild atrophy.  There is periventricular and deep white matter  microangiopathic change. There is no midline shift or mass effect.      I personally viewed and interpreted the patient's clinical data and CT head imaging discussed results with Dr. Santos.    Meds reviewed/changed: Yes    Scheduled Meds:carvedilol, 6.25 mg, Oral, BID  gabapentin, 300 mg, Oral, Q12H  insulin regular, 0-7 Units, Subcutaneous, Q6H  levothyroxine, 50 mcg, Oral, Daily  montelukast, 10 mg, Oral, Nightly  multivitamin, 1 tablet, Oral, QAM  pantoprazole, 40 mg, Oral, Daily  sertraline, 50 mg, Oral, Daily  sodium chloride, 10 mL, Intravenous, Q12H      Continuous Infusions:niCARdipine, 5-15 mg/hr, Last Rate: Stopped (01/21/22 2010)      PRN Meds:.•  acetaminophen **OR** acetaminophen  •  albumin human  •  dextrose  •  dextrose  •  glucagon (human recombinant)  •  [COMPLETED] Insert peripheral IV **AND** sodium chloride  •  sodium chloride      Physical Exam:    General:   Awake, alert, oriented x3. Speech clear with no aphasia  Neck:    Supple    CN III IV VI: Extraocular movements are full , PERRL   CN V: Normal facial sensation and strength of muscles of mastication.  CN VII: Facial movements are symmetric. No weakness.      Motor: Normal muscle strength, bulk and tone in upper and lower extremities.  Able to move all extremities equally throughout   Sensation: Normal to light touch; no extinction  Station and Gait: Normal gait and station.    Coordination: Finger-to-nose and heel-to-shin test shows no dysmetria.    Extremities:   Wearing SCDs    Assessment/Plan     ASSESSMENT:      Intracranial hemorrhage (HCC)    This is a 71-year-old female patient who presented to [facility] ED with complaints of headache after a mechanical fall at home hitting the left side of her head on the wall.  CT head imaging in the ED revealed a small left sided acute putamen hemorrhage.     Patient is awake, alert, and oriented x3.  Denies headache, dizziness,  "visual changes.  CT head this morning was stable.  From our standpoint, the patient is okay to be discharged home.  Advised the patient that we would like for her to continue to keep her SBP less than 150.  Ok to restart Eliquis in 2 weeks.     PLAN:   Recommend continued SBP <150  Ok for discharge from JENNY standpoint  Ok to restart Eliquis in 2 weeks  Follow-up with us in our office in 4-6 weeks with repeat head CT    I discussed the patient's findings and my recommendations with patient, patient's , nursing staff, and Dr. Santos.      LOS: 2 days       Neena Lennon, APRN  1/23/2022  10:46 EST    \"Dictated utilizing Dragon dictation\".    "

## 2022-01-23 NOTE — OUTREACH NOTE
Prep Survey      Responses   [facility] patient discharged from? [facility]   Is LACE score < 7 ? No   Emergency Room discharge w/ pulse ox? No   Eligibility [facility]   Date of Admission 01/21/22   Date of Discharge 01/23/22   Discharge Disposition Home or Self Care   Discharge diagnosis Intracranial hemorrhage    Does the patient have one of the following disease processes/diagnoses(primary or secondary)? Stroke (TIA)   Does the patient have Home health ordered? No   Is there a DME ordered? No   Prep survey completed? Yes          Indy Russell RN

## 2022-01-23 NOTE — DISCHARGE SUMMARY
PHYSICIAN DISCHARGE SUMMARY                                                                        [facility]    Patient Identification:  Name: Maribeth Rendon  Age: 71 y.o.  Sex: female  :  1950  MRN: [id number]  Primary Care Physician: Robby Chaney MD    Admit date: 2022  Discharge date and time: No discharge date for patient encounter.   Discharged Condition: stable    Discharge Diagnoses:  Intracranial hemorrhage (HCC)   Intracranial hemorrhage, traumatic  Chronic anticoagulation with Eliquis  ESRD on hemodialysis  Coronary artery disease  History of stroke  Diabetes mellitus  Essential hypertension    History of presenting illness  Maribeth Rendon is a 71 y.o. female who has end-stage renal disease on hemodialysis, history of coronary artery disease with previous CABG and history of atrial fibrillation on chronic anticoagulation with Eliquis.Other comorbidities include history of diabetes mellitus, obesity, history of prior CVA which is another indication for her chronic anticoagulation.  She was getting ready for her hemodialysis session, she was trying to transfer to the wheelchair where she stumbled and landed on the wheelchair causing it to swing to the side and she fell along with the wheelchair to the ground. She did hit her head and even though she denies any altered mental status changes or loss of consciousness she was brought into the ER for evaluation given the concern of being on the blood thinner.  There was no uncontrolled muscular contraction or any suspected seizure activities, there was no evidence of any external bleed.  In the ER the patient had a CT of the head that showed small area of bleed, being on anticoagulation and given the location of the bleed this was discussed with the neurosurgical team who wanted the patient to be reversed on her anticoagulation.  The last dose of the Eliquis was taken on the night of 2022.  There is no fever or  chills  There is no cough  There is no altered mental status, patient able to answer question and follow commands.    Hospital Course: Maribeth Rendon presented to [facility]    Patient admitted to the ICU and neurochecks were monitored.  Eliquis reversed with Kcentra.  Hemodialysis done by nephrology.  Blood pressure managed.  Neurosurgery saw the patient recommended stable CT head findings and recommended patient to hold Eliquis for 2 weeks and follow-up with them.  Nephrology has cleared patient to be discharged.  At this time patient is stable and will be discharged home follow-up with neurosurgery as per their recommendations.  She is to hold aspirin and hold Eliquis for 2 weeks.  Consults:   IP CONSULT TO NEUROSURGERY  IP CONSULT TO PULMONOLOGY  IP CONSULT TO NEPHROLOGY  IP CONSULT TO NEURO CLINICAL SPECIALIST  IP CONSULT TO REHAB ADMISSION  IP CONSULT TO CASE MANAGEMENT   IP CONSULT TO DIABETES EDUCATOR  IP CONSULT TO NEUROSURGERY    Significant Diagnostic Studies:   Imaging Results (All)     Procedure Component Value Units Date/Time    CT Head Without Contrast [[id number]] Collected: 01/23/22 0546     Updated: 01/23/22 0554    Narrative:      CT HEAD WITHOUT CONTRAST     HISTORY: Intracranial hemorrhage     COMPARISON: 01/22/2022     TECHNIQUE: Axial CT imaging was obtained through brain. No IV contrast  was administered.     FINDINGS:  Previously identified hemorrhage within the left basal ganglia is stable  when compared to prior exam. No new areas of hemorrhage are seen. There  is mild atrophy. There is periventricular and deep white matter  microangiopathic change. There is no midline shift or mass effect.       Impression:      No significant interval change.     Radiation dose reduction techniques were utilized, including automated  exposure control and exposure modulation based on body size.     This report was finalized on 1/23/2022 5:51 AM by Dr. Fernandez  ITZ Camacho       CT Head Without Contrast [[id number]] Collected: 01/22/22 0517     Updated: 01/22/22 0525    Narrative:      CT HEAD WITHOUT CONTRAST     HISTORY: Intracranial hemorrhage     COMPARISON: 01/21/2022     TECHNIQUE: Axial CT imaging was obtained through the brain. No IV  contrast was administered.     FINDINGS:  There is a tiny focus of hemorrhage identified within the left basal  ganglia, measuring approximate 5 mm on the axial series and 9 mm on the  coronal series. This is unchanged when compared to the prior exam. No  new areas of hemorrhage are seen. There is atrophy. There is  periventricular and deep white matter microangiopathic change. There is  no midline shift or mass effect.       Impression:      No significant interval change.     Radiation dose reduction techniques were utilized, including automated  exposure control and exposure modulation based on body size.     This report was finalized on 1/22/2022 5:22 AM by Dr. Rebecca Camacho M.D.       CT Cervical Spine Without Contrast [[id number]] Collected: 01/21/22 1224     Updated: 01/21/22 1245    Narrative:      CT CERVICAL SPINE WO CONTRAST-     CLINICAL HISTORY: Patient fell. Neck pain.     TECHNIQUE: Multiple axial 1 mm thick images were obtained through the  cervical spine. Coronal and sagittal reconstructions were produced.     Radiation dose reduction techniques were utilized, including automated  exposure control and exposure modulation based on body size.     COMPARISON: MRI of the cervical spine dated 11/25/2015.     FINDINGS: There is marked disc space narrowing at the C5-C6 and C6-C7  levels with endplate bony spurring. There is mild disc space narrowing  at C4-C5 The remainder the cervical disc spaces are normal in height.  All of the vertebral bodies are normal in height. The facet joints are  intact. There is straightening of normal cervical lordosis due to the  degenerative disc changes. There is no evidence of  acute fracture or  subluxation.     At C2-C3 there is no significant disc bulging or spinal canal stenosis.  There is moderate narrowing of the left neural foramen due to bony  spurring.     At C3-C4 there is no significant disc bulging. There is moderately  severe narrowing of the right neural foramen due to bony spurring. There  is no central canal stenosis.     At C4-C5 there is facet joint arthropathy and extensive endplate bony  spurring and mild disc bulging that produces moderate central canal  stenosis and also moderate bilateral foraminal narrowing. Similar  findings are present at C5-C6 and C6-C7.       Impression:      Multilevel degenerative disc changes as described. There is  no evidence of acute fracture or subluxation.     This report was finalized on 1/21/2022 12:42 PM by Dr. Nehemiah Veliz M.D.       CT Head Without Contrast [[id number]] Collected: 01/21/22 0948     Updated: 01/21/22 1211    Narrative:      CT SCAN OF THE BRAIN WITHOUT CONTRAST     HISTORY: Left-sided head trauma. Pain.     The CT scan was performed as an emergency procedure through the brain  without contrast. There is mild diffuse atrophy and chronic small vessel  ischemic change. There is a very small focus of probable acute  hemorrhage in the region of the putamen and measuring 4 x 4 mm and 11 mm  in vertical extent as seen on the coronal reconstructions. This is new  since the CT scans dated 06/29/2021 and 02/20/2020, and therefore is  highly suspicious for acute hemorrhage. There is no significant mass  effect but continued follow-up evaluation is recommended. The remainder  of the CT scan is unremarkable. The visualized sinuses and mastoid air  cells are clear.                 Radiation dose reduction techniques were utilized, including automated  exposure control and exposure modulation based on body size.     This report was finalized on 1/21/2022 12:08 PM by Dr. Kirill Lindsay M.D.             Discharge Exam:  Alert and  oriented x 4, in NAD  Supple neck, midline trach  RRR, no m/r/g, no edema  Clear bilaterally, no wheezing, nonlabored  No clubbing or cyanosis     Disposition:  Home    Patient Instructions:    Follow-up Information     Robby Chaney MD .    Specialty: Internal Medicine  Contact information:  [address] [facility] [address] [phone]                       Discharge Orders  Maribeth Rendon (MRN [id number])    (none)              Discharge Medications      Continue These Medications      Instructions Start Date   carvedilol 3.125 MG tablet  Commonly known as: COREG   3.125 mg, Oral, 2 Times Daily With Meals      gabapentin 300 MG capsule  Commonly known as: NEURONTIN   300 mg, Oral, 2 times daily      levothyroxine 50 MCG tablet  Commonly known as: SYNTHROID, LEVOTHROID   50 mcg, Oral, Daily      lidocaine-prilocaine 2.5-2.5 % cream  Commonly known as: EMLA   No dose, route, or frequency recorded.      montelukast 10 MG tablet  Commonly known as: SINGULAIR   10 mg, Oral, Nightly      multivitamin tablet tablet  Commonly known as: THERAGRAN   3 tablets, Oral, Every 7 Days      ondansetron ODT 4 MG disintegrating tablet  Commonly known as: ZOFRAN-ODT   4 mg, Oral, Every 8 Hours PRN      pantoprazole 40 MG EC tablet  Commonly known as: PROTONIX   40 mg, Oral, Daily      PROBIOTIC DAILY PO   1 capsule, Oral, Daily      sertraline 50 MG tablet  Commonly known as: ZOLOFT   50 mg, Oral, Daily      topiramate 100 MG tablet  Commonly known as: TOPAMAX   100 mg, Oral, Daily      Tresiba FlexTouch 100 UNIT/ML solution pen-injector injection  Generic drug: insulin degludec   8 Units, Subcutaneous, Daily         Stop These Medications    aspirin 81 MG tablet     Eliquis 5 MG tablet tablet  Generic drug: apixaban            Total time spent discharging patient including evaluation, medication reconciliation, arranging follow up, and post hospitalization instructions and education total time exceeds 30  minutes.    Signed:  Jade Storm MD  1/23/2022  14:52 EST

## 2022-01-23 NOTE — PROGRESS NOTES
[facility] Progress Note      Patient Name: Maribeth Rendon  : 1950  MRN: [id number]  Primary Care Physician:  Robby Chaney MD  Date of admission: 2022    Subjective     Interval History:   The patient has no new complaints today.  She feels good.  Had dialysis yesterday with no reported problems.  Had 1.1 L of UF    Review of Systems:   As noted above    Objective     Vitals:   Temp:  [97.1 °F (36.2 °C)-98.4 °F (36.9 °C)] 97.8 °F (36.6 °C)  Heart Rate:  [73-93] 86  Resp:  [16-18] 16  BP: (109-141)/(41-84) 132/72    Intake/Output Summary (Last 24 hours) at 2022 1313  Last data filed at 2022 0800  Gross per 24 hour   Intake --   Output 1250 ml   Net -1250 ml       Physical Exam:    General Appearance: alert, oriented x 3, no acute distress   Skin: warm and dry  HEENT: oral mucosa normal, nonicteric sclera  Neck: supple, no JVD  Lungs: CTA  Heart: RRR, normal S1 and S2  Abdomen: soft, nontender, nondistended  : no palpable bladder  Extremities: no edema, cyanosis or clubbing  Neuro: normal speech and mental status     Scheduled Meds:     carvedilol, 6.25 mg, Oral, BID  gabapentin, 300 mg, Oral, Q12H  insulin regular, 0-7 Units, Subcutaneous, Q6H  levothyroxine, 50 mcg, Oral, Daily  montelukast, 10 mg, Oral, Nightly  multivitamin, 1 tablet, Oral, QAM  pantoprazole, 40 mg, Oral, Daily  sertraline, 50 mg, Oral, Daily  sodium chloride, 10 mL, Intravenous, Q12H      IV Meds:   niCARdipine, 5-15 mg/hr, Last Rate: Stopped (22)        Results Reviewed:   I have personally reviewed the results from the time of this admission to 2022 13:13 EST     Results from last 7 days   Lab Units 22  0754 22  2205 22  1012   SODIUM mmol/L 137 136 139   POTASSIUM mmol/L 4.8 4.4 4.0   CHLORIDE mmol/L 102 99 97*   CO2 mmol/L 25.9 27.4 30.7*   BUN mg/dL 22 19 31*   CREATININE mg/dL 4.57* 3.86* 6.47*   CALCIUM mg/dL 7.9* 7.8* 8.7   BILIRUBIN mg/dL   --   --  0.2   ALK PHOS U/L  --   --  112   ALT (SGPT) U/L  --   --  10   AST (SGOT) U/L  --   --  13   GLUCOSE mg/dL 118* 201* 325*       Estimated Creatinine Clearance: 11.6 mL/min (A) (by C-G formula based on SCr of 4.57 mg/dL (H)).    Results from last 7 days   Lab Units 01/23/22  0754 01/22/22  2205   PHOSPHORUS mg/dL 5.1* 3.8             Results from last 7 days   Lab Units 01/23/22  0754 01/21/22  1012   WBC 10*3/mm3 7.88 8.81   HEMOGLOBIN g/dL 9.7* 10.6*   PLATELETS 10*3/mm3 192 184       Results from last 7 days   Lab Units 01/21/22  1012   INR  1.06       Assessment / Plan     ASSESSMENT:  1.  End-stage renal disease on dialysis on HD MWF since 06/2021. Volume and electrolyte within acceptable range as of yesterday.  No labs today.  2.  Anemia of chronic kidney disease  3.  Intracranial hemorrhage   4.  Secondary hyperparathyroidism   5.  Coronary artery disease, with prior CABG  6.  Hypothyroid, on replacement therapy        PLAN:  Okay to discharge from nephrology standpoint to follow-up with dialysis tomorrow.    Will dialyze tomorrow if in hospital.    Thank you for involving us in the care of Maribeth Rendon.  Please feel free to call with any questions.    Wild Faust MD  01/23/22  13:13 [facility]    [facility]  [phone]      Much of this encounter note is an electronic transcription/translation of spoken language to printed text. The electronic translation of spoken language may permit erroneous, or at times, nonsensical words or phrases to be inadvertently transcribed; Although I have reviewed the note for such errors, some may still exist.

## 2022-01-23 NOTE — NURSING NOTE
HD treatment complete, 1150ml removed, arterial blood returned, MD Vinny Zaragoza notified of venous chamber clotting, no new orders, hemostasis achieved.    Previtals:  /41  HR 88  T 97.1    Postvitals:  /64  HR 92  T 97.1    Post weight: 86.6kg  BVP 48.9

## 2022-01-24 NOTE — OUTREACH NOTE
Call Center TCM Note      Responses   [facility] patient discharged from? [facility]   Does the patient have one of the following disease processes/diagnoses(primary or secondary)? Stroke (TIA)   TCM attempt successful? Yes   Discharge diagnosis Intracranial hemorrhage    Is patient permission given to speak with other caregiver? Yes   Person spoke with today (if not patient) and relationship    Meds reviewed with patient/caregiver? Yes   Does the patient have all medications ordered at discharge? N/A   Prescription comments No new meds, pt has discontinued ASA and Eliquis for now.    Is the patient taking all medications as directed (includes completed medication regime)? Yes   Does the patient have a primary care provider?  Yes   Does the patient have an appointment with their PCP within 7 days of discharge? No   Comments regarding PCP Per  they will call to sched with Dr Chaney.    Has the patient kept scheduled appointments due by today? N/A   Has home health visited the patient within 72 hours of discharge? N/A   Psychosocial issues? No   Does the patient require any assistance with activities of daily living such as eating, bathing, dressing, walking, etc.? No   Does the patient have any residual symptoms from stroke/TIA? No   Does the patient understand the diet ordered at discharge? Yes   Wrap up additional comments Pt did not have stroke, small bleed following head injury when pt w/c toppled over. No new meds, pt has discontinued ASA and Eliquis for now. Pt has mutilple upcoming appts. Pt did resume dialysis today. Per  they will call to sched with Dr Chaney.           Risa Workman MA    1/24/2022, 15:19 EST

## 2022-01-24 NOTE — CASE MANAGEMENT/SOCIAL WORK
Case Management Discharge Note      Final Note: home         Selected Continued Care - Discharged on 1/23/2022 Admission date: 1/21/2022 - Discharge disposition: Home or Self Care    Destination    No services have been selected for the patient.              Durable Medical Equipment    No services have been selected for the patient.              Dialysis/Infusion    No services have been selected for the patient.              Home Medical Care    No services have been selected for the patient.              Therapy    No services have been selected for the patient.              Community Resources    No services have been selected for the patient.              Community & DME    No services have been selected for the patient.                  Transportation Services  Private: Car    Final Discharge Disposition Code: 01 - home or self-care

## 2022-01-31 NOTE — TELEPHONE ENCOUNTER
----- Message from IRENA Suh sent at 1/23/2022 12:13 PM EST -----  Regarding: Follow-up  Please schedule follow-up appt with Dr. ROMO in 4-6 weeks with repeat head CT. Suffered a small left putamen hemorrhage after a mechanical fall at home. Patient gave me verbal consent to leave appointment details on her answering machine at home as she has dialysis MWF.

## 2022-02-01 NOTE — OUTREACH NOTE
Stroke Week 2 Survey      Responses   [facility] patient discharged from? [place]   Does the patient have one of the following disease processes/diagnoses(primary or secondary)? Stroke (TIA)   Week 2 attempt successful? Yes   Call start time 1640   Call end time 1653   General alerts for this patient Dialysis M W F   Discharge diagnosis Intracranial hemorrhage    Meds reviewed with patient/caregiver? Yes   Is the patient taking all medications as directed (includes completed medication regime)? Yes   Has the patient kept scheduled appointments due by today? Yes   Comments 02/10/2022 Neurology   What is the [facility] agency?  [facility]    Has home health visited the patient within 72 hours of discharge? Yes   Home health comments [facility] Nurse comes once a week Patient would like PT. SN will call PCP for referral   What DME was ordered? Wound care every 2 weeks   Does the patient require any assistance with activities of daily living such as eating, bathing, dressing, walking, etc.? No   Does the patient have any residual symptoms from stroke/TIA? No   Does the patient understand the diet ordered at discharge? Yes   Did the patient receive a copy of their discharge instructions? Yes   Nursing interventions Reviewed instructions with patient   What is the patient's perception of their health status since discharge? Returned to baseline/stable   Nursing interventions Nurse provided patient education   Is the patient able to teach back FAST for Stroke? Yes   Is the patient/caregiver able to teach back the risk factors for a stroke? High blood pressure-goal below 120/80,  Diabetes   Is the patient/caregiver able to teach back signs and symptoms related to disease process for when to call PCP? Yes   Is the patient/caregiver able to teach back the hierarchy of who to call/visit for symptoms/problems? PCP, Specialist, Home health nurse, Urgent Care, ED, 911 Yes   Week 2 call completed? Yes          Violet Douglas RN

## 2022-02-08 NOTE — OUTREACH NOTE
Stroke Week 3 Survey      Responses   [facility] patient discharged from? [place]   Does the patient have one of the following disease processes/diagnoses(primary or secondary)? Stroke (TIA)   Week 3 attempt successful? Yes   Call start time 1607   Call end time 1619   General alerts for this patient Dialysis M W F   Discharge diagnosis Intracranial hemorrhage, fall   Is patient permission given to speak with other caregiver? Yes   Person spoke with today (if not patient) and relationship    Medication alerts for this patient Aspirin and eliquis on hold for 2 weeks from discharge.  to check with physician for OK to resume tomorrow.    Meds reviewed with patient/caregiver? Yes   Is the patient taking all medications as directed (includes completed medication regime)? Yes   Comments regarding appointments Wound care 2/10, endocrinology 2/11   Does the patient have a primary care provider?  Yes   Has the patient kept scheduled appointments due by today? Yes   What [facility] the [facility] agency?  [facility]    Has home health visited the patient within 72 hours of discharge? Yes   Psychosocial issues? No   Does the patient require any assistance with activities of daily living such as eating, bathing, dressing, walking, etc.? No   Does the patient have any residual symptoms from stroke/TIA? No   Does the patient understand the diet ordered at discharge? Yes   Did the patient receive a copy of their discharge instructions? Yes   Nursing interventions Reviewed instructions with patient   What is the patient's perception of their health status since discharge? Returned to baseline/stable   Is the patient able to teach back FAST for Stroke? Yes   Is the patient/caregiver able to teach back the risk factors for a stroke? High blood pressure-goal below 120/80,  Diabetes   Is the patient/caregiver able to teach back signs and symptoms related to disease process for when to call PCP? Yes   Is the patient/caregiver  able to teach back signs and symptoms related to disease process for when to call 911? Yes   If the patient is a current smoker, are they able to teach back resources for cessation? Not a smoker   Is the patient/caregiver able to teach back the hierarchy of who to call/visit for symptoms/problems? PCP, Specialist, Home health nurse, Urgent Care, ED, 911 Yes   Week 3 call completed? Yes          Risa Tellez RN

## 2022-02-10 NOTE — PROGRESS NOTES
"Chief Complaint  Chief Complaint   Patient presents with   • Diabetes     TYPE 2    • Hypoglycemia   • Peripheral Neuropathy       Subjective          History of Present Illness    Maribeth Rendon 71 y.o. presents with Type 2 dm as a f/U    Type 2 dm - Diagnosed about 10 + years ago.   Today in clinic pt reports being on tresiba 8 units in the am.   FBG - 100 - 140  Pre meals - 120 - 160   Checks BG - 1x daily  Sensor - x  Dm retinopathy -yes  ,Last eye exam - 1 year ago  Dm nephropathy - ESRD - on HD  Dm neuropathy - Yes, does have open wound on the right heel - seeing wound care of this.   CAD - yes  CVA - yes  Episodes of hypoglycemia - none in the last few weeks.   Pt is physically active. weight has been stable.   Pt tries to follow DM diet for most part.     Hypothyrpoidism - on levothyroxine 50 mcg oral daily.     #Patient fell down recently when she was trying to get ready for dialysis, she felt dizzy at that time. No low blood sugar recorded at that time      Reviewed primary care physician's/consulting physician documentation and lab results         I have reviewed the patient's allergies, medicines, past medical hx, family hx and social hx in detail.    Objective   Vital Signs:   /85   Pulse 89   Resp 20   Ht 162.6 cm (64\")   Wt 77.1 kg (170 lb)   LMP  (LMP Unknown)   SpO2 100%   BMI 29.18 kg/m²   Physical Exam   General appearance - no distress  Eyes- anicteric sclera  Ear nose and throat-external ears and nose normal.    Respiratory-normal chest on inspection.  No respiratory distress noted.  Skin-no rashes.  Neuro-alert and oriented x3            Result Review :   The following data was reviewed by: Jose Florentino MD on 02/10/2022:  Admission on 01/21/2022, Discharged on 01/23/2022   Component Date Value Ref Range Status   • Glucose 01/21/2022 325* 65 - 99 mg/dL Final   • BUN 01/21/2022 31* 8 - 23 mg/dL Final   • Creatinine 01/21/2022 6.47* 0.57 - 1.00 mg/dL Final   • Sodium 01/21/2022 139  " 136 - 145 mmol/L Final   • Potassium 01/21/2022 4.0  3.5 - 5.2 mmol/L Final   • Chloride 01/21/2022 97* 98 - 107 mmol/L Final   • CO2 01/21/2022 30.7* 22.0 - 29.0 mmol/L Final   • Calcium 01/21/2022 8.7  8.6 - 10.5 mg/dL Final   • Total Protein 01/21/2022 6.9  6.0 - 8.5 g/dL Final   • Albumin 01/21/2022 3.00* 3.50 - 5.20 g/dL Final   • ALT (SGPT) 01/21/2022 10  1 - 33 U/L Final   • AST (SGOT) 01/21/2022 13  1 - 32 U/L Final   • Alkaline Phosphatase 01/21/2022 112  39 - 117 U/L Final   • Total Bilirubin 01/21/2022 0.2  0.0 - 1.2 mg/dL Final   • eGFR Non African Amer 01/21/2022 6* >60 mL/min/1.73 Final    <15 Indicative of kidney failure.   • eGFR   Amer 01/21/2022    Final    <15 Indicative of kidney failure.   • Globulin 01/21/2022 3.9  gm/dL Final   • A/G Ratio 01/21/2022 0.8  g/dL Final   • BUN/Creatinine Ratio 01/21/2022 4.8* 7.0 - 25.0 Final   • Anion Gap 01/21/2022 11.3  5.0 - 15.0 mmol/L Final   • PTT 01/21/2022 29.7  22.7 - 35.4 seconds Final   • Protime 01/21/2022 13.6  11.7 - 14.2 Seconds Final   • INR 01/21/2022 1.06  0.90 - 1.10 Final   • WBC 01/21/2022 8.81  3.40 - 10.80 10*3/mm3 Final   • RBC 01/21/2022 3.43* 3.77 - 5.28 10*6/mm3 Final   • Hemoglobin 01/21/2022 10.6* 12.0 - 15.9 g/dL Final   • Hematocrit 01/21/2022 34.2  34.0 - 46.6 % Final   • MCV 01/21/2022 99.7* 79.0 - 97.0 fL Final   • MCH 01/21/2022 30.9  26.6 - 33.0 pg Final   • MCHC 01/21/2022 31.0* 31.5 - 35.7 g/dL Final   • RDW 01/21/2022 15.1  12.3 - 15.4 % Final   • RDW-SD 01/21/2022 54.2* 37.0 - 54.0 fl Final   • MPV 01/21/2022 11.0  6.0 - 12.0 fL Final   • Platelets 01/21/2022 184  140 - 450 10*3/mm3 Final   • Neutrophil % 01/21/2022 69.7  42.7 - 76.0 % Final   • Lymphocyte % 01/21/2022 16.5* 19.6 - 45.3 % Final   • Monocyte % 01/21/2022 8.7  5.0 - 12.0 % Final   • Eosinophil % 01/21/2022 3.9  0.3 - 6.2 % Final   • Basophil % 01/21/2022 0.7  0.0 - 1.5 % Final   • Immature Grans % 01/21/2022 0.5  0.0 - 0.5 % Final   • Neutrophils,  Absolute 01/21/2022 6.15  1.70 - 7.00 10*3/mm3 Final   • Lymphocytes, Absolute 01/21/2022 1.45  0.70 - 3.10 10*3/mm3 Final   • Monocytes, Absolute 01/21/2022 0.77  0.10 - 0.90 10*3/mm3 Final   • Eosinophils, Absolute 01/21/2022 0.34  0.00 - 0.40 10*3/mm3 Final   • Basophils, Absolute 01/21/2022 0.06  0.00 - 0.20 10*3/mm3 Final   • Immature Grans, Absolute 01/21/2022 0.04  0.00 - 0.05 10*3/mm3 Final   • nRBC 01/21/2022 0.0  0.0 - 0.2 /100 WBC Final   • Extra Tube 01/21/2022 Hold for add-ons.   Final    Auto resulted.   • Extra Tube 01/21/2022 hold for add-on   Final    Auto resulted   • Extra Tube 01/21/2022 Hold for add-ons.   Final    Auto resulted.   • Extra Tube 01/21/2022 hold for add-on   Final    Auto resulted   • COVID19 01/21/2022 Not Detected  Not Detected - Ref. Range Final   • Glucose 01/21/2022 234* 70 - 130 mg/dL Final    Meter: [id number] : [id number] Tyrone DE LA CRUZ   • Glucose 01/21/2022 183* 70 - 130 mg/dL Final    Meter: [id number] : [id number] August Lo RN   • Glucose 01/21/2022 106  70 - 130 mg/dL Final    Meter: [id number] : [id number] Lisette Lamar RN   • Hemoglobin A1C 01/22/2022 8.21* 4.80 - 5.60 % Final   • Total Cholesterol 01/22/2022 112  0 - 200 mg/dL Final   • Triglycerides 01/22/2022 178* 0 - 150 mg/dL Final   • HDL Cholesterol 01/22/2022 33* 40 - 60 mg/dL Final   • LDL Cholesterol  01/22/2022 49  0 - 100 mg/dL Final   • VLDL Cholesterol 01/22/2022 30  5 - 40 mg/dL Final   • LDL/HDL Ratio 01/22/2022 1.32   Final   • Glucose 01/21/2022 200* 70 - 130 mg/dL Final    Meter: [id number] : [id number] Keven Packer ALVERTO   • Glucose 01/22/2022 61* 70 - 130 mg/dL Final    Meter: [id number] : [id number] Keven Packer ALVERTO   • Glucose 01/22/2022 125  70 - 130 mg/dL Final    Meter: [id number] : [id number] Kirby MEYER   • Glucose 01/22/2022 201* 65 - 99 mg/dL Final   • BUN 01/22/2022 19  8 - 23 mg/dL Final   • Creatinine 01/22/2022 3.86* 0.57 - 1.00 mg/dL  Final   • Sodium 01/22/2022 136  136 - 145 mmol/L Final   • Potassium 01/22/2022 4.4  3.5 - 5.2 mmol/L Final   • Chloride 01/22/2022 99  98 - 107 mmol/L Final   • CO2 01/22/2022 27.4  22.0 - 29.0 mmol/L Final   • Calcium 01/22/2022 7.8* 8.6 - 10.5 mg/dL Final   • Albumin 01/22/2022 2.70* 3.50 - 5.20 g/dL Final   • Phosphorus 01/22/2022 3.8  2.5 - 4.5 mg/dL Final   • Anion Gap 01/22/2022 9.6  5.0 - 15.0 mmol/L Final   • BUN/Creatinine Ratio 01/22/2022 4.9* 7.0 - 25.0 Final   • eGFR Non African Amer 01/22/2022 12* >60 mL/min/1.73 Final    <15 Indicative of kidney failure.   • eGFR   Amer 01/22/2022    Final    <15 Indicative of kidney failure.   • Glucose 01/22/2022 139* 70 - 130 mg/dL Final    Meter: [id number] : [id number] Shruthi aLmar RN   • Glucose 01/22/2022 193* 70 - 130 mg/dL Final    Meter: [id number] : [id number] Franny MEYER   • Glucose 01/23/2022 118* 65 - 99 mg/dL Final   • BUN 01/23/2022 22  8 - 23 mg/dL Final   • Creatinine 01/23/2022 4.57* 0.57 - 1.00 mg/dL Final   • Sodium 01/23/2022 137  136 - 145 mmol/L Final   • Potassium 01/23/2022 4.8  3.5 - 5.2 mmol/L Final    Slight hemolysis detected by analyzer. Results may be affected.   • Chloride 01/23/2022 102  98 - 107 mmol/L Final   • CO2 01/23/2022 25.9  22.0 - 29.0 mmol/L Final   • Calcium 01/23/2022 7.9* 8.6 - 10.5 mg/dL Final   • Albumin 01/23/2022 2.70* 3.50 - 5.20 g/dL Final   • Phosphorus 01/23/2022 5.1* 2.5 - 4.5 mg/dL Final   • Anion Gap 01/23/2022 9.1  5.0 - 15.0 mmol/L Final   • BUN/Creatinine Ratio 01/23/2022 4.8* 7.0 - 25.0 Final   • eGFR Non African Amer 01/23/2022 9* >60 mL/min/1.73 Final    <15 Indicative of kidney failure.   • eGFR   Amer 01/23/2022    Final    <15 Indicative of kidney failure.   • WBC 01/23/2022 7.88  3.40 - 10.80 10*3/mm3 Final   • RBC 01/23/2022 3.18* 3.77 - 5.28 10*6/mm3 Final   • Hemoglobin 01/23/2022 9.7* 12.0 - 15.9 g/dL Final   • Hematocrit 01/23/2022 30.5* 34.0 - 46.6 % Final   •  MCV 01/23/2022 95.9  79.0 - 97.0 fL Final   • MCH 01/23/2022 30.5  26.6 - 33.0 pg Final   • MCHC 01/23/2022 31.8  31.5 - 35.7 g/dL Final   • RDW 01/23/2022 14.8  12.3 - 15.4 % Final   • RDW-SD 01/23/2022 50.8  37.0 - 54.0 fl Final   • MPV 01/23/2022 11.1  6.0 - 12.0 fL Final   • Platelets 01/23/2022 192  140 - 450 10*3/mm3 Final   • Neutrophil % 01/23/2022 63.8  42.7 - 76.0 % Final   • Lymphocyte % 01/23/2022 20.2  19.6 - 45.3 % Final   • Monocyte % 01/23/2022 11.4  5.0 - 12.0 % Final   • Eosinophil % 01/23/2022 3.3  0.3 - 6.2 % Final   • Basophil % 01/23/2022 0.8  0.0 - 1.5 % Final   • Immature Grans % 01/23/2022 0.5  0.0 - 0.5 % Final   • Neutrophils, Absolute 01/23/2022 5.03  1.70 - 7.00 10*3/mm3 Final   • Lymphocytes, Absolute 01/23/2022 1.59  0.70 - 3.10 10*3/mm3 Final   • Monocytes, Absolute 01/23/2022 0.90  0.10 - 0.90 10*3/mm3 Final   • Eosinophils, Absolute 01/23/2022 0.26  0.00 - 0.40 10*3/mm3 Final   • Basophils, Absolute 01/23/2022 0.06  0.00 - 0.20 10*3/mm3 Final   • Immature Grans, Absolute 01/23/2022 0.04  0.00 - 0.05 10*3/mm3 Final   • nRBC 01/23/2022 0.1  0.0 - 0.2 /100 WBC Final   • Glucose 01/23/2022 175* 70 - 130 mg/dL Final    Meter: [id number] : [id number] Franny MEYER   • Glucose 01/23/2022 114  70 - 130 mg/dL Final    Meter: [id number] : [id number] Franny Vásquez NA   • Glucose 01/23/2022 326* 70 - 130 mg/dL Final    Meter: [id number] : [id number] Stephen Perales CNA     Data reviewed: PCP notes       Results Review:    I reviewed the patient's new clinical results.     Assessment and Plan    Problem List Items Addressed This Visit        Other    Type 2 diabetes mellitus (HCC) - Primary    Relevant Medications    insulin degludec (Tresiba FlexTouch) 100 UNIT/ML solution pen-injector injection    Other Relevant Orders    Basic Metabolic Panel    Hemoglobin A1c    Lipid Panel    TSH    T4, Free      Other Visit Diagnoses     Diabetic ulcer of right heel associated with type  "2 diabetes mellitus, with muscle involvement without evidence of necrosis (HCC)        Relevant Medications    insulin degludec (Tresiba FlexTouch) 100 UNIT/ML solution pen-injector injection    Other Relevant Orders    Basic Metabolic Panel    Hemoglobin A1c    Lipid Panel    TSH    T4, Free    Acquired hypothyroidism        Relevant Orders    Basic Metabolic Panel    Hemoglobin A1c    Lipid Panel    TSH    T4, Free    Hemodialysis status (HCC)        Relevant Orders    Basic Metabolic Panel    Hemoglobin A1c    Lipid Panel    TSH    T4, Free        Type 2 diabetes mellitus-uncontrolled with hyperglycemia  HbA1c is not at goal  Increase Tresiba to 20 units daily  Please continuous glucose monitoring on the patient to further assess the blood glucose trends and make further changes to the regimen.    Hypothyroidism  Continue levothyroxine 50 mcg oral daily.      Interpreted the blood work-up/imaging results performed by the primary care/consulting physician -    Refills sent to pharmacy    Follow Up     Patient was given instructions and counseling regarding her condition or for health maintenance advice. Please see specific information pulled into the AVS if appropriate.       Thank you for asking me to see your patient, Maribeth Rendon in consultation.         Jose Florentino MD  02/10/22      EMR Dragon / transcription disclaimer:     \"Dictated utilizing Dragon dictation\".         "

## 2022-02-16 NOTE — OUTREACH NOTE
Stroke Week 4 Survey      Responses   [facility] patient discharged from? [place]   Does the patient have one of the following disease processes/diagnoses(primary or secondary)? Stroke (TIA)   Week 4 attempt successful? No          Flower Mckinney RN

## 2022-02-21 NOTE — TELEPHONE ENCOUNTER
I called pt and notified her of what Dr. Chaney said.  She said that they don't  phone numbers that they don't know of it is just the phone number and no descirption.  I gave her the phone number to Dr. Mcfadden's office she previously had an appointment on 2/1 and she didn't know about it.  She also wanted me to let you know she thinks she has a kidney infection and was wanting to know if you could send in something for her she is having urinating frequency.    Patient is having urinary frequency.  She reports her sugars are not running high.  I did advise we would give her an antibiotic.  I am going to give her some cephalexin just 500 mg once daily because of her dialysis.   Keystone Flap Text: The defect edges were debeveled with a #15 scalpel blade.  Given the location of the defect, shape of the defect a keystone flap was deemed most appropriate.  Using a sterile surgical marker, an appropriate keystone flap was drawn incorporating the defect, outlining the appropriate donor tissue and placing the expected incisions within the relaxed skin tension lines where possible. The area thus outlined was incised deep to adipose tissue with a #15 scalpel blade.  The skin margins were undermined to an appropriate distance in all directions around the primary defect and laterally outward around the flap utilizing iris scissors.

## 2022-02-21 NOTE — TELEPHONE ENCOUNTER
Caller: Maribeth Rendon    Relationship: Self    Best call back number: [phone]    What is the medical concern/diagnosis: PROBLEMS WITH GALLBLADDER - PATIENT WAS TOLD HER GALLBLADDER WAS FULL OF STONES. THE PATIENT EXPERIENCES BOTH NAUSEA AND DIARRHEA ON A CONSISTENT BASIS.     What specialty or service is being requested: WHOEVER DR. PERALES RECOMMENDS.

## 2022-03-10 NOTE — TELEPHONE ENCOUNTER
PATIENT IS REQUESTING A CALL BACK FROM DR. PERALES TO DISCUSS HER GETTING SOME PHYSICAL THERAPY.    PATIENT> [phone]

## 2022-03-11 NOTE — TELEPHONE ENCOUNTER
Patient said she called yesterday and is needing a order put in for [facility] so that she can get started with PT she just got out of hospital she said she need 2 or more days with them if you have any question please call patient on cell

## 2022-03-11 NOTE — OUTREACH NOTE
Prep Survey    Flowsheet Row Responses   [facility] discharged from? [facility]   Is LACE score < 7 ? Non-BH Discharge   Emergency Room discharge w/ pulse ox? No   Eligibility TCM   [facility]   Date of Discharge 03/10/22   Discharge diagnosis Unavailable    Does the patient have one of the following disease processes/diagnoses(primary or secondary)? Non-BH Discharge   Prep survey completed? Yes          ANDREW MURILLO - Registered Nurse

## 2022-03-11 NOTE — OUTREACH NOTE
Call Center TCM Note    Flowsheet Row Responses   [facility] patient discharged from? [facility]   Does the patient have one of the following disease processes/diagnoses(primary or secondary)? Non-BH Discharge   TCM attempt successful? No  [Spouse, Daksha, daughter]   Unsuccessful attempts Attempt 1          Danita Morris RN    3/11/2022, 13:11 EST

## 2022-03-14 NOTE — PROGRESS NOTES
Subjective  Complaint is follow-up from the hospital  Maribeth Rendon is a 71 y.o. female.     History of Present Illness Maribeth is here today for follow-up.  She was seen at the [facility] on February 24.  She was having altered mental status likely attributable to a urinary tract infection.  CT scan of the abdomen did not show any evidence of pyelonephritis.  She was treated with Rocephin for an adequate number of days.  Her medicines were adjusted and I cannot figure why they stopped her long-acting insulin.  I did not see any low blood sugar reactions but I do not have access to the fingerstick glucose monitoring.  They put her on a sliding scale Humalog insulin.  I did advise him to check with their endocrinologist regarding what to do with the insulin dosing.  They also stopped a number of other medications.  She is seeing a vascular surgeon about some ischemic fingers.  They think they need to take her shunt down and put in a temporary 1.    The following portions of the patient's history were reviewed and updated as appropriate: allergies, current medications, past family history, past medical history, past social history, past surgical history and problem list.    Review of Systems   Constitutional: Negative for chills and fever.   Gastrointestinal: Positive for diarrhea.        She has been having some diarrhea that is interfering with her dialysis.   Neurological:        Today she is not experiencing any confusion       Objective   Physical Exam  Vitals and nursing note reviewed.   Cardiovascular:      Rate and Rhythm: Normal rate and regular rhythm.   Pulmonary:      Effort: Pulmonary effort is normal.      Breath sounds: No wheezing, rhonchi or rales.   Abdominal:      General: Bowel sounds are normal.   Musculoskeletal:      Comments: She has very minimal edema.   Neurological:      Mental Status: She is alert.           Assessment/Plan   Diagnoses and all orders for this visit:    1. Acute cystitis  without hematuria (Primary)    2. Mental status change resolved    3. Ischemia of hand    4. Diarrhea, unspecified type      Maribeth is here today for follow-up.  She seems back to baseline mentally.  She is having some diarrhea that may be diabetic related.  I did advise that she can try using a little bit of Imodium on dialysis days and see if that will help.  She is going to be seeing a vascular surgeon about the digital ischemia.

## 2022-03-17 NOTE — TELEPHONE ENCOUNTER
Caller: Maribeth Rendon    Relationship to patient: Self    Best call back number: [phone]    Patient is needing: PATIENT IS CALLING TO CHECK THE STATUS OF A REFERRAL FOR [facility].  SHE STATES SHE CHECKED WITH [facility] AND THEY TOLD HER THEY HAVE NOT RECEIVED THE ORDER.  SHE STATES IF SHE HAS TO CALL BACK ON Monday, SHE WILL NOT BE HAPPY.    PLEASE ADVISE.

## 2022-03-24 NOTE — TELEPHONE ENCOUNTER
Pharmacy Name:  [facility] [address] [place] [address] [facility] - [phone]  - [phone]   [phone]    Pharmacy representative name: QUENTIN    Pharmacy representative phone number: [phone]    What medication are you calling in regards to: HYDROcodone-acetaminophen (NORCO) 5-325 MG per tablet    What question does the pharmacy have: MEDICATION WAS SENT IN WITHOUT A DIAGNOSIS.  THEY NEED THAT NOTED ON THE PATIENT PROFILE BEFORE IT CAN BE DISPENSED.     Who is the provider that prescribed the medication: DR PERALES    Additional notes: PLEASE CALL AND ADVISE    Diagnosis was given has ischemic hand pain

## 2022-03-24 NOTE — TELEPHONE ENCOUNTER
Caller: Maribeth Rendon    Relationship: Self    Best call back number: [phone]  What medication are you requesting: PAIN MEDICATION      Have you had these symptoms before:    [x] Yes  [] No    Have you been treated for these symptoms before:   [x] Yes  [] No    If a prescription is needed, what is your preferred pharmacy and phone number:    [facility] [address] [place], [place] [id number] [facility] [phone] [place] [phone]   [phone] [address] [place] [id number] [facility] [phone] [place] [phone]   [phone]        PATIENT IS HAVING PAIN AND IS WANTING SOMETHING CALLED IN, HAVING PAIN IN HER HANDS. UNABLE TO GET IN WITH DR. PERALES UNTIL THE END OF THE MONTH    The patient is having pain from digital ischemia.  Tylenol is not helping.  I did advise that I could prescribe something but with her chronic kidney disease is it is going to hang around longer than normal.  I did advise these medicines can cause respiratory depression and death.  She would still like to try something.

## 2022-04-11 NOTE — PROGRESS NOTES
Subjective   History of Present Illness: Maribeth Rendon is a 71 y.o. female is here today for hospital follow up on putaminal hemorrhage with new CT done 2/15/22.  She is doing well today.  She reports she has had right-sided weakness since her hemorrhage in January.  She reports that her arm strength is back to near normal.  She reports that she is now able to walk without assistance for short distances however gets tired easily and still has some right leg weakness.  She reports that uses a wheelchair for longer distances.  She denies any sensory abnormalities.  Denies any headaches.  Denies any visual changes.  She reports that she has been taking her Eliquis.  She is continuing to do physical therapy.    You have chosen to receive care through a telephone visit. Do you consent to use a telephone visit for your medical care today? Yes      History of Present Illness    The following portions of the patient's history were reviewed and updated as appropriate: allergies, past family history, past medical history, past social history, past surgical history and problem list.    Past Medical History:   Diagnosis Date   • Achilles tendon tear     left    • Acute pulmonary edema ([facility]) 5/6/2021   • Anemia    • Anxiety    • CKD (chronic kidney disease) stage 5, GFR less than 15 ml/min ([facility]) 1/27/2020   • Depression    • Diabetes mellitus, type 2 ([facility])    • ESRD (end stage renal disease) ([facility])     HAS LEFT FOREARM FISTULA   • GERD (gastroesophageal reflux disease)    • Hemorrhagic stroke ([facility]) 9/18/2020   • History of CVA (cerebrovascular accident) 7/1/2021   • History of MRSA infection 03/15/2016    ABDOMINAL WOUND,   INFECTION CONTROLL NOTIFIED 2-6-2017   • History of transfusion    • Hyperlipidemia    • Hypertension    • Hypokalemia    • Hypomagnesemia    • Hypoxic 01/2016    WHEN SHE HAD PNEUMONIA   • Intertrigo    • Leukocytosis    • Osteoarthritis    • Pneumonia 01/2016   • Psoriasis    • Psoriatic arthritis ([facility])    •  Seizures ([facility])     one time   • Sepsis (HCC) 01/2016   • SOB (shortness of breath)    • Stage 4 chronic renal impairment associated with type 2 diabetes mellitus ([facility])    • Staph infection     HX RIGHT FOOT AT [facility] 01/09/2010   • Streptococcal bacteremia    • Stroke (cerebrum) ([facility])    • Stroke (HCC)    • Swelling of hand 10/27/2016    LEFT HAND SWELLIMG SINCE LEFT FISTULA SURGERY   • Tremor, essential    • Wears glasses    • Wheelchair dependent     For mobility        Past Surgical History:   Procedure Laterality Date   • ABDOMINAL WALL ABSCESS INCISION AND DRAINAGE  2016   • ARTERIOVENOUS FISTULA/SHUNT SURGERY Left 10/27/2016    Procedure: LT FOREARM FISTULA;  Surgeon: Lorelei Haque Jr., MD;  Location: [facility] MAIN OR;  Service:    • ARTERIOVENOUS FISTULA/SHUNT SURGERY Left 2/16/2017    Procedure: LT BRACHIAL CEPHALIC WITH FISTULA LIGATION RADIAL CEPHALIC.;  Surgeon: Gurmeet Carver MD;  Location: [facility] MAIN OR;  Service:    • CARDIAC CATHETERIZATION N/A 7/26/2019    Procedure: Left Heart Cath;  Surgeon: Eddie Hodges MD;  Location: [facility] CATH INVASIVE LOCATION;  Service: Cardiology   • CARDIAC CATHETERIZATION N/A 7/26/2019    Procedure: Coronary angiography;  Surgeon: Eddie Hodges MD;  Location: [facility] CATH INVASIVE LOCATION;  Service: Cardiology   • CARDIAC SURGERY     • CATARACT EXTRACTION W/ INTRAOCULAR LENS IMPLANT Bilateral 2011   • COLONOSCOPY N/A 7/4/2021    Procedure: COLONOSCOPY into cecum/terminal ileum;  Surgeon: Purvi Grant MD;  Location: [facility] ENDOSCOPY;  Service: Gastroenterology;  Laterality: N/A;  hemorrhoids, diverticulosis   • CORONARY ARTERY BYPASS GRAFT N/A 7/29/2019    Procedure: INTRAOP ERNESTO; CORONARY ARTERY BYPASS GRAFTING X 4 WITH LEFT INTERNAL MAMMARY ARTERY GRAFT AND UTILIZING ENDOSCOPICALLY HARVESTED GREATER SAPHENOUS VEIN; PRP;  Surgeon: Gordo Ferreira MD;  Location: [facility] MAIN OR;  Service: Cardiothoracic   • CORONARY ARTERY BYPASS  GRAFT     • DILATATION AND CURETTAGE  1991   • ENDOSCOPY N/A 7/1/2021    Procedure: ESOPHAGOGASTRODUODENOSCOPY WITH BX'S;  Surgeon: Azam Kat MD;  Location: [facility] ENDOSCOPY;  Service: Gastroenterology;  Laterality: N/A;  pre: ANEMIA, MELENA  post: ESOPHAGITIS, GASTRITIS, BILE REFLUX, BUT NO OBVIOUS SOURCE OF BLEEDING   • EXCISION MASS TRUNK N/A 3/22/2016    Procedure: I&D LOWER ABDOMINAL  WOUND;  Surgeon: Tru Yi MD;  Location: [facility] MAIN OR;  Service:    • FOOT SURGERY Right 2010    REMOVED BONE IN RIGHT GREAT TOE   • HYSTERECTOMY  1992          Current Outpatient Medications:   •  Biotin 300 MCG tablet, Take 1,000 mcg by mouth., Disp: , Rfl:   •  carvedilol (COREG) 3.125 MG tablet, Take 3.125 mg by mouth 2 (Two) Times a Day With Meals., Disp: , Rfl:   •  gabapentin (NEURONTIN) 300 MG capsule, Take 1 capsule by mouth 2 (two) times a day., Disp: 180 capsule, Rfl: 1  •  insulin degludec (Tresiba FlexTouch) 100 UNIT/ML solution pen-injector injection, Inject 10 Units under the skin into the appropriate area as directed Daily., Disp: 15 mL, Rfl: 3  •  levothyroxine (SYNTHROID, LEVOTHROID) 50 MCG tablet, Take 1 tablet by mouth Daily., Disp: 30 tablet, Rfl: 11  •  lidocaine-prilocaine (EMLA) 2.5-2.5 % cream, , Disp: , Rfl:   •  montelukast (SINGULAIR) 10 MG tablet, Take 1 tablet by mouth Every Night., Disp: 90 tablet, Rfl: 1  •  Multiple Vitamin (MULTI VITAMIN DAILY PO), Take 3 tablets by mouth Every 7 (Seven) Days., Disp: , Rfl:   •  ondansetron (Zofran) 4 MG tablet, Take 1 tablet by mouth Every 8 (Eight) Hours As Needed for Nausea or Vomiting., Disp: 12 tablet, Rfl: 1  •  Probiotic Product (PROBIOTIC DAILY PO), Take 1 capsule by mouth Daily., Disp: , Rfl:   •  sertraline (ZOLOFT) 50 MG tablet, Take 50 mg by mouth Daily., Disp: , Rfl:   •  topiramate (TOPAMAX) 100 MG tablet, Take 100 mg by mouth Daily., Disp: , Rfl:   •  cephalexin (Keflex) 500 MG capsule, Take 1 capsule by mouth Daily., Disp: 5  capsule, Rfl: 0  •  HYDROcodone-acetaminophen (NORCO) 5-325 MG per tablet, Take 1 tablet by mouth Every 8 (Eight) Hours As Needed for Moderate Pain  or Severe Pain ., Disp: 12 tablet, Rfl: 0     Allergies   Allergen Reactions   • Prednisone Hallucinations        Social History     Socioeconomic History   • Marital status:      Spouse name: Tru   • Number of children: 4   • Years of education: 11   • Highest education level: 11th grade   Tobacco Use   • Smoking status: Former Smoker     Packs/day: 2.00     Years: 26.00     Pack years: 52.00     Types: Cigarettes     Quit date: 10/21/1992     Years since quittin.4   • Smokeless tobacco: Never Used   • Tobacco comment: quit    Vaping Use   • Vaping Use: Never used   Substance and Sexual Activity   • Alcohol use: Not Currently     Alcohol/week: 0.0 standard drinks   • Drug use: No   • Sexual activity: Defer     Birth control/protection: Surgical        Family History   Problem Relation Age of Onset   • Heart attack Mother    • Heart disease Mother    • Kidney disease Mother    • Heart attack Father    • Heart disease Father    • Hypertension Father    • Stroke Father         ISCHEMIC   • Diabetes Father         TYPE 2   • Kidney disease Brother    • Diabetes Brother         TYPE 1   • Thyroid disease Daughter    • Anxiety disorder Maternal Aunt    • Bipolar disorder Maternal Aunt    • Depression Maternal Aunt    • Lupus Maternal Aunt         LUPUS ANTICOAGLULANT   • Rheum arthritis Maternal Aunt    • Alcohol abuse Maternal Uncle    • Other Maternal Uncle         PULMONARY DISEASE        Review of Systems   Eyes: Negative for visual disturbance.   Neurological: Negative for dizziness, seizures, speech difficulty and light-headedness.       Objective     There were no vitals filed for this visit.  There is no height or weight on file to calculate BMI.      Physical Exam  Neurologic Exam  No physical exam because this was a telephone  visit      Assessment/Plan   Independent Review of Radiographic Studies:      I personally reviewed the images from the following studies.  CT head without contrast from February 15, 2022  The CT head shows a 6 mm hyperdensity within the left basal ganglia.  This most likely represents calcifications.    Medical Decision Makin-year-old female seen in the hospital on 2022 after a ground-level fall with a left basal ganglia hemorrhage  -She reports that she still has some residual right-sided weakness.  She reports her arm strength has returned to near normal and her right leg strength is continuing to improve.  She still has to use a wheelchair for traveling longer distances, but is able to walk shorter distances.  I recommended that she continue with physical therapy  -The repeat CT head from February 15, 2022 shows no evidence of new hemorrhage.  Dr. Hilliard with neuroradiology has recommended an MRI with and without contrast to further evaluate for any underlying lesions.  She is unable to have contrast due to being on dialysis.  I will plan to obtain a MRI brain without contrast and have her follow-up in 4 to 6 weeks after the MRI is available.  -She restarted her Eliquis 2 weeks after the fall.    Diagnoses and all orders for this visit:    1. Intracranial hemorrhage (HCC) (Primary)  -     MRI Brain Without Contrast; Future      Return in about 4 weeks (around 2022).    This was a telephone visit we spoke for 10 minutes.

## 2022-04-15 NOTE — TELEPHONE ENCOUNTER
Caller: [facility]    Relationship: Home Health    Best call back number:[phone]    What form or medical record are you requesting: OFFICE SUMMARY NOTES FROM 3/14/22     Who is requesting this form or medical record from you: [facility] NURSE    How would you like to receive the form or medical records (pick-up, mail, fax): FAX    If fax, what is the fax number: [phone]    Timeframe paperwork needed: ASAP    Additional notes: HOME HEALTH NURSE IS REQUESTING A COPY OF PATIENT'S OFFICE SUMMARY NOTES FROM 3/14/2022. PLEASE MAKE SURE DR. PERALES SIGNS THE BOTTOM OF THE VISIT NOTES BEFORE FAXING.

## 2022-04-28 NOTE — TELEPHONE ENCOUNTER
KAREN WITH [facility] CALLED TO REQUEST AN ORDER FOR A WHEELCHAIR TO BE SENT TO [facility],  [phone] ATTN: VERNA

## 2022-05-05 NOTE — PROGRESS NOTES
Cortez Rendon is a 71 y.o. female.  Chief complaint is checkup on blood pressure but also needs a wheelchair    History of Present Illness and is here today for checkup.  I did recheck her blood pressure today at 136/68.  I rechecked her heart rate at 64.  She was recently hospitalized.  She had an osteomyelitis of her left index finger.  This eventually required amputation.  Cultures of the wound and cultures of the blood did show E. coli.  She did complete a course of cefdinir as an outpatient.  During the course of the hospitalization they did do a hemoglobin A1c and that was 7.2.  She is requesting a wheelchair.  She has chronic congestive heart failure.  Because of this she cannot adequately propel a standard wheelchair without getting short of breath.  A wheelchair would help with her activities of daily living.  Additionally she has considerable weakness secondary to multiple recent hospitalizations as well as prior CVA.  The debility cannot be adequately addressed with a walker or cane.  I believe the patient can safely use a manual wheelchair and that she can maneuver the chair adequately.  She does have a  who can assist the patient with maneuvering.  The following portions of the patient's history were reviewed and updated as appropriate: allergies, current medications, past family history, past medical history, past social history, past surgical history and problem list.    Review of Systems   Constitutional: Negative for chills and fever.   Respiratory: Positive for cough.        Objective   Physical Exam  Vitals and nursing note reviewed.   Constitutional:       Appearance: Normal appearance.   Cardiovascular:      Rate and Rhythm: Normal rate and regular rhythm.      Comments: There is an occasional ectopic  Pulmonary:      Effort: Pulmonary effort is normal.      Breath sounds: No wheezing, rhonchi or rales.   Musculoskeletal:      Comments: She does have an amputation of the left  index finger at the MCP joint   Neurological:      Mental Status: She is alert.           Assessment/Plan   Diagnoses and all orders for this visit:    1. Osteomyelitis of finger of left hand (HCC) (Primary)    2. Amputation of left index finger    3. DM type 2 with diabetic peripheral neuropathy (HCC)    4. Dependence on wheelchair      Maribeth is here today for follow-up.  She seems to be doing fairly well since her hospitalization.  Her A1c was 7.2 which is better than it was 3 months ago.  We will see her back in 3 months.  Her history of congestive heart failure, prior CVA, and weakness secondary to chronic diabetes and peripheral neuropathy should qualify her for a wheelchair.

## 2022-05-19 NOTE — PROGRESS NOTES
6 MO FOLLOW UP   Subjective:        Maribeth Rendon is a 71 y.o. female who here for follow up    CC  AS  HPI  71-year-old female with atrial fibrillation, systolic congestive heart failure abnormal EKG and aortic stenosis here for the follow-up continues to complains of shortness of breath on exertion no chest pain     Problems Addressed this Visit        Cardiac and Vasculature    Atrial fibrillation (HCC) - Primary    Relevant Orders    Adult Transthoracic Echo Complete W/ Cont if Necessary Per Protocol    Systolic CHF, chronic (HCC)    Abnormal electrocardiogram (ECG) (EKG)     Relevant Orders    Adult Transthoracic Echo Complete W/ Cont if Necessary Per Protocol    Aortic stenosis, moderate      Diagnoses       Codes Comments    Atrial fibrillation, unspecified type (HCC)    -  Primary ICD-10-CM: I48.91  ICD-9-CM: 427.31     Systolic CHF, chronic (HCC)     ICD-10-CM: I50.22  ICD-9-CM: 428.22, 428.0     Abnormal electrocardiogram (ECG) (EKG)      ICD-10-CM: R94.31  ICD-9-CM: 794.31     Aortic stenosis, moderate     ICD-10-CM: I35.0  ICD-9-CM: 424.1         .    The following portions of the patient's history were reviewed and updated as appropriate: allergies, current medications, past family history, past medical history, past social history, past surgical history and problem list.    Past Medical History:   Diagnosis Date   • Achilles tendon tear     left    • Acute pulmonary edema ([facility]) 5/6/2021   • Anemia    • Anxiety    • CKD (chronic kidney disease) stage 5, GFR less than 15 ml/min ([facility]) 1/27/2020   • Depression    • Diabetes mellitus, type 2 ([facility])    • ESRD (end stage renal disease) ([facility])     HAS LEFT FOREARM FISTULA   • GERD (gastroesophageal reflux disease)    • Hemorrhagic stroke ([facility]) 9/18/2020   • History of CVA (cerebrovascular accident) 7/1/2021   • History of MRSA infection 03/15/2016    ABDOMINAL WOUND,   INFECTION CONTROLL NOTIFIED 2-6-2017   • History of transfusion    • Hyperlipidemia    •  "Hypertension    • Hypokalemia    • Hypomagnesemia    • Hypoxic 01/2016    WHEN SHE HAD PNEUMONIA   • Intertrigo    • Leukocytosis    • Osteoarthritis    • Pneumonia 01/2016   • Psoriasis    • Psoriatic arthritis (HCC)    • Seizures (HCC)     one time   • Sepsis (HCC) 01/2016   • SOB (shortness of breath)    • Stage 4 chronic renal impairment associated with type 2 diabetes mellitus (HCC)    • Staph infection     HX RIGHT FOOT AT [facility] 01/09/2010   • Streptococcal bacteremia    • Stroke (cerebrum) (HCC)    • Stroke (HCC)    • Swelling of hand 10/27/2016    LEFT HAND SWELLIMG SINCE LEFT FISTULA SURGERY   • Tremor, essential    • Wears glasses    • Wheelchair dependent     For mobility     reports that she quit smoking about 29 years ago. Her smoking use included cigarettes. She has a 52.00 pack-year smoking history. She has never used smokeless tobacco. She reports previous alcohol use. She reports that she does not use drugs.   Family History   Problem Relation Age of Onset   • Heart attack Mother    • Heart disease Mother    • Kidney disease Mother    • Heart attack Father    • Heart disease Father    • Hypertension Father    • Stroke Father         ISCHEMIC   • Diabetes Father         TYPE 2   • Kidney disease Brother    • Diabetes Brother         TYPE 1   • Thyroid disease Daughter    • Anxiety disorder Maternal Aunt    • Bipolar disorder Maternal Aunt    • Depression Maternal Aunt    • Lupus Maternal Aunt         LUPUS ANTICOAGLULANT   • Rheum arthritis Maternal Aunt    • Alcohol abuse Maternal Uncle    • Other Maternal Uncle         PULMONARY DISEASE       Review of Systems  Constitutional: No wt loss, fever, fatigue  Gastrointestinal: No nausea, abdominal pain  Behavioral/Psych: No insomnia or anxiety   Cardiovascular shortness of breath no chest pain  Objective:       Physical Exam  /67   Pulse 85   Ht 162.6 cm (64\")   LMP  (LMP Unknown)   BMI 29.18 kg/m²   General appearance: No acute changes "   Neck: Trachea midline; NECK, supple, no thyromegaly or lymphadenopathy   Lungs: Normal size and shape, normal breath sounds, equal distribution of air, no rales and rhonchi   CV: S1-S2 regular, no murmurs, no rub, no gallop   Abdomen: Soft, nontender; no masses , no abnormal abdominal sounds   Extremities: No deformity , normal color , no peripheral edema   Skin: Normal temperature, turgor and texture; no rash, ulcers            ECG 12 Lead    Date/Time: 5/19/2022 10:26 AM  Performed by: Eddie Hodges MD  Authorized by: Eddie Hodges MD   Comparison: compared with previous ECG   Similar to previous ECG  Rhythm: sinus rhythm  ST Flattening: all    Clinical impression: non-specific ECG              Echocardiogram:        Current Outpatient Medications:   •  apixaban (ELIQUIS) 5 MG tablet tablet, Take 5 mg by mouth 2 (Two) Times a Day., Disp: , Rfl:   •  Biotin 300 MCG tablet, Take 1,000 mcg by mouth., Disp: , Rfl:   •  carvedilol (COREG) 3.125 MG tablet, Take 3.125 mg by mouth 2 (Two) Times a Day With Meals., Disp: , Rfl:   •  gabapentin (NEURONTIN) 300 MG capsule, Take 1 capsule by mouth 2 (two) times a day., Disp: 180 capsule, Rfl: 1  •  HYDROcodone-acetaminophen (NORCO) 5-325 MG per tablet, Take 1 tablet by mouth Every 8 (Eight) Hours As Needed for Moderate Pain  or Severe Pain ., Disp: 12 tablet, Rfl: 0  •  insulin degludec (Tresiba FlexTouch) 100 UNIT/ML solution pen-injector injection, Inject 10 Units under the skin into the appropriate area as directed Daily., Disp: 15 mL, Rfl: 3  •  levothyroxine (SYNTHROID, LEVOTHROID) 50 MCG tablet, Take 1 tablet by mouth Daily., Disp: 30 tablet, Rfl: 11  •  lidocaine-prilocaine (EMLA) 2.5-2.5 % cream, , Disp: , Rfl:   •  montelukast (SINGULAIR) 10 MG tablet, Take 1 tablet by mouth Every Night., Disp: 90 tablet, Rfl: 1  •  Multiple Vitamin (MULTI VITAMIN DAILY PO), Take 3 tablets by mouth Every 7 (Seven) Days., Disp: , Rfl:   •  ondansetron (Zofran) 4 MG  tablet, Take 1 tablet by mouth Every 8 (Eight) Hours As Needed for Nausea or Vomiting., Disp: 12 tablet, Rfl: 1  •  Probiotic Product (PROBIOTIC DAILY PO), Take 1 capsule by mouth Daily., Disp: , Rfl:   •  sertraline (ZOLOFT) 50 MG tablet, Take 50 mg by mouth Daily., Disp: , Rfl:   •  topiramate (TOPAMAX) 100 MG tablet, Take 100 mg by mouth Daily., Disp: , Rfl:    Assessment:        Patient Active Problem List   Diagnosis   • Menstrual disorder   • Atopic rhinitis   • Anemia in CKD (chronic kidney disease)   • Spasm of cervical paraspinous muscle   • Cervical radiculopathy   • Depression   • DM type 2 with diabetic peripheral neuropathy (HCC)   • Essential hypertension   • Duplay's periarthritis syndrome   • Gastroesophageal reflux disease   • Hyperlipidemia   • Hypertension   • Arthritis   • Seizure disorder (HCC)   • Phlebitis   • Type 2 diabetes mellitus (HCC)   • Vitamin D deficiency   • Chest pain   • Abscess   • Generalized muscle weakness   • Abdominal wall cellulitis   • HTN (hypertension)   • Diabetes mellitus with peripheral autonomic neuropathy (HCC)   • Hyponatremia   • Hypercalcemia   • Dehydration   • Abdominal wall abscess   • Cellulitis of toe of left foot   • Partial Achilles tendon tear, left, initial encounter   • Arthritis of foot   • Essential tremor   • Primary parkinsonism ([facility])   • Abnormal cardiac function test   • 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 pectoris ([facility])   • Atrial fibrillation (HCC)   • Anticoagulated   • CKD (chronic kidney disease) stage 5, GFR less than 15 ml/min ([facility])   • Hypoglycemia   • Low vitamin B12 level   • S/P CABG (coronary artery bypass graft)   • Hypothyroidism (acquired)   • Gastrointestinal hemorrhage with melena   • Systolic CHF, chronic (HCC)   • ESRD (end stage renal disease) ([facility])   • Anemia, chronic disease   • Anemia, posthemorrhagic, acute   • Altered mental state   • Intracranial hemorrhage ([facility])               Plan:             ICD-10-CM ICD-9-CM   1. Atrial fibrillation, unspecified type (HCC)  I48.91 427.31   2. Systolic CHF, chronic (HCC)  I50.22 428.22     428.0   3. Abnormal electrocardiogram (ECG) (EKG)   R94.31 794.31   4. Aortic stenosis, moderate  I35.0 424.1     1. Atrial fibrillation, unspecified type (HCC)  Controlled  - Adult Transthoracic Echo Complete W/ Cont if Necessary Per Protocol; Future    2. Systolic CHF, chronic (HCC)  Compensated    3. Abnormal electrocardiogram (ECG) (EKG)   No angina pectoris  - Adult Transthoracic Echo Complete W/ Cont if Necessary Per Protocol; Future    4. Aortic stenosis, moderate  Considering patient's medical condition as well as the risk factors, patient will require echocardiogram for further evaluation for the LV function, four-chamber evaluation, including the pressures, valvular function and  pericardial disease and pericardial effusion         1 YR WITH ECHO  COUNSELING:    Maribeth Eagle was given to patient for the following topics: diagnostic results, risk factor reductions, impressions, risks and benefits of treatment options and importance of treatment compliance .       SMOKING COUNSELING:    [unfilled]    Dictated using Dragon dictation

## 2022-05-30 PROBLEM — R94.31 ABNORMAL ELECTROCARDIOGRAM (ECG) (EKG): Status: ACTIVE | Noted: 2022-01-01

## 2022-05-30 PROBLEM — I35.0 AORTIC STENOSIS, MODERATE: Status: ACTIVE | Noted: 2022-01-01

## 2022-06-02 NOTE — TELEPHONE ENCOUNTER
Rx Refill Note  Requested Prescriptions     Pending Prescriptions Disp Refills   • apixaban (ELIQUIS) 5 MG tablet tablet 60 tablet      Sig: Take 1 tablet by mouth 2 (Two) Times a Day.      Last office visit with prescribing clinician: 5/5/2022      Next office visit with prescribing clinician: 8/4/2022            Pete Gonzalez MA  06/02/22, 12:47 EDT

## 2022-06-02 NOTE — TELEPHONE ENCOUNTER
Caller: [facility]    Relationship: Other    Best call back number: [phone]    Requested Prescriptions:   Requested Prescriptions     Pending Prescriptions Disp Refills   • apixaban (ELIQUIS) 5 MG tablet tablet 60 tablet      Sig: Take 1 tablet by mouth 2 (Two) Times a Day.        Pharmacy where request should be sent: [facility] [address] [place] [address] [id number] [place] [id number] [facility] [phone] FX     Additional details provided by patient: PATIENTS DAUGHTER STATES SHE IS OUT. PLEASE CALL [facility] AND LET HER KNOW WHEN THIS IS CALLED IN.     Does the patient have less than a 3 day supply:  [x] Yes  [] No    Soraya Cardenas Rep   06/02/22 12:21 EDT

## 2022-06-21 PROBLEM — G93.41 METABOLIC ENCEPHALOPATHY: Status: ACTIVE | Noted: 2022-01-01

## 2022-06-21 NOTE — TELEPHONE ENCOUNTER
[facility]     Best call back number: [phone]    Who are you requesting to speak with (clinical staff, provider,  specific staff member): DR. PERALES    What was the call regarding: AT HOME VISIT BLOOD PRESSURE /130 ON PATIENT BLOOD PRESSURE CUFF COULDN'T GET A SIMRAN READING BECAUSE PATIENT COULDN'T SIT STILL BUT CALLED 911 AND PATIENT ON WAY TO HOSPITAL  PATIENT WAS COMPLAINING OF NAUSEA AND HEADACHE     Do you require a callback: NO

## 2022-06-27 PROBLEM — R54 AGE-RELATED PHYSICAL DEBILITY: Status: ACTIVE | Noted: 2022-01-01

## 2022-06-27 NOTE — OUTREACH NOTE
Prep Survey    Flowsheet Row Responses   [facility] facility patient discharged from? [place]   Is LACE score < 7 ? No   Emergency Room discharge w/ pulse ox? No   Eligibility [facility]   Date of Admission 06/21/22   Date of Discharge 06/27/22   Discharge Disposition Home or Self Care   Discharge diagnosis Metabolic encephalopathy   Does the patient have one of the following disease processes/diagnoses(primary or secondary)? Other   Does the patient have Home health ordered? Yes   What is the Home health agency?  [facility]   Is there a DME ordered? No   Prep survey completed? Yes          JESSICA A - Registered Nurse

## 2022-06-28 NOTE — OUTREACH NOTE
Call Center TCM Note    Flowsheet Row Responses   [facility] patient discharged from? [place]   Does the patient have one of the following disease processes/diagnoses(primary or secondary)? Other   TCM attempt successful? Yes   Call start time 1132   Call end time 1134   Is patient permission given to speak with other caregiver? Yes   List who call center can speak with ONESIMO GILLIS   Person spoke with today (if not patient) and relationship ONESIMO GILLIS-    Meds reviewed with patient/caregiver? Yes   Is the patient having any side effects they believe may be caused by any medication additions or changes? No   Does the patient have all medications ordered at discharge? Yes   Is the patient taking all medications as directed (includes completed medication regime)? Yes   Does the patient have a primary care provider?  Yes   Does the patient have an appointment with their PCP within 7 days of discharge? Greater than 7 days   Comments regarding PCP PATIENT HAS AN APPOINTMENT WITH HER PCP ON 8/4/22.  ENCOURAGED TO SCHEDULE A SOONER APPOINTMENT DURING THIS CALL, BUT  STATES HE PREFERS TO CALL AND SCHEDULE IT HIMSELF.    Nursing Interventions Educated patient on importance of making appointment   Has the patient kept scheduled appointments due by today? N/A   What is the Home health agency?  [facility]   Has home health visited the patient within 72 hours of discharge? Call prior to 72 hours   Psychosocial issues? No   Did the patient receive a copy of their discharge instructions? Yes   Nursing interventions Reviewed instructions with patient   What is the patient's perception of their health status since discharge? Improving   Is the patient/caregiver able to teach back signs and symptoms related to disease process for when to call PCP? Yes   Is the patient/caregiver able to teach back signs and symptoms related to disease process for when to call 911? Yes   Is the patient/caregiver able to teach  back the hierarchy of who to call/visit for symptoms/problems? PCP, Specialist, Home health nurse, Urgent Care, ED, 911 Yes   If the patient is a current smoker, are they able to teach back resources for cessation? Not a smoker   TCM call completed? Yes          Kateryna Dhillon LPN    6/28/2022, 11:44 EDT

## 2024-06-12 NOTE — ANESTHESIA POSTPROCEDURE EVALUATION
"Patient: Maribeth Rendon    Procedure Summary     Date: 07/01/21 Room / Location: [facility] ENDOSCOPY 7 / [facility] ENDOSCOPY    Anesthesia Start: 1112 Anesthesia Stop: 1129    Procedure: ESOPHAGOGASTRODUODENOSCOPY WITH BX'S (N/A Esophagus) Diagnosis:       Esophagitis      Gastritis      Duodenogastric bile reflux      (Melena [K92.1])    Surgeons: Azam Kat MD Provider: Kimani Cerda MD    Anesthesia Type: MAC ASA Status: 3          Anesthesia Type: MAC    Vitals  Vitals Value Taken Time   /100 07/01/21 1153   Temp     Pulse 80 07/01/21 1153   Resp 18 07/01/21 1153   SpO2 95 % 07/01/21 1153           Post Anesthesia Care and Evaluation    Patient location during evaluation: bedside  Patient participation: complete - patient participated  Level of consciousness: sleepy but conscious  Pain score: 0  Pain management: adequate  Airway patency: patent  Anesthetic complications: No anesthetic complications    Cardiovascular status: acceptable  Respiratory status: acceptable  Hydration status: acceptable    Comments: /100 (BP Location: Left leg, Patient Position: Lying)   Pulse 80   Temp 36.6 °C (97.9 °F) (Oral)   Resp 18   Ht 165.1 cm (65\")   Wt 80 kg (176 lb 5.9 oz)   LMP  (LMP Unknown)   SpO2 95%   BMI 29.35 kg/m²         " Include Location In Plan?: Yes Hide Include Location In Plan Question?: No Detail Level: Zone

## 2024-09-03 NOTE — NURSING NOTE
95 Pt has not voided since F/C d/c'd. States she does not void much or often & would not allow a straight cath since she is having dialysis today.   65

## 2025-03-14 NOTE — PROGRESS NOTES
"Chief Complaint  Diabetes (not checking BG; needing new supplies; last eye exam a year ago)    Subjective          Maribeth Rendon presents to [facility] for   History of Present Illness     70-year-old white female is here for the follow-up of type 2 diabetes mellitus.  September 2020 with stroke and she has right-sided hemiplegia, in a wheelchair today.  Was in the hospital for hypoglycemia, dropped to 27 on trulicity and tradjenta  She does not eat a lot and has low appetite           Type 2 dm - Diagnosed about 10-15 years ago.  Today in clinic pt reports being on Tradjenta 5 mg oral daily, OFF Trulicity 0.75 mg subcutaneous weekly after hypoglycemia   FBG -100-140  Pre meals -below 140 for most times  Checks BG -not checking, machine is broken   Sensor -no  Dm retinopathy -no,Last eye exam -in the last 1 year  Dm nephropathy -yes, end-stage renal disease but not on dialysis yet but does have fistula in place for the last 5 years   Dm neuropathy -moderate to severe,Dm neuropathy meds -not on meds  CAD -history of CABG  CVA - September 2020 with stroke  Episodes of hypoglycemia- just recently down to 27 needing hospitilization but had none prior to that. Her glucometer was old so unsure of reliability and now currently not working   Pt is not physically active. weight fluctuates.   Pt tries to follow DM diet for most part.   On ARB    Objective   Vital Signs:   /68   Resp 16   Ht 162.6 cm (64\")   Wt 71.2 kg (157 lb)   BMI 26.95 kg/m²     Physical Exam  Vitals signs reviewed.   Constitutional:       General: She is not in acute distress.     Comments: Wheel chair    HENT:      Head: Normocephalic and atraumatic.   Neck:      Musculoskeletal: Normal range of motion and neck supple.   Cardiovascular:      Rate and Rhythm: Normal rate and regular rhythm.   Pulmonary:      Effort: Pulmonary effort is normal. No respiratory distress.   Musculoskeletal: Normal range of motion.    " Pt okay with pain management recommendation.    Referral pended for review/signature.        General: No signs of injury.   Skin:     General: Skin is warm and dry.   Neurological:      Mental Status: She is alert and oriented to person, place, and time. Mental status is at baseline.   Psychiatric:         Mood and Affect: Mood normal.         Behavior: Behavior normal.         Thought Content: Thought content normal.         Judgment: Judgment normal.        Result Review :   The following data was reviewed by: IRENA Tovar on 02/03/2021:  Common labs    Common Labsle 1/26/21 1/27/21 1/29/21   WBC 7.62 8.29    Hemoglobin 10.0 (A) 10.7 (A) 10.8 (A)   Hematocrit 32.7 (A) 34.3 (A) 34.1   Platelets 154 178    (A) Abnormal value                      Assessment and Plan    Problem List Items Addressed This Visit        Other    Type 2 diabetes mellitus (CMS/[facility]) - Primary    Relevant Medications    Glucagon (Baqsimi Two Pack) 3 MG/DOSE powder    Continuous Blood Gluc Transmit (Dexcom G6 Transmitter) misc    Continuous Blood Gluc Sensor (Dexcom G6 Sensor)    glucose monitor monitoring kit    Other Relevant Orders    Hemoglobin A1c          Follow Up   Return in about 3 months (around 5/3/2021).   Send RX for baqsimi and coupon given  Get new meter, sample given   Stay on tradjenta only, no trulicity   Resume testing blood sugars 4x/day with a regular eating pattern  Try to get dexcom, samples given   High risk of diabetic complications with recent complicated hypoglycemic event not on insulin     Patient was given instructions and counseling regarding her condition or for health maintenance advice. Please see specific information pulled into the AVS if appropriate.     IRENA Tovar

## (undated) DEVICE — SUT SILK 2/0 TIES 18IN A185H

## (undated) DEVICE — PK CATH CARD 40

## (undated) DEVICE — SENSR CERBRL O2 PK/2

## (undated) DEVICE — ROTATING SURGICAL PUNCHES, 1 PER POUCH: Brand: A&E MEDICAL / ROTATING SURGICAL PUNCHES

## (undated) DEVICE — ADHS SKIN DERMABOND TOP ADVANCED

## (undated) DEVICE — TUBING, SUCTION, 1/4" X 10', STRAIGHT: Brand: MEDLINE

## (undated) DEVICE — CANN AORT ROOT DLP 12G 9F

## (undated) DEVICE — ST. SORBAVIEW ULTIMATE IJ SYSTEM A,C: Brand: CENTURION

## (undated) DEVICE — DRSNG WND STRIP OPTIFOAM AG SUPRABS A/MIC 4X12IN STRL

## (undated) DEVICE — CANN ART DLP 1PC EDPA A/ 20F

## (undated) DEVICE — BNDG ELAS ELITE V/CLOSE 6IN 5YD LF STRL

## (undated) DEVICE — 28 FR RIGHT ANGLE – SOFT PVC CATHETER: Brand: PVC THORACIC CATHETERS

## (undated) DEVICE — ADAPT CARDIO VNT Y/TYP

## (undated) DEVICE — CANN VESL FREE FLO 2MM

## (undated) DEVICE — Device

## (undated) DEVICE — PK HEART OPN 40

## (undated) DEVICE — VESSEL LOOPS X-RAY DETECTABLE: Brand: DEROYAL

## (undated) DEVICE — SUT ETHIBOND EXCEL POLYSTR DBL/ARM BB 3/0 36IN

## (undated) DEVICE — BIOPATCH™ ANTIMICROBIAL DRESSING WITH CHLORHEXIDINE GLUCONATE IS A HYDROPHILLIC POLYURETHANE ABSORPTIVE FOAM WITH CHLORHEXIDINE GLUCONATE (CHG) WHICH INHIBITS BACTERIAL GROWTH UNDER THE DRESSING. THE DRESSING IS INTENDED TO BE USED TO ABSORB EXUDATE, COVER A WOUND CAUSED BY VASCULAR AND NONVASCULAR PERCUTANEOUS MEDICAL DEVICES DURING SURGERY, AS WELL AS REDUCE LOCAL INFECTION AND COLONIZATION OF MICROORGANISMS.: Brand: BIOPATCH

## (undated) DEVICE — SOL ISO/ALC RUB 70PCT 4OZ

## (undated) DEVICE — CORONARY ARTERY BYPASS GRAFT MARKERS, STAINLESS STEEL, DISTAL, WITHOUT HOLDER: Brand: ANASTOMARK CORONARY ARTERY BYPASS GRAFT MARKERS, STAINLESS STEEL, DISTAL

## (undated) DEVICE — SENSR O2 OXIMAX FNGR A/ 18IN NONSTR

## (undated) DEVICE — HEMOCONCENTRATOR PERFUS LPS06

## (undated) DEVICE — CATH DIAG IMPULSE FR4 5F 100CM

## (undated) DEVICE — MSK ENDO PORT O2 POM ELITE CURAPLEX A/

## (undated) DEVICE — 28 FR STRAIGHT – SOFT PVC CATHETER: Brand: PVC THORACIC CATHETERS

## (undated) DEVICE — DRP SLUSH WARMR MACH 52X66IN OM-ORS-301

## (undated) DEVICE — LN SMPL CO2 SHTRM SD STREAM W/M LUER

## (undated) DEVICE — SUT SILK 0 CT1 CR8 18IN C021D

## (undated) DEVICE — ST BLD COL SAFETYLOK LS 23GA 3/4IN 12IN

## (undated) DEVICE — CANN O2 ETCO2 FITS ALL CONN CO2 SMPL A/ 7IN DISP LF

## (undated) DEVICE — TBG ART PRESS 60 IN

## (undated) DEVICE — TEMP PACING WIRE: Brand: MYO/WIRE

## (undated) DEVICE — 12 FOOT DISPOSABLE EXTENSION CABLE WITH SAFE CONNECT / SCREW-DOWN

## (undated) DEVICE — ANTIBACTERIAL UNDYED BRAIDED (POLYGLACTIN 910), SYNTHETIC ABSORBABLE SUTURE: Brand: COATED VICRYL

## (undated) DEVICE — KT ORCA ORCAPOD DISP STRL

## (undated) DEVICE — BLOWER/MISTER AXIOUS OPCAB W/TBG

## (undated) DEVICE — ST PERFUS M/

## (undated) DEVICE — INSUFFLATION TUBING,LAPAROSCOPIC: Brand: DEROYAL

## (undated) DEVICE — SPNG GZ WOVN 4X4IN 12PLY 10/BX STRL

## (undated) DEVICE — ST TOURNI COMPL A/ 7IN

## (undated) DEVICE — SOL IRR NACL 0.9PCT BT 1000ML

## (undated) DEVICE — BNDG ELAS ELITE V/CLOSE 4IN 5YD LF STRL

## (undated) DEVICE — GOWN,PREVENTION PLUS,XLONG/XLARGE,STRL: Brand: MEDLINE

## (undated) DEVICE — ADAPT CLN BIOGUARD AIR/H2O DISP

## (undated) DEVICE — OASIS DRAIN, SINGLE, INLINE & ATS COMPATIBLE: Brand: OASIS

## (undated) DEVICE — CLAMP INSERT: Brand: STEALTH® CLAMP INSERT

## (undated) DEVICE — PK AV FISTL/SHNT 40

## (undated) DEVICE — GW EMR FIX EXCHG J STD .035 3MM 260CM

## (undated) DEVICE — KT MANIFLD CARDIAC

## (undated) DEVICE — THE TORRENT IRRIGATION SCOPE CONNECTOR IS USED WITH THE TORRENT IRRIGATION TUBING TO PROVIDE IRRIGATION FLUIDS SUCH AS STERILE WATER DURING GASTROINTESTINAL ENDOSCOPIC PROCEDURES WHEN USED IN CONJUNCTION WITH AN IRRIGATION PUMP (OR ELECTROSURGICAL UNIT).: Brand: TORRENT

## (undated) DEVICE — CAUTERY TIP POLISHER: Brand: DEVON

## (undated) DEVICE — GLV SURG NEOLON 2G PF LF 7.5 STRL

## (undated) DEVICE — GLV SURG SENSICARE GREEN W/ALOE PF LF 8.5 STRL

## (undated) DEVICE — ADHS SKIN DERMABOND PROPEN

## (undated) DEVICE — CANN ART EOPA 3D NV W/CONN 20F

## (undated) DEVICE — SUT SILK 4/0 TIES 18IN A183H

## (undated) DEVICE — SUT ETHIBOND EXCEL POLYSTR DBL/ARM SH 3/0 30IN

## (undated) DEVICE — SOL IRR H2O BTL 1000ML STRL

## (undated) DEVICE — SUT SILK 3/0 SH CR5 18IN C0135

## (undated) DEVICE — DRSNG SURESITE WNDW 2.38X2.75

## (undated) DEVICE — SOL NS 500ML

## (undated) DEVICE — SUT PROLN 5/0 RB1 D/A 36IN 8556H

## (undated) DEVICE — SUT PROLN 6/0 BV1 D/A 30IN 8709H

## (undated) DEVICE — GLV SURG BIOGEL LTX PF 8 1/2

## (undated) DEVICE — FRCP BX RADJAW4 NDL 2.8 240CM LG OG BX40

## (undated) DEVICE — SUT SILK 3/0 TIES 18IN A184H

## (undated) DEVICE — GLIDESHEATH SLENDER STAINLESS STEEL KIT: Brand: GLIDESHEATH SLENDER

## (undated) DEVICE — CATH DIAG IMPULSE FL3.5 5F 100CM

## (undated) DEVICE — BLD BEAVER MICROSHARP 15D 3MM BLU LF

## (undated) DEVICE — VASOVIEW HEMOPRO: Brand: VASOVIEW HEMOPRO

## (undated) DEVICE — SYS PERFUS SEP PLATLT W TIPS CUST

## (undated) DEVICE — SUT PROLN 7/0 BV1 D/A 30IN 8703H

## (undated) DEVICE — 3M™ MEDIPORE™ H SOFT CLOTH SURGICAL TAPE 2862, 2 INCH X 10 YARD (5CM X 9,1M), 12 ROLLS/CASE: Brand: 3M™ MEDIPORE™

## (undated) DEVICE — CATHETER,SUCTION,14FR,WHISTL,STR PK,CAL: Brand: MEDLINE

## (undated) DEVICE — PK ATS CUST W CARDIOTOMY RESEVOIR

## (undated) DEVICE — BITEBLOCK OMNI BLOC

## (undated) DEVICE — ADAPT ANTEGRADE RETRGR

## (undated) DEVICE — ISOLATION BAG: Brand: CONVERTORS

## (undated) DEVICE — PK PERFUS CUST W/CARDIOPLEGIA

## (undated) DEVICE — CANN RETRGR STYLET RSCP 15F